# Patient Record
Sex: FEMALE | Race: WHITE | NOT HISPANIC OR LATINO | Employment: OTHER | URBAN - METROPOLITAN AREA
[De-identification: names, ages, dates, MRNs, and addresses within clinical notes are randomized per-mention and may not be internally consistent; named-entity substitution may affect disease eponyms.]

---

## 2017-01-05 ENCOUNTER — HOSPITAL ENCOUNTER (OUTPATIENT)
Dept: MAMMOGRAPHY | Facility: CLINIC | Age: 72
Discharge: HOME | End: 2017-01-05
Attending: FAMILY MEDICINE | Admitting: FAMILY MEDICINE
Payer: MEDICARE

## 2017-01-05 DIAGNOSIS — Z12.31 VISIT FOR SCREENING MAMMOGRAM: ICD-10-CM

## 2017-01-05 PROCEDURE — G0202 SCR MAMMO BI INCL CAD: HCPCS

## 2017-01-08 ENCOUNTER — HOSPITAL ENCOUNTER (EMERGENCY)
Facility: CLINIC | Age: 72
Discharge: HOME | End: 2017-01-08
Attending: EMERGENCY MEDICINE | Admitting: EMERGENCY MEDICINE
Payer: MEDICARE

## 2017-01-08 ENCOUNTER — APPOINTMENT (OUTPATIENT)
Dept: GENERAL RADIOLOGY | Facility: CLINIC | Age: 72
End: 2017-01-08
Attending: EMERGENCY MEDICINE
Payer: MEDICARE

## 2017-01-08 VITALS
DIASTOLIC BLOOD PRESSURE: 80 MMHG | TEMPERATURE: 98 F | SYSTOLIC BLOOD PRESSURE: 117 MMHG | RESPIRATION RATE: 16 BRPM | OXYGEN SATURATION: 96 % | HEART RATE: 77 BPM

## 2017-01-08 DIAGNOSIS — R06.02 SHORTNESS OF BREATH: ICD-10-CM

## 2017-01-08 DIAGNOSIS — R07.89 CHEST DISCOMFORT: ICD-10-CM

## 2017-01-08 DIAGNOSIS — R06.09 DOE (DYSPNEA ON EXERTION): ICD-10-CM

## 2017-01-08 LAB
ALBUMIN SERPL-MCNC: 3.9 G/DL (ref 3.4–5)
ALP SERPL-CCNC: 62 U/L (ref 40–150)
ALT SERPL W P-5'-P-CCNC: 58 U/L (ref 0–50)
ANION GAP SERPL CALCULATED.3IONS-SCNC: 8 MMOL/L (ref 3–14)
AST SERPL W P-5'-P-CCNC: 37 U/L (ref 0–45)
BASOPHILS # BLD AUTO: 0 10E9/L (ref 0–0.2)
BASOPHILS NFR BLD AUTO: 0.4 %
BILIRUB SERPL-MCNC: 0.3 MG/DL (ref 0.2–1.3)
BUN SERPL-MCNC: 14 MG/DL (ref 7–30)
CALCIUM SERPL-MCNC: 8.6 MG/DL (ref 8.5–10.1)
CHLORIDE SERPL-SCNC: 107 MMOL/L (ref 94–109)
CO2 SERPL-SCNC: 28 MMOL/L (ref 20–32)
CREAT SERPL-MCNC: 0.74 MG/DL (ref 0.52–1.04)
DIFFERENTIAL METHOD BLD: NORMAL
EOSINOPHIL # BLD AUTO: 0.1 10E9/L (ref 0–0.7)
EOSINOPHIL NFR BLD AUTO: 1.3 %
ERYTHROCYTE [DISTWIDTH] IN BLOOD BY AUTOMATED COUNT: 13.8 % (ref 10–15)
GFR SERPL CREATININE-BSD FRML MDRD: 78 ML/MIN/1.7M2
GLUCOSE SERPL-MCNC: 89 MG/DL (ref 70–99)
HCT VFR BLD AUTO: 36.6 % (ref 35–47)
HGB BLD-MCNC: 11.7 G/DL (ref 11.7–15.7)
IMM GRANULOCYTES # BLD: 0 10E9/L (ref 0–0.4)
IMM GRANULOCYTES NFR BLD: 0.3 %
LYMPHOCYTES # BLD AUTO: 1.8 10E9/L (ref 0.8–5.3)
LYMPHOCYTES NFR BLD AUTO: 26.1 %
MCH RBC QN AUTO: 29.8 PG (ref 26.5–33)
MCHC RBC AUTO-ENTMCNC: 32 G/DL (ref 31.5–36.5)
MCV RBC AUTO: 93 FL (ref 78–100)
MONOCYTES # BLD AUTO: 0.6 10E9/L (ref 0–1.3)
MONOCYTES NFR BLD AUTO: 8.1 %
NEUTROPHILS # BLD AUTO: 4.5 10E9/L (ref 1.6–8.3)
NEUTROPHILS NFR BLD AUTO: 63.8 %
PLATELET # BLD AUTO: 286 10E9/L (ref 150–450)
POTASSIUM SERPL-SCNC: 4 MMOL/L (ref 3.4–5.3)
PROT SERPL-MCNC: 6.6 G/DL (ref 6.8–8.8)
RBC # BLD AUTO: 3.93 10E12/L (ref 3.8–5.2)
SODIUM SERPL-SCNC: 143 MMOL/L (ref 133–144)
TROPONIN I SERPL-MCNC: NORMAL UG/L (ref 0–0.04)
WBC # BLD AUTO: 7 10E9/L (ref 4–11)

## 2017-01-08 PROCEDURE — 99284 EMERGENCY DEPT VISIT MOD MDM: CPT | Mod: 25 | Performed by: EMERGENCY MEDICINE

## 2017-01-08 PROCEDURE — 93005 ELECTROCARDIOGRAM TRACING: CPT

## 2017-01-08 PROCEDURE — 93010 ELECTROCARDIOGRAM REPORT: CPT | Performed by: EMERGENCY MEDICINE

## 2017-01-08 PROCEDURE — 85025 COMPLETE CBC W/AUTO DIFF WBC: CPT | Performed by: EMERGENCY MEDICINE

## 2017-01-08 PROCEDURE — 80053 COMPREHEN METABOLIC PANEL: CPT | Performed by: EMERGENCY MEDICINE

## 2017-01-08 PROCEDURE — 71020 XR CHEST 2 VW: CPT

## 2017-01-08 PROCEDURE — 84484 ASSAY OF TROPONIN QUANT: CPT | Performed by: EMERGENCY MEDICINE

## 2017-01-08 PROCEDURE — 99285 EMERGENCY DEPT VISIT HI MDM: CPT | Mod: 25

## 2017-01-08 NOTE — ED NOTES
"Pt presents to ED with shortness of breath. Pt notes she donated blood on Friday and hasn't felt right since (low hemoglobin going in). Pt feels like she can't get in a full breath. Pt also reports a pressure sensation across her chest. Pt denies pain. Eating and drinking normally. No change in bowel or bladder. No dizziness or lightheadedness, but she feels \"out-of-sorts\".     Recent change in meds: Doubled nexium dose around millicent.   "

## 2017-01-08 NOTE — ED PROVIDER NOTES
"  History     Chief Complaint   Patient presents with     Shortness of Breath     HPI     Kristen Thompson is a 71 year old female who presents to the ED today with complaints of shortness of breath after donating blood on Friday. She reports that she \"feels out of sorts\" and has shortness of breath even at rest. She has a history of an abnormal stress test in , noted below.  She takes Lasix when she experiences edema in her hands or ankles. She gets her weight daily and has not noticed any fluctuations in her weight. She denies any dizziness, cough, congestion, weight gain, edema, or palpitations.     Previous Records Reviewed:  Procedure: CT CORONARY ARTERY ANGIO    Examination Date: 2014 11:00 AM       IMPRESSION:     1. Total Agatston score 46.49, placing the patient in the 50 to 75th  percentile when compared to age and gender matched control group.     2. Mild two vessel coronary artery disease primarily involving the  LMCA,LAD, and RCA with no significant or flow limited stenosis.     3.  Please review Radiology report for incidental noncardiac findings  that  will follow separately.     FINDINGS:   CORONARY CALCIUM SCORE: The total Agatston calcium score is 46.49,  Left main: 32.21, left anterior descendin.2,  circumflex: 0, right  coronary artery: 13.09. This places the patient in the 50th to the  75th percentile when compared to age and gender matched control group.     CORONARY CT ANGIOGRAPHY     DOMINANCE: Right dominant system.       LEFT MAIN: The left main arises normally from the left coronary cusp  and is widely patent with mild mid to distal mixed plaque with minimal  narrowing.     LEFT ANTERIOR DESCENDING: The left anterior descending and its major  diagonal branches are widely patent with minimal diffuse scattered  soft plaque with no flow limiting stenosis.        CIRCUMFLEX: The circumflex and major branches are nondominant small  vessels with trivial soft plaque and no " stenosis     RIGHT CORONARY ARTERY: The right coronary artery is dominant, is  widely patent, and has mild narrowing with  trivial-mild mixed and  calcified plaque in the proximal segment.     ADDITIONAL FINDINGS:       The proximal ascending aorta is normal in size.       Normal pulmonary venous anatomy with all four pulmonary veins draining  into the left atrium.        There is no left ventricular mass or thrombus.       Normal pericardial thickness. There is no pericardial effusion.     The proximal pulmonary arteries are well opacified.     Please review Radiology report for incidental noncardiac findings that  will follow separately.   Pedro Cotter MD    No enlarged lymph nodes identified. The visible liver, spleen, and  left kidney are unremarkable. No acute osseous abnormality. Minimal  scattered bibasilar opacities corresponding to atelectasis. No  pulmonary masses.     JOSE MARTIN DALE MD    I have reviewed the Medications, Allergies, Past Medical and Surgical History, and Social History in the Epic system.    Patient Active Problem List   Diagnosis     GERD (gastroesophageal reflux disease)     CARDIOVASCULAR SCREENING; LDL GOAL LESS THAN 160     HL (hearing loss)     Health Care Home     Arthritis     Advanced directives, counseling/discussion     Hyperlipidemia LDL goal <130     Chronic constipation     Gastritis     Cervicalgia     Current Outpatient Prescriptions   Medication Sig Dispense Refill     vitamin B complex with vitamin C (VITAMIN  B COMPLEX) TABS tablet Take 1 tablet by mouth daily       Acetaminophen (TYLENOL PO) Take 1,000 mg by mouth every 4 hours as needed for mild pain or fever       esomeprazole (NEXIUM) 40 MG CR capsule Take 1 capsule (40 mg) by mouth every morning (before breakfast) Take 30-60 minutes before a eating. 30 capsule 1     fluticasone (FLONASE) 50 MCG/ACT spray Spray 1-2 sprays into both nostrils daily 16 g 11     simvastatin (ZOCOR) 40 MG tablet Take 1 tablet (40 mg)  by mouth At Bedtime 90 tablet 2     MAGNESIUM-POTASSIUM PO Take by mouth daily        aspirin 81 MG EC tablet Take 1 tablet (81 mg) by mouth daily 90 tablet 3     Red Yeast Rice 600 MG TABS Take 2 tablets by mouth daily       CALCIUM PO Take by mouth daily        Omega-3 Fatty Acids (OMEGA 3 PO) Take  by mouth.       Cholecalciferol (VITAMIN D PO) Take 1,000 Units by mouth daily        nitroglycerin (NITROSTAT) 0.4 MG sublingual tablet For chest pain place 1 tablet under the tongue every 5 minutes for 3 doses. If symptoms persist 5 minutes after 1st dose call 911. 25 tablet 0     furosemide (LASIX) 20 MG tablet Take 0.5 tablets (10 mg) by mouth daily as needed 30 tablet 0     cetirizine (ZYRTEC) 10 MG tablet Take 10 mg by mouth daily       Allergies   Allergen Reactions     Codeine Sulfate      Nausea and vomiting      Quinapril Difficulty breathing     Darvon-N [Propoxyphene Napsylate] Nausea and Vomiting       Review of Systems   Constitutional: Negative for unexpected weight change.   HENT: Negative for congestion.    Respiratory: Positive for chest tightness and shortness of breath. Negative for cough.    Cardiovascular: Positive for chest pain. Negative for palpitations and leg swelling.   Neurological: Negative for dizziness.       Physical Exam     BP: 117/78 mmHg  Pulse: 77  Heart Rate: 77  Temp: 98  F (36.7  C)  Resp: 20  SpO2: 98 %    Physical Exam     Head:  Normocephalic.    Eyes:  PERRLA, full EOM.  External exams normal.    Ears:  Normal pinnae, canals, and TM's.    Nose:  Patent, without deformity.    Throat:  Moist mucous membranes without lesions, erythema, or exudate.    Neck:  Supple, without masses, lymphadenopathy or tenderness.    Respiratory:  Normal respiratory effort.  Lungs are clear with good breath sounds.    Heart:  RR without murmurs, rubs, or gallops.    Chest: Nontender and without deformity.  Extremities: Normal perfusion.  No edema    ED Course   Procedures        EKG: Normal sinus  rhythm.  No ectopy.  Normal repolarization.  Rate 64 bpm.  Comparison EKG completed 2015 shows no change.         Results for orders placed or performed during the hospital encounter of 01/08/17   XR Chest 2 Views    Narrative    CHEST TWO VIEWS    1/8/2017 5:10 PM     HISTORY: Dyspnea on exertion.    COMPARISON: 12/31/2015.      Impression    IMPRESSION: Normal.     TYREE BRIZUELA MD   CBC with platelets differential   Result Value Ref Range    WBC 7.0 4.0 - 11.0 10e9/L    RBC Count 3.93 3.8 - 5.2 10e12/L    Hemoglobin 11.7 11.7 - 15.7 g/dL    Hematocrit 36.6 35.0 - 47.0 %    MCV 93 78 - 100 fl    MCH 29.8 26.5 - 33.0 pg    MCHC 32.0 31.5 - 36.5 g/dL    RDW 13.8 10.0 - 15.0 %    Platelet Count 286 150 - 450 10e9/L    Diff Method Automated Method     % Neutrophils 63.8 %    % Lymphocytes 26.1 %    % Monocytes 8.1 %    % Eosinophils 1.3 %    % Basophils 0.4 %    % Immature Granulocytes 0.3 %    Absolute Neutrophil 4.5 1.6 - 8.3 10e9/L    Absolute Lymphocytes 1.8 0.8 - 5.3 10e9/L    Absolute Monocytes 0.6 0.0 - 1.3 10e9/L    Absolute Eosinophils 0.1 0.0 - 0.7 10e9/L    Absolute Basophils 0.0 0.0 - 0.2 10e9/L    Abs Immature Granulocytes 0.0 0 - 0.4 10e9/L   Comprehensive metabolic panel   Result Value Ref Range    Sodium 143 133 - 144 mmol/L    Potassium 4.0 3.4 - 5.3 mmol/L    Chloride 107 94 - 109 mmol/L    Carbon Dioxide 28 20 - 32 mmol/L    Anion Gap 8 3 - 14 mmol/L    Glucose 89 70 - 99 mg/dL    Urea Nitrogen 14 7 - 30 mg/dL    Creatinine 0.74 0.52 - 1.04 mg/dL    GFR Estimate 78 >60 mL/min/1.7m2    GFR Estimate If Black >90   GFR Calc   >60 mL/min/1.7m2    Calcium 8.6 8.5 - 10.1 mg/dL    Bilirubin Total 0.3 0.2 - 1.3 mg/dL    Albumin 3.9 3.4 - 5.0 g/dL    Protein Total 6.6 (L) 6.8 - 8.8 g/dL    Alkaline Phosphatase 62 40 - 150 U/L    ALT 58 (H) 0 - 50 U/L    AST 37 0 - 45 U/L   Troponin I   Result Value Ref Range    Troponin I ES  0.000 - 0.045 ug/L     <0.015  The 99th percentile for upper  reference range is 0.045 ug/L.  Troponin values in   the range of 0.045 - 0.120 ug/L may be associated with risks of adverse   clinical events.           4:06 PM Patient Assessed        Assessments & Plan (with Medical Decision Making)  71-year-old female presents with dyspnea and vague chest discomfort.  Present since she donated one unit of blood a few days ago.  Symptoms do not escalate with activity.  She is noted no palpitations.  No recent chest congestion or cough.  No leg swelling.  No pleuritic type chest pain.    Her examination noted no infectious concern.  There is no signs for DVT/PE.  No indications for CHF.  She was noted not to be anemic her hemoglobin is down 2 g which I presume is secondary to her recent donating blood in the past week.  I do not believe this declining hemoglobin would be sufficient to cause her symptoms of dyspnea and chest discomfort.    The patient had a CT cholangiogram completed 2 and half years ago in June 2014.  At that time she had early RCA and LAD disease which they felt should be managed medically with aspirin and statin therapy.  Patient has been compliant with recommendations including exercise and weight loss though in recent months stopped statin therapy can she thought she didn't need it due to weight loss.    At the time of discharge she was having no symptoms.  No chest discomfort or dyspnea.  Appeared anxious.  Her initial troponin was normal and a repeat was not felt necessary because her symptoms had been present for a few days.  EKG was also normal.    Patient was scheduled for Lexiscan/myocardial perfusion scan.  She was advised and her current medications including aspirin.  Advised to return to emergency if she has any further chest discomfort or escorting symptoms of dyspnea.  She routinely checks her weight daily.  Advised to continue doing so.  At this time not show any fluid retention.    Advised to follow-up with her primary clinic provider to go over  Lexiscan results .     I have reviewed the nursing notes.    I have reviewed the findings, diagnosis, plan and need for follow up with the patient.    Discharge Medication List as of 1/8/2017  5:54 PM          Final diagnoses:   Chest discomfort   Shortness of breath   HERNANDEZ (dyspnea on exertion)     This document serves as a record of the services and decisions personally performed and made by Frederick Santiago, *. It was created on HIS/HER behalf by Gia Montez, a trained medical scribe. The creation of this document is based the provider's statements to the medical scribe.  Gia Montez   4:07 PM 1/8/2017    Provider:   The information in this document, created by the medical scribe for me, accurately reflects the services I personally performed and the decisions made by me. I have reviewed and approved this document for accuracy prior to leaving the patient care area.  Frederick Snatiago, * 4:07 PM 1/8/2017 1/8/2017   [facility], [place] EMERGENCY DEPARTMENT      Frederick Santiago, DO  01/08/17 1753    Frederick Santiago, DO  01/13/17 0011

## 2017-01-08 NOTE — ED AVS SNAPSHOT
[facility] Emergency Department    [address] [address]    Phone:  [phone]    Fax:  [phone]                                       Kristen Thompson   MRN: [id number]    Department:  [facility] Emergency Department   Date of Visit:  1/8/2017           Patient Information     Date Of Birth          1945        Your diagnoses for this visit were:     Chest discomfort     Shortness of breath        You were seen by Frederick Santiago DO.        Discharge Instructions       Continue with current medications  Maintain good hydration  Your symptoms cannot entirely be assigned to recent donation of blood.  Current hemoglobin still remains greater than 11.    Chest x-ray is normal  EKG shows no acute cardiac concerns  Cardiac enzyme-troponin was normal  Hgb is 2 grams lower then your normal- related to recent blood donation. Not low enough to cause your symptoms.    Review of your CT coronary angiogram from June 2014 showed evidence for early coronary artery disease involving the right coronary artery and the left anterior descending coronary artery.  At that time there was no flow limiting concerns and therefore they elected to treat with medical management-aspirin and statin therapy.    With 2-1/2 years passing since last study evaluating her coronary arteries now having the development of intermittent chest discomfort with shortness of breath-I recommend arranging for repeat cardiac stress test/perfusion scan.    While waiting to complete the cardiac stress priyanka- if you have symptoms that continue to escalate please return to the emergency room.    Upon completion of your cardiac stress test-Lexiscan.  Contact your primary clinic provider to discuss results.    24 Hour Appointment Hotline       To make an appointment at any [facility], call [phone] ([phone]). If you don't have a family doctor or clinic, we will help you find one. [facility] are  conveniently located to serve the needs of you and your family.          ED Discharge Orders     NM Lexiscan stress test       The type of stress to be performed (pharmacologic or physical) will be at the discretion of the supervising physician as per department protocol.                     Review of your medicines      Our records show that you are taking the medicines listed below. If these are incorrect, please call your family doctor or clinic.        Dose / Directions Last dose taken    aspirin 81 MG EC tablet   Dose:  81 mg   Quantity:  90 tablet        Take 1 tablet (81 mg) by mouth daily   Refills:  3        CALCIUM PO        Take by mouth daily   Refills:  0        cetirizine 10 MG tablet   Commonly known as:  zyrTEC   Dose:  10 mg        Take 10 mg by mouth daily   Refills:  0        esomeprazole 40 MG CR capsule   Commonly known as:  nexIUM   Dose:  40 mg   Quantity:  30 capsule        Take 1 capsule (40 mg) by mouth every morning (before breakfast) Take 30-60 minutes before a eating.   Refills:  1        fluticasone 50 MCG/ACT spray   Commonly known as:  FLONASE   Dose:  1-2 spray   Quantity:  16 g        Spray 1-2 sprays into both nostrils daily   Refills:  11        furosemide 20 MG tablet   Commonly known as:  LASIX   Dose:  10 mg   Quantity:  30 tablet        Take 0.5 tablets (10 mg) by mouth daily as needed   Refills:  0        MAGNESIUM-POTASSIUM PO        Take by mouth daily   Refills:  0        OMEGA 3 PO        Take  by mouth.   Refills:  0        Red Yeast Rice 600 MG Tabs   Dose:  2 tablet        Take 2 tablets by mouth daily   Refills:  0        simvastatin 40 MG tablet   Commonly known as:  ZOCOR   Dose:  40 mg   Quantity:  90 tablet        Take 1 tablet (40 mg) by mouth At Bedtime   Refills:  2        TYLENOL PO   Dose:  1000 mg        Take 1,000 mg by mouth every 4 hours as needed for mild pain or fever   Refills:  0        vitamin B complex with vitamin C Tabs tablet   Dose:  1 tablet         Take 1 tablet by mouth daily   Refills:  0        VITAMIN D PO   Dose:  1000 Units        Take 1,000 Units by mouth daily   Refills:  0                Procedures and tests performed during your visit     CBC with platelets differential    Comprehensive metabolic panel    EKG 12-lead, tracing only    Troponin I    XR Chest 2 Views      Orders Needing Specimen Collection     None      Pending Results     No orders found from 1/7/2017 to 1/9/2017.            Pending Culture Results     No orders found from 1/7/2017 to 1/9/2017.       Test Results from your hospital stay           1/8/2017  4:06 PM - Interface, Flexilab Results      Component Results     Component Value Ref Range & Units Status    WBC 7.0 4.0 - 11.0 10e9/L Final    RBC Count 3.93 3.8 - 5.2 10e12/L Final    Hemoglobin 11.7 11.7 - 15.7 g/dL Final    Hematocrit 36.6 35.0 - 47.0 % Final    MCV 93 78 - 100 fl Final    MCH 29.8 26.5 - 33.0 pg Final    MCHC 32.0 31.5 - 36.5 g/dL Final    RDW 13.8 10.0 - 15.0 % Final    Platelet Count 286 150 - 450 10e9/L Final    Diff Method Automated Method  Final    % Neutrophils 63.8 % Final    % Lymphocytes 26.1 % Final    % Monocytes 8.1 % Final    % Eosinophils 1.3 % Final    % Basophils 0.4 % Final    % Immature Granulocytes 0.3 % Final    Absolute Neutrophil 4.5 1.6 - 8.3 10e9/L Final    Absolute Lymphocytes 1.8 0.8 - 5.3 10e9/L Final    Absolute Monocytes 0.6 0.0 - 1.3 10e9/L Final    Absolute Eosinophils 0.1 0.0 - 0.7 10e9/L Final    Absolute Basophils 0.0 0.0 - 0.2 10e9/L Final    Abs Immature Granulocytes 0.0 0 - 0.4 10e9/L Final         1/8/2017  4:21 PM - Interface, Flexilab Results      Component Results     Component Value Ref Range & Units Status    Sodium 143 133 - 144 mmol/L Final    Potassium 4.0 3.4 - 5.3 mmol/L Final    Chloride 107 94 - 109 mmol/L Final    Carbon Dioxide 28 20 - 32 mmol/L Final    Anion Gap 8 3 - 14 mmol/L Final    Glucose 89 70 - 99 mg/dL Final    Urea Nitrogen 14 7 - 30 mg/dL Final     Creatinine 0.74 0.52 - 1.04 mg/dL Final    GFR Estimate 78 >60 mL/min/1.7m2 Final    Non  GFR Calc    GFR Estimate If Black >90   GFR Calc   >60 mL/min/1.7m2 Final    Calcium 8.6 8.5 - 10.1 mg/dL Final    Bilirubin Total 0.3 0.2 - 1.3 mg/dL Final    Albumin 3.9 3.4 - 5.0 g/dL Final    Protein Total 6.6 (L) 6.8 - 8.8 g/dL Final    Alkaline Phosphatase 62 40 - 150 U/L Final    ALT 58 (H) 0 - 50 U/L Final    AST 37 0 - 45 U/L Final         1/8/2017  4:21 PM - Interface, Flexilab Results      Component Results     Component Value Ref Range & Units Status    Troponin I ES  0.000 - 0.045 ug/L Final    <0.015  The 99th percentile for upper reference range is 0.045 ug/L.  Troponin values in   the range of 0.045 - 0.120 ug/L may be associated with risks of adverse   clinical events.           1/8/2017  5:54 PM - Interface, Radiant Ib      Narrative     CHEST TWO VIEWS    1/8/2017 5:10 PM     HISTORY: Dyspnea on exertion.    COMPARISON: 12/31/2015.        Impression     IMPRESSION: Normal.     TYREE BRIZUELA MD                Thank you for choosing [facility]       Thank you for choosing [facility] for your care. Our goal is always to provide you with excellent care. Hearing back from our patients is one way we can continue to improve our services. Please take a few minutes to complete the written survey that you may receive in the mail after you visit with us. Thank you!        [email or web address] Information     [employer] gives you secure access to your electronic health record. If you see a primary care provider, you can also send messages to your care team and make appointments. If you have questions, please call your primary care clinic.  If you do not have a primary care provider, please call [phone] and they will assist you.        Care EveryWhere ID     This is your Care EveryWhere ID. This could be used by other organizations to access your [facility] medical records  [id number]        After Visit  Summary       This is your record. Keep this with you and show to your community pharmacist(s) and doctor(s) at your next visit.

## 2017-01-08 NOTE — DISCHARGE INSTRUCTIONS
Continue with current medications  Maintain good hydration  Your symptoms cannot entirely be assigned to recent donation of blood.  Current hemoglobin still remains greater than 11.    Chest x-ray is normal  EKG shows no acute cardiac concerns  Cardiac enzyme-troponin was normal  Hgb is 2 grams lower then your normal- related to recent blood donation. Not low enough to cause your symptoms.    Review of your CT coronary angiogram from June 2014 showed evidence for early coronary artery disease involving the right coronary artery and the left anterior descending coronary artery.  At that time there was no flow limiting concerns and therefore they elected to treat with medical management-aspirin and statin therapy.    With 2-1/2 years passing since last study evaluating her coronary arteries now having the development of intermittent chest discomfort with shortness of breath-I recommend arranging for repeat cardiac stress test/perfusion scan.    While waiting to complete the cardiac stress priyanka- if you have symptoms that continue to escalate please return to the emergency room.    Upon completion of your cardiac stress test-Lexiscan.  Contact your primary clinic provider to discuss results.

## 2017-01-08 NOTE — ED AVS SNAPSHOT
[facility] Emergency Department    [address] [address]    Phone:  [phone]    Fax:  [phone]                                       Kristen Thompson   MRN: [id number]    Department:  [facility] Emergency Department   Date of Visit:  1/8/2017           After Visit Summary Signature Page     I have received my discharge instructions, and my questions have been answered. I have discussed any challenges I see with this plan with the nurse or doctor.    ..........................................................................................................................................  Patient/Patient Representative Signature      ..........................................................................................................................................  Patient Representative Print Name and Relationship to Patient    ..................................................               ................................................  Date                                            Time    ..........................................................................................................................................  Reviewed by Signature/Title    ...................................................              ..............................................  Date                                                            Time

## 2017-01-12 ENCOUNTER — OFFICE VISIT (OUTPATIENT)
Dept: FAMILY MEDICINE | Facility: CLINIC | Age: 72
End: 2017-01-12
Payer: COMMERCIAL

## 2017-01-12 VITALS
HEART RATE: 70 BPM | DIASTOLIC BLOOD PRESSURE: 87 MMHG | HEIGHT: 62 IN | TEMPERATURE: 98.6 F | OXYGEN SATURATION: 97 % | BODY MASS INDEX: 28.16 KG/M2 | WEIGHT: 153 LBS | SYSTOLIC BLOOD PRESSURE: 147 MMHG

## 2017-01-12 DIAGNOSIS — R07.9 ACUTE CHEST PAIN: Primary | ICD-10-CM

## 2017-01-12 DIAGNOSIS — R68.84 JAW PAIN: ICD-10-CM

## 2017-01-12 LAB
ALBUMIN SERPL-MCNC: 3.9 G/DL (ref 3.4–5)
ALP SERPL-CCNC: 62 U/L (ref 40–150)
ALT SERPL W P-5'-P-CCNC: 65 U/L (ref 0–50)
ANION GAP SERPL CALCULATED.3IONS-SCNC: 9 MMOL/L (ref 3–14)
AST SERPL W P-5'-P-CCNC: 35 U/L (ref 0–45)
BASOPHILS # BLD AUTO: 0 10E9/L (ref 0–0.2)
BASOPHILS NFR BLD AUTO: 0.5 %
BILIRUB SERPL-MCNC: 0.3 MG/DL (ref 0.2–1.3)
BUN SERPL-MCNC: 12 MG/DL (ref 7–30)
CALCIUM SERPL-MCNC: 8.7 MG/DL (ref 8.5–10.1)
CHLORIDE SERPL-SCNC: 106 MMOL/L (ref 94–109)
CO2 SERPL-SCNC: 27 MMOL/L (ref 20–32)
CREAT SERPL-MCNC: 0.75 MG/DL (ref 0.52–1.04)
D DIMER PPP FEU-MCNC: 0.3 UG/ML FEU (ref 0–0.5)
DIFFERENTIAL METHOD BLD: ABNORMAL
EOSINOPHIL # BLD AUTO: 0.1 10E9/L (ref 0–0.7)
EOSINOPHIL NFR BLD AUTO: 1.4 %
ERYTHROCYTE [DISTWIDTH] IN BLOOD BY AUTOMATED COUNT: 14.2 % (ref 10–15)
GFR SERPL CREATININE-BSD FRML MDRD: 77 ML/MIN/1.7M2
GLUCOSE SERPL-MCNC: 98 MG/DL (ref 70–99)
HCT VFR BLD AUTO: 36.4 % (ref 35–47)
HGB BLD-MCNC: 11.5 G/DL (ref 11.7–15.7)
LYMPHOCYTES # BLD AUTO: 1.5 10E9/L (ref 0.8–5.3)
LYMPHOCYTES NFR BLD AUTO: 27.3 %
MCH RBC QN AUTO: 30.1 PG (ref 26.5–33)
MCHC RBC AUTO-ENTMCNC: 31.6 G/DL (ref 31.5–36.5)
MCV RBC AUTO: 95 FL (ref 78–100)
MONOCYTES # BLD AUTO: 0.6 10E9/L (ref 0–1.3)
MONOCYTES NFR BLD AUTO: 10.3 %
NEUTROPHILS # BLD AUTO: 3.4 10E9/L (ref 1.6–8.3)
NEUTROPHILS NFR BLD AUTO: 60.5 %
PLATELET # BLD AUTO: 296 10E9/L (ref 150–450)
POTASSIUM SERPL-SCNC: 3.9 MMOL/L (ref 3.4–5.3)
PROT SERPL-MCNC: 6.5 G/DL (ref 6.8–8.8)
RBC # BLD AUTO: 3.82 10E12/L (ref 3.8–5.2)
SODIUM SERPL-SCNC: 142 MMOL/L (ref 133–144)
TROPONIN I SERPL-MCNC: NORMAL UG/L (ref 0–0.04)
WBC # BLD AUTO: 5.7 10E9/L (ref 4–11)

## 2017-01-12 PROCEDURE — 80053 COMPREHEN METABOLIC PANEL: CPT | Performed by: FAMILY MEDICINE

## 2017-01-12 PROCEDURE — 85379 FIBRIN DEGRADATION QUANT: CPT | Performed by: FAMILY MEDICINE

## 2017-01-12 PROCEDURE — 93000 ELECTROCARDIOGRAM COMPLETE: CPT | Performed by: FAMILY MEDICINE

## 2017-01-12 PROCEDURE — 84484 ASSAY OF TROPONIN QUANT: CPT | Performed by: FAMILY MEDICINE

## 2017-01-12 PROCEDURE — 36415 COLL VENOUS BLD VENIPUNCTURE: CPT | Performed by: FAMILY MEDICINE

## 2017-01-12 PROCEDURE — 85025 COMPLETE CBC W/AUTO DIFF WBC: CPT | Performed by: FAMILY MEDICINE

## 2017-01-12 PROCEDURE — 99214 OFFICE O/P EST MOD 30 MIN: CPT | Performed by: FAMILY MEDICINE

## 2017-01-12 RX ORDER — NITROGLYCERIN 0.4 MG/1
TABLET SUBLINGUAL
Qty: 25 TABLET | Refills: 0 | Status: SHIPPED | OUTPATIENT
Start: 2017-01-12 | End: 2018-01-11

## 2017-01-12 NOTE — MR AVS SNAPSHOT
After Visit Summary   1/12/2017    Kristen Thompson    MRN: [id number]           Patient Information     Date Of Birth          1945        Visit Information        Provider Department      1/12/2017 2:30 PM Janett Morse MD [facility]        Today's Diagnoses     Acute chest pain    -  1     Jaw pain            Follow-ups after your visit        Your next 10 appointments already scheduled     Jan 16, 2017 12:00 PM   NM MPI WITH LEXISCAN with WYNM2, WY STRESS TEST   [facility] ([facility], [place])    [address] [address]   [phone]           For a ONE day exam: Allow 3-4 hours for test. For a TWO day exam: Allow 2 hours PER day for test.  You may need to stop some medicines before the test. Follow your doctor s orders. - If you take a beta blocker: Follow your doctor s specific instructions on taking it prior to and on the day of your exam. - If you take Aggrenox or dipyridamole (Persantine, Permole), stop taking it 48 hours before your test. - If you take Viagra, Cialis or Levitra, stop taking it 48 hours before your test. - If you take theophylline or aminophylline, stop taking it 12 hours before your test.  For patients with diabetes: - If you take insulin, call your diabetes care team. Ask if you should take a 1/2 dose the morning of your test. - If you take diabetes medicine by mouth, don t take it on the morning of your test. Bring it with you to take after the test. (If you have questions, call your diabetes care team.)  Do not take nitrates on the day of your test. Do not wear your Nitro-Patch.  Stop all caffeine 12 hours before the test. This includes coffee, tea, soda pop, chocolate and certain medicines (such as Anacin, Excedrin and NoDoz). Also avoid decaf coffee and tea, as these contain small amounts of caffeine.  No alcohol, smoking or other tobacco for 12 hours before the test.  Stop eating 3 hours  "before the test. You may drink water.  Please wear a loose two-piece outfit. If you will have an exercise test, bring rubber-soled walking shoes.  When you arrive, please tell us if you: - Have diabetes - Are breastfeeding - May be pregnant - Have a pacemaker of ICD (implantable defibrillator).  Please call your Imaging Department at your exam site with any questions.              Future tests that were ordered for you today     Open Future Orders        Priority Expected Expires Ordered    Zio Patch Holter Routine  2/26/2017 1/12/2017            Who to contact     Normal or non-critical lab and imaging results will be communicated to you by [employer], letter or phone within 4 business days after the clinic has received the results. If you do not hear from us within 7 days, please contact the clinic through [employer] or phone. If you have a critical or abnormal lab result, we will notify you by phone as soon as possible.  Submit refill requests through Storie or call your pharmacy and they will forward the refill request to us. Please allow 3 business days for your refill to be completed.          If you need to speak with a  for additional information , please call: [phone]             Additional Information About Your Visit        Storie Information     Storie gives you secure access to your electronic health record. If you see a primary care provider, you can also send messages to your care team and make appointments. If you have questions, please call your primary care clinic.  If you do not have a primary care provider, please call [phone] and they will assist you.        Care EveryWhere ID     This is your Care EveryWhere ID. This could be used by other organizations to access your [place] medical records  [id number]        Your Vitals Were     Pulse Temperature Height BMI (Body Mass Index) Pulse Oximetry       70 98.6  F (37  C) (Tympanic) 5' 2\" (1.575 m) 27.98 kg/m2 97%        " Blood Pressure from Last 3 Encounters:   01/12/17 147/87   01/08/17 117/80   12/06/16 122/87    Weight from Last 3 Encounters:   01/12/17 153 lb (69.4 kg)   12/06/16 147 lb (66.679 kg)   09/16/16 147 lb (66.679 kg)              We Performed the Following     CBC with platelets differential     Comprehensive metabolic panel (BMP + Alb, Alk Phos, ALT, AST, Total. Bili, TP)     D dimer, quantitative     EKG 12-lead complete w/read - Clinics     Troponin I          Today's Medication Changes          These changes are accurate as of: 1/12/17  3:30 PM.  If you have any questions, ask your nurse or doctor.               Start taking these medicines.        Dose/Directions    nitroglycerin 0.4 MG sublingual tablet   Commonly known as:  NITROSTAT   Used for:  Jaw pain, Acute chest pain   Started by:  Janett Morse MD        For chest pain place 1 tablet under the tongue every 5 minutes for 3 doses. If symptoms persist 5 minutes after 1st dose call 911.   Quantity:  25 tablet   Refills:  0            Where to get your medicines      These medications were sent to [facility] [address] [facility]  [address] [facility] [address]     Phone:  [phone]    - nitroglycerin 0.4 MG sublingual tablet             Primary Care Provider Office Phone # Fax #    Janett Morse [phone]       [address] [address]        Thank you!     Thank you for choosing [facility]  for your care. Our goal is always to provide you with excellent care. Hearing back from our patients is one way we can continue to improve our services. Please take a few minutes to complete the written survey that you may receive in the mail after your visit with us. Thank you!             Your Updated Medication List - Protect others around you: Learn how to safely use, store and throw away your medicines at www.disposemymeds.org.          This list is  accurate as of: 1/12/17  3:30 PM.  Always use your most recent med list.                   Brand Name Dispense Instructions for use    aspirin 81 MG EC tablet     90 tablet    Take 1 tablet (81 mg) by mouth daily       CALCIUM PO      Take by mouth daily       cetirizine 10 MG tablet    zyrTEC     Take 10 mg by mouth daily       esomeprazole 40 MG CR capsule    nexIUM    30 capsule    Take 1 capsule (40 mg) by mouth every morning (before breakfast) Take 30-60 minutes before a eating.       fluticasone 50 MCG/ACT spray    FLONASE    16 g    Spray 1-2 sprays into both nostrils daily       furosemide 20 MG tablet    LASIX    30 tablet    Take 0.5 tablets (10 mg) by mouth daily as needed       MAGNESIUM-POTASSIUM PO      Take by mouth daily       nitroglycerin 0.4 MG sublingual tablet    NITROSTAT    25 tablet    For chest pain place 1 tablet under the tongue every 5 minutes for 3 doses. If symptoms persist 5 minutes after 1st dose call 911.       OMEGA 3 PO      Take  by mouth.       Red Yeast Rice 600 MG Tabs      Take 2 tablets by mouth daily       simvastatin 40 MG tablet    ZOCOR    90 tablet    Take 1 tablet (40 mg) by mouth At Bedtime       TYLENOL PO      Take 1,000 mg by mouth every 4 hours as needed for mild pain or fever       vitamin B complex with vitamin C Tabs tablet      Take 1 tablet by mouth daily       VITAMIN D PO      Take 1,000 Units by mouth daily

## 2017-01-12 NOTE — PROGRESS NOTES
"  SUBJECTIVE:                                                    Kristen Thompson is a 71 year old female who presents to clinic today for the following health issues:      ED/UC Followup:    Facility:  [facility]   Date of visit: 01/08/2017  Reason for visit: chest congestion   Current Status: Patient said she is still having chest discomfert and now having jaw pain.      In the ER - 1/8/17  Symptoms of chest pain - normal labs, trop, cxr, and ecg. Normal tele. Sent home with plan to f/u for stress echo - has it scheduled in 3 days   Donated blood the day before she had the chest pain     Having jaw pain right now - for 2 days  Still having chest heaviness - for 5 days - since before ER visit. It feels the same   \"doesn't feel right\"  Denies sob  No dizziness/lightheadedness  No nausea  More tired in the afternoons - taking daily naps   Feels like heart rate becomes abnormal sometimes - can't quantify this statement further   Lives alone     Review of systems:  No f/c  No cough or wheeze  no n/v   No d/c   Chronic neck pain, says this could be her neck pain - has been trying to cut down on her nsaid use.     Component      Latest Ref Rng 1/8/2017   WBC      4.0 - 11.0 10e9/L 7.0   RBC Count      3.8 - 5.2 10e12/L 3.93   Hemoglobin      11.7 - 15.7 g/dL 11.7   Hematocrit      35.0 - 47.0 % 36.6   MCV      78 - 100 fl 93   MCH      26.5 - 33.0 pg 29.8   MCHC      31.5 - 36.5 g/dL 32.0   RDW      10.0 - 15.0 % 13.8   Platelet Count      150 - 450 10e9/L 286   Diff Method       Automated Method   % Neutrophils       63.8   % Lymphocytes       26.1   % Monocytes       8.1   % Eosinophils       1.3   % Basophils       0.4   % Immature Granulocytes       0.3   Absolute Neutrophil      1.6 - 8.3 10e9/L 4.5   Absolute Lymphocytes      0.8 - 5.3 10e9/L 1.8   Absolute Monocytes      0.0 - 1.3 10e9/L 0.6   Absolute Eosinophils      0.0 - 0.7 10e9/L 0.1   Absolute Basophils      0.0 - 0.2 10e9/L 0.0   Abs " Immature Granulocytes      0 - 0.4 10e9/L 0.0   Sodium      133 - 144 mmol/L 143   Potassium      3.4 - 5.3 mmol/L 4.0   Chloride      94 - 109 mmol/L 107   Carbon Dioxide      20 - 32 mmol/L 28   Anion Gap      3 - 14 mmol/L 8   Glucose      70 - 99 mg/dL 89   Urea Nitrogen      7 - 30 mg/dL 14   Creatinine      0.52 - 1.04 mg/dL 0.74   GFR Estimate      >60 mL/min/1.7m2 78   GFR Estimate If Black      >60 mL/min/1.7m2 >90 . . .   Calcium      8.5 - 10.1 mg/dL 8.6   Bilirubin Total      0.2 - 1.3 mg/dL 0.3   Albumin      3.4 - 5.0 g/dL 3.9   Protein Total      6.8 - 8.8 g/dL 6.6 (L)   Alkaline Phosphatase      40 - 150 U/L 62   ALT      0 - 50 U/L 58 (H)   AST      0 - 45 U/L 37   Troponin I ES      0.000 - 0.045 ug/L <0.015 . . .              Procedure: CT CORONARY ARTERY ANGIO    Examination Date: 6/23/2014 11:00 AM       Indication:  Abnormal stress test.       Clinical Information: Exertional chest pain    Ordering Physician: Danyell       Overall quality of the study: Good.       PROCEDURE:After obtaining informed consent, the patient was positioned  in the scanner gantry and an IV was started using an 18 gauge IV in  the right antecubital fossa.  Utilizing 105 cc  Isovue 370, wasted 0  cc, multi-slice computed tomography was performed with a Siemens Dual  Source Flash scanner without incident. Beta-blockers were required to  optimize heart rate, patient was given Metoprolol 50 mg Oral,  Metoprolol 0 mg  IV. The patient was given pre-medication of  sublingual Nitrostat 0.4 mg prior to scanning. Coronary artery calcium  score was performed using the Flash scanner protocol. CTA was  performed in the sequential mode at a heart rate of 63 bpm with 100  kVp. Images were reconstructed and analyzed on a DNA Response  workstation. Scan protocol was optimized to minimize radiation  exposure. The total radiation exposure including calcium score was  calculated to be 199 DLP, and 2.8  mSv.     IMPRESSION  IMPRESSION:     1. Total Agatston score 46.49, placing the patient in the 50 to 75th  percentile when compared to age and gender matched control group.     2. Mild two vessel coronary artery disease primarily involving the  LMCA,LAD, and RCA with no significant or flow limited stenosis.     3.  Please review Radiology report for incidental noncardiac findings  that  will follow separately.    CHEST TWO VIEWS    1/8/2017 5:10 PM    HISTORY: Dyspnea on exertion.  COMPARISON: 12/31/2015.                                                              IMPRESSION: Normal.    TYREE BRIZUELA MD      GERD  nexium is working well for her  No heartburn symptoms       Problem list and histories reviewed & adjusted, as indicated.  Additional history: as documented    Patient Active Problem List   Diagnosis     GERD (gastroesophageal reflux disease)     CARDIOVASCULAR SCREENING; LDL GOAL LESS THAN 160     HL (hearing loss)     Health Care Home     Arthritis     Advanced directives, counseling/discussion     Hyperlipidemia LDL goal <130     Chronic constipation     Gastritis     Cervicalgia     Current Outpatient Prescriptions   Medication     vitamin B complex with vitamin C (VITAMIN  B COMPLEX) TABS tablet     Acetaminophen (TYLENOL PO)     esomeprazole (NEXIUM) 40 MG CR capsule     fluticasone (FLONASE) 50 MCG/ACT spray     simvastatin (ZOCOR) 40 MG tablet     MAGNESIUM-POTASSIUM PO     aspirin 81 MG EC tablet     furosemide (LASIX) 20 MG tablet     cetirizine (ZYRTEC) 10 MG tablet     Red Yeast Rice 600 MG TABS     CALCIUM PO     Omega-3 Fatty Acids (OMEGA 3 PO)     Cholecalciferol (VITAMIN D PO)     No current facility-administered medications for this visit.        Allergies   Allergen Reactions     Codeine Sulfate      Nausea and vomiting      Quinapril Difficulty breathing     Darvon-N [Propoxyphene Napsylate] Nausea and Vomiting     /87 mmHg  Pulse 70  Temp(Src) 98.6  F (37  C) (Tympanic)  Ht 5'  "2\" (1.575 m)  Wt 153 lb (69.4 kg)  BMI 27.98 kg/m2  SpO2 97%  GENERAL - Pt is alert and oriented in no acute distress.  Affect is appropriate. Good eye contact.  HEET - Head is normocephalic, atraumatic.    PERRLA,EEMI. Conjunctiva are free of icterus or erythema.   Oropharynx free of masses and lesions, no tonsillar exudate or petechiae.    NECK - Neck is supple w/o LA or thyromegaly  RESPIRATORY - Clear to auscultation bilaterally.  No wheezing noted  CV - RRR, no murmurs, rubs, gallops.   EXTREM - No edema.    ecg - NSR at 67bpm. No st changes. Similar to ecg done on 1/8/17      Assessment/Plan -  (R07.9) Acute chest pain  (primary encounter diagnosis)  Comment: Negative w/u in the ER after having symptoms for a few days. She says symptoms are similar today with the exception of the jaw pain. No other cardiac type symptoms. ECG today looks good. Exam and vitals are normal. I recommended she go to ER for immediate ddimer/trop, tele. She adamantly refused, said this was so similar to full negative w/u in the ER and didn't want to spend another night there.  She agreed to stat labs and if they are abnormal, then to ER.  I gave her nitro. If she needs to take one, should call 911.  She agrees.  Holter monitor ordered - she will do stress test first. Take it easy over the weekend. The patient indicates understanding of these issues and agrees with the plan.   Plan: EKG 12-lead complete w/read - Clinics, Troponin        I, D dimer, quantitative, CBC with platelets         differential, Comprehensive metabolic panel         (BMP + Alb, Alk Phos, ALT, AST, Total. Bili,         TP), nitroglycerin (NITROSTAT) 0.4 MG         sublingual tablet, Zio Patch Holter            (R68.84) Jaw pain  Comment:   Plan: EKG 12-lead complete w/read - Clinics, Troponin        I, D dimer, quantitative, CBC with platelets         differential, Comprehensive metabolic panel         (BMP + Alb, Alk Phos, ALT, AST, Total. Bili,         TP), " nitroglycerin (NITROSTAT) 0.4 MG         sublingual tablet            LEONARD Morse MD

## 2017-01-12 NOTE — NURSING NOTE
"Chief Complaint   Patient presents with     ER F/U       Initial /87 mmHg  Pulse 70  Temp(Src) 98.6  F (37  C) (Tympanic)  Ht 5' 2\" (1.575 m)  Wt 153 lb (69.4 kg)  BMI 27.98 kg/m2  SpO2 97% Estimated body mass index is 27.98 kg/(m^2) as calculated from the following:    Height as of this encounter: 5' 2\" (1.575 m).    Weight as of this encounter: 153 lb (69.4 kg).  BP completed using cuff size: dante Parisi LPN    "

## 2017-01-13 ASSESSMENT — ENCOUNTER SYMPTOMS
PALPITATIONS: 0
UNEXPECTED WEIGHT CHANGE: 0
SHORTNESS OF BREATH: 1
CHEST TIGHTNESS: 1
COUGH: 0
DIZZINESS: 0

## 2017-01-16 ENCOUNTER — HOSPITAL ENCOUNTER (OUTPATIENT)
Dept: NUCLEAR MEDICINE | Facility: CLINIC | Age: 72
Setting detail: NUCLEAR MEDICINE
Discharge: HOME | End: 2017-01-16
Attending: EMERGENCY MEDICINE | Admitting: EMERGENCY MEDICINE
Payer: MEDICARE

## 2017-01-16 DIAGNOSIS — R07.89 CHEST DISCOMFORT: ICD-10-CM

## 2017-01-16 DIAGNOSIS — R06.02 SHORTNESS OF BREATH: ICD-10-CM

## 2017-01-16 PROCEDURE — 93017 CV STRESS TEST TRACING ONLY: CPT

## 2017-01-16 PROCEDURE — 78452 HT MUSCLE IMAGE SPECT MULT: CPT | Mod: 26 | Performed by: INTERNAL MEDICINE

## 2017-01-16 PROCEDURE — 93018 CV STRESS TEST I&R ONLY: CPT | Performed by: INTERNAL MEDICINE

## 2017-01-16 PROCEDURE — 78452 HT MUSCLE IMAGE SPECT MULT: CPT

## 2017-01-16 PROCEDURE — A9502 TC99M TETROFOSMIN: HCPCS | Performed by: EMERGENCY MEDICINE

## 2017-01-16 PROCEDURE — 34300033 ZZH RX 343: Performed by: EMERGENCY MEDICINE

## 2017-01-16 PROCEDURE — 93016 CV STRESS TEST SUPVJ ONLY: CPT | Performed by: FAMILY MEDICINE

## 2017-01-16 RX ADMIN — TETROFOSMIN 30.9 MCI.: 0.23 INJECTION, POWDER, LYOPHILIZED, FOR SOLUTION INTRAVENOUS at 14:00

## 2017-01-16 RX ADMIN — TETROFOSMIN 10.6 MCI.: 0.23 INJECTION, POWDER, LYOPHILIZED, FOR SOLUTION INTRAVENOUS at 12:05

## 2017-01-16 NOTE — PROGRESS NOTES
Patient had a nuclear treadmill stress test today. Patient described a chronic chest tightness 3/10 prior to exercise. States she has had this for a long time and feels like she can't get a deep breath. At peak exercise sx increased to 5/10 and then resumed baseline 3/10 by 2 minutes recovery. EKG's and sx reviewed with Dr. Chavez, supervising MD. He OK'd patient to be discharged. CHRISTINE Andrade RN

## 2017-01-18 ENCOUNTER — TELEPHONE (OUTPATIENT)
Dept: FAMILY MEDICINE | Facility: CLINIC | Age: 72
End: 2017-01-18

## 2017-01-18 DIAGNOSIS — R94.39 ABNORMAL CARDIOVASCULAR STRESS TEST: Primary | ICD-10-CM

## 2017-01-19 ENCOUNTER — PRE VISIT (OUTPATIENT)
Dept: CARDIOLOGY | Facility: CLINIC | Age: 72
End: 2017-01-19

## 2017-01-19 NOTE — TELEPHONE ENCOUNTER
"Please call Paul:     The stress test was read as \"possible\" new area of ischemia in the heart wall.  There was nothing very dramatic but the cardiologist who read it made sure to say this could be a NEW finding. With that in mind, I would like her to see cardiology as soon as she can.  I have placed a referral and would like her to take the soonest appointment at any location - we could even call and get an appointment for her?      In the meantime, if her symptoms worsen/change and/or she takes the nitro, call 911.      Please give her the referral info once you have helped her make the appointment     Thanks, LEONARD Morse MD     NM MPI Multi Rest Stress   Status: Final result     Visible to patient:  Not Released     Dx:  Shortness of breath; Chest discomfort                 Details        Reading Physician Reading Date Result Priority       Ip, Jeremías Skelton MD 1/16/2017                 Narrative             GATED MYOCARDIAL PERFUSION SCINTIGRAPHY EXERCISE- ONE DAY STUDY     1/16/2017 2:47 PM  PAUL FISCHER  71 years  Female  1945.    Indication/Clinical History: Chest pain    Impression  1.  Myocardial perfusion imaging using single isotope technique  demonstrated a small reversible mid and basal inferior defect  suggesting a small area of ischemia.   2. Gated images demonstrated normal wall motion.  The left ventricular  systolic function is normal with a calculated ejection fraction of  58%.  3. Compared to the prior study from 2013, the small area of ischemia  seen in this study may be a new finding .    Procedure  The patient performed treadmill exercise using a Ronald protocol,  completing 7 minutes and 30 seconds with an estimated workload of 8.5  METS.  The test was terminated due to leg fatigue. The heart rate was  55 beats per minute at baseline and increased to 130 beats at peak  exercise, which was 86% of the maximum predicted heart rate. The rest  blood pressure was 142/80 mm/Hg and " peak blood pressure is 172/86mm/Hg  with rate pressure product of 22,380. The patient experienced mild to  moderate chest pain during the test. The patient was not on a beta  blocker.    Myocardial perfusion imaging was performed at rest, approximately 45  minutes after the injection intravenously of 10.6of Tc-99m Myoview. At  peak exercise, the patient was injected intravenously with 30.9 mCi of   Tc-99m Myoview and exercise continued for approximately 1 minute.  Gated post-stress tomographic imaging was performed approximately 30  minutes after stress    EKG Findings  The resting EKG demonstrated sinus rhythm with nonspecific ST segment  changes . The stress EKG demonstrated 1 mm of lateral ST segment  depression. These changes are less specific due to the mild  abnormalities at baseline. Blood pressure response exercise was  normal. The patient had mild chest pain at rest which increased  slightly during the exercise test. Blood pressure response exercise  was normal..    Tomographic Findings  Overall, the study quality is adequate though diaphragmatic  attenuation may be present . On the stress images, there is a mild  reduction in radiotracer uptake involving the mid and basal inferior  wall. On the rest images, myocardial perfusion appeared normal . Gated  images demonstrated normal wall motion. The left ventricular ejection  fraction was calculated to be 58%. TID was absent.    JOSE MARTIN FLORES MD

## 2017-01-20 ENCOUNTER — OFFICE VISIT (OUTPATIENT)
Dept: CARDIOLOGY | Facility: CLINIC | Age: 72
End: 2017-01-20
Attending: FAMILY MEDICINE
Payer: COMMERCIAL

## 2017-01-20 VITALS
BODY MASS INDEX: 29.26 KG/M2 | DIASTOLIC BLOOD PRESSURE: 66 MMHG | HEART RATE: 60 BPM | HEIGHT: 62 IN | SYSTOLIC BLOOD PRESSURE: 124 MMHG | WEIGHT: 159 LBS

## 2017-01-20 DIAGNOSIS — R94.39 ABNORMAL CARDIOVASCULAR STRESS TEST: Primary | ICD-10-CM

## 2017-01-20 DIAGNOSIS — I25.110 CORONARY ARTERY DISEASE INVOLVING NATIVE CORONARY ARTERY OF NATIVE HEART WITH UNSTABLE ANGINA PECTORIS (H): ICD-10-CM

## 2017-01-20 DIAGNOSIS — E78.5 HYPERLIPIDEMIA LDL GOAL <100: ICD-10-CM

## 2017-01-20 PROCEDURE — 99205 OFFICE O/P NEW HI 60 MIN: CPT | Performed by: INTERNAL MEDICINE

## 2017-01-20 RX ORDER — VITAMIN B COMPLEX
TABLET ORAL
COMMUNITY
End: 2018-01-11

## 2017-01-20 NOTE — MR AVS SNAPSHOT
After Visit Summary   1/20/2017    Kristen Thompson    MRN: [id number]           Patient Information     Date Of Birth          1945        Visit Information        Provider Department      1/20/2017 10:15 AM Pastora Castro DO [facility] [place]        Today's Diagnoses     Abnormal cardiovascular stress test    -  1     Coronary artery disease involving native coronary artery of native heart with unstable angina pectoris (H)           Care Instructions    STOP RED YEAST RICE, YOU SHOULD NOT TAKE THIS WITH SIMVASTATIN        Follow-ups after your visit        Future tests that were ordered for you today     Open Future Orders        Priority Expected Expires Ordered    Cardiac Cath: Coronary Angiography Adult Order Routine 1/27/2017 1/15/2018 1/20/2017            Who to contact     If you have questions or need follow up information about today's clinic visit or your schedule please contact [facility] [place] directly at [phone].  Normal or non-critical lab and imaging results will be communicated to you by [employer], letter or phone within 4 business days after the clinic has received the results. If you do not hear from us within 7 days, please contact the clinic through [employer] or phone. If you have a critical or abnormal lab result, we will notify you by phone as soon as possible.  Submit refill requests through [employer] or call your pharmacy and they will forward the refill request to us. Please allow 3 business days for your refill to be completed.          Additional Information About Your Visit        MyChart Information     [employer] gives you secure access to your electronic health record. If you see a primary care provider, you can also send messages to your care team and make appointments. If you have questions, please call your primary care clinic.  If you do not have a primary care provider, please  "call [phone] and they will assist you.        Care EveryWhere ID     This is your Care EveryWhere ID. This could be used by other organizations to access your [facility] medical records  [id number]        Your Vitals Were     Pulse Height BMI (Body Mass Index)             60 1.575 m (5' 2\") 29.07 kg/m2          Blood Pressure from Last 3 Encounters:   01/20/17 124/66   01/12/17 147/87   01/08/17 117/80    Weight from Last 3 Encounters:   01/20/17 72.122 kg (159 lb)   01/12/17 69.4 kg (153 lb)   12/06/16 66.679 kg (147 lb)               Primary Care Provider Office Phone # Fax #    Janett Morse [phone]       [facility] [address] [facility] [address]        Thank you!     Thank you for choosing [facility] [place]  for your care. Our goal is always to provide you with excellent care. Hearing back from our patients is one way we can continue to improve our services. Please take a few minutes to complete the written survey that you may receive in the mail after your visit with us. Thank you!             Your Updated Medication List - Protect others around you: Learn how to safely use, store and throw away your medicines at www.disposemymeds.org.          This list is accurate as of: 1/20/17 10:35 AM.  Always use your most recent med list.                   Brand Name Dispense Instructions for use    aspirin 81 MG EC tablet     90 tablet    Take 1 tablet (81 mg) by mouth daily       CALCIUM PO      Take by mouth daily       CoQ10 100 MG Caps          esomeprazole 40 MG CR capsule    nexIUM    30 capsule    Take 1 capsule (40 mg) by mouth every morning (before breakfast) Take 30-60 minutes before a eating.       fluticasone 50 MCG/ACT spray    FLONASE    16 g    Spray 1-2 sprays into both nostrils daily       furosemide 20 MG tablet    LASIX    30 tablet    Take 0.5 tablets (10 mg) by mouth daily as needed       MAGNESIUM-POTASSIUM PO      " Take by mouth daily       nitroglycerin 0.4 MG sublingual tablet    NITROSTAT    25 tablet    For chest pain place 1 tablet under the tongue every 5 minutes for 3 doses. If symptoms persist 5 minutes after 1st dose call 911.       OMEGA 3 PO      Take  by mouth.       Red Yeast Rice 600 MG Tabs      Take 2 tablets by mouth daily       simvastatin 40 MG tablet    ZOCOR    90 tablet    Take 1 tablet (40 mg) by mouth At Bedtime       TYLENOL PO      Take 1,000 mg by mouth every 4 hours as needed for mild pain or fever       vitamin B complex with vitamin C Tabs tablet      Take 1 tablet by mouth daily       VITAMIN D PO      Take 1,000 Units by mouth daily

## 2017-01-20 NOTE — LETTER
1/20/2017    Janett Morse MD  [facility]  [address] [place] [address]    RE: Kristen Mcdanielbridge       Dear Colleague,    I had the pleasure of seeing Kristen Thompson in the [facility].    REFERRING PHYSICIAN:  Dr. Janett Morse.      Ms. Thompson is a very pleasant 71-year-old female with a history of nonobstructive coronary disease and hyperlipidemia who presents with symptoms of chest pain.  She was recently hospitalized at [facility] for symptoms of chest discomfort radiating into her jaw and down her left arm.  She underwent testing to rule out for myocardial infarction, was referred for both a ZIO Patch monitor as well as a perfusion imaging study.  She has had previous CT coronary angiogram back in 2014 that did show mild 2-vessel coronary disease involving the left main into the left anterior descending artery and right coronary artery.  She has undergone the perfusion imaging study which did demonstrate ischemia in the basal to mid inferior wall with preserved LV systolic function.  The area of ischemia did seem to be a new finding.  She did have reproducible chest pain during the stress test.  She has not undergone the ZIO Patch monitor.  She does continue to have a sensation of heaviness or pressure in her chest.  This all started after she donated blood.  She has been experiencing both shortness of breath and tightness across her chest.  She does not note any significant exacerbating or relieving factors.  She is pretty sedentary, so she has not noted that activity intensifies her discomfort.  She did have an EKG done on 01/12 which I do have available for review.  This shows a normal sinus rhythm.  She does have some sagging ST segments noted in the lateral leads, but otherwise this looks like a normal EKG.      PHYSICAL EXAMINATION:   VITAL SIGNS:  On exam today, her blood pressure is 124/66, pulse is 60, weight 159 with  a body mass index of 29.   NECK:  Carotid upstrokes are brisk without bruit.   CARDIOVASCULAR:  Tones are regular without murmur, gallop or rub.   LUNGS:  Clear posteriorly.   EXTREMITIES:  She has no peripheral edema.      SUMMARY:  Ms. Thompson is a very pleasant 71-year-old female who is presenting with symptoms of unstable angina.  She has known history of coronary disease and an abnormal stress test indicating possible ischemia in the right coronary artery distribution.  At this time, I will recommend that she undergo coronary angiogram for further evaluation.  I did explain this procedure to her, its risks, benefits and alternatives and she is agreeable to proceed.  We did talk about the possibility of revascularization with drug-eluting stent placement and the need for dual antiplatelet therapy.  She does not have any contraindication to this.  I also reviewed her medication list.  She is currently taking both red yeast rice and simvastatin.  Simvastatin was added recently and has been very helpful in reducing her overall LDL. However, I recommended that she stop red yeast rice as this has a contraindication with statin use.  It is notable that on her recent liver function tests she did have abnormal ALT member measurement.  I would recommend repeating this after stopping red yeast rice.  We will follow up with her after the results of her coronary angiogram for any further management.  Please feel free to contact me with any questions you have in regards to her care and thank you for allowing me to participate in the care of your nice patient.      Again, thank you for allowing me to participate in the care of your patient.      Sincerely,    Pastora Castro, DO     [facility]

## 2017-01-20 NOTE — PROGRESS NOTES
HPI and Plan:   See dictation    No orders of the defined types were placed in this encounter.       Orders Placed This Encounter   Medications     Coenzyme Q10 (COQ10) 100 MG CAPS     Sig:        Medications Discontinued During This Encounter   Medication Reason     cetirizine (ZYRTEC) 10 MG tablet Therapy completed         Encounter Diagnoses   Name Primary?     Abnormal cardiovascular stress test Yes     Coronary artery disease involving native coronary artery of native heart with unstable angina pectoris (H)      Hyperlipidemia LDL goal <100        CURRENT MEDICATIONS:  Current Outpatient Prescriptions   Medication Sig Dispense Refill     Coenzyme Q10 (COQ10) 100 MG CAPS        nitroglycerin (NITROSTAT) 0.4 MG sublingual tablet For chest pain place 1 tablet under the tongue every 5 minutes for 3 doses. If symptoms persist 5 minutes after 1st dose call 911. 25 tablet 0     vitamin B complex with vitamin C (VITAMIN  B COMPLEX) TABS tablet Take 1 tablet by mouth daily       Acetaminophen (TYLENOL PO) Take 1,000 mg by mouth every 4 hours as needed for mild pain or fever       esomeprazole (NEXIUM) 40 MG CR capsule Take 1 capsule (40 mg) by mouth every morning (before breakfast) Take 30-60 minutes before a eating. 30 capsule 1     fluticasone (FLONASE) 50 MCG/ACT spray Spray 1-2 sprays into both nostrils daily 16 g 11     simvastatin (ZOCOR) 40 MG tablet Take 1 tablet (40 mg) by mouth At Bedtime 90 tablet 2     MAGNESIUM-POTASSIUM PO Take by mouth daily        aspirin 81 MG EC tablet Take 1 tablet (81 mg) by mouth daily 90 tablet 3     furosemide (LASIX) 20 MG tablet Take 0.5 tablets (10 mg) by mouth daily as needed 30 tablet 0     Red Yeast Rice 600 MG TABS Take 2 tablets by mouth daily       CALCIUM PO Take by mouth daily        Omega-3 Fatty Acids (OMEGA 3 PO) Take  by mouth.       Cholecalciferol (VITAMIN D PO) Take 1,000 Units by mouth daily          ALLERGIES     Allergies   Allergen Reactions     Codeine  Sulfate      Nausea and vomiting      Quinapril Difficulty breathing     Darvon-N [Propoxyphene Napsylate] Nausea and Vomiting       PAST MEDICAL HISTORY:  Past Medical History   Diagnosis Date     PONV (postoperative nausea and vomiting)      H/O total hysterectomy      Gastro-oesophageal reflux disease      Arthritis 11/19/2013     HL (hearing loss) 10/11/2012     GERD (gastroesophageal reflux disease) 10/2/2012     Squamous cell carcinoma (H)        PAST SURGICAL HISTORY:  Past Surgical History   Procedure Laterality Date     Hysterectomy       Gallbladder surgery       Repair bladder       C rad resec tonsil/pillars       Knee replacment  2007     Colonoscopy  10/30/2012     Procedure: COLONOSCOPY;  Colonoscopy;  Surgeon: Denise Bah MD;  Location: [facility]     Release trigger finger Right 4/29/2016     Procedure: RELEASE TRIGGER FINGER;  Surgeon: Percy Sarmiento MD;  Location: [facility]     Esophagoscopy, gastroscopy, duodenoscopy (egd), combined N/A 9/15/2016     Procedure: COMBINED ESOPHAGOSCOPY, GASTROSCOPY, DUODENOSCOPY (EGD);  Surgeon: Bennie Godinez MD;  Location: [facility]       FAMILY HISTORY:  Family History   Problem Relation Age of Onset     C.A.D. Mother      Cardiovascular Mother      Thyroid Disease Mother      CANCER Father      stomach ca     CANCER Sister      Eye Disorder Sister      C.A.D. Sister      CEREBROVASCULAR DISEASE Sister      Breast Cancer Sister      Arthritis Sister      Cardiovascular Sister      Depression Sister      HEART DISEASE Sister      Neurologic Disorder Sister      OSTEOPOROSIS Sister      Thyroid Disease Sister      Depression Sister      Thyroid Disease Sister      Depression Sister      Thyroid Disease Sister      Obesity Sister      Neurologic Disorder Sister        SOCIAL HISTORY:  Social History     Social History     Marital Status:      Spouse Name: N/A     Number of Children: N/A     Years of Education: N/A     Social History Main Topics      "Smoking status: Never Smoker      Smokeless tobacco: Never Used     Alcohol Use: No     Drug Use: No     Sexual Activity:     Partners: Male     Other Topics Concern     Parent/Sibling W/ Cabg, Mi Or Angioplasty Before 65f 55m? No     Social History Narrative       Review of Systems:  Skin:  Negative       Eyes:  Positive for glasses    ENT:  Positive for hearing loss    Respiratory:  Positive for dyspnea on exertion;shortness of breath     Cardiovascular:    Positive for;palpitations;chest pain;edema;lightheadedness;dizziness    Gastroenterology: Negative      Genitourinary:  Negative      Musculoskeletal:  Negative      Neurologic:  Positive for headaches    Psychiatric:  Negative      Heme/Lymph/Imm:  Negative      Endocrine:  Negative        Physical Exam:  Vitals: /66 mmHg  Pulse 60  Ht 1.575 m (5' 2\")  Wt 72.122 kg (159 lb)  BMI 29.07 kg/m2    Constitutional:  cooperative, alert and oriented, well developed, well nourished, in no acute distress        Skin:  warm and dry to the touch        Head:  normocephalic        Eyes:  pupils equal and round        ENT:  no pallor or cyanosis        Neck:  carotid pulses are full and equal bilaterally        Chest:  clear to auscultation;normal symmetry          Cardiac: regular rhythm;no murmurs, gallops or rubs detected                  Abdomen:  abdomen soft;no bruits        Vascular: pulses full and equal                                        Extremities and Back:  no deformities, clubbing, cyanosis, erythema observed;no edema              Neurological:  affect appropriate, oriented to time, person and place;no gross motor deficits              CC  Janett Morse MD  [facility]  [address] [place] [address]                    "

## 2017-01-20 NOTE — PROGRESS NOTES
REFERRING PHYSICIAN:  Dr. Janett Morse.      HISTORY OF PRESENT ILLNESS:  Ms. Thompson is a very pleasant 71-year-old female with a history of nonobstructive coronary disease and hyperlipidemia who presents with symptoms of chest pain.  She was recently hospitalized at [facility] for symptoms of chest discomfort radiating into her jaw and down her left arm.  She underwent testing to rule out for myocardial infarction, was referred for both a ZIO Patch monitor as well as a perfusion imaging study.  She has had previous CT coronary angiogram back in 2014 that did show mild 2-vessel coronary disease involving the left main into the left anterior descending artery and right coronary artery.  She has undergone the perfusion imaging study which did demonstrate ischemia in the basal to mid inferior wall with preserved LV systolic function.  The area of ischemia did seem to be a new finding.  She did have reproducible chest pain during the stress test.  She has not undergone the ZIO Patch monitor.  She does continue to have a sensation of heaviness or pressure in her chest.  This all started after she donated blood.  She has been experiencing both shortness of breath and tightness across her chest.  She does not note any significant exacerbating or relieving factors.  She is pretty sedentary, so she has not noted that activity intensifies her discomfort.  She did have an EKG done on 01/12 which I do have available for review.  This shows a normal sinus rhythm.  She does have some sagging ST segments noted in the lateral leads, but otherwise this looks like a normal EKG.      PHYSICAL EXAMINATION:   VITAL SIGNS:  On exam today, her blood pressure is 124/66, pulse is 60, weight 159 with a body mass index of 29.   NECK:  Carotid upstrokes are brisk without bruit.   CARDIOVASCULAR:  Tones are regular without murmur, gallop or rub.   LUNGS:  Clear posteriorly.   EXTREMITIES:  She has no peripheral edema.       SUMMARY:  Ms. Thompson is a very pleasant 71-year-old female who is presenting with symptoms of unstable angina.  She has known history of coronary disease and an abnormal stress test indicating possible ischemia in the right coronary artery distribution.  At this time, I will recommend that she undergo coronary angiogram for further evaluation.  I did explain this procedure to her, its risks, benefits and alternatives and she is agreeable to proceed.  We did talk about the possibility of revascularization with drug-eluting stent placement and the need for dual antiplatelet therapy.  She does not have any contraindication to this.  I also reviewed her medication list.  She is currently taking both red yeast rice and simvastatin.  Simvastatin was added recently and has been very helpful in reducing her overall LDL. However, I recommended that she stop red yeast rice as this has a contraindication with statin use.  It is notable that on her recent liver function tests she did have abnormal ALT member measurement.  I would recommend repeating this after stopping red yeast rice.  We will follow up with her after the results of her coronary angiogram for any further management.  Please feel free to contact me with any questions you have in regards to her care and thank you for allowing me to participate in the care of your nice patient.      cc:   Janett Morse MD   [address]  [address]         GERA CAMPOS DO             D: 2017 10:43   T: 2017 15:27   MT: MAYELIN      Name:     PAUL THOMPSON   MRN:      [id number]        Account:      [id number]   :      1945           Service Date: 2017      Document: [id number]

## 2017-01-24 DIAGNOSIS — E78.2 MIXED HYPERLIPIDEMIA: Primary | ICD-10-CM

## 2017-01-24 DIAGNOSIS — R94.39 ABNORMAL CARDIOVASCULAR STRESS TEST: ICD-10-CM

## 2017-01-24 DIAGNOSIS — R07.9 CHEST PAIN: Primary | ICD-10-CM

## 2017-01-24 RX ORDER — LIDOCAINE 40 MG/G
CREAM TOPICAL
Status: CANCELLED | OUTPATIENT
Start: 2017-01-24

## 2017-01-24 RX ORDER — SODIUM CHLORIDE 9 MG/ML
INJECTION, SOLUTION INTRAVENOUS CONTINUOUS
Status: CANCELLED | OUTPATIENT
Start: 2017-01-24

## 2017-01-24 RX ORDER — POTASSIUM CHLORIDE 1500 MG/1
20 TABLET, EXTENDED RELEASE ORAL
Status: CANCELLED | OUTPATIENT
Start: 2017-01-24

## 2017-01-24 RX ORDER — ASPIRIN 81 MG/1
81 TABLET ORAL DAILY
Status: CANCELLED | OUTPATIENT
Start: 2017-01-24

## 2017-01-24 NOTE — PROGRESS NOTES
Left heart cath orders entered for 1/25/17 (pt scheduled for 1000 and is to check in at 0800). I called pt and reviewed angiogram prep with her. Nothing to eat or drink after midnight except for am meds with small sip of water, including aspirin 81 mg in the am and day before. She has ride to drive her and stay with her 24 hours post angiogram. Pt said she has been experiencing some vertigo today in relation to her meniere's disease. She did some meniere exercises this am and thinks it is improving. She will update nurse in the am if she is still having any issues when she arrives. ANIRUDHlettjosee MINER

## 2017-01-25 ENCOUNTER — HOSPITAL ENCOUNTER (OUTPATIENT)
Facility: CLINIC | Age: 72
Discharge: HOME | End: 2017-01-25
Attending: INTERNAL MEDICINE | Admitting: INTERNAL MEDICINE
Payer: MEDICARE

## 2017-01-25 ENCOUNTER — TELEPHONE (OUTPATIENT)
Dept: FAMILY MEDICINE | Facility: CLINIC | Age: 72
End: 2017-01-25

## 2017-01-25 ENCOUNTER — APPOINTMENT (OUTPATIENT)
Dept: CARDIOLOGY | Facility: CLINIC | Age: 72
End: 2017-01-25
Attending: INTERNAL MEDICINE
Payer: MEDICARE

## 2017-01-25 VITALS
HEART RATE: 110 BPM | RESPIRATION RATE: 16 BRPM | OXYGEN SATURATION: 97 % | DIASTOLIC BLOOD PRESSURE: 91 MMHG | WEIGHT: 155 LBS | BODY MASS INDEX: 28.52 KG/M2 | SYSTOLIC BLOOD PRESSURE: 111 MMHG | TEMPERATURE: 97 F | HEIGHT: 62 IN

## 2017-01-25 DIAGNOSIS — R94.39 ABNORMAL CARDIOVASCULAR STRESS TEST: ICD-10-CM

## 2017-01-25 DIAGNOSIS — M19.90 ARTHRITIS: ICD-10-CM

## 2017-01-25 DIAGNOSIS — I25.110 CORONARY ARTERY DISEASE INVOLVING NATIVE CORONARY ARTERY OF NATIVE HEART WITH UNSTABLE ANGINA PECTORIS (H): ICD-10-CM

## 2017-01-25 DIAGNOSIS — M54.2 CERVICALGIA: Primary | ICD-10-CM

## 2017-01-25 DIAGNOSIS — Z98.890 STATUS POST CORONARY ANGIOGRAM: Primary | ICD-10-CM

## 2017-01-25 LAB
ANION GAP SERPL CALCULATED.3IONS-SCNC: 7 MMOL/L (ref 3–14)
APTT PPP: 27 SEC (ref 22–37)
BUN SERPL-MCNC: 11 MG/DL (ref 7–30)
CALCIUM SERPL-MCNC: 8.8 MG/DL (ref 8.5–10.1)
CHLORIDE SERPL-SCNC: 109 MMOL/L (ref 94–109)
CO2 SERPL-SCNC: 26 MMOL/L (ref 20–32)
CREAT SERPL-MCNC: 0.77 MG/DL (ref 0.52–1.04)
ERYTHROCYTE [DISTWIDTH] IN BLOOD BY AUTOMATED COUNT: 14.3 % (ref 10–15)
GFR SERPL CREATININE-BSD FRML MDRD: 75 ML/MIN/1.7M2
GLUCOSE SERPL-MCNC: 99 MG/DL (ref 70–99)
HCT VFR BLD AUTO: 35.2 % (ref 35–47)
HGB BLD-MCNC: 11.4 G/DL (ref 11.7–15.7)
INR PPP: 1.02 (ref 0.86–1.14)
MCH RBC QN AUTO: 29.8 PG (ref 26.5–33)
MCHC RBC AUTO-ENTMCNC: 32.4 G/DL (ref 31.5–36.5)
MCV RBC AUTO: 92 FL (ref 78–100)
PLATELET # BLD AUTO: 308 10E9/L (ref 150–450)
POTASSIUM SERPL-SCNC: 3.9 MMOL/L (ref 3.4–5.3)
RBC # BLD AUTO: 3.82 10E12/L (ref 3.8–5.2)
SODIUM SERPL-SCNC: 142 MMOL/L (ref 133–144)
WBC # BLD AUTO: 5.4 10E9/L (ref 4–11)

## 2017-01-25 PROCEDURE — 36415 COLL VENOUS BLD VENIPUNCTURE: CPT | Performed by: INTERNAL MEDICINE

## 2017-01-25 PROCEDURE — 25000128 H RX IP 250 OP 636: Performed by: INTERNAL MEDICINE

## 2017-01-25 PROCEDURE — 93010 ELECTROCARDIOGRAM REPORT: CPT | Performed by: INTERNAL MEDICINE

## 2017-01-25 PROCEDURE — A9270 NON-COVERED ITEM OR SERVICE: HCPCS | Mod: GY | Performed by: INTERNAL MEDICINE

## 2017-01-25 PROCEDURE — C1769 GUIDE WIRE: HCPCS

## 2017-01-25 PROCEDURE — 27210946 ZZH KIT HC TOTES DISP CR8

## 2017-01-25 PROCEDURE — 40000852 ZZH STATISTIC HEART CATH LAB OR EP LAB

## 2017-01-25 PROCEDURE — 85610 PROTHROMBIN TIME: CPT | Performed by: INTERNAL MEDICINE

## 2017-01-25 PROCEDURE — B2111ZZ FLUOROSCOPY OF MULTIPLE CORONARY ARTERIES USING LOW OSMOLAR CONTRAST: ICD-10-PCS | Performed by: INTERNAL MEDICINE

## 2017-01-25 PROCEDURE — 4A023N7 MEASUREMENT OF CARDIAC SAMPLING AND PRESSURE, LEFT HEART, PERCUTANEOUS APPROACH: ICD-10-PCS | Performed by: INTERNAL MEDICINE

## 2017-01-25 PROCEDURE — 27210914 ZZH SHEATH CR8

## 2017-01-25 PROCEDURE — 85027 COMPLETE CBC AUTOMATED: CPT | Performed by: INTERNAL MEDICINE

## 2017-01-25 PROCEDURE — 25000132 ZZH RX MED GY IP 250 OP 250 PS 637: Mod: GY | Performed by: INTERNAL MEDICINE

## 2017-01-25 PROCEDURE — 80048 BASIC METABOLIC PNL TOTAL CA: CPT | Performed by: INTERNAL MEDICINE

## 2017-01-25 PROCEDURE — 27210856 ZZH ACCESS HEART CATH CR2

## 2017-01-25 PROCEDURE — 99153 MOD SED SAME PHYS/QHP EA: CPT | Mod: GC | Performed by: INTERNAL MEDICINE

## 2017-01-25 PROCEDURE — 40000065 ZZH STATISTIC EKG NON-CHARGEABLE

## 2017-01-25 PROCEDURE — 25000125 ZZHC RX 250: Performed by: INTERNAL MEDICINE

## 2017-01-25 PROCEDURE — 85730 THROMBOPLASTIN TIME PARTIAL: CPT | Performed by: INTERNAL MEDICINE

## 2017-01-25 PROCEDURE — 27210787 ZZH MANIFOLD CR2

## 2017-01-25 PROCEDURE — 99153 MOD SED SAME PHYS/QHP EA: CPT

## 2017-01-25 PROCEDURE — 93005 ELECTROCARDIOGRAM TRACING: CPT

## 2017-01-25 PROCEDURE — 27211089 ZZH KIT ACIST INJECTOR CR3

## 2017-01-25 PROCEDURE — 99152 MOD SED SAME PHYS/QHP 5/>YRS: CPT

## 2017-01-25 PROCEDURE — B2151ZZ FLUOROSCOPY OF LEFT HEART USING LOW OSMOLAR CONTRAST: ICD-10-PCS | Performed by: INTERNAL MEDICINE

## 2017-01-25 PROCEDURE — 93458 L HRT ARTERY/VENTRICLE ANGIO: CPT | Mod: 26 | Performed by: INTERNAL MEDICINE

## 2017-01-25 PROCEDURE — 27210795 ZZH PAD DEFIB QUICK CR4

## 2017-01-25 PROCEDURE — 93458 L HRT ARTERY/VENTRICLE ANGIO: CPT

## 2017-01-25 PROCEDURE — 99152 MOD SED SAME PHYS/QHP 5/>YRS: CPT | Mod: GC | Performed by: INTERNAL MEDICINE

## 2017-01-25 RX ORDER — PHENYLEPHRINE HCL IN 0.9% NACL 1 MG/10 ML
20-100 SYRINGE (ML) INTRAVENOUS
Status: DISCONTINUED | OUTPATIENT
Start: 2017-01-25 | End: 2017-01-25 | Stop reason: HOSPADM

## 2017-01-25 RX ORDER — IOPAMIDOL 755 MG/ML
55 INJECTION, SOLUTION INTRAVASCULAR ONCE
Status: COMPLETED | OUTPATIENT
Start: 2017-01-25 | End: 2017-01-25

## 2017-01-25 RX ORDER — FENTANYL CITRATE 50 UG/ML
25-50 INJECTION, SOLUTION INTRAMUSCULAR; INTRAVENOUS
Status: DISCONTINUED | OUTPATIENT
Start: 2017-01-25 | End: 2017-01-25 | Stop reason: HOSPADM

## 2017-01-25 RX ORDER — HYDRALAZINE HYDROCHLORIDE 20 MG/ML
10-20 INJECTION INTRAMUSCULAR; INTRAVENOUS
Status: DISCONTINUED | OUTPATIENT
Start: 2017-01-25 | End: 2017-01-25 | Stop reason: HOSPADM

## 2017-01-25 RX ORDER — NITROGLYCERIN 0.4 MG/1
0.4 TABLET SUBLINGUAL EVERY 5 MIN PRN
Status: DISCONTINUED | OUTPATIENT
Start: 2017-01-25 | End: 2017-01-25 | Stop reason: HOSPADM

## 2017-01-25 RX ORDER — ENALAPRILAT 1.25 MG/ML
1.25-2.5 INJECTION INTRAVENOUS
Status: DISCONTINUED | OUTPATIENT
Start: 2017-01-25 | End: 2017-01-25 | Stop reason: HOSPADM

## 2017-01-25 RX ORDER — PRASUGREL 10 MG/1
10-60 TABLET, FILM COATED ORAL
Status: DISCONTINUED | OUTPATIENT
Start: 2017-01-25 | End: 2017-01-25 | Stop reason: HOSPADM

## 2017-01-25 RX ORDER — POTASSIUM CHLORIDE 1500 MG/1
20 TABLET, EXTENDED RELEASE ORAL
Status: COMPLETED | OUTPATIENT
Start: 2017-01-25 | End: 2017-01-25

## 2017-01-25 RX ORDER — NITROGLYCERIN 5 MG/ML
100-500 VIAL (ML) INTRAVENOUS
Status: COMPLETED | OUTPATIENT
Start: 2017-01-25 | End: 2017-01-25

## 2017-01-25 RX ORDER — BUPIVACAINE HYDROCHLORIDE 2.5 MG/ML
1-10 INJECTION, SOLUTION EPIDURAL; INFILTRATION; INTRACAUDAL
Status: DISCONTINUED | OUTPATIENT
Start: 2017-01-25 | End: 2017-01-25 | Stop reason: HOSPADM

## 2017-01-25 RX ORDER — NITROGLYCERIN 20 MG/100ML
.07-2 INJECTION INTRAVENOUS CONTINUOUS PRN
Status: DISCONTINUED | OUTPATIENT
Start: 2017-01-25 | End: 2017-01-25 | Stop reason: HOSPADM

## 2017-01-25 RX ORDER — ONDANSETRON 2 MG/ML
4 INJECTION INTRAMUSCULAR; INTRAVENOUS EVERY 4 HOURS PRN
Status: DISCONTINUED | OUTPATIENT
Start: 2017-01-25 | End: 2017-01-25 | Stop reason: HOSPADM

## 2017-01-25 RX ORDER — METOPROLOL TARTRATE 25 MG/1
25 TABLET, FILM COATED ORAL 2 TIMES DAILY
Qty: 180 TABLET | Refills: 3 | Status: SHIPPED | OUTPATIENT
Start: 2017-01-25 | End: 2017-02-09 | Stop reason: SINTOL

## 2017-01-25 RX ORDER — VERAPAMIL HYDROCHLORIDE 2.5 MG/ML
1-2.5 INJECTION, SOLUTION INTRAVENOUS
Status: COMPLETED | OUTPATIENT
Start: 2017-01-25 | End: 2017-01-25

## 2017-01-25 RX ORDER — LIDOCAINE HYDROCHLORIDE 10 MG/ML
1-10 INJECTION, SOLUTION EPIDURAL; INFILTRATION; INTRACAUDAL; PERINEURAL
Status: COMPLETED | OUTPATIENT
Start: 2017-01-25 | End: 2017-01-25

## 2017-01-25 RX ORDER — EPTIFIBATIDE 2 MG/ML
2 INJECTION, SOLUTION INTRAVENOUS CONTINUOUS PRN
Status: DISCONTINUED | OUTPATIENT
Start: 2017-01-25 | End: 2017-01-25 | Stop reason: HOSPADM

## 2017-01-25 RX ORDER — DEXTROSE MONOHYDRATE 25 G/50ML
12.5-5 INJECTION, SOLUTION INTRAVENOUS EVERY 30 MIN PRN
Status: DISCONTINUED | OUTPATIENT
Start: 2017-01-25 | End: 2017-01-25 | Stop reason: HOSPADM

## 2017-01-25 RX ORDER — LORAZEPAM 2 MG/ML
.5-2 INJECTION INTRAMUSCULAR EVERY 4 HOURS PRN
Status: DISCONTINUED | OUTPATIENT
Start: 2017-01-25 | End: 2017-01-25 | Stop reason: HOSPADM

## 2017-01-25 RX ORDER — SODIUM CHLORIDE 9 MG/ML
INJECTION, SOLUTION INTRAVENOUS CONTINUOUS
Status: DISCONTINUED | OUTPATIENT
Start: 2017-01-25 | End: 2017-01-25 | Stop reason: HOSPADM

## 2017-01-25 RX ORDER — ASPIRIN 81 MG/1
81-324 TABLET, CHEWABLE ORAL
Status: DISCONTINUED | OUTPATIENT
Start: 2017-01-25 | End: 2017-01-25 | Stop reason: HOSPADM

## 2017-01-25 RX ORDER — MORPHINE SULFATE 2 MG/ML
1-2 INJECTION, SOLUTION INTRAMUSCULAR; INTRAVENOUS EVERY 5 MIN PRN
Status: DISCONTINUED | OUTPATIENT
Start: 2017-01-25 | End: 2017-01-25 | Stop reason: HOSPADM

## 2017-01-25 RX ORDER — NIFEDIPINE 10 MG/1
10 CAPSULE ORAL
Status: DISCONTINUED | OUTPATIENT
Start: 2017-01-25 | End: 2017-01-25 | Stop reason: HOSPADM

## 2017-01-25 RX ORDER — PROMETHAZINE HYDROCHLORIDE 25 MG/ML
6.25-25 INJECTION, SOLUTION INTRAMUSCULAR; INTRAVENOUS EVERY 4 HOURS PRN
Status: DISCONTINUED | OUTPATIENT
Start: 2017-01-25 | End: 2017-01-25 | Stop reason: HOSPADM

## 2017-01-25 RX ORDER — FUROSEMIDE 10 MG/ML
20-100 INJECTION INTRAMUSCULAR; INTRAVENOUS
Status: DISCONTINUED | OUTPATIENT
Start: 2017-01-25 | End: 2017-01-25 | Stop reason: HOSPADM

## 2017-01-25 RX ORDER — PROTAMINE SULFATE 10 MG/ML
1-5 INJECTION, SOLUTION INTRAVENOUS
Status: DISCONTINUED | OUTPATIENT
Start: 2017-01-25 | End: 2017-01-25 | Stop reason: HOSPADM

## 2017-01-25 RX ORDER — NALOXONE HYDROCHLORIDE 0.4 MG/ML
.1-.4 INJECTION, SOLUTION INTRAMUSCULAR; INTRAVENOUS; SUBCUTANEOUS
Status: DISCONTINUED | OUTPATIENT
Start: 2017-01-25 | End: 2017-01-25 | Stop reason: HOSPADM

## 2017-01-25 RX ORDER — NITROGLYCERIN 5 MG/ML
100-200 VIAL (ML) INTRAVENOUS
Status: DISCONTINUED | OUTPATIENT
Start: 2017-01-25 | End: 2017-01-25 | Stop reason: HOSPADM

## 2017-01-25 RX ORDER — CLOPIDOGREL BISULFATE 75 MG/1
75 TABLET ORAL
Status: DISCONTINUED | OUTPATIENT
Start: 2017-01-25 | End: 2017-01-25 | Stop reason: HOSPADM

## 2017-01-25 RX ORDER — ASPIRIN 81 MG/1
81 TABLET ORAL DAILY
Status: DISCONTINUED | OUTPATIENT
Start: 2017-01-25 | End: 2017-01-25 | Stop reason: HOSPADM

## 2017-01-25 RX ORDER — HEPARIN SODIUM 1000 [USP'U]/ML
1000-10000 INJECTION, SOLUTION INTRAVENOUS; SUBCUTANEOUS EVERY 5 MIN PRN
Status: DISCONTINUED | OUTPATIENT
Start: 2017-01-25 | End: 2017-01-25 | Stop reason: HOSPADM

## 2017-01-25 RX ORDER — HYDROCODONE BITARTRATE AND ACETAMINOPHEN 5; 325 MG/1; MG/1
1-2 TABLET ORAL EVERY 4 HOURS PRN
Status: DISCONTINUED | OUTPATIENT
Start: 2017-01-25 | End: 2017-01-25 | Stop reason: HOSPADM

## 2017-01-25 RX ORDER — PROTAMINE SULFATE 10 MG/ML
25-100 INJECTION, SOLUTION INTRAVENOUS EVERY 5 MIN PRN
Status: DISCONTINUED | OUTPATIENT
Start: 2017-01-25 | End: 2017-01-25 | Stop reason: HOSPADM

## 2017-01-25 RX ORDER — SODIUM NITROPRUSSIDE 25 MG/ML
100-200 INJECTION INTRAVENOUS
Status: DISCONTINUED | OUTPATIENT
Start: 2017-01-25 | End: 2017-01-25 | Stop reason: HOSPADM

## 2017-01-25 RX ORDER — LIDOCAINE 40 MG/G
CREAM TOPICAL
Status: DISCONTINUED | OUTPATIENT
Start: 2017-01-25 | End: 2017-01-25 | Stop reason: HOSPADM

## 2017-01-25 RX ORDER — EPTIFIBATIDE 2 MG/ML
180 INJECTION, SOLUTION INTRAVENOUS EVERY 10 MIN PRN
Status: DISCONTINUED | OUTPATIENT
Start: 2017-01-25 | End: 2017-01-25 | Stop reason: HOSPADM

## 2017-01-25 RX ORDER — NICARDIPINE HYDROCHLORIDE 2.5 MG/ML
100 INJECTION INTRAVENOUS
Status: DISCONTINUED | OUTPATIENT
Start: 2017-01-25 | End: 2017-01-25 | Stop reason: HOSPADM

## 2017-01-25 RX ORDER — DOBUTAMINE HYDROCHLORIDE 200 MG/100ML
2-20 INJECTION INTRAVENOUS CONTINUOUS PRN
Status: DISCONTINUED | OUTPATIENT
Start: 2017-01-25 | End: 2017-01-25 | Stop reason: HOSPADM

## 2017-01-25 RX ORDER — NALOXONE HYDROCHLORIDE 0.4 MG/ML
0.4 INJECTION, SOLUTION INTRAMUSCULAR; INTRAVENOUS; SUBCUTANEOUS EVERY 5 MIN PRN
Status: DISCONTINUED | OUTPATIENT
Start: 2017-01-25 | End: 2017-01-25 | Stop reason: HOSPADM

## 2017-01-25 RX ORDER — ACETAMINOPHEN 325 MG/1
325-650 TABLET ORAL EVERY 4 HOURS PRN
Status: DISCONTINUED | OUTPATIENT
Start: 2017-01-25 | End: 2017-01-25 | Stop reason: HOSPADM

## 2017-01-25 RX ORDER — POTASSIUM CHLORIDE 29.8 MG/ML
20 INJECTION INTRAVENOUS
Status: DISCONTINUED | OUTPATIENT
Start: 2017-01-25 | End: 2017-01-25 | Stop reason: HOSPADM

## 2017-01-25 RX ORDER — FLUMAZENIL 0.1 MG/ML
0.2 INJECTION, SOLUTION INTRAVENOUS
Status: DISCONTINUED | OUTPATIENT
Start: 2017-01-25 | End: 2017-01-25 | Stop reason: HOSPADM

## 2017-01-25 RX ORDER — DOPAMINE HYDROCHLORIDE 160 MG/100ML
2-20 INJECTION, SOLUTION INTRAVENOUS CONTINUOUS PRN
Status: DISCONTINUED | OUTPATIENT
Start: 2017-01-25 | End: 2017-01-25 | Stop reason: HOSPADM

## 2017-01-25 RX ORDER — ADENOSINE 3 MG/ML
12-12000 INJECTION, SOLUTION INTRAVENOUS
Status: DISCONTINUED | OUTPATIENT
Start: 2017-01-25 | End: 2017-01-25 | Stop reason: HOSPADM

## 2017-01-25 RX ORDER — CLOPIDOGREL BISULFATE 75 MG/1
300-600 TABLET ORAL
Status: DISCONTINUED | OUTPATIENT
Start: 2017-01-25 | End: 2017-01-25 | Stop reason: HOSPADM

## 2017-01-25 RX ORDER — ASPIRIN 325 MG
325 TABLET ORAL
Status: DISCONTINUED | OUTPATIENT
Start: 2017-01-25 | End: 2017-01-25 | Stop reason: HOSPADM

## 2017-01-25 RX ORDER — METHYLPREDNISOLONE SODIUM SUCCINATE 125 MG/2ML
125 INJECTION, POWDER, LYOPHILIZED, FOR SOLUTION INTRAMUSCULAR; INTRAVENOUS
Status: DISCONTINUED | OUTPATIENT
Start: 2017-01-25 | End: 2017-01-25 | Stop reason: HOSPADM

## 2017-01-25 RX ORDER — POTASSIUM CHLORIDE 7.45 MG/ML
10 INJECTION INTRAVENOUS
Status: DISCONTINUED | OUTPATIENT
Start: 2017-01-25 | End: 2017-01-25 | Stop reason: HOSPADM

## 2017-01-25 RX ORDER — LIDOCAINE HYDROCHLORIDE 10 MG/ML
30 INJECTION, SOLUTION EPIDURAL; INFILTRATION; INTRACAUDAL; PERINEURAL
Status: DISCONTINUED | OUTPATIENT
Start: 2017-01-25 | End: 2017-01-25 | Stop reason: HOSPADM

## 2017-01-25 RX ORDER — DIPHENHYDRAMINE HYDROCHLORIDE 50 MG/ML
25-50 INJECTION INTRAMUSCULAR; INTRAVENOUS
Status: DISCONTINUED | OUTPATIENT
Start: 2017-01-25 | End: 2017-01-25 | Stop reason: HOSPADM

## 2017-01-25 RX ORDER — NALOXONE HYDROCHLORIDE 0.4 MG/ML
.2-.4 INJECTION, SOLUTION INTRAMUSCULAR; INTRAVENOUS; SUBCUTANEOUS
Status: DISCONTINUED | OUTPATIENT
Start: 2017-01-25 | End: 2017-01-25 | Stop reason: HOSPADM

## 2017-01-25 RX ADMIN — VERAPAMIL HYDROCHLORIDE 2.5 MG: 2.5 INJECTION, SOLUTION INTRAVENOUS at 12:32

## 2017-01-25 RX ADMIN — LIDOCAINE HYDROCHLORIDE 20 MG: 10 INJECTION, SOLUTION EPIDURAL; INFILTRATION; INTRACAUDAL; PERINEURAL at 12:13

## 2017-01-25 RX ADMIN — POTASSIUM CHLORIDE 20 MEQ: 1500 TABLET, EXTENDED RELEASE ORAL at 09:03

## 2017-01-25 RX ADMIN — SODIUM CHLORIDE: 9 INJECTION, SOLUTION INTRAVENOUS at 08:12

## 2017-01-25 RX ADMIN — NITROGLYCERIN 200 MCG: 5 INJECTION, SOLUTION INTRAVENOUS at 12:32

## 2017-01-25 RX ADMIN — FENTANYL CITRATE 200 MCG: 50 INJECTION, SOLUTION INTRAMUSCULAR; INTRAVENOUS at 12:53

## 2017-01-25 RX ADMIN — MIDAZOLAM HYDROCHLORIDE 3.5 MG: 1 INJECTION, SOLUTION INTRAMUSCULAR; INTRAVENOUS at 12:53

## 2017-01-25 RX ADMIN — METOPROLOL TARTRATE 5 MG: 5 INJECTION INTRAVENOUS at 13:25

## 2017-01-25 RX ADMIN — IOPAMIDOL 55 ML: 755 INJECTION, SOLUTION INTRAVASCULAR at 13:00

## 2017-01-25 NOTE — IP AVS SNAPSHOT
[place] [address]    [place] [address]    Phone:  [phone]                                       After Visit Summary   1/25/2017    Kristen Thompson    MRN: [id number]           After Visit Summary Signature Page     I have received my discharge instructions, and my questions have been answered. I have discussed any challenges I see with this plan with the nurse or doctor.    ..........................................................................................................................................  Patient/Patient Representative Signature      ..........................................................................................................................................  Patient Representative Print Name and Relationship to Patient    ..................................................               ................................................  Date                                            Time    ..........................................................................................................................................  Reviewed by Signature/Title    ...................................................              ..............................................  Date                                                            Time

## 2017-01-25 NOTE — PROGRESS NOTES
Discharge instructions & education:   Discharge instructions reviewed with patient and DTR. Patient verbalizes understanding.   Additional patient education provided:  Right groin care instructions provided        Jesus Manuel Dickson

## 2017-01-25 NOTE — PROGRESS NOTES
1300 Pt returned from Cath Lab. TR band intact to left wrist.  No oozing or hematoma noted. Area soft & flat. Pt denies pain. Pt instructed on activity restrictions with left wrist. Verbal understanding received from pt.  1305 Pt's family at bedside. Detailed update given.   1315 Dr Crowe at bedside to speak with pt & family.  1320 Report to ISIDRA Ken.

## 2017-01-25 NOTE — TELEPHONE ENCOUNTER
Kristen calling to request vicodin.  She states that she has had vertigo x 2 days and she had an angiogram done today.  She states that you and her had talked about it and was hoping to get it ordered.    Vicodin      Last Written Prescription Date:  Unknown had it in hospital  Last Fill Quantity: unknown,   # refills: unknown  Last Office Visit with [facility], [facility] or [facility] prescribing provider: 1/12/17  Future Office visit:    Next 5 appointments (look out 90 days)     Feb 01, 2017  2:30 PM   Return Visit with AHMET Bro CNP   [facility] [place] ([facility])    [address] [place] [id number] [phone]                   Routing refill request to provider for review/approval because:  Drug not active on patient's medication list

## 2017-01-25 NOTE — DISCHARGE INSTRUCTIONS
Cardiac Angiogram Discharge Instructions - Radial    After you go home:      Have an adult stay with you until tomorrow.    Drink extra fluids for 2 days.    You may resume your normal diet.    No smoking       For 24 hours - due to the sedation you received:    Relax and take it easy.    Do NOT make any important or legal decisions.    Do NOT drive or operate machines at home or at work.    Do NOT drink alcohol.    Care of Wrist Puncture Site:      For the first 24 hrs - check the puncture site every 1-2 hours while awake.    It is normal to have soreness at the puncture site and mild tingling in your hand for up to 3 days.    Remove the bandaid after 24 hours. If there is minor oozing, apply another bandaid and remove it after 12 hours.    You may shower tomorrow.  Do NOT take a bath, or use a hot tub or pool for at least 3 days. Do NOT scrub the site. Do not use lotion or powder near the puncture site.           Activity:        For 2 days:     do not use your hand or arm to support your weight (such as rising from a chair)     do not bend your wrist (such as lifting a garage door).    do not lift more than 5 pounds or exercise your arm (such as tennis, golf or bowling).    Do NOT do any heavy activity such as exercise, lifting, or straining.     Bleeding:      If you start bleeding from the site in your wrist, sit down and press firmly on/above the site for 10 minutes.     Once bleeding stops, keep arm still for 2 hours.     Call [facility] as soon as you can.       Call 911 right away if you have heavy bleeding or bleeding that does not stop.      Medicines:      If you are taking antiplatelet medications such as Plavix, Brilinta or Effient, do not stop taking it until you talk to your cardiologist.        If you are on Metformin (Glucophage), do not restart it until you have blood tests (within 2 to 3 days after discharge).  After you have your blood drawn, you may restart the Metformin.    Take your  medications, including blood thinners, unless your provider tells you not to.  If you take Coumadin (Warfarin), have your INR checked by your provider in  3-5 days. Call your clinic to schedule this.    If you have stopped any other medicines, check with your provider about when to restart them.    Follow Up Appointments:      Follow up with [facility] Nurse Practitioner at [facility] of patient preference in 7-10 days.    Call the clinic if:      You have a large or growing hard lump around the site.    The site is red, swollen, hot or tender.    Blood or fluid is draining from the site.    You have chills or a fever greater than 101 F (38 C).    Your arm turns feels numb, cool or changes color.    You have hives, a rash or unusual itching.    Any questions or concerns.          [facility] [place]:    [phone] [facility] (7 days a week)

## 2017-01-25 NOTE — PROGRESS NOTES
1450 Report received from ISIDRA Ken.  1510 RX given x1. Left forearm area cleansed & new bandaid applied to site. No oozing or hematoma noted. Area soft & flat.  1540 Pt discharged to private vehicle. All personal belongings sent with pt.

## 2017-01-25 NOTE — PROGRESS NOTES
SUMMARY:   1. Atypical chest pain with normal coronary arteries.    2. Left ventricle with LVEDP of 10 mmHg, visually estimated LVEF of 60-65%, no evidence of mitral regurgitation and no evidence of aortic stenosis.  3. Patient went into AF during the procedure while we were in the LV getting an LV gram. She mentions that the feeling she has at the time, was the same symptom she had that prompted the stress test.   4. TR band placed over the LRA arteriotomy site after sheath removal   5. There is a high radial artery bifurcation with significant tortuosity at the elbow. There was significant spasm with straightening it out with a wire, even with use of a 4Fr catheter. Consider alternative access (RRA, left ulnar artery, femoral access) if future angiogram is required.     PLAN:   1. We have added Metoprolol 25mg bid to her regimen  2. Dr. Castro had ordered Ziopatch and we have encouraged the pt to get it placed ASAP to monitor for AF  3. Further management of AF if noted on Ziopatch (and not just triggered by the LV gram), including anticoagulation, will be coordinated by Dr. Castro  4. Bedrest per protocol.  5. Discharge today per protocol   6. Continued medical management and lifestyle modification for cardiovascular risk factor optimization.     The attending interventional cardiologist was present for the entire procedure.    Harini Garvin MD,MPH [id number]  Interventional Cardiology Fellow

## 2017-01-26 RX ORDER — HYDROCODONE BITARTRATE AND ACETAMINOPHEN 5; 325 MG/1; MG/1
1 TABLET ORAL EVERY 4 HOURS PRN
Qty: 18 TABLET | Refills: 0 | Status: SHIPPED | OUTPATIENT
Start: 2017-01-26 | End: 2017-03-03

## 2017-01-26 NOTE — TELEPHONE ENCOUNTER
Ok - she can try the vicodin. I would like her to use it sparingly and only when pain is bad     She does have a codeine allergy so make sure she isn't allergic to hydrocodone     cgb

## 2017-01-27 ENCOUNTER — HOSPITAL ENCOUNTER (OUTPATIENT)
Dept: CARDIOLOGY | Facility: CLINIC | Age: 72
Discharge: HOME | End: 2017-01-27
Attending: FAMILY MEDICINE | Admitting: FAMILY MEDICINE
Payer: MEDICARE

## 2017-01-27 DIAGNOSIS — R07.9 ACUTE CHEST PAIN: ICD-10-CM

## 2017-01-27 LAB — INTERPRETATION ECG - MUSE: NORMAL

## 2017-01-27 PROCEDURE — 0298T ZZC EXT ECG > 48HR TO 21 DAY REVIEW AND INTERPRETATN: CPT | Performed by: INTERNAL MEDICINE

## 2017-01-27 PROCEDURE — 0296T ZIO PATCH HOLTER: CPT

## 2017-01-28 ENCOUNTER — HOSPITAL ENCOUNTER (EMERGENCY)
Facility: CLINIC | Age: 72
Discharge: HOME | End: 2017-01-28
Attending: EMERGENCY MEDICINE | Admitting: EMERGENCY MEDICINE
Payer: MEDICARE

## 2017-01-28 VITALS
TEMPERATURE: 98.8 F | SYSTOLIC BLOOD PRESSURE: 144 MMHG | OXYGEN SATURATION: 96 % | RESPIRATION RATE: 18 BRPM | DIASTOLIC BLOOD PRESSURE: 85 MMHG

## 2017-01-28 DIAGNOSIS — M62.81 GENERALIZED MUSCLE WEAKNESS: ICD-10-CM

## 2017-01-28 DIAGNOSIS — R42 DIZZINESS: ICD-10-CM

## 2017-01-28 DIAGNOSIS — I48.0 PAROXYSMAL ATRIAL FIBRILLATION (H): ICD-10-CM

## 2017-01-28 DIAGNOSIS — R00.1 BRADYCARDIA: ICD-10-CM

## 2017-01-28 LAB
ALBUMIN SERPL-MCNC: 3.8 G/DL (ref 3.4–5)
ALP SERPL-CCNC: 61 U/L (ref 40–150)
ALT SERPL W P-5'-P-CCNC: 44 U/L (ref 0–50)
ANION GAP SERPL CALCULATED.3IONS-SCNC: 7 MMOL/L (ref 3–14)
AST SERPL W P-5'-P-CCNC: 21 U/L (ref 0–45)
BASOPHILS # BLD AUTO: 0 10E9/L (ref 0–0.2)
BASOPHILS NFR BLD AUTO: 0.3 %
BILIRUB SERPL-MCNC: 0.2 MG/DL (ref 0.2–1.3)
BUN SERPL-MCNC: 16 MG/DL (ref 7–30)
CALCIUM SERPL-MCNC: 8.7 MG/DL (ref 8.5–10.1)
CHLORIDE SERPL-SCNC: 108 MMOL/L (ref 94–109)
CO2 SERPL-SCNC: 28 MMOL/L (ref 20–32)
CREAT SERPL-MCNC: 0.7 MG/DL (ref 0.52–1.04)
DIFFERENTIAL METHOD BLD: ABNORMAL
EOSINOPHIL # BLD AUTO: 0.1 10E9/L (ref 0–0.7)
EOSINOPHIL NFR BLD AUTO: 0.9 %
ERYTHROCYTE [DISTWIDTH] IN BLOOD BY AUTOMATED COUNT: 13.9 % (ref 10–15)
GFR SERPL CREATININE-BSD FRML MDRD: 82 ML/MIN/1.7M2
GLUCOSE SERPL-MCNC: 95 MG/DL (ref 70–99)
HCT VFR BLD AUTO: 35 % (ref 35–47)
HGB BLD-MCNC: 11.2 G/DL (ref 11.7–15.7)
IMM GRANULOCYTES # BLD: 0 10E9/L (ref 0–0.4)
IMM GRANULOCYTES NFR BLD: 0.2 %
LYMPHOCYTES # BLD AUTO: 1.5 10E9/L (ref 0.8–5.3)
LYMPHOCYTES NFR BLD AUTO: 17.8 %
MCH RBC QN AUTO: 29.5 PG (ref 26.5–33)
MCHC RBC AUTO-ENTMCNC: 32 G/DL (ref 31.5–36.5)
MCV RBC AUTO: 92 FL (ref 78–100)
MONOCYTES # BLD AUTO: 0.9 10E9/L (ref 0–1.3)
MONOCYTES NFR BLD AUTO: 10.3 %
NEUTROPHILS # BLD AUTO: 6.1 10E9/L (ref 1.6–8.3)
NEUTROPHILS NFR BLD AUTO: 70.5 %
PLATELET # BLD AUTO: 291 10E9/L (ref 150–450)
POTASSIUM SERPL-SCNC: 4.2 MMOL/L (ref 3.4–5.3)
PROT SERPL-MCNC: 6.6 G/DL (ref 6.8–8.8)
RBC # BLD AUTO: 3.8 10E12/L (ref 3.8–5.2)
SODIUM SERPL-SCNC: 143 MMOL/L (ref 133–144)
TROPONIN I SERPL-MCNC: NORMAL UG/L (ref 0–0.04)
WBC # BLD AUTO: 8.6 10E9/L (ref 4–11)

## 2017-01-28 PROCEDURE — 99284 EMERGENCY DEPT VISIT MOD MDM: CPT

## 2017-01-28 PROCEDURE — 84484 ASSAY OF TROPONIN QUANT: CPT | Performed by: EMERGENCY MEDICINE

## 2017-01-28 PROCEDURE — 93005 ELECTROCARDIOGRAM TRACING: CPT

## 2017-01-28 PROCEDURE — 85025 COMPLETE CBC W/AUTO DIFF WBC: CPT | Performed by: EMERGENCY MEDICINE

## 2017-01-28 PROCEDURE — 99284 EMERGENCY DEPT VISIT MOD MDM: CPT | Performed by: EMERGENCY MEDICINE

## 2017-01-28 PROCEDURE — 80053 COMPREHEN METABOLIC PANEL: CPT | Performed by: EMERGENCY MEDICINE

## 2017-01-28 ASSESSMENT — ENCOUNTER SYMPTOMS
DIZZINESS: 1
HEADACHES: 1
FATIGUE: 1
SHORTNESS OF BREATH: 1
COUGH: 0

## 2017-01-28 NOTE — DISCHARGE INSTRUCTIONS
Continue current medications  Follow up with your clinic physician/cardiologist to review Ziopatch cardiac monitoring  If you end up using a beta blocker therapy/metoprolol long-term-consider switching to once daily long-acting form.  Better tolerance and efficacy.

## 2017-01-28 NOTE — ED AVS SNAPSHOT
[facility]    [address] [address]    Phone:  [phone]    Fax:  [phone]                                       Kristen Thompson   MRN: [id number]    Department:  [facility]   Date of Visit:  1/28/2017           Patient Information     Date Of Birth          1945        Your diagnoses for this visit were:     Dizziness     Generalized muscle weakness     Paroxysmal atrial fibrillation (H)     Bradycardia- beta blocker        You were seen by Frederick Santiago DO.        Discharge Instructions       Continue current medications  Follow up with your clinic physician/cardiologist to review Ziopatch cardiac monitoring  If you end up using a beta blocker therapy/metoprolol long-term-consider switching to once daily long-acting form.  Better tolerance and efficacy.    Future Appointments        Provider Department Dept Phone Center    1/30/2017 12:40 PM TRAM Skaggs P [facility] [place] [phone] [facility]    1/30/2017 1:50 PM AHMET Proctor CNP [facility] AT [place] [id number] [phone] [facility]      24 Hour Appointment Hotline       To make an appointment at any [facility], call [phone] ([phone]). If you don't have a family doctor or clinic, we will help you find one. [place] clinics are conveniently located to serve the needs of you and your family.             Review of your medicines      Our records show that you are taking the medicines listed below. If these are incorrect, please call your family doctor or clinic.        Dose / Directions Last dose taken    aspirin 81 MG EC tablet   Dose:  81 mg   Quantity:  90 tablet        Take 1 tablet (81 mg) by mouth daily   Refills:  3        CALCIUM PO        Take by mouth daily   Refills:  0        CoQ10 100 MG Caps        Refills:  0        esomeprazole 40 MG CR  capsule   Commonly known as:  nexIUM   Dose:  40 mg   Quantity:  30 capsule        Take 1 capsule (40 mg) by mouth every morning (before breakfast) Take 30-60 minutes before a eating.   Refills:  1        fluticasone 50 MCG/ACT spray   Commonly known as:  FLONASE   Dose:  1-2 spray   Quantity:  16 g        Spray 1-2 sprays into both nostrils daily   Refills:  11        furosemide 20 MG tablet   Commonly known as:  LASIX   Dose:  10 mg   Quantity:  30 tablet        Take 0.5 tablets (10 mg) by mouth daily as needed   Refills:  0        HYDROcodone-acetaminophen 5-325 MG per tablet   Commonly known as:  NORCO   Dose:  1 tablet   Quantity:  18 tablet        Take 1 tablet by mouth every 4 hours as needed for moderate to severe pain or pain   Refills:  0        MAGNESIUM-POTASSIUM PO        Take by mouth daily   Refills:  0        metoprolol 25 MG tablet   Commonly known as:  LOPRESSOR   Dose:  25 mg   Quantity:  180 tablet        Take 1 tablet (25 mg) by mouth 2 times daily Hold IF heart rate less than 55.   Refills:  3        nitroglycerin 0.4 MG sublingual tablet   Commonly known as:  NITROSTAT   Quantity:  25 tablet        For chest pain place 1 tablet under the tongue every 5 minutes for 3 doses. If symptoms persist 5 minutes after 1st dose call 911.   Refills:  0        OMEGA 3 PO        Take  by mouth.   Refills:  0        Red Yeast Rice 600 MG Tabs   Dose:  2 tablet        Take 2 tablets by mouth daily   Refills:  0        simvastatin 40 MG tablet   Commonly known as:  ZOCOR   Dose:  40 mg   Quantity:  90 tablet        Take 1 tablet (40 mg) by mouth At Bedtime   Refills:  3        TYLENOL PO   Dose:  1000 mg        Take 1,000 mg by mouth every 4 hours as needed for mild pain or fever   Refills:  0        vitamin B complex with vitamin C Tabs tablet   Dose:  1 tablet        Take 1 tablet by mouth daily   Refills:  0        VITAMIN D PO   Dose:  1000 Units        Take 1,000 Units by mouth daily   Refills:  0                 Procedures and tests performed during your visit     CBC with platelets differential    Comprehensive metabolic panel    EKG 12 lead    Troponin I      Orders Needing Specimen Collection     None      Pending Results     Date and Time Order Name Status Description    1/27/2017 0936 Zio Patch Holter In process             Pending Culture Results     No orders found from 1/27/2017 to 1/29/2017.       Test Results from your hospital stay           1/28/2017  3:33 PM - Interface, Flexilab Results      Component Results     Component Value Ref Range & Units Status    WBC 8.6 4.0 - 11.0 10e9/L Final    RBC Count 3.80 3.8 - 5.2 10e12/L Final    Hemoglobin 11.2 (L) 11.7 - 15.7 g/dL Final    Hematocrit 35.0 35.0 - 47.0 % Final    MCV 92 78 - 100 fl Final    MCH 29.5 26.5 - 33.0 pg Final    MCHC 32.0 31.5 - 36.5 g/dL Final    RDW 13.9 10.0 - 15.0 % Final    Platelet Count 291 150 - 450 10e9/L Final    Diff Method Automated Method  Final    % Neutrophils 70.5 % Final    % Lymphocytes 17.8 % Final    % Monocytes 10.3 % Final    % Eosinophils 0.9 % Final    % Basophils 0.3 % Final    % Immature Granulocytes 0.2 % Final    Absolute Neutrophil 6.1 1.6 - 8.3 10e9/L Final    Absolute Lymphocytes 1.5 0.8 - 5.3 10e9/L Final    Absolute Monocytes 0.9 0.0 - 1.3 10e9/L Final    Absolute Eosinophils 0.1 0.0 - 0.7 10e9/L Final    Absolute Basophils 0.0 0.0 - 0.2 10e9/L Final    Abs Immature Granulocytes 0.0 0 - 0.4 10e9/L Final         1/28/2017  3:54 PM - Interface, Flexilab Results      Component Results     Component Value Ref Range & Units Status    Sodium 143 133 - 144 mmol/L Final    Potassium 4.2 3.4 - 5.3 mmol/L Final    Chloride 108 94 - 109 mmol/L Final    Carbon Dioxide 28 20 - 32 mmol/L Final    Anion Gap 7 3 - 14 mmol/L Final    Glucose 95 70 - 99 mg/dL Final    Urea Nitrogen 16 7 - 30 mg/dL Final    Creatinine 0.70 0.52 - 1.04 mg/dL Final    GFR Estimate 82 >60 mL/min/1.7m2 Final    Non  GFR Calc     GFR Estimate If Black >90   GFR Calc   >60 mL/min/1.7m2 Final    Calcium 8.7 8.5 - 10.1 mg/dL Final    Bilirubin Total 0.2 0.2 - 1.3 mg/dL Final    Albumin 3.8 3.4 - 5.0 g/dL Final    Protein Total 6.6 (L) 6.8 - 8.8 g/dL Final    Alkaline Phosphatase 61 40 - 150 U/L Final    ALT 44 0 - 50 U/L Final    AST 21 0 - 45 U/L Final         1/28/2017  3:54 PM - Interface, Flexilab Results      Component Results     Component Value Ref Range & Units Status    Troponin I ES  0.000 - 0.045 ug/L Final    <0.015  The 99th percentile for upper reference range is 0.045 ug/L.  Troponin values in   the range of 0.045 - 0.120 ug/L may be associated with risks of adverse   clinical events.                  Thank you for choosing [facility]       Thank you for choosing [facility] for your care. Our goal is always to provide you with excellent care. Hearing back from our patients is one way we can continue to improve our services. Please take a few minutes to complete the written survey that you may receive in the mail after you visit with us. Thank you!        [employer] Information     [employer] gives you secure access to your electronic health record. If you see a primary care provider, you can also send messages to your care team and make appointments. If you have questions, please call your primary care clinic.  If you do not have a primary care provider, please call [phone] and they will assist you.        Care EveryWhere ID     This is your Care EveryWhere ID. This could be used by other organizations to access your [facility] medical records  [id number]        After Visit Summary       This is your record. Keep this with you and show to your community pharmacist(s) and doctor(s) at your next visit.

## 2017-01-28 NOTE — ED NOTES
had angio on Wed, new DX afib started on meds, now sure if it's the meds but she is not feeling well. Was started on metoprolol and is unsure if that is causing her not to feel well, her chest pain and SOB is the same as it has been, she is also wearing a heart monitor

## 2017-01-28 NOTE — ED AVS SNAPSHOT
[facility]    [address] [address]    Phone:  [phone]    Fax:  [phone]                                       Kristen Thompson   MRN: [id number]    Department:  [facility]   Date of Visit:  1/28/2017           After Visit Summary Signature Page     I have received my discharge instructions, and my questions have been answered. I have discussed any challenges I see with this plan with the nurse or doctor.    ..........................................................................................................................................  Patient/Patient Representative Signature      ..........................................................................................................................................  Patient Representative Print Name and Relationship to Patient    ..................................................               ................................................  Date                                            Time    ..........................................................................................................................................  Reviewed by Signature/Title    ...................................................              ..............................................  Date                                                            Time

## 2017-01-28 NOTE — ED PROVIDER NOTES
History     Chief Complaint   Patient presents with     Chest Pain     had angio on Wed, new DX afib started on meds, now sure if it's the meds but she is not feeling well.      HPI  Kristen Thompson is a 71 year old female with a history of hyperlipidemia, gastritis and GERD, newly diagnosed with atrial fibrillation, on metoprolol, who presents to the ED with her daughter for evaluation of dizziness, chest pain, and shortness of breath. Patient was seen in the ED 1/8 for chest pain and shortness of breath after donating blood. At that time labs, troponin, chest x-ray, and EKG were all normal. She followed up with a stress test 1/16/17, see impression below, and met with cardiology 1/20/17. She then underwent coronary angiogram 1/25 and went into atrial fibrillation during the procedure. It did not self-resolve throughout her hospital stay and thus was ruled not to be procedure-induced. Per daughter, they suspected her original symptoms had been secondary to paroxsymal atrial fibrillation. Patient was started on metoprolol 25 mg bid at that time and received a ZIO patch yesterday, which she will wear for the next two days. Per daughter, since starting the metoprolol the patient's heart rate has been consistently below 60, and today she had one episode where her heart rate was in the 40's. The patient also reports fatigue, headaches, dizziness, and shortness of breath, which could be side effects from the metoprolol. She adds that her left arm has been very achy after the angiogram. Denies syncope. No cough or congestion. No swelling in legs or calf tenderness. Patient states her symptoms feel similar to those she felt before with atrial fibrillation.     I have reviewed the Medications, Allergies, Past Medical and Surgical History, and Social History in the Epic system.    Previous Records Reviewed:  Heart Cath Left Heart Cath 1/25/17  PROCEDURES PERFORMED:   1. Selective left and right coronary angiography.  2.  Left heart catheterization.  3. Left ventriculography  4. TR band placed over the LRA arteriotomy site after sheath removal     SUMMARY:   1. Atypical chest pain with normal coronary arteries.    2. Left ventricle with LVEDP of 10 mmHg, visually estimated LVEF of  60-65%, no evidence of mitral regurgitation and no evidence of aortic  stenosis.  3. Patient went into AF during the procedure while we were in the LV  getting an LV gram. She mentions that the feeling she has at the time,  was the same symptom she had that prompted the stress test.   4. TR band placed over the LRA arteriotomy site after sheath removal   5. There is a high radial artery bifurcation with significant  tortuosity at the elbow. There was significant spasm with  straightening it out with a wire, even with use of a 4Fr catheter.  Consider alternative access (RRA, left ulnar artery, femoral access)  if future angiogram is required.     PLAN:   1. We have added Metoprolol 25mg bid to her regimen  2. Dr. Castro had ordered Ziopatch and we have encouraged the pt to  get it placed  to monitor for spontaneous AF.  3. Further management of AF if noted on Ziopatch (and not just  triggered by the LV gram), including anticoagulation, will be  coordinated by Dr. Castro  4. Bedrest per protocol.  5. Discharge today per protocol   6. Continued medical management and lifestyle modification for  cardiovascular risk factor optimization.     GATED MYOCARDIAL PERFUSION SCINTIGRAPHY EXERCISE- ONE DAY STUDY 1/16/2017 2:47 PM  PAUL FISCHER  71 years  Female  1945.  Indication/Clinical History: Chest pain     Impression  1.  Myocardial perfusion imaging using single isotope technique  demonstrated a small reversible mid and basal inferior defect  suggesting a small area of ischemia.    2. Gated images demonstrated normal wall motion.  The left ventricular  systolic function is normal with a calculated ejection fraction of  58%.  3. Compared to the prior  study from 2013, the small area of ischemia  seen in this study may be a new finding .     JOSE MARTIN FLORES MD    Patient Active Problem List   Diagnosis     GERD (gastroesophageal reflux disease)     CARDIOVASCULAR SCREENING; LDL GOAL LESS THAN 160     HL (hearing loss)     Health Care Home     Arthritis     Advanced directives, counseling/discussion     Hyperlipidemia LDL goal <130     Chronic constipation     Gastritis     Cervicalgia     Current Outpatient Prescriptions   Medication Sig Dispense Refill     HYDROcodone-acetaminophen (NORCO) 5-325 MG per tablet Take 1 tablet by mouth every 4 hours as needed for moderate to severe pain or pain 18 tablet 0     metoprolol (LOPRESSOR) 25 MG tablet Take 1 tablet (25 mg) by mouth 2 times daily Hold IF heart rate less than 55. 180 tablet 3     simvastatin (ZOCOR) 40 MG tablet Take 1 tablet (40 mg) by mouth At Bedtime 90 tablet 3     Coenzyme Q10 (COQ10) 100 MG CAPS        nitroglycerin (NITROSTAT) 0.4 MG sublingual tablet For chest pain place 1 tablet under the tongue every 5 minutes for 3 doses. If symptoms persist 5 minutes after 1st dose call 911. 25 tablet 0     vitamin B complex with vitamin C (VITAMIN  B COMPLEX) TABS tablet Take 1 tablet by mouth daily       Acetaminophen (TYLENOL PO) Take 1,000 mg by mouth every 4 hours as needed for mild pain or fever       esomeprazole (NEXIUM) 40 MG CR capsule Take 1 capsule (40 mg) by mouth every morning (before breakfast) Take 30-60 minutes before a eating. 30 capsule 1     fluticasone (FLONASE) 50 MCG/ACT spray Spray 1-2 sprays into both nostrils daily 16 g 11     MAGNESIUM-POTASSIUM PO Take by mouth daily        aspirin 81 MG EC tablet Take 1 tablet (81 mg) by mouth daily 90 tablet 3     furosemide (LASIX) 20 MG tablet Take 0.5 tablets (10 mg) by mouth daily as needed 30 tablet 0     Red Yeast Rice 600 MG TABS Take 2 tablets by mouth daily       CALCIUM PO Take by mouth daily        Omega-3 Fatty Acids (OMEGA 3 PO) Take  by  mouth.       Cholecalciferol (VITAMIN D PO) Take 1,000 Units by mouth daily        Allergies   Allergen Reactions     Codeine Sulfate      Nausea and vomiting      Quinapril Difficulty breathing     Darvon-N [Propoxyphene Napsylate] Nausea and Vomiting       Review of Systems   Constitutional: Positive for fatigue.   HENT: Negative for congestion.    Respiratory: Positive for shortness of breath. Negative for cough.    Cardiovascular: Positive for chest pain. Negative for leg swelling.   Musculoskeletal:        Left arm aches.    Neurological: Positive for dizziness and headaches.     Remainder of comprehensive-14 point review of systems was negative    Physical Exam   BP: 149/90 mmHg  Heart Rate: 62  Temp: 98.8  F (37.1  C)  SpO2: 96 %    Physical Exam     Head:  Normocephalic.    Eyes:  PERRLA, full EOM.  External exams normal.    Throat:  Moist mucous membranes without lesions, erythema, or exudate.    Neck:  Supple, without masses, lymphadenopathy or tenderness.    Respiratory:  Normal respiratory effort.  Lungs are clear with good breath sounds.    Heart:  RR without murmurs, rubs, or gallops.  No JVD  Abdomen: No discomfort right upper quadrant epigastric area  Extremities: Ecchymosis and some soft tissue swelling in the area for the left radial artery was used for recent angiogram.  There is no evidence for any DVT involving left upper extremity.  Both lower extremities show poor perfusion.  There is no edema.  Negative Homans test bilaterally  Skin: Warm dry well perfused.      ED Course   Procedures                 Results for orders placed or performed during the hospital encounter of 01/28/17 (from the past 24 hour(s))   CBC with platelets differential   Result Value Ref Range    WBC 8.6 4.0 - 11.0 10e9/L    RBC Count 3.80 3.8 - 5.2 10e12/L    Hemoglobin 11.2 (L) 11.7 - 15.7 g/dL    Hematocrit 35.0 35.0 - 47.0 %    MCV 92 78 - 100 fl    MCH 29.5 26.5 - 33.0 pg    MCHC 32.0 31.5 - 36.5 g/dL    RDW 13.9  10.0 - 15.0 %    Platelet Count 291 150 - 450 10e9/L    Diff Method Automated Method     % Neutrophils 70.5 %    % Lymphocytes 17.8 %    % Monocytes 10.3 %    % Eosinophils 0.9 %    % Basophils 0.3 %    % Immature Granulocytes 0.2 %    Absolute Neutrophil 6.1 1.6 - 8.3 10e9/L    Absolute Lymphocytes 1.5 0.8 - 5.3 10e9/L    Absolute Monocytes 0.9 0.0 - 1.3 10e9/L    Absolute Eosinophils 0.1 0.0 - 0.7 10e9/L    Absolute Basophils 0.0 0.0 - 0.2 10e9/L    Abs Immature Granulocytes 0.0 0 - 0.4 10e9/L   Comprehensive metabolic panel   Result Value Ref Range    Sodium 143 133 - 144 mmol/L    Potassium 4.2 3.4 - 5.3 mmol/L    Chloride 108 94 - 109 mmol/L    Carbon Dioxide 28 20 - 32 mmol/L    Anion Gap 7 3 - 14 mmol/L    Glucose 95 70 - 99 mg/dL    Urea Nitrogen 16 7 - 30 mg/dL    Creatinine 0.70 0.52 - 1.04 mg/dL    GFR Estimate 82 >60 mL/min/1.7m2    GFR Estimate If Black >90   GFR Calc   >60 mL/min/1.7m2    Calcium 8.7 8.5 - 10.1 mg/dL    Bilirubin Total 0.2 0.2 - 1.3 mg/dL    Albumin 3.8 3.4 - 5.0 g/dL    Protein Total 6.6 (L) 6.8 - 8.8 g/dL    Alkaline Phosphatase 61 40 - 150 U/L    ALT 44 0 - 50 U/L    AST 21 0 - 45 U/L   Troponin I   Result Value Ref Range    Troponin I ES  0.000 - 0.045 ug/L     <0.015  The 99th percentile for upper reference range is 0.045 ug/L.  Troponin values in   the range of 0.045 - 0.120 ug/L may be associated with risks of adverse   clinical events.       3:42 PM Patient assessed.    Assessments & Plan (with Medical Decision Making)  Kristen is a 71 year old female who initially saw on January 13.  At that time she was having complaints of fatigue, dizziness, mild dyspnea with exertional symptoms.  Discharge she was set up for myocardial perfusion scan.  This was completed which showed some concerns for new ischemia and disposition of the RCA.  This led to a coronary angiogram a left heart catheterization on January 25.  Identified abnormal renal artery anatomy with no  occlusion.  A normal EF.  Postprocedure she was noted to have paroxysmal atrial fibrillation.  Initially thought this might be related procedure one persisted they initiated beta blocker therapy with metoprolol.  This resulted in chemical cardioversion to sinus rhythm and rate control.  Discharged home on metoprolol 25 mg twice a day and was given a ZIO- patch for 3 days.  This patient monitored device is scheduled to be removed on Monday and mailed in for analysis.  Patient is accompanied by her daughter Christy who is RN at [employer].  She is concerned that her mother was still complaining of vague symptoms of lightheadedness, dizziness and fatigue.  Noted that her heart rate was in the 50s to 60s but on one occasion was 48 bpm.  No documented hypotension.  No near-syncope or syncope.    Medically recommended the patient remain on her current medications.  If the outpatient cardiac monitoring confirmed that she is having frequent episodes of bradycardia with heart rate below 50 recommendation to reduce her metoprolol dosing from 25 mg twice a day to 12.5 twice a day and consider once a day extended release Toprol-XL .  Her daughter was also concerned about possible persistent hypokalemia.  Apparently was treated for hypokalemia the time of her left heart cath.  CBC along with chemistries were all normal today.       I have reviewed the nursing notes.    I have reviewed the findings, diagnosis, plan and need for follow up with the patient.    Discharge Medication List as of 1/28/2017  4:29 PM          Final diagnoses:   Dizziness   Generalized muscle weakness   Paroxysmal atrial fibrillation (H)   Bradycardia- beta blocker     This document serves as a record of the services and decisions personally performed and made by Frederick Santiago, *. It was created on HIS/HER behalf by Sonam Ybarra, a trained medical scribe. The creation of this document is based the provider's statements to the  medical scribe.  Sonam Ybarra 3:42 PM 1/28/2017    Provider:   The information in this document, created by the medical scribe for me, accurately reflects the services I personally performed and the decisions made by me. I have reviewed and approved this document for accuracy prior to leaving the patient care area.  Frederick Santiago, * 3:42 PM 1/28/2017 1/28/2017   [facility] EMERGENCY DEPARTMENT      Frederick Santiago, DO  01/29/17 0003

## 2017-01-30 ENCOUNTER — TELEPHONE (OUTPATIENT)
Dept: CARDIOLOGY | Facility: CLINIC | Age: 72
End: 2017-01-30

## 2017-01-30 NOTE — TELEPHONE ENCOUNTER
"Received call from patient reporting that her left arm is still sore from the angiogram. She is s/p angiogram on 1/25/17 with left radial approach. \"Is my arm still suppose to hurt this far out from the procedure ?\" She denied any red raised area at access site. No fever or drainage. Pt said her arm is healing and has \"bluish and yellow colored bruise. She has been taking one tylenol every 4-5 hours with good relief, however, after that time the \"aching returns\". I encouraged her to apply a warm (NOT HOT) or a cool compress for comfort. She agreed to try this. I went over the s/s symptoms of infection and encouraged her to go to the ED if her symptoms worsen. She was in agreement with this plan.  "

## 2017-02-01 ENCOUNTER — OFFICE VISIT (OUTPATIENT)
Dept: CARDIOLOGY | Facility: CLINIC | Age: 72
End: 2017-02-01
Payer: COMMERCIAL

## 2017-02-01 VITALS
WEIGHT: 157.21 LBS | DIASTOLIC BLOOD PRESSURE: 68 MMHG | HEART RATE: 53 BPM | OXYGEN SATURATION: 98 % | BODY MASS INDEX: 28.75 KG/M2 | SYSTOLIC BLOOD PRESSURE: 105 MMHG

## 2017-02-01 DIAGNOSIS — I10 BENIGN ESSENTIAL HYPERTENSION: ICD-10-CM

## 2017-02-01 DIAGNOSIS — R07.89 CHEST DISCOMFORT: Primary | ICD-10-CM

## 2017-02-01 DIAGNOSIS — Z98.61 STATUS POST PERCUTANEOUS TRANSLUMINAL CORONARY ANGIOPLASTY: ICD-10-CM

## 2017-02-01 DIAGNOSIS — M79.622 PAIN OF LEFT UPPER ARM: ICD-10-CM

## 2017-02-01 PROCEDURE — 99214 OFFICE O/P EST MOD 30 MIN: CPT | Mod: 25 | Performed by: NURSE PRACTITIONER

## 2017-02-01 NOTE — PROGRESS NOTES
Cardiology Clinic Progress Note  Kristen Thompson MRN# [id number]   YOB: 1945 Age: 71 year old     Reason For Visit:  angiogram follow-up    Primary Cardiologist:   Dr. moreno          History of Presenting Illness:    Kristen Thompson is a pleasant 71 year old patient with a past cardiac history significant for hyperlipidemia.  She was hospitalized at [facility] for chest discomfort radiating to her jaw and down her left arm.  Previous CT coronary angiogram 2014 showed mild two-vessel CAD.  Stress testing showed ischemia which was a new finding along with reproducible chest pain during the testing.  She was then referred to cardiology.  Pt was last seen by Dr. moreno on 1/20/2017 for chest pain.  She was also experiencing shortness of breath and chest tightness without any exacerbating or relieving factors.  Coronary angiogram and Zio Patch monitor were recommended.  Patient was also noted to be on red yeast rice along with simvastatin, which are contraindicated.  She did have recent abnormal ALT and so stopping red yeast rice was recommended along with repeating ALT.    Since she was last seen, she presented to the ED on 1/28/2017 with lightheadedness and dizziness with heart rate 50s to 60s.  She had no syncope or presyncope.  Her ALT was rechecked and was found to be within normal limits after stopping red yeast rice.  No changes were made to her medications and recommendation to continue with Zio Patch was made.    Pt presents today for angiogram follow-up.  Angiogram 1/25/2017 showed normal coronary arteries with no evidence of disease.  Of note, she did go into atrial fibrillation during the procedure while in the left ventricle getting an LV gram.  She mentions that the feeling she experienced was the same symptoms she had that prompted her original stress test.  There was also a high radial artery bifurcation with significant tortuosity at the elbow and she experienced significant  spasm with straightening it out with the wire.  Post angiogram, metoprolol 25 mg b.i.d. was started along with getting Zio Patch placed to assess for further atrial fibrillation.  These results were reviewed with her today.    Since her angiogram, she has completed the Zio Patch monitoring which she mailed back 2 days ago.  We do not have the results of this yet.  She has developed significant arm discomfort post angiogram in her left wrist and left upper arm.  This is exacerbated with lifting items.  She rates this 8/10 and at times wakes her at night.  She has been using Tylenol which will only last her approximately three hours before she starts developing discomfort again.  She has been using cold packs.  Her left radial angiogram site has moderate ecchymosis, no bleeding, small pea-sized hematoma, and no bruit.  She has 2+ left radial pulse with capillary refill less than 3 seconds.  This was discussed with Dr. Jara who believes the left arm discomfort may possibly be related to spasm during angiogram or reaction to the sheath used during the procedure.    She has continued to have chest pressure occurring throughout the day at rest and with exertion.  This is occurring on a daily basis.  She does have a history of gastritis and is unable to tell if this is related to her stomach or her heart.  She has developed some lightheadedness and dizziness notably in the mornings upon awakening which is improved with a glass of water.  She has also developed vertigo in the past few days with an ear ache.  I recommended she follow-up with her PCP regarding this.  She does have a history of vertigo.  She reports her home blood pressures are greater than 140 systolically, at times.  Her blood pressure today in the office is 105/68 with heart rate of 53. Patient reports no PND, orthopnea, presyncope, syncope, heart racing, or palpitations.      Current Cardiac Medications   Metoprolol 25 mg b.i.d.  Simvastatin 40 mg  daily  Coenzyme Q10 100 mg daily  Nitroglycerin p.r.n.  Magnesium-potassium supplement daily  Aspirin 81 mg daily  Lasix 10 mg daily p.r.n.  Omega-3 daily                    Assessment and Plan:     Plan  1.  Call the clinic if left arm pain worsens, may take ibuprofen or Tylenol for discomfort  2.  Check with primary provider regarding setting up medication management visit with pharmacist to check interactions with supplements  3.  Follow-up with [facility] in one week to review Zio Patch and reassess left arm  4.  Bring blood pressure cuff to next office visit to check accuracy      1. Chest discomfort    Experiences chest pressure throughout the day at rest and with exertion, on a daily basis    History of gastritis and patient is unable to tell if this is related to her heart or her stomach    May possibly be related to paroxysmal atrial fibrillation but we have not received Zio Patch results yet      2. Left arm pain status post angiogram    Left wrist and upper arm pain 8/10, worse with use    She was noted to have tortuous vessel and spasm during angiogram.  Could also be related to reaction with sheath    Continue Tylenol and ice or warm packs    If pain persists after another week, we will obtain LUE artery ultrasound      3. Atrial fibrillation    Induced during LV gram, uncertain if this is paroxysmal atrial fibrillation or induced during angiogram    Patient has been experiencing chest pressure, shortness of breath and found to have normal coronaries, this is possibly related to atrial fibrillation    Three-day Zio Patch completed but have not received results yet    Continue metoprolol for rate control      4. Hypertension    105/68 well-controlled.  Home blood pressure readings occasionally greater than 140 systolic    Continue metoprolol    Will reassess at next OV           Thank you for allowing me to participate in this delightful patient's care.      This note was completed in part using Dragon voice  recognition software. Although reviewed after completion, some word and grammatical errors may occur.    Joyce Hill, APRN, CNP           Data:   All laboratory data reviewed:    LAST CHOLESTEROL:  CHOL      144   6/9/2016  HDL       60   6/9/2016  LDL       72   6/9/2016  TRIG       58   6/9/2016  CHOLHDLRATIO      3.1   9/29/2015    LAST BMP:  NA      143   1/28/2017   POTASSIUM      4.2   1/28/2017  CHLORIDE      108   1/28/2017  HEATHER      8.7   1/28/2017  CO2       28   1/28/2017  BUN       16   1/28/2017  CR     0.70   1/28/2017  GLC       95   1/28/2017    LAST CBC:  WBC      8.6   1/28/2017  RBC     3.80   1/28/2017  HGB     11.2   1/28/2017  HCT     35.0   1/28/2017  MCV       92   1/28/2017  MCH     29.5   1/28/2017  MCHC     32.0   1/28/2017  RDW     13.9   1/28/2017  PLT      291   1/28/2017

## 2017-02-01 NOTE — PROGRESS NOTES
CURRENT MEDICATIONS:  Current Outpatient Prescriptions   Medication Sig Dispense Refill     HYDROcodone-acetaminophen (NORCO) 5-325 MG per tablet Take 1 tablet by mouth every 4 hours as needed for moderate to severe pain or pain 18 tablet 0     metoprolol (LOPRESSOR) 25 MG tablet Take 1 tablet (25 mg) by mouth 2 times daily Hold IF heart rate less than 55. 180 tablet 3     simvastatin (ZOCOR) 40 MG tablet Take 1 tablet (40 mg) by mouth At Bedtime 90 tablet 3     Coenzyme Q10 (COQ10) 100 MG CAPS        nitroglycerin (NITROSTAT) 0.4 MG sublingual tablet For chest pain place 1 tablet under the tongue every 5 minutes for 3 doses. If symptoms persist 5 minutes after 1st dose call 911. 25 tablet 0     vitamin B complex with vitamin C (VITAMIN  B COMPLEX) TABS tablet Take 1 tablet by mouth daily       Acetaminophen (TYLENOL PO) Take 1,000 mg by mouth every 4 hours as needed for mild pain or fever       esomeprazole (NEXIUM) 40 MG CR capsule Take 1 capsule (40 mg) by mouth every morning (before breakfast) Take 30-60 minutes before a eating. 30 capsule 1     fluticasone (FLONASE) 50 MCG/ACT spray Spray 1-2 sprays into both nostrils daily 16 g 11     MAGNESIUM-POTASSIUM PO Take by mouth daily        aspirin 81 MG EC tablet Take 1 tablet (81 mg) by mouth daily 90 tablet 3     furosemide (LASIX) 20 MG tablet Take 0.5 tablets (10 mg) by mouth daily as needed 30 tablet 0     Red Yeast Rice 600 MG TABS Take 2 tablets by mouth daily       CALCIUM PO Take by mouth daily        Omega-3 Fatty Acids (OMEGA 3 PO) Take  by mouth.       Cholecalciferol (VITAMIN D PO) Take 1,000 Units by mouth daily          ALLERGIES     Allergies   Allergen Reactions     Codeine Sulfate      Nausea and vomiting      Quinapril Difficulty breathing     Darvon-N [Propoxyphene Napsylate] Nausea and Vomiting       PAST MEDICAL HISTORY:  Past Medical History   Diagnosis Date     PONV (postoperative nausea and vomiting)      H/O total hysterectomy       Gastro-oesophageal reflux disease      Arthritis 11/19/2013     HL (hearing loss) 10/11/2012     GERD (gastroesophageal reflux disease) 10/2/2012     Squamous cell carcinoma (H)        PAST SURGICAL HISTORY:  Past Surgical History   Procedure Laterality Date     Hysterectomy       Gallbladder surgery       Repair bladder       C rad resec tonsil/pillars       Knee replacment  2007     Colonoscopy  10/30/2012     Procedure: COLONOSCOPY;  Colonoscopy;  Surgeon: Denise Bah MD;  Location: [facility]     Release trigger finger Right 4/29/2016     Procedure: RELEASE TRIGGER FINGER;  Surgeon: Percy Sarmiento MD;  Location: [facility]     Esophagoscopy, gastroscopy, duodenoscopy (egd), combined N/A 9/15/2016     Procedure: COMBINED ESOPHAGOSCOPY, GASTROSCOPY, DUODENOSCOPY (EGD);  Surgeon: Bennie Godinez MD;  Location: [facility]       Social History:  Social History   Substance Use Topics     Smoking status: Never Smoker      Smokeless tobacco: Never Used     Alcohol Use: No       FAMILY HISTORY:  Family History   Problem Relation Age of Onset     C.A.D. Mother      Cardiovascular Mother      Thyroid Disease Mother      CANCER Father      stomach ca     CANCER Sister      Eye Disorder Sister      C.A.D. Sister      CEREBROVASCULAR DISEASE Sister      Breast Cancer Sister      Arthritis Sister      Cardiovascular Sister      Depression Sister      HEART DISEASE Sister      Neurologic Disorder Sister      OSTEOPOROSIS Sister      Thyroid Disease Sister      Depression Sister      Thyroid Disease Sister      Depression Sister      Thyroid Disease Sister      Obesity Sister      Neurologic Disorder Sister        Review of Systems:  Skin:  Positive for bruising     Eyes:  Positive for glasses    ENT:  Positive for vertigo;hearing loss;earache    Respiratory:  Positive for dyspnea on exertion     Cardiovascular:    Positive for;chest pain;edema    Gastroenterology: Positive for abdominal pain;excessive gas or  bloating;heartburn    Genitourinary:  Negative      Musculoskeletal:  Positive for arthritis;joint pain;joint swelling;joint stiffness    Neurologic:  Positive for headaches    Psychiatric:  Positive for anxiety;depression;sleep disturbances    Heme/Lymph/Imm:  Positive for chills    Endocrine:  Negative

## 2017-02-01 NOTE — Clinical Note
2/1/2017    Janett Morse MD  [facility]   [address] [facility] [address]    RE: Kristen Thompson       Dear Colleague,    I had the pleasure of seeing Kristen Thompson in the [facility].    CURRENT MEDICATIONS:  Current Outpatient Prescriptions   Medication Sig Dispense Refill     HYDROcodone-acetaminophen (NORCO) 5-325 MG per tablet Take 1 tablet by mouth every 4 hours as needed for moderate to severe pain or pain 18 tablet 0     metoprolol (LOPRESSOR) 25 MG tablet Take 1 tablet (25 mg) by mouth 2 times daily Hold IF heart rate less than 55. 180 tablet 3     simvastatin (ZOCOR) 40 MG tablet Take 1 tablet (40 mg) by mouth At Bedtime 90 tablet 3     Coenzyme Q10 (COQ10) 100 MG CAPS        nitroglycerin (NITROSTAT) 0.4 MG sublingual tablet For chest pain place 1 tablet under the tongue every 5 minutes for 3 doses. If symptoms persist 5 minutes after 1st dose call 911. 25 tablet 0     vitamin B complex with vitamin C (VITAMIN  B COMPLEX) TABS tablet Take 1 tablet by mouth daily       Acetaminophen (TYLENOL PO) Take 1,000 mg by mouth every 4 hours as needed for mild pain or fever       esomeprazole (NEXIUM) 40 MG CR capsule Take 1 capsule (40 mg) by mouth every morning (before breakfast) Take 30-60 minutes before a eating. 30 capsule 1     fluticasone (FLONASE) 50 MCG/ACT spray Spray 1-2 sprays into both nostrils daily 16 g 11     MAGNESIUM-POTASSIUM PO Take by mouth daily        aspirin 81 MG EC tablet Take 1 tablet (81 mg) by mouth daily 90 tablet 3     furosemide (LASIX) 20 MG tablet Take 0.5 tablets (10 mg) by mouth daily as needed 30 tablet 0     Red Yeast Rice 600 MG TABS Take 2 tablets by mouth daily       CALCIUM PO Take by mouth daily        Omega-3 Fatty Acids (OMEGA 3 PO) Take  by mouth.       Cholecalciferol (VITAMIN D PO) Take 1,000 Units by mouth daily          ALLERGIES     Allergies   Allergen Reactions     Codeine Sulfate      Nausea and  vomiting      Quinapril Difficulty breathing     Darvon-N [Propoxyphene Napsylate] Nausea and Vomiting       PAST MEDICAL HISTORY:  Past Medical History   Diagnosis Date     PONV (postoperative nausea and vomiting)      H/O total hysterectomy      Gastro-oesophageal reflux disease      Arthritis 11/19/2013     HL (hearing loss) 10/11/2012     GERD (gastroesophageal reflux disease) 10/2/2012     Squamous cell carcinoma (H)        PAST SURGICAL HISTORY:  Past Surgical History   Procedure Laterality Date     Hysterectomy       Gallbladder surgery       Repair bladder       C rad resec tonsil/pillars       Knee replacment  2007     Colonoscopy  10/30/2012     Procedure: COLONOSCOPY;  Colonoscopy;  Surgeon: Denise Bah MD;  Location: [facility]     Release trigger finger Right 4/29/2016     Procedure: RELEASE TRIGGER FINGER;  Surgeon: Percy Sarmiento MD;  Location: [facility]     Esophagoscopy, gastroscopy, duodenoscopy (egd), combined N/A 9/15/2016     Procedure: COMBINED ESOPHAGOSCOPY, GASTROSCOPY, DUODENOSCOPY (EGD);  Surgeon: Bennie Godinez MD;  Location: [facility]       Social History:  Social History   Substance Use Topics     Smoking status: Never Smoker      Smokeless tobacco: Never Used     Alcohol Use: No       FAMILY HISTORY:  Family History   Problem Relation Age of Onset     C.A.D. Mother      Cardiovascular Mother      Thyroid Disease Mother      CANCER Father      stomach ca     CANCER Sister      Eye Disorder Sister      C.A.D. Sister      CEREBROVASCULAR DISEASE Sister      Breast Cancer Sister      Arthritis Sister      Cardiovascular Sister      Depression Sister      HEART DISEASE Sister      Neurologic Disorder Sister      OSTEOPOROSIS Sister      Thyroid Disease Sister      Depression Sister      Thyroid Disease Sister      Depression Sister      Thyroid Disease Sister      Obesity Sister      Neurologic Disorder Sister        Review of Systems:  Skin:  Positive for bruising     Eyes:   Positive for glasses    ENT:  Positive for vertigo;hearing loss;earache    Respiratory:  Positive for dyspnea on exertion     Cardiovascular:    Positive for;chest pain;edema    Gastroenterology: Positive for abdominal pain;excessive gas or bloating;heartburn    Genitourinary:  Negative      Musculoskeletal:  Positive for arthritis;joint pain;joint swelling;joint stiffness    Neurologic:  Positive for headaches    Psychiatric:  Positive for anxiety;depression;sleep disturbances    Heme/Lymph/Imm:  Positive for chills    Endocrine:  Negative              HPI and Plan:   See dictation    Orders Placed This Encounter   Procedures     Follow-Up with Cardiac Advanced Practice Provider       No orders of the defined types were placed in this encounter.       There are no discontinued medications.      Encounter Diagnoses   Name Primary?     Chest discomfort Yes     Pain of left upper arm and wrist      Status post percutaneous transluminal coronary angioplasty        CURRENT MEDICATIONS:  Current Outpatient Prescriptions   Medication Sig Dispense Refill     HYDROcodone-acetaminophen (NORCO) 5-325 MG per tablet Take 1 tablet by mouth every 4 hours as needed for moderate to severe pain or pain 18 tablet 0     metoprolol (LOPRESSOR) 25 MG tablet Take 1 tablet (25 mg) by mouth 2 times daily Hold IF heart rate less than 55. 180 tablet 3     simvastatin (ZOCOR) 40 MG tablet Take 1 tablet (40 mg) by mouth At Bedtime 90 tablet 3     Coenzyme Q10 (COQ10) 100 MG CAPS        nitroglycerin (NITROSTAT) 0.4 MG sublingual tablet For chest pain place 1 tablet under the tongue every 5 minutes for 3 doses. If symptoms persist 5 minutes after 1st dose call 911. 25 tablet 0     vitamin B complex with vitamin C (VITAMIN  B COMPLEX) TABS tablet Take 1 tablet by mouth daily       Acetaminophen (TYLENOL PO) Take 1,000 mg by mouth every 4 hours as needed for mild pain or fever       esomeprazole (NEXIUM) 40 MG CR capsule Take 1 capsule (40 mg) by  mouth every morning (before breakfast) Take 30-60 minutes before a eating. 30 capsule 1     fluticasone (FLONASE) 50 MCG/ACT spray Spray 1-2 sprays into both nostrils daily 16 g 11     MAGNESIUM-POTASSIUM PO Take by mouth daily        aspirin 81 MG EC tablet Take 1 tablet (81 mg) by mouth daily 90 tablet 3     furosemide (LASIX) 20 MG tablet Take 0.5 tablets (10 mg) by mouth daily as needed 30 tablet 0     Red Yeast Rice 600 MG TABS Take 2 tablets by mouth daily       CALCIUM PO Take by mouth daily        Omega-3 Fatty Acids (OMEGA 3 PO) Take  by mouth.       Cholecalciferol (VITAMIN D PO) Take 1,000 Units by mouth daily          ALLERGIES     Allergies   Allergen Reactions     Codeine Sulfate      Nausea and vomiting      Quinapril Difficulty breathing     Darvon-N [Propoxyphene Napsylate] Nausea and Vomiting       PAST MEDICAL HISTORY:  Past Medical History   Diagnosis Date     PONV (postoperative nausea and vomiting)      H/O total hysterectomy      Gastro-oesophageal reflux disease      Arthritis 11/19/2013     HL (hearing loss) 10/11/2012     GERD (gastroesophageal reflux disease) 10/2/2012     Squamous cell carcinoma (H)        PAST SURGICAL HISTORY:  Past Surgical History   Procedure Laterality Date     Hysterectomy       Gallbladder surgery       Repair bladder       C rad resec tonsil/pillars       Knee replacment  2007     Colonoscopy  10/30/2012     Procedure: COLONOSCOPY;  Colonoscopy;  Surgeon: Denise Bah MD;  Location: [facility]     Release trigger finger Right 4/29/2016     Procedure: RELEASE TRIGGER FINGER;  Surgeon: Percy Sarmiento MD;  [facility]: [facility]     Esophagoscopy, gastroscopy, duodenoscopy (egd), combined N/A 9/15/2016     Procedure: COMBINED ESOPHAGOSCOPY, GASTROSCOPY, DUODENOSCOPY (EGD);  Surgeon: Bennie Godinez MD;  Location: [facility]       FAMILY HISTORY:  Family History   Problem Relation Age of Onset     C.A.D. Mother      Cardiovascular Mother      Thyroid Disease Mother       CANCER Father      stomach ca     CANCER Sister      Eye Disorder Sister      C.A.D. Sister      CEREBROVASCULAR DISEASE Sister      Breast Cancer Sister      Arthritis Sister      Cardiovascular Sister      Depression Sister      HEART DISEASE Sister      Neurologic Disorder Sister      OSTEOPOROSIS Sister      Thyroid Disease Sister      Depression Sister      Thyroid Disease Sister      Depression Sister      Thyroid Disease Sister      Obesity Sister      Neurologic Disorder Sister        SOCIAL HISTORY:  Social History     Social History     Marital Status:      Spouse Name: N/A     Number of Children: N/A     Years of Education: N/A     Social History Main Topics     Smoking status: Never Smoker      Smokeless tobacco: Never Used     Alcohol Use: No     Drug Use: No     Sexual Activity:     Partners: Male     Other Topics Concern     Parent/Sibling W/ Cabg, Mi Or Angioplasty Before 65f 55m? No     Social History Narrative       Review of Systems:  Skin:  Positive for bruising     Eyes:  Positive for glasses    ENT:  Positive for vertigo;hearing loss;earache    Respiratory:  Positive for dyspnea on exertion     Cardiovascular:    Positive for;chest pain;edema    Gastroenterology: Positive for abdominal pain;excessive gas or bloating;heartburn    Genitourinary:  Negative      Musculoskeletal:  Positive for arthritis;joint pain;joint swelling;joint stiffness    Neurologic:  Positive for headaches    Psychiatric:  Positive for anxiety;depression;sleep disturbances    Heme/Lymph/Imm:  Positive for chills    Endocrine:  Negative        Physical Exam:  Vitals: /68 mmHg  Pulse 53  Wt 71.31 kg (157 lb 3.4 oz)  SpO2 98%    Constitutional:  cooperative, alert and oriented, well developed, well nourished, in no acute distress        Skin:  warm and dry to the touch        Head:  normocephalic        Eyes:  sclera white        ENT:  no pallor or cyanosis        Neck:  carotid pulses are full and equal  bilaterally        Chest:  clear to auscultation;normal symmetry          Cardiac: regular rhythm;no murmurs, gallops or rubs detected                  Abdomen:  abdomen soft        Vascular: pulses full and equal                                 left radial bruit (-)      Extremities and Back:  no deformities, clubbing, cyanosis, erythema observed   bilateral LE edema;1+          Neurological:  affect appropriate, oriented to time, person and place;no gross motor deficits              CC  Referred Self, MD  No address on file                  Cardiology Clinic Progress Note  Kristen Thompson MRN# [id number]   YOB: 1945 Age: 71 year old     Reason For Visit:  angiogram follow-up    Primary Cardiologist:   Dr. moreno          History of Presenting Illness:    Kristen Thompson is a pleasant 71 year old patient with a past cardiac history significant for hyperlipidemia.  She was hospitalized at [facility] for chest discomfort radiating to her jaw and down her left arm.  Previous CT coronary angiogram 2014 showed mild two-vessel CAD.  Stress testing showed ischemia which was a new finding along with reproducible chest pain during the testing.  She was then referred to cardiology.  Pt was last seen by Dr. moreno on 1/20/2017 for chest pain.  She was also experiencing shortness of breath and chest tightness without any exacerbating or relieving factors.  Coronary angiogram and Zio Patch monitor were recommended.  Patient was also noted to be on red yeast rice along with simvastatin, which are contraindicated.  She did have recent abnormal ALT and so stopping red yeast rice was recommended along with repeating ALT.    Since she was last seen, she presented to the ED on 1/28/2017 with lightheadedness and dizziness with heart rate 50s to 60s.  She had no syncope or presyncope.  Her ALT was rechecked and was found to be within normal limits after stopping red yeast rice.  No changes were made to her  medications and recommendation to continue with Zio Patch was made.    Pt presents today for angiogram follow-up.  Angiogram 1/25/2017 showed normal coronary arteries with no evidence of disease.  Of note, she did go into atrial fibrillation during the procedure while in the left ventricle getting an LV gram.  She mentions that the feeling she experienced was the same symptoms she had that prompted her original stress test.  There was also a high radial artery bifurcation with significant tortuosity at the elbow and she experienced significant spasm with straightening it out with the wire.  Post angiogram, metoprolol 25 mg b.i.d. was started along with getting Zio Patch placed to assess for further atrial fibrillation.  These results were reviewed with her today.    Since her angiogram, she has completed the Zio Patch monitoring which she mailed back 2 days ago.  We do not have the results of this yet.  She has developed significant arm discomfort post angiogram in her left wrist and left upper arm.  This is exacerbated with lifting items.  She rates this 8/10 and at times wakes her at night.  She has been using Tylenol which will only last her approximately three hours before she starts developing discomfort again.  She has been using cold packs.  Her left radial angiogram site has moderate ecchymosis, no bleeding, small pea-sized hematoma, and no bruit.  She has 2+ left radial pulse with capillary refill less than 3 seconds.  This was discussed with Dr. Jara who believes the left arm discomfort may possibly be related to spasm during angiogram or reaction to the sheath used during the procedure.    She has continued to have chest pressure occurring throughout the day at rest and with exertion.  This is occurring on a daily basis.  She does have a history of gastritis and is unable to tell if this is related to her stomach or her heart.  She has developed some lightheadedness and dizziness notably in the mornings  upon awakening which is improved with a glass of water.  She has also developed vertigo in the past few days with an ear ache.  I recommended she follow-up with her PCP regarding this.  She does have a history of vertigo.  She reports her home blood pressures are greater than 140 systolically, at times.  Her blood pressure today in the office is 105/68 with heart rate of 53. Patient reports no PND, orthopnea, presyncope, syncope, heart racing, or palpitations.      Current Cardiac Medications   Metoprolol 25 mg b.i.d.  Simvastatin 40 mg daily  Coenzyme Q10 100 mg daily  Nitroglycerin p.r.n.  Magnesium-potassium supplement daily  Aspirin 81 mg daily  Lasix 10 mg daily p.r.n.  Omega-3 daily                    Assessment and Plan:     Plan  1.  Call the clinic if left arm pain worsens, may take ibuprofen or Tylenol for discomfort  2.  Check with primary provider regarding setting up medication management visit with pharmacist to check interactions with supplements  3.  Follow-up with [facility] in one week to review Zio Patch and reassess left arm  4.  Bring blood pressure cuff to next office visit to check accuracy      1. Chest discomfort    Experiences chest pressure throughout the day at rest and with exertion, on a daily basis    History of gastritis and patient is unable to tell if this is related to her heart or her stomach    May possibly be related to paroxysmal atrial fibrillation but we have not received Zio Patch results yet      2. Left arm pain status post angiogram    Left wrist and upper arm pain 8/10, worse with use    She was noted to have tortuous vessel and spasm during angiogram.  Could also be related to reaction with sheath    Continue Tylenol and ice or warm packs    If pain persists after another week, we will obtain LUE artery ultrasound      3. Atrial fibrillation    Induced during LV gram, uncertain if this is paroxysmal atrial fibrillation or induced during angiogram    Patient has been  experiencing chest pressure, shortness of breath and found to have normal coronaries, this is possibly related to atrial fibrillation    Three-day Zio Patch completed but have not received results yet    Continue metoprolol for rate control      4. Hypertension    105/68 well-controlled.  Home blood pressure readings occasionally greater than 140 systolic    Continue metoprolol    Will reassess at next OV           Thank you for allowing me to participate in this delightful patient's care.      This note was completed in part using Dragon voice recognition software. Although reviewed after completion, some word and grammatical errors may occur.    AHMET Hinojosa, CNP           Data:   All laboratory data reviewed:    LAST CHOLESTEROL:  CHOL      144   6/9/2016  HDL       60   6/9/2016  LDL       72   6/9/2016  TRIG       58   6/9/2016  CHOLHDLRATIO      3.1   9/29/2015    LAST BMP:  NA      143   1/28/2017   POTASSIUM      4.2   1/28/2017  CHLORIDE      108   1/28/2017  HEATHER      8.7   1/28/2017  CO2       28   1/28/2017  BUN       16   1/28/2017  CR     0.70   1/28/2017  GLC       95   1/28/2017    LAST CBC:  WBC      8.6   1/28/2017  RBC     3.80   1/28/2017  HGB     11.2   1/28/2017  HCT     35.0   1/28/2017  MCV       92   1/28/2017  MCH     29.5   1/28/2017  MCHC     32.0   1/28/2017  RDW     13.9   1/28/2017  PLT      291   1/28/2017    Thank you for allowing me to participate in the care of your patient.    Sincerely,     AHMET Hinojosa CNP     [facility]

## 2017-02-01 NOTE — PROGRESS NOTES
HPI and Plan:   See dictation    Orders Placed This Encounter   Procedures     Follow-Up with Cardiac Advanced Practice Provider       No orders of the defined types were placed in this encounter.       There are no discontinued medications.      Encounter Diagnoses   Name Primary?     Chest discomfort Yes     Pain of left upper arm and wrist      Status post percutaneous transluminal coronary angioplasty        CURRENT MEDICATIONS:  Current Outpatient Prescriptions   Medication Sig Dispense Refill     HYDROcodone-acetaminophen (NORCO) 5-325 MG per tablet Take 1 tablet by mouth every 4 hours as needed for moderate to severe pain or pain 18 tablet 0     metoprolol (LOPRESSOR) 25 MG tablet Take 1 tablet (25 mg) by mouth 2 times daily Hold IF heart rate less than 55. 180 tablet 3     simvastatin (ZOCOR) 40 MG tablet Take 1 tablet (40 mg) by mouth At Bedtime 90 tablet 3     Coenzyme Q10 (COQ10) 100 MG CAPS        nitroglycerin (NITROSTAT) 0.4 MG sublingual tablet For chest pain place 1 tablet under the tongue every 5 minutes for 3 doses. If symptoms persist 5 minutes after 1st dose call 911. 25 tablet 0     vitamin B complex with vitamin C (VITAMIN  B COMPLEX) TABS tablet Take 1 tablet by mouth daily       Acetaminophen (TYLENOL PO) Take 1,000 mg by mouth every 4 hours as needed for mild pain or fever       esomeprazole (NEXIUM) 40 MG CR capsule Take 1 capsule (40 mg) by mouth every morning (before breakfast) Take 30-60 minutes before a eating. 30 capsule 1     fluticasone (FLONASE) 50 MCG/ACT spray Spray 1-2 sprays into both nostrils daily 16 g 11     MAGNESIUM-POTASSIUM PO Take by mouth daily        aspirin 81 MG EC tablet Take 1 tablet (81 mg) by mouth daily 90 tablet 3     furosemide (LASIX) 20 MG tablet Take 0.5 tablets (10 mg) by mouth daily as needed 30 tablet 0     Red Yeast Rice 600 MG TABS Take 2 tablets by mouth daily       CALCIUM PO Take by mouth daily        Omega-3 Fatty Acids (OMEGA 3 PO) Take  by mouth.        Cholecalciferol (VITAMIN D PO) Take 1,000 Units by mouth daily          ALLERGIES     Allergies   Allergen Reactions     Codeine Sulfate      Nausea and vomiting      Quinapril Difficulty breathing     Darvon-N [Propoxyphene Napsylate] Nausea and Vomiting       PAST MEDICAL HISTORY:  Past Medical History   Diagnosis Date     PONV (postoperative nausea and vomiting)      H/O total hysterectomy      Gastro-oesophageal reflux disease      Arthritis 11/19/2013     HL (hearing loss) 10/11/2012     GERD (gastroesophageal reflux disease) 10/2/2012     Squamous cell carcinoma (H)        PAST SURGICAL HISTORY:  Past Surgical History   Procedure Laterality Date     Hysterectomy       Gallbladder surgery       Repair bladder       C rad resec tonsil/pillars       Knee replacment  2007     Colonoscopy  10/30/2012     Procedure: COLONOSCOPY;  Colonoscopy;  Surgeon: Denise Bah MD;  Location: [facility]     Release trigger finger Right 4/29/2016     Procedure: RELEASE TRIGGER FINGER;  Surgeon: Percy Sarmiento MD;  Location: [facility]     Esophagoscopy, gastroscopy, duodenoscopy (egd), combined N/A 9/15/2016     Procedure: COMBINED ESOPHAGOSCOPY, GASTROSCOPY, DUODENOSCOPY (EGD);  Surgeon: Bennie Godinez MD;  Location: [facility]       FAMILY HISTORY:  Family History   Problem Relation Age of Onset     C.A.D. Mother      Cardiovascular Mother      Thyroid Disease Mother      CANCER Father      stomach ca     CANCER Sister      Eye Disorder Sister      C.A.D. Sister      CEREBROVASCULAR DISEASE Sister      Breast Cancer Sister      Arthritis Sister      Cardiovascular Sister      Depression Sister      HEART DISEASE Sister      Neurologic Disorder Sister      OSTEOPOROSIS Sister      Thyroid Disease Sister      Depression Sister      Thyroid Disease Sister      Depression Sister      Thyroid Disease Sister      Obesity Sister      Neurologic Disorder Sister        SOCIAL HISTORY:  Social History     Social History      Marital Status:      Spouse Name: N/A     Number of Children: N/A     Years of Education: N/A     Social History Main Topics     Smoking status: Never Smoker      Smokeless tobacco: Never Used     Alcohol Use: No     Drug Use: No     Sexual Activity:     Partners: Male     Other Topics Concern     Parent/Sibling W/ Cabg, Mi Or Angioplasty Before 65f 55m? No     Social History Narrative       Review of Systems:  Skin:  Positive for bruising     Eyes:  Positive for glasses    ENT:  Positive for vertigo;hearing loss;earache    Respiratory:  Positive for dyspnea on exertion     Cardiovascular:    Positive for;chest pain;edema    Gastroenterology: Positive for abdominal pain;excessive gas or bloating;heartburn    Genitourinary:  Negative      Musculoskeletal:  Positive for arthritis;joint pain;joint swelling;joint stiffness    Neurologic:  Positive for headaches    Psychiatric:  Positive for anxiety;depression;sleep disturbances    Heme/Lymph/Imm:  Positive for chills    Endocrine:  Negative        Physical Exam:  Vitals: /68 mmHg  Pulse 53  Wt 71.31 kg (157 lb 3.4 oz)  SpO2 98%    Constitutional:  cooperative, alert and oriented, well developed, well nourished, in no acute distress        Skin:  warm and dry to the touch        Head:  normocephalic        Eyes:  sclera white        ENT:  no pallor or cyanosis        Neck:  carotid pulses are full and equal bilaterally        Chest:  clear to auscultation;normal symmetry          Cardiac: regular rhythm;no murmurs, gallops or rubs detected                  Abdomen:  abdomen soft        Vascular: pulses full and equal                                 left radial bruit (-)      Extremities and Back:  no deformities, clubbing, cyanosis, erythema observed   bilateral LE edema;1+          Neurological:  affect appropriate, oriented to time, person and place;no gross motor deficits              CC  Referred Self, MD  No address on file

## 2017-02-01 NOTE — PATIENT INSTRUCTIONS
Thank you for your [facility] visit today. Your provider has recommended the following:  Medication Changes:  No changes  Recommendations:  Call if left arm pain worsens. You may take advil or tylenol for discomfort.   If arm pain continues we may need to do an ultrasound   Check with primary provider regarding setting up medication management visit with pharmacist to discuss interactions with supplements   Follow-up:  See Joyce LOPEZ for cardiology follow up in 1 week to review heart monitor and assess left arm pain.  Call [phone] two months prior to request date to schedule any future appointments.  Reminder:  1. Please bring in all current medications, over the counter supplements and vitamin bottles to your next appointment.               [facility]  [facility]~[address]~[place], [place]~[address]  Questions about your visit today?   Call your Cardiology Clinic RN's-Emilee Esposito and/or Sherri Bonilla at [phone].

## 2017-02-01 NOTE — MR AVS SNAPSHOT
After Visit Summary   2/1/2017    Kristen Thompson    MRN: [id number]           Patient Information     Date Of Birth          1945        Visit Information        Provider Department      2/1/2017 3:00 PM Joyce Hill APRN CNP [facility] PHYSICIAN HEART AT [facility]        Today's Diagnoses     Chest discomfort    -  1     Pain of left upper arm and wrist         Status post percutaneous transluminal coronary angioplasty           Care Instructions    Thank you for your [facility] visit today. Your provider has recommended the following:  Medication Changes:  No changes  Recommendations:  Call if left arm pain worsens. You may take advil or tylenol for discomfort.   If arm pain continues we may need to do an ultrasound   Check with primary provider regarding setting up medication management visit with pharmacist to discuss interactions with supplements   Follow-up:  See Joyce LOPEZ for cardiology follow up in 1 week to review heart monitor and assess left arm pain.  Call [phone] two months prior to request date to schedule any future appointments.  Reminder:  1. Please bring in all current medications, over the counter supplements and vitamin bottles to your next appointment.               [facility] HEART [facility]~[address]~[place], [place]~[address]  Questions about your visit today?   Call your Cardiology Clinic RN's-Emilee Esposito and/or Sherri Bonilla at [phone].          Follow-ups after your visit        Additional Services     Follow-Up with Cardiac Advanced Practice Provider                 Future tests that were ordered for you today     Open Future Orders        Priority Expected Expires Ordered    Follow-Up with Cardiac Advanced Practice Provider Routine 2/8/2017 2/1/2019 2/1/2017            Who to contact     If you have questions or need follow up information about today's clinic visit  or your schedule please contact [facility] PHYSICIAN HEART AT [facility] directly at [phone].  Normal or non-critical lab and imaging results will be communicated to you by MyChart, letter or phone within 4 business days after the clinic has received the results. If you do not hear from us within 7 days, please contact the clinic through [employer] or phone. If you have a critical or abnormal lab result, we will notify you by phone as soon as possible.  Submit refill requests through [employer] or call your pharmacy and they will forward the refill request to us. Please allow 3 business days for your refill to be completed.          Additional Information About Your Visit        [employer] Information     [employer] gives you secure access to your electronic health record. If you see a primary care provider, you can also send messages to your care team and make appointments. If you have questions, please call your primary care clinic.  If you do not have a primary care provider, please call [phone] and they will assist you.        Care EveryWhere ID     This is your Care EveryWhere ID. This could be used by other organizations to access your [place] medical records  [id number]        Your Vitals Were     Pulse Pulse Oximetry                53 98%           Blood Pressure from Last 3 Encounters:   02/01/17 105/68   01/28/17 144/85   01/25/17 111/91    Weight from Last 3 Encounters:   02/01/17 71.31 kg (157 lb 3.4 oz)   01/25/17 70.308 kg (155 lb)   01/20/17 72.122 kg (159 lb)               Primary Care Provider Office Phone # Fax #    Janett Morse [phone]       [facility] [address] [address] [address]        Thank you!     Thank you for choosing [facility] AT [facility]  for your care. Our goal is always to provide you with excellent care. Hearing back from our patients is one way we can continue to improve our services.  Please take a few minutes to complete the written survey that you may receive in the mail after your visit with us. Thank you!             Your Updated Medication List - Protect others around you: Learn how to safely use, store and throw away your medicines at www.disposemymeds.org.          This list is accurate as of: 2/1/17  3:59 PM.  Always use your most recent med list.                   Brand Name Dispense Instructions for use    aspirin 81 MG EC tablet     90 tablet    Take 1 tablet (81 mg) by mouth daily       CALCIUM PO      Take by mouth daily       CoQ10 100 MG Caps          esomeprazole 40 MG CR capsule    nexIUM    30 capsule    Take 1 capsule (40 mg) by mouth every morning (before breakfast) Take 30-60 minutes before a eating.       fluticasone 50 MCG/ACT spray    FLONASE    16 g    Spray 1-2 sprays into both nostrils daily       furosemide 20 MG tablet    LASIX    30 tablet    Take 0.5 tablets (10 mg) by mouth daily as needed       HYDROcodone-acetaminophen 5-325 MG per tablet    NORCO    18 tablet    Take 1 tablet by mouth every 4 hours as needed for moderate to severe pain or pain       MAGNESIUM-POTASSIUM PO      Take by mouth daily       metoprolol 25 MG tablet    LOPRESSOR    180 tablet    Take 1 tablet (25 mg) by mouth 2 times daily Hold IF heart rate less than 55.       nitroglycerin 0.4 MG sublingual tablet    NITROSTAT    25 tablet    For chest pain place 1 tablet under the tongue every 5 minutes for 3 doses. If symptoms persist 5 minutes after 1st dose call 911.       OMEGA 3 PO      Take  by mouth.       Red Yeast Rice 600 MG Tabs      Take 2 tablets by mouth daily       simvastatin 40 MG tablet    ZOCOR    90 tablet    Take 1 tablet (40 mg) by mouth At Bedtime       TYLENOL PO      Take 1,000 mg by mouth every 4 hours as needed for mild pain or fever       vitamin B complex with vitamin C Tabs tablet      Take 1 tablet by mouth daily       VITAMIN D PO      Take 1,000 Units by mouth daily

## 2017-02-03 ENCOUNTER — TELEPHONE (OUTPATIENT)
Dept: CARDIOLOGY | Facility: CLINIC | Age: 72
End: 2017-02-03

## 2017-02-03 NOTE — TELEPHONE ENCOUNTER
Pt called in today to state that she was having significant pain in her LUE where she had the angiogram done. States it woke her up this morning at 4am and she has not been able to get back to sleep since. Has been taking Tylenol Q4H without relief. Pt states that her hand and arm are pink, warm and dry and denies paresthesias. States pain is mostly with any movement and pressure--unable to pinpoint pain exactly but states that when she goes to pull up the covers, it really hurts. Advised to present to ED for evaluation--states she does not want to go to the ED. Advised to get in to see her PCP this morning so they could possible get an upper extremity US to check for clot--pt states she goes to the [facility] and doesn't know if they have that capability. I will discuss with Joyce Hill NP regarding her recommendations and pt will contact [facility] to see if they could do and upper extremity US if necessary. I will call her back with Joyce's recommendations.     ADDENDUM: Spoke with Joyce personally in clinic. Her recommendation (per Dr Jara) is to continue current treatment plan, watch over the weekend and if the pain isn't getting better, to do an ultrasound of the LUE mid next week. Discussed with patient and she agreed with this recommendation. Sherri Bonilla RN Cardiology February 3, 2017, 9:07 AM

## 2017-02-09 ENCOUNTER — OFFICE VISIT (OUTPATIENT)
Dept: CARDIOLOGY | Facility: CLINIC | Age: 72
End: 2017-02-09
Attending: NURSE PRACTITIONER
Payer: COMMERCIAL

## 2017-02-09 VITALS
SYSTOLIC BLOOD PRESSURE: 111 MMHG | DIASTOLIC BLOOD PRESSURE: 71 MMHG | OXYGEN SATURATION: 98 % | WEIGHT: 156.2 LBS | BODY MASS INDEX: 28.56 KG/M2 | HEART RATE: 51 BPM

## 2017-02-09 DIAGNOSIS — E78.5 HYPERLIPIDEMIA LDL GOAL <130: ICD-10-CM

## 2017-02-09 DIAGNOSIS — R07.89 CHEST DISCOMFORT: Primary | ICD-10-CM

## 2017-02-09 DIAGNOSIS — I10 BENIGN ESSENTIAL HYPERTENSION: ICD-10-CM

## 2017-02-09 DIAGNOSIS — R06.02 SOB (SHORTNESS OF BREATH): ICD-10-CM

## 2017-02-09 PROCEDURE — 99214 OFFICE O/P EST MOD 30 MIN: CPT | Performed by: NURSE PRACTITIONER

## 2017-02-09 RX ORDER — SIMVASTATIN 40 MG
20 TABLET ORAL AT BEDTIME
Qty: 90 TABLET | Refills: 3
Start: 2017-02-09 | End: 2017-02-17

## 2017-02-09 RX ORDER — AMLODIPINE BESYLATE 5 MG/1
5 TABLET ORAL DAILY
Qty: 30 TABLET | Refills: 11 | Status: SHIPPED | OUTPATIENT
Start: 2017-02-09 | End: 2017-02-17

## 2017-02-09 NOTE — PATIENT INSTRUCTIONS
Thank you for your [facility] visit today. Your provider has recommended the following:  Medication Changes:  STOP metoprolol   START amlodipine 5 mg daily   DECREASE simvastatin to 20 mg daily, as you had normal heart arteries  Recommendations:  Have primary provider draw cholesterol labs drawn in 2 months on the decreased dose of simvastatin   Check home blood pressures, call primary clinic if > 140/90 consistently  Follow-up:  See primary provider for follow-up of blood pressure and chest discomfort/ shortness of breath in 1 month.   Call [phone] two months prior to request date to schedule any future appointments.  Reminder:  1. Please bring in all current medications, over the counter supplements and vitamin bottles to your next appointment.               [facility]  [facility]~[address]~[place], [place]~[address]  Questions about your visit today?   Call your Cardiology Clinic RN's-Emilee Esposito and/or Sherri Bonilla at [phone].

## 2017-02-09 NOTE — PROGRESS NOTES
Cardiology Clinic Progress Note  Kristen Thompson MRN# [id number]   YOB: 1945 Age: 71 year old     Reason For Visit:  ziopatch and left arm pain follow-up    Primary Cardiologist:   Dr. moreno          History of Presenting Illness:    Kristen Thompson is a pleasant 71 year old patient with a past cardiac history significant for hyperlipidemia.  She was hospitalized at [facility] for chest discomfort radiating to her jaw and down her left arm.  Previous CT coronary angiogram 2014 showed mild two-vessel CAD and stress testing showed ischemia. Pt was seen by Dr. moreno on 1/20/2017 for chest tightness and shortness of breath.  Coronary angiogram 1/25/2017 showed normal coronary arteries with no evidence of disease.  Of note, she went into atrial fibrillation during the procedure while in the left ventricle getting an LV gram. Metoprolol 25 mg b.i.d. was started along with getting Zio Patch placed to assess for further atrial fibrillation.     Patient was last seen by me on 2/1/2017 for angiogram follow-up.  At that time, her Zio Patch results were not back yet.  She continued with c/o chest pressure throughout the day at rest and with exertion, occurring on a daily basis.  She was unable to tell if this was related to gastritis, which she had a history of.  She reported home systolic blood pressure readings greater than 140 but in the office they were 105/68.  She was asked to bring her home blood pressure cuff to check the accuracy before making adjustments to blood pressure medication.  She also had complaints of significant left arm discomfort post angiogram, in her left wrist and left upper arm, especially with lifting or pulling.  This was discussed with Dr. Jara who felt it was possibly related to spasm during angiogram or reaction to the sheath used during the procedure.  She had reports of lightheadedness with bradycardia, after starting metoprolol.     Patient presents, with her  daughter Gabriella, today for Zio Patch and left arm pain follow-up.  Three-day Zio Patch from 1/27/2017 until 1/30/2017 showed no atrial fibrillation, predominantly sinus rhythm with average heart rate 59, minimum 45, maximum 143, four runs of nonsustained SVT with the longest being six beats, frequent PACs approximately 7.3%, and symptoms did not correlate with any arrhythmias.  These results were reviewed with her today.    Since she was last seen, she states that her left arm pain has been improving and is now only slightly present such as when she turns on the lamp.  She brought her home blood pressure cuff to check accuracy and there was a difference of her cuff being 10 points higher than ours.  Her blood pressure in the clinic today is 111/71 and she reports home blood pressure readings of 150-160 systolic taking into account the 10 mmHg difference.  In regards to her symptoms of chest tightness and shortness of breath, these have continued and they are most likely noncardiac.  I offered her an echocardiogram, as this has not been performed yet.  I do not believe this will show any significant findings but she does decline it at this time.  I encouraged her to call us if she would like to get this scheduled.  She has continued to have lightheadedness and dizziness related to bradycardia, while being on metoprolol.  She is requesting this be discontinued and that she be started on a different antihypertensive medication.  At this point, she prefers to follow-up with her primary care provider in regards to her hypertension and hyperlipidemia.  I also recommended she follow-up with primary care regarding her chest discomfort and shortness of breath symptoms.  I educated her on the side effects of amlodipine with lower extremity edema. She is agreeable to starting this medication and will notify us if she develops any side effects.  She is also inquiring about discontinuing statin therapy as she has normal coronary  arteries.  Looking at her previous lipid profiles I see total cholesterols of 209-224 with LDL of 140 prior to being on statin medication.  I agreed to decrease her simvastatin to 20 mg daily from 40 mg daily, as she does not require as tight of control on her LDL, given her normal coronaries. Patient reports no PND, orthopnea, presyncope, syncope, heart racing, or palpitations.     Patient is very discouraged that we are unable to find a cardiac cause for her chest discomfort and shortness of breath.  She is also frustrated in regards to previous false positive CT coronary angiogram and stress testing.  Patient was reluctant to continue treating her hypertension but her daughter, who is a nurse, encouraged her to continue treating hypertension and hyperlipidemia.  Patient would prefer to follow up with PCP in regards to these issues, in the future.        Current Cardiac Medications   Metoprolol 25 mg b.i.d.  Simvastatin 40 mg daily  Coenzyme Q10 100 mg daily  Nitroglycerin p.r.n.  Magnesium-potassium supplement daily  Aspirin 81 mg daily  Lasix 10 mg daily p.r.n.  Omega-3 daily                    Assessment and Plan:     Plan  1.  Discontinue metoprolol  2.  Start amlodipine 5 mg daily  3.  Decrease simvastatin to 20 mg daily  4.  Follow-up with PCP in one month for hypertension and hyperlipidemia  5.  Follow-up with PCP regarding chest discomfort and shortness of breath, which is likely noncardiac  6.  Call the clinic if you decide to schedule echocardiogram      1. Chest discomfort/SOB    Experiences chest pressure throughout the day at rest and with exertion, on a daily basis    History of gastritis and patient is unable to tell if this is related to her stomach    Coronary angiogram showing normal coronaries and Zio Patch showing no atrial fibrillation or significant arrhythmias that correlate with her symptoms    Discussed that she could have echocardiogram to further assess pulmonary pressures and heart  valves, but she declines at this time    This is likely noncardiac and recommended follow-up with PCP      2. Left arm pain status post angiogram- improved    Left wrist and upper arm pain significantly improved    She was noted to have tortuous vessel and spasm during angiogram.  Could also be related to reaction with sheath    Continue Tylenol and ice as needed      3. Atrial fibrillation-resolved    Induced during LV gram and no further atrial fibrillation seen on Zio Patch    May discontinue metoprolol as she has had no further atrial fibrillation and this is believed to have been induced during LV gram, along with patient's symptoms of lightheadedness with bradycardia      4. Hypertension    111/71,  Home blood pressure systolic readings 150 to 160s    Discontinue metoprolol and start amlodipine    Patient prefers to follow with PCP regarding hypertension      5.         Hyperlipidemia    LDL 72 6/2016    Patient prefers to decrease statin medication given normal coronaries    Previous total cholesterol 224 and  in 2015    I explained that she should be on some statin medication givin her previous hyperlipidemia, but that we could decrease the dose of her simvastatin and recheck cholesterol numbers    She prefers to follow with PCP regarding hyperlipidemia         Thank you for allowing me to participate in this delightful patient's care.      This note was completed in part using Dragon voice recognition software. Although reviewed after completion, some word and grammatical errors may occur.    Joyce Hill, APRN, CNP           Data:   All laboratory data reviewed:    LAST CHOLESTEROL:  CHOL      144   6/9/2016  HDL       60   6/9/2016  LDL       72   6/9/2016  TRIG       58   6/9/2016  CHOLHDLRATIO      3.1   9/29/2015    LAST BMP:  NA      143   1/28/2017   POTASSIUM      4.2   1/28/2017  CHLORIDE      108   1/28/2017  HEATHER      8.7   1/28/2017  CO2       28   1/28/2017  BUN       16    1/28/2017  CR     0.70   1/28/2017  GLC       95   1/28/2017    LAST CBC:  WBC      8.6   1/28/2017  RBC     3.80   1/28/2017  HGB     11.2   1/28/2017  HCT     35.0   1/28/2017  MCV       92   1/28/2017  MCH     29.5   1/28/2017  MCHC     32.0   1/28/2017  RDW     13.9   1/28/2017  PLT      291   1/28/2017

## 2017-02-09 NOTE — Clinical Note
2/9/2017    Janett Morse MD  [facility]  [address] [facility] [address]    RE: Kristen Thompson       Dear Colleague,    I had the pleasure of seeing Kristen Thompson in the [facility].  CURRENT MEDICATIONS:  Current Outpatient Prescriptions   Medication Sig Dispense Refill     HYDROcodone-acetaminophen (NORCO) 5-325 MG per tablet Take 1 tablet by mouth every 4 hours as needed for moderate to severe pain or pain 18 tablet 0     metoprolol (LOPRESSOR) 25 MG tablet Take 1 tablet (25 mg) by mouth 2 times daily Hold IF heart rate less than 55. 180 tablet 3     simvastatin (ZOCOR) 40 MG tablet Take 1 tablet (40 mg) by mouth At Bedtime 90 tablet 3     Coenzyme Q10 (COQ10) 100 MG CAPS        nitroglycerin (NITROSTAT) 0.4 MG sublingual tablet For chest pain place 1 tablet under the tongue every 5 minutes for 3 doses. If symptoms persist 5 minutes after 1st dose call 911. 25 tablet 0     vitamin B complex with vitamin C (VITAMIN  B COMPLEX) TABS tablet Take 1 tablet by mouth daily       Acetaminophen (TYLENOL PO) Take 1,000 mg by mouth every 4 hours as needed for mild pain or fever       esomeprazole (NEXIUM) 40 MG CR capsule Take 1 capsule (40 mg) by mouth every morning (before breakfast) Take 30-60 minutes before a eating. 30 capsule 1     fluticasone (FLONASE) 50 MCG/ACT spray Spray 1-2 sprays into both nostrils daily 16 g 11     MAGNESIUM-POTASSIUM PO Take by mouth daily        aspirin 81 MG EC tablet Take 1 tablet (81 mg) by mouth daily 90 tablet 3     furosemide (LASIX) 20 MG tablet Take 0.5 tablets (10 mg) by mouth daily as needed 30 tablet 0     CALCIUM PO Take by mouth daily        Omega-3 Fatty Acids (OMEGA 3 PO) Take  by mouth.       Cholecalciferol (VITAMIN D PO) Take 1,000 Units by mouth daily          ALLERGIES     Allergies   Allergen Reactions     Codeine Sulfate      Nausea and vomiting      Quinapril Difficulty breathing     Darvon-N [Propoxyphene  Napsylate] Nausea and Vomiting       PAST MEDICAL HISTORY:  Past Medical History   Diagnosis Date     PONV (postoperative nausea and vomiting)      H/O total hysterectomy      Gastro-oesophageal reflux disease      Arthritis 11/19/2013     HL (hearing loss) 10/11/2012     GERD (gastroesophageal reflux disease) 10/2/2012     Squamous cell carcinoma (H)        PAST SURGICAL HISTORY:  Past Surgical History   Procedure Laterality Date     Hysterectomy       Gallbladder surgery       Repair bladder       C rad resec tonsil/pillars       Knee replacment  2007     Colonoscopy  10/30/2012     Procedure: COLONOSCOPY;  Colonoscopy;  Surgeon: Denise Bah MD;  Location: [facility]     Release trigger finger Right 4/29/2016     Procedure: RELEASE TRIGGER FINGER;  Surgeon: Percy Sarmiento MD;  Location: [facility]     Esophagoscopy, gastroscopy, duodenoscopy (egd), combined N/A 9/15/2016     Procedure: COMBINED ESOPHAGOSCOPY, GASTROSCOPY, DUODENOSCOPY (EGD);  Surgeon: Bennie Godinez MD;  Location: [facility]       Social History:  Social History   Substance Use Topics     Smoking status: Never Smoker      Smokeless tobacco: Never Used     Alcohol Use: No       FAMILY HISTORY:  Family History   Problem Relation Age of Onset     C.A.D. Mother      Cardiovascular Mother      Thyroid Disease Mother      CANCER Father      stomach ca     CANCER Sister      Eye Disorder Sister      C.A.D. Sister      CEREBROVASCULAR DISEASE Sister      Breast Cancer Sister      Arthritis Sister      Cardiovascular Sister      Depression Sister      HEART DISEASE Sister      Neurologic Disorder Sister      OSTEOPOROSIS Sister      Thyroid Disease Sister      Depression Sister      Thyroid Disease Sister      Depression Sister      Thyroid Disease Sister      Obesity Sister      Neurologic Disorder Sister        Review of Systems:  Skin:  Positive for bruising     Eyes:  Positive for glasses    ENT:  Positive for vertigo;hearing loss;earache     Respiratory:  Positive for dyspnea on exertion     Cardiovascular:    Positive for;chest pain;edema    Gastroenterology: Positive for abdominal pain;excessive gas or bloating;heartburn    Genitourinary:  Negative      Musculoskeletal:  Positive for arthritis;joint pain;joint swelling;joint stiffness    Neurologic:  Positive for headaches    Psychiatric:  Positive for anxiety;depression;sleep disturbances    Heme/Lymph/Imm:  Positive for chills    Endocrine:  Negative              HPI and Plan:   See dictation    No orders of the defined types were placed in this encounter.       Orders Placed This Encounter   Medications     amLODIPine (NORVASC) 5 MG tablet     Sig: Take 1 tablet (5 mg) by mouth daily     Dispense:  30 tablet     Refill:  11     simvastatin (ZOCOR) 40 MG tablet     Sig: Take 0.5 tablets (20 mg) by mouth At Bedtime     Dispense:  90 tablet     Refill:  3       Medications Discontinued During This Encounter   Medication Reason     metoprolol (LOPRESSOR) 25 MG tablet Side effects     simvastatin (ZOCOR) 40 MG tablet Reorder         Encounter Diagnoses   Name Primary?     Chest discomfort      Benign essential hypertension Yes     CARDIOVASCULAR SCREENING; LDL GOAL LESS THAN 100        CURRENT MEDICATIONS:  Current Outpatient Prescriptions   Medication Sig Dispense Refill     amLODIPine (NORVASC) 5 MG tablet Take 1 tablet (5 mg) by mouth daily 30 tablet 11     simvastatin (ZOCOR) 40 MG tablet Take 0.5 tablets (20 mg) by mouth At Bedtime 90 tablet 3     HYDROcodone-acetaminophen (NORCO) 5-325 MG per tablet Take 1 tablet by mouth every 4 hours as needed for moderate to severe pain or pain 18 tablet 0     Coenzyme Q10 (COQ10) 100 MG CAPS        nitroglycerin (NITROSTAT) 0.4 MG sublingual tablet For chest pain place 1 tablet under the tongue every 5 minutes for 3 doses. If symptoms persist 5 minutes after 1st dose call 911. 25 tablet 0     vitamin B complex with vitamin C (VITAMIN  B COMPLEX) TABS tablet  Take 1 tablet by mouth daily       Acetaminophen (TYLENOL PO) Take 1,000 mg by mouth every 4 hours as needed for mild pain or fever       esomeprazole (NEXIUM) 40 MG CR capsule Take 1 capsule (40 mg) by mouth every morning (before breakfast) Take 30-60 minutes before a eating. 30 capsule 1     fluticasone (FLONASE) 50 MCG/ACT spray Spray 1-2 sprays into both nostrils daily 16 g 11     MAGNESIUM-POTASSIUM PO Take by mouth daily        aspirin 81 MG EC tablet Take 1 tablet (81 mg) by mouth daily 90 tablet 3     furosemide (LASIX) 20 MG tablet Take 0.5 tablets (10 mg) by mouth daily as needed 30 tablet 0     CALCIUM PO Take by mouth daily        Omega-3 Fatty Acids (OMEGA 3 PO) Take  by mouth.       Cholecalciferol (VITAMIN D PO) Take 1,000 Units by mouth daily        [DISCONTINUED] simvastatin (ZOCOR) 40 MG tablet Take 1 tablet (40 mg) by mouth At Bedtime 90 tablet 3       ALLERGIES     Allergies   Allergen Reactions     Codeine Sulfate      Nausea and vomiting      Quinapril Difficulty breathing     Darvon-N [Propoxyphene Napsylate] Nausea and Vomiting       PAST MEDICAL HISTORY:  Past Medical History   Diagnosis Date     PONV (postoperative nausea and vomiting)      H/O total hysterectomy      Gastro-oesophageal reflux disease      Arthritis 11/19/2013     HL (hearing loss) 10/11/2012     GERD (gastroesophageal reflux disease) 10/2/2012     Squamous cell carcinoma (H)        PAST SURGICAL HISTORY:  Past Surgical History   Procedure Laterality Date     Hysterectomy       Gallbladder surgery       Repair bladder       C rad resec tonsil/pillars       Knee replacment  2007     Colonoscopy  10/30/2012     Procedure: COLONOSCOPY;  Colonoscopy;  Surgeon: Denise Bah MD;  Location: [facility]     Release trigger finger Right 4/29/2016     Procedure: RELEASE TRIGGER FINGER;  Surgeon: Percy Sarmiento MD;  Location: [facility]     Esophagoscopy, gastroscopy, duodenoscopy (egd), combined N/A 9/15/2016     Procedure:  COMBINED ESOPHAGOSCOPY, GASTROSCOPY, DUODENOSCOPY (EGD);  Surgeon: Bennie Godinez MD;  Location: [facility]       FAMILY HISTORY:  Family History   Problem Relation Age of Onset     C.A.D. Mother      Cardiovascular Mother      Thyroid Disease Mother      CANCER Father      stomach ca     CANCER Sister      Eye Disorder Sister      C.A.D. Sister      CEREBROVASCULAR DISEASE Sister      Breast Cancer Sister      Arthritis Sister      Cardiovascular Sister      Depression Sister      HEART DISEASE Sister      Neurologic Disorder Sister      OSTEOPOROSIS Sister      Thyroid Disease Sister      Depression Sister      Thyroid Disease Sister      Depression Sister      Thyroid Disease Sister      Obesity Sister      Neurologic Disorder Sister        SOCIAL HISTORY:  Social History     Social History     Marital Status:      Spouse Name: N/A     Number of Children: N/A     Years of Education: N/A     Social History Main Topics     Smoking status: Never Smoker      Smokeless tobacco: Never Used     Alcohol Use: No     Drug Use: No     Sexual Activity:     Partners: Male     Other Topics Concern     Parent/Sibling W/ Cabg, Mi Or Angioplasty Before 65f 55m? No     Social History Narrative       Review of Systems:  Skin:  Positive for bruising     Eyes:  Positive for glasses    ENT:  Positive for vertigo;hearing loss;earache    Respiratory:  Positive for dyspnea on exertion     Cardiovascular:    Positive for;chest pain;edema    Gastroenterology: Positive for abdominal pain;excessive gas or bloating;heartburn    Genitourinary:  Negative      Musculoskeletal:  Positive for arthritis;joint pain;joint swelling;joint stiffness    Neurologic:  Positive for headaches    Psychiatric:  Positive for anxiety;depression;sleep disturbances    Heme/Lymph/Imm:  Positive for chills    Endocrine:  Negative        Physical Exam:  Vitals: /71 mmHg  Pulse 51  Wt 70.852 kg (156 lb 3.2 oz)  SpO2 98%    Constitutional:  cooperative,  alert and oriented, well developed, well nourished, in no acute distress   Pt refused physical exam today     Skin:           Head:  normocephalic        Eyes:  sclera white        ENT:  no pallor or cyanosis        Neck:  not assessed this visit        Chest:  not assessed this visit          Cardiac:                    Abdomen:  not assessed this visit        Vascular: not assessed this visit                                        Extremities and Back:  not assessed this visit              Neurological:  no gross motor deficits;affect appropriate, oriented to time, person and place        Cardiology Clinic Progress Note  Kristen Thompson MRN# [id number]   YOB: 1945 Age: 71 year old     Reason For Visit:  ziopatch and left arm pain follow-up    Primary Cardiologist:   Dr. moreno          History of Presenting Illness:    Kristen Thompson is a pleasant 71 year old patient with a past cardiac history significant for hyperlipidemia.    She was hospitalized at [facility] for chest discomfort radiating to her jaw and down her left arm.    Previous CT coronary angiogram 2014 showed mild two-vessel CAD and stress testing showed ischemia.   Pt was seen by Dr. moreno on 1/20/2017 for chest tightness and shortness of breath.  Coronary angiogram   1/25/2017 showed normal coronary arteries with no evidence of disease.  Of note, she went into atrial   fibrillation during the procedure while in the left ventricle getting an LV gram. Metoprolol 25 mg b.i.d.   was started along with getting Zio Patch placed to assess for further atrial fibrillation.     Patient was last seen by me on 2/1/2017 for angiogram follow-up.  At that time, her Zio Patch results were   not back yet.  She continued with c/o chest pressure throughout the day at rest and with exertion, occurring   on a daily basis.  She was unable to tell if this was related to gastritis, which she had a history of.  She   reported home systolic blood  pressure readings greater than 140 but in the office they were 105/68.    She was asked to bring her home blood pressure cuff to check the accuracy before making adjustments   to blood pressure medication.  She also had complaints of significant left arm discomfort post angiogram,   in her left wrist and left upper arm, especially with lifting or pulling.  This was discussed with Dr. Jara   who felt it was possibly related to spasm during angiogram or reaction to the sheath used during the   procedure.  She had reports of lightheadedness with bradycardia, after starting metoprolol.     Patient presents, with her daughter Gabriella, today for Zio Patch and left arm pain follow-up.  Three-day   Zio Patch from 1/27/2017 until 1/30/2017 showed no atrial fibrillation, predominantly sinus rhythm with   average heart rate 59, minimum 45, maximum 143, four runs of nonsustained SVT with the longest   being six beats, frequent PACs approximately 7.3%, and symptoms did not correlate with any arrhythmias.    These results were reviewed with her today.    Since she was last seen, she states that her left arm pain has been improving and is now only slightly   present such as when she turns on the lamp.  She brought her home blood pressure cuff to check   accuracy and there was a difference of her cuff being 10 points higher than ours.  Her blood pressure   in the clinic today is 111/71 and she reports home blood pressure readings of 150-160 systolic taking   into account the 10 mmHg difference.  In regards to her symptoms of chest tightness and shortness of   breath, these have continued and they are most likely noncardiac.  I offered her an echocardiogram,   as this has not been performed yet.  I do not believe this will show any significant findings but she does   decline it at this time.  I encouraged her to call us if she would like to get this scheduled.  She has   continued to have lightheadedness and dizziness related to  bradycardia, while being on metoprolol.    She is requesting this be discontinued and that she be started on a different antihypertensive medication.    At this point, she prefers to follow-up with her primary care provider in regards to her hypertension and   hyperlipidemia.  I also recommended she follow-up with primary care regarding her chest discomfort and   shortness of breath symptoms.  I educated her on the side effects of amlodipine with lower extremity   edema. She is agreeable to starting this medication and will notify us if she develops any side effects.    She is also inquiring about discontinuing statin therapy as she has normal coronary arteries.  Looking   at her previous lipid profiles I see total cholesterols of 209-224 with LDL of 140 prior to being on statin   medication.  I agreed to decrease her simvastatin to 20 mg daily from 40 mg daily, as she does not   require as tight of control on her LDL, given her normal coronaries. Patient reports no PND, orthopnea,   presyncope, syncope, heart racing, or palpitations.     Patient is very discouraged that we are unable to find a cardiac cause for her chest discomfort and   shortness of breath.  She is also frustrated in regards to previous false positive CT coronary angiogram   and stress testing.  Patient was reluctant to continue treating her hypertension but her daughter, who is   a nurse, encouraged her to continue treating hypertension and hyperlipidemia.  Patient would prefer to   follow up with PCP in regards to these issues, in the future.        Current Cardiac Medications   Metoprolol 25 mg b.i.d.  Simvastatin 40 mg daily  Coenzyme Q10 100 mg daily  Nitroglycerin p.r.n.  Magnesium-potassium supplement daily  Aspirin 81 mg daily  Lasix 10 mg daily p.r.n.  Omega-3 daily                    Assessment and Plan:     Plan  1.  Discontinue metoprolol  2.  Start amlodipine 5 mg daily  3.  Decrease simvastatin to 20 mg daily  4.  Follow-up with PCP  in one month for hypertension and hyperlipidemia  5.  Follow-up with PCP regarding chest discomfort and shortness of breath, which is likely noncardiac  6.  Call the clinic if you decide to schedule echocardiogram      1. Chest discomfort/SOB    Experiences chest pressure throughout the day at rest and with exertion, on a daily basis    History of gastritis and patient is unable to tell if this is related to her stomach    Coronary angiogram showing normal coronaries and Zio Patch showing no atrial fibrillation or     significant arrhythmias that correlate with her symptoms    Discussed that she could have echocardiogram to further assess pulmonary pressures and heart     valves, but she declines at this time    This is likely noncardiac and recommended follow-up with PCP      2. Left arm pain status post angiogram- improved    Left wrist and upper arm pain significantly improved    She was noted to have tortuous vessel and spasm during angiogram.  Could also be related to     reaction with sheath    Continue Tylenol and ice as needed      3. Atrial fibrillation-resolved    Induced during LV gram and no further atrial fibrillation seen on Zio Patch    May discontinue metoprolol as she has had no further atrial fibrillation and this is believed to     have been induced during LV gram, along with patient's symptoms of lightheadedness with bradycardia      4. Hypertension    111/71,  Home blood pressure systolic readings 150 to 160s    Discontinue metoprolol and start amlodipine    Patient prefers to follow with PCP regarding hypertension      5.         Hyperlipidemia    LDL 72 6/2016    Patient prefers to decrease statin medication given normal coronaries    Previous total cholesterol 224 and  in 2015    I explained that she should be on some statin medication givin her previous hyperlipidemia, but that we could decrease the dose of her simvastatin and recheck cholesterol numbers    She prefers to follow with  PCP regarding hyperlipidemia         Thank you for allowing me to participate in this delightful patient's care.      This note was completed in part using Dragon voice recognition software. Although reviewed after completion, some word and grammatical errors may occur.    Joyce Hill, APRN, CNP           Data:   All laboratory data reviewed:    LAST CHOLESTEROL:  CHOL      144   6/9/2016  HDL       60   6/9/2016  LDL       72   6/9/2016  TRIG       58   6/9/2016  CHOLHDLRATIO      3.1   9/29/2015    LAST BMP:  NA      143   1/28/2017   POTASSIUM      4.2   1/28/2017  CHLORIDE      108   1/28/2017  HEATHER      8.7   1/28/2017  CO2       28   1/28/2017  BUN       16   1/28/2017  CR     0.70   1/28/2017  GLC       95   1/28/2017    LAST CBC:  WBC      8.6   1/28/2017  RBC     3.80   1/28/2017  HGB     11.2   1/28/2017  HCT     35.0   1/28/2017  MCV       92   1/28/2017  MCH     29.5   1/28/2017  MCHC     32.0   1/28/2017  RDW     13.9   1/28/2017  PLT      291   1/28/2017

## 2017-02-09 NOTE — PROGRESS NOTES
CURRENT MEDICATIONS:  Current Outpatient Prescriptions   Medication Sig Dispense Refill     HYDROcodone-acetaminophen (NORCO) 5-325 MG per tablet Take 1 tablet by mouth every 4 hours as needed for moderate to severe pain or pain 18 tablet 0     metoprolol (LOPRESSOR) 25 MG tablet Take 1 tablet (25 mg) by mouth 2 times daily Hold IF heart rate less than 55. 180 tablet 3     simvastatin (ZOCOR) 40 MG tablet Take 1 tablet (40 mg) by mouth At Bedtime 90 tablet 3     Coenzyme Q10 (COQ10) 100 MG CAPS        nitroglycerin (NITROSTAT) 0.4 MG sublingual tablet For chest pain place 1 tablet under the tongue every 5 minutes for 3 doses. If symptoms persist 5 minutes after 1st dose call 911. 25 tablet 0     vitamin B complex with vitamin C (VITAMIN  B COMPLEX) TABS tablet Take 1 tablet by mouth daily       Acetaminophen (TYLENOL PO) Take 1,000 mg by mouth every 4 hours as needed for mild pain or fever       esomeprazole (NEXIUM) 40 MG CR capsule Take 1 capsule (40 mg) by mouth every morning (before breakfast) Take 30-60 minutes before a eating. 30 capsule 1     fluticasone (FLONASE) 50 MCG/ACT spray Spray 1-2 sprays into both nostrils daily 16 g 11     MAGNESIUM-POTASSIUM PO Take by mouth daily        aspirin 81 MG EC tablet Take 1 tablet (81 mg) by mouth daily 90 tablet 3     furosemide (LASIX) 20 MG tablet Take 0.5 tablets (10 mg) by mouth daily as needed 30 tablet 0     CALCIUM PO Take by mouth daily        Omega-3 Fatty Acids (OMEGA 3 PO) Take  by mouth.       Cholecalciferol (VITAMIN D PO) Take 1,000 Units by mouth daily          ALLERGIES     Allergies   Allergen Reactions     Codeine Sulfate      Nausea and vomiting      Quinapril Difficulty breathing     Darvon-N [Propoxyphene Napsylate] Nausea and Vomiting       PAST MEDICAL HISTORY:  Past Medical History   Diagnosis Date     PONV (postoperative nausea and vomiting)      H/O total hysterectomy      Gastro-oesophageal reflux disease      Arthritis 11/19/2013     HL  (hearing loss) 10/11/2012     GERD (gastroesophageal reflux disease) 10/2/2012     Squamous cell carcinoma (H)        PAST SURGICAL HISTORY:  Past Surgical History   Procedure Laterality Date     Hysterectomy       Gallbladder surgery       Repair bladder       C rad resec tonsil/pillars       Knee replacment  2007     Colonoscopy  10/30/2012     Procedure: COLONOSCOPY;  Colonoscopy;  Surgeon: Denise Bah MD;  Location: [facility]     Release trigger finger Right 4/29/2016     Procedure: RELEASE TRIGGER FINGER;  Surgeon: Percy Sarmiento MD;  Location: [facility]     Esophagoscopy, gastroscopy, duodenoscopy (egd), combined N/A 9/15/2016     Procedure: COMBINED ESOPHAGOSCOPY, GASTROSCOPY, DUODENOSCOPY (EGD);  Surgeon: Bennie Godinez MD;  Location: [facility]       Social History:  Social History   Substance Use Topics     Smoking status: Never Smoker      Smokeless tobacco: Never Used     Alcohol Use: No       FAMILY HISTORY:  Family History   Problem Relation Age of Onset     C.A.D. Mother      Cardiovascular Mother      Thyroid Disease Mother      CANCER Father      stomach ca     CANCER Sister      Eye Disorder Sister      C.A.D. Sister      CEREBROVASCULAR DISEASE Sister      Breast Cancer Sister      Arthritis Sister      Cardiovascular Sister      Depression Sister      HEART DISEASE Sister      Neurologic Disorder Sister      OSTEOPOROSIS Sister      Thyroid Disease Sister      Depression Sister      Thyroid Disease Sister      Depression Sister      Thyroid Disease Sister      Obesity Sister      Neurologic Disorder Sister        Review of Systems:  Skin:  Positive for bruising     Eyes:  Positive for glasses    ENT:  Positive for vertigo;hearing loss;earache    Respiratory:  Positive for dyspnea on exertion     Cardiovascular:    Positive for;chest pain;edema    Gastroenterology: Positive for abdominal pain;excessive gas or bloating;heartburn    Genitourinary:  Negative      Musculoskeletal:  Positive  for arthritis;joint pain;joint swelling;joint stiffness    Neurologic:  Positive for headaches    Psychiatric:  Positive for anxiety;depression;sleep disturbances    Heme/Lymph/Imm:  Positive for chills    Endocrine:  Negative

## 2017-02-09 NOTE — PROGRESS NOTES
HPI and Plan:   See dictation    No orders of the defined types were placed in this encounter.       Orders Placed This Encounter   Medications     amLODIPine (NORVASC) 5 MG tablet     Sig: Take 1 tablet (5 mg) by mouth daily     Dispense:  30 tablet     Refill:  11     simvastatin (ZOCOR) 40 MG tablet     Sig: Take 0.5 tablets (20 mg) by mouth At Bedtime     Dispense:  90 tablet     Refill:  3       Medications Discontinued During This Encounter   Medication Reason     metoprolol (LOPRESSOR) 25 MG tablet Side effects     simvastatin (ZOCOR) 40 MG tablet Reorder         Encounter Diagnoses   Name Primary?     Chest discomfort      Benign essential hypertension Yes     CARDIOVASCULAR SCREENING; LDL GOAL LESS THAN 100        CURRENT MEDICATIONS:  Current Outpatient Prescriptions   Medication Sig Dispense Refill     amLODIPine (NORVASC) 5 MG tablet Take 1 tablet (5 mg) by mouth daily 30 tablet 11     simvastatin (ZOCOR) 40 MG tablet Take 0.5 tablets (20 mg) by mouth At Bedtime 90 tablet 3     HYDROcodone-acetaminophen (NORCO) 5-325 MG per tablet Take 1 tablet by mouth every 4 hours as needed for moderate to severe pain or pain 18 tablet 0     Coenzyme Q10 (COQ10) 100 MG CAPS        nitroglycerin (NITROSTAT) 0.4 MG sublingual tablet For chest pain place 1 tablet under the tongue every 5 minutes for 3 doses. If symptoms persist 5 minutes after 1st dose call 911. 25 tablet 0     vitamin B complex with vitamin C (VITAMIN  B COMPLEX) TABS tablet Take 1 tablet by mouth daily       Acetaminophen (TYLENOL PO) Take 1,000 mg by mouth every 4 hours as needed for mild pain or fever       esomeprazole (NEXIUM) 40 MG CR capsule Take 1 capsule (40 mg) by mouth every morning (before breakfast) Take 30-60 minutes before a eating. 30 capsule 1     fluticasone (FLONASE) 50 MCG/ACT spray Spray 1-2 sprays into both nostrils daily 16 g 11     MAGNESIUM-POTASSIUM PO Take by mouth daily        aspirin 81 MG EC tablet Take 1 tablet (81 mg) by  mouth daily 90 tablet 3     furosemide (LASIX) 20 MG tablet Take 0.5 tablets (10 mg) by mouth daily as needed 30 tablet 0     CALCIUM PO Take by mouth daily        Omega-3 Fatty Acids (OMEGA 3 PO) Take  by mouth.       Cholecalciferol (VITAMIN D PO) Take 1,000 Units by mouth daily        [DISCONTINUED] simvastatin (ZOCOR) 40 MG tablet Take 1 tablet (40 mg) by mouth At Bedtime 90 tablet 3       ALLERGIES     Allergies   Allergen Reactions     Codeine Sulfate      Nausea and vomiting      Quinapril Difficulty breathing     Darvon-N [Propoxyphene Napsylate] Nausea and Vomiting       PAST MEDICAL HISTORY:  Past Medical History   Diagnosis Date     PONV (postoperative nausea and vomiting)      H/O total hysterectomy      Gastro-oesophageal reflux disease      Arthritis 11/19/2013     HL (hearing loss) 10/11/2012     GERD (gastroesophageal reflux disease) 10/2/2012     Squamous cell carcinoma (H)        PAST SURGICAL HISTORY:  Past Surgical History   Procedure Laterality Date     Hysterectomy       Gallbladder surgery       Repair bladder       C rad resec tonsil/pillars       Knee replacment  2007     Colonoscopy  10/30/2012     Procedure: COLONOSCOPY;  Colonoscopy;  Surgeon: Denise Bah MD;  Location: [facility]     Release trigger finger Right 4/29/2016     Procedure: RELEASE TRIGGER FINGER;  Surgeon: Percy Sarmiento MD;  Location: [facility]     Esophagoscopy, gastroscopy, duodenoscopy (egd), combined N/A 9/15/2016     Procedure: COMBINED ESOPHAGOSCOPY, GASTROSCOPY, DUODENOSCOPY (EGD);  Surgeon: Bennie Godinez MD;  Location: [facility]       FAMILY HISTORY:  Family History   Problem Relation Age of Onset     C.A.D. Mother      Cardiovascular Mother      Thyroid Disease Mother      CANCER Father      stomach ca     CANCER Sister      Eye Disorder Sister      C.A.D. Sister      CEREBROVASCULAR DISEASE Sister      Breast Cancer Sister      Arthritis Sister      Cardiovascular Sister      Depression Sister      HEART  DISEASE Sister      Neurologic Disorder Sister      OSTEOPOROSIS Sister      Thyroid Disease Sister      Depression Sister      Thyroid Disease Sister      Depression Sister      Thyroid Disease Sister      Obesity Sister      Neurologic Disorder Sister        SOCIAL HISTORY:  Social History     Social History     Marital Status:      Spouse Name: N/A     Number of Children: N/A     Years of Education: N/A     Social History Main Topics     Smoking status: Never Smoker      Smokeless tobacco: Never Used     Alcohol Use: No     Drug Use: No     Sexual Activity:     Partners: Male     Other Topics Concern     Parent/Sibling W/ Cabg, Mi Or Angioplasty Before 65f 55m? No     Social History Narrative       Review of Systems:  Skin:  Positive for bruising     Eyes:  Positive for glasses    ENT:  Positive for vertigo;hearing loss;earache    Respiratory:  Positive for dyspnea on exertion     Cardiovascular:    Positive for;chest pain;edema    Gastroenterology: Positive for abdominal pain;excessive gas or bloating;heartburn    Genitourinary:  Negative      Musculoskeletal:  Positive for arthritis;joint pain;joint swelling;joint stiffness    Neurologic:  Positive for headaches    Psychiatric:  Positive for anxiety;depression;sleep disturbances    Heme/Lymph/Imm:  Positive for chills    Endocrine:  Negative        Physical Exam:  Vitals: /71 mmHg  Pulse 51  Wt 70.852 kg (156 lb 3.2 oz)  SpO2 98%    Constitutional:  cooperative, alert and oriented, well developed, well nourished, in no acute distress   Pt refused physical exam today     Skin:           Head:  normocephalic        Eyes:  sclera white        ENT:  no pallor or cyanosis        Neck:  not assessed this visit        Chest:  not assessed this visit          Cardiac:                    Abdomen:  not assessed this visit        Vascular: not assessed this visit                                        Extremities and Back:  not assessed this visit               Neurological:  no gross motor deficits;affect appropriate, oriented to time, person and place              CC  AHMET Tucker CNP   [facility]  [address]  [place] [id number]

## 2017-02-09 NOTE — MR AVS SNAPSHOT
After Visit Summary   2/9/2017    Kristen Thompson    MRN: [id number]           Patient Information     Date Of Birth          1945        Visit Information        Provider Department      2/9/2017 1:40 PM Joyce Hill APRN CNP [facility]        Today's Diagnoses     Benign essential hypertension    -  1     Chest discomfort         CARDIOVASCULAR SCREENING; LDL GOAL LESS THAN 100           Care Instructions    Thank you for your [facility] visit today. Your provider has recommended the following:  Medication Changes:  STOP metoprolol   START amlodipine 5 mg daily   DECREASE simvastatin to 20 mg daily, as you had normal heart arteries  Recommendations:  Have primary provider draw cholesterol labs drawn in 2 months on the decreased dose of simvastatin   Check home blood pressures, call primary clinic if > 140/90 consistently  Follow-up:  See primary provider for follow-up of blood pressure and chest discomfort/ shortness of breath in 1 month.   Call [phone] two months prior to request date to schedule any future appointments.  Reminder:  1. Please bring in all current medications, over the counter supplements and vitamin bottles to your next appointment.               [facility]~[address]~[place], [place]~[address]  Questions about your visit today?   Call your Cardiology Clinic RN's-Emilee Esposito and/or Sherir Bonilla at [phone].          Follow-ups after your visit        Who to contact     If you have questions or need follow up information about today's clinic visit or your schedule please contact [facility] directly at [phone].  Normal or non-critical lab and imaging results will be communicated to you by MyChart, letter or phone within 4 business days after the clinic has received the results. If you do  not hear from us within 7 days, please contact the clinic through [employer] or phone. If you have a critical or abnormal lab result, we will notify you by phone as soon as possible.  Submit refill requests through [employer] or call your pharmacy and they will forward the refill request to us. Please allow 3 business days for your refill to be completed.          Additional Information About Your Visit        [employer] Information     [employer] gives you secure access to your electronic health record. If you see a primary care provider, you can also send messages to your care team and make appointments. If you have questions, please call your primary care clinic.  If you do not have a primary care provider, please call [phone] and they will assist you.        Care EveryWhere ID     This is your Care EveryWhere ID. This could be used by other organizations to access your [facility] medical records  [id number]        Your Vitals Were     Pulse Pulse Oximetry                51 98%           Blood Pressure from Last 3 Encounters:   02/09/17 111/71   02/01/17 105/68   01/28/17 144/85    Weight from Last 3 Encounters:   02/09/17 70.852 kg (156 lb 3.2 oz)   02/01/17 71.31 kg (157 lb 3.4 oz)   01/25/17 70.308 kg (155 lb)              We Performed the Following     Follow-Up with Cardiac Advanced Practice Provider          Today's Medication Changes          These changes are accurate as of: 2/9/17  2:17 PM.  If you have any questions, ask your nurse or doctor.               Start taking these medicines.        Dose/Directions    amLODIPine 5 MG tablet   Commonly known as:  NORVASC   Used for:  Benign essential hypertension   Started by:  Joyce Hill APRN CNP        Dose:  5 mg   Take 1 tablet (5 mg) by mouth daily   Quantity:  30 tablet   Refills:  11         These medicines have changed or have updated prescriptions.        Dose/Directions    simvastatin 40 MG tablet   Commonly known as:  ZOCOR   This may have  changed:  how much to take   Used for:  CARDIOVASCULAR SCREENING; LDL GOAL LESS THAN 100   Changed by:  Joyce Hill APRN CNP        Dose:  20 mg   Take 0.5 tablets (20 mg) by mouth At Bedtime   Quantity:  90 tablet   Refills:  3         Stop taking these medicines if you haven't already. Please contact your care team if you have questions.     metoprolol 25 MG tablet   Commonly known as:  LOPRESSOR   Stopped by:  Joyce Hill APRN CNP                Where to get your medicines      These medications were sent to [facility]/pharmacy #[id number] - [place], [place] - [facility] [facility]  [address] [address], [facility] [address]     Phone:  [phone]    - amLODIPine 5 MG tablet      Some of these will need a paper prescription and others can be bought over the counter.  Ask your nurse if you have questions.     You don't need a prescription for these medications    - simvastatin 40 MG tablet             Primary Care Provider Office Phone # Fax #    Janett Morse [phone]       [facility] [address] [facility] [address]        Thank you!     Thank you for choosing [facility] PHYS[facility] AT [facility]  for your care. Our goal is always to provide you with excellent care. Hearing back from our patients is one way we can continue to improve our services. Please take a few minutes to complete the written survey that you may receive in the mail after your visit with us. Thank you!             Your Updated Medication List - Protect others around you: Learn how to safely use, store and throw away your medicines at www.disposemymeds.org.          This list is accurate as of: 2/9/17  2:17 PM.  Always use your most recent med list.                   Brand Name Dispense Instructions for use    amLODIPine 5 MG tablet    NORVASC    30 tablet    Take 1 tablet (5 mg) by mouth daily       aspirin 81 MG EC tablet     90 tablet    Take 1 tablet (81 mg) by  mouth daily       CALCIUM PO      Take by mouth daily       CoQ10 100 MG Caps          esomeprazole 40 MG CR capsule    nexIUM    30 capsule    Take 1 capsule (40 mg) by mouth every morning (before breakfast) Take 30-60 minutes before a eating.       fluticasone 50 MCG/ACT spray    FLONASE    16 g    Spray 1-2 sprays into both nostrils daily       furosemide 20 MG tablet    LASIX    30 tablet    Take 0.5 tablets (10 mg) by mouth daily as needed       HYDROcodone-acetaminophen 5-325 MG per tablet    NORCO    18 tablet    Take 1 tablet by mouth every 4 hours as needed for moderate to severe pain or pain       MAGNESIUM-POTASSIUM PO      Take by mouth daily       nitroglycerin 0.4 MG sublingual tablet    NITROSTAT    25 tablet    For chest pain place 1 tablet under the tongue every 5 minutes for 3 doses. If symptoms persist 5 minutes after 1st dose call 911.       OMEGA 3 PO      Take  by mouth.       simvastatin 40 MG tablet    ZOCOR    90 tablet    Take 0.5 tablets (20 mg) by mouth At Bedtime       TYLENOL PO      Take 1,000 mg by mouth every 4 hours as needed for mild pain or fever       vitamin B complex with vitamin C Tabs tablet      Take 1 tablet by mouth daily       VITAMIN D PO      Take 1,000 Units by mouth daily

## 2017-02-15 ENCOUNTER — ALLIED HEALTH/NURSE VISIT (OUTPATIENT)
Dept: FAMILY MEDICINE | Facility: CLINIC | Age: 72
End: 2017-02-15
Payer: COMMERCIAL

## 2017-02-15 VITALS — SYSTOLIC BLOOD PRESSURE: 132 MMHG | DIASTOLIC BLOOD PRESSURE: 84 MMHG

## 2017-02-15 DIAGNOSIS — Z01.30 BLOOD PRESSURE CHECK: Primary | ICD-10-CM

## 2017-02-15 PROCEDURE — 99207 ZZC NO CHARGE NURSE ONLY: CPT

## 2017-02-15 NOTE — PROGRESS NOTES
Patient's BP is 132/84 using the manual large cuff. Her CVS cuff read 169/99. She said that the last time she took metoprolol was 2/9/17. She feels better now that she is off of it. She is not sure what to do as she does not want to take medication unless she needs to. Nicki advised to take CVS cuff back and exchange it. Appointment madefor 2/17/17 to see Dr. Morse to assess BP and compare new cuff and make plan.

## 2017-02-15 NOTE — PROGRESS NOTES
Patient is here today for a blood pressure. Her blood pressure was 181/94 and 146/88. She was prescribed blood pressure medication and has not picked them up yet.. She isn't experiencing and dizzy or lightheadedness. Provider ZAIN Curtis, [facility]

## 2017-02-15 NOTE — MR AVS SNAPSHOT
After Visit Summary   2/15/2017    Kristen Thompson    MRN: [id number]           Patient Information     Date Of Birth          1945        Visit Information        Provider Department      2/15/2017 9:30 AM [facility]        Today's Diagnoses     Blood pressure check    -  1       Follow-ups after your visit        Follow-up notes from your care team     Return in about 2 days (around 2/17/2017) for BP Recheck.      Your next 10 appointments already scheduled     Feb 17, 2017 10:00 AM CST   Office Visit with Janett Morse MD   [facility]    [address]  [place] [address] [phone]           Bring a current list of meds and any records pertaining to this visit.  For Physicals, please bring immunization records and any forms needing to be filled out.  Please arrive 10 minutes early to complete paperwork.              Who to contact     Normal or non-critical lab and imaging results will be communicated to you by [employer], letter or phone within 4 business days after the clinic has received the results. If you do not hear from us within 7 days, please contact the clinic through [employer] or phone. If you have a critical or abnormal lab result, we will notify you by phone as soon as possible.  Submit refill requests through [employer] or call your pharmacy and they will forward the refill request to us. Please allow 3 business days for your refill to be completed.          If you need to speak with a  for additional information , please call: [phone]             Additional Information About Your Visit        [employer] Information     [employer] gives you secure access to your electronic health record. If you see a primary care provider, you can also send messages to your care team and make appointments. If you have questions, please call your primary care clinic.  If you do not have a primary care  provider, please call [phone] and they will assist you.        Care EveryWhere ID     This is your Care EveryWhere ID. This could be used by other organizations to access your [place] medical records  [id number]         Blood Pressure from Last 3 Encounters:   02/15/17 132/84   02/09/17 111/71   02/01/17 105/68    Weight from Last 3 Encounters:   02/09/17 156 lb 3.2 oz (70.9 kg)   02/01/17 157 lb 3.4 oz (71.3 kg)   01/25/17 155 lb (70.3 kg)              Today, you had the following     No orders found for display       Primary Care Provider Office Phone # Fax #    Janett Morse [phone]       [facility] [address] [facility] [address]        Thank you!     Thank you for choosing [facility]  for your care. Our goal is always to provide you with excellent care. Hearing back from our patients is one way we can continue to improve our services. Please take a few minutes to complete the written survey that you may receive in the mail after your visit with us. Thank you!             Your Updated Medication List - Protect others around you: Learn how to safely use, store and throw away your medicines at www.disposemymeds.org.          This list is accurate as of: 2/15/17 10:16 AM.  Always use your most recent med list.                   Brand Name Dispense Instructions for use    amLODIPine 5 MG tablet    NORVASC    30 tablet    Take 1 tablet (5 mg) by mouth daily       aspirin 81 MG EC tablet     90 tablet    Take 1 tablet (81 mg) by mouth daily       CALCIUM PO      Take by mouth daily       CoQ10 100 MG Caps          esomeprazole 40 MG CR capsule    nexIUM    30 capsule    Take 1 capsule (40 mg) by mouth every morning (before breakfast) Take 30-60 minutes before a eating.       fluticasone 50 MCG/ACT spray    FLONASE    16 g    Spray 1-2 sprays into both nostrils daily       furosemide 20 MG tablet    LASIX    30 tablet    Take 0.5 tablets (10 mg) by mouth  daily as needed       HYDROcodone-acetaminophen 5-325 MG per tablet    NORCO    18 tablet    Take 1 tablet by mouth every 4 hours as needed for moderate to severe pain or pain       MAGNESIUM-POTASSIUM PO      Take by mouth daily       nitroglycerin 0.4 MG sublingual tablet    NITROSTAT    25 tablet    For chest pain place 1 tablet under the tongue every 5 minutes for 3 doses. If symptoms persist 5 minutes after 1st dose call 911.       OMEGA 3 PO      Take  by mouth.       simvastatin 40 MG tablet    ZOCOR    90 tablet    Take 0.5 tablets (20 mg) by mouth At Bedtime       TYLENOL PO      Take 1,000 mg by mouth every 4 hours as needed for mild pain or fever       vitamin B complex with vitamin C Tabs tablet      Take 1 tablet by mouth daily       VITAMIN D PO      Take 1,000 Units by mouth daily

## 2017-02-17 ENCOUNTER — OFFICE VISIT (OUTPATIENT)
Dept: FAMILY MEDICINE | Facility: CLINIC | Age: 72
End: 2017-02-17
Payer: COMMERCIAL

## 2017-02-17 VITALS
BODY MASS INDEX: 28.34 KG/M2 | HEART RATE: 56 BPM | DIASTOLIC BLOOD PRESSURE: 97 MMHG | SYSTOLIC BLOOD PRESSURE: 146 MMHG | HEIGHT: 62 IN | WEIGHT: 154 LBS | TEMPERATURE: 98.4 F

## 2017-02-17 DIAGNOSIS — K29.50 OTHER CHRONIC GASTRITIS WITHOUT HEMORRHAGE: Primary | ICD-10-CM

## 2017-02-17 DIAGNOSIS — R07.89 OTHER CHEST PAIN: ICD-10-CM

## 2017-02-17 PROCEDURE — 99215 OFFICE O/P EST HI 40 MIN: CPT | Performed by: FAMILY MEDICINE

## 2017-02-17 RX ORDER — SUCRALFATE 1 G/1
1 TABLET ORAL 4 TIMES DAILY
Qty: 40 TABLET | Refills: 1 | Status: SHIPPED | OUTPATIENT
Start: 2017-02-17 | End: 2018-01-11

## 2017-02-17 NOTE — PATIENT INSTRUCTIONS
Try switching back to omeprazole twice a day and try carafate     Check blood pressure daily and send me data in two weeks. If it is running high, we'll consider amlodipine at 5mg/day     LEONARD Morse MD

## 2017-02-17 NOTE — NURSING NOTE
"Chief Complaint   Patient presents with     Hypertension       Initial BP (!) 146/97  Pulse 56  Temp 98.4  F (36.9  C) (Tympanic)  Ht 5' 2\" (1.575 m)  Wt 154 lb (69.9 kg)  BMI 28.17 kg/m2 Estimated body mass index is 28.17 kg/(m^2) as calculated from the following:    Height as of this encounter: 5' 2\" (1.575 m).    Weight as of this encounter: 154 lb (69.9 kg).  Medication Reconciliation: ansley Duarte CMA      "

## 2017-02-17 NOTE — PROGRESS NOTES
SUBJECTIVE:                                                    Kristen Thompson is a 71 year old female who presents to clinic today for the following health issues:      Concern - High Blood Pressure     Onset: Borderline few years    Description:   Cardiologist suggested on her taking a high blood pressure medication and she is wanting opinion if she should be taking it.     Intensity: mild    Progression of Symptoms:  same    Accompanying Signs & Symptoms:  none       Previous history of similar problem:   none    Precipitating factors:   Worsened by: none    Alleviating factors:  Improved by: none       Therapies Tried and outcome: none    Here to discuss recent chest pain/holter monitor/cardiology w/u  Frustrated that chest pain continues and is unexplained  Frustrated that nuc med study appeared positive and so she had angio - which was normal  Frustrated that angio put her in afib but no afib was seen on holter    Reports that holter was only worn while taken metoprolol so worried that it covered up a-fib     Hated being on metoprolol - made her very tired.     Cards tried to switch her to amlodipine but she didn't want to take it    Hypertension -new monitor at home - numbers from 130-140s/80-90s    BP Readings from Last 6 Encounters:   02/17/17 (!) 146/97   02/15/17 132/84   02/09/17 111/71   02/01/17 105/68   01/28/17 144/85   01/25/17 (!) 111/91     On zio patch - was on metoprolol the whole time     Chest pain - has it most of the day  Pressure feeling   No shortness of breath/cough.    Was taking omeprazole and having heartburn  Switched to nexium daily and it helps but is expensive  Would like to switch back to omeprazole    9/2015  Normal nasopharynx and oropharynx.                        - Normal esophagus.                        - Gastritis.                        - Normal examined duodenum.   Recommendation:      - Discharge patient to home.                        - High fiber diet  "indefinitely.                        - Return to referring physician PRN.                     pulm  - she doesn't feel this is pulmonary. She doesn't want to pursue this angle at this time     Wants to stop taking aspirin    Statin - wants to stop taking it. Says she doesn't need it.     Review of systems:  No headache  No weight loss/night sweats  No n/v   No d/c   No black/tarry/bloody stools.                                                                     Problem list and histories reviewed & adjusted, as indicated.  Additional history: as documented      Patient Active Problem List   Diagnosis     GERD (gastroesophageal reflux disease)     CARDIOVASCULAR SCREENING; LDL GOAL LESS THAN 160     HL (hearing loss)     Health Care Home     Arthritis     Advanced directives, counseling/discussion     Hyperlipidemia LDL goal <130     Chronic constipation     Gastritis     Cervicalgia     Current Outpatient Prescriptions   Medication     omeprazole (PRILOSEC) 20 MG CR capsule     sucralfate (CARAFATE) 1 GM tablet     simvastatin (ZOCOR) 40 MG tablet     Coenzyme Q10 (COQ10) 100 MG CAPS     Acetaminophen (TYLENOL PO)     esomeprazole (NEXIUM) 40 MG CR capsule     fluticasone (FLONASE) 50 MCG/ACT spray     furosemide (LASIX) 20 MG tablet     CALCIUM PO     Omega-3 Fatty Acids (OMEGA 3 PO)     Cholecalciferol (VITAMIN D PO)     amLODIPine (NORVASC) 5 MG tablet     nitroglycerin (NITROSTAT) 0.4 MG sublingual tablet     aspirin 81 MG EC tablet     No current facility-administered medications for this visit.         Allergies   Allergen Reactions     Codeine Sulfate      Nausea and vomiting      Quinapril Difficulty breathing     Darvon-N [Propoxyphene Napsylate] Nausea and Vomiting     BP (!) 146/97  Pulse 56  Temp 98.4  F (36.9  C) (Tympanic)  Ht 5' 2\" (1.575 m)  Wt 154 lb (69.9 kg)  BMI 28.17 kg/m2  GENERAL - Pt is alert and oriented in no acute distress.  She is tearful, and clearly frustrated. Good eye " contact.      Assessment/Plan -  (K29.50) Other chronic gastritis without hemorrhage  (primary encounter diagnosis)  Comment: switch back to omeprazole bid and add carafate.   Plan: omeprazole (PRILOSEC) 20 MG CR capsule,         sucralfate (CARAFATE) 1 GM tablet            (R07.89) Other chest pain  Comment: we reviewed her work up for chest pain thus far. Discussed imaging studies and zio patch. I offered to repeat the holter monitor off beta blockers - she declined for now. We also discussed looking at other causes of chest pain  - she would like to re-try some gastritis meds and see if that helps. See above.  Regarding blood pressure  - she will check her blood pressure daily and send me data in two weeks. She is very hesitant to start the amlodipine so we'll wait and see what blood pressure data shows.  She wants to stop the statin and, after discussion, will do that. She also wants to stop the asa and, given it could be worsened stomach pain, we decided to do that as well. The patient indicates understanding of these issues and agrees with the plan.   Plan: omeprazole (PRILOSEC) 20 MG CR capsule,         sucralfate (CARAFATE) 1 GM tablet            LEONARD Morse MD       TT =  45 minutes, more than half of the time was spent discussing plan, reviewing records, and coordinating care.

## 2017-02-17 NOTE — MR AVS SNAPSHOT
After Visit Summary   2/17/2017    Kristen Thompson    MRN: [id number]           Patient Information     Date Of Birth          1945        Visit Information        Provider Department      2/17/2017 10:00 AM Janett Morse MD [facility]        Today's Diagnoses     Other chronic gastritis without hemorrhage    -  1    Other chest pain          Care Instructions      Try switching back to omeprazole twice a day and try carafate     Check blood pressure daily and send me data in two weeks. If it is running high, we'll consider amlodipine at 5mg/day     C. Franklin Morse MD             Follow-ups after your visit        Who to contact     Normal or non-critical lab and imaging results will be communicated to you by [employer], letter or phone within 4 business days after the clinic has received the results. If you do not hear from us within 7 days, please contact the clinic through [employer] or phone. If you have a critical or abnormal lab result, we will notify you by phone as soon as possible.  Submit refill requests through [employer] or call your pharmacy and they will forward the refill request to us. Please allow 3 business days for your refill to be completed.          If you need to speak with a  for additional information , please call: [phone]             Additional Information About Your Visit        [employer] Information     [employer] gives you secure access to your electronic health record. If you see a primary care provider, you can also send messages to your care team and make appointments. If you have questions, please call your primary care clinic.  If you do not have a primary care provider, please call [phone] and they will assist you.        Care EveryWhere ID     This is your Care EveryWhere ID. This could be used by other organizations to access your [facility] medical records  [id number]        Your Vitals Were     Pulse Temperature Height BMI  "(Body Mass Index)          56 98.4  F (36.9  C) (Tympanic) 5' 2\" (1.575 m) 28.17 kg/m2         Blood Pressure from Last 3 Encounters:   02/17/17 (!) 146/97   02/15/17 132/84   02/09/17 111/71    Weight from Last 3 Encounters:   02/17/17 154 lb (69.9 kg)   02/09/17 156 lb 3.2 oz (70.9 kg)   02/01/17 157 lb 3.4 oz (71.3 kg)              Today, you had the following     No orders found for display         Today's Medication Changes          These changes are accurate as of: 2/17/17 10:55 AM.  If you have any questions, ask your nurse or doctor.               Start taking these medicines.        Dose/Directions    omeprazole 20 MG CR capsule   Commonly known as:  priLOSEC   Used for:  Other chronic gastritis without hemorrhage, Other chest pain   Started by:  Janett Morse MD        Dose:  20 mg   Take 1 capsule (20 mg) by mouth 2 times daily   Quantity:  180 capsule   Refills:  1       sucralfate 1 GM tablet   Commonly known as:  CARAFATE   Used for:  Other chronic gastritis without hemorrhage, Other chest pain   Started by:  Janett Morse MD        Dose:  1 g   Take 1 tablet (1 g) by mouth 4 times daily   Quantity:  40 tablet   Refills:  1         Stop taking these medicines if you haven't already. Please contact your care team if you have questions.     amLODIPine 5 MG tablet   Commonly known as:  NORVASC   Stopped by:  Janett Morse MD           esomeprazole 40 MG CR capsule   Commonly known as:  nexIUM   Stopped by:  Janett Morse MD           furosemide 20 MG tablet   Commonly known as:  LASIX   Stopped by:  Janett Morse MD           simvastatin 40 MG tablet   Commonly known as:  ZOCOR   Stopped by:  Janett Morse MD                Where to get your medicines      These medications were sent to [facility]/pharmacy #[id number] - [place] [id number] [address]  [address] [address], [facility] [address]     Phone:  [phone]     omeprazole 20 MG CR capsule    sucralfate 1 " GM tablet                Primary Care Provider Office Phone # Fax #    Janett Morse [phone]       [facility] [address] [facility] [address]        Thank you!     Thank you for choosing [facility]  for your care. Our goal is always to provide you with excellent care. Hearing back from our patients is one way we can continue to improve our services. Please take a few minutes to complete the written survey that you may receive in the mail after your visit with us. Thank you!             Your Updated Medication List - Protect others around you: Learn how to safely use, store and throw away your medicines at www.disposemymeds.org.          This list is accurate as of: 2/17/17 10:55 AM.  Always use your most recent med list.                   Brand Name Dispense Instructions for use    aspirin 81 MG EC tablet     90 tablet    Take 81 mg by mouth daily Reported on 2/17/2017       CALCIUM PO      Take by mouth daily       CoQ10 100 MG Caps          fluticasone 50 MCG/ACT spray    FLONASE    16 g    Spray 1-2 sprays into both nostrils daily       nitroglycerin 0.4 MG sublingual tablet    NITROSTAT    25 tablet    For chest pain place 1 tablet under the tongue every 5 minutes for 3 doses. If symptoms persist 5 minutes after 1st dose call 911.       OMEGA 3 PO      Take  by mouth.       omeprazole 20 MG CR capsule    priLOSEC    180 capsule    Take 1 capsule (20 mg) by mouth 2 times daily       sucralfate 1 GM tablet    CARAFATE    40 tablet    Take 1 tablet (1 g) by mouth 4 times daily       TYLENOL PO      Take 1,000 mg by mouth every 4 hours as needed for mild pain or fever       VITAMIN D PO      Take 1,000 Units by mouth daily

## 2017-03-02 ENCOUNTER — MYC MEDICAL ADVICE (OUTPATIENT)
Dept: FAMILY MEDICINE | Facility: CLINIC | Age: 72
End: 2017-03-02

## 2017-03-02 DIAGNOSIS — M54.2 CERVICALGIA: ICD-10-CM

## 2017-03-02 DIAGNOSIS — M19.90 ARTHRITIS: ICD-10-CM

## 2017-03-02 NOTE — TELEPHONE ENCOUNTER
Please call patient and find out how often she is using the pain medications and it seems she was given a script from Dr. Morse on 1/25/17 - did this ever get filled?   Jasmin

## 2017-03-02 NOTE — TELEPHONE ENCOUNTER
Patient got the medication for norco filled on 1/27/17 for #18. She takes one tablet at night to help her sleep. She is requesting another refill. Please advise. Thank you.  Zarina Nobles RN

## 2017-03-03 RX ORDER — HYDROCODONE BITARTRATE AND ACETAMINOPHEN 5; 325 MG/1; MG/1
1 TABLET ORAL EVERY 4 HOURS PRN
Qty: 18 TABLET | Refills: 0 | Status: SHIPPED | OUTPATIENT
Start: 2017-03-03 | End: 2017-05-01

## 2017-03-03 NOTE — TELEPHONE ENCOUNTER
"In terms of the pain medication - I will refill today but i would suggest she come back in and discuss this with Dr. Morse. Per Dr. Morse's message at her last refill:   Ok - she can try the vicodin. I would like her to use it sparingly and only when pain is bad   cgb    If it is agreed upon between Dr. Morse and patient to continue the vicodin then a pain contract needs to be signed.     In terms of the blood pressures, it looks like of the 24 readings she gave, 10 were what we consider \"hypertensive\" My understanding from the last note is that she has not started the amlodipine yet? If she continues to have readings >140/90, I would have her consider a trial of at least a very low dose of the medication with ongoing checks of her blood pressure to monitor response.    Jasmin    Will route to Dr. Morse as well  "

## 2017-03-03 NOTE — TELEPHONE ENCOUNTER
Kristen notified of script and script walked to  for .    She is going to wait to see Dr. Morse before starting her amlodipine. She will call for an appoinment..Nayeli Perez

## 2017-03-08 ENCOUNTER — OFFICE VISIT (OUTPATIENT)
Dept: DERMATOLOGY | Facility: CLINIC | Age: 72
End: 2017-03-08
Payer: COMMERCIAL

## 2017-03-08 VITALS — SYSTOLIC BLOOD PRESSURE: 124 MMHG | DIASTOLIC BLOOD PRESSURE: 76 MMHG | OXYGEN SATURATION: 98 % | HEART RATE: 59 BPM

## 2017-03-08 DIAGNOSIS — L81.4 LENTIGO: ICD-10-CM

## 2017-03-08 DIAGNOSIS — L57.0 AK (ACTINIC KERATOSIS): ICD-10-CM

## 2017-03-08 DIAGNOSIS — D48.5 NEOPLASM OF UNCERTAIN BEHAVIOR OF SKIN: Primary | ICD-10-CM

## 2017-03-08 DIAGNOSIS — L82.1 SEBORRHEIC KERATOSIS: ICD-10-CM

## 2017-03-08 DIAGNOSIS — Z85.89 HISTORY OF SQUAMOUS CELL CARCINOMA: ICD-10-CM

## 2017-03-08 PROCEDURE — 88312 SPECIAL STAINS GROUP 1: CPT | Performed by: PHYSICIAN ASSISTANT

## 2017-03-08 PROCEDURE — 17000 DESTRUCT PREMALG LESION: CPT | Performed by: PHYSICIAN ASSISTANT

## 2017-03-08 PROCEDURE — 11100 HC DESTRUCT PREMALIGNANT LESION, FIRST: CPT | Mod: 59 | Performed by: PHYSICIAN ASSISTANT

## 2017-03-08 PROCEDURE — 88305 TISSUE EXAM BY PATHOLOGIST: CPT | Performed by: PHYSICIAN ASSISTANT

## 2017-03-08 PROCEDURE — 99213 OFFICE O/P EST LOW 20 MIN: CPT | Mod: 25 | Performed by: PHYSICIAN ASSISTANT

## 2017-03-08 NOTE — NURSING NOTE
"Chief Complaint   Patient presents with     Derm Problem     skin check - waist up       Initial /76  Pulse 59  SpO2 98% Estimated body mass index is 28.17 kg/(m^2) as calculated from the following:    Height as of 2/17/17: 1.575 m (5' 2\").    Weight as of 2/17/17: 69.9 kg (154 lb).  BP completed using cuff size: dante Gutierrez LPN    "

## 2017-03-08 NOTE — PATIENT INSTRUCTIONS
Wound Care Instructions     FOR SUPERFICIAL WOUNDS     [facility] [phone]    [facility] [phone]                       AFTER 24 HOURS YOU SHOULD REMOVE THE BANDAGE AND BEGIN DAILY DRESSING CHANGES AS FOLLOWS:     1) Remove Dressing.     2) Clean and dry the area with tap water using a Q-tip or sterile gauze pad.     3) Apply Vaseline, Aquaphor, Polysporin ointment or Bacitracin ointment over entire wound.  Do NOT use Neosporin ointment.     4) Cover the wound with a band-aid, or a sterile non-stick gauze pad and micropore paper tape      REPEAT THESE INSTRUCTIONS AT LEAST ONCE A DAY UNTIL THE WOUND HAS COMPLETELY HEALED.    It is an old wives tale that a wound heals better when it is exposed to air and allowed to dry out. The wound will heal faster with a better cosmetic result if it is kept moist with ointment and covered with a bandage.    **Do not let the wound dry out.**      Supplies Needed:      *Cotton tipped applicators (Q-tips)    *Polysporin Ointment or Bacitracin Ointment (NOT NEOSPORIN)    *Band-aids or non-stick gauze pads and micropore paper tape.      PATIENT INFORMATION:    During the healing process you will notice a number of changes. All wounds develop a small halo of redness surrounding the wound.  This means healing is occurring. Severe itching with extensive redness usually indicates sensitivity to the ointment or bandage tape used to dress the wound.  You should call our office if this develops.      Swelling  and/or discoloration around your surgical site is common, particularly when performed around the eye.    All wounds normally drain.  The larger the wound the more drainage there will be.  After 7-10 days, you will notice the wound beginning to shrink and new skin will begin to grow.  The wound is healed when you can see skin has formed over the entire area.  A healed wound has a healthy, shiny look to the surface and is red to dark pink in color  to normalize.  Wounds may take approximately 4-6 weeks to heal.  Larger wounds may take 6-8 weeks.  After the wound is healed you may discontinue dressing changes.    You may experience a sensation of tightness as your wound heals. This is normal and will gradually subside.    Your healed wound may be sensitive to temperature changes. This sensitivity improves with time, but if you re having a lot of discomfort, try to avoid temperature extremes.    Patients frequently experience itching after their wound appears to have healed because of the continue healing under the skin.  Plain Vaseline will help relieve the itching.        POSSIBLE COMPLICATIONS    BLEEDIN. Leave the bandage in place.  2. Use tightly rolled up gauze or a cloth to apply direct pressure over the bandage for 30  minutes.  3. Reapply pressure for an additional 30 minutes if necessary  4. Use additional gauze and tape to maintain pressure once the bleeding has stopped.      WOUND CARE INSTRUCTIONS   FOR CRYOSURGERY   This area treated with liquid nitrogen will form a blister. You do not need to bandage the area until after the blister forms and breaks (which may be a few days). When the blister breaks, begin daily dressing changes as follows:   1) Clean and dry the area with tap water using clean Q-tip or sterile gauze pad.   2) Apply Polysporin ointment or Bacitracin ointment over entire wound. Do NOT use Neosporin ointment.   3) Cover the wound with a band-aid or sterile non-stick gauze pad and micropore paper tape.   REPEAT THESE INSTRUCTIONS AT LEAST ONCE A DAY UNTIL THE WOUND HAS COMPLETELY HEALED.   It is an old wives tale that a wound heals better when it is exposed to air and allowed to dry out. The wound will heal faster with a better cosmetic result if it is kept moist with ointment and covered with a bandage.   Do not let the wound dry out.   IMPORTANT INFORMATION ON REVERSE SIDE   Supplies Needed:   *Cotton tipped applicators  (Q-tips)   *Polysporin ointment or Bacitracin ointment (NOT NEOSPORIN)   *Band-aids, or non stick gauze pads and micropore paper tape   PATIENT INFORMATION   During the healing process you will notice a number of changes. All wounds develop a small halo of redness surrounding the wound. This means healing is occurring. Severe itching with extensive redness usually indicates sensitivity to the ointment or bandage tape used to dress the wound. You should call our office if this develops.   Swelling and/or discoloration around your surgical site is common, particularly when performed around the eye.   All wounds normally drain. The larger the wound the more drainage there will be. After 7-10 days, you will notice the wound beginning to shrink and new skin will begin to grow. The wound is healed when you can see skin has formed over the entire area. A healed wound has a healthy, shiny look to the surface and is red to dark pink in color to normalize. Wounds may take approximately 4-6 weeks to heal. Larger wounds may take 6-8 weeks. After the wound is healed you may discontinue dressing changes.   You may experience a sensation of tightness as your wound heals. This is normal and will gradually subside.   Your healed wound may be sensitive to temperature changes. This sensitivity improves with time, but if you re having a lot of discomfort, try to avoid temperature extremes.   Patients frequently experience itching after their wound appears to have healed because of the continue healing under the skin. Plain Vaseline will help relieve the itching.

## 2017-03-08 NOTE — MR AVS SNAPSHOT
After Visit Summary   3/8/2017    Kristen Thompson    MRN: [id number]           Patient Information     Date Of Birth          1945        Visit Information        Provider Department      3/8/2017 3:00 PM Andreia Looney PA-C [facility]        Care Instructions          Wound Care Instructions     FOR SUPERFICIAL WOUNDS     [facility] [phone]    [facility] [phone]                       AFTER 24 HOURS YOU SHOULD REMOVE THE BANDAGE AND BEGIN DAILY DRESSING CHANGES AS FOLLOWS:     1) Remove Dressing.     2) Clean and dry the area with tap water using a Q-tip or sterile gauze pad.     3) Apply Vaseline, Aquaphor, Polysporin ointment or Bacitracin ointment over entire wound.  Do NOT use Neosporin ointment.     4) Cover the wound with a band-aid, or a sterile non-stick gauze pad and micropore paper tape      REPEAT THESE INSTRUCTIONS AT LEAST ONCE A DAY UNTIL THE WOUND HAS COMPLETELY HEALED.    It is an old wives tale that a wound heals better when it is exposed to air and allowed to dry out. The wound will heal faster with a better cosmetic result if it is kept moist with ointment and covered with a bandage.    **Do not let the wound dry out.**      Supplies Needed:      *Cotton tipped applicators (Q-tips)    *Polysporin Ointment or Bacitracin Ointment (NOT NEOSPORIN)    *Band-aids or non-stick gauze pads and micropore paper tape.      PATIENT INFORMATION:    During the healing process you will notice a number of changes. All wounds develop a small halo of redness surrounding the wound.  This means healing is occurring. Severe itching with extensive redness usually indicates sensitivity to the ointment or bandage tape used to dress the wound.  You should call our office if this develops.      Swelling  and/or discoloration around your surgical site is common, particularly when performed around the eye.    All wounds normally drain.  The  larger the wound the more drainage there will be.  After 7-10 days, you will notice the wound beginning to shrink and new skin will begin to grow.  The wound is healed when you can see skin has formed over the entire area.  A healed wound has a healthy, shiny look to the surface and is red to dark pink in color to normalize.  Wounds may take approximately 4-6 weeks to heal.  Larger wounds may take 6-8 weeks.  After the wound is healed you may discontinue dressing changes.    You may experience a sensation of tightness as your wound heals. This is normal and will gradually subside.    Your healed wound may be sensitive to temperature changes. This sensitivity improves with time, but if you re having a lot of discomfort, try to avoid temperature extremes.    Patients frequently experience itching after their wound appears to have healed because of the continue healing under the skin.  Plain Vaseline will help relieve the itching.        POSSIBLE COMPLICATIONS    BLEEDIN. Leave the bandage in place.  2. Use tightly rolled up gauze or a cloth to apply direct pressure over the bandage for 30  minutes.  3. Reapply pressure for an additional 30 minutes if necessary  4. Use additional gauze and tape to maintain pressure once the bleeding has stopped.      WOUND CARE INSTRUCTIONS   FOR CRYOSURGERY   This area treated with liquid nitrogen will form a blister. You do not need to bandage the area until after the blister forms and breaks (which may be a few days). When the blister breaks, begin daily dressing changes as follows:   1) Clean and dry the area with tap water using clean Q-tip or sterile gauze pad.   2) Apply Polysporin ointment or Bacitracin ointment over entire wound. Do NOT use Neosporin ointment.   3) Cover the wound with a band-aid or sterile non-stick gauze pad and micropore paper tape.   REPEAT THESE INSTRUCTIONS AT LEAST ONCE A DAY UNTIL THE WOUND HAS COMPLETELY HEALED.   It is an old wives tale that a  wound heals better when it is exposed to air and allowed to dry out. The wound will heal faster with a better cosmetic result if it is kept moist with ointment and covered with a bandage.   Do not let the wound dry out.   IMPORTANT INFORMATION ON REVERSE SIDE   Supplies Needed:   *Cotton tipped applicators (Q-tips)   *Polysporin ointment or Bacitracin ointment (NOT NEOSPORIN)   *Band-aids, or non stick gauze pads and micropore paper tape   PATIENT INFORMATION   During the healing process you will notice a number of changes. All wounds develop a small halo of redness surrounding the wound. This means healing is occurring. Severe itching with extensive redness usually indicates sensitivity to the ointment or bandage tape used to dress the wound. You should call our office if this develops.   Swelling and/or discoloration around your surgical site is common, particularly when performed around the eye.   All wounds normally drain. The larger the wound the more drainage there will be. After 7-10 days, you will notice the wound beginning to shrink and new skin will begin to grow. The wound is healed when you can see skin has formed over the entire area. A healed wound has a healthy, shiny look to the surface and is red to dark pink in color to normalize. Wounds may take approximately 4-6 weeks to heal. Larger wounds may take 6-8 weeks. After the wound is healed you may discontinue dressing changes.   You may experience a sensation of tightness as your wound heals. This is normal and will gradually subside.   Your healed wound may be sensitive to temperature changes. This sensitivity improves with time, but if you re having a lot of discomfort, try to avoid temperature extremes.   Patients frequently experience itching after their wound appears to have healed because of the continue healing under the skin. Plain Vaseline will help relieve the itching.               Follow-ups after your visit        Who to contact     If  you have questions or need follow up information about today's clinic visit or your schedule please contact [facility] directly at [phone].  Normal or non-critical lab and imaging results will be communicated to you by MyChart, letter or phone within 4 business days after the clinic has received the results. If you do not hear from us within 7 days, please contact the clinic through Seesearchhart or phone. If you have a critical or abnormal lab result, we will notify you by phone as soon as possible.  Submit refill requests through Coolest Cooler or call your pharmacy and they will forward the refill request to us. Please allow 3 business days for your refill to be completed.          Additional Information About Your Visit        SeesearchharSwissmed Mobile Information     Coolest Cooler gives you secure access to your electronic health record. If you see a primary care provider, you can also send messages to your care team and make appointments. If you have questions, please call your primary care clinic.  If you do not have a primary care provider, please call [phone] and they will assist you.        Care EveryWhere ID     This is your Care EveryWhere ID. This could be used by other organizations to access your [place] medical records  [id number]        Your Vitals Were     Pulse Pulse Oximetry                59 98%           Blood Pressure from Last 3 Encounters:   03/08/17 124/76   02/17/17 (!) 146/97   02/15/17 132/84    Weight from Last 3 Encounters:   02/17/17 69.9 kg (154 lb)   02/09/17 70.9 kg (156 lb 3.2 oz)   02/01/17 71.3 kg (157 lb 3.4 oz)              Today, you had the following     No orders found for display       Primary Care Provider Office Phone # Fax #    Janett Morse [phone]       [facility] [address] [facility] [address]        Thank you!     Thank you for choosing [facility]  for your care. Our goal is always to provide you with excellent care.  Hearing back from our patients is one way we can continue to improve our services. Please take a few minutes to complete the written survey that you may receive in the mail after your visit with us. Thank you!             Your Updated Medication List - Protect others around you: Learn how to safely use, store and throw away your medicines at www.disposemymeds.org.          This list is accurate as of: 3/8/17  3:26 PM.  Always use your most recent med list.                   Brand Name Dispense Instructions for use    aspirin 81 MG EC tablet     90 tablet    Take 81 mg by mouth daily Reported on 2/17/2017       CALCIUM PO      Take by mouth daily       CoQ10 100 MG Caps          fluticasone 50 MCG/ACT spray    FLONASE    16 g    Spray 1-2 sprays into both nostrils daily       HYDROcodone-acetaminophen 5-325 MG per tablet    NORCO    18 tablet    Take 1 tablet by mouth every 4 hours as needed for moderate to severe pain or pain       nitroglycerin 0.4 MG sublingual tablet    NITROSTAT    25 tablet    For chest pain place 1 tablet under the tongue every 5 minutes for 3 doses. If symptoms persist 5 minutes after 1st dose call 911.       OMEGA 3 PO      Take  by mouth.       omeprazole 20 MG CR capsule    priLOSEC    180 capsule    Take 1 capsule (20 mg) by mouth 2 times daily       sucralfate 1 GM tablet    CARAFATE    40 tablet    Take 1 tablet (1 g) by mouth 4 times daily       TYLENOL PO      Take 1,000 mg by mouth every 4 hours as needed for mild pain or fever       VITAMIN D PO      Take 1,000 Units by mouth daily

## 2017-03-13 LAB — COPATH REPORT: NORMAL

## 2017-03-13 NOTE — PROGRESS NOTES
Kristen Thompson is a 71 year old year old female patient here today for upper body skin check. Patient reports that she has an itchy spot on back  Patient states this has been present for few months. Patient reports the following previous treatments none.  Patient reports the following modifying factors none.  Associated symptoms: none.  Patient has no other skin complaints today.  Remainder of the HPI, Meds, PMH, Allergies, FH, and SH was reviewed in chart.    Pertinent Hx:   History of SCC   Past Medical History   Diagnosis Date     Arthritis 11/19/2013     Gastro-oesophageal reflux disease      GERD (gastroesophageal reflux disease) 10/2/2012     H/O total hysterectomy      HL (hearing loss) 10/11/2012     PONV (postoperative nausea and vomiting)      Squamous cell carcinoma (H)        Past Surgical History   Procedure Laterality Date     Hysterectomy       Gallbladder surgery       Repair bladder       C rad resec tonsil/pillars       Knee replacment  2007     Colonoscopy  10/30/2012     Procedure: COLONOSCOPY;  Colonoscopy;  Surgeon: Denise Bah MD;  Location: [facility]     Release trigger finger Right 4/29/2016     Procedure: RELEASE TRIGGER FINGER;  Surgeon: Percy Sarmiento MD;  Location: [facility]     Esophagoscopy, gastroscopy, duodenoscopy (egd), combined N/A 9/15/2016     Procedure: COMBINED ESOPHAGOSCOPY, GASTROSCOPY, DUODENOSCOPY (EGD);  Surgeon: Bennie Godinez MD;  Location: [facility]        Family History   Problem Relation Age of Onset     C.A.D. Mother      Cardiovascular Mother      Thyroid Disease Mother      CANCER Father      stomach ca     CANCER Sister      Eye Disorder Sister      C.A.D. Sister      CEREBROVASCULAR DISEASE Sister      Breast Cancer Sister      Arthritis Sister      Cardiovascular Sister      Depression Sister      HEART DISEASE Sister      Neurologic Disorder Sister      OSTEOPOROSIS Sister      Thyroid Disease Sister      Depression Sister      Thyroid Disease  Sister      Depression Sister      Thyroid Disease Sister      Obesity Sister      Neurologic Disorder Sister        Social History     Social History     Marital status:      Spouse name: N/A     Number of children: N/A     Years of education: N/A     Occupational History     Not on file.     Social History Main Topics     Smoking status: Never Smoker     Smokeless tobacco: Never Used     Alcohol use No     Drug use: No     Sexual activity: Not Currently     Partners: Male     Other Topics Concern     Parent/Sibling W/ Cabg, Mi Or Angioplasty Before 65f 55m? No     Social History Narrative       Outpatient Encounter Prescriptions as of 3/8/2017   Medication Sig Dispense Refill     HYDROcodone-acetaminophen (NORCO) 5-325 MG per tablet Take 1 tablet by mouth every 4 hours as needed for moderate to severe pain or pain 18 tablet 0     omeprazole (PRILOSEC) 20 MG CR capsule Take 1 capsule (20 mg) by mouth 2 times daily 180 capsule 1     sucralfate (CARAFATE) 1 GM tablet Take 1 tablet (1 g) by mouth 4 times daily 40 tablet 1     Coenzyme Q10 (COQ10) 100 MG CAPS        nitroglycerin (NITROSTAT) 0.4 MG sublingual tablet For chest pain place 1 tablet under the tongue every 5 minutes for 3 doses. If symptoms persist 5 minutes after 1st dose call 911. 25 tablet 0     Acetaminophen (TYLENOL PO) Take 1,000 mg by mouth every 4 hours as needed for mild pain or fever       fluticasone (FLONASE) 50 MCG/ACT spray Spray 1-2 sprays into both nostrils daily 16 g 11     aspirin 81 MG EC tablet Take 81 mg by mouth daily Reported on 2/17/2017 90 tablet 3     CALCIUM PO Take by mouth daily        Omega-3 Fatty Acids (OMEGA 3 PO) Take  by mouth.       Cholecalciferol (VITAMIN D PO) Take 1,000 Units by mouth daily        No facility-administered encounter medications on file as of 3/8/2017.              Review Of Systems  Skin: As above  Eyes: negative  Ears/Nose/Throat: negative  Respiratory: No shortness of breath, dyspnea on  exertion, cough, or hemoptysis  Cardiovascular: negative  Gastrointestinal: negative  Genitourinary: negative  Musculoskeletal: negative  Neurologic: negative  Psychiatric: negative  Hematologic/Lymphatic/Immunologic: negative  Endocrine: negative      O:   NAD, WDWN, Alert & Oriented, Mood & Affect wnl, Vitals stable   Here today alone   /76  Pulse 59  SpO2 98%   General appearance normal   Vitals stable   Alert, oriented and in no acute distress      0.7 cm pin scaly papule on right upper back   Brown stuck on papules, brown macules on upper body   Pink gritty papule on hand x 1        The remainder of the detailed exam was unremarkable; the following areas were examined:  scalp/hair, conjunctiva/lids, face, neck, lips/teeth, oral mucosa/gingiva, chest, back, abdomen, digits/nails, RUE, LUE.      Eyes: Conjunctivae/lids:Normal     ENT: Lips    MSK:Normal    Pulm: Breathing Normal    Neuro/Psych: Orientation:Normal; Mood/Affect:Normal  A/P:  1. R/O NMSC on right upper back  TANGENTIAL BIOPSY SENT OUT:  After consent, anesthesia with LEC and prep, tangential excision performed and specimen sent out for permanent section histology.  No complications and routine wound care. Patient told to call our office in 1-2 weeks for result.      2. Actinic Keratosis on hand x 1  LN2:  Treated with LN2 for 5s for 1-2 cycles. Warned risks of blistering, pain, pigment change, scarring, and incomplete resolution.  Advised patient to return if lesions do not completely resolve.  Wound care sheet given.  3. History of SCC, lentigo, seborrheic keratosis   BENIGN LESIONS DISCUSSED WITH PATIENT:  I discussed the specifics of tumor, prognosis, and genetics of benign lesions.  I explained that treatment of these lesions would be purely cosmetic and not medically neccessary.  I discussed with patient different removal options including excision, cautery and /or laser.      Nature and genetics of benign skin lesions dicussed with  patient.  Signs and Symptoms of skin cancer discussed with patient.  ABCDEs of melanoma reviewed with patient.  Patient encouraged to perform monthly skin exams.  UV precautions reviewed with patient.  Skin care regimen reviewed with patient: Eliminate harsh soaps, i.e. Dial, zest, irsih spring; Mild soaps such as Cetaphil or Dove sensitive skin, avoid hot or cold showers, aggressive use of emollients including vanicream, cetaphil or cerave discussed with patient.    Risks of non-melanoma skin cancer discussed with patient   Return to clinic in one year or sooner pending biopsy results.

## 2017-03-15 ENCOUNTER — OFFICE VISIT (OUTPATIENT)
Dept: OTOLARYNGOLOGY | Facility: CLINIC | Age: 72
End: 2017-03-15
Payer: COMMERCIAL

## 2017-03-15 VITALS
WEIGHT: 150 LBS | TEMPERATURE: 98.5 F | SYSTOLIC BLOOD PRESSURE: 115 MMHG | HEART RATE: 69 BPM | DIASTOLIC BLOOD PRESSURE: 81 MMHG | BODY MASS INDEX: 27.44 KG/M2

## 2017-03-15 DIAGNOSIS — H69.93 DYSFUNCTION OF EUSTACHIAN TUBE, BILATERAL: Primary | ICD-10-CM

## 2017-03-15 PROCEDURE — 99213 OFFICE O/P EST LOW 20 MIN: CPT | Performed by: OTOLARYNGOLOGY

## 2017-03-15 ASSESSMENT — PAIN SCALES - GENERAL: PAINLEVEL: WORST PAIN (10)

## 2017-03-15 NOTE — PATIENT INSTRUCTIONS
Based on today's history and physical exam, I can find no evidence of middle ear pathology or eustachian tube dysfunction.  At this point my primary diagnosis is of temporomandibular syndrome.  I have discussed the etiology of TMJ, and the reasons why referred pain can mimic symptoms of ear disease, headaches, and even sinusitis.  Finally, I counseled the patient that should the therapy not help, or should the symptoms change, that they should return to me.    For the next 2 weeks:  1. Soft diet  2. No chewing gum  3. Use ibuprofen 600mg every 6 hours for 2 weeks  4. Use a bite guard at night, consider Kelley's Grind-No-More which is available OTC.    TMJ exercises:  1. Close your mouth to touch your upper and lower teeth without clenching them. Rest the tip of your tongue on your palate behind your upper teeth.  2. Slide the tip of your tongue backwards toward the throat as far as you can, keeping your teeth together.  3. Try to touch the soft palate with the tip of your tongue, and keep it there. Then slowly open your mouth until you feel your tongue is being pulled away from the soft palate. Keep your mouth open in this position for five seconds before closing your mouth to relax.  4. Repeat the above steps slowly for 5 minutes. You may want to check in a mirror to be sure your teeth are closed aligned straight up and down, without your jaw being off to one side.    Do the exercises twice daily for the first 2 weeks, but then you can do it more often if it seems to help. You shouldn't hear any clicks or noise from your joints, and if you do then you should restart the exercise rather than opening the mouth further. With practice, you will be able to open further without having clicking.

## 2017-03-15 NOTE — NURSING NOTE
"Initial /81 (BP Location: Right arm, Patient Position: Chair, Cuff Size: Adult Large)  Pulse 69  Temp 98.5  F (36.9  C) (Oral)  Wt 68 kg (150 lb)  BMI 27.44 kg/m2 Estimated body mass index is 27.44 kg/(m^2) as calculated from the following:    Height as of 2/17/17: 1.575 m (5' 2\").    Weight as of this encounter: 68 kg (150 lb). .    Marisabel La LPN    "

## 2017-03-15 NOTE — MR AVS SNAPSHOT
After Visit Summary   3/15/2017    Kristen Thompson    MRN: [id number]           Patient Information     Date Of Birth          1945        Visit Information        Provider Department      3/15/2017 12:45 PM Angie Castelan MD [facility]        Today's Diagnoses     Dysfunction of eustachian tube, bilateral    -  1      Care Instructions    Based on today's history and physical exam, I can find no evidence of middle ear pathology or eustachian tube dysfunction.  At this point my primary diagnosis is of temporomandibular syndrome.  I have discussed the etiology of TMJ, and the reasons why referred pain can mimic symptoms of ear disease, headaches, and even sinusitis.  Finally, I counseled the patient that should the therapy not help, or should the symptoms change, that they should return to me.    For the next 2 weeks:  1. Soft diet  2. No chewing gum  3. Use ibuprofen 600mg every 6 hours for 2 weeks  4. Use a bite guard at night, consider Kelley's Grind-No-More which is available OTC.    TMJ exercises:  1. Close your mouth to touch your upper and lower teeth without clenching them. Rest the tip of your tongue on your palate behind your upper teeth.  2. Slide the tip of your tongue backwards toward the throat as far as you can, keeping your teeth together.  3. Try to touch the soft palate with the tip of your tongue, and keep it there. Then slowly open your mouth until you feel your tongue is being pulled away from the soft palate. Keep your mouth open in this position for five seconds before closing your mouth to relax.  4. Repeat the above steps slowly for 5 minutes. You may want to check in a mirror to be sure your teeth are closed aligned straight up and down, without your jaw being off to one side.    Do the exercises twice daily for the first 2 weeks, but then you can do it more often if it seems to help. You shouldn't hear any clicks or noise from your joints, and if you  do then you should restart the exercise rather than opening the mouth further. With practice, you will be able to open further without having clicking.            Follow-ups after your visit        Who to contact     If you have questions or need follow up information about today's clinic visit or your schedule please contact [facility] directly at [phone].  Normal or non-critical lab and imaging results will be communicated to you by MyChart, letter or phone within 4 business days after the clinic has received the results. If you do not hear from us within 7 days, please contact the clinic through Autobutlerhart or phone. If you have a critical or abnormal lab result, we will notify you by phone as soon as possible.  Submit refill requests through Muxlim or call your pharmacy and they will forward the refill request to us. Please allow 3 business days for your refill to be completed.          Additional Information About Your Visit        Autobutlerhart Information     Muxlim gives you secure access to your electronic health record. If you see a primary care provider, you can also send messages to your care team and make appointments. If you have questions, please call your primary care clinic.  If you do not have a primary care provider, please call [phone] and they will assist you.        Care EveryWhere ID     This is your Care EveryWhere ID. This could be used by other organizations to access your [place] medical records  [id number]        Your Vitals Were     Pulse Temperature BMI (Body Mass Index)             69 98.5  F (36.9  C) (Oral) 27.44 kg/m2          Blood Pressure from Last 3 Encounters:   03/15/17 115/81   03/08/17 124/76   02/17/17 (!) 146/97    Weight from Last 3 Encounters:   03/15/17 68 kg (150 lb)   02/17/17 69.9 kg (154 lb)   02/09/17 70.9 kg (156 lb 3.2 oz)              Today, you had the following     No orders found for display       Primary Care Provider Office Phone # Fax #     Janett Morse [phone]       [facility] [address] [facility] [place] [address]        Thank you!     Thank you for choosing [facility]  for your care. Our goal is always to provide you with excellent care. Hearing back from our patients is one way we can continue to improve our services. Please take a few minutes to complete the written survey that you may receive in the mail after your visit with us. Thank you!             Your Updated Medication List - Protect others around you: Learn how to safely use, store and throw away your medicines at www.disposemymeds.org.          This list is accurate as of: 3/15/17  1:07 PM.  Always use your most recent med list.                   Brand Name Dispense Instructions for use    aspirin 81 MG EC tablet     90 tablet    Take 81 mg by mouth daily Reported on 2/17/2017       CALCIUM PO      Take by mouth daily       CoQ10 100 MG Caps          fluticasone 50 MCG/ACT spray    FLONASE    16 g    Spray 1-2 sprays into both nostrils daily       HYDROcodone-acetaminophen 5-325 MG per tablet    NORCO    18 tablet    Take 1 tablet by mouth every 4 hours as needed for moderate to severe pain or pain       nitroglycerin 0.4 MG sublingual tablet    NITROSTAT    25 tablet    For chest pain place 1 tablet under the tongue every 5 minutes for 3 doses. If symptoms persist 5 minutes after 1st dose call 911.       OMEGA 3 PO      Take  by mouth.       omeprazole 20 MG CR capsule    priLOSEC    180 capsule    Take 1 capsule (20 mg) by mouth 2 times daily       sucralfate 1 GM tablet    CARAFATE    40 tablet    Take 1 tablet (1 g) by mouth 4 times daily       TYLENOL PO      Take 1,000 mg by mouth every 4 hours as needed for mild pain or fever       VITAMIN D PO      Take 1,000 Units by mouth daily

## 2017-03-15 NOTE — PROGRESS NOTES
History of Present Illness - Kristen Thompson is a 71 year old female with known bilateral SNHL that presents today with bilateral otalgia. The otalgia has been present for a couple of years. No change in her hearing with these episodes. She denies any drainage from the ears.     Past Medical History -   Patient Active Problem List   Diagnosis     GERD (gastroesophageal reflux disease)     CARDIOVASCULAR SCREENING; LDL GOAL LESS THAN 160     HL (hearing loss)     Health Care Home     Arthritis     Advanced directives, counseling/discussion     Hyperlipidemia LDL goal <130     Chronic constipation     Gastritis     Cervicalgia       Current Medications -   Current Outpatient Prescriptions:      HYDROcodone-acetaminophen (NORCO) 5-325 MG per tablet, Take 1 tablet by mouth every 4 hours as needed for moderate to severe pain or pain, Disp: 18 tablet, Rfl: 0     omeprazole (PRILOSEC) 20 MG CR capsule, Take 1 capsule (20 mg) by mouth 2 times daily, Disp: 180 capsule, Rfl: 1     sucralfate (CARAFATE) 1 GM tablet, Take 1 tablet (1 g) by mouth 4 times daily, Disp: 40 tablet, Rfl: 1     Coenzyme Q10 (COQ10) 100 MG CAPS, , Disp: , Rfl:      Acetaminophen (TYLENOL PO), Take 1,000 mg by mouth every 4 hours as needed for mild pain or fever, Disp: , Rfl:      fluticasone (FLONASE) 50 MCG/ACT spray, Spray 1-2 sprays into both nostrils daily, Disp: 16 g, Rfl: 11     aspirin 81 MG EC tablet, Take 81 mg by mouth daily Reported on 2/17/2017, Disp: 90 tablet, Rfl: 3     CALCIUM PO, Take by mouth daily , Disp: , Rfl:      Omega-3 Fatty Acids (OMEGA 3 PO), Take  by mouth., Disp: , Rfl:      Cholecalciferol (VITAMIN D PO), Take 1,000 Units by mouth daily , Disp: , Rfl:      nitroglycerin (NITROSTAT) 0.4 MG sublingual tablet, For chest pain place 1 tablet under the tongue every 5 minutes for 3 doses. If symptoms persist 5 minutes after 1st dose call 911. (Patient not taking: Reported on 3/15/2017), Disp: 25 tablet, Rfl: 0    Allergies -    Allergies   Allergen Reactions     Codeine Sulfate      Nausea and vomiting      Quinapril Difficulty breathing     Darvon-N [Propoxyphene Napsylate] Nausea and Vomiting       Social History -   Social History     Social History     Marital status:      Spouse name: N/A     Number of children: N/A     Years of education: N/A     Social History Main Topics     Smoking status: Never Smoker     Smokeless tobacco: Never Used     Alcohol use No     Drug use: No     Sexual activity: Not Currently     Partners: Male     Other Topics Concern     Parent/Sibling W/ Cabg, Mi Or Angioplasty Before 65f 55m? No     Social History Narrative       Family History -   Family History   Problem Relation Age of Onset     C.A.D. Mother      Cardiovascular Mother      Thyroid Disease Mother      CANCER Father      stomach ca     CANCER Sister      Eye Disorder Sister      C.A.D. Sister      CEREBROVASCULAR DISEASE Sister      Breast Cancer Sister      Arthritis Sister      Cardiovascular Sister      Depression Sister      HEART DISEASE Sister      Neurologic Disorder Sister      OSTEOPOROSIS Sister      Thyroid Disease Sister      Depression Sister      Thyroid Disease Sister      Depression Sister      Thyroid Disease Sister      Obesity Sister      Neurologic Disorder Sister        Review of Systems - As per HPI and PMHx, otherwise 7 system review of the head and neck negative. 10+ system review negative.    Exam:  /81 (BP Location: Right arm, Patient Position: Chair, Cuff Size: Adult Large)  Pulse 69  Temp 98.5  F (36.9  C) (Oral)  Wt 68 kg (150 lb)  BMI 27.44 kg/m2  General - The patient is well nourished and well developed, and appears to have good nutritional status.  Alert and oriented to person and place, answers questions and cooperates with examination appropriately.   Head and Face - Normocephalic and atraumatic, with no gross asymmetry noted of the contour of the facial features.  The facial nerve is intact,  with strong symmetric movements.  Eyes - Extraocular movements intact.  Sclera were not icteric or injected, conjunctiva were pink and moist.  Ears - bilateral TMs intact, no middle ear effusion. No significant cerumen.  Mouth - bilateral TMJ crepitus.      A/P - Kristen Thompson is a 71 year old female with bilateral otalgia, lasting for less than a day at a time. Based on today's history and physical exam, I can find no evidence of middle ear pathology or eustachian tube dysfunction.  At this point my primary diagnosis is of temporomandibular syndrome.  I have discussed the etiology of TMJ, and the reasons why referred pain can mimic symptoms of ear disease, headaches, and even sinusitis.  Finally, I counseled the patient that should the therapy not help, or should the symptoms change, that they should return to me.    For the next 2 weeks:  1. Soft diet  2. No chewing gum  3. Use ibuprofen 600mg every 6 hours for 2 weeks  4. Use a bite guard at night, consider Kelley's Grind-No-More which is available OTC.    TMJ exercises:  1. Close your mouth to touch your upper and lower teeth without clenching them. Rest the tip of your tongue on your palate behind your upper teeth.  2. Slide the tip of your tongue backwards toward the throat as far as you can, keeping your teeth together.  3. Try to touch the soft palate with the tip of your tongue, and keep it there. Then slowly open your mouth until you feel your tongue is being pulled away from the soft palate. Keep your mouth open in this position for five seconds before closing your mouth to relax.  4. Repeat the above steps slowly for 5 minutes. You may want to check in a mirror to be sure your teeth are closed aligned straight up and down, without your jaw being off to one side.    Do the exercises twice daily for the first 2 weeks, but then you can do it more often if it seems to help. You shouldn't hear any clicks or noise from your joints, and if you do then  you should restart the exercise rather than opening the mouth further. With practice, you will be able to open further without having clicking.        Dr. Angie Castelan MD  Otolaryngology  [facility]

## 2017-03-20 NOTE — IP AVS SNAPSHOT
MRN:[id number]                      After Visit Summary   1/25/2017    Kristen Thompson    MRN: [id number]           Visit Information        Department      1/25/2017  7:44 AM [facility]          Review of your medicines      START taking        Dose / Directions    metoprolol 25 MG tablet   Commonly known as:  LOPRESSOR   Used for:  Status post coronary angiogram        Dose:  25 mg   Take 1 tablet (25 mg) by mouth 2 times daily Hold IF heart rate less than 55.   Quantity:  180 tablet   Refills:  3         CONTINUE these medicines which have NOT CHANGED        Dose / Directions    aspirin 81 MG EC tablet        Dose:  81 mg   Take 1 tablet (81 mg) by mouth daily   Quantity:  90 tablet   Refills:  3       CALCIUM PO        Take by mouth daily   Refills:  0       CoQ10 100 MG Caps        Refills:  0       esomeprazole 40 MG CR capsule   Commonly known as:  nexIUM   Used for:  Chronic gastritis without bleeding, unspecified gastritis type        Dose:  40 mg   Take 1 capsule (40 mg) by mouth every morning (before breakfast) Take 30-60 minutes before a eating.   Quantity:  30 capsule   Refills:  1       fluticasone 50 MCG/ACT spray   Commonly known as:  FLONASE   Used for:  Chronic maxillary sinusitis        Dose:  1-2 spray   Spray 1-2 sprays into both nostrils daily   Quantity:  16 g   Refills:  11       furosemide 20 MG tablet   Commonly known as:  LASIX   Used for:  Bilateral edema of lower extremity        Dose:  10 mg   Take 0.5 tablets (10 mg) by mouth daily as needed   Quantity:  30 tablet   Refills:  0       MAGNESIUM-POTASSIUM PO        Take by mouth daily   Refills:  0       nitroglycerin 0.4 MG sublingual tablet   Commonly known as:  NITROSTAT   Used for:  Jaw pain, Acute chest pain        For chest pain place 1 tablet under the tongue every 5 minutes for 3 doses. If symptoms persist 5 minutes after 1st dose call 911.   Quantity:  25 tablet   Refills:  0       OMEGA  Echo completed 3 PO        Take  by mouth.   Refills:  0       Red Yeast Rice 600 MG Tabs   Used for:  Hyperlipidemia LDL goal <130        Dose:  2 tablet   Take 2 tablets by mouth daily   Refills:  0       simvastatin 40 MG tablet   Commonly known as:  ZOCOR   Used for:  CARDIOVASCULAR SCREENING; LDL GOAL LESS THAN 100        Dose:  40 mg   Take 1 tablet (40 mg) by mouth At Bedtime   Quantity:  90 tablet   Refills:  3       TYLENOL PO        Dose:  1000 mg   Take 1,000 mg by mouth every 4 hours as needed for mild pain or fever   Refills:  0       vitamin B complex with vitamin C Tabs tablet        Dose:  1 tablet   Take 1 tablet by mouth daily   Refills:  0       VITAMIN D PO        Dose:  1000 Units   Take 1,000 Units by mouth daily   Refills:  0            Where to get your medicines      Some of these will need a paper prescription and others can be bought over the counter. Ask your nurse if you have questions.     Bring a paper prescription for each of these medications    - metoprolol 25 MG tablet            Prescriptions were sent or printed at these locations (1 Prescription)                   [facility]/pharmacy #[id number] - [address] [address]    Telephone:  [phone]   Fax:  [phone]   Hours:                  Printed at Department/Unit printer (1 of 1)         metoprolol (LOPRESSOR) 25 MG tablet                 Protect others around you: Learn how to safely use, store and throw away your medicines at www.disposemymeds.org.         Follow-ups after your visit        Your next 10 appointments already scheduled     Feb 01, 2017  1:30 PM   LAB with [facility]   [facility] [place] ([facility])    [address] [place] [id number] [phone]           Patient must bring picture ID.  Patient should be prepared to give a urine specimen  Please do not eat 10-12 hours before your appointment if you are coming in  fasting for labs on lipids, cholesterol, or glucose (sugar).  Pregnant women should follow their Care Team instructions. Water with medications is okay. Do not drink coffee or other fluids.   If you have concerns about taking  your medications, please ask at office or if scheduling via [employer], send a message by clicking on Secure Messaging, Message Your Care Team.            Feb 01, 2017  2:30 PM   Return Visit with AHMET Bro CNP   [facility] ([facility])    [address] [place] [id number] [phone]               Care Instructions        After Care Instructions     Discharge Instructions - Follow up with [facility] Cardiologist       Follow -up with the [facility] Clinic of patient preference in 2-4 weeks            Discharge Instructions - IF on Metformin (Glucophage or Glucovance) or Metformin containing medications       IF on Metformin (Glucophage or Glucovance) or Metformin containing medications , schedule a Basic Metabolic Panel at [facility] or Primary Clinic in 48 - 72 hours post procedure and PRIOR TO resuming the Metformin or Metformin containing medications.  Hold Metformin (Glucophage or Glucovance) or Metformin containing medications until after the Basic Metabolic Panel on the 2nd or 3rd day following the procedure.  May resume after blood draw is complete.                  Further instructions from your care team       Cardiac Angiogram Discharge Instructions - Radial    After you go home:      Have an adult stay with you until tomorrow.    Drink extra fluids for 2 days.    You may resume your normal diet.    No smoking       For 24 hours - due to the sedation you received:    Relax and take it easy.    Do NOT make any important or legal decisions.    Do NOT drive or operate machines at home or at work.    Do NOT drink alcohol.    Care of Wrist Puncture Site:      For the first 24 hrs - check the  puncture site every 1-2 hours while awake.    It is normal to have soreness at the puncture site and mild tingling in your hand for up to 3 days.    Remove the bandaid after 24 hours. If there is minor oozing, apply another bandaid and remove it after 12 hours.    You may shower tomorrow.  Do NOT take a bath, or use a hot tub or pool for at least 3 days. Do NOT scrub the site. Do not use lotion or powder near the puncture site.           Activity:        For 2 days:     do not use your hand or arm to support your weight (such as rising from a chair)     do not bend your wrist (such as lifting a garage door).    do not lift more than 5 pounds or exercise your arm (such as tennis, golf or bowling).    Do NOT do any heavy activity such as exercise, lifting, or straining.     Bleeding:      If you start bleeding from the site in your wrist, sit down and press firmly on/above the site for 10 minutes.     Once bleeding stops, keep arm still for 2 hours.     Call [facility] as soon as you can.       Call 911 right away if you have heavy bleeding or bleeding that does not stop.      Medicines:      If you are taking antiplatelet medications such as Plavix, Brilinta or Effient, do not stop taking it until you talk to your cardiologist.        If you are on Metformin (Glucophage), do not restart it until you have blood tests (within 2 to 3 days after discharge).  After you have your blood drawn, you may restart the Metformin.    Take your medications, including blood thinners, unless your provider tells you not to.  If you take Coumadin (Warfarin), have your INR checked by your provider in  3-5 days. Call your clinic to schedule this.    If you have stopped any other medicines, check with your provider about when to restart them.    Follow Up Appointments:      Follow up with [facility] Nurse Practitioner at [facility] Clinic of patient preference in 7-10 days.    Call the clinic if:      You have a large or growing hard lump  "around the site.    The site is red, swollen, hot or tender.    Blood or fluid is draining from the site.    You have chills or a fever greater than 101 F (38 C).    Your arm turns feels numb, cool or changes color.    You have hives, a rash or unusual itching.    Any questions or concerns.          [facility] [place]:    [phone] UMP (7 days a week)             Additional Information About Your Visit        BitStashhart Information     [employer] gives you secure access to your electronic health record. If you see a primary care provider, you can also send messages to your care team and make appointments. If you have questions, please call your primary care clinic.  If you do not have a primary care provider, please call [phone] and they will assist you.        Care EveryWhere ID     This is your Care EveryWhere ID. This could be used by other organizations to access your [facility] medical records  [id number]        Your Vitals Were     Blood Pressure Pulse Temperature    106/88 mmHg 110 97  F (36.1  C) (Oral)    Respirations Height Weight    16 1.575 m (5' 2\") 70.308 kg (155 lb)    BMI (Body Mass Index) Pulse Oximetry       28.34 kg/m2 96%        Primary Care Provider Office Phone # Fax #    Janett Morse [phone]      Thank you!     Thank you for choosing [facility] for your care. Our goal is always to provide you with excellent care. Hearing back from our patients is one way we can continue to improve our services. Please take a few minutes to complete the written survey that you may receive in the mail after you visit with us. Thank you!             Medication List: This is a list of all your medications and when to take them. Check marks below indicate your daily home schedule. Keep this list as a reference.      Medications           Morning Afternoon Evening Bedtime As Needed    aspirin 81 MG EC tablet   Take 1 tablet (81 mg) by mouth daily         "                        CALCIUM PO   Take by mouth daily                                CoQ10 100 MG Caps                                esomeprazole 40 MG CR capsule   Commonly known as:  nexIUM   Take 1 capsule (40 mg) by mouth every morning (before breakfast) Take 30-60 minutes before a eating.                                fluticasone 50 MCG/ACT spray   Commonly known as:  FLONASE   Spray 1-2 sprays into both nostrils daily                                furosemide 20 MG tablet   Commonly known as:  LASIX   Take 0.5 tablets (10 mg) by mouth daily as needed                                MAGNESIUM-POTASSIUM PO   Take by mouth daily                                metoprolol 25 MG tablet   Commonly known as:  LOPRESSOR   Take 1 tablet (25 mg) by mouth 2 times daily Hold IF heart rate less than 55.                                nitroglycerin 0.4 MG sublingual tablet   Commonly known as:  NITROSTAT   For chest pain place 1 tablet under the tongue every 5 minutes for 3 doses. If symptoms persist 5 minutes after 1st dose call 911.                                OMEGA 3 PO   Take  by mouth.                                Red Yeast Rice 600 MG Tabs   Take 2 tablets by mouth daily                                simvastatin 40 MG tablet   Commonly known as:  ZOCOR   Take 1 tablet (40 mg) by mouth At Bedtime                                TYLENOL PO   Take 1,000 mg by mouth every 4 hours as needed for mild pain or fever                                vitamin B complex with vitamin C Tabs tablet   Take 1 tablet by mouth daily                                VITAMIN D PO   Take 1,000 Units by mouth daily

## 2017-05-01 ENCOUNTER — MYC REFILL (OUTPATIENT)
Dept: FAMILY MEDICINE | Facility: CLINIC | Age: 72
End: 2017-05-01

## 2017-05-01 DIAGNOSIS — M54.2 CERVICALGIA: ICD-10-CM

## 2017-05-01 DIAGNOSIS — M19.90 ARTHRITIS: ICD-10-CM

## 2017-05-01 NOTE — TELEPHONE ENCOUNTER
Message from [employer]:  Original authorizing provider: VERA Koehler would like a refill of the following medications:  HYDROcodone-acetaminophen (NORCO) 5-325 MG per tablet [Jasmin Reis PA-C]    Preferred pharmacy: [facility]/PHARMACY #[id number] - [place] [address]    Comment:

## 2017-05-02 RX ORDER — HYDROCODONE BITARTRATE AND ACETAMINOPHEN 5; 325 MG/1; MG/1
1 TABLET ORAL EVERY 4 HOURS PRN
Qty: 18 TABLET | Refills: 0 | Status: SHIPPED | OUTPATIENT
Start: 2017-05-02 | End: 2017-06-20

## 2017-05-16 ENCOUNTER — OFFICE VISIT (OUTPATIENT)
Dept: ORTHOPEDICS | Facility: CLINIC | Age: 72
End: 2017-05-16
Payer: COMMERCIAL

## 2017-05-16 VITALS
BODY MASS INDEX: 28.52 KG/M2 | WEIGHT: 155 LBS | HEIGHT: 62 IN | SYSTOLIC BLOOD PRESSURE: 110 MMHG | DIASTOLIC BLOOD PRESSURE: 68 MMHG

## 2017-05-16 DIAGNOSIS — M17.12 PRIMARY OSTEOARTHRITIS OF LEFT KNEE: Primary | ICD-10-CM

## 2017-05-16 PROCEDURE — 99212 OFFICE O/P EST SF 10 MIN: CPT | Mod: 25 | Performed by: PEDIATRICS

## 2017-05-16 PROCEDURE — 20610 DRAIN/INJ JOINT/BURSA W/O US: CPT | Mod: LT | Performed by: PEDIATRICS

## 2017-05-16 RX ORDER — TRIAMCINOLONE ACETONIDE 40 MG/ML
40 INJECTION, SUSPENSION INTRA-ARTICULAR; INTRAMUSCULAR ONCE
Qty: 1 ML | Refills: 0 | OUTPATIENT
Start: 2017-05-16 | End: 2017-05-16

## 2017-05-16 RX ORDER — SIMVASTATIN 40 MG
40 TABLET ORAL AT BEDTIME
Refills: 1 | COMMUNITY
Start: 2017-01-23 | End: 2018-01-11

## 2017-05-16 NOTE — MR AVS SNAPSHOT
After Visit Summary   5/16/2017    Kristen Thompson    MRN: [id number]           Patient Information     Date Of Birth          1945        Visit Information        Provider Department      5/16/2017 11:40 AM Parveen Mehta DO [facility]        Today's Diagnoses     Primary osteoarthritis of left knee    -  1      Care Instructions    Left Knee Injection discussed today:     Ice/OTC Pain Medication as needed, as directed on packaging    Activity Modification: as discussed, avoid painful activities     Medical Equipment: can use compression as needed    Steroid injection of the left knee was performed today in clinic  Icing for the next 1-2 days may be helpful for pain. Injection may take 10-14 days to see the full effect.    Follow up as needed        Follow-ups after your visit        Follow-up notes from your care team     Return if symptoms worsen or fail to improve.      Who to contact     If you have questions or need follow up information about today's clinic visit or your schedule please contact [facility] directly at [phone].  Normal or non-critical lab and imaging results will be communicated to you by neoSurgicalhart, letter or phone within 4 business days after the clinic has received the results. If you do not hear from us within 7 days, please contact the clinic through Udext or phone. If you have a critical or abnormal lab result, we will notify you by phone as soon as possible.  Submit refill requests through [email or web address] or call your pharmacy and they will forward the refill request to us. Please allow 3 business days for your refill to be completed.          Additional Information About Your Visit        neoSurgicalhart Information     [email or web address] gives you secure access to your electronic health record. If you see a primary care provider, you can also send messages to your care team and make appointments. If you have  "questions, please call your primary care clinic.  If you do not have a primary care provider, please call [phone] and they will assist you.        Care EveryWhere ID     This is your Care EveryWhere ID. This could be used by other organizations to access your [facility] medical records  [id number]        Your Vitals Were     Height BMI (Body Mass Index)                5' 2\" (1.575 m) 28.35 kg/m2           Blood Pressure from Last 3 Encounters:   05/16/17 110/68   03/15/17 115/81   03/08/17 124/76    Weight from Last 3 Encounters:   05/16/17 155 lb (70.3 kg)   03/15/17 150 lb (68 kg)   02/17/17 154 lb (69.9 kg)              We Performed the Following     Kenalog 40 MG  []     Large Joint/Bursa injection and/or drainage (Shoulder, Knee)          Today's Medication Changes          These changes are accurate as of: 5/16/17  3:10 PM.  If you have any questions, ask your nurse or doctor.               Start taking these medicines.        Dose/Directions    triamcinolone acetonide 40 MG/ML injection   Commonly known as:  KENALOG   Used for:  Primary osteoarthritis of left knee   Started by:  Parveen Mehta, DO        Dose:  40 mg   1 mL (40 mg) by INTRA-ARTICULAR route once for 1 dose   Quantity:  1 mL   Refills:  0            Where to get your medicines      Some of these will need a paper prescription and others can be bought over the counter.  Ask your nurse if you have questions.     You don't need a prescription for these medications     triamcinolone acetonide 40 MG/ML injection                Primary Care Provider Office Phone # Fax #    Janett Morse [phone]       [facility] [address] [facility] [address]        Thank you!     Thank you for choosing [facility]  for your care. Our goal is always to provide you with excellent care. Hearing back from our patients is one way we can continue to improve our services. Please " take a few minutes to complete the written survey that you may receive in the mail after your visit with us. Thank you!             Your Updated Medication List - Protect others around you: Learn how to safely use, store and throw away your medicines at www.disposemymeds.org.          This list is accurate as of: 5/16/17  3:10 PM.  Always use your most recent med list.                   Brand Name Dispense Instructions for use    aspirin 81 MG EC tablet     90 tablet    Take 81 mg by mouth daily Reported on 2/17/2017       CALCIUM PO      Take by mouth daily       CoQ10 100 MG Caps          fluticasone 50 MCG/ACT spray    FLONASE    16 g    Spray 1-2 sprays into both nostrils daily       HYDROcodone-acetaminophen 5-325 MG per tablet    NORCO    18 tablet    Take 1 tablet by mouth every 4 hours as needed for moderate to severe pain or pain       nitroglycerin 0.4 MG sublingual tablet    NITROSTAT    25 tablet    For chest pain place 1 tablet under the tongue every 5 minutes for 3 doses. If symptoms persist 5 minutes after 1st dose call 911.       OMEGA 3 PO      Take  by mouth.       omeprazole 20 MG CR capsule    priLOSEC    180 capsule    Take 1 capsule (20 mg) by mouth 2 times daily       simvastatin 40 MG tablet    ZOCOR     Take 40 mg by mouth At Bedtime       sucralfate 1 GM tablet    CARAFATE    40 tablet    Take 1 tablet (1 g) by mouth 4 times daily       triamcinolone acetonide 40 MG/ML injection    KENALOG    1 mL    1 mL (40 mg) by INTRA-ARTICULAR route once for 1 dose       TYLENOL PO      Take 1,000 mg by mouth every 4 hours as needed for mild pain or fever       VITAMIN D PO      Take 1,000 Units by mouth daily

## 2017-05-16 NOTE — Clinical Note
Kristen BARRETT was seen in [facility] clinic for her knee OA. Injection done. Please see copy of the chart note for additional details. Thanks.

## 2017-05-16 NOTE — PROGRESS NOTES
"Sports Medicine Clinic Visit    PCP: Janett Morse HILARIO Thompson is a 71 year old female who is seen in f/u up for Primary osteoarthritis of left knee. Since last visit and injection on 8/9/2016 patient has begun to have pain in her left knee.  She does believe she had at least 6 months of relief from the injection.  No new injury.  Would like a repeat injection today.      Walking 3 flat miles, 5 days a week.       Review of Systems  All other systems reviewed and are negative unless noted above.    This document serves as a record of the services and decisions personally performed and made by Parveen Mehta DO, CAQ. It was created on his behalf by Ortiz Graf, a trained medical scribe. The creation of this document is based the provider's statements to the medical scribe.  Ortiz Graf May 16, 2017 12:06 PM     Past Medical History:   Diagnosis Date     Arthritis 11/19/2013     Gastro-oesophageal reflux disease      GERD (gastroesophageal reflux disease) 10/2/2012     H/O total hysterectomy      HL (hearing loss) 10/11/2012     PONV (postoperative nausea and vomiting)      Squamous cell carcinoma (H)      Past Surgical History:   Procedure Laterality Date     C RAD RESEC TONSIL/PILLARS       COLONOSCOPY  10/30/2012    Procedure: COLONOSCOPY;  Colonoscopy;  Surgeon: Denise Bah MD;  Location: [facility]     ESOPHAGOSCOPY, GASTROSCOPY, DUODENOSCOPY (EGD), COMBINED N/A 9/15/2016    Procedure: COMBINED ESOPHAGOSCOPY, GASTROSCOPY, DUODENOSCOPY (EGD);  Surgeon: Bennie Godinez MD;  Location: [facility]     GALLBLADDER SURGERY       HYSTERECTOMY       knee replacment  2007     RELEASE TRIGGER FINGER Right 4/29/2016    Procedure: RELEASE TRIGGER FINGER;  Surgeon: Percy Sarmiento MD;  Location: [facility]     REPAIR BLADDER         Objective  /68  Ht 5' 2\" (1.575 m)  Wt 155 lb (70.3 kg)  BMI 28.35 kg/m2    GENERAL APPEARANCE: healthy, alert and no distress   GAIT: NORMAL  SKIN: no suspicious " lesions or rashes  NEURO: Normal strength and tone, mentation intact and speech normal  PSYCH:  mentation appears normal and affect normal/bright  HEENT: no scleral icterus  CV: regional pulses intact  RESP: nonlabored breathing    Exam   Left Knee exam    ROM:        Grossly full active and passive ROM with flexion and extension  Pain end of motion, mild    Inspection:       no visible ecchymosis        effusion noted mild    Skin:       no visible deformities       well perfused       capillary refill brisk    Tender:        medial joint line    Special Tests:        neg (-) varus       neg (-) valgus         Radiology  Reviewed past images with patient today; left knee OA    Assessment:  1. Primary osteoarthritis of left knee        Plan:  Discussed the assessment with the patient. Reviewed course. Good results from steroid injection in past. Has not had an ideal outcome with right knee TKA, still some discomfort, so she prefers to avoid surgery on left if possible.    Radiologic images reviewed and discussed with patient today    We discussed the following treatment options: symptom treatment, activity modification/rest, rehab, injection therapy, medication, surgical options, and support for the affected area. Following discussion, plan:    Topical Treatments: Ice prn  Over the counter medication: Patient's preferred OTC medication as directed on packaging.  Activity Modification: as discussed  Medical Equipment: can use compression as needed  Steroid injection of the left knee was performed today in clinic  Icing for the next 1-2 days may be helpful for pain. Injection may take 10-14 days to see the full effect.   Follow up: as needed    Discussed steroid injection, including risks, potential benefits, and alternatives.  The patient expressed understanding.  Obtained verbal and written consent and the patient elected to proceed.  Procedure: A steroid injection was performed under aseptic technique at left knee  using 1% plain Lidocaine and 40 mg of Kenalog. Bandage applied. This was well tolerated.    Parveen Mehta DO, NATTY        Disclaimer: This note consists of symbols derived from keyboarding, dictation and/or voice recognition software. As a result, there may be errors in the script that have gone undetected. Please consider this when interpreting information found in this chart.    The information in this document, created by the medical scribe for me, accurately reflects the services I personally performed and the decisions made by me. I have reviewed and approved this document for accuracy.   Parveen Mehta DO, CAQ

## 2017-05-16 NOTE — PATIENT INSTRUCTIONS
Left Knee Injection discussed today:     Ice/OTC Pain Medication as needed, as directed on packaging    Activity Modification: as discussed, avoid painful activities     Medical Equipment: can use compression as needed    Steroid injection of the left knee was performed today in clinic  Icing for the next 1-2 days may be helpful for pain. Injection may take 10-14 days to see the full effect.    Follow up as needed

## 2017-06-14 ENCOUNTER — MYC MEDICAL ADVICE (OUTPATIENT)
Dept: FAMILY MEDICINE | Facility: CLINIC | Age: 72
End: 2017-06-14

## 2017-06-14 DIAGNOSIS — M19.90 ARTHRITIS: ICD-10-CM

## 2017-06-14 DIAGNOSIS — M54.2 CERVICALGIA: Primary | ICD-10-CM

## 2017-06-16 PROBLEM — R60.0 LOCALIZED EDEMA: Status: ACTIVE | Noted: 2017-06-16

## 2017-06-16 RX ORDER — FUROSEMIDE 20 MG
10 TABLET ORAL DAILY
Qty: 60 TABLET | Refills: 1 | Status: SHIPPED | OUTPATIENT
Start: 2017-06-16 | End: 2017-12-10

## 2017-06-20 RX ORDER — HYDROCODONE BITARTRATE AND ACETAMINOPHEN 5; 325 MG/1; MG/1
1 TABLET ORAL EVERY 4 HOURS PRN
Qty: 18 TABLET | Refills: 0 | Status: SHIPPED | OUTPATIENT
Start: 2017-06-20 | End: 2017-10-03

## 2017-07-14 ENCOUNTER — TRANSFERRED RECORDS (OUTPATIENT)
Dept: HEALTH INFORMATION MANAGEMENT | Facility: CLINIC | Age: 72
End: 2017-07-14

## 2017-08-09 DIAGNOSIS — K29.50 OTHER CHRONIC GASTRITIS WITHOUT HEMORRHAGE: ICD-10-CM

## 2017-08-09 DIAGNOSIS — R07.89 OTHER CHEST PAIN: ICD-10-CM

## 2017-08-09 NOTE — TELEPHONE ENCOUNTER
OMEPRAZOLE DR 20 MG CAPSULE        Last Written Prescription Date: 2/17/17  Last Fill Quantity: 180,  # refills: 1   Last Office Visit with G, P or [facility] prescribing provider: 7/14/17

## 2017-10-03 ENCOUNTER — MYC REFILL (OUTPATIENT)
Dept: FAMILY MEDICINE | Facility: CLINIC | Age: 72
End: 2017-10-03

## 2017-10-03 DIAGNOSIS — M54.2 CERVICALGIA: ICD-10-CM

## 2017-10-03 DIAGNOSIS — M19.90 ARTHRITIS: ICD-10-CM

## 2017-10-03 DIAGNOSIS — J32.0 CHRONIC MAXILLARY SINUSITIS: ICD-10-CM

## 2017-10-03 RX ORDER — FLUTICASONE PROPIONATE 50 MCG
1-2 SPRAY, SUSPENSION (ML) NASAL DAILY
Qty: 16 G | Refills: 11 | Status: SHIPPED | OUTPATIENT
Start: 2017-10-03 | End: 2018-01-11

## 2017-10-03 RX ORDER — HYDROCODONE BITARTRATE AND ACETAMINOPHEN 5; 325 MG/1; MG/1
1 TABLET ORAL EVERY 4 HOURS PRN
Qty: 18 TABLET | Refills: 0 | Status: SHIPPED | OUTPATIENT
Start: 2017-10-03 | End: 2018-01-11

## 2017-10-03 NOTE — TELEPHONE ENCOUNTER
Message from MyChart:  Original authorizing provider: MD Kristen Smalls would like a refill of the following medications:  fluticasone (FLONASE) 50 MCG/ACT spray [Janett Morse MD]  HYDROcodone-acetaminophen (NORCO) 5-325 MG per tablet [Janett Morse MD]    Preferred pharmacy: [id number] [place] [id number]    Comment:

## 2017-10-03 NOTE — TELEPHONE ENCOUNTER
Kristen notified via [employer].  Script faxed to [facility]..Nayeli Perez

## 2017-10-26 ENCOUNTER — OFFICE VISIT (OUTPATIENT)
Dept: PODIATRY | Facility: CLINIC | Age: 72
End: 2017-10-26
Payer: COMMERCIAL

## 2017-10-26 VITALS — HEIGHT: 62 IN | WEIGHT: 160 LBS | BODY MASS INDEX: 29.44 KG/M2

## 2017-10-26 DIAGNOSIS — L60.0 INGROWN NAIL OF FIFTH TOE OF LEFT FOOT: Primary | ICD-10-CM

## 2017-10-26 PROCEDURE — 11730 AVULSION NAIL PLATE SIMPLE 1: CPT | Mod: T4 | Performed by: PODIATRIST

## 2017-10-26 PROCEDURE — 99203 OFFICE O/P NEW LOW 30 MIN: CPT | Mod: 25 | Performed by: PODIATRIST

## 2017-10-26 NOTE — MR AVS SNAPSHOT
After Visit Summary   10/26/2017    Kristen Thompson    MRN: [id number]           Patient Information     Date Of Birth          1945        Visit Information        Provider Department      10/26/2017 2:40 PM Bart Arana DPM [facility]        Care Instructions    Post toenail procedure instructions:    1.  Keep bandage on the rest of the day; take off in the morning.  2.  Soak the toe in warm water with Epsom's Salt or Dreft detergent for 20 min.  3.  Clean out any scab tissue from the treated area with a toothpick or Q-tip.  4.  Apply a topical antibiotic to the treated area and bandage with a Band-Aid.  5.  Repeat morning and night for two weeks            Follow-ups after your visit        Who to contact     If you have questions or need follow up information about today's clinic visit or your schedule please contact [facility] [employer] directly at [phone].  Normal or non-critical lab and imaging results will be communicated to you by [employer], letter or phone within 4 business days after the clinic has received the results. If you do not hear from us within 7 days, please contact the clinic through [employer] or phone. If you have a critical or abnormal lab result, we will notify you by phone as soon as possible.  Submit refill requests through [employer] or call your pharmacy and they will forward the refill request to us. Please allow 3 business days for your refill to be completed.          Additional Information About Your Visit        [employer] Experience[employer] Information     [employer] gives you secure access to your electronic health record. If you see a primary care provider, you can also send messages to your care team and make appointments. If you have questions, please call your primary care clinic.  If you do not have a primary care provider, please call [phone] and they will assist you.        Care EveryWhere ID     This is your  "Care EveryWhere ID. This could be used by other organizations to access your [facility] medical records  [id number]        Your Vitals Were     Height BMI (Body Mass Index)                1.575 m (5' 2\") 29.26 kg/m2           Blood Pressure from Last 3 Encounters:   05/16/17 110/68   03/15/17 115/81   03/08/17 124/76    Weight from Last 3 Encounters:   10/26/17 72.6 kg (160 lb)   05/16/17 70.3 kg (155 lb)   03/15/17 68 kg (150 lb)              Today, you had the following     No orders found for display       Primary Care Provider Office Phone # Fax #    Janett Morse [phone] [address] [place] [address]        Equal Access to Services     ROLA DENNEY : Hadii rodriguez mobley Soолег, waaxda luqadaha, qaybta kaalmada adeegyada, breanne santos . So [facility] [phone].    ATENCIÓN: Si habla español, tiene a bonilla disposición servicios gratuitos de asistencia lingüística. Devin al [phone].    We comply with applicable federal civil rights laws and [place] laws. We do not discriminate on the basis of race, color, national origin, age, disability, sex, sexual orientation, or gender identity.            Thank you!     Thank you for choosing [facility]  for your care. Our goal is always to provide you with excellent care. Hearing back from our patients is one way we can continue to improve our services. Please take a few minutes to complete the written survey that you may receive in the mail after your visit with us. Thank you!             Your Updated Medication List - Protect others around you: Learn how to safely use, store and throw away your medicines at www.disposemymeds.org.          This list is accurate as of: 10/26/17  3:52 PM.  Always use your most recent med list.                   Brand Name Dispense Instructions for use Diagnosis    aspirin 81 MG EC tablet     90 tablet    Take 81 mg by mouth daily " Reported on 2/17/2017        CALCIUM PO      Take by mouth daily        CoQ10 100 MG Caps           fluticasone 50 MCG/ACT spray    FLONASE    16 g    Spray 1-2 sprays into both nostrils daily    Chronic maxillary sinusitis       furosemide 20 MG tablet    LASIX    60 tablet    Take 0.5 tablets (10 mg) by mouth daily        HYDROcodone-acetaminophen 5-325 MG per tablet    NORCO    18 tablet    Take 1 tablet by mouth every 4 hours as needed for moderate to severe pain or pain    Cervicalgia, Arthritis       nitroGLYcerin 0.4 MG sublingual tablet    NITROSTAT    25 tablet    For chest pain place 1 tablet under the tongue every 5 minutes for 3 doses. If symptoms persist 5 minutes after 1st dose call 911.    Jaw pain, Acute chest pain       OMEGA 3 PO      Take  by mouth.        omeprazole 20 MG CR capsule    priLOSEC    180 capsule    TAKE 1 CAPSULE (20 MG) BY MOUTH 2 TIMES DAILY    Other chronic gastritis without hemorrhage, Other chest pain       simvastatin 40 MG tablet    ZOCOR     Take 40 mg by mouth At Bedtime        sucralfate 1 GM tablet    CARAFATE    40 tablet    Take 1 tablet (1 g) by mouth 4 times daily    Other chronic gastritis without hemorrhage, Other chest pain       TYLENOL PO      Take 1,000 mg by mouth every 4 hours as needed for mild pain or fever        VITAMIN D PO      Take 1,000 Units by mouth daily

## 2017-10-26 NOTE — PATIENT INSTRUCTIONS
Post toenail procedure instructions:    1.  Keep bandage on the rest of the day; take off in the morning.  2.  Soak the toe in warm water with Epsom's Salt or Dreft detergent for 20 min.  3.  Clean out any scab tissue from the treated area with a toothpick or Q-tip.  4.  Apply a topical antibiotic to the treated area and bandage with a Band-Aid.  5.  Repeat morning and night for two weeks

## 2017-10-26 NOTE — LETTER
10/26/2017         RE: Kristen Thompson  [address] [facility] [address]        Dear Colleague,    Thank you for referring your patient, Kristen Thompson, to the [facility]. Please see a copy of my visit note below.    Kristen Thompson is a 72 year old female who presents with a chief complain of a painful ingrown toenail to the left fifth toe.  The patient relates the ingrown nail was first noticed several weeks ago and has steadily become worse.  The patient relates the medial nail border is inflamed and sore to the touch.  The patient relates no bloody drainage.  The patient denies any redness extending up the fifth toe.  The patient has been treating the fifth toe by soaking it in salt water and antibiotics with no relief.       REVIEW OF SYSTEMS:  Constitutional, HEENT, cardiovascular, pulmonary, GI, , musculoskeletal, neuro, skin, endocrine and psych systems are negative, except as otherwise noted.     PAST MEDICAL HISTORY:   Past Medical History:   Diagnosis Date     Arthritis 11/19/2013     Gastro-oesophageal reflux disease      GERD (gastroesophageal reflux disease) 10/2/2012     H/O total hysterectomy      HL (hearing loss) 10/11/2012     PONV (postoperative nausea and vomiting)      Squamous cell carcinoma         PAST SURGICAL HISTORY:   Past Surgical History:   Procedure Laterality Date     C RAD RESEC TONSIL/PILLARS       COLONOSCOPY  10/30/2012    Procedure: COLONOSCOPY;  Colonoscopy;  Surgeon: Denise Bah MD;  Location: [facility]     ESOPHAGOSCOPY, GASTROSCOPY, DUODENOSCOPY (EGD), COMBINED N/A 9/15/2016    Procedure: COMBINED ESOPHAGOSCOPY, GASTROSCOPY, DUODENOSCOPY (EGD);  Surgeon: Bennie Godinez MD;  Location: [facility]     GALLBLADDER SURGERY       HYSTERECTOMY       knee replacment  2007     RELEASE TRIGGER FINGER Right 4/29/2016    Procedure: RELEASE TRIGGER FINGER;  Surgeon: Percy Sarmiento MD;  Location: [facility]     REPAIR BLADDER           MEDICATIONS:   Current Outpatient Prescriptions:      fluticasone (FLONASE) 50 MCG/ACT spray, Spray 1-2 sprays into both nostrils daily, Disp: 16 g, Rfl: 11     omeprazole (PRILOSEC) 20 MG CR capsule, TAKE 1 CAPSULE (20 MG) BY MOUTH 2 TIMES DAILY, Disp: 180 capsule, Rfl: 0     furosemide (LASIX) 20 MG tablet, Take 0.5 tablets (10 mg) by mouth daily, Disp: 60 tablet, Rfl: 1     simvastatin (ZOCOR) 40 MG tablet, Take 40 mg by mouth At Bedtime, Disp: , Rfl: 1     sucralfate (CARAFATE) 1 GM tablet, Take 1 tablet (1 g) by mouth 4 times daily, Disp: 40 tablet, Rfl: 1     Coenzyme Q10 (COQ10) 100 MG CAPS, , Disp: , Rfl:      nitroglycerin (NITROSTAT) 0.4 MG sublingual tablet, For chest pain place 1 tablet under the tongue every 5 minutes for 3 doses. If symptoms persist 5 minutes after 1st dose call 911., Disp: 25 tablet, Rfl: 0     Acetaminophen (TYLENOL PO), Take 1,000 mg by mouth every 4 hours as needed for mild pain or fever, Disp: , Rfl:      aspirin 81 MG EC tablet, Take 81 mg by mouth daily Reported on 2/17/2017, Disp: 90 tablet, Rfl: 3     CALCIUM PO, Take by mouth daily , Disp: , Rfl:      Omega-3 Fatty Acids (OMEGA 3 PO), Take  by mouth., Disp: , Rfl:      Cholecalciferol (VITAMIN D PO), Take 1,000 Units by mouth daily , Disp: , Rfl:      HYDROcodone-acetaminophen (NORCO) 5-325 MG per tablet, Take 1 tablet by mouth every 4 hours as needed for moderate to severe pain or pain (Patient not taking: Reported on 10/26/2017), Disp: 18 tablet, Rfl: 0     ALLERGIES:    Allergies   Allergen Reactions     Codeine Sulfate      Nausea and vomiting      Quinapril Difficulty breathing     Darvon-N [Propoxyphene Napsylate] Nausea and Vomiting        SOCIAL HISTORY:   Social History     Social History     Marital status:      Spouse name: N/A     Number of children: N/A     Years of education: N/A     Occupational History     Not on file.     Social History Main Topics     Smoking status: Never Smoker     Smokeless  "tobacco: Never Used     Alcohol use No     Drug use: No     Sexual activity: Not Currently     Partners: Male     Other Topics Concern     Parent/Sibling W/ Cabg, Mi Or Angioplasty Before 65f 55m? No     Social History Narrative        FAMILY HISTORY:   Family History   Problem Relation Age of Onset     C.A.D. Mother      Cardiovascular Mother      Thyroid Disease Mother      CANCER Father      stomach ca     CANCER Sister      Eye Disorder Sister      C.A.D. Sister      CEREBROVASCULAR DISEASE Sister      Breast Cancer Sister      Arthritis Sister      Cardiovascular Sister      Depression Sister      HEART DISEASE Sister      Neurologic Disorder Sister      OSTEOPOROSIS Sister      Thyroid Disease Sister      Depression Sister      Thyroid Disease Sister      Depression Sister      Thyroid Disease Sister      Obesity Sister      Neurologic Disorder Sister         EXAM:Vitals: Ht 1.575 m (5' 2\")  Wt 72.6 kg (160 lb)  BMI 29.26 kg/m2  BMI= Body mass index is 29.26 kg/(m^2).        General appearance: Patient is alert and fully cooperative with history & exam.  No sign of distress is noted during the visit.     Psychiatric: Affect is pleasant & appropriate.  Patient appears motivated to improve health.     Respiratory: Breathing is regular & unlabored while sitting.     HEENT: Hearing is intact to spoken word.  Speech is clear.  No gross evidence of visual impairment that would impact ambulation.     Dermatologic: Skin is intact to both lower extremities without significant lesions, rash or abrasion.  Noted paronychia.     Vascular: DP & PT pulses are intact & regular bilaterally.  No significant edema or varicosities noted.  CFT and skin temperature is normal to both lower extremities.     Neurologic: Lower extremity sensation is intact to light touch.  No evidence of weakness or contracture in the lower extremities.  No evidence of neuropathy.     Musculoskeletal: Patient is ambulatory without assistive device or " brace.  No gross ankle deformity noted.  No foot or ankle joint effusion is noted.    One notes an inflamed medial nail border of the left fifth toe.  There is noted erythema and edema located around the medial labial fold and does not extend past the interphalangeal joint.  There is no apparent purulent drainage noted.  There is pain on palpation to the proximal aspect of the medial nail border.      Assessment:  1.  Paranychia to the medial aspect of the left fifth toe.      Plan:  I have explained to Kristen about the condition.  The potential causes and nature of an ingrown toenail were discussed with the patient.  We reviewed the natural history/prognosis of the condition and potential risks if no treatment is provided.      Treatment options discussed included conservative management (oral antibiotics, soaking of foot, adequate width shoes)  as well as surgical management (partial or total nail removal).  The pros and cons of both forms of treatment were reviewed.      After thorough discussion and answering all questions, the patient elected to proceed with surgery.    At this point, I recommended having the offending nail plate removed.  I have explained to the patient all of the possible risks, benefits, alternatives to the procedure.  The patient consented to the proposed procedure.  The left fifth toe was swabbed with alcohol.  Next, approximately 5 cc of 1% lidocaine plain was injected around the left fifth toe.  The left fifth toe was then prepped with Betadine ointment.  A tourniquet was applied to the left fifth toe.  The offending nail plate was avulsed in toto. The wound bed was debrided on any remaining nail, matrix and skin.  The wound was then irrigated and dressed with Triple antibiotic ointment and a compressive dry sterile dressing.  The tourniquet was removed and a prompt hyperemic response was noted.  The patient tolerated the procedure well with no complications.  The patient was given post  procedure instructions for the care of the wound.      Disclaimer: This note consists of symbols derived from keyboarding, dictation and/or voice recognition software. As a result, there may be errors in the script that have gone undetected. Please consider this when interpreting information found in this chart.      BUNNY Arana D.P.M., F.VINHC.F.A.S.      Again, thank you for allowing me to participate in the care of your patient.        Sincerely,        Bart Arana DPM

## 2017-10-26 NOTE — PROGRESS NOTES
Kristen Thompson is a 72 year old female who presents with a chief complain of a painful ingrown toenail to the left fifth toe.  The patient relates the ingrown nail was first noticed several weeks ago and has steadily become worse.  The patient relates the medial nail border is inflamed and sore to the touch.  The patient relates no bloody drainage.  The patient denies any redness extending up the fifth toe.  The patient has been treating the fifth toe by soaking it in salt water and antibiotics with no relief.       REVIEW OF SYSTEMS:  Constitutional, HEENT, cardiovascular, pulmonary, GI, , musculoskeletal, neuro, skin, endocrine and psych systems are negative, except as otherwise noted.     PAST MEDICAL HISTORY:   Past Medical History:   Diagnosis Date     Arthritis 11/19/2013     Gastro-oesophageal reflux disease      GERD (gastroesophageal reflux disease) 10/2/2012     H/O total hysterectomy      HL (hearing loss) 10/11/2012     PONV (postoperative nausea and vomiting)      Squamous cell carcinoma         PAST SURGICAL HISTORY:   Past Surgical History:   Procedure Laterality Date     C RAD RESEC TONSIL/PILLARS       COLONOSCOPY  10/30/2012    Procedure: COLONOSCOPY;  Colonoscopy;  Surgeon: Denise Bah MD;  Location: [facility]     ESOPHAGOSCOPY, GASTROSCOPY, DUODENOSCOPY (EGD), COMBINED N/A 9/15/2016    Procedure: COMBINED ESOPHAGOSCOPY, GASTROSCOPY, DUODENOSCOPY (EGD);  Surgeon: Bennie Godinez MD;  Location: [facility]     GALLBLADDER SURGERY       HYSTERECTOMY       knee replacment  2007     RELEASE TRIGGER FINGER Right 4/29/2016    Procedure: RELEASE TRIGGER FINGER;  Surgeon: Percy Sarmiento MD;  Location: [facility]     REPAIR BLADDER          MEDICATIONS:   Current Outpatient Prescriptions:      fluticasone (FLONASE) 50 MCG/ACT spray, Spray 1-2 sprays into both nostrils daily, Disp: 16 g, Rfl: 11     omeprazole (PRILOSEC) 20 MG CR capsule, TAKE 1 CAPSULE (20 MG) BY MOUTH 2 TIMES DAILY, Disp: 180  capsule, Rfl: 0     furosemide (LASIX) 20 MG tablet, Take 0.5 tablets (10 mg) by mouth daily, Disp: 60 tablet, Rfl: 1     simvastatin (ZOCOR) 40 MG tablet, Take 40 mg by mouth At Bedtime, Disp: , Rfl: 1     sucralfate (CARAFATE) 1 GM tablet, Take 1 tablet (1 g) by mouth 4 times daily, Disp: 40 tablet, Rfl: 1     Coenzyme Q10 (COQ10) 100 MG CAPS, , Disp: , Rfl:      nitroglycerin (NITROSTAT) 0.4 MG sublingual tablet, For chest pain place 1 tablet under the tongue every 5 minutes for 3 doses. If symptoms persist 5 minutes after 1st dose call 911., Disp: 25 tablet, Rfl: 0     Acetaminophen (TYLENOL PO), Take 1,000 mg by mouth every 4 hours as needed for mild pain or fever, Disp: , Rfl:      aspirin 81 MG EC tablet, Take 81 mg by mouth daily Reported on 2/17/2017, Disp: 90 tablet, Rfl: 3     CALCIUM PO, Take by mouth daily , Disp: , Rfl:      Omega-3 Fatty Acids (OMEGA 3 PO), Take  by mouth., Disp: , Rfl:      Cholecalciferol (VITAMIN D PO), Take 1,000 Units by mouth daily , Disp: , Rfl:      HYDROcodone-acetaminophen (NORCO) 5-325 MG per tablet, Take 1 tablet by mouth every 4 hours as needed for moderate to severe pain or pain (Patient not taking: Reported on 10/26/2017), Disp: 18 tablet, Rfl: 0     ALLERGIES:    Allergies   Allergen Reactions     Codeine Sulfate      Nausea and vomiting      Quinapril Difficulty breathing     Darvon-N [Propoxyphene Napsylate] Nausea and Vomiting        SOCIAL HISTORY:   Social History     Social History     Marital status:      Spouse name: N/A     Number of children: N/A     Years of education: N/A     Occupational History     Not on file.     Social History Main Topics     Smoking status: Never Smoker     Smokeless tobacco: Never Used     Alcohol use No     Drug use: No     Sexual activity: Not Currently     Partners: Male     Other Topics Concern     Parent/Sibling W/ Cabg, Mi Or Angioplasty Before 65f 55m? No     Social History Narrative        FAMILY HISTORY:   Family  "History   Problem Relation Age of Onset     C.A.D. Mother      Cardiovascular Mother      Thyroid Disease Mother      CANCER Father      stomach ca     CANCER Sister      Eye Disorder Sister      C.A.D. Sister      CEREBROVASCULAR DISEASE Sister      Breast Cancer Sister      Arthritis Sister      Cardiovascular Sister      Depression Sister      HEART DISEASE Sister      Neurologic Disorder Sister      OSTEOPOROSIS Sister      Thyroid Disease Sister      Depression Sister      Thyroid Disease Sister      Depression Sister      Thyroid Disease Sister      Obesity Sister      Neurologic Disorder Sister         EXAM:Vitals: Ht 1.575 m (5' 2\")  Wt 72.6 kg (160 lb)  BMI 29.26 kg/m2  BMI= Body mass index is 29.26 kg/(m^2).        General appearance: Patient is alert and fully cooperative with history & exam.  No sign of distress is noted during the visit.     Psychiatric: Affect is pleasant & appropriate.  Patient appears motivated to improve health.     Respiratory: Breathing is regular & unlabored while sitting.     HEENT: Hearing is intact to spoken word.  Speech is clear.  No gross evidence of visual impairment that would impact ambulation.     Dermatologic: Skin is intact to both lower extremities without significant lesions, rash or abrasion.  Noted paronychia.     Vascular: DP & PT pulses are intact & regular bilaterally.  No significant edema or varicosities noted.  CFT and skin temperature is normal to both lower extremities.     Neurologic: Lower extremity sensation is intact to light touch.  No evidence of weakness or contracture in the lower extremities.  No evidence of neuropathy.     Musculoskeletal: Patient is ambulatory without assistive device or brace.  No gross ankle deformity noted.  No foot or ankle joint effusion is noted.    One notes an inflamed medial nail border of the left fifth toe.  There is noted erythema and edema located around the medial labial fold and does not extend past the " interphalangeal joint.  There is no apparent purulent drainage noted.  There is pain on palpation to the proximal aspect of the medial nail border.      Assessment:  1.  Paranychia to the medial aspect of the left fifth toe.      Plan:  I have explained to Kristen about the condition.  The potential causes and nature of an ingrown toenail were discussed with the patient.  We reviewed the natural history/prognosis of the condition and potential risks if no treatment is provided.      Treatment options discussed included conservative management (oral antibiotics, soaking of foot, adequate width shoes)  as well as surgical management (partial or total nail removal).  The pros and cons of both forms of treatment were reviewed.      After thorough discussion and answering all questions, the patient elected to proceed with surgery.    At this point, I recommended having the offending nail plate removed.  I have explained to the patient all of the possible risks, benefits, alternatives to the procedure.  The patient consented to the proposed procedure.  The left fifth toe was swabbed with alcohol.  Next, approximately 5 cc of 1% lidocaine plain was injected around the left fifth toe.  The left fifth toe was then prepped with Betadine ointment.  A tourniquet was applied to the left fifth toe.  The offending nail plate was avulsed in toto. The wound bed was debrided on any remaining nail, matrix and skin.  The wound was then irrigated and dressed with Triple antibiotic ointment and a compressive dry sterile dressing.  The tourniquet was removed and a prompt hyperemic response was noted.  The patient tolerated the procedure well with no complications.  The patient was given post procedure instructions for the care of the wound.      Disclaimer: This note consists of symbols derived from keyboarding, dictation and/or voice recognition software. As a result, there may be errors in the script that have gone undetected. Please  consider this when interpreting information found in this chart.      BUNNY Arana D.P.M., CATHIE.[email or web address].S.

## 2017-10-26 NOTE — NURSING NOTE
"Chief Complaint   Patient presents with     Consult     sore little toe       Initial Ht 1.575 m (5' 2\")  Wt 72.6 kg (160 lb)  BMI 29.26 kg/m2 Estimated body mass index is 29.26 kg/(m^2) as calculated from the following:    Height as of this encounter: 1.575 m (5' 2\").    Weight as of this encounter: 72.6 kg (160 lb).  Medication Reconciliation: complete.  ruddy mendoza LPN      "

## 2017-11-06 DIAGNOSIS — R07.89 OTHER CHEST PAIN: ICD-10-CM

## 2017-11-06 DIAGNOSIS — K29.50 OTHER CHRONIC GASTRITIS WITHOUT HEMORRHAGE: ICD-10-CM

## 2017-11-08 NOTE — TELEPHONE ENCOUNTER
Prescription approved per [facility] Refill Protocol.  Mahamed Thomas RN

## 2017-11-27 ENCOUNTER — TELEPHONE (OUTPATIENT)
Dept: FAMILY MEDICINE | Facility: CLINIC | Age: 72
End: 2017-11-27

## 2017-11-27 DIAGNOSIS — E78.5 HYPERLIPIDEMIA LDL GOAL <130: Primary | ICD-10-CM

## 2017-11-27 DIAGNOSIS — D64.9 LOW HEMOGLOBIN: ICD-10-CM

## 2017-11-27 NOTE — TELEPHONE ENCOUNTER
Reason for Call: Request for an order or referral:    Order or referral being requested: Kristen has lab appointment on 11/30/17 and she is hoping to have her lipids done along with her iron.  I see lipids are due, but nothing about Iron.  Could you please call and assess Thank you..Nayeli Perez    Date needed: By November 30, 2017    Has the patient been seen by the PCP for this problem? YES    Additional comments: none    Phone number Patient can be reached at:  Home number on file [phone] (home)    Best Time:  Any time    Can we leave a detailed message on this number?  YES    Call taken on 11/27/2017 at 11:56 AM by Nayeli Perez

## 2017-11-28 NOTE — TELEPHONE ENCOUNTER
hgb has been low.   I ordered a ferritin as well as the labs you already ordered     cgb       Component      Latest Ref Rng & Units 1/25/2017 1/28/2017   WBC      4.0 - 11.0 10e9/L 5.4 8.6   RBC Count      3.8 - 5.2 10e12/L 3.82 3.80   Hemoglobin      11.7 - 15.7 g/dL 11.4 (L) 11.2 (L)   Hematocrit      35.0 - 47.0 % 35.2 35.0   MCV      78 - 100 fl 92 92   MCH      26.5 - 33.0 pg 29.8 29.5   MCHC      31.5 - 36.5 g/dL 32.4 32.0   RDW      10.0 - 15.0 % 14.3 13.9   Platelet Count      150 - 450 10e9/L 308 291   Diff Method        Automated Method   % Neutrophils      %  70.5   % Lymphocytes      %  17.8   % Monocytes      %  10.3   % Eosinophils      %  0.9   % Basophils      %  0.3   % Immature Granulocytes      %  0.2   Absolute Neutrophil      1.6 - 8.3 10e9/L  6.1   Absolute Lymphocytes      0.8 - 5.3 10e9/L  1.5   Absolute Monocytes      0.0 - 1.3 10e9/L  0.9   Absolute Eosinophils      0.0 - 0.7 10e9/L  0.1   Absolute Basophils      0.0 - 0.2 10e9/L  0.0   Abs Immature Granulocytes      0 - 0.4 10e9/L  0.0

## 2017-12-07 DIAGNOSIS — D64.9 LOW HEMOGLOBIN: ICD-10-CM

## 2017-12-07 DIAGNOSIS — E78.5 HYPERLIPIDEMIA LDL GOAL <130: ICD-10-CM

## 2017-12-07 LAB
CHOLEST SERPL-MCNC: 233 MG/DL
ERYTHROCYTE [DISTWIDTH] IN BLOOD BY AUTOMATED COUNT: 15.9 % (ref 10–15)
FERRITIN SERPL-MCNC: 19 NG/ML (ref 8–252)
HCT VFR BLD AUTO: 43.7 % (ref 35–47)
HDLC SERPL-MCNC: 90 MG/DL
HGB BLD-MCNC: 14.1 G/DL (ref 11.7–15.7)
LDLC SERPL CALC-MCNC: 128 MG/DL
MCH RBC QN AUTO: 30.2 PG (ref 26.5–33)
MCHC RBC AUTO-ENTMCNC: 32.3 G/DL (ref 31.5–36.5)
MCV RBC AUTO: 94 FL (ref 78–100)
NONHDLC SERPL-MCNC: 143 MG/DL
PLATELET # BLD AUTO: 270 10E9/L (ref 150–450)
RBC # BLD AUTO: 4.67 10E12/L (ref 3.8–5.2)
TRIGL SERPL-MCNC: 75 MG/DL
WBC # BLD AUTO: 3.8 10E9/L (ref 4–11)

## 2017-12-07 PROCEDURE — 82728 ASSAY OF FERRITIN: CPT | Performed by: FAMILY MEDICINE

## 2017-12-07 PROCEDURE — 85027 COMPLETE CBC AUTOMATED: CPT | Performed by: FAMILY MEDICINE

## 2017-12-07 PROCEDURE — 36415 COLL VENOUS BLD VENIPUNCTURE: CPT | Performed by: FAMILY MEDICINE

## 2017-12-07 PROCEDURE — 80061 LIPID PANEL: CPT | Performed by: FAMILY MEDICINE

## 2017-12-10 DIAGNOSIS — R60.9 EDEMA: Primary | ICD-10-CM

## 2017-12-11 RX ORDER — FUROSEMIDE 20 MG
TABLET ORAL
Qty: 60 TABLET | Refills: 0 | Status: SHIPPED | OUTPATIENT
Start: 2017-12-11 | End: 2018-03-09

## 2017-12-11 NOTE — TELEPHONE ENCOUNTER
Prescription approved per [facility] Refill Protocol.  Mahamed Thomas RN

## 2018-01-11 ENCOUNTER — OFFICE VISIT (OUTPATIENT)
Dept: FAMILY MEDICINE | Facility: CLINIC | Age: 73
End: 2018-01-11
Payer: COMMERCIAL

## 2018-01-11 VITALS — SYSTOLIC BLOOD PRESSURE: 122 MMHG | HEART RATE: 64 BPM | DIASTOLIC BLOOD PRESSURE: 70 MMHG | TEMPERATURE: 98.5 F

## 2018-01-11 DIAGNOSIS — B30.9 VIRAL CONJUNCTIVITIS OF LEFT EYE: Primary | ICD-10-CM

## 2018-01-11 PROCEDURE — 99213 OFFICE O/P EST LOW 20 MIN: CPT | Performed by: FAMILY MEDICINE

## 2018-01-11 NOTE — MR AVS SNAPSHOT
After Visit Summary   1/11/2018    Kristen Thompson    MRN: [id number]           Patient Information     Date Of Birth          1945        Visit Information        Provider Department      1/11/2018 3:00 PM Cortney Hung MD [facility]        Today's Diagnoses     Viral conjunctivitis of left eye    -  1       Follow-ups after your visit        Follow-up notes from your care team     Return if symptoms worsen or fail to improve.      Who to contact     Normal or non-critical lab and imaging results will be communicated to you by Drobohart, letter or phone within 4 business days after the clinic has received the results. If you do not hear from us within 7 days, please contact the clinic through VividWorkst or phone. If you have a critical or abnormal lab result, we will notify you by phone as soon as possible.  Submit refill requests through [employer] or call your pharmacy and they will forward the refill request to us. Please allow 3 business days for your refill to be completed.          If you need to speak with a  for additional information , please call: [phone]             Additional Information About Your Visit        MyChart Information     [employer] gives you secure access to your electronic health record. If you see a primary care provider, you can also send messages to your care team and make appointments. If you have questions, please call your primary care clinic.  If you do not have a primary care provider, please call [phone] and they will assist you.        Care EveryWhere ID     This is your Care EveryWhere ID. This could be used by other organizations to access your [facility] medical records  [id number]        Your Vitals Were     Pulse Temperature                64 98.5  F (36.9  C) (Tympanic)           Blood Pressure from Last 3 Encounters:   01/11/18 122/70   05/16/17 110/68   03/15/17 115/81    Weight from Last 3 Encounters:   10/26/17 160  lb (72.6 kg)   05/16/17 155 lb (70.3 kg)   03/15/17 150 lb (68 kg)              Today, you had the following     No orders found for display       Primary Care Provider Office Phone # Fax #    Janett Morse [phone] [facility] [address]        Equal Access to Services     [facility]: Hadii aad ku hadasho Soomaali, waaxda luqadaha, qaybta kaalmada adeegyada, waxay idiin hayaan adeeg kharash laYoshiaan . [facility] [phone].    ATENCIÓN: Si habla español, tiene a bonilla disposición servicios gratuitos de asistencia lingüística. Llame al [phone].    We comply with applicable federal civil rights laws and [place] laws. We do not discriminate on the basis of race, color, national origin, age, disability, sex, sexual orientation, or gender identity.            Thank you!     Thank you for choosing [facility]  for your care. Our goal is always to provide you with excellent care. Hearing back from our patients is one way we can continue to improve our services. Please take a few minutes to complete the written survey that you may receive in the mail after your visit with us. Thank you!             Your Updated Medication List - Protect others around you: Learn how to safely use, store and throw away your medicines at www.disposemymeds.org.          This list is accurate as of: 1/11/18  4:11 PM.  Always use your most recent med list.                   Brand Name Dispense Instructions for use Diagnosis    CALCIUM PO      Take by mouth daily        furosemide 20 MG tablet    LASIX    60 tablet    TAKE 0.5 TABLETS (10 MG) BY MOUTH DAILY    Edema       OMEGA 3 PO      Take  by mouth.        omeprazole 20 MG CR capsule    priLOSEC    180 capsule    TAKE 1 CAPSULE (20 MG) BY MOUTH 2 TIMES DAILY    Other chronic gastritis without hemorrhage, Other chest pain       TYLENOL PO      Take 1,000 mg by mouth every 4 hours as needed for mild pain or fever        VITAMIN D PO       Take 1,000 Units by mouth daily

## 2018-01-11 NOTE — PROGRESS NOTES
SUBJECTIVE:                                                    Kristen Thompson is a 72 year old female who presents to clinic today for the following health issues:    Eye(s) Problem  Onset: started yesterday    Description:   Location: right  Pain: YES  Redness: YES    Accompanying Signs & Symptoms:  Discharge/mattering: YES  Swelling: YES  Visual changes: no  Fever: no  Nasal Congestion: no  Bothered by bright lights: YES- but nothing more than normal    History:   Trauma: no   Foreign body exposure: YES- feels like there is sand in the eye.    Precipitating factors:   Wearing contacts: no    Alleviating factors:  Improved by: nothing    Therapies Tried and outcome: OTC irritated eye relief, warm wash cloth      Problem list and histories reviewed & adjusted, as indicated.  Additional history: just started yesterday feels like there is sand in the eye  No different headache   No change in vision  Had a tiny amount of d/c last night   Started working at  2 days ago   Has been using homeopathic drops they have multiple ingredients in them they are not helping       Patient Active Problem List   Diagnosis     GERD (gastroesophageal reflux disease)     CARDIOVASCULAR SCREENING; LDL GOAL LESS THAN 160     HL (hearing loss)     Health Care Home     Arthritis     Advanced directives, counseling/discussion     Hyperlipidemia LDL goal <130     Chronic constipation     Gastritis     Cervicalgia     Localized edema     Past Surgical History:   Procedure Laterality Date     C RAD RESEC TONSIL/PILLARS       COLONOSCOPY  10/30/2012    Procedure: COLONOSCOPY;  Colonoscopy;  Surgeon: Denise Bah MD;  Location: [facility]     ESOPHAGOSCOPY, GASTROSCOPY, DUODENOSCOPY (EGD), COMBINED N/A 9/15/2016    Procedure: COMBINED ESOPHAGOSCOPY, GASTROSCOPY, DUODENOSCOPY (EGD);  Surgeon: Bennie Godinez MD;  Location: [facility]     GALLBLADDER SURGERY       HYSTERECTOMY       knee replacment  2007     RELEASE TRIGGER FINGER  Right 4/29/2016    Procedure: RELEASE TRIGGER FINGER;  Surgeon: Percy Sarmiento MD;  Location: [facility]     REPAIR BLADDER         Social History   Substance Use Topics     Smoking status: Never Smoker     Smokeless tobacco: Never Used     Alcohol use No     Family History   Problem Relation Age of Onset     C.A.D. Mother      Cardiovascular Mother      Thyroid Disease Mother      CANCER Father      stomach ca     CANCER Sister      Eye Disorder Sister      C.A.D. Sister      CEREBROVASCULAR DISEASE Sister      Breast Cancer Sister      Arthritis Sister      Cardiovascular Sister      Depression Sister      HEART DISEASE Sister      Neurologic Disorder Sister      OSTEOPOROSIS Sister      Thyroid Disease Sister      Depression Sister      Thyroid Disease Sister      Depression Sister      Thyroid Disease Sister      Obesity Sister      Neurologic Disorder Sister              ROS:  Constitutional, HEENT, cardiovascular, pulmonary, GI, , musculoskeletal, neuro, skin, endocrine and psych systems are negative, except as otherwise noted.      OBJECTIVE:                                                    /70  Pulse 64  Temp 98.5  F (36.9  C) (Tympanic) There is no height or weight on file to calculate BMI.   GENERAL APPEARANCE: healthy, alert and no distress  EYES: fundi benign-no diabetic or hypertensive changes seen, PERRL and conjunctiva/corneas- conjunctival injection OS and neg fluoroscein exam no fb seen on inverted eyelid   HENT: ear canals and TM's normal and nose and mouth without ulcers or lesions  NECK: no adenopathy, no asymmetry, masses, or scars and thyroid normal to palpation       ASSESSMENT/PLAN:                                                    1. Viral conjunctivitis of left eye  Stop the eye drops   Start just plain artificial tears/saline   If not improving please make eye doctor appointment for full exam      reports that she has never smoked. She has never used smokeless  tobacco.          Cortney Hung M.D.  [facility]

## 2018-01-29 ENCOUNTER — HOSPITAL ENCOUNTER (OUTPATIENT)
Dept: MAMMOGRAPHY | Facility: CLINIC | Age: 73
Discharge: HOME | End: 2018-01-29
Attending: FAMILY MEDICINE | Admitting: FAMILY MEDICINE
Payer: MEDICARE

## 2018-01-29 DIAGNOSIS — Z12.31 VISIT FOR SCREENING MAMMOGRAM: ICD-10-CM

## 2018-01-29 PROCEDURE — 77067 SCR MAMMO BI INCL CAD: CPT

## 2018-02-04 DIAGNOSIS — R07.89 OTHER CHEST PAIN: ICD-10-CM

## 2018-02-04 DIAGNOSIS — K29.50 OTHER CHRONIC GASTRITIS WITHOUT HEMORRHAGE: ICD-10-CM

## 2018-02-05 NOTE — TELEPHONE ENCOUNTER
"OMEPRAZOLE DR 20 MG CAPSULE        Last Written Prescription Date:  11/8/17  Last Fill Quantity: 180,   # refills: 0  Last Office Visit: 1/11/18  Future Office visit:       Requested Prescriptions   Pending Prescriptions Disp Refills     omeprazole (PRILOSEC) 20 MG CR capsule [Pharmacy Med Name: OMEPRAZOLE DR 20 MG CAPSULE] 180 capsule 0     Sig: TAKE 1 CAPSULE (20 MG) BY MOUTH 2 TIMES DAILY    PPI Protocol Passed    2/4/2018  1:28 AM       Passed - Not on Clopidogrel (unless Pantoprazole ordered)       Passed - No diagnosis of osteoporosis on record       Passed - Recent or future visit with authorizing provider's specialty    Patient had office visit in the last year or has a visit in the next 30 days with authorizing provider.  See \"Patient Info\" tab in inbasket, or \"Choose Columns\" in Meds & Orders section of the refill encounter.            Passed - Patient is age 18 or older       Passed - No active pregnacy on record       Passed - No positive pregnancy test in past 12 months          "

## 2018-03-08 DIAGNOSIS — K29.50 OTHER CHRONIC GASTRITIS WITHOUT HEMORRHAGE: ICD-10-CM

## 2018-03-08 DIAGNOSIS — R07.89 OTHER CHEST PAIN: ICD-10-CM

## 2018-03-09 DIAGNOSIS — R60.9 EDEMA: ICD-10-CM

## 2018-03-09 NOTE — TELEPHONE ENCOUNTER
"Requested Prescriptions   Pending Prescriptions Disp Refills     furosemide (LASIX) 20 MG tablet [Pharmacy Med Name: FUROSEMIDE 20 MG TABLET] 60 tablet 0    Last Written Prescription Date:  12/11/17  Last Fill Quantity: 60,  # refills: 0   Last office visit: 1/11/2018 with prescribing provider:  1/11/18   Future Office Visit:     Sig: TAKE 0.5 TABLETS (10 MG) BY MOUTH DAILY    Diuretics (Including Combos) Protocol Failed    3/9/2018  1:47 AM       Failed - Normal serum creatinine on file in past 12 months    Recent Labs   Lab Test  01/28/17   1507   CR  0.70             Failed - Normal serum potassium on file in past 12 months    Recent Labs   Lab Test  01/28/17   1507   POTASSIUM  4.2                   Failed - Normal serum sodium on file in past 12 months    Recent Labs   Lab Test  01/28/17   1507   NA  143             Passed - Blood pressure under 140/90 in past 12 months    BP Readings from Last 3 Encounters:   01/11/18 122/70   05/16/17 110/68   03/15/17 115/81                Passed - Recent (12 mo) or future (30 days) visit within the authorizing provider's specialty    Patient had office visit in the last year or has a visit in the next 30 days with authorizing provider.  See \"Patient Info\" tab in inbasket, or \"Choose Columns\" in Meds & Orders section of the refill encounter.            Passed - Patient is age 18 or older       Passed - No active pregancy on record       Passed - No positive pregnancy test in past 12 months          "

## 2018-03-09 NOTE — TELEPHONE ENCOUNTER
"Requested Prescriptions   Pending Prescriptions Disp Refills     omeprazole (PRILOSEC) 20 MG CR capsule [Pharmacy Med Name: OMEPRAZOLE DR 20 MG CAPSULE] 60 capsule 0    Last Written Prescription Date:  2/6/18  Last Fill Quantity: 60,  # refills: 0   Last office visit: 1/11/2018 with prescribing provider:  1/11/18   Future Office Visit:     Sig: TAKE 1 CAPSULE (20 MG) BY MOUTH 2 TIMES DAILY (NEEDS FOLLOW-UP APPOINTMENT FOR THIS MEDICATION)    PPI Protocol Passed    3/8/2018  5:02 PM       Passed - Not on Clopidogrel (unless Pantoprazole ordered)       Passed - No diagnosis of osteoporosis on record       Passed - Recent (12 mo) or future (30 days) visit within the authorizing provider's specialty    Patient had office visit in the last year or has a visit in the next 30 days with authorizing provider.  See \"Patient Info\" tab in inbasket, or \"Choose Columns\" in Meds & Orders section of the refill encounter.            Passed - Patient is age 18 or older       Passed - No active pregnacy on record       Passed - No positive pregnancy test in past 12 months          "

## 2018-03-12 RX ORDER — FUROSEMIDE 20 MG
TABLET ORAL
Qty: 45 TABLET | Refills: 0 | Status: SHIPPED | OUTPATIENT
Start: 2018-03-12 | End: 2018-09-04

## 2018-03-12 NOTE — TELEPHONE ENCOUNTER
Prescription approved per [facility] Refill Protocol.  Mahamed Thomas RN

## 2018-03-12 NOTE — TELEPHONE ENCOUNTER
Medication is being filled for 1 time refill only due to:  Patient needs labs bmp. Future labs ordered yes.   Mahamed Thomas RN

## 2018-03-20 ENCOUNTER — OFFICE VISIT (OUTPATIENT)
Dept: FAMILY MEDICINE | Facility: CLINIC | Age: 73
End: 2018-03-20
Payer: COMMERCIAL

## 2018-03-20 VITALS
HEIGHT: 62 IN | HEART RATE: 58 BPM | BODY MASS INDEX: 31.25 KG/M2 | WEIGHT: 169.8 LBS | DIASTOLIC BLOOD PRESSURE: 87 MMHG | TEMPERATURE: 98.5 F | SYSTOLIC BLOOD PRESSURE: 135 MMHG

## 2018-03-20 DIAGNOSIS — K59.09 CHRONIC CONSTIPATION: ICD-10-CM

## 2018-03-20 DIAGNOSIS — R10.9 LEFT SIDED ABDOMINAL PAIN: Primary | ICD-10-CM

## 2018-03-20 LAB
ALBUMIN SERPL-MCNC: 3.9 G/DL (ref 3.4–5)
ALBUMIN UR-MCNC: NEGATIVE MG/DL
ALP SERPL-CCNC: 82 U/L (ref 40–150)
ALT SERPL W P-5'-P-CCNC: 27 U/L (ref 0–50)
ANION GAP SERPL CALCULATED.3IONS-SCNC: 5 MMOL/L (ref 3–14)
APPEARANCE UR: CLEAR
AST SERPL W P-5'-P-CCNC: 20 U/L (ref 0–45)
BASOPHILS # BLD AUTO: 0 10E9/L (ref 0–0.2)
BASOPHILS NFR BLD AUTO: 0.3 %
BILIRUB SERPL-MCNC: 0.3 MG/DL (ref 0.2–1.3)
BILIRUB UR QL STRIP: NEGATIVE
BUN SERPL-MCNC: 22 MG/DL (ref 7–30)
CALCIUM SERPL-MCNC: 8.8 MG/DL (ref 8.5–10.1)
CHLORIDE SERPL-SCNC: 105 MMOL/L (ref 94–109)
CO2 SERPL-SCNC: 29 MMOL/L (ref 20–32)
COLOR UR AUTO: YELLOW
CREAT SERPL-MCNC: 0.71 MG/DL (ref 0.52–1.04)
DIFFERENTIAL METHOD BLD: NORMAL
EOSINOPHIL # BLD AUTO: 0.1 10E9/L (ref 0–0.7)
EOSINOPHIL NFR BLD AUTO: 1.4 %
ERYTHROCYTE [DISTWIDTH] IN BLOOD BY AUTOMATED COUNT: 14.9 % (ref 10–15)
GFR SERPL CREATININE-BSD FRML MDRD: 82 ML/MIN/1.7M2
GLUCOSE SERPL-MCNC: 97 MG/DL (ref 70–99)
GLUCOSE UR STRIP-MCNC: NEGATIVE MG/DL
HCT VFR BLD AUTO: 40 % (ref 35–47)
HGB BLD-MCNC: 12.9 G/DL (ref 11.7–15.7)
HGB UR QL STRIP: NEGATIVE
KETONES UR STRIP-MCNC: NEGATIVE MG/DL
LEUKOCYTE ESTERASE UR QL STRIP: NEGATIVE
LIPASE SERPL-CCNC: 132 U/L (ref 73–393)
LYMPHOCYTES # BLD AUTO: 1.9 10E9/L (ref 0.8–5.3)
LYMPHOCYTES NFR BLD AUTO: 24.1 %
MCH RBC QN AUTO: 29.9 PG (ref 26.5–33)
MCHC RBC AUTO-ENTMCNC: 32.3 G/DL (ref 31.5–36.5)
MCV RBC AUTO: 93 FL (ref 78–100)
MONOCYTES # BLD AUTO: 0.6 10E9/L (ref 0–1.3)
MONOCYTES NFR BLD AUTO: 7.8 %
NEUTROPHILS # BLD AUTO: 5.2 10E9/L (ref 1.6–8.3)
NEUTROPHILS NFR BLD AUTO: 66.4 %
NITRATE UR QL: NEGATIVE
PH UR STRIP: 6 PH (ref 5–7)
PLATELET # BLD AUTO: 267 10E9/L (ref 150–450)
POTASSIUM SERPL-SCNC: 3.6 MMOL/L (ref 3.4–5.3)
PROT SERPL-MCNC: 6.5 G/DL (ref 6.8–8.8)
RBC # BLD AUTO: 4.31 10E12/L (ref 3.8–5.2)
SODIUM SERPL-SCNC: 139 MMOL/L (ref 133–144)
SOURCE: NORMAL
SP GR UR STRIP: 1.01 (ref 1–1.03)
UROBILINOGEN UR STRIP-ACNC: 0.2 EU/DL (ref 0.2–1)
WBC # BLD AUTO: 7.8 10E9/L (ref 4–11)

## 2018-03-20 PROCEDURE — 80053 COMPREHEN METABOLIC PANEL: CPT | Performed by: FAMILY MEDICINE

## 2018-03-20 PROCEDURE — 83690 ASSAY OF LIPASE: CPT | Performed by: FAMILY MEDICINE

## 2018-03-20 PROCEDURE — 85025 COMPLETE CBC W/AUTO DIFF WBC: CPT | Performed by: FAMILY MEDICINE

## 2018-03-20 PROCEDURE — 99214 OFFICE O/P EST MOD 30 MIN: CPT | Performed by: FAMILY MEDICINE

## 2018-03-20 PROCEDURE — 36415 COLL VENOUS BLD VENIPUNCTURE: CPT | Performed by: FAMILY MEDICINE

## 2018-03-20 PROCEDURE — 81003 URINALYSIS AUTO W/O SCOPE: CPT | Performed by: FAMILY MEDICINE

## 2018-03-20 RX ORDER — SENNOSIDES A AND B 8.6 MG/1
1 TABLET, FILM COATED ORAL DAILY
Qty: 120 TABLET | Refills: 0 | Status: SHIPPED | OUTPATIENT
Start: 2018-03-20 | End: 2018-09-04

## 2018-03-20 NOTE — PROGRESS NOTES
"  SUBJECTIVE:   Kristen Thompson is a 72 year old female who presents to clinic today for the following health issues:      ABDOMINAL PAIN     Onset: past few months     abd pain - several months worsened but lifelong constipation  \" I think I'm full of poop\" - stooling marbles several times a day   Passing gas  Having large belches   Off and on left sided pain - several times a day and when eating     Tried miralax 3/4 capful daily for a week   Drinks a lot of water. Eats fruits/vegies     Walking 2-3 miles/day     Colonoscopy 2012 - normal   EGD 2016 - gastritis, normal otherwise     Review of systems:  No f/c  No weight loss/night sweats  Normal appetite and energy level  No n/v  No diarrhea  No black/tarry/bloody stools.   No dysuria/urgency/frequency     Problem list and histories reviewed & adjusted, as indicated.  Additional history: as documented    Patient Active Problem List   Diagnosis     GERD (gastroesophageal reflux disease)     CARDIOVASCULAR SCREENING; LDL GOAL LESS THAN 160     HL (hearing loss)     Health Care Home     Arthritis     Advanced directives, counseling/discussion     Hyperlipidemia LDL goal <130     Chronic constipation     Gastritis     Cervicalgia     Localized edema     Current Outpatient Prescriptions   Medication     Multiple Vitamins-Minerals (MULTIVITAMIN PO)     B Complex Vitamins (VITAMIN B COMPLEX PO)     UNABLE TO FIND     senna (SENOKOT) 8.6 MG tablet     omeprazole (PRILOSEC) 20 MG CR capsule     Acetaminophen (TYLENOL PO)     CALCIUM PO     Omega-3 Fatty Acids (OMEGA 3 PO)     Cholecalciferol (VITAMIN D PO)     furosemide (LASIX) 20 MG tablet     No current facility-administered medications for this visit.         Allergies   Allergen Reactions     Codeine Sulfate      Nausea and vomiting      Quinapril Difficulty breathing     Darvon-N [Propoxyphene Napsylate] Nausea and Vomiting       /87 (BP Location: Right arm, Patient Position: Sitting, Cuff Size: Adult " "Regular)  Pulse 58  Temp 98.5  F (36.9  C) (Tympanic)  Ht 5' 2\" (1.575 m)  Wt 169 lb 12.8 oz (77 kg)  BMI 31.06 kg/m2  GENERAL - Pt is alert and oriented in no acute distress.  Affect is appropriate. Good eye contact.  HEET - Head is normocephalic, atraumatic.  PERRLA,EEMI. Conjunctiva are free of icterus or erythema.    NECK - Neck is supple w/o LA or thyromegaly  RESPIRATORY - Clear to auscultation bilaterally.  No wheezing noted  CV - RRR, no murmurs, rubs, gallops.   ABD - +BS, soft, slightly tender to palpation in the left upper and lower quadrant, no rebound, no guarding. No palpable organomegaly.  EXTREM - No edema.      Assessment/Plan -  (R10.9) Left sided abdominal pain  (primary encounter diagnosis)  Comment: Will treat as constipation. Not likely obstruction as she is passing flatus and small amount of stool - she declines abd xray. Will check basic labs, per her request. Will have her do colonoscopy prep, or half of prep to empty out bowels, then start daily senna. If symptoms persist/change/worsen, needs to let me know as we need to go further with the w/u. The patient indicates understanding of these issues and agrees with the plan.   Plan: Comprehensive metabolic panel, Lipase, CBC with        platelets differential, *UA reflex to         Microscopic and Culture ([facility] (except [place] and [place]), senna        (SENOKOT) 8.6 MG tablet            (K59.09) Chronic constipation  Comment:   Plan:     LEONARD Morse MD       "

## 2018-03-20 NOTE — MR AVS SNAPSHOT
"              After Visit Summary   3/20/2018    Kristen Thompson    MRN: [id number]           Patient Information     Date Of Birth          1945        Visit Information        Provider Department      3/20/2018 1:00 PM Janett Morse MD [facility]        Today's Diagnoses     Left sided abdominal pain    -  1    Chronic constipation           Follow-ups after your visit        Who to contact     Normal or non-critical lab and imaging results will be communicated to you by CLAREDhart, letter or phone within 4 business days after the clinic has received the results. If you do not hear from us within 7 days, please contact the clinic through CLAREDhart or phone. If you have a critical or abnormal lab result, we will notify you by phone as soon as possible.  Submit refill requests through FRS or call your pharmacy and they will forward the refill request to us. Please allow 3 business days for your refill to be completed.          If you need to speak with a  for additional information , please call: [phone]             Additional Information About Your Visit        CLAREDharPlaybasis Information     FRS gives you secure access to your electronic health record. If you see a primary care provider, you can also send messages to your care team and make appointments. If you have questions, please call your primary care clinic.  If you do not have a primary care provider, please call [phone] and they will assist you.        Care EveryWhere ID     This is your Care EveryWhere ID. This could be used by other organizations to access your [place] medical records  [id number]        Your Vitals Were     Pulse Temperature Height BMI (Body Mass Index)          58 98.5  F (36.9  C) (Tympanic) 5' 2\" (1.575 m) 31.06 kg/m2         Blood Pressure from Last 3 Encounters:   03/20/18 135/87   01/11/18 122/70   05/16/17 110/68    Weight from Last 3 Encounters:   03/20/18 169 lb 12.8 oz (77 " kg)   10/26/17 160 lb (72.6 kg)   05/16/17 155 lb (70.3 kg)              We Performed the Following     *UA reflex to Microscopic and Culture ([facility] (except [place] and [place])     CBC with platelets differential     Comprehensive metabolic panel     Lipase          Today's Medication Changes          These changes are accurate as of 3/20/18  1:35 PM.  If you have any questions, ask your nurse or doctor.               Start taking these medicines.        Dose/Directions    senna 8.6 MG tablet   Commonly known as:  SENOKOT   Used for:  Left sided abdominal pain   Started by:  Janett Morse MD        Dose:  1 tablet   Take 1 tablet by mouth daily   Quantity:  120 tablet   Refills:  0            Where to get your medicines      These medications were sent to [facility]/pharmacy #[id number] - [place] [address], [facility] [address]     Phone:  [phone]     senna 8.6 MG tablet                Primary Care Provider Office Phone # Fax #    Janett Morse [phone] [address] [facility] [address]        Equal Access to Services     [facility] AH: Hadii aad ku hadasho Soomaali, waaxda luqadaha, qaybta kaalmada adeegyada, breanne santos . So [facility] [phone].    ATENCIÓN: Si habla español, tiene a bonilla disposición servicios gratuitos de asistencia lingüística. Devin al [phone].    We comply with applicable federal civil rights laws and [place] laws. We do not discriminate on the basis of race, color, national origin, age, disability, sex, sexual orientation, or gender identity.            Thank you!     Thank you for choosing [facility]  for your care. Our goal is always to provide you with excellent care. Hearing back from our patients is one way we can continue to improve our services. Please take a few minutes to complete the written survey that you may receive in the mail after  your visit with us. Thank you!             Your Updated Medication List - Protect others around you: Learn how to safely use, store and throw away your medicines at www.disposemymeds.org.          This list is accurate as of 3/20/18  1:35 PM.  Always use your most recent med list.                   Brand Name Dispense Instructions for use Diagnosis    CALCIUM PO      Take by mouth daily        furosemide 20 MG tablet    LASIX    45 tablet    TAKE 0.5 TABLETS (10 MG) BY MOUTH DAILY    Edema       MULTIVITAMIN PO           OMEGA 3 PO      Take  by mouth.        omeprazole 20 MG CR capsule    priLOSEC    180 capsule    Take 1 capsule (20 mg) by mouth 2 times daily    Other chronic gastritis without hemorrhage, Other chest pain       senna 8.6 MG tablet    SENOKOT    120 tablet    Take 1 tablet by mouth daily    Left sided abdominal pain       TYLENOL PO      Take 1,000 mg by mouth every 4 hours as needed for mild pain or fever        UNABLE TO FIND      MEDICATION NAME: Vinager tablet        VITAMIN B COMPLEX PO           VITAMIN D PO      Take 1,000 Units by mouth daily

## 2018-03-20 NOTE — NURSING NOTE
"Chief Complaint   Patient presents with     Pain       Initial /87 (BP Location: Right arm, Patient Position: Sitting, Cuff Size: Adult Regular)  Pulse 58  Temp 98.5  F (36.9  C) (Tympanic)  Ht 5' 2\" (1.575 m)  Wt 169 lb 12.8 oz (77 kg)  BMI 31.06 kg/m2 Estimated body mass index is 31.06 kg/(m^2) as calculated from the following:    Height as of this encounter: 5' 2\" (1.575 m).    Weight as of this encounter: 169 lb 12.8 oz (77 kg).  Medication Reconciliation: complete   Marissa Parisi LPN    "

## 2018-04-16 ENCOUNTER — DOCUMENTATION ONLY (OUTPATIENT)
Dept: OTHER | Facility: CLINIC | Age: 73
End: 2018-04-16

## 2018-04-16 DIAGNOSIS — Z71.89 ADVANCED DIRECTIVES, COUNSELING/DISCUSSION: Chronic | ICD-10-CM

## 2018-09-04 ENCOUNTER — OFFICE VISIT (OUTPATIENT)
Dept: FAMILY MEDICINE | Facility: CLINIC | Age: 73
End: 2018-09-04
Payer: COMMERCIAL

## 2018-09-04 VITALS — WEIGHT: 148 LBS | HEIGHT: 62 IN | BODY MASS INDEX: 27.23 KG/M2

## 2018-09-04 DIAGNOSIS — M54.14 RADICULAR PAIN OF THORACIC REGION: ICD-10-CM

## 2018-09-04 DIAGNOSIS — K29.50 CHRONIC GASTRITIS WITHOUT BLEEDING, UNSPECIFIED GASTRITIS TYPE: Primary | ICD-10-CM

## 2018-09-04 PROCEDURE — 99213 OFFICE O/P EST LOW 20 MIN: CPT | Performed by: FAMILY MEDICINE

## 2018-09-04 RX ORDER — MECOBALAMIN 5000 MCG
15 TABLET,DISINTEGRATING ORAL
Qty: 60 CAPSULE | Refills: 1 | Status: SHIPPED | OUTPATIENT
Start: 2018-09-04 | End: 2019-04-10

## 2018-09-04 NOTE — PATIENT INSTRUCTIONS
If the insurance does not pay for the prevacid , start taking zantac 2 times per day for 2 weeks   If this does not make any difference in the stomach pain please schedule the upper endoscopy    Here are some exercises for the upper back pain . Just do the ones that look like they won't make you dizzy                 Upper Back Pain          What is upper back pain?   Your upper back is also called your thoracic back, the part of the back where the ribs attach. Upper back pain is pain between your neck and your lower back.   How does it occur?   The bones in your back are called vertebrae. Back pain is usually caused when ligaments or muscles attaching to the vertebrae are injured. Upper back pain can come from a twisting motion, poor posture, overuse, or an injury such as a fall or car accident. It is very common for someone to injure their upper back when carrying objects, throwing, bending or twisting. Sitting at a desk for a long time can cause upper back muscles to tighten and become stiff. Upper back pain can even come from vigorous coughing or sneezing.   Sometimes upper back pain is caused by scoliosis, a curve in the spine that has developed during the adolescent growth period. In scoliosis there is usually an imbalance of the muscles of the upper back.   What are the symptoms?   Symptoms of upper back pain may include:   muscle spasms   pain when you take a deep breath   pain when your back is touched or when you move   pain when you move your shoulders or bend your neck forward   How is it diagnosed?   Your provider will take your history, review your symptoms, and examine your back.   How is it treated?   To treat this condition:   Put an ice pack, gel pack, or package of frozen vegetables, wrapped in a cloth on the area every 3 to 4 hours, for up to 20 minutes at a time.   After you have iced for 2 to 3 days, you may start to use moist heat to help loosen up stiff muscles. Use moist heat for up to 20  minutes at a time to help relax tight muscles or muscle spasms. Do not use heat if you have swelling.   Take an anti-inflammatory such as ibuprofen, or other medicine as directed by your provider. Nonsteroidal anti-inflammatory medicines (NSAIDs) may cause stomach bleeding and other problems. These risks increase with age. Read the label and take as directed. Unless recommended by your healthcare provider, do not take for more than 10 days.   Get a back massage by a trained person.   Follow your provider's instructions for doing exercises to help you recover.   When can I return to my normal activities?   Everyone recovers from an injury at a different rate. Return to your activities depends on how soon your back recovers, not by how many days or weeks it has been since your injury has occurred. In general, the longer you have symptoms before you start treatment, the longer it will take to get better. The goal of rehabilitation is to return you to your normal activities as soon as is safely possible. If you return too soon you may worsen your injury.   It is important that you have fully recovered from your upper back pain before you return to any strenuous activity. You must be able to have the same range of motion that you had before the injury.   What can I do to prevent upper back pain?   Be sure that you have warmed up and have done proper stretching exercises before your activity. Try not to twist when you are lifting heavy objects. If you are at a desk for a long period of time be sure to take frequent breaks to stretch you back.     Published by [employer].  This content is reviewed periodically and is subject to change as new health information becomes available. The information is intended to inform and educate and is not a replacement for medical evaluation, advice, diagnosis or treatment by a healthcare professional.   Written by Len Lainez MD.   ? 2010 [employer] and/or its affiliates. All Rights  Reserved.   Copyright   [employer] 2011      Upper Back Pain Rehabilitation Exercises   You may do all of these exercises right away.     Pectoralis stretch:  a doorway or corner with both arms on the wall slightly above your head. Slowly lean forward until you feel a stretch in the front of your shoulders. Hold 15 to 30 seconds. Repeat 3 times.     Thoracic extension: While sitting in a chair, clasp both arms behind your head. Gently arch backward and look up toward the ceiling. Repeat 10 times. Do this several times per day.     Arm slides on wall: Sit or stand against a wall with your elbows and wrists against the wall. Slowly slide your arms upward as high as you can while keeping your elbows and wrists against the wall. Do 3 sets of 10.     Scapular squeezes: While sitting or standing with your arms by your sides, squeeze your shoulder blades together and hold for 5 seconds. Do 3 sets of 10.     Mid-trap exercise: Lie on your stomach on a firm surface and place a folded pillow underneath your chest. Place your arms out straight to your sides with your elbows straight and thumbs toward the ceiling. Slowly raise your arms toward the ceiling as you squeeze your shoulder blades together. Lower slowly. Do 3 sets of 15. Progress to holding soup cans or small weights in your hands.     Thoracic stretch   A. Sit on the floor with your legs out straight in front of you. Hold your mid-thighs with your hands. Curl you head and neck toward your belly button. Hold for a count of 15. Repeat 3 times.   B. To stretch your right upper back, point your right elbow and shoulders forward while twisting your trunk to the left. Hold for a count of 15. Repeat 3 times.   C. To stretch your left upper back, point your left elbow and shoulder forward while twisting your trunk to the right. Hold for a count of 10. Repeat 3 times.     Rowing exercise: Tie a piece of elastic tubing around an immovable object and  grasp the ends in each hand. Keep your forearms vertical and your elbows at shoulder level and bent to 90 degrees. Pull backward on the band and squeeze your shoulder blades together. Repeat 10 times. Do 3 sets.   Written by Len Lainez MD for [email or web address].   Published by [employer].   This content is reviewed periodically and is subject to change as new health information becomes available. The information is intended to inform and educate and is not a replacement for medical evaluation, advice, diagnosis or treatment by a healthcare professional.   Sports Medicine Advisor 2003.1 Index  Sports Medicine Advisor 2003.1 Credits   Copyright   2003 [employer]. All rights reserved.

## 2018-09-04 NOTE — PROGRESS NOTES
SUBJECTIVE:                                                    Kristen Thompson is a 72 year old female who presents to clinic today for the following health issues:    FLANK PAIN     Onset: 6 months - seen Dr. Morse 3/20/18 had constipation, made some dietary changes and it still having the pain. Stomach ache every time she eats.     Description:   Character: pain, sometimes sharp pain. With feel a twisting feeling when bending over, has to move to get things back in place.   Location: left flank, ULQ  Radiation: Back - not sure that it is related.     Progression of Symptoms:  same and intermittent    Accompanying Signs & Symptoms:  Fever/Chills?: no   Gas/Bloating: YES  Nausea: no   Vomitting: no   Diarrhea?: no   Constipation:YES - this is her normal. Taking magnesium for the legs and it is helping with the BM's   Dysuria or Hematuria: no    History:   Trauma: no   Previous similar pain: no    Previous tests done: none    Precipitating factors:   Does the pain change with:     Food: YES     BM: YES    Urination: no     Therapies Tried and outcome: eliminated, gluten, milk, sugar. Senna - didn't help    LMP:  not applicable     Wt Readings from Last 5 Encounters:   09/04/18 148 lb (67.1 kg)   03/20/18 169 lb 12.8 oz (77 kg)   10/26/17 160 lb (72.6 kg)   05/16/17 155 lb (70.3 kg)   03/15/17 150 lb (68 kg)       Problem list and histories reviewed & adjusted, as indicated.  Additional history: as above she has no constipation now but she still has pain   Has eliminated gluten and dairy   Has pain every time she eats   Lasts 1/2 hour   Has history of  Gastritis   She takes omeprazole 2 times per day   Has more gas   Feels like she is having things get stuck almost   She has been trying to lose weight she had been doing medifast and it made her have bowel issues   Now she is doing the whole 30 has been eating healthy   She has lost weight on purpose   Able to eat has discomfort afterwrds   She has no middle of the  night symptoms   Has more gas   After the egd 2 y ago she had relief with this   The twisting is related to more of the   Passing gas and belching helps   She is taking magnesium   She has had some back pain     Patient Active Problem List   Diagnosis     GERD (gastroesophageal reflux disease)     CARDIOVASCULAR SCREENING; LDL GOAL LESS THAN 160     HL (hearing loss)     Health Care Home     Arthritis     Advance Care Planning     Hyperlipidemia LDL goal <130     Chronic constipation     Gastritis     Cervicalgia     Localized edema     Past Surgical History:   Procedure Laterality Date     C RAD RESEC TONSIL/PILLARS       COLONOSCOPY  10/30/2012    Procedure: COLONOSCOPY;  Colonoscopy;  Surgeon: Denise Bah MD;  Location: [facility]     ESOPHAGOSCOPY, GASTROSCOPY, DUODENOSCOPY (EGD), COMBINED N/A 9/15/2016    Procedure: COMBINED ESOPHAGOSCOPY, GASTROSCOPY, DUODENOSCOPY (EGD);  Surgeon: Bennie Godinez MD;  Location: [facility]     GALLBLADDER SURGERY       HYSTERECTOMY       knee replacment  2007     RELEASE TRIGGER FINGER Right 4/29/2016    Procedure: RELEASE TRIGGER FINGER;  Surgeon: Percy Sarmiento MD;  Location: [facility]     REPAIR BLADDER         Social History   Substance Use Topics     Smoking status: Never Smoker     Smokeless tobacco: Never Used     Alcohol use No     Family History   Problem Relation Age of Onset     C.A.D. Mother      Cardiovascular Mother      Thyroid Disease Mother      Cancer Father      stomach ca     Cancer Sister      Eye Disorder Sister      C.A.D. Sister      Cerebrovascular Disease Sister      Breast Cancer Sister      Arthritis Sister      Cardiovascular Sister      Depression Sister      HEART DISEASE Sister      Neurologic Disorder Sister      Osteoporosis Sister      Thyroid Disease Sister      Depression Sister      Thyroid Disease Sister      Depression Sister      Thyroid Disease Sister      Obesity Sister      Neurologic Disorder Sister       "      ROS:  Constitutional, HEENT, cardiovascular, pulmonary, gi and gu systems are negative, except as otherwise noted.    OBJECTIVE:                                                    Ht 5' 2\" (1.575 m)  Wt 148 lb (67.1 kg)  BMI 27.07 kg/m2 Body mass index is 27.07 kg/(m^2).   GENERAL: healthy, alert, well nourished, well hydrated, no distress  HENT: ear canals- normal; TMs- normal; Nose- normal; Mouth- no ulcers, no lesions  NECK: no tenderness, no adenopathy, no asymmetry, no masses, no stiffness; thyroid- normal to palpation  RESP: lungs clear to auscultation - no rales, no rhonchi, no wheezes  CV: regular rates and rhythm, normal S1 S2, no S3 or S4 and no murmur, no click or rub -  ABDOMEN: soft, no tenderness, no  hepatosplenomegaly, no masses, normal bowel sounds  Comprehensive back pain exam:  Tenderness of t7-8 parathoracic muscles and + facet compression with reproduction of pain , Range of motion not limited by pain, Lower extremity strength functional and equal on both sides, Lower extremity sensation normal and equal on both sides and Straight leg raise negative bilaterally       ASSESSMENT/PLAN:                                                    1. Chronic gastritis without bleeding, unspecified gastritis type  Cont with this  - LANsoprazole (PREVACID) 15 MG CR capsule; Take 1 capsule (15 mg) by mouth 2 times daily (before meals) Take 30-60 minutes before a meal.  Dispense: 60 capsule; Refill: 1  - GASTROENTEROLOGY ADULT REF PROCEDURE ONLY [facility] [phone]; [facility]    2. Radicular pain of thoracic region  See avs for stretching exercises   she will also cont to monitor and will retrun if she having new sx       reports that she has never smoked. She has never used smokeless tobacco.          Cortney Hung M.D.  [facility]    "

## 2018-09-04 NOTE — MR AVS SNAPSHOT
After Visit Summary   9/4/2018    Kristen Thompson    MRN: [id number]           Patient Information     Date Of Birth          1945        Visit Information        Provider Department      9/4/2018 2:30 PM Cortney Hung MD [facility]'s Diagnoses     Chronic gastritis without bleeding, unspecified gastritis type    -  1    Radicular pain of thoracic region          Care Instructions    If the insurance does not pay for the prevacid , start taking zantac 2 times per day for 2 weeks   If this does not make any difference in the stomach pain please schedule the upper endoscopy    Here are some exercises for the upper back pain . Just do the ones that look like they won't make you dizzy                 Upper Back Pain          What is upper back pain?   Your upper back is also called your thoracic back, the part of the back where the ribs attach. Upper back pain is pain between your neck and your lower back.   How does it occur?   The bones in your back are called vertebrae. Back pain is usually caused when ligaments or muscles attaching to the vertebrae are injured. Upper back pain can come from a twisting motion, poor posture, overuse, or an injury such as a fall or car accident. It is very common for someone to injure their upper back when carrying objects, throwing, bending or twisting. Sitting at a desk for a long time can cause upper back muscles to tighten and become stiff. Upper back pain can even come from vigorous coughing or sneezing.   Sometimes upper back pain is caused by scoliosis, a curve in the spine that has developed during the adolescent growth period. In scoliosis there is usually an imbalance of the muscles of the upper back.   What are the symptoms?   Symptoms of upper back pain may include:   muscle spasms   pain when you take a deep breath   pain when your back is touched or when you move   pain when you move your shoulders or bend your neck  forward   How is it diagnosed?   Your provider will take your history, review your symptoms, and examine your back.   How is it treated?   To treat this condition:   Put an ice pack, gel pack, or package of frozen vegetables, wrapped in a cloth on the area every 3 to 4 hours, for up to 20 minutes at a time.   After you have iced for 2 to 3 days, you may start to use moist heat to help loosen up stiff muscles. Use moist heat for up to 20 minutes at a time to help relax tight muscles or muscle spasms. Do not use heat if you have swelling.   Take an anti-inflammatory such as ibuprofen, or other medicine as directed by your provider. Nonsteroidal anti-inflammatory medicines (NSAIDs) may cause stomach bleeding and other problems. These risks increase with age. Read the label and take as directed. Unless recommended by your healthcare provider, do not take for more than 10 days.   Get a back massage by a trained person.   Follow your provider's instructions for doing exercises to help you recover.   When can I return to my normal activities?   Everyone recovers from an injury at a different rate. Return to your activities depends on how soon your back recovers, not by how many days or weeks it has been since your injury has occurred. In general, the longer you have symptoms before you start treatment, the longer it will take to get better. The goal of rehabilitation is to return you to your normal activities as soon as is safely possible. If you return too soon you may worsen your injury.   It is important that you have fully recovered from your upper back pain before you return to any strenuous activity. You must be able to have the same range of motion that you had before the injury.   What can I do to prevent upper back pain?   Be sure that you have warmed up and have done proper stretching exercises before your activity. Try not to twist when you are lifting heavy objects. If you are at a desk for a long period of  time be sure to take frequent breaks to stretch you back.     Published by [employer].  This content is reviewed periodically and is subject to change as new health information becomes available. The information is intended to inform and educate and is not a replacement for medical evaluation, advice, diagnosis or treatment by a healthcare professional.   Written by Len Lainez MD.   ? 2010 [employer] and/or its affiliates. All Rights Reserved.   Copyright   [employer] 2011      Upper Back Pain Rehabilitation Exercises   You may do all of these exercises right away.     Pectoralis stretch:  a doorway or corner with both arms on the wall slightly above your head. Slowly lean forward until you feel a stretch in the front of your shoulders. Hold 15 to 30 seconds. Repeat 3 times.     Thoracic extension: While sitting in a chair, clasp both arms behind your head. Gently arch backward and look up toward the ceiling. Repeat 10 times. Do this several times per day.     Arm slides on wall: Sit or stand against a wall with your elbows and wrists against the wall. Slowly slide your arms upward as high as you can while keeping your elbows and wrists against the wall. Do 3 sets of 10.     Scapular squeezes: While sitting or standing with your arms by your sides, squeeze your shoulder blades together and hold for 5 seconds. Do 3 sets of 10.     Mid-trap exercise: Lie on your stomach on a firm surface and place a folded pillow underneath your chest. Place your arms out straight to your sides with your elbows straight and thumbs toward the ceiling. Slowly raise your arms toward the ceiling as you squeeze your shoulder blades together. Lower slowly. Do 3 sets of 15. Progress to holding soup cans or small weights in your hands.     Thoracic stretch   A. Sit on the floor with your legs out straight in front of you. Hold your mid-thighs with your hands. Curl you head and neck toward your belly button. Hold  for a count of 15. Repeat 3 times.   B. To stretch your right upper back, point your right elbow and shoulders forward while twisting your trunk to the left. Hold for a count of 15. Repeat 3 times.   C. To stretch your left upper back, point your left elbow and shoulder forward while twisting your trunk to the right. Hold for a count of 10. Repeat 3 times.     Rowing exercise: Tie a piece of elastic tubing around an immovable object and grasp the ends in each hand. Keep your forearms vertical and your elbows at shoulder level and bent to 90 degrees. Pull backward on the band and squeeze your shoulder blades together. Repeat 10 times. Do 3 sets.   Written by Len Lainez MD for [employer].   Published by [employer].   This content is reviewed periodically and is subject to change as new health information becomes available. The information is intended to inform and educate and is not a replacement for medical evaluation, advice, diagnosis or treatment by a healthcare professional.   Sports Medicine Advisor 2003.1 Index  Sports Medicine Advisor 2003.1 Credits   Copyright   2003 [employer]. All rights reserved.                               Follow-ups after your visit        Additional Services     GASTROENTEROLOGY ADULT REF PROCEDURE ONLY [facility] [phone]; [place] [facility] Surgeon       Last Lab Result: Creatinine (mg/dL)       Date                     Value                 03/20/2018               0.71             ----------  Body mass index is 27.07 kg/(m^2).     Needed:  No  Language:  English    Patient will be contacted to schedule procedure.     Please be aware that coverage of these services is subject to the terms and limitations of your health insurance plan.  Call member services at your health plan with any benefit or coverage questions.  Any procedures must be performed at a [place] facility OR coordinated by your clinic's  "referral office.    Please bring the following with you to your appointment:    (1) Any X-Rays, CTs or MRIs which have been performed.  Contact the facility where they were done to arrange for  prior to your scheduled appointment.    (2) List of current medications   (3) This referral request   (4) Any documents/labs given to you for this referral                  Who to contact     Normal or non-critical lab and imaging results will be communicated to you by Myfacepagehart, letter or phone within 4 business days after the clinic has received the results. If you do not hear from us within 7 days, please contact the clinic through [employer] or phone. If you have a critical or abnormal lab result, we will notify you by phone as soon as possible.  Submit refill requests through [employer] or call your pharmacy and they will forward the refill request to us. Please allow 3 business days for your refill to be completed.          If you need to speak with a  for additional information , please call: [phone]             Additional Information About Your Visit        [employer] Information     [employer] gives you secure access to your electronic health record. If you see a primary care provider, you can also send messages to your care team and make appointments. If you have questions, please call your primary care clinic.  If you do not have a primary care provider, please call [phone] and they will assist you.        Care EveryWhere ID     This is your Care EveryWhere ID. This could be used by other organizations to access your [place] medical records  [id number]        Your Vitals Were     Height BMI (Body Mass Index)                5' 2\" (1.575 m) 27.07 kg/m2           Blood Pressure from Last 3 Encounters:   03/20/18 135/87   01/11/18 122/70   05/16/17 110/68    Weight from Last 3 Encounters:   09/04/18 148 lb (67.1 kg)   03/20/18 169 lb 12.8 oz (77 kg)   10/26/17 160 lb (72.6 kg)              We " Performed the Following     GASTROENTEROLOGY ADULT REF PROCEDURE ONLY [facility] [phone]; [place] [facility] Surgeon          Today's Medication Changes          These changes are accurate as of 9/4/18  3:23 PM.  If you have any questions, ask your nurse or doctor.               Start taking these medicines.        Dose/Directions    LANsoprazole 15 MG CR capsule   Commonly known as:  PREVACID   Used for:  Chronic gastritis without bleeding, unspecified gastritis type   Started by:  Cortney Hung MD        Dose:  15 mg   Take 1 capsule (15 mg) by mouth 2 times daily (before meals) Take 30-60 minutes before a meal.   Quantity:  60 capsule   Refills:  1         Stop taking these medicines if you haven't already. Please contact your care team if you have questions.     furosemide 20 MG tablet   Commonly known as:  LASIX   Stopped by:  Cortney Hung MD           MULTIVITAMIN PO   Stopped by:  Cortney Hung MD           senna 8.6 MG tablet   Commonly known as:  SENOKOT   Stopped by:  Cortney Hung MD           TYLENOL PO   Stopped by:  Cortney Hung MD           UNABLE TO FIND   Stopped by:  Cortney Hung MD                Where to get your medicines      These medications were sent to [facility]/pharmacy #[id number] - [place] [id number] [place] [address]  [address] [address], [facility] [address]     Phone:  [phone]     LANsoprazole 15 MG CR capsule                Primary Care Provider Office Phone # Fax #    Janett Franklin Morse [phone] [address] [facility] [address]        Equal Access to Services     [facility] : Hadii rodriguez hickeyo Soолег, waaxda luqadaha, qaybta kaalmada adeegyada, waxkiara ruy dacosta. So [facility] [phone].    ATENCIÓN: Si habla español, tiene a bonilla disposición servicios gratuitos de asistencia lingüística. Llame al [phone].    We comply with applicable federal civil rights laws and [place] laws. We do not  discriminate on the basis of race, color, national origin, age, disability, sex, sexual orientation, or gender identity.            Thank you!     Thank you for choosing [facility]  for your care. Our goal is always to provide you with excellent care. Hearing back from our patients is one way we can continue to improve our services. Please take a few minutes to complete the written survey that you may receive in the mail after your visit with us. Thank you!             Your Updated Medication List - Protect others around you: Learn how to safely use, store and throw away your medicines at www.disposemymeds.org.          This list is accurate as of 9/4/18  3:23 PM.  Always use your most recent med list.                   Brand Name Dispense Instructions for use Diagnosis    CALCIUM PO      Take by mouth daily        KRILL OIL OMEGA-3 PO      Take by mouth daily        LANsoprazole 15 MG CR capsule    PREVACID    60 capsule    Take 1 capsule (15 mg) by mouth 2 times daily (before meals) Take 30-60 minutes before a meal.    Chronic gastritis without bleeding, unspecified gastritis type       MAGNESIUM PO           OMEGA 3 PO      Take  by mouth.        omeprazole 20 MG CR capsule    priLOSEC    180 capsule    Take 1 capsule (20 mg) by mouth 2 times daily    Other chronic gastritis without hemorrhage, Other chest pain       VITAMIN B COMPLEX PO           VITAMIN D PO      Take 1,000 Units by mouth daily

## 2018-09-12 ENCOUNTER — TELEPHONE (OUTPATIENT)
Dept: FAMILY MEDICINE | Facility: CLINIC | Age: 73
End: 2018-09-12

## 2018-09-12 NOTE — TELEPHONE ENCOUNTER
Yes she was trying something for the back. She had improved and now is working on some radicular pain. If this was not improving then she will make an appointment for the egd. Cortney Hung M.D.'

## 2018-09-12 NOTE — TELEPHONE ENCOUNTER
Reason for Call:  Procedure  has reached out to patient to schedule diagnostic upper gi endoscopy    Detailed comments: patient states that she is trying another medication and will call if she needs to proceed    No further action necessary for procedure     Phone Number Patient can be reached at: Home number on file [phone] (home)    Best Time: NA    Can we leave a detailed message on this number? Not Applicable    Call taken on 9/12/2018 at 8:42 AM by Kate Arrieta

## 2018-11-26 ENCOUNTER — OFFICE VISIT (OUTPATIENT)
Dept: FAMILY MEDICINE | Facility: CLINIC | Age: 73
End: 2018-11-26
Payer: COMMERCIAL

## 2018-11-26 VITALS
OXYGEN SATURATION: 97 % | WEIGHT: 148 LBS | HEIGHT: 62 IN | TEMPERATURE: 98.2 F | SYSTOLIC BLOOD PRESSURE: 138 MMHG | DIASTOLIC BLOOD PRESSURE: 80 MMHG | HEART RATE: 63 BPM | BODY MASS INDEX: 27.23 KG/M2

## 2018-11-26 DIAGNOSIS — J01.11 ACUTE RECURRENT FRONTAL SINUSITIS: Primary | ICD-10-CM

## 2018-11-26 PROCEDURE — 99213 OFFICE O/P EST LOW 20 MIN: CPT | Performed by: FAMILY MEDICINE

## 2018-11-26 NOTE — PATIENT INSTRUCTIONS
Sinus pressure is primarily a problem of drainage.  You can best help your body clear the sinus secretions and pressure by opening up the natural passageways which are often blocked by viral colds and allergies.      Short courses of a nasal decongestant spray (Afrin or Neosinephrine) are one of the most effective tools in opening sinus drainage passageways.  Their use should be restricted to 3 days though due to the high risk of nasal addiction.  Pseudoephedrine or phenylephrine (Sudafed) is often helpful but it can cause elevations in blood pressure and insomnia.     Sometimes a nasal saline spray will help rinse out the nasal passages and feel good. The mist is better     Guaifenesin (Robitussin or Mucinex) helps loosen secretions and often help make the mucous more liquid and easier to clear.    For pain and fevers, acetaminophen (Tylenol) is most appropriate.  Ibuprofen (Advil) or naproxen (Aleve) are useful too and last longer but they can cause elevation of blood pressure or stomach problems.    Antihistamines (Benadryl, Dimetapp, etc.) cause sedation, confusion, bowel and urinary abnormalities and are of little use for infectious causes of cough and nasal congestion.  Their use should be reserved for allergic symptoms.    The body needs to be treated well in order to help heal itself.  Rest as needed.  It is ok to reduce food intake if appetite is poor but it is quite important to maintain/increase fluid intake.

## 2018-11-26 NOTE — MR AVS SNAPSHOT
After Visit Summary   11/26/2018    Kristen Thompson    MRN: [id number]           Patient Information     Date Of Birth          1945        Visit Information        Provider Department      11/26/2018 2:30 PM Cortney Hung MD [facility]        Today's Diagnoses     Acute recurrent frontal sinusitis    -  1      Care Instructions    Sinus pressure is primarily a problem of drainage.  You can best help your body clear the sinus secretions and pressure by opening up the natural passageways which are often blocked by viral colds and allergies.      Short courses of a nasal decongestant spray (Afrin or Neosinephrine) are one of the most effective tools in opening sinus drainage passageways.  Their use should be restricted to 3 days though due to the high risk of nasal addiction.  Pseudoephedrine or phenylephrine (Sudafed) is often helpful but it can cause elevations in blood pressure and insomnia.     Sometimes a nasal saline spray will help rinse out the nasal passages and feel good. The mist is better     Guaifenesin (Robitussin or Mucinex) helps loosen secretions and often help make the mucous more liquid and easier to clear.    For pain and fevers, acetaminophen (Tylenol) is most appropriate.  Ibuprofen (Advil) or naproxen (Aleve) are useful too and last longer but they can cause elevation of blood pressure or stomach problems.    Antihistamines (Benadryl, Dimetapp, etc.) cause sedation, confusion, bowel and urinary abnormalities and are of little use for infectious causes of cough and nasal congestion.  Their use should be reserved for allergic symptoms.    The body needs to be treated well in order to help heal itself.  Rest as needed.  It is ok to reduce food intake if appetite is poor but it is quite important to maintain/increase fluid intake.             Follow-ups after your visit        Who to contact     Normal or non-critical lab and imaging results will be communicated  "to you by Yozonshart, letter or phone within 4 business days after the clinic has received the results. If you do not hear from us within 7 days, please contact the clinic through [employer] or phone. If you have a critical or abnormal lab result, we will notify you by phone as soon as possible.  Submit refill requests through [employer] or call your pharmacy and they will forward the refill request to us. Please allow 3 business days for your refill to be completed.          If you need to speak with a  for additional information , please call: [phone]             Additional Information About Your Visit        [employer] Information     [employer] gives you secure access to your electronic health record. If you see a primary care provider, you can also send messages to your care team and make appointments. If you have questions, please call your primary care clinic.  If you do not have a primary care provider, please call [phone] and they will assist you.        Care EveryWhere ID     This is your Care EveryWhere ID. This could be used by other organizations to access your [place] medical records  [id number]        Your Vitals Were     Pulse Temperature Height Pulse Oximetry BMI (Body Mass Index)       63 98.2  F (36.8  C) (Tympanic) 5' 2\" (1.575 m) 97% 27.07 kg/m2        Blood Pressure from Last 3 Encounters:   11/26/18 138/80   03/20/18 135/87   01/11/18 122/70    Weight from Last 3 Encounters:   11/26/18 148 lb (67.1 kg)   09/04/18 148 lb (67.1 kg)   03/20/18 169 lb 12.8 oz (77 kg)              Today, you had the following     No orders found for display         Today's Medication Changes          These changes are accurate as of 11/26/18  3:03 PM.  If you have any questions, ask your nurse or doctor.               Start taking these medicines.        Dose/Directions    amoxicillin-clavulanate 875-125 MG tablet   Commonly known as:  AUGMENTIN   Used for:  Acute recurrent frontal sinusitis "   Started by:  Cortney Hung MD        Dose:  1 tablet   Take 1 tablet by mouth 2 times daily for 10 days   Quantity:  20 tablet   Refills:  0            Where to get your medicines      These medications were sent to [facility]/pharmacy #[id number] - [place], [place] - [address] [facility] [address], [facility] [address]     Phone:  [phone]     amoxicillin-clavulanate 875-125 MG tablet                Primary Care Provider Office Phone # Fax #    Janett Morse [phone] [address] [facility] [address]        Equal Access to Services     [facility]: Hadii aad ku hadasho Soomaali, waaxda luqadaha, qaybta kaalmada adeegyada, breanne santos . So [facility] [phone].    ATENCIÓN: Si habla español, tiene a bonilla disposición servicios gratuitos de asistencia lingüística. [facility] [phone].    We comply with applicable federal civil rights laws and [place] laws. We do not discriminate on the basis of race, color, national origin, age, disability, sex, sexual orientation, or gender identity.            Thank you!     Thank you for choosing [facility]  for your care. Our goal is always to provide you with excellent care. Hearing back from our patients is one way we can continue to improve our services. Please take a few minutes to complete the written survey that you may receive in the mail after your visit with us. Thank you!             Your Updated Medication List - Protect others around you: Learn how to safely use, store and throw away your medicines at www.disposemymeds.org.          This list is accurate as of 11/26/18  3:03 PM.  Always use your most recent med list.                   Brand Name Dispense Instructions for use Diagnosis    amoxicillin-clavulanate 875-125 MG tablet    AUGMENTIN    20 tablet    Take 1 tablet by mouth 2 times daily for 10 days    Acute recurrent frontal sinusitis       CALCIUM PO      Take by mouth daily         KRILL OIL OMEGA-3 PO      Take by mouth daily        LANsoprazole 15 MG DR capsule    PREVACID    60 capsule    Take 1 capsule (15 mg) by mouth 2 times daily (before meals) Take 30-60 minutes before a meal.    Chronic gastritis without bleeding, unspecified gastritis type       MAGNESIUM PO           OMEGA 3 PO      Take  by mouth.        omeprazole 20 MG DR capsule    priLOSEC    180 capsule    Take 1 capsule (20 mg) by mouth 2 times daily    Other chronic gastritis without hemorrhage, Other chest pain       VITAMIN B COMPLEX PO           VITAMIN D PO      Take 1,000 Units by mouth daily

## 2018-11-26 NOTE — PROGRESS NOTES
"SUBJECTIVE:  Kristen Thompson is a 73 year old female who presents with the following concerns;              Symptoms: cc Present Absent Comment   Fever/Chills  x  Hot and cold flashes   Fatigue  x     Headache  x     Muscle or Body  Aches   x    Eye Irritation  x  soreness   Sneezing   x    Nasal Thomas/Drg x   green   Sinus Pressure/Pain x      Dental pain  x     Sore Throat   x    Swollen Glands   x    Ear Pain/Fullness  x     Cough   x    Wheeze   x    Chest Discomfort   x    Shortness of breath   x    Abdominal pain   x    Emesis    x    Diarrhea   x    Other  x  dizzy     Symptom duration:  24 days, since November 2   Symptom severity:     Treatments tried:  tylenol at 8 am   Contacts:  yes, works at [employer]   Had n/v/d and passed out heaves then she started with the upper respiratory and so that part is better  Still having the headache frontal feels stabbing feels heaviness   The acute phase is over   She still as the PND  She is feeling a little better but it has not resolved   PMH  Patient Active Problem List   Diagnosis     GERD (gastroesophageal reflux disease)     CARDIOVASCULAR SCREENING; LDL GOAL LESS THAN 160     HL (hearing loss)     Health Care Home     Arthritis     Advance Care Planning     Hyperlipidemia LDL goal <130     Chronic constipation     Gastritis     Cervicalgia     Localized edema     ROS: Constitutional, HEENT, cardiovascular, respiratory, GI, , and skin are otherwise negative except as noted above.    PHYSICAL EXAM:    /80  Pulse 63  Temp 98.2  F (36.8  C) (Tympanic)  Ht 5' 2\" (1.575 m)  Wt 148 lb (67.1 kg)  SpO2 97%  BMI 27.07 kg/m2  GENERAL: Active, alert and no distress.  EYES: PERRL/EOMI.  Bilateral sclera/conjunctiva clear.  HEENT: Audible congestion with post  nasal discharge.  TMs gray and translucent.  Oral mucosa moist and pink.  Posterior pharynx normal . Uvula midline.frontal sinus tenderness   NECK: Supple with full range of motion.  Bilateral shotty " anterior cervical nodes.  CV: Regular rate and rhythm without murmur.  LUNGS: Clear to auscultation.    SKIN:  No rash. Warm, pink. Capillary refill less than 2 seconds.      1. Acute recurrent frontal sinusitis  Patient Instructions   Sinus pressure is primarily a problem of drainage.  You can best help your body clear the sinus secretions and pressure by opening up the natural passageways which are often blocked by viral colds and allergies.      Short courses of a nasal decongestant spray (Afrin or Neosinephrine) are one of the most effective tools in opening sinus drainage passageways.  Their use should be restricted to 3 days though due to the high risk of nasal addiction.  Pseudoephedrine or phenylephrine (Sudafed) is often helpful but it can cause elevations in blood pressure and insomnia.     Sometimes a nasal saline spray will help rinse out the nasal passages and feel good. The mist is better     Guaifenesin (Robitussin or Mucinex) helps loosen secretions and often help make the mucous more liquid and easier to clear.    For pain and fevers, acetaminophen (Tylenol) is most appropriate.  Ibuprofen (Advil) or naproxen (Aleve) are useful too and last longer but they can cause elevation of blood pressure or stomach problems.    Antihistamines (Benadryl, Dimetapp, etc.) cause sedation, confusion, bowel and urinary abnormalities and are of little use for infectious causes of cough and nasal congestion.  Their use should be reserved for allergic symptoms.    The body needs to be treated well in order to help heal itself.  Rest as needed.  It is ok to reduce food intake if appetite is poor but it is quite important to maintain/increase fluid intake.         - amoxicillin-clavulanate (AUGMENTIN) 875-125 MG tablet; Take 1 tablet by mouth 2 times daily for 10 days  Dispense: 20 tablet; Refill: 0    Cortney Hung M.D.

## 2018-11-29 ENCOUNTER — TELEPHONE (OUTPATIENT)
Dept: FAMILY MEDICINE | Facility: CLINIC | Age: 73
End: 2018-11-29

## 2018-11-29 DIAGNOSIS — K29.50 CHRONIC GASTRITIS WITHOUT BLEEDING, UNSPECIFIED GASTRITIS TYPE: Primary | ICD-10-CM

## 2018-11-29 NOTE — TELEPHONE ENCOUNTER
There is not anyone up at [facility]. I have referred her to [facility] . Cortney Hung M.D.

## 2018-11-29 NOTE — TELEPHONE ENCOUNTER
Reason for call:  Patient reporting a symptom    Symptom or request: Kristen was seen on 9/4 and was advised if her stomach issues was not improving that she should have an endoscopy.  She would rather see a gastro first.  If in agreement could you please place referral for her.  She is OK to see someone at [place] if there is one there.  Please review and advise.  Thank you..Nayeli Perez    Duration (how long have symptoms been present): since 9/4/18    Have you been treated for this before? Yes seen    Phone Number patient can be reached at:  Home number on file [phone] (home)    Best Time:  Any time    Can we leave a detailed message on this number:  YES    Call taken on 11/29/2018 at 3:55 PM by Nayeli Perez

## 2018-12-11 ENCOUNTER — TRANSFERRED RECORDS (OUTPATIENT)
Dept: HEALTH INFORMATION MANAGEMENT | Facility: CLINIC | Age: 73
End: 2018-12-11

## 2018-12-14 ENCOUNTER — HOSPITAL ENCOUNTER (EMERGENCY)
Facility: CLINIC | Age: 73
Discharge: HOME | End: 2018-12-14
Attending: EMERGENCY MEDICINE | Admitting: EMERGENCY MEDICINE
Payer: MEDICARE

## 2018-12-14 ENCOUNTER — APPOINTMENT (OUTPATIENT)
Dept: CT IMAGING | Facility: CLINIC | Age: 73
End: 2018-12-14
Attending: EMERGENCY MEDICINE
Payer: MEDICARE

## 2018-12-14 VITALS
RESPIRATION RATE: 16 BRPM | BODY MASS INDEX: 27.6 KG/M2 | HEIGHT: 62 IN | OXYGEN SATURATION: 97 % | DIASTOLIC BLOOD PRESSURE: 86 MMHG | WEIGHT: 150 LBS | SYSTOLIC BLOOD PRESSURE: 138 MMHG | HEART RATE: 76 BPM | TEMPERATURE: 97.8 F

## 2018-12-14 DIAGNOSIS — K57.32 DIVERTICULITIS OF COLON: ICD-10-CM

## 2018-12-14 LAB
ALBUMIN SERPL-MCNC: 3.8 G/DL (ref 3.4–5)
ALP SERPL-CCNC: 70 U/L (ref 40–150)
ALT SERPL W P-5'-P-CCNC: 21 U/L (ref 0–50)
ANION GAP SERPL CALCULATED.3IONS-SCNC: 7 MMOL/L (ref 3–14)
AST SERPL W P-5'-P-CCNC: 16 U/L (ref 0–45)
BASOPHILS # BLD AUTO: 0 10E9/L (ref 0–0.2)
BASOPHILS NFR BLD AUTO: 0.4 %
BILIRUB SERPL-MCNC: 0.4 MG/DL (ref 0.2–1.3)
BUN SERPL-MCNC: 19 MG/DL (ref 7–30)
CALCIUM SERPL-MCNC: 8.7 MG/DL (ref 8.5–10.1)
CHLORIDE SERPL-SCNC: 109 MMOL/L (ref 94–109)
CO2 SERPL-SCNC: 27 MMOL/L (ref 20–32)
CREAT SERPL-MCNC: 0.7 MG/DL (ref 0.52–1.04)
DIFFERENTIAL METHOD BLD: ABNORMAL
EOSINOPHIL # BLD AUTO: 0.1 10E9/L (ref 0–0.7)
EOSINOPHIL NFR BLD AUTO: 1 %
ERYTHROCYTE [DISTWIDTH] IN BLOOD BY AUTOMATED COUNT: 13.2 % (ref 10–15)
GFR SERPL CREATININE-BSD FRML MDRD: 82 ML/MIN/1.7M2
GLUCOSE SERPL-MCNC: 102 MG/DL (ref 70–99)
HCT VFR BLD AUTO: 41.9 % (ref 35–47)
HGB BLD-MCNC: 13.8 G/DL (ref 11.7–15.7)
IMM GRANULOCYTES # BLD: 0 10E9/L (ref 0–0.4)
IMM GRANULOCYTES NFR BLD: 0.3 %
LYMPHOCYTES # BLD AUTO: 1.1 10E9/L (ref 0.8–5.3)
LYMPHOCYTES NFR BLD AUTO: 10.3 %
MCH RBC QN AUTO: 32.7 PG (ref 26.5–33)
MCHC RBC AUTO-ENTMCNC: 32.9 G/DL (ref 31.5–36.5)
MCV RBC AUTO: 99 FL (ref 78–100)
MONOCYTES # BLD AUTO: 0.8 10E9/L (ref 0–1.3)
MONOCYTES NFR BLD AUTO: 7.6 %
NEUTROPHILS # BLD AUTO: 8.5 10E9/L (ref 1.6–8.3)
NEUTROPHILS NFR BLD AUTO: 80.4 %
NRBC # BLD AUTO: 0 10*3/UL
NRBC BLD AUTO-RTO: 0 /100
PLATELET # BLD AUTO: 219 10E9/L (ref 150–450)
POTASSIUM SERPL-SCNC: 3.8 MMOL/L (ref 3.4–5.3)
PROT SERPL-MCNC: 6.5 G/DL (ref 6.8–8.8)
RBC # BLD AUTO: 4.22 10E12/L (ref 3.8–5.2)
SODIUM SERPL-SCNC: 143 MMOL/L (ref 133–144)
WBC # BLD AUTO: 10.6 10E9/L (ref 4–11)

## 2018-12-14 PROCEDURE — 25000128 H RX IP 250 OP 636: Performed by: EMERGENCY MEDICINE

## 2018-12-14 PROCEDURE — 99285 EMERGENCY DEPT VISIT HI MDM: CPT | Mod: 25 | Performed by: EMERGENCY MEDICINE

## 2018-12-14 PROCEDURE — 80053 COMPREHEN METABOLIC PANEL: CPT | Performed by: EMERGENCY MEDICINE

## 2018-12-14 PROCEDURE — 96375 TX/PRO/DX INJ NEW DRUG ADDON: CPT | Performed by: EMERGENCY MEDICINE

## 2018-12-14 PROCEDURE — 74177 CT ABD & PELVIS W/CONTRAST: CPT

## 2018-12-14 PROCEDURE — 96374 THER/PROPH/DIAG INJ IV PUSH: CPT | Mod: 59 | Performed by: EMERGENCY MEDICINE

## 2018-12-14 PROCEDURE — 25000125 ZZHC RX 250: Performed by: EMERGENCY MEDICINE

## 2018-12-14 PROCEDURE — 99284 EMERGENCY DEPT VISIT MOD MDM: CPT | Mod: Z6 | Performed by: EMERGENCY MEDICINE

## 2018-12-14 PROCEDURE — 85025 COMPLETE CBC W/AUTO DIFF WBC: CPT | Performed by: EMERGENCY MEDICINE

## 2018-12-14 RX ORDER — KETOROLAC TROMETHAMINE 15 MG/ML
15 INJECTION, SOLUTION INTRAMUSCULAR; INTRAVENOUS ONCE
Status: COMPLETED | OUTPATIENT
Start: 2018-12-14 | End: 2018-12-14

## 2018-12-14 RX ORDER — IOPAMIDOL 755 MG/ML
74 INJECTION, SOLUTION INTRAVASCULAR ONCE
Status: COMPLETED | OUTPATIENT
Start: 2018-12-14 | End: 2018-12-14

## 2018-12-14 RX ORDER — METRONIDAZOLE 500 MG/1
500 TABLET ORAL 2 TIMES DAILY
Qty: 20 TABLET | Refills: 0 | Status: SHIPPED | OUTPATIENT
Start: 2018-12-14 | End: 2019-04-10

## 2018-12-14 RX ORDER — CIPROFLOXACIN 500 MG/1
500 TABLET, FILM COATED ORAL 2 TIMES DAILY
Qty: 20 TABLET | Refills: 0 | Status: SHIPPED | OUTPATIENT
Start: 2018-12-14 | End: 2019-04-10

## 2018-12-14 RX ORDER — ONDANSETRON 2 MG/ML
4 INJECTION INTRAMUSCULAR; INTRAVENOUS
Status: COMPLETED | OUTPATIENT
Start: 2018-12-14 | End: 2018-12-14

## 2018-12-14 RX ADMIN — SODIUM CHLORIDE 58 ML: 9 INJECTION, SOLUTION INTRAVENOUS at 05:06

## 2018-12-14 RX ADMIN — IOPAMIDOL 74 ML: 755 INJECTION, SOLUTION INTRAVENOUS at 05:06

## 2018-12-14 RX ADMIN — KETOROLAC TROMETHAMINE 15 MG: 15 INJECTION, SOLUTION INTRAMUSCULAR; INTRAVENOUS at 04:44

## 2018-12-14 RX ADMIN — ONDANSETRON 4 MG: 2 INJECTION INTRAMUSCULAR; INTRAVENOUS at 04:43

## 2018-12-14 ASSESSMENT — ENCOUNTER SYMPTOMS
SHORTNESS OF BREATH: 0
SORE THROAT: 0
CHILLS: 0
ARTHRALGIAS: 0
EYE REDNESS: 0
WEAKNESS: 0
BRUISES/BLEEDS EASILY: 0
CONFUSION: 0
DYSURIA: 0
DIFFICULTY URINATING: 0
ABDOMINAL PAIN: 1
HEADACHES: 0
RECTAL PAIN: 0
NECK STIFFNESS: 0
FEVER: 0
FATIGUE: 0
MYALGIAS: 0
VOMITING: 0
BLOOD IN STOOL: 1
CONSTIPATION: 1
WHEEZING: 0
CHEST TIGHTNESS: 0
EYE DISCHARGE: 0
COUGH: 0
TROUBLE SWALLOWING: 0
SINUS PRESSURE: 0

## 2018-12-14 ASSESSMENT — MIFFLIN-ST. JEOR: SCORE: 1148.65

## 2018-12-14 NOTE — ED AVS SNAPSHOT
[facility]  [address] [address]  Phone:  [phone]  Fax:  [phone]                                    Kristen Thompson   MRN: [id number]    Department:  [facility]   Date of Visit:  12/14/2018           After Visit Summary Signature Page    I have received my discharge instructions, and my questions have been answered. I have discussed any challenges I see with this plan with the nurse or doctor.    ..........................................................................................................................................  Patient/Patient Representative Signature      ..........................................................................................................................................  Patient Representative Print Name and Relationship to Patient    ..................................................               ................................................  Date                                   Time    ..........................................................................................................................................  Reviewed by Signature/Title    ...................................................              ..............................................  Date                                               Time          22EPIC Rev 08/18

## 2018-12-14 NOTE — ED PROVIDER NOTES
History     Chief Complaint   Patient presents with     Abdominal Pain     HPI  Kristen Thompson is a 73 year old female significant past medical history for chronic constipation, acute on chronic left sided abdominal pain, osteoarthritis, hyperlipidemia, hearing loss, chronic vertigo, GERD-presents with worsening of her left-sided abdominal pain.  States in the last year she is seen her primary care provider at the [facility] as well as a recent visit this past week with a gastroenterologist for recurrent left sided abdominal pain.  The gastroenterologist report he thought was may be muscle strain though did order blood work and an ultrasound which has not been done to date.  Over the last year she been having a twisting sensation in the left side of her abdomen with activity or when she bends over.  It is very fleeting.  She does suffer from chronic constipation.  This is ongoing.  In the last 24 hours she has noted pain now more localized left lower quadrant.  Last bowel when she did notice some blood when she wiped little bit on the surface of the stool.  Normal colonoscopy October 2012.  Colonoscopy makes no mention of any diverticulum.  Patient does acknowledge there is a family history for diverticulitis.  Patient is status post total abdominal hysterectomy.  Patient's had no history of renal calculi.  This did not come on room abrupt.  She is noted no hematuria, dysuria, urgency or frequency.  Rates her pain 7/10 intensity.  She has had no fever chills or night sweats..    Problem List:    Patient Active Problem List    Diagnosis Date Noted     Localized edema 06/16/2017     Priority: Medium     Cervicalgia 12/06/2016     Priority: Medium     Gastritis 09/16/2016     Priority: Medium     Chronic constipation 09/08/2016     Priority: Medium     Advance Care Planning 07/11/2014     Priority: Medium     Advance Care Planning: ACP Review and Resources Provided:  Reviewed chart for advance care plan.  Kristen  HILARIO Thompson has no plan or code status on file however states presence of ACP document. Copy requested. Added by Yasmeen Todd on 7/11/2014       Hyperlipidemia LDL goal <130 07/11/2014     Priority: Medium     Arthritis 11/19/2013     Priority: Medium     Health Care Home 10/19/2012     Priority: Medium     Keyla Beckett, RN-PHN  FPA / FELI [facility]   [phone]   DX V65.8 REPLACED WITH [id number] HEALTH CARE HOME (04/08/2013)       CARDIOVASCULAR SCREENING; LDL GOAL LESS THAN 160 10/11/2012     Priority: Medium     HL (hearing loss) 10/11/2012     Priority: Medium     GERD (gastroesophageal reflux disease) 10/02/2012     Priority: Medium        Past Medical History:    Past Medical History:   Diagnosis Date     Arthritis 11/19/2013     Gastro-oesophageal reflux disease      GERD (gastroesophageal reflux disease) 10/2/2012     H/O total hysterectomy      HL (hearing loss) 10/11/2012     PONV (postoperative nausea and vomiting)      Squamous cell carcinoma        Past Surgical History:    Past Surgical History:   Procedure Laterality Date     C RAD RESEC TONSIL/PILLARS       COLONOSCOPY  10/30/2012    Procedure: COLONOSCOPY;  Colonoscopy;  Surgeon: Denise Bah MD;  Location: [facility]     ESOPHAGOSCOPY, GASTROSCOPY, DUODENOSCOPY (EGD), COMBINED N/A 9/15/2016    Procedure: COMBINED ESOPHAGOSCOPY, GASTROSCOPY, DUODENOSCOPY (EGD);  Surgeon: Bennie Godinez MD;  Location: [facility]     GALLBLADDER SURGERY       HYSTERECTOMY       knee replacment  2007     RELEASE TRIGGER FINGER Right 4/29/2016    Procedure: RELEASE TRIGGER FINGER;  Surgeon: Percy Sarmiento MD;  Location: [facility]     REPAIR BLADDER         Family History:    Family History   Problem Relation Age of Onset     C.A.D. Mother      Cardiovascular Mother      Thyroid Disease Mother      Cancer Father         stomach ca     Cancer Sister      Eye Disorder Sister      C.A.D. Sister      Cerebrovascular Disease Sister      Breast  Cancer Sister      Arthritis Sister      Cardiovascular Sister      Depression Sister      Heart Disease Sister      Neurologic Disorder Sister      Osteoporosis Sister      Thyroid Disease Sister      Depression Sister      Thyroid Disease Sister      Depression Sister      Thyroid Disease Sister      Obesity Sister      Neurologic Disorder Sister        Social History:  Marital Status:   [5]  Social History     Tobacco Use     Smoking status: Never Smoker     Smokeless tobacco: Never Used   Substance Use Topics     Alcohol use: No     Alcohol/week: 0.0 oz     Drug use: No        Medications:      B Complex Vitamins (VITAMIN B COMPLEX PO)   CALCIUM PO   Cholecalciferol (VITAMIN D PO)   KRILL OIL OMEGA-3 PO   LANsoprazole (PREVACID) 15 MG CR capsule   MAGNESIUM PO   Omega-3 Fatty Acids (OMEGA 3 PO)   omeprazole (PRILOSEC) 20 MG CR capsule         Review of Systems   Constitutional: Negative for chills, fatigue and fever.   HENT: Negative for congestion, ear pain, sinus pressure, sore throat and trouble swallowing.    Eyes: Negative for discharge and redness.   Respiratory: Negative for cough, chest tightness, shortness of breath and wheezing.    Cardiovascular: Negative for chest pain and leg swelling.   Gastrointestinal: Positive for abdominal pain, blood in stool and constipation. Negative for rectal pain and vomiting.   Genitourinary: Negative for difficulty urinating, dysuria and vaginal discharge.   Musculoskeletal: Negative for arthralgias, myalgias and neck stiffness.   Skin: Negative for pallor and rash.   Neurological: Negative for weakness and headaches.   Hematological: Does not bruise/bleed easily.   Psychiatric/Behavioral: Negative for confusion.   All other systems reviewed and are negative.      Physical Exam   BP: (!) 189/95  Pulse: 76  Temp: 97.8  F (36.6  C)  Resp: 18  SpO2: 98 %      Physical Exam   Constitutional: She is oriented to person, place, and time. Vital signs are normal. She  appears well-nourished. She does not appear ill.   HENT:   Head: Normocephalic and atraumatic.   Right Ear: External ear normal.   Left Ear: External ear normal.   Nose: Nose normal.   Mouth/Throat: Oropharynx is clear and moist and mucous membranes are normal.   Eyes: Conjunctivae, EOM and lids are normal. Pupils are equal, round, and reactive to light. No scleral icterus.   Fundoscopic exam:       The right eye shows no hemorrhage.        The left eye shows no hemorrhage.   Neck: Trachea normal. Neck supple. No JVD present. Carotid bruit is not present.   Cardiovascular: Normal rate, regular rhythm, S1 normal, S2 normal and intact distal pulses.  No extrasystoles are present.   No murmur heard.  Pulmonary/Chest: Effort normal and breath sounds normal. No respiratory distress.   Abdominal: Soft. Bowel sounds are normal. She exhibits no distension and no mass. There is no splenomegaly or hepatomegaly. There is tenderness (Pain localizes left lower quadrant.). There is no rebound and no guarding. No hernia.   Musculoskeletal: Normal range of motion.   Lymphadenopathy:     She has no cervical adenopathy.   Neurological: She is alert and oriented to person, place, and time. She has normal strength and normal reflexes. No sensory deficit.   Skin: Skin is warm and dry. No rash noted. No pallor.   Psychiatric: She has a normal mood and affect. Her speech is normal and behavior is normal. Cognition and memory are normal.   Nursing note and vitals reviewed.      ED Course        Procedures              Medications   ketorolac (TORADOL) injection 15 mg (not administered)   ondansetron (ZOFRAN) injection 4 mg (not administered)       Assessments & Plan (with Medical Decision Making)  Acute on chronic left sided Abd. Pain. Now with blood mixed with stool and more intense pain LLQ.  No fever.  Examination noted localized pain left.  Positive family history of diverticulitis.  Reviewed colonoscopy report from 2012 which  indicated normal findings.  I suspect that diverticulum was present this was not simply reported.  CT confirmed multiple diverticula on the left side with active mesenteric inflammation in the fat layer around the diverticulum consistent with diverticulitis.  There is no complication such as perforation or abscess.  Treat with metronidazole and ciprofloxacin.     I have reviewed the nursing notes.    I have reviewed the findings, diagnosis, plan and need for follow up with the patient.         Medication List      There are no discharge medications for this visit.         Final diagnoses:   Diverticulitis of colon       12/14/2018   [facility] EMERGENCY DEPARTMENT     Frederick Santiago DO  12/14/18 0619

## 2018-12-20 ENCOUNTER — TELEPHONE (OUTPATIENT)
Dept: FAMILY MEDICINE | Facility: CLINIC | Age: 73
End: 2018-12-20

## 2018-12-20 NOTE — TELEPHONE ENCOUNTER
"Call placed to patient.  Reports having red blood tinged stool 12/14/18 with worsening left side abdominal pain-ED.  Diagnosed with Diverticulitis.     Patient reports having intermittent black stools since starting antibiotics.  Today, thought there was a larger amount of black stool.  Patient \"looking for reassurance that this is normal with Diverticulitis\".    Afebrile.  Denies light headed or dizziness.  Abdominal pain present, tender, better.  Reports poor intake, pushing fluids. No change in urine amount or character.  HGB 13.8 at ED visit.  With symptoms improving since diagnosis, appears this can be the Diverticulitis resolving.    Encourage small bland food frequently.  Encourage probiotic or yogurt daily.    Advised to ED if pain becomes severe, bright red blood per stool.  Patient verbalizes understanding.  Plan forward to Dr Hung to advise if additional recommendations.  Debbie Mayorga RN       "

## 2018-12-20 NOTE — TELEPHONE ENCOUNTER
Reason for call:  Patient reporting a symptom    Symptom or request: Kristen was seen by gastroenterologist and diagnosed with diverticulitis.  She is on 1000mg cipro and 1000mg flagyl.  She has been having dark stools.  States that for a couple of days they were a bit light, but now today they are black.  No diarrhea, no pain, just black stools.  Could antibiotics be causing the dark stools?  She has been seeing Dr. Hung lately.  Please call and assess.  Thank you..Nayeli Perez    Duration (how long have symptoms been present): for a few days    Have you been treated for this before? No    Phone Number patient can be reached at:  Cell number on file:    Telephone Information:   Mobile [phone]       Best Time:  Any time    Can we leave a detailed message on this number:  YES    Call taken on 12/20/2018 at 2:53 PM by Nayeli Perez

## 2018-12-26 ENCOUNTER — TELEPHONE (OUTPATIENT)
Dept: FAMILY MEDICINE | Facility: CLINIC | Age: 73
End: 2018-12-26

## 2018-12-26 NOTE — TELEPHONE ENCOUNTER
Reason for Call:  Other dark stools.       Detailed comments: Kristen had called on 12/20/18 and spoke with RN regarding her dark stools.  She is still waiting to hear what the next step is or what the doctor said.  Could you please call and assess. Thank you..Nayeli Perez    Phone Number Patient can be reached at: Home number on file [phone] (home)    Best Time: any time    Can we leave a detailed message on this number? YES    Call taken on 12/26/2018 at 8:58 AM by Nayeli Perez

## 2018-12-28 ENCOUNTER — OFFICE VISIT (OUTPATIENT)
Dept: FAMILY MEDICINE | Facility: CLINIC | Age: 73
End: 2018-12-28
Payer: COMMERCIAL

## 2018-12-28 VITALS
HEART RATE: 52 BPM | TEMPERATURE: 97.8 F | DIASTOLIC BLOOD PRESSURE: 72 MMHG | SYSTOLIC BLOOD PRESSURE: 126 MMHG | BODY MASS INDEX: 27.44 KG/M2 | HEIGHT: 62 IN

## 2018-12-28 DIAGNOSIS — R14.0 BLOATING SYMPTOM: ICD-10-CM

## 2018-12-28 DIAGNOSIS — M46.92 INFLAMMATORY SPONDYLOPATHY OF CERVICAL REGION (H): ICD-10-CM

## 2018-12-28 DIAGNOSIS — K57.32 DIVERTICULITIS OF COLON: Primary | ICD-10-CM

## 2018-12-28 DIAGNOSIS — K59.00 CONSTIPATION, UNSPECIFIED CONSTIPATION TYPE: ICD-10-CM

## 2018-12-28 DIAGNOSIS — I48.91 ATRIAL FIBRILLATION, UNSPECIFIED TYPE (H): ICD-10-CM

## 2018-12-28 PROCEDURE — 99214 OFFICE O/P EST MOD 30 MIN: CPT | Performed by: FAMILY MEDICINE

## 2018-12-28 ASSESSMENT — PAIN SCALES - GENERAL: PAINLEVEL: NO PAIN (0)

## 2018-12-28 NOTE — PROGRESS NOTES
"  SUBJECTIVE:   Kristen Thompson is a 73 year old female who presents to clinic today for the following health issues:    ED/UC Followup:    Facility:  [facility]  Date of visit: 12/14/2018  Reason for visit: Diverticulitis  Current Status: She says she is feeling better, but she is still in pain. Her stools were black until yesterday.      Symptoms started 3/2018  Was seen in our clinic 3x this year and once in GI clinic without getting diagnosis of diverticulitis  Has chronic constipation - \"my entire life I have pooped marbles\"    Symptoms got worse in the middle of the night on 12/14/18 and she went to the ER  CT scan showed diverticulitis -   Ct abd/pelvis:  Pelvis: The small and large bowel are normal in caliber. Several  diverticula are present in the colon. Moderate haziness within the fat  about a diverticulum in the distal sigmoid colon (series 2 image 62).  These findings are consistent with diverticulitis. No extraluminal gas  or loculated fluid collections in the pelvis. The uterus is not  visualized. No enlarged lymph nodes in the pelvis.   IMPRESSION: Diverticulitis of the distal sigmoid colon. No abscess.  KATT KAUR MD    Discharged home on antibiotics     Has been trying to eat fiber rich foods  Tried cutting out wheat and dairy.    Current symptoms -   Blood tinged stool for the past two weeks, gone today  Says pain in the LLQ is improved but not gone   Pain level 3-4, pain was 8/10 in the ER     She wants to know how to avoid bouts of diverticulitis in the future     Review of systems:  Appetite is normal  Eating bland food   No n/v       Colonoscopy 10/2012    Findings:       The entire examined colon appeared normal on direct and retroflexion        views.                                                                                   Impression:               - Preparation of the colon was fair.                            - The entire examined colon is normal on direct and " "                            retroflexion views.  Recommendation:           - Discharge patient to home (ambulatory).                                             Problem list and histories reviewed & adjusted, as indicated.  Additional history: as documented    Patient Active Problem List   Diagnosis     GERD (gastroesophageal reflux disease)     CARDIOVASCULAR SCREENING; LDL GOAL LESS THAN 160     HL (hearing loss)     Health Care Home     Arthritis     Advance Care Planning     Hyperlipidemia LDL goal <130     Chronic constipation     Gastritis     Cervicalgia     Localized edema     Current Outpatient Medications   Medication     B Complex Vitamins (VITAMIN B COMPLEX PO)     CALCIUM PO     Cholecalciferol (VITAMIN D PO)     KRILL OIL OMEGA-3 PO     Omega-3 Fatty Acids (OMEGA 3 PO)     omeprazole (PRILOSEC) 20 MG CR capsule     LANsoprazole (PREVACID) 15 MG CR capsule     MAGNESIUM PO     No current facility-administered medications for this visit.         Allergies   Allergen Reactions     Codeine Sulfate      Nausea and vomiting      Quinapril Difficulty breathing     Darvon-N [Propoxyphene Napsylate] Nausea and Vomiting       /72 (BP Location: Right arm, Patient Position: Sitting, Cuff Size: Adult Regular)   Pulse 52   Temp 97.8  F (36.6  C) (Tympanic)   Ht 1.575 m (5' 2\")   BMI 27.44 kg/m    GENERAL - Pt is alert and oriented in no acute distress.  Affect is appropriate. Good eye contact.  HEET - Head is normocephalic, atraumatic.    Oropharynx free of masses and lesions, no tonsillar exudate or petechiae.    NECK - Neck is supple w/o LA or thyromegaly  RESPIRATORY - Clear to auscultation bilaterally.  No wheezing noted  CV - RRR, no murmurs, rubs, gallops.   ABD - +BS, soft, mildly tender to palpation in the LLQ, no rebound, no guarding. No palpable organomegaly.  EXTREM - No edema.      Assessment/Plan -    (K57.32) Diverticulitis of colon  (primary encounter diagnosis)  Comment: discussed diagnosis " at length. She has completed her antibiotics and still has pain. Doesn't want to take more antibiotics - no fever, pain is better than it was, eating normally.  Will monitor closely, if pain worsens or persists, consider another course of antibiotics.  Discussed importance of no straining and soft stools to prevent recurrence of diverticulitis.  Given her chronic lifelong constipation, she is skeptical that this is possible.    Plan:     (K59.00) chronic constipation  Comment: Discussed possible bowel regimen. Will start with :   miralax take half capful every other day  PLUS daily stool softener for a week   If too much, stop stool softener   If too little - increase miralax to half capful daily and can go further to capful daily     She may need senna in addition, but she didn't want to start that today.   In general, she doesn't like taking medication so she is a little frustrated that I am asking her to do this. However, she acknowledges that she has made multiple appropriate dietary changes without improvement in her constipation. She will mychart me in a few weeks and let me know how it is going     (R14.0) Bloating symptom  Comment: I offered sprue testing but she prefers to just continue gluten free   Plan: Tissue transglutaminase cristal IgA and IgG, IgA            (I48.91) Atrial fibrillation, unspecified type (H)  Comment:   Plan:     (M46.92) Inflammatory spondylopathy of cervical region (H)  Comment: neck is feeling ok at this time.   Plan:     LEONARD Morse MD

## 2018-12-28 NOTE — PATIENT INSTRUCTIONS
For chronic constipation:     miralax take half capful every other day  PLUS daily stool softener for a week     If too much, stop stool softener     If too little - increase miralax to half capful daily and can go further to capful daily

## 2018-12-30 PROBLEM — M46.92 INFLAMMATORY SPONDYLOPATHY OF CERVICAL REGION (H): Status: ACTIVE | Noted: 2018-12-30

## 2018-12-30 PROBLEM — I48.91 ATRIAL FIBRILLATION, UNSPECIFIED TYPE (H): Status: ACTIVE | Noted: 2018-12-30

## 2019-01-03 ENCOUNTER — TELEPHONE (OUTPATIENT)
Dept: FAMILY MEDICINE | Facility: CLINIC | Age: 74
End: 2019-01-03

## 2019-01-03 NOTE — TELEPHONE ENCOUNTER
"Patient returned call.  Patient denies sore throat. Denies difficulty breathing.  Tongue feels \"thick\" last few days.  Thick white coating on tongue.  Metallic taste in mouth since 12/14/18 when started antibiotics.  Afebrile.  Abdominal ache/constipation unchanged from office visit.  Advised if feels difficulty breathing, ED visit.  ?Thrush.  Debbie Mayorga RN     "

## 2019-01-03 NOTE — TELEPHONE ENCOUNTER
Reason for call:  Patient reporting a symptom    Symptom or request: Kristen WRIGHT MESSAGE:  She feels like the back of her tongue is swollen.  When she looks at it there is a yellow film on her tongue that doesn't come off.  Not sure if that is from the antibiotics she was on or what it's from.  Please call and assess.  Thank you..Nayeli Perez    Duration (how long have symptoms been present): unknown    Have you been treated for this before? No    Phone Number patient can be reached at:  Cell number on file:    Telephone Information:   Mobile [phone]       Call taken on 1/3/2019 at 2:16 PM by Nayeli Perez

## 2019-01-04 NOTE — TELEPHONE ENCOUNTER
Voicemail message left for patient to return call, Dr Hung would like to see her today.  Debbie Mayorga RN

## 2019-01-07 ENCOUNTER — OFFICE VISIT (OUTPATIENT)
Dept: FAMILY MEDICINE | Facility: CLINIC | Age: 74
End: 2019-01-07
Payer: COMMERCIAL

## 2019-01-07 VITALS
DIASTOLIC BLOOD PRESSURE: 72 MMHG | BODY MASS INDEX: 27.6 KG/M2 | HEIGHT: 62 IN | SYSTOLIC BLOOD PRESSURE: 122 MMHG | TEMPERATURE: 98.7 F | HEART RATE: 72 BPM | WEIGHT: 150 LBS

## 2019-01-07 DIAGNOSIS — R39.9 LOWER URINARY TRACT SYMPTOMS: ICD-10-CM

## 2019-01-07 DIAGNOSIS — B37.0 ORAL THRUSH: Primary | ICD-10-CM

## 2019-01-07 LAB
ALBUMIN UR-MCNC: NEGATIVE MG/DL
APPEARANCE UR: CLEAR
BACTERIA #/AREA URNS HPF: ABNORMAL /HPF
BILIRUB UR QL STRIP: NEGATIVE
COLOR UR AUTO: YELLOW
GLUCOSE UR STRIP-MCNC: NEGATIVE MG/DL
HGB UR QL STRIP: NEGATIVE
KETONES UR STRIP-MCNC: NEGATIVE MG/DL
LEUKOCYTE ESTERASE UR QL STRIP: NEGATIVE
NITRATE UR QL: NEGATIVE
PH UR STRIP: 7 PH (ref 5–7)
RBC #/AREA URNS AUTO: ABNORMAL /HPF
SOURCE: ABNORMAL
SP GR UR STRIP: 1.01 (ref 1–1.03)
UROBILINOGEN UR STRIP-ACNC: 0.2 EU/DL (ref 0.2–1)
WBC #/AREA URNS AUTO: ABNORMAL /HPF

## 2019-01-07 PROCEDURE — 81001 URINALYSIS AUTO W/SCOPE: CPT | Performed by: FAMILY MEDICINE

## 2019-01-07 PROCEDURE — 99214 OFFICE O/P EST MOD 30 MIN: CPT | Performed by: FAMILY MEDICINE

## 2019-01-07 RX ORDER — NYSTATIN 100000/ML
500000 SUSPENSION, ORAL (FINAL DOSE FORM) ORAL 4 TIMES DAILY
Qty: 200 ML | Refills: 0 | Status: SHIPPED | OUTPATIENT
Start: 2019-01-07 | End: 2019-04-10

## 2019-01-07 ASSESSMENT — MIFFLIN-ST. JEOR: SCORE: 1148.65

## 2019-01-07 NOTE — TELEPHONE ENCOUNTER
Kristen reports that her tongue is not as swollen but she can still feel something in her throat and tongue. Message handled by Nurse Triage with [facility] - provider name: Toan.Work in appointment made for this morning.  Mahamed Thomas RN

## 2019-01-07 NOTE — PROGRESS NOTES
"SUBJECTIVE:                                                    Kristen Thompson is a 73 year old female who presents to clinic today for the following health issues:    1/3/19 Nurse Telephone Message.   Patient denies sore throat. Denies difficulty breathing.  Tongue feels \"thick\" last few days.  Thick white coating on tongue.  Metallic taste in mouth since 12/14/18 when started antibiotics.  Afebrile.  Abdominal ache/constipation unchanged from office visit.  Advised if feels difficulty breathing, ED visit.  ?Thrush.  Debbie Mayorga RN         Problem list and histories reviewed & adjusted, as indicated.  Additional history: not a swollen but she feels   She has not been the same since she was on the antibiotics  She has been constipated and has been on the miralax  She has had the diverticultisi and she took the medications still feeling like it is not   She also has strong urine odor maybe some hesitancy   No fever or chills no back pain   No hematuria   The blood in the stool has stopped     Patient Active Problem List   Diagnosis     GERD (gastroesophageal reflux disease)     CARDIOVASCULAR SCREENING; LDL GOAL LESS THAN 160     HL (hearing loss)     Health Care Home     Arthritis     Advance Care Planning     Hyperlipidemia LDL goal <130     Chronic constipation     Gastritis     Cervicalgia     Localized edema     Atrial fibrillation, unspecified type (H)     Inflammatory spondylopathy of cervical region (H)     Past Surgical History:   Procedure Laterality Date     C RAD RESEC TONSIL/PILLARS       COLONOSCOPY  10/30/2012    Procedure: COLONOSCOPY;  Colonoscopy;  Surgeon: Denise Bah MD;  Location: [facility]     ESOPHAGOSCOPY, GASTROSCOPY, DUODENOSCOPY (EGD), COMBINED N/A 9/15/2016    Procedure: COMBINED ESOPHAGOSCOPY, GASTROSCOPY, DUODENOSCOPY (EGD);  Surgeon: Bennie Godinez MD;  Location: [facility]     GALLBLADDER SURGERY       HYSTERECTOMY       knee replacment  2007     RELEASE TRIGGER FINGER " "Right 4/29/2016    Procedure: RELEASE TRIGGER FINGER;  Surgeon: Percy Sarmiento MD;  Location: [facility]     REPAIR BLADDER         Social History     Tobacco Use     Smoking status: Never Smoker     Smokeless tobacco: Never Used   Substance Use Topics     Alcohol use: No     Alcohol/week: 0.0 oz     Family History   Problem Relation Age of Onset     C.A.D. Mother      Cardiovascular Mother      Thyroid Disease Mother      Cancer Father         stomach ca     Cancer Sister      Eye Disorder Sister      C.A.D. Sister      Cerebrovascular Disease Sister      Breast Cancer Sister      Arthritis Sister      Cardiovascular Sister      Depression Sister      Heart Disease Sister      Neurologic Disorder Sister      Osteoporosis Sister      Thyroid Disease Sister      Depression Sister      Thyroid Disease Sister      Depression Sister      Thyroid Disease Sister      Obesity Sister      Neurologic Disorder Sister            ROS:  Constitutional, HEENT, cardiovascular, pulmonary, gi and gu systems are negative, except as otherwise noted.    OBJECTIVE:                                                    /72   Pulse 72   Temp 98.7  F (37.1  C) (Tympanic)   Ht 1.575 m (5' 2\")   Wt 68 kg (150 lb)   BMI 27.44 kg/m   Body mass index is 27.44 kg/m .   GENERAL APPEARANCE: healthy, alert and no distress  HENT: ear canals and TM's normal and white /yellow coating posterior tongue with patches on tonsils   NECK: no adenopathy, no asymmetry, masses, or scars and thyroid normal to palpation  RESP: lungs clear to auscultation - no rales, rhonchi or wheezes  CV: regular rates and rhythm, normal S1 S2, no S3 or S4 and no murmur, click or rub  ABDOMEN: soft, little tender to deep palpation in the llq , without hepatosplenomegaly or masses, bowel sounds normal and no cva tenderness        ASSESSMENT/PLAN:                                                      1. Oral thrush  Secondary to the antibiotics for diverticulitis   - " nystatin (MYCOSTATIN) 222526 UNIT/ML suspension; Take 5 mLs (500,000 Units) by mouth 4 times daily for 10 days  Dispense: 200 mL; Refill: 0    2. Lower urinary tract symptoms  Will check urine will call with results.     - UA with Microscopic reflex to Culture     reports that  has never smoked. she has never used smokeless tobacco.          Cortney Hung M.D.  [facility]

## 2019-02-08 ENCOUNTER — OFFICE VISIT (OUTPATIENT)
Dept: ORTHOPEDICS | Facility: CLINIC | Age: 74
End: 2019-02-08
Payer: COMMERCIAL

## 2019-02-08 ENCOUNTER — ANCILLARY PROCEDURE (OUTPATIENT)
Dept: GENERAL RADIOLOGY | Facility: CLINIC | Age: 74
End: 2019-02-08
Attending: PEDIATRICS
Payer: COMMERCIAL

## 2019-02-08 VITALS
SYSTOLIC BLOOD PRESSURE: 126 MMHG | HEIGHT: 62 IN | BODY MASS INDEX: 27.6 KG/M2 | WEIGHT: 150 LBS | DIASTOLIC BLOOD PRESSURE: 74 MMHG

## 2019-02-08 DIAGNOSIS — M17.12 PRIMARY OSTEOARTHRITIS OF LEFT KNEE: ICD-10-CM

## 2019-02-08 DIAGNOSIS — M25.561 PAIN IN BOTH KNEES, UNSPECIFIED CHRONICITY: ICD-10-CM

## 2019-02-08 DIAGNOSIS — Z96.651 HISTORY OF TOTAL KNEE ARTHROPLASTY, RIGHT: ICD-10-CM

## 2019-02-08 DIAGNOSIS — M25.562 PAIN IN BOTH KNEES, UNSPECIFIED CHRONICITY: ICD-10-CM

## 2019-02-08 DIAGNOSIS — M17.12 PRIMARY OSTEOARTHRITIS OF LEFT KNEE: Primary | ICD-10-CM

## 2019-02-08 PROCEDURE — 73562 X-RAY EXAM OF KNEE 3: CPT | Mod: LT | Performed by: PEDIATRICS

## 2019-02-08 PROCEDURE — 99213 OFFICE O/P EST LOW 20 MIN: CPT | Performed by: PEDIATRICS

## 2019-02-08 ASSESSMENT — MIFFLIN-ST. JEOR: SCORE: 1148.65

## 2019-02-08 NOTE — PROGRESS NOTES
"Sports Medicine Clinic Visit    PCP: Janett Morse Jay is a 73 year old female who is seen in f/u up for Primary osteoarthritis of left knee. Since last visit on 5/16/17  patient has begun to have pain in both knees.  Does have a TKA in her right knee, done in 2007.  In the past 6 weeks she has had pain in her right knee.  She did also fall on her left knee about 3 weeks ago.  She has had an increase in pain in that knee since that time.  Pain increases with weather changes.    Has never been pain free with her TKA.  **    Right knee more sore currently. Hx of TKA 2007.  Left knee injury as noted above.  Primarily anterior knee discomfort, both knees.  Not much pain with walking on level surface.  Notes pain more with stairs, attempting to kneel or squat, also sit to stand and vice versa.      Review of Systems  All other systems reviewed and are negative unless noted above.    Past Medical History:   Diagnosis Date     Arthritis 11/19/2013     Gastro-oesophageal reflux disease      GERD (gastroesophageal reflux disease) 10/2/2012     H/O total hysterectomy      HL (hearing loss) 10/11/2012     PONV (postoperative nausea and vomiting)      Squamous cell carcinoma      Past Surgical History:   Procedure Laterality Date     C RAD RESEC TONSIL/PILLARS       COLONOSCOPY  10/30/2012    Procedure: COLONOSCOPY;  Colonoscopy;  Surgeon: Denise Bah MD;  Location: [facility]     ESOPHAGOSCOPY, GASTROSCOPY, DUODENOSCOPY (EGD), COMBINED N/A 9/15/2016    Procedure: COMBINED ESOPHAGOSCOPY, GASTROSCOPY, DUODENOSCOPY (EGD);  Surgeon: Bennie Godinez MD;  Location: [facility]     GALLBLADDER SURGERY       HYSTERECTOMY       knee replacment  2007     RELEASE TRIGGER FINGER Right 4/29/2016    Procedure: RELEASE TRIGGER FINGER;  Surgeon: Percy Sarmiento MD;  Location: [facility]     REPAIR BLADDER         Objective  /74   Ht 1.575 m (5' 2\")   Wt 68 kg (150 lb)   BMI 27.44 kg/m      GENERAL " APPEARANCE: healthy, alert and no distress   GAIT: NORMAL  SKIN: no suspicious lesions or rashes  NEURO: Normal strength and tone, mentation intact and speech normal  PSYCH:  mentation appears normal and affect normal/bright  HEENT: no scleral icterus  CV: no extremity edema  RESP: nonlabored breathing        Bilateral knee exam    ROM:        Grossly symmetric active and passive ROM with flexion and extension  Minimal discomfort anterior knee at end of flexion, and with active extension    Inspection:       no visible ecchymosis       effusion noted trace to mild left  Right with appearance of chronic degenerative change, and well-healed surgical scar    Skin:       no visible deformities       well perfused       capillary refill brisk    Tender:        Mild joint lines bilaterally    Special Tests:        neg (-) varus       neg (-) valgus         Radiology  Visualized radiographs of both knees obtained today, and reviewed the images with the patient.  Impression: Bilateral PA, bilateral sunrise, bilateral lateral views of the knees demonstrate once again moderate left knee degenerative change, similar to that seen on prior plain films from 5/10/16.  On the right, total knee arthroplasty components appear intact, unchanged compared to prior films.  There is no acute osseous abnormality identified.      Assessment:  1. Primary osteoarthritis of left knee    2. Pain in both knees, unspecified chronicity    3. History of total knee arthroplasty, right        Plan:  Discussed the assessment with the patient.  Reviewed course and options.  In particular, discussed ongoing monitoring with symptom management, activity modification; rehab; additional imaging (nothing required, though if investigating components on the right, could consider bone scan); use of compression or other support as desired; left knee steroid injection.  Her main concern was for any potential aggravation of underlying.  I do not see any  significant change on plain films, so no other intervention is required currently.  She prefers to continue with monitoring, does not desire an injection today.  Follow-up: Will leave open-ended.  Questions answered. The patient indicates understanding of these issues and agrees with the plan.    Parveen Mehta, , CAQ          Disclaimer: This note consists of symbols derived from keyboarding, dictation and/or voice recognition software. As a result, there may be errors in the script that have gone undetected. Please consider this when interpreting information found in this chart.

## 2019-02-08 NOTE — LETTER
2/8/2019         RE: Kristen Thompson  [address] [place] [id number]        Dear Colleague,    Thank you for referring your patient, Kristen Thompson, to the [facility]. Please see a copy of my visit note below.    Sports Medicine Clinic Visit    PCP: Janett Morse    Kristen Thompson is a 73 year old female who is seen in f/u up for Primary osteoarthritis of left knee. Since last visit on 5/16/17  patient has begun to have pain in both knees.  Does have a TKA in her right knee, done in 2007.  In the past 6 weeks she has had pain in her right knee.  She did also fall on her left knee about 3 weeks ago.  She has had an increase in pain in that knee since that time.  Pain increases with weather changes.    Has never been pain free with her TKA.  **    Right knee more sore currently. Hx of TKA 2007.  Left knee injury as noted above.  Primarily anterior knee discomfort, both knees.  Not much pain with walking on level surface.  Notes pain more with stairs, attempting to kneel or squat, also sit to stand and vice versa.      Review of Systems  All other systems reviewed and are negative unless noted above.    Past Medical History:   Diagnosis Date     Arthritis 11/19/2013     Gastro-oesophageal reflux disease      GERD (gastroesophageal reflux disease) 10/2/2012     H/O total hysterectomy      HL (hearing loss) 10/11/2012     PONV (postoperative nausea and vomiting)      Squamous cell carcinoma      Past Surgical History:   Procedure Laterality Date     C RAD RESEC TONSIL/PILLARS       COLONOSCOPY  10/30/2012    Procedure: COLONOSCOPY;  Colonoscopy;  Surgeon: Denise Bah MD;  Location: [facility]     ESOPHAGOSCOPY, GASTROSCOPY, DUODENOSCOPY (EGD), COMBINED N/A 9/15/2016    Procedure: COMBINED ESOPHAGOSCOPY, GASTROSCOPY, DUODENOSCOPY (EGD);  Surgeon: Bennie Godinez MD;  Location: [facility]     GALLBLADDER SURGERY       HYSTERECTOMY       knee replacment   "2007     RELEASE TRIGGER FINGER Right 4/29/2016    Procedure: RELEASE TRIGGER FINGER;  Surgeon: Percy Sarmiento MD;  Location: [facility]     REPAIR BLADDER         Objective  /74   Ht 1.575 m (5' 2\")   Wt 68 kg (150 lb)   BMI 27.44 kg/m       GENERAL APPEARANCE: healthy, alert and no distress   GAIT: NORMAL  SKIN: no suspicious lesions or rashes  NEURO: Normal strength and tone, mentation intact and speech normal  PSYCH:  mentation appears normal and affect normal/bright  HEENT: no scleral icterus  CV: no extremity edema  RESP: nonlabored breathing        Bilateral knee exam    ROM:        Grossly symmetric active and passive ROM with flexion and extension  Minimal discomfort anterior knee at end of flexion, and with active extension    Inspection:       no visible ecchymosis       effusion noted trace to mild left  Right with appearance of chronic degenerative change, and well-healed surgical scar    Skin:       no visible deformities       well perfused       capillary refill brisk    Tender:        Mild joint lines bilaterally    Special Tests:        neg (-) varus       neg (-) valgus         Radiology  Visualized radiographs of both knees obtained today, and reviewed the images with the patient.  Impression: Bilateral PA, bilateral sunrise, bilateral lateral views of the knees demonstrate once again moderate left knee degenerative change, similar to that seen on prior plain films from 5/10/16.  On the right, total knee arthroplasty components appear intact, unchanged compared to prior films.  There is no acute osseous abnormality identified.      Assessment:  1. Primary osteoarthritis of left knee    2. Pain in both knees, unspecified chronicity    3. History of total knee arthroplasty, right        Plan:  Discussed the assessment with the patient.  Reviewed course and options.  In particular, discussed ongoing monitoring with symptom management, activity modification; rehab; additional imaging " (nothing required, though if investigating components on the right, could consider bone scan); use of compression or other support as desired; left knee steroid injection.  Her main concern was for any potential aggravation of underlying.  I do not see any significant change on plain films, so no other intervention is required currently.  She prefers to continue with monitoring, does not desire an injection today.  Follow-up: Will leave open-ended.  Questions answered. The patient indicates understanding of these issues and agrees with the plan.    Parveen Mehta DO, CAQ          Disclaimer: This note consists of symbols derived from keyboarding, dictation and/or voice recognition software. As a result, there may be errors in the script that have gone undetected. Please consider this when interpreting information found in this chart.        Again, thank you for allowing me to participate in the care of your patient.        Sincerely,        Parveen Mehta DO

## 2019-02-14 ENCOUNTER — HOSPITAL ENCOUNTER (OUTPATIENT)
Dept: MAMMOGRAPHY | Facility: CLINIC | Age: 74
Discharge: HOME | End: 2019-02-14
Attending: FAMILY MEDICINE | Admitting: FAMILY MEDICINE
Payer: COMMERCIAL

## 2019-02-14 DIAGNOSIS — Z12.31 VISIT FOR SCREENING MAMMOGRAM: ICD-10-CM

## 2019-02-14 PROCEDURE — 77063 BREAST TOMOSYNTHESIS BI: CPT

## 2019-03-14 ENCOUNTER — OFFICE VISIT (OUTPATIENT)
Dept: FAMILY MEDICINE | Facility: CLINIC | Age: 74
End: 2019-03-14
Payer: COMMERCIAL

## 2019-03-14 VITALS
RESPIRATION RATE: 18 BRPM | HEART RATE: 54 BPM | OXYGEN SATURATION: 96 % | WEIGHT: 148 LBS | HEIGHT: 62 IN | DIASTOLIC BLOOD PRESSURE: 87 MMHG | BODY MASS INDEX: 27.23 KG/M2 | TEMPERATURE: 97.8 F | SYSTOLIC BLOOD PRESSURE: 134 MMHG

## 2019-03-14 DIAGNOSIS — J01.00 ACUTE NON-RECURRENT MAXILLARY SINUSITIS: Primary | ICD-10-CM

## 2019-03-14 PROCEDURE — 99214 OFFICE O/P EST MOD 30 MIN: CPT | Performed by: FAMILY MEDICINE

## 2019-03-14 ASSESSMENT — MIFFLIN-ST. JEOR: SCORE: 1139.57

## 2019-03-14 NOTE — PATIENT INSTRUCTIONS
Continue with the flonase spray     Buy from pharmacist - generic 12hr sudafed   ( not phenylephrine)     neti pot - sterile saline warm    Humidifier in the bedroom     Consider afrin   - 3 days - twice a day - wash with neti after using afrin

## 2019-03-14 NOTE — PROGRESS NOTES
SUBJECTIVE:   Kristen Thompson is a 73 year old female who presents to clinic today for the following health issues:      ENT Symptoms             Symptoms: cc Present Absent Comment   Fever/Chills   x    Fatigue  x  Headache    Muscle Aches   x    Eye Irritation   x    Sneezing   x    Nasal Thomas/Drg  x     Sinus Pressure/Pain  x     Loss of smell  x     Dental pain  x     Sore Throat   x    Swollen Glands       Ear Pain/Fullness  x  Pressure     Cough   x    Wheeze   x    Chest Pain   x    Shortness of breath   x    Rash   x    Other   x      Symptom duration:  Past 4 weeks    Symptom severity:  mild    Treatments tried:  Flonase    Contacts:  - works at      4 weeks of symptoms  Sore throat, congestion, ears are plugged   Headache   Symptoms are slightly improved   Fevers at the start - not now  No cough  She started flonase a few weeks ago and it has helped     Review of systems:  Normal appetite  No f/c   No n/v   No diarrhea/constipation   No rash     Problem list and histories reviewed & adjusted, as indicated.  Additional history: as documented       Patient Active Problem List   Diagnosis     GERD (gastroesophageal reflux disease)     CARDIOVASCULAR SCREENING; LDL GOAL LESS THAN 160     HL (hearing loss)     Health Care Home     Arthritis     Advance Care Planning     Hyperlipidemia LDL goal <130     Chronic constipation     Gastritis     Cervicalgia     Localized edema     Atrial fibrillation, unspecified type (H)     Inflammatory spondylopathy of cervical region (H)     Current Outpatient Medications   Medication     B Complex Vitamins (VITAMIN B COMPLEX PO)     CALCIUM PO     Cholecalciferol (VITAMIN D PO)     fluticasone (FLONASE) 50 MCG/ACT nasal spray     KRILL OIL OMEGA-3 PO     Omega-3 Fatty Acids (OMEGA 3 PO)     Polyethylene Glycol 3350 (MIRALAX PO)     Probiotic Product (PROBIOTIC PO)     LANsoprazole (PREVACID) 15 MG CR capsule     MAGNESIUM PO     omeprazole (PRILOSEC) 20 MG   "capsule     No current facility-administered medications for this visit.         Allergies   Allergen Reactions     Codeine Sulfate      Nausea and vomiting      Quinapril Difficulty breathing     Darvon-N [Propoxyphene Napsylate] Nausea and Vomiting       /87 (BP Location: Right arm, Patient Position: Sitting, Cuff Size: Adult Regular)   Pulse 54   Temp 97.8  F (36.6  C) (Tympanic)   Resp 18   Ht 1.575 m (5' 2\")   Wt 67.1 kg (148 lb)   SpO2 96%   BMI 27.07 kg/m    GENERAL - Pt is alert and oriented in no acute distress.  Affect is appropriate. Good eye contact.  HEET - Head is normocephalic, atraumatic.    PERRLA,EEMI. Conjunctiva are free of icterus or erythema.    TMs bilaterally normal.    Oropharynx free of masses and lesions, no tonsillar exudate or petechiae.    NECK - Neck is supple w/o LA or thyromegaly  RESPIRATORY - Clear to auscultation bilaterally.  No wheezing noted  CV - RRR, no murmurs, rubs, gallops.       Assessment/Plan -    (J01.00) Acute non-recurrent maxillary sinusitis  (primary encounter diagnosis)  Comment: Discussed sinus cares including sinus washes, rest, and fluids. Sudafed for decongestion as needed. Can take Afrin x 2-3d then stop. Continue using flonase. She doesn't want antibiotics and there isn't a clear indication for them at this time. She will try to get sinuses draining with washing, humidifier over the weekend. If symptoms persist/change/worsen, she will let me know early next week and we'll consider adding in antibiotics.  Information handout on diagnosis and symptomatic treatment.  Return to clinic or call if not improving.      LEONARD Morse MD             "

## 2019-04-03 ENCOUNTER — TELEPHONE (OUTPATIENT)
Dept: ORTHOPEDICS | Facility: CLINIC | Age: 74
End: 2019-04-03

## 2019-04-03 NOTE — TELEPHONE ENCOUNTER
Patient LVM requesting a call back. Would like to know if she should continue with antibiotics prior to dental due to her knee replacement.

## 2019-04-03 NOTE — TELEPHONE ENCOUNTER
Called patient.  She would like to know what Dr. Mehta thought about having to take antibiotics for her TKA prior to her dental procedures.  Her dentist told her that she may need to be on them for life.  Explained that Dr. Mehta does not do surgery and she would need to contact her surgeon.  She states her surgeon has since passed away.  Her surgery was in 2007.  I did let her know I would pass this message along to the surgeons here to see if they have any insight on if she would still need to continue antibiotics for dental procedures.    Please advise on thoughts for continued prophylactic antibiotics.     Susan Guidry MS ATC

## 2019-04-10 ENCOUNTER — OFFICE VISIT (OUTPATIENT)
Dept: DERMATOLOGY | Facility: CLINIC | Age: 74
End: 2019-04-10
Payer: COMMERCIAL

## 2019-04-10 VITALS — OXYGEN SATURATION: 97 % | HEART RATE: 57 BPM | SYSTOLIC BLOOD PRESSURE: 152 MMHG | DIASTOLIC BLOOD PRESSURE: 88 MMHG

## 2019-04-10 DIAGNOSIS — L82.1 SEBORRHEIC KERATOSIS: Primary | ICD-10-CM

## 2019-04-10 DIAGNOSIS — D23.9 DERMAL NEVUS: ICD-10-CM

## 2019-04-10 DIAGNOSIS — Z85.828 HISTORY OF SKIN CANCER: ICD-10-CM

## 2019-04-10 DIAGNOSIS — D18.00 ANGIOMA: ICD-10-CM

## 2019-04-10 DIAGNOSIS — L81.4 LENTIGO: ICD-10-CM

## 2019-04-10 PROCEDURE — 99213 OFFICE O/P EST LOW 20 MIN: CPT | Performed by: DERMATOLOGY

## 2019-04-10 NOTE — PROGRESS NOTES
Kristen Thompson is a 73 year old year old female patient here today for hx of non-melanoma skin cancer.  She notes brown spots on back.  .  Patient states this has been present for a while.  Patient reports the following symptoms:  none.  Patient reports the following previous treatments none.  Patient reports the following modifying factors none.  Associated symptoms: none.  Patient has no other skin complaints today.  Remainder of the HPI, Meds, PMH, Allergies, FH, and SH was reviewed in chart.      Past Medical History:   Diagnosis Date     Arthritis 11/19/2013     Gastro-oesophageal reflux disease      GERD (gastroesophageal reflux disease) 10/2/2012     H/O total hysterectomy      HL (hearing loss) 10/11/2012     PONV (postoperative nausea and vomiting)      Squamous cell carcinoma        Past Surgical History:   Procedure Laterality Date     C RAD RESEC TONSIL/PILLARS       COLONOSCOPY  10/30/2012    Procedure: COLONOSCOPY;  Colonoscopy;  Surgeon: Denise Bah MD;  Location: [facility]     ESOPHAGOSCOPY, GASTROSCOPY, DUODENOSCOPY (EGD), COMBINED N/A 9/15/2016    Procedure: COMBINED ESOPHAGOSCOPY, GASTROSCOPY, DUODENOSCOPY (EGD);  Surgeon: Bennie Godinez MD;  Location: [facility]     GALLBLADDER SURGERY       HYSTERECTOMY       knee replacment  2007     RELEASE TRIGGER FINGER Right 4/29/2016    Procedure: RELEASE TRIGGER FINGER;  Surgeon: Percy Sarmiento MD;  Location: [facility]     REPAIR BLADDER          Family History   Problem Relation Age of Onset     C.A.D. Mother      Cardiovascular Mother      Thyroid Disease Mother      Cancer Father         stomach ca     Cancer Sister      Eye Disorder Sister      C.A.D. Sister      Cerebrovascular Disease Sister      Breast Cancer Sister      Arthritis Sister      Cardiovascular Sister      Depression Sister      Heart Disease Sister      Neurologic Disorder Sister      Osteoporosis Sister      Thyroid Disease Sister      Depression Sister      Thyroid  Disease Sister      Depression Sister      Thyroid Disease Sister      Obesity Sister      Neurologic Disorder Sister        Social History     Socioeconomic History     Marital status:      Spouse name: Not on file     Number of children: Not on file     Years of education: Not on file     Highest education level: Not on file   Occupational History     Not on file   Social Needs     Financial resource strain: Not on file     Food insecurity:     Worry: Not on file     Inability: Not on file     Transportation needs:     Medical: Not on file     Non-medical: Not on file   Tobacco Use     Smoking status: Never Smoker     Smokeless tobacco: Never Used   Substance and Sexual Activity     Alcohol use: No     Alcohol/week: 0.0 oz     Drug use: No     Sexual activity: Not Currently     Partners: Male   Lifestyle     Physical activity:     Days per week: Not on file     Minutes per session: Not on file     Stress: Not on file   Relationships     Social connections:     Talks on phone: Not on file     Gets together: Not on file     Attends [employer] service: Not on file     Active member of club or organization: Not on file     Attends meetings of clubs or organizations: Not on file     Relationship status: Not on file     Intimate partner violence:     Fear of current or ex partner: Not on file     Emotionally abused: Not on file     Physically abused: Not on file     Forced sexual activity: Not on file   Other Topics Concern     Parent/sibling w/ CABG, MI or angioplasty before 65F 55M? No   Social History Narrative     Not on file       Outpatient Encounter Medications as of 4/10/2019   Medication Sig Dispense Refill     B Complex Vitamins (VITAMIN B COMPLEX PO)        CALCIUM PO Take by mouth daily        Cholecalciferol (VITAMIN D PO) Take 1,000 Units by mouth daily        fluticasone (FLONASE) 50 MCG/ACT nasal spray SPRAY 1-2 SPRAYS INTO BOTH NOSTRILS DAILY 16 mL 3     KRILL OIL OMEGA-3 PO Take by mouth daily        MAGNESIUM PO        Omega-3 Fatty Acids (OMEGA 3 PO) Take  by mouth.       Polyethylene Glycol 3350 (MIRALAX PO)        Probiotic Product (PROBIOTIC PO)        [DISCONTINUED] amoxicillin-clavulanate (AUGMENTIN) 875-125 MG tablet Take 1 tablet by mouth 2 times daily for 10 days 20 tablet 0     [DISCONTINUED] ciprofloxacin (CIPRO) 500 MG tablet Take 1 tablet (500 mg) by mouth 2 times daily for 10 days 20 tablet 0     [DISCONTINUED] LANsoprazole (PREVACID) 15 MG CR capsule Take 1 capsule (15 mg) by mouth 2 times daily (before meals) Take 30-60 minutes before a meal. (Patient not taking: Reported on 3/14/2019) 60 capsule 1     [DISCONTINUED] metroNIDAZOLE (FLAGYL) 500 MG tablet Take 1 tablet (500 mg) by mouth 2 times daily for 10 days 20 tablet 0     [DISCONTINUED] nystatin (MYCOSTATIN) 477811 UNIT/ML suspension Take 5 mLs (500,000 Units) by mouth 4 times daily for 10 days 200 mL 0     [DISCONTINUED] omeprazole (PRILOSEC) 20 MG DR capsule TAKE 1 CAPSULE (20 MG) BY MOUTH 2 TIMES DAILY (Patient not taking: Reported on 3/14/2019) 180 capsule 3     No facility-administered encounter medications on file as of 4/10/2019.              Review Of Systems  Skin: As above  Eyes: negative  Ears/Nose/Throat: negative  Respiratory: No shortness of breath, dyspnea on exertion, cough, or hemoptysis  Cardiovascular: negative  Gastrointestinal: negative  Genitourinary: negative  Musculoskeletal: negative  Neurologic: negative  Psychiatric: negative  Hematologic/Lymphatic/Immunologic: negative  Endocrine: negative      O:   NAD, WDWN, Alert & Oriented, Mood & Affect wnl, Vitals stable   Here today alone   /88   Pulse 57   SpO2 97%    General appearance normal   Vitals stable   Alert, oriented and in no acute distress      Following lymph nodes palpated: Occipital, Cervical, Supraclavicular no lad     Stuck on papules and brown macules on trunk and ext   Red papules on trunk  Flesh colored papules on trunk     The remainder of  the full exam was unremarkable; the following areas were examined:  conjunctiva/lids, oral mucosa, neck, peripheral vascular system, abdomen, lymph nodes, digits/nails, eccrine and apocrine glands, scalp/hair, face, neck, chest, abdomen, buttocks, back, RUE, LUE, RLE, LLE       Eyes: Conjunctivae/lids:Normal     ENT: Lips, buccal mucosa, tongue: normal    MSK:Normal    Cardiovascular: peripheral edema none    Pulm: Breathing Normal    Lymph Nodes: No Head and Neck Lymphadenopathy     Neuro/Psych: Orientation:Normal; Mood/Affect:Normal      A/P:  1. Seborrheic keratosis, lentigo, angioma, dermal nevus, hx of [facility]   BENIGN LESIONS DISCUSSED WITH PATIENT:  I discussed the specifics of tumor, prognosis, and genetics of benign lesions.  I explained that treatment of these lesions would be purely cosmetic and not medically neccessary.  I discussed with patient different removal options including excision, cautery and /or laser.      Nature and genetics of benign skin lesions dicussed with patient.  Signs and Symptoms of skin cancer discussed with patient.  ABCDEs of melanoma reviewed with patient.  Patient encouraged to perform monthly skin exams.  UV precautions reviewed with patient.  Patient to follow up with Primary Care provider regarding elevated blood pressure.  Skin care regimen reviewed with patient: Eliminate harsh soaps, i.e. Dial, zest, irsih spring; Mild soaps such as Cetaphil or Dove sensitive skin, avoid hot or cold showers, aggressive use of emollients including vanicream, cetaphil or cerave discussed with patient.    Risks of non-melanoma skin cancer discussed with patient   Return to clinic 12 months  Kristen to follow up with Primary Care provider regarding elevated blood pressure.

## 2019-04-10 NOTE — LETTER
4/10/2019         RE: Kristen Thompson  [address] [place] [id number]        Dear Colleague,    Thank you for referring your patient, Kristen Thompson, to the [facility]. Please see a copy of my visit note below.    Kristen Thompson is a 73 year old year old female patient here today for hx of non-melanoma skin cancer.  She notes brown spots on back.  .  Patient states this has been present for a while.  Patient reports the following symptoms:  none.  Patient reports the following previous treatments none.  Patient reports the following modifying factors none.  Associated symptoms: none.  Patient has no other skin complaints today.  Remainder of the HPI, Meds, PMH, Allergies, FH, and SH was reviewed in chart.      Past Medical History:   Diagnosis Date     Arthritis 11/19/2013     Gastro-oesophageal reflux disease      GERD (gastroesophageal reflux disease) 10/2/2012     H/O total hysterectomy      HL (hearing loss) 10/11/2012     PONV (postoperative nausea and vomiting)      Squamous cell carcinoma        Past Surgical History:   Procedure Laterality Date     C RAD RESEC TONSIL/PILLARS       COLONOSCOPY  10/30/2012    Procedure: COLONOSCOPY;  Colonoscopy;  Surgeon: Denise Bah MD;  Location: [facility]     ESOPHAGOSCOPY, GASTROSCOPY, DUODENOSCOPY (EGD), COMBINED N/A 9/15/2016    Procedure: COMBINED ESOPHAGOSCOPY, GASTROSCOPY, DUODENOSCOPY (EGD);  Surgeon: Bennie Godinez MD;  Location: [facility]     GALLBLADDER SURGERY       HYSTERECTOMY       knee replacment  2007     RELEASE TRIGGER FINGER Right 4/29/2016    Procedure: RELEASE TRIGGER FINGER;  Surgeon: Percy Sarmiento MD;  Location: [facility]     REPAIR BLADDER          Family History   Problem Relation Age of Onset     C.A.D. Mother      Cardiovascular Mother      Thyroid Disease Mother      Cancer Father         stomach ca     Cancer Sister      Eye Disorder Sister      C.A.D. Sister      Cerebrovascular Disease Sister       Breast Cancer Sister      Arthritis Sister      Cardiovascular Sister      Depression Sister      Heart Disease Sister      Neurologic Disorder Sister      Osteoporosis Sister      Thyroid Disease Sister      Depression Sister      Thyroid Disease Sister      Depression Sister      Thyroid Disease Sister      Obesity Sister      Neurologic Disorder Sister        Social History     Socioeconomic History     Marital status:      Spouse name: Not on file     Number of children: Not on file     Years of education: Not on file     Highest education level: Not on file   Occupational History     Not on file   Social Needs     Financial resource strain: Not on file     Food insecurity:     Worry: Not on file     Inability: Not on file     Transportation needs:     Medical: Not on file     Non-medical: Not on file   Tobacco Use     Smoking status: Never Smoker     Smokeless tobacco: Never Used   Substance and Sexual Activity     Alcohol use: No     Alcohol/week: 0.0 oz     Drug use: No     Sexual activity: Not Currently     Partners: Male   Lifestyle     Physical activity:     Days per week: Not on file     Minutes per session: Not on file     Stress: Not on file   Relationships     Social connections:     Talks on phone: Not on file     Gets together: Not on file     Attends [employer] service: Not on file     Active member of club or organization: Not on file     Attends meetings of clubs or organizations: Not on file     Relationship status: Not on file     Intimate partner violence:     Fear of current or ex partner: Not on file     Emotionally abused: Not on file     Physically abused: Not on file     Forced sexual activity: Not on file   Other Topics Concern     Parent/sibling w/ CABG, MI or angioplasty before 65F 55M? No   Social History Narrative     Not on file       Outpatient Encounter Medications as of 4/10/2019   Medication Sig Dispense Refill     B Complex Vitamins (VITAMIN B COMPLEX PO)         CALCIUM PO Take by mouth daily        Cholecalciferol (VITAMIN D PO) Take 1,000 Units by mouth daily        fluticasone (FLONASE) 50 MCG/ACT nasal spray SPRAY 1-2 SPRAYS INTO BOTH NOSTRILS DAILY 16 mL 3     KRILL OIL OMEGA-3 PO Take by mouth daily       MAGNESIUM PO        Omega-3 Fatty Acids (OMEGA 3 PO) Take  by mouth.       Polyethylene Glycol 3350 (MIRALAX PO)        Probiotic Product (PROBIOTIC PO)        [DISCONTINUED] amoxicillin-clavulanate (AUGMENTIN) 875-125 MG tablet Take 1 tablet by mouth 2 times daily for 10 days 20 tablet 0     [DISCONTINUED] ciprofloxacin (CIPRO) 500 MG tablet Take 1 tablet (500 mg) by mouth 2 times daily for 10 days 20 tablet 0     [DISCONTINUED] LANsoprazole (PREVACID) 15 MG CR capsule Take 1 capsule (15 mg) by mouth 2 times daily (before meals) Take 30-60 minutes before a meal. (Patient not taking: Reported on 3/14/2019) 60 capsule 1     [DISCONTINUED] metroNIDAZOLE (FLAGYL) 500 MG tablet Take 1 tablet (500 mg) by mouth 2 times daily for 10 days 20 tablet 0     [DISCONTINUED] nystatin (MYCOSTATIN) 861779 UNIT/ML suspension Take 5 mLs (500,000 Units) by mouth 4 times daily for 10 days 200 mL 0     [DISCONTINUED] omeprazole (PRILOSEC) 20 MG DR capsule TAKE 1 CAPSULE (20 MG) BY MOUTH 2 TIMES DAILY (Patient not taking: Reported on 3/14/2019) 180 capsule 3     No facility-administered encounter medications on file as of 4/10/2019.              Review Of Systems  Skin: As above  Eyes: negative  Ears/Nose/Throat: negative  Respiratory: No shortness of breath, dyspnea on exertion, cough, or hemoptysis  Cardiovascular: negative  Gastrointestinal: negative  Genitourinary: negative  Musculoskeletal: negative  Neurologic: negative  Psychiatric: negative  Hematologic/Lymphatic/Immunologic: negative  Endocrine: negative      O:   NAD, WDWN, Alert & Oriented, Mood & Affect wnl, Vitals stable   Here today alone   /88   Pulse 57   SpO2 97%    General appearance normal   Vitals  stable   Alert, oriented and in no acute distress      Following lymph nodes palpated: Occipital, Cervical, Supraclavicular no lad     Stuck on papules and brown macules on trunk and ext   Red papules on trunk  Flesh colored papules on trunk     The remainder of the full exam was unremarkable; the following areas were examined:  conjunctiva/lids, oral mucosa, neck, peripheral vascular system, abdomen, lymph nodes, digits/nails, eccrine and apocrine glands, scalp/hair, face, neck, chest, abdomen, buttocks, back, RUE, LUE, RLE, LLE       Eyes: Conjunctivae/lids:Normal     ENT: Lips, buccal mucosa, tongue: normal    MSK:Normal    Cardiovascular: peripheral edema none    Pulm: Breathing Normal    Lymph Nodes: No Head and Neck Lymphadenopathy     Neuro/Psych: Orientation:Normal; Mood/Affect:Normal      A/P:  1. Seborrheic keratosis, lentigo, angioma, dermal nevus, hx of [facility]   BENIGN LESIONS DISCUSSED WITH PATIENT:  I discussed the specifics of tumor, prognosis, and genetics of benign lesions.  I explained that treatment of these lesions would be purely cosmetic and not medically neccessary.  I discussed with patient different removal options including excision, cautery and /or laser.      Nature and genetics of benign skin lesions dicussed with patient.  Signs and Symptoms of skin cancer discussed with patient.  ABCDEs of melanoma reviewed with patient.  Patient encouraged to perform monthly skin exams.  UV precautions reviewed with patient.  Patient to follow up with Primary Care provider regarding elevated blood pressure.  Skin care regimen reviewed with patient: Eliminate harsh soaps, i.e. Dial, zest, irsih spring; Mild soaps such as Cetaphil or Dove sensitive skin, avoid hot or cold showers, aggressive use of emollients including vanicream, cetaphil or cerave discussed with patient.    Risks of non-melanoma skin cancer discussed with patient   Return to clinic 12 months  Kristen to follow up with Primary Care provider  regarding elevated blood pressure.      Again, thank you for allowing me to participate in the care of your patient.        Sincerely,        Kaushal Angela MD

## 2019-04-10 NOTE — NURSING NOTE
Chief Complaint   Patient presents with     Skin Check       Vitals:    04/10/19 1044   BP: 152/88   Pulse: 57   SpO2: 97%     Wt Readings from Last 1 Encounters:   03/14/19 67.1 kg (148 lb)       Izzy Escalera LPN.................4/10/2019

## 2019-04-16 ENCOUNTER — TELEPHONE (OUTPATIENT)
Dept: ORTHOPEDICS | Facility: CLINIC | Age: 74
End: 2019-04-16

## 2019-04-16 DIAGNOSIS — Z96.651 HISTORY OF TOTAL KNEE ARTHROPLASTY, RIGHT: ICD-10-CM

## 2019-04-16 DIAGNOSIS — M25.562 PAIN IN BOTH KNEES, UNSPECIFIED CHRONICITY: Primary | ICD-10-CM

## 2019-04-16 DIAGNOSIS — M25.561 PAIN IN BOTH KNEES, UNSPECIFIED CHRONICITY: Primary | ICD-10-CM

## 2019-04-16 NOTE — TELEPHONE ENCOUNTER
Called and spoke with patient. She would like to proceed with the bone scan to evaluate her TKA.    Order placed, please sign if in agreement.    Susan Guidry MS ATC

## 2019-04-16 NOTE — TELEPHONE ENCOUNTER
"Patient LVM requesting a call back regarding information on how to process with \"imaging scheduling\".    Please call to discuss        # [phone]        "

## 2019-04-17 NOTE — TELEPHONE ENCOUNTER
Contacted patient and informed her the order was placed. She already had the scheduling number. She will call to schedule.     She was pleasant and thankful for the call.     Suze Chatterjee M.Ed., LAT, ATC

## 2019-04-23 ENCOUNTER — HOSPITAL ENCOUNTER (OUTPATIENT)
Dept: NUCLEAR MEDICINE | Facility: CLINIC | Age: 74
Setting detail: NUCLEAR MEDICINE
Discharge: HOME | End: 2019-04-23
Attending: PEDIATRICS | Admitting: PEDIATRICS
Payer: COMMERCIAL

## 2019-04-23 ENCOUNTER — TELEPHONE (OUTPATIENT)
Dept: ORTHOPEDICS | Facility: CLINIC | Age: 74
End: 2019-04-23

## 2019-04-23 ENCOUNTER — HOSPITAL ENCOUNTER (OUTPATIENT)
Dept: NUCLEAR MEDICINE | Facility: CLINIC | Age: 74
Setting detail: NUCLEAR MEDICINE
End: 2019-04-23
Attending: PEDIATRICS
Payer: COMMERCIAL

## 2019-04-23 DIAGNOSIS — Z96.651 HISTORY OF TOTAL KNEE ARTHROPLASTY, RIGHT: ICD-10-CM

## 2019-04-23 DIAGNOSIS — M25.562 PAIN IN BOTH KNEES, UNSPECIFIED CHRONICITY: ICD-10-CM

## 2019-04-23 DIAGNOSIS — M25.561 PAIN IN BOTH KNEES, UNSPECIFIED CHRONICITY: ICD-10-CM

## 2019-04-23 PROCEDURE — 34300033 ZZH RX 343: Performed by: PEDIATRICS

## 2019-04-23 PROCEDURE — 78315 BONE IMAGING 3 PHASE: CPT

## 2019-04-23 PROCEDURE — A9561 TC99M OXIDRONATE: HCPCS | Performed by: PEDIATRICS

## 2019-04-23 RX ADMIN — Medication 24.8 MILLICURIE: at 08:01

## 2019-04-24 NOTE — TELEPHONE ENCOUNTER
Results for orders placed or performed during the hospital encounter of 04/23/19   NM Bone scan 3 phase    Narrative    NUCLEAR MEDICINE BONE SCAN THREE-PHASE  4/23/2019 11:19 AM    HISTORY: Pain in left knee 12 years after total knee arthroplasty.  Pain in both knees, unspecified chronicity. History of total knee  arthroplasty, right.    TECHNIQUE:  24.8mCi Tc-HDP.  Anterior and posterior images. Selected  oblique images.    COMPARISON:  Prior bone scan: None available.   Relevant imaging study: Right knee radiograph 2/8/2019.    FINDINGS: Symmetric blood flow to the knees. Symmetric blood pool  activity at the knee joints. There is asymmetric superficial blood  flow activity at the medial aspect of the right knee; this could  relate to cellulitis, injury, or recent procedure. Clinical  correlation recommended. Mild delayed activity adjacent to the right  tibial component which is nonspecific, and could simply be reactive.  There is some arthritic type activity at the left knee medial  compartment.      Impression    IMPRESSION:   1. No findings suspicious for right knee arthroplasty loosening.  2. Superficial blood pool activity at the medial aspect of the right  knee, as above.    BRANDON HOBBS MD

## 2019-04-25 ENCOUNTER — TELEPHONE (OUTPATIENT)
Dept: FAMILY MEDICINE | Facility: CLINIC | Age: 74
End: 2019-04-25

## 2019-04-25 DIAGNOSIS — M25.50 ARTHRALGIA, UNSPECIFIED JOINT: Primary | ICD-10-CM

## 2019-04-25 NOTE — TELEPHONE ENCOUNTER
Reason for Call:  Other Question    Detailed comments: Kristen is calling wondering if she has ever had a lyme test done?  No other information was left.  Please review, call and assess. Thank you..Nayeli Perez    Phone Number Patient can be reached at: Home number on file [phone] (home)      Call taken on 4/25/2019 at 1:25 PM by Nayeli Perez

## 2019-04-25 NOTE — TELEPHONE ENCOUNTER
Call placed to patient.  Requesting Lymes work up to rule out symptoms she has noted worsening x 6months:    Increased joint pain, involving all joints.  Headaches.  Patient needing to take Tylenol Q4 hr to manage discomfort, not resolving discomfort.  Patient ambulating also with no improvement of pain.  Dizziness, balance changes.  Fatigue.  Short term memory loss has worsened over the last 6 months.  Patient has not noted rash or bullseye on skin.    Please advise.  Debbie Mayorga RN

## 2019-04-26 DIAGNOSIS — M25.50 ARTHRALGIA, UNSPECIFIED JOINT: ICD-10-CM

## 2019-04-26 DIAGNOSIS — R14.0 BLOATING SYMPTOM: ICD-10-CM

## 2019-04-26 PROCEDURE — 36415 COLL VENOUS BLD VENIPUNCTURE: CPT | Performed by: FAMILY MEDICINE

## 2019-04-26 PROCEDURE — 82784 ASSAY IGA/IGD/IGG/IGM EACH: CPT | Performed by: FAMILY MEDICINE

## 2019-04-26 PROCEDURE — 83516 IMMUNOASSAY NONANTIBODY: CPT | Mod: 59 | Performed by: FAMILY MEDICINE

## 2019-04-26 PROCEDURE — 86618 LYME DISEASE ANTIBODY: CPT | Performed by: FAMILY MEDICINE

## 2019-04-26 NOTE — TELEPHONE ENCOUNTER
Given the timing, I doubt this is lyme. I will order the test for her though. We have not tested her for lyme in the past   CVerna Morse MD

## 2019-04-26 NOTE — TELEPHONE ENCOUNTER
Call placed to patient.  Relayed Dr Morse's message.  Scheduled lab visit for today per patient request.  Debbie Mayorga RN

## 2019-04-28 LAB — IGA SERPL-MCNC: 80 MG/DL (ref 70–380)

## 2019-04-29 LAB
B BURGDOR IGG+IGM SER QL: 0.08 (ref 0–0.89)
TTG IGA SER-ACNC: <1 U/ML
TTG IGG SER-ACNC: <1 U/ML

## 2019-04-30 NOTE — TELEPHONE ENCOUNTER
Findings noted. Nothing concerning for component loosening. Some uptake medial knee, of questionable significance.  Options: 1) continue monitoring, 2) PT, 3) recheck in clinic.  Thanks.  Parveen Mehta DO, CAQ

## 2019-05-01 NOTE — TELEPHONE ENCOUNTER
Patient calling for bone scan results.      Called and spoke with patient.  Relayed results.  She would like to think about her options and will call us back with her decision.    Susan Guidry MS ATC

## 2019-07-22 ENCOUNTER — OFFICE VISIT (OUTPATIENT)
Dept: ORTHOPEDICS | Facility: CLINIC | Age: 74
End: 2019-07-22
Payer: COMMERCIAL

## 2019-07-22 ENCOUNTER — ANCILLARY PROCEDURE (OUTPATIENT)
Dept: GENERAL RADIOLOGY | Facility: CLINIC | Age: 74
End: 2019-07-22
Attending: PEDIATRICS
Payer: COMMERCIAL

## 2019-07-22 VITALS
WEIGHT: 159 LBS | DIASTOLIC BLOOD PRESSURE: 76 MMHG | BODY MASS INDEX: 29.26 KG/M2 | HEIGHT: 62 IN | SYSTOLIC BLOOD PRESSURE: 122 MMHG

## 2019-07-22 DIAGNOSIS — M25.531 RIGHT WRIST PAIN: Primary | ICD-10-CM

## 2019-07-22 DIAGNOSIS — Z96.651 HISTORY OF TOTAL KNEE ARTHROPLASTY, RIGHT: ICD-10-CM

## 2019-07-22 DIAGNOSIS — M25.531 RIGHT WRIST PAIN: ICD-10-CM

## 2019-07-22 DIAGNOSIS — M19.031 ARTHRITIS OF SCAPHOID-TRAPEZIUM-TRAPEZOID JOINT OF RIGHT HAND: ICD-10-CM

## 2019-07-22 PROCEDURE — 99214 OFFICE O/P EST MOD 30 MIN: CPT | Performed by: PEDIATRICS

## 2019-07-22 PROCEDURE — 73110 X-RAY EXAM OF WRIST: CPT | Mod: RT | Performed by: PEDIATRICS

## 2019-07-22 ASSESSMENT — MIFFLIN-ST. JEOR: SCORE: 1189.47

## 2019-07-22 NOTE — PROGRESS NOTES
Sports Medicine Clinic Visit    PCP: Janett Morse    Kristen HILARIO Thompson is a 73 year old female who is seen as a self referral presenting with right wrist pain.  Pain is diffuse through the wrist.  No known injury.  Pain has been present for about 3-4 months.  Pain with weight bearing, twisting off jars, or overuse.   She is right hand dominant.     **  She notes right wrist pain started after doing repetitive cutting motions (cutting rhubarb). Pain is located in the thumb and radial wrist. Pain with gripping any weighted item.     **  History of right total knee arthroplasty, 2007. She reports not being pain free since surgery.     Injury: gradual onset     Location of Pain: right wrist, diffuse   Duration of Pain: 2-3 month(s)  Rating of Pain at worst: 10/10  Rating of Pain Currently: 4/10  Symptoms are better with: Nothing  Symptoms are worse with: use  Additional Features:   Positive: swelling, weakness   Negative: bruising, popping, grinding, catching, locking, instability, paresthesias, numbness, pain in other joints and systemic symptoms  Other evaluation and/or treatments so far consists of: Nothing  Prior History of related problems: denies     Social History: retired     Review of Systems  Musculoskeletal: as above  Remainder of review of systems is negative including constitutional, CV, pulmonary, GI, Skin and Neurologic except as noted in HPI or medical history.    Past Medical History:   Diagnosis Date     Arthritis 11/19/2013     Gastro-oesophageal reflux disease      GERD (gastroesophageal reflux disease) 10/2/2012     H/O total hysterectomy      HL (hearing loss) 10/11/2012     PONV (postoperative nausea and vomiting)      Squamous cell carcinoma      Past Surgical History:   Procedure Laterality Date     C RAD RESEC TONSIL/PILLARS       COLONOSCOPY  10/30/2012    Procedure: COLONOSCOPY;  Colonoscopy;  Surgeon: Denise Bah MD;  Location: [facility]     ESOPHAGOSCOPY, GASTROSCOPY,  DUODENOSCOPY (EGD), COMBINED N/A 9/15/2016    Procedure: COMBINED ESOPHAGOSCOPY, GASTROSCOPY, DUODENOSCOPY (EGD);  Surgeon: Bennie Godinez MD;  Location: [facility]     GALLBLADDER SURGERY       HYSTERECTOMY       knee replacment  2007     RELEASE TRIGGER FINGER Right 4/29/2016    Procedure: RELEASE TRIGGER FINGER;  Surgeon: Percy Sarmiento MD;  Location: [facility]     REPAIR BLADDER       Family History   Problem Relation Age of Onset     C.A.D. Mother      Cardiovascular Mother      Thyroid Disease Mother      Cancer Father         stomach ca     Cancer Sister      Eye Disorder Sister      C.A.D. Sister      Cerebrovascular Disease Sister      Breast Cancer Sister      Arthritis Sister      Cardiovascular Sister      Depression Sister      Heart Disease Sister      Neurologic Disorder Sister      Osteoporosis Sister      Thyroid Disease Sister      Depression Sister      Thyroid Disease Sister      Depression Sister      Thyroid Disease Sister      Obesity Sister      Neurologic Disorder Sister      Social History     Socioeconomic History     Marital status:      Spouse name: Not on file     Number of children: Not on file     Years of education: Not on file     Highest education level: Not on file   Occupational History     Not on file   Social Needs     Financial resource strain: Not on file     Food insecurity:     Worry: Not on file     Inability: Not on file     Transportation needs:     Medical: Not on file     Non-medical: Not on file   Tobacco Use     Smoking status: Never Smoker     Smokeless tobacco: Never Used   Substance and Sexual Activity     Alcohol use: No     Alcohol/week: 0.0 oz     Drug use: No     Sexual activity: Not Currently     Partners: Male   Lifestyle     Physical activity:     Days per week: Not on file     Minutes per session: Not on file     Stress: Not on file   Relationships     Social connections:     Talks on phone: Not on file     Gets together: Not on file     Attends  "Protestant service: Not on file     Active member of club or organization: Not on file     Attends meetings of clubs or organizations: Not on file     Relationship status: Not on file     Intimate partner violence:     Fear of current or ex partner: Not on file     Emotionally abused: Not on file     Physically abused: Not on file     Forced sexual activity: Not on file   Other Topics Concern     Parent/sibling w/ CABG, MI or angioplasty before 65F 55M? No   Social History Narrative     Not on file     This document serves as a record of the services and decisions personally performed and made by DO NATTY Hutchins. It was created on his behalf by Vianey Keene, a trained medical scribe. The creation of this document is based the provider's statements to the medical scribe.    Scribe Vianey Keene 3:43 PM 7/22/2019       Objective  /76   Ht 1.575 m (5' 2\")   Wt 72.1 kg (159 lb)   BMI 29.08 kg/m      GENERAL APPEARANCE: healthy, alert and no distress   GAIT: NORMAL  SKIN: no suspicious lesions or rashes  NEURO: Normal strength and tone, mentation intact and speech normal  PSYCH:  mentation appears normal and affect normal/bright  HEENT: no scleral icterus  CV: no extremity edema  RESP: nonlabored breathing    Exam:  Hand/wrist (right):    Inspection:  No deformity noted. Radial hand soft tissue swelling, mild. No ecchymosis.    Motion:       Grossly intact wrist; some radial hand pain with thumb motion    Sensation:       Grossly intact.    Palpation:        Tender no area of focal tenderness    Tests:       Thumb grind test: pain reproduced    Radial pulses normal, +2/4, capillary refill brisk.    Right Knee exam  No exam repeated today in lieu of discussion      Radiology:  Visualized radiographs of right wrist, taken 7/22/2019, and reviewed the images with the patient.  Impression: Three views of the right wrist demonstrate degenerative change at the first carpometacarpal joint and " ynfykl-gtwkbxzg-rizdhcdmu articulation. No acute osseous abnormality identified.        Prior bone scan:    NUCLEAR MEDICINE BONE SCAN THREE-PHASE  4/23/2019 11:19 AM     HISTORY: Pain in left knee 12 years after total knee arthroplasty.  Pain in both knees, unspecified chronicity. History of total knee  arthroplasty, right.     TECHNIQUE:  24.8mCi Tc-HDP.  Anterior and posterior images. Selected  oblique images.     COMPARISON:  Prior bone scan: None available.   Relevant imaging study: Right knee radiograph 2/8/2019.     FINDINGS: Symmetric blood flow to the knees. Symmetric blood pool  activity at the knee joints. There is asymmetric superficial blood  flow activity at the medial aspect of the right knee; this could  relate to cellulitis, injury, or recent procedure. Clinical  correlation recommended. Mild delayed activity adjacent to the right  tibial component which is nonspecific, and could simply be reactive.  There is some arthritic type activity at the left knee medial  compartment.                                                                      IMPRESSION:   1. No findings suspicious for right knee arthroplasty loosening.  2. Superficial blood pool activity at the medial aspect of the right  knee, as above.     BRANDON HOBBS MD      Assessment:  1. Right wrist pain    2. Arthritis of uziojmct-rzeqivjfa-jlxwqdjfk joint of right hand    3. History of total knee arthroplasty, right         Plan:  Discussed the assessment with the patient. Right knee persistent pain with prior TKA. Right hand STT arthrosis.    Radiologic images reviewed and discussed with patient today     Right hand/wrist:   Topical Treatments: Ice or Heat as needed   Over the counter medication: Patient's preferred OTC medication as needed   Activity Modification: as discussed; may remain active as able.  Rehab: consider hand therapy; referral offered.  Medical Equipment: discussed wrist brace options. Spica brace provided.  Future  consideration for cortisone injection if symptoms don't improve. For injection, likely would have her see one of my colleagues for use of US guidance.  Follow up: is open ended. Contact clinic if questions, desiring therapy, or injection.       Right knee:   Activity Modification: as discussed; remain active as able.  Rehab: consider physical therapy; she has done PT in past  Further considerations: consider genicular nerve block. If found beneficial, could consider RFA. Can also consider additional imaging via CT for further evaluation of total knee arthroplasty components. Finally, we discussed possible referral to see orthopaedics surgeon for further surgical discussion.  For now, she prefers to monitor the knee.  Follow up: is as needed   Questions answered. The patient indicates understanding of these issues and agrees with the plan.    Parveen Mehta, DO, CAQ      Patient Instructions   Right Hand/Wrist:  There is some degenerative change (arthritis) in the hand/wrist  - Ice/heat as needed  - Over-the-counter medications as needed  - Activity modification: consider taking breaks with activity if needed  - Support: wrist brace options reviewed  - Consider hand therapy  - Cortisone injection.     - If considering brace, hand therapy and/or injection, please contact my office.       Right Knee:  - OK to continue doing current treatment of symptom treatment and activity modification.   - additional considerations: nerve block through pain management, additional imaging with CT scan, and return to see orthopedic surgeon             Disclaimer: This note consists of symbols derived from keyboarding, dictation and/or voice recognition software. As a result, there may be errors in the script that have gone undetected. Please consider this when interpreting information found in this chart.    The information in this document, created by a scribe for me, accurately reflects the services I personally performed and the  decisions made by me. I have reviewed and approved this document for accuracy.

## 2019-07-22 NOTE — PATIENT INSTRUCTIONS
Right Hand/Wrist:  There is some degenerative change (arthritis) in the hand/wrist  - Ice/heat as needed  - Over-the-counter medications as needed  - Activity modification: consider taking breaks with activity if needed  - Support: wrist brace options reviewed  - Consider hand therapy  - Cortisone injection.     - If considering brace, hand therapy and/or injection, please contact my office.       Right Knee:  - OK to continue doing current treatment of symptom treatment and activity modification.   - additional considerations: nerve block through pain management, additional imaging with CT scan, and return to see orthopedic surgeon

## 2019-07-22 NOTE — LETTER
7/22/2019         RE: Kristen Thompson  [address] [place] [id number]        Dear Colleague,    Thank you for referring your patient, Kritsen Thompson, to the [facility]. Please see a copy of my visit note below.    Sports Medicine Clinic Visit    PCP: Janett Morse    Kristen Thompson is a 73 year old female who is seen as a self referral presenting with right wrist pain.  Pain is diffuse through the wrist.  No known injury.  Pain has been present for about 3-4 months.  Pain with weight bearing, twisting off jars, or overuse.   She is right hand dominant.     **  She notes right wrist pain started after doing repetitive cutting motions (cutting rhubarb). Pain is located in the thumb and radial wrist. Pain with gripping any weighted item.     **  History of right total knee arthroplasty, 2007. She reports not being pain free since surgery.     Injury: gradual onset     Location of Pain: right wrist, diffuse   Duration of Pain: 2-3 month(s)  Rating of Pain at worst: 10/10  Rating of Pain Currently: 4/10  Symptoms are better with: Nothing  Symptoms are worse with: use  Additional Features:   Positive: swelling, weakness   Negative: bruising, popping, grinding, catching, locking, instability, paresthesias, numbness, pain in other joints and systemic symptoms  Other evaluation and/or treatments so far consists of: Nothing  Prior History of related problems: denies     Social History: retired     Review of Systems  Musculoskeletal: as above  Remainder of review of systems is negative including constitutional, CV, pulmonary, GI, Skin and Neurologic except as noted in HPI or medical history.    Past Medical History:   Diagnosis Date     Arthritis 11/19/2013     Gastro-oesophageal reflux disease      GERD (gastroesophageal reflux disease) 10/2/2012     H/O total hysterectomy      HL (hearing loss) 10/11/2012     PONV (postoperative nausea and vomiting)      Squamous  cell carcinoma      Past Surgical History:   Procedure Laterality Date     C RAD RESEC TONSIL/PILLARS       COLONOSCOPY  10/30/2012    Procedure: COLONOSCOPY;  Colonoscopy;  Surgeon: Denise Bah MD;  Location: [facility]     ESOPHAGOSCOPY, GASTROSCOPY, DUODENOSCOPY (EGD), COMBINED N/A 9/15/2016    Procedure: COMBINED ESOPHAGOSCOPY, GASTROSCOPY, DUODENOSCOPY (EGD);  Surgeon: Bennie Godinez MD;  Location: [facility]     GALLBLADDER SURGERY       HYSTERECTOMY       knee replacment  2007     RELEASE TRIGGER FINGER Right 4/29/2016    Procedure: RELEASE TRIGGER FINGER;  Surgeon: Percy Sarmiento MD;  Location: [facility]     REPAIR BLADDER       Family History   Problem Relation Age of Onset     C.A.D. Mother      Cardiovascular Mother      Thyroid Disease Mother      Cancer Father         stomach ca     Cancer Sister      Eye Disorder Sister      C.A.D. Sister      Cerebrovascular Disease Sister      Breast Cancer Sister      Arthritis Sister      Cardiovascular Sister      Depression Sister      Heart Disease Sister      Neurologic Disorder Sister      Osteoporosis Sister      Thyroid Disease Sister      Depression Sister      Thyroid Disease Sister      Depression Sister      Thyroid Disease Sister      Obesity Sister      Neurologic Disorder Sister      Social History     Socioeconomic History     Marital status:      Spouse name: Not on file     Number of children: Not on file     Years of education: Not on file     Highest education level: Not on file   Occupational History     Not on file   Social Needs     Financial resource strain: Not on file     Food insecurity:     Worry: Not on file     Inability: Not on file     Transportation needs:     Medical: Not on file     Non-medical: Not on file   Tobacco Use     Smoking status: Never Smoker     Smokeless tobacco: Never Used   Substance and Sexual Activity     Alcohol use: No     Alcohol/week: 0.0 oz     Drug use: No     Sexual activity: Not Currently      "Partners: Male   Lifestyle     Physical activity:     Days per week: Not on file     Minutes per session: Not on file     Stress: Not on file   Relationships     Social connections:     Talks on phone: Not on file     Gets together: Not on file     Attends Hoahaoism service: Not on file     Active member of club or organization: Not on file     Attends meetings of clubs or organizations: Not on file     Relationship status: Not on file     Intimate partner violence:     Fear of current or ex partner: Not on file     Emotionally abused: Not on file     Physically abused: Not on file     Forced sexual activity: Not on file   Other Topics Concern     Parent/sibling w/ CABG, MI or angioplasty before 65F 55M? No   Social History Narrative     Not on file     This document serves as a record of the services and decisions personally performed and made by DO NATTY Hutchins. It was created on his behalf by Vianey Keene, a trained medical scribe. The creation of this document is based the provider's statements to the medical scribe.    Scribe Vianey Keene 3:43 PM 7/22/2019       Objective  /76   Ht 1.575 m (5' 2\")   Wt 72.1 kg (159 lb)   BMI 29.08 kg/m       GENERAL APPEARANCE: healthy, alert and no distress   GAIT: NORMAL  SKIN: no suspicious lesions or rashes  NEURO: Normal strength and tone, mentation intact and speech normal  PSYCH:  mentation appears normal and affect normal/bright  HEENT: no scleral icterus  CV: no extremity edema  RESP: nonlabored breathing    Exam:  Hand/wrist (right):    Inspection:  No deformity noted. Radial hand soft tissue swelling, mild. No ecchymosis.    Motion:       Grossly intact wrist; some radial hand pain with thumb motion    Sensation:       Grossly intact.    Palpation:        Tender no area of focal tenderness    Tests:       Thumb grind test: pain reproduced    Radial pulses normal, +2/4, capillary refill brisk.    Right Knee exam  No exam repeated today in lieu of " discussion      Radiology:  Visualized radiographs of right wrist, taken 7/22/2019, and reviewed the images with the patient.  Impression: Three views of the right wrist demonstrate degenerative change at the first carpometacarpal joint and ogskfq-jcugcxzj-jxffnjdqw articulation. No acute osseous abnormality identified.        Prior bone scan:    NUCLEAR MEDICINE BONE SCAN THREE-PHASE  4/23/2019 11:19 AM     HISTORY: Pain in left knee 12 years after total knee arthroplasty.  Pain in both knees, unspecified chronicity. History of total knee  arthroplasty, right.     TECHNIQUE:  24.8mCi Tc-HDP.  Anterior and posterior images. Selected  oblique images.     COMPARISON:  Prior bone scan: None available.   Relevant imaging study: Right knee radiograph 2/8/2019.     FINDINGS: Symmetric blood flow to the knees. Symmetric blood pool  activity at the knee joints. There is asymmetric superficial blood  flow activity at the medial aspect of the right knee; this could  relate to cellulitis, injury, or recent procedure. Clinical  correlation recommended. Mild delayed activity adjacent to the right  tibial component which is nonspecific, and could simply be reactive.  There is some arthritic type activity at the left knee medial  compartment.                                                                      IMPRESSION:   1. No findings suspicious for right knee arthroplasty loosening.  2. Superficial blood pool activity at the medial aspect of the right  knee, as above.     BRANDON HOBBS MD      Assessment:  1. Right wrist pain    2. Arthritis of lxfnddgf-svnycshdt-noogqjspz joint of right hand    3. History of total knee arthroplasty, right         Plan:  Discussed the assessment with the patient. Right knee persistent pain with prior TKA. Right hand STT arthrosis.    Radiologic images reviewed and discussed with patient today     Right hand/wrist:   Topical Treatments: Ice or Heat as needed   Over the counter medication:  Patient's preferred OTC medication as needed   Activity Modification: as discussed; may remain active as able.  Rehab: consider hand therapy; referral offered.  Medical Equipment: discussed wrist brace options. Spica brace provided.  Future consideration for cortisone injection if symptoms don't improve. For injection, likely would have her see one of my colleagues for use of US guidance.  Follow up: is open ended. Contact clinic if questions, desiring therapy, or injection.       Right knee:   Activity Modification: as discussed; remain active as able.  Rehab: consider physical therapy; she has done PT in past  Further considerations: consider genicular nerve block. If found beneficial, could consider RFA. Can also consider additional imaging via CT for further evaluation of total knee arthroplasty components. Finally, we discussed possible referral to see orthopaedics surgeon for further surgical discussion.  For now, she prefers to monitor the knee.  Follow up: is as needed   Questions answered. The patient indicates understanding of these issues and agrees with the plan.    Parveen Mehta, DO, CAQ      Patient Instructions   Right Hand/Wrist:  There is some degenerative change (arthritis) in the hand/wrist  - Ice/heat as needed  - Over-the-counter medications as needed  - Activity modification: consider taking breaks with activity if needed  - Support: wrist brace options reviewed  - Consider hand therapy  - Cortisone injection.     - If considering brace, hand therapy and/or injection, please contact my office.       Right Knee:  - OK to continue doing current treatment of symptom treatment and activity modification.   - additional considerations: nerve block through pain management, additional imaging with CT scan, and return to see orthopedic surgeon             Disclaimer: This note consists of symbols derived from keyboarding, dictation and/or voice recognition software. As a result, there may be errors  in the script that have gone undetected. Please consider this when interpreting information found in this chart.    The information in this document, created by a scribe for me, accurately reflects the services I personally performed and the decisions made by me. I have reviewed and approved this document for accuracy.              Again, thank you for allowing me to participate in the care of your patient.        Sincerely,        Parveen Mehta, DO

## 2019-11-01 ENCOUNTER — OFFICE VISIT (OUTPATIENT)
Dept: FAMILY MEDICINE | Facility: CLINIC | Age: 74
End: 2019-11-01
Payer: COMMERCIAL

## 2019-11-01 VITALS
HEART RATE: 68 BPM | SYSTOLIC BLOOD PRESSURE: 118 MMHG | BODY MASS INDEX: 28.52 KG/M2 | OXYGEN SATURATION: 98 % | TEMPERATURE: 98 F | DIASTOLIC BLOOD PRESSURE: 72 MMHG | WEIGHT: 155 LBS | RESPIRATION RATE: 16 BRPM | HEIGHT: 62 IN

## 2019-11-01 DIAGNOSIS — J01.00 ACUTE NON-RECURRENT MAXILLARY SINUSITIS: Primary | ICD-10-CM

## 2019-11-01 PROBLEM — I48.91 ATRIAL FIBRILLATION, UNSPECIFIED TYPE (H): Status: RESOLVED | Noted: 2018-12-30 | Resolved: 2019-11-01

## 2019-11-01 PROCEDURE — 99213 OFFICE O/P EST LOW 20 MIN: CPT | Performed by: NURSE PRACTITIONER

## 2019-11-01 RX ORDER — DOXYCYCLINE HYCLATE 100 MG
100 TABLET ORAL 2 TIMES DAILY
Qty: 14 TABLET | Refills: 0 | Status: SHIPPED | OUTPATIENT
Start: 2019-11-01 | End: 2020-02-03

## 2019-11-01 ASSESSMENT — MIFFLIN-ST. JEOR: SCORE: 1166.33

## 2019-11-01 ASSESSMENT — PAIN SCALES - GENERAL: PAINLEVEL: NO PAIN (0)

## 2019-11-01 NOTE — PATIENT INSTRUCTIONS
Patient Education     Sinusitis (Antibiotic Treatment)    The sinuses are air-filled spaces within the bones of the face. They connect to the inside of the nose. Sinusitis is an inflammation of the tissue that lines the sinuses. Sinusitis can occur during a cold. It can also happen due to allergies to pollens and other particles in the air. Sinusitis can cause symptoms of sinus congestion and a feeling of fullness. A sinus infection causes fever, headache, and facial pain. There is often green or yellow fluid draining from the nose or into the back of the throat (post-nasal drip). You have been given antibiotics to treat this condition.  Home care    Take the full course of antibiotics as instructed. Do not stop taking them, even when you feel better.    Drink plenty of water, hot tea, and other liquids. This may help thin nasal mucus. It also may help your sinuses drain fluids.    Heat may help soothe painful areas of your face. Use a towel soaked in hot water. Or,  the shower and direct the warm spray onto your face. Using a vaporizer along with a menthol rub at night may also help soothe symptoms.     An expectorant with guaifenesin (Mucinex or Robitussin) may help thin nasal mucus and help your sinuses drain fluids.    You can use an over-the-counter decongestant, unless a similar medicine was prescribed to you. Nasal sprays work the fastest. Use one that contains phenylephrine or oxymetazoline. First blow your nose gently. Then use the spray. Do not use these medicines more often than directed on the label. If you do, your symptoms may get worse. You may also take pills that contain pseudoephedrine. Don t use products that combine multiple medicines. This is because side effects may be increased. Read labels. You can also ask the pharmacist for help. (People with high blood pressure should not use decongestants. They can raise blood pressure.)    Over-the-counter antihistamines may help if allergies  contributed to your sinusitis.      Do not use nasal rinses or irrigation during an acute sinus infection, unless your healthcare provider tells you to. Rinsing may spread the infection to other areas in your sinuses.    Use acetaminophen or ibuprofen to control pain, unless another pain medicine was prescribed to you. If you have chronic liver or kidney disease or ever had a stomach ulcer, talk with your healthcare provider before using these medicines. (Aspirin should never be taken by anyone under age 18 who is ill with a fever. It may cause severe liver damage.)    Don't smoke. This can make symptoms worse.  Follow-up care  Follow up with your healthcare provider or our staff if you are not better in 1 week.  When to seek medical advice  Call your healthcare provider if any of these occur:    Facial pain or headache that gets worse    Stiff neck    Unusual drowsiness or confusion    Swelling of your forehead or eyelids    Vision problems, such as blurred or double vision    Fever of 100.4 F (38 C) or higher, or as directed by your healthcare provider    Seizure    Breathing problems    Symptoms don't go away in 10 days  Prevention  Here are steps you can take to help prevent an infection:    Keep good hand washing habits.    Don t have close contact with people who have sore throats, colds, or other upper respiratory infections.    Don t smoke, and stay away from secondhand smoke.    Stay up to date with of your vaccines.  Date Last Reviewed: 11/1/2017 2000-2018 [employer]. [address] [place] [id number]. All rights reserved. This information is not intended as a substitute for professional medical care. Always follow your healthcare professional's instructions.

## 2019-11-01 NOTE — PROGRESS NOTES
Subjective     Kristen Thompson is a 74 year old female who presents to clinic today for the following health issues:    HPI   ENT Symptoms             Symptoms: cc Present Absent Comment   Fever/Chills   x First few weeks had a fever   Fatigue  x     Muscle Aches   x    Eye Irritation   x    Sneezing   x    Nasal Thomas/Drg  x     Sinus Pressure/Pain  x     Loss of smell   x    Dental pain   x    Sore Throat  x  Sore throat x 1 month   Swollen Glands  x     Ear Pain/Fullness  x  Ears feels full   Cough   x Has to clear throat all day   Wheeze   x    Chest Pain  x  From coughing   Shortness of breath  x     Rash   x    Other   x      Symptom duration:  x 2 weeks   Symptom severity:  mild to moderate   Treatments tried:  OTC; sudafed and flonase - not really   Contacts:  unknown     Eating and drinking okay.  No nausea, vomiting or rashes.  Persistent symptoms with drainage all day and now blood in nasal drainage.    Patient Active Problem List   Diagnosis     GERD (gastroesophageal reflux disease)     CARDIOVASCULAR SCREENING; LDL GOAL LESS THAN 160     HL (hearing loss)     Health Care Home     Arthritis     Advance Care Planning     Hyperlipidemia LDL goal <130     Chronic constipation     Gastritis     Cervicalgia     Localized edema     Inflammatory spondylopathy of cervical region (H)     Restless leg syndrome     Primary localized osteoarthrosis, lower leg     Female stress incontinence     Insomnia     Edema     Past Surgical History:   Procedure Laterality Date     C RAD RESEC TONSIL/PILLARS       COLONOSCOPY  10/30/2012    Procedure: COLONOSCOPY;  Colonoscopy;  Surgeon: Denise Bah MD;  Location: [facility]     ESOPHAGOSCOPY, GASTROSCOPY, DUODENOSCOPY (EGD), COMBINED N/A 9/15/2016    Procedure: COMBINED ESOPHAGOSCOPY, GASTROSCOPY, DUODENOSCOPY (EGD);  Surgeon: Bennie Godinez MD;  Location: [facility]     GALLBLADDER SURGERY       HYSTERECTOMY       knee replacment  2007     RELEASE TRIGGER FINGER  Right 4/29/2016    Procedure: RELEASE TRIGGER FINGER;  Surgeon: Percy Sarmiento MD;  Location: [facility]     REPAIR BLADDER         Social History     Tobacco Use     Smoking status: Never Smoker     Smokeless tobacco: Never Used   Substance Use Topics     Alcohol use: No     Alcohol/week: 0.0 standard drinks     Family History   Problem Relation Age of Onset     C.A.D. Mother      Cardiovascular Mother      Thyroid Disease Mother      Cancer Father         stomach ca     Cancer Sister      Eye Disorder Sister      C.A.D. Sister      Cerebrovascular Disease Sister      Breast Cancer Sister      Arthritis Sister      Cardiovascular Sister      Depression Sister      Heart Disease Sister      Neurologic Disorder Sister      Osteoporosis Sister      Thyroid Disease Sister      Depression Sister      Thyroid Disease Sister      Depression Sister      Thyroid Disease Sister      Obesity Sister      Neurologic Disorder Sister          Current Outpatient Medications   Medication Sig Dispense Refill     B Complex Vitamins (VITAMIN B COMPLEX PO)        CALCIUM PO Take by mouth daily        Cholecalciferol (VITAMIN D PO) Take 1,000 Units by mouth daily        doxycycline hyclate (VIBRA-TABS) 100 MG tablet Take 1 tablet (100 mg) by mouth 2 times daily for 7 days 14 tablet 0     fluticasone (FLONASE) 50 MCG/ACT nasal spray SPRAY 1-2 SPRAYS INTO BOTH NOSTRILS DAILY 16 mL 3     KRILL OIL OMEGA-3 PO Take by mouth daily       MAGNESIUM PO        Omega-3 Fatty Acids (OMEGA 3 PO) Take  by mouth.       order for DME Equipment being ordered: thumb spica wrist brace, right 1 Device 0     Polyethylene Glycol 3350 (MIRALAX PO)        Probiotic Product (PROBIOTIC PO)        Allergies   Allergen Reactions     Codeine Sulfate      Nausea and vomiting      Quinapril Difficulty breathing     Darvon-N [Propoxyphene Napsylate] Nausea and Vomiting       Reviewed and updated as needed this visit by Provider  Tobacco  Allergies  Meds  Problems   "Med Hx  Surg Hx  Fam Hx         Review of Systems   ROS COMP: CONSTITUTIONAL:POSITIVE  for fatigue and fever at beginning but now there is none  INTEGUMENTARY/SKIN: NEGATIVE for worrisome rashes, moles or lesions  ENT/MOUTH: POSITIVE for nasal congestion, postnasal drainage, sinus pressure, sore throat   RESP:POSITIVE for no cough but does feel that breathing is tight sometimes  CV: NEGATIVE for chest pain, palpitations or peripheral edema  PSYCHIATRIC: NEGATIVE for changes in mood or affect  ROS otherwise negative      Objective    /72 (BP Location: Right arm, Patient Position: Sitting, Cuff Size: Adult Large)   Pulse 68   Temp 98  F (36.7  C) (Tympanic)   Resp 16   Ht 1.575 m (5' 2\")   Wt 70.3 kg (155 lb)   SpO2 98%   Breastfeeding? No   BMI 28.35 kg/m    Body mass index is 28.35 kg/m .  Physical Exam   GENERAL: healthy, alert and no distress  HENT: normal cephalic/atraumatic, right ear: occluded with wax, left ear: normal: no effusions, no erythema, normal landmarks, nasal mucosa edematous , oropharynx clear, oral mucous membranes moist, sinuses: maxillary, frontal tenderness on bilateral and green thick drainage in post pharyngeal with redness in throat and peripheral oropharyngeal  NECK: no adenopathy and no asymmetry, masses, or scars  RESP: lungs clear to auscultation - no rales, rhonchi or wheezes  CV: regular rate and rhythm, normal S1 S2, no S3 or S4, no murmur, click or rub, no peripheral edema and peripheral pulses strong  PSYCH: mentation appears normal, affect normal/bright    Diagnostic Test Results:  Labs reviewed in Epic        Assessment & Plan     1. Acute non-recurrent maxillary sinusitis  Patient has had symptoms for 1 month and they are persistent with little to no improvement.  Patient is being started on doxycycline twice a day for 7 days.  Recommend follow-up in clinic if any persistent or worsening symptoms.  Information handout given and reviewed and symptom management.  " Recommending Mucinex and Robitussin versus Sudafed in the future for congestion.  - doxycycline hyclate (VIBRA-TABS) 100 MG tablet; Take 1 tablet (100 mg) by mouth 2 times daily for 7 days  Dispense: 14 tablet; Refill: 0     See Patient Instructions    Return in about 1 week (around 11/8/2019), or if symptoms worsen or fail to improve.    Gretchen Last NP  [facility]

## 2019-11-06 NOTE — TELEPHONE ENCOUNTER
EMERGENCY DEPARTMENT HISTORY AND PHYSICAL EXAM    Date: 11/5/2019  Patient Name: Candace Rodrigez    History of Presenting Illness     Chief Complaint   Patient presents with    Shortness of Breath         History Provided By: Patient        Additional History (Context): Candace Rodrigez is a 63 y.o. female with History of hypertension, obstructive sleep apnea, COPD on home oxygen 24 hours 2 L/min obesity diastolic congestive heart failure who presents with a complaint of shortness of breath at home prior to arrival despite multiple DuoNeb's. Patient states she is currently on a steroid taper daily dose of 30 mg of prednisone. Patient smoking history is equal to 40-year pack history. She states she quit smoking in 2009. Verbal report from EMS provider states patient had pallor on arrival.  During transport additional DuoNeb's patient's color improved. Patient denies recent illness to include cough above her baseline cough. PCP: Devante Medeiros MD    Current Outpatient Medications   Medication Sig Dispense Refill    insulin glargine (LANTUS) 100 unit/mL injection Take 37u QHS while on steroids, then return to 35u QHS when not taking increased steroids 1 Vial 0    albuterol-ipratropium (DUO-NEB) 2.5 mg-0.5 mg/3 ml nebu 3 mL by Nebulization route every four (4) hours as needed (SOB). 30 Nebule 0    levoFLOXacin (LEVAQUIN) 500 mg tablet Take 1 Tab by mouth every twenty-four (24) hours. 4 Tab 0    predniSONE (DELTASONE) 10 mg tablet Take 30mg BID for 7 days total, then 50mg daily for 7 days,40 mg daily for 7 days, 30mg daily for 7 days, 20mg daily for 7 days, 10 mg 131 Tab 0    simethicone (GAS-X) 125 mg capsule Take 125 mg by mouth four (4) times daily as needed for Flatulence.  loratadine (CLARITIN) 10 mg tablet Take 10 mg by mouth daily as needed for Allergies.       HYPER-SAL 3.5 % nebu USE 1 VIAL IN NEBULIZER TWICE DAILY  6    lisinopril (PRINIVIL, ZESTRIL) 20 mg tablet Take Patient notified of recommendations. Phone numbers given. She will call and make an appointment within one week. If she has problems, she will call the clinic back.  Zarina Nobles RN      Your provider has referred you to:  [facility]: [facility] [phone]   [email or web address]  [facility]: [facility] [phone]   [email or web address]  [facility]: [facility] [phone]   [email or web address]  [facility]: [facility] [phone]   [email or web address]-center  [facility]: [facility] [phone]   [email or web address]  [facility] [phone]   [email or web address]   20 mg by mouth two (2) times a day.  albuterol sulfate (PROVENTIL;VENTOLIN) 2.5 mg/0.5 mL nebu nebulizer solution 0.5 mL by Nebulization route four (4) times daily as needed for Wheezing. To be used with HyperSal nebulizer solution. ICD code: COPD J44.1 60 mL 3    fluticasone propionate (FLONASE ALLERGY RELIEF) 50 mcg/actuation nasal spray 2 Sprays by Both Nostrils route daily.  fluticasone propion-salmeterol (ADVAIR HFA) 230-21 mcg/actuation inhaler Take 2 Puffs by inhalation two (2) times a day.  hydroCHLOROthiazide (HYDRODIURIL) 12.5 mg tablet Take 12.5 mg by mouth daily.  tiotropium bromide (SPIRIVA RESPIMAT) 2.5 mcg/actuation inhaler Take 2 Puffs by inhalation daily.  insulin glargine (LANTUS,BASAGLAR) 100 unit/mL (3 mL) inpn 35 Units by SubCUTAneous route nightly. (Patient taking differently: 32 Units by SubCUTAneous route nightly. Indications: type 2 diabetes mellitus) 28 Units 0    albuterol sulfate 90 mcg/actuation aepb Take 1 Puff by inhalation every four (4) hours. (Patient taking differently: Take 1 Puff by inhalation every four (4) hours as needed.) 1 Inhaler 0    NOVOLOG FLEXPEN U-100 INSULIN 100 unit/mL inpn Continue home Sliding scale insulin as prior to admission (Patient taking differently: 1 Units by SubCUTAneous route three (3) times daily. If BG <100=0u  101-150=5u  151-250=8u  251-300=12u  >300 call MD  ) 1 Pen 0    omeprazole (PRILOSEC) 40 mg capsule Take 40 mg by mouth daily. Indications: gastroesophageal reflux disease      cpap machine kit by Does Not Apply route nightly. M.E.D.      OXYGEN-AIR DELIVERY SYSTEMS 2 L by Nasal route continuous. [facility] Choice      aspirin delayed-release 81 mg tablet Take 81 mg by mouth daily.  famotidine (PEPCID) 20 mg tablet Take 20 mg by mouth two (2) times daily as needed for Other (reflux).  montelukast (SINGULAIR) 10 mg tablet Take 10 mg by mouth nightly.          Past History     Past Medical History:  Past Medical History:   Diagnosis Date    Asthma     Chronic lung disease     COPD     Cystocele, midline     Diabetes mellitus (HCC)     GERD (gastroesophageal reflux disease)     Hidradenitis suppurativa     Hyperlipidemia     Hypertension     SHERRIE on CPAP     CPAP    Stress incontinence        Past Surgical History:  Past Surgical History:   Procedure Laterality Date    BREAST SURGERY PROCEDURE UNLISTED      Right breast benign tumor removal    HX APPENDECTOMY      HX CHOLECYSTECTOMY      HX DILATION AND CURETTAGE      Dysfunctional uterine bleeding, thought 2/2 fibroids    HX TUBAL LIGATION         Family History:  Family History   Problem Relation Age of Onset    Hypertension Mother     Stroke Mother     Breast Cancer Mother         Bilateral mastectomies    Cancer Mother         ovarian and breast    Heart Failure Mother     Heart Attack Father         2011    Heart Surgery Father         CABG    Heart Failure Father     COPD Sister         Heavy smoker    Cancer Sister         ovarian    Heart Failure Sister     Lung Disease Sister     Asthma Child     Cancer Maternal Aunt         pancreatic    Cancer Maternal Grandfather         stomach       Social History:  Social History     Tobacco Use    Smoking status: Former Smoker     Packs/day: 1.00     Years: 30.00     Pack years: 30.00     Types: Cigarettes     Start date: 1966     Last attempt to quit: 2006     Years since quittin.1    Smokeless tobacco: Never Used    Tobacco comment: 1-1.5 packs per day   Substance Use Topics    Alcohol use: No    Drug use: No       Allergies: Allergies   Allergen Reactions    Ancef [Cefazolin] Hives    Contrast Agent [Iodine] Anaphylaxis, Shortness of Breath and Swelling     Needs pre-medication for IV contrast with Benadryl, Solu-Medrol    Fish Containing Products Anaphylaxis     Pt states she had a severe allergic reaction at 12 y/o.     Metformin Other (comments)     Abdominal pain, diarrhea.  Codeine Other (comments)     Altered mental status         Review of Systems   Review of Systems  Review of Systems   Constitutional: Negative for fatigue and fever. HENT: Negative for congestion. Respiratory: Baseline cough with shortness of breath. Cardiovascular: Negative for chest pain. Gastrointestinal: Negative for abdominal pain, diarrhea, nausea and vomiting. Genitourinary: Negative for difficulty urinating and dysuria. Musculoskeletal: Negative joint pain, joint swelling, recent injury. Skin: Negative for wound. Neurological: Negative for dizziness and headaches. All other systems reviewed and are negative. All Other Systems Negative  Physical Exam     Vitals:    11/05/19 2344   BP: 189/85   Pulse: 89   Resp: 24   Temp: 97.7 °F (36.5 °C)   SpO2: 99%     Physical Exam     Constitutional: Pt is oriented to person, place, and time. Pt appears well-developed and well-nourished. HENT:   Head: Normocephalic and atraumatic. Mouth/Throat: Oropharynx is clear and moist.   Eyes: Pupils are equal, round, and reactive to light. Neck: Normal range of motion. Neck supple. No tracheal deviation present. No thyromegaly present. Cardiovascular: Normal rate, regular rhythm and normal heart sounds. No murmur heard. Pulmonary/Chest: Patient has laborious breathing pattern with tachypnea patient only able to talk in brief sentences with 3 words. Decreased air movement on auscultation with mild wheezing throughout. Abdominal: Soft. no distension and no mass. There is no tenderness. There is no rebound and no guarding. Musculoskeletal: Normal range of motion. No edema or deformity. Neurological: Pt is alert and oriented to person, place, and time   Skin: Skin is warm and dry.    Psychiatric: Pt has a normal mood and affect;  behavior is normal. Judgment and thought content normal.           Diagnostic Study Results     Labs -     Recent Results (from the past 12 hour(s)) CBC WITH AUTOMATED DIFF    Collection Time: 11/06/19 12:02 AM   Result Value Ref Range    WBC 8.6 4.6 - 13.2 K/uL    RBC 4.57 4.20 - 5.30 M/uL    HGB 13.6 12.0 - 16.0 g/dL    HCT 39.0 35.0 - 45.0 %    MCV 85.3 74.0 - 97.0 FL    MCH 29.8 24.0 - 34.0 PG    MCHC 34.9 31.0 - 37.0 g/dL    RDW 13.7 11.6 - 14.5 %    PLATELET 646 254 - 453 K/uL    MPV 8.9 (L) 9.2 - 11.8 FL    NEUTROPHILS 69 40 - 73 %    LYMPHOCYTES 24 21 - 52 %    MONOCYTES 7 3 - 10 %    EOSINOPHILS 0 0 - 5 %    BASOPHILS 0 0 - 2 %    ABS. NEUTROPHILS 5.9 1.8 - 8.0 K/UL    ABS. LYMPHOCYTES 2.1 0.9 - 3.6 K/UL    ABS. MONOCYTES 0.6 0.05 - 1.2 K/UL    ABS. EOSINOPHILS 0.0 0.0 - 0.4 K/UL    ABS. BASOPHILS 0.0 0.0 - 0.1 K/UL    DF AUTOMATED     NT-PRO BNP    Collection Time: 11/06/19 12:02 AM   Result Value Ref Range    NT pro-BNP 82 0 - 123 PG/ML   METABOLIC PANEL, COMPREHENSIVE    Collection Time: 11/06/19 12:02 AM   Result Value Ref Range    Sodium 139 136 - 145 mmol/L    Potassium 3.8 3.5 - 5.5 mmol/L    Chloride 105 100 - 111 mmol/L    CO2 29 21 - 32 mmol/L    Anion gap 5 3.0 - 18 mmol/L    Glucose 157 (H) 74 - 99 mg/dL    BUN 18 7.0 - 18 MG/DL    Creatinine 0.85 0.6 - 1.3 MG/DL    BUN/Creatinine ratio 21 (H) 12 - 20      GFR est AA >60 >60 ml/min/1.73m2    GFR est non-AA >60 >60 ml/min/1.73m2    Calcium 9.3 8.5 - 10.1 MG/DL    Bilirubin, total 0.6 0.2 - 1.0 MG/DL    ALT (SGPT) 31 13 - 56 U/L    AST (SGOT) 8 (L) 10 - 38 U/L    Alk.  phosphatase 77 45 - 117 U/L    Protein, total 7.6 6.4 - 8.2 g/dL    Albumin 3.7 3.4 - 5.0 g/dL    Globulin 3.9 2.0 - 4.0 g/dL    A-G Ratio 0.9 0.8 - 1.7     POC LACTIC ACID    Collection Time: 11/06/19 12:28 AM   Result Value Ref Range    Lactic Acid (POC) 1.84 0.40 - 2.00 mmol/L   POC VENOUS BLOOD GAS    Collection Time: 11/06/19  2:32 AM   Result Value Ref Range    Device: NASAL CANNULA      Flow rate (POC) 3 L/M    pH, venous (POC) 7.399 7.32 - 7.42      pCO2, venous (POC) 44.7 41 - 51 MMHG    pO2, venous (POC) 85 (H) 25 - 40 mmHg    HCO3, venous (POC) 27.6 23.0 - 28.0 MMOL/L    sO2, venous (POC) 96 (H) 65 - 88 %    Base excess, venous (POC) 2 mmol/L    Allens test (POC) N/A      Total resp. rate 19      Site OTHER      Specimen type (POC) VENOUS BLOOD      Performed by Bing Brooks        Radiologic Studies -   XR CHEST PORT    (Results Pending)     CT Results  (Last 48 hours)    None        CXR Results  (Last 48 hours)    None            Medical Decision Making   I am the first provider for this patient. I reviewed the vital signs, available nursing notes, past medical history, past surgical history, family history and social history. Vital Signs-Reviewed the patient's vital signs. Comparison:    Records Reviewed: Nursing Notes, Old Medical Records, Previous Radiology Studies and Previous Laboratory Studies    Procedures:  Procedures    Provider Notes (Medical Decision Making):   I increased the patient's nasal cannula O2 to 4 L/min ordered a DuoNeb and Solu-Medrol IV upon arrival.  Patient was having difficulty speaking in sentences upon arrival.    Venous blood gas demonstrates the patient is currently not holding CO2. Lab results are within normal limits. Chest x-ray no indication of infiltrate seen on chest x-ray. Increased air movement of the lungs, expiratory wheezing is still present but more audible. Patient is comfortable going home and following up with primary care. MED RECONCILIATION:  No current facility-administered medications for this encounter. Current Outpatient Medications   Medication Sig    insulin glargine (LANTUS) 100 unit/mL injection Take 37u QHS while on steroids, then return to 35u QHS when not taking increased steroids    albuterol-ipratropium (DUO-NEB) 2.5 mg-0.5 mg/3 ml nebu 3 mL by Nebulization route every four (4) hours as needed (SOB).  levoFLOXacin (LEVAQUIN) 500 mg tablet Take 1 Tab by mouth every twenty-four (24) hours.     predniSONE (DELTASONE) 10 mg tablet Take 30mg BID for 7 days total, then 50mg daily for 7 days,40 mg daily for 7 days, 30mg daily for 7 days, 20mg daily for 7 days, 10 mg    simethicone (GAS-X) 125 mg capsule Take 125 mg by mouth four (4) times daily as needed for Flatulence.  loratadine (CLARITIN) 10 mg tablet Take 10 mg by mouth daily as needed for Allergies.  HYPER-SAL 3.5 % nebu USE 1 VIAL IN NEBULIZER TWICE DAILY    lisinopril (PRINIVIL, ZESTRIL) 20 mg tablet Take 20 mg by mouth two (2) times a day.  albuterol sulfate (PROVENTIL;VENTOLIN) 2.5 mg/0.5 mL nebu nebulizer solution 0.5 mL by Nebulization route four (4) times daily as needed for Wheezing. To be used with HyperSal nebulizer solution. ICD code: COPD J44.1    fluticasone propionate (FLONASE ALLERGY RELIEF) 50 mcg/actuation nasal spray 2 Sprays by Both Nostrils route daily.  fluticasone propion-salmeterol (ADVAIR HFA) 230-21 mcg/actuation inhaler Take 2 Puffs by inhalation two (2) times a day.  hydroCHLOROthiazide (HYDRODIURIL) 12.5 mg tablet Take 12.5 mg by mouth daily.  tiotropium bromide (SPIRIVA RESPIMAT) 2.5 mcg/actuation inhaler Take 2 Puffs by inhalation daily.  insulin glargine (LANTUS,BASAGLAR) 100 unit/mL (3 mL) inpn 35 Units by SubCUTAneous route nightly. (Patient taking differently: 32 Units by SubCUTAneous route nightly. Indications: type 2 diabetes mellitus)    albuterol sulfate 90 mcg/actuation aepb Take 1 Puff by inhalation every four (4) hours. (Patient taking differently: Take 1 Puff by inhalation every four (4) hours as needed.)    NOVOLOG FLEXPEN U-100 INSULIN 100 unit/mL inpn Continue home Sliding scale insulin as prior to admission (Patient taking differently: 1 Units by SubCUTAneous route three (3) times daily. If BG <100=0u  101-150=5u  151-250=8u  251-300=12u  >300 call MD  )    omeprazole (PRILOSEC) 40 mg capsule Take 40 mg by mouth daily. Indications: gastroesophageal reflux disease    cpap machine kit by Does Not Apply route nightly.  [place] OXYGEN-AIR DELIVERY SYSTEMS 2 L by Nasal route continuous. First Choice    aspirin delayed-release 81 mg tablet Take 81 mg by mouth daily.  famotidine (PEPCID) 20 mg tablet Take 20 mg by mouth two (2) times daily as needed for Other (reflux).  montelukast (SINGULAIR) 10 mg tablet Take 10 mg by mouth nightly. Disposition:  Home    DISCHARGE NOTE:     Pt has been reexamined. Patient has no new complaints, changes, or physical findings. Care plan outlined and precautions discussed. Results of labs, chest xray and  exam were reviewed with the patient. All medications were reviewed with the patient; will d/c home with follow up instructions. All of pt's questions and concerns were addressed. Patient was instructed and agrees to follow up with primary care this  week, as well as to return to the ED upon further deterioration. Patient is ready to go home. Follow-up Information     Follow up With Specialties Details Why Contact Info    Melody Torres MD [facility] Schedule an appointment as soon as possible for a visit  Joe Rizzo      [facility] EMERGENCY DEPT Emergency Medicine  If symptoms worsen [address] [address]  [phone]          Current Discharge Medication List              Diagnosis     Clinical Impression:   1. Acute exacerbation of chronic obstructive pulmonary disease (COPD) ([address]    2.  Shortness of breath

## 2020-02-03 ENCOUNTER — OFFICE VISIT (OUTPATIENT)
Dept: DERMATOLOGY | Facility: CLINIC | Age: 75
End: 2020-02-03
Payer: COMMERCIAL

## 2020-02-03 VITALS
WEIGHT: 150 LBS | BODY MASS INDEX: 27.6 KG/M2 | SYSTOLIC BLOOD PRESSURE: 128 MMHG | HEART RATE: 58 BPM | HEIGHT: 62 IN | DIASTOLIC BLOOD PRESSURE: 82 MMHG

## 2020-02-03 DIAGNOSIS — L81.4 LENTIGO: ICD-10-CM

## 2020-02-03 DIAGNOSIS — D22.9 NEVUS: ICD-10-CM

## 2020-02-03 DIAGNOSIS — D18.01 ANGIOMA OF SKIN: ICD-10-CM

## 2020-02-03 DIAGNOSIS — L82.1 SEBORRHEIC KERATOSIS: ICD-10-CM

## 2020-02-03 DIAGNOSIS — Z85.89 HISTORY OF SQUAMOUS CELL CARCINOMA: ICD-10-CM

## 2020-02-03 DIAGNOSIS — L57.0 AK (ACTINIC KERATOSIS): Primary | ICD-10-CM

## 2020-02-03 PROCEDURE — 17003 DESTRUCT PREMALG LES 2-14: CPT | Performed by: PHYSICIAN ASSISTANT

## 2020-02-03 PROCEDURE — 99213 OFFICE O/P EST LOW 20 MIN: CPT | Mod: 25 | Performed by: PHYSICIAN ASSISTANT

## 2020-02-03 PROCEDURE — 17000 DESTRUCT PREMALG LESION: CPT | Performed by: PHYSICIAN ASSISTANT

## 2020-02-03 ASSESSMENT — MIFFLIN-ST. JEOR: SCORE: 1143.65

## 2020-02-03 NOTE — NURSING NOTE
"Initial /82   Pulse 58   Ht 1.575 m (5' 2\")   Wt 68 kg (150 lb)   BMI 27.44 kg/m   Estimated body mass index is 27.44 kg/m  as calculated from the following:    Height as of this encounter: 1.575 m (5' 2\").    Weight as of this encounter: 68 kg (150 lb). .      "

## 2020-02-03 NOTE — PROGRESS NOTES
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 Spot on her back that her chiropractor pointed out.  Social History: lives in [place]  On review of systems, there are no further skin complaints, patient is feeling otherwise well.  See patient intake sheet.  ROS of the following were done and are negative: Constitutional, Eyes, Ears, Nose,   Mouth, Throat, Cardiovascular, Respiratory, GI, Genitourinary, Musculoskeletal,   Psychiatric, Endocrine, Allergic/Immunologic.    This document serves as a record of the services and decisions personally performed and made by Adriana Rubio, MS, PA-C. It was created on her behalf by Muna Lazo, a trained medical scribe. The creation of this document is based on the provider's statements to the medical scribe.  Muna Lazo 4:39 PM February 3, 2020    PHYSICAL EXAM:   /82   Pulse 58   Ht 1.575 m (5' 2\")   Wt 68 kg (150 lb)   BMI 27.44 kg/m    Skin exam performed as follows: Type 2 skin. Mood appropriate  Alert and Oriented X 3. Well developed, well nourished in no distress.  General appearance: Normal  Head including face: Normal  Eyes: conjunctiva and lids: Normal  Mouth: Lips, teeth, gums: Normal  Neck: Normal  Chest-breast/axillae: Normal  Back: Normal  Spleen and liver: Normal  Cardiovascular: Exam of peripheral vascular system by observation for swelling, varicosities, edema: Normal  Genitalia: groin, buttocks: Normal  Extremities: digits/nails (clubbing): Normal  Eccrine and Apocrine glands: Normal  Right upper extremity: " Normal  Left upper extremity: Normal  Right lower extremity: Normal  Left lower extremity: Normal  Skin: Scalp and body hair: See below    Pt deferred exam of breasts, groin, buttocks: No    Other physical findings:  1. Multiple pigmented macules on extremities and trunk  2. Multiple pigmented macules on face, trunk and extremities  3. Multiple vascular papules on trunk, arms and legs  4. Multiple scattered keratotic plaques  5. Pink gritty papule on right dorsal hand x 1, right trapezius x 1, left nasal tip x 1.        Except as noted above, no other signs of skin cancer or melanoma.     ASSESSMENT/PLAN:   Benign Above Waist skin cancer screening today. . Patient with history of NMSC  Advised on monthly self exams and 1 year  Patient Education: Appropriate brochures given.    1. Multiple benign appearing nevi on arms, legs and trunk. Discussed ABCDEs of melanoma and sunscreen.   2. Multiple lentigos on arms, legs and trunk. Advised benign, no treatment needed.  3. Multiple scattered angiomas. Advised benign, no treatment needed.   4. Seborrheic keratosis on arms, legs and trunk. Advised benign, no treatment needed.  5. Actinic keratosis on right dorsal hand x 1, right trapezius x 1, and left nasal tip x 1. As precancerous, cryosurgery performed. Advised on blistering and post-op care. Advised if not resolved in 1-2 months to return for evaluation  6. Kristen to follow up with Primary Care provider regarding elevated blood pressure.            Follow-up: Yearly FSE / PRN sooner    1.) Patient was asked about new and changing moles. YES  2.) Patient received a complete physical skin examination: YES  3.) Patient was counseled to perform a monthly self skin examination: YES  Scribed By: Muna Lazo Medical Scribe    The information in this document, created by the medical scribe for me, accurately reflects the services I personally performed and the decisions made by me. I have reviewed and approved this document for  accuracy prior to leaving the patient care area.  February 3, 2020 4:55 PM      Adriana Rubio MS, PA-C

## 2020-02-03 NOTE — LETTER
"    2/3/2020         RE: Kristen Thompson  [address] [place] [id number]        Dear Colleague,    Thank you for referring your patient, Kristen Thompson, to the [facility]. Please see a copy of my visit note below.    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 Spot on her back that her chiropractor pointed out.  Social History: lives in [place]  On review of systems, there are no further skin complaints, patient is feeling otherwise well.  See patient intake sheet.  ROS of the following were done and are negative: Constitutional, Eyes, Ears, Nose,   Mouth, Throat, Cardiovascular, Respiratory, GI, Genitourinary, Musculoskeletal,   Psychiatric, Endocrine, Allergic/Immunologic.    This document serves as a record of the services and decisions personally performed and made by Adriana Rubio, MS, PA-C. It was created on her behalf by Muna Lazo, a trained medical scribe. The creation of this document is based on the provider's statements to the medical scribe.  Muna Lazo 4:39 PM February 3, 2020    PHYSICAL EXAM:   /82   Pulse 58   Ht 1.575 m (5' 2\")   Wt 68 kg (150 lb)   BMI 27.44 kg/m     Skin exam performed as follows: Type 2 skin. Mood appropriate  Alert and Oriented X 3. Well developed, well nourished in no distress.  General appearance: Normal  Head including face: Normal  Eyes: conjunctiva and lids: Normal  Mouth: Lips, teeth, gums: Normal  Neck: Normal  Chest-breast/axillae: Normal  Back: Normal  Spleen and liver: " Normal  Cardiovascular: Exam of peripheral vascular system by observation for swelling, varicosities, edema: Normal  Genitalia: groin, buttocks: Normal  Extremities: digits/nails (clubbing): Normal  Eccrine and Apocrine glands: Normal  Right upper extremity: Normal  Left upper extremity: Normal  Right lower extremity: Normal  Left lower extremity: Normal  Skin: Scalp and body hair: See below    Pt deferred exam of breasts, groin, buttocks: No    Other physical findings:  1. Multiple pigmented macules on extremities and trunk  2. Multiple pigmented macules on face, trunk and extremities  3. Multiple vascular papules on trunk, arms and legs  4. Multiple scattered keratotic plaques  5. Pink gritty papule on right dorsal hand x 1, right trapezius x 1, left nasal tip x 1.        Except as noted above, no other signs of skin cancer or melanoma.     ASSESSMENT/PLAN:   Benign Above Waist skin cancer screening today. . Patient with history of NMSC  Advised on monthly self exams and 1 year  Patient Education: Appropriate brochures given.    1. Multiple benign appearing nevi on arms, legs and trunk. Discussed ABCDEs of melanoma and sunscreen.   2. Multiple lentigos on arms, legs and trunk. Advised benign, no treatment needed.  3. Multiple scattered angiomas. Advised benign, no treatment needed.   4. Seborrheic keratosis on arms, legs and trunk. Advised benign, no treatment needed.  5. Actinic keratosis on right dorsal hand x 1, right trapezius x 1, and left nasal tip x 1. As precancerous, cryosurgery performed. Advised on blistering and post-op care. Advised if not resolved in 1-2 months to return for evaluation  6. Kristen to follow up with Primary Care provider regarding elevated blood pressure.            Follow-up: Yearly FSE / PRN sooner    1.) Patient was asked about new and changing moles. YES  2.) Patient received a complete physical skin examination: YES  3.) Patient was counseled to perform a monthly self skin  examination: YES  Scribed By: Muna Lazo Medical Scribe    The information in this document, created by the medical scribe for me, accurately reflects the services I personally performed and the decisions made by me. I have reviewed and approved this document for accuracy prior to leaving the patient care area.  February 3, 2020 4:55 PM      Adriana Rubio MS, PAMARTHA        Again, thank you for allowing me to participate in the care of your patient.        Sincerely,        Adriana Rubio PA-C

## 2020-02-05 ENCOUNTER — TELEPHONE (OUTPATIENT)
Dept: FAMILY MEDICINE | Facility: CLINIC | Age: 75
End: 2020-02-05

## 2020-02-05 DIAGNOSIS — K21.9 GASTROESOPHAGEAL REFLUX DISEASE WITHOUT ESOPHAGITIS: ICD-10-CM

## 2020-02-05 RX ORDER — NICOTINE POLACRILEX 4 MG/1
20 GUM, CHEWING ORAL DAILY
Qty: 90 TABLET | Refills: 2 | Status: SHIPPED | OUTPATIENT
Start: 2020-02-05 | End: 2020-06-22

## 2020-02-05 NOTE — TELEPHONE ENCOUNTER
I iwll refill this. If her stomach /gerd is not getting better she should make an appointment. Cortney Hung M.D.

## 2020-02-05 NOTE — TELEPHONE ENCOUNTER
Reason for Call:  Other prescription    Detailed comments: Pt is wanting to refill the medication of: omeprazole for her acid reflex.  She stating she has had to take this again every nite now and is down to three tablets.  It was over a year ago that it was ordered.  [facility]    Phone Number Patient can be reached at: Home number on file [phone] (home)    Best Time: any    Can we leave a detailed message on this number? YES    Call taken on 2/5/2020 at 11:10 AM by Marie Ramos

## 2020-02-05 NOTE — TELEPHONE ENCOUNTER
Routing to PCP for review, Omeprazole is discontinued in the chart as of 4-10-19.    ISIDRA Razo

## 2020-02-16 ENCOUNTER — HEALTH MAINTENANCE LETTER (OUTPATIENT)
Age: 75
End: 2020-02-16

## 2020-02-27 ENCOUNTER — HOSPITAL ENCOUNTER (OUTPATIENT)
Dept: MAMMOGRAPHY | Facility: CLINIC | Age: 75
Discharge: HOME | End: 2020-02-27
Attending: FAMILY MEDICINE | Admitting: FAMILY MEDICINE
Payer: COMMERCIAL

## 2020-02-27 DIAGNOSIS — Z12.31 VISIT FOR SCREENING MAMMOGRAM: ICD-10-CM

## 2020-02-27 PROCEDURE — 77067 SCR MAMMO BI INCL CAD: CPT

## 2020-06-18 ENCOUNTER — MYC REFILL (OUTPATIENT)
Dept: FAMILY MEDICINE | Facility: CLINIC | Age: 75
End: 2020-06-18

## 2020-06-18 DIAGNOSIS — K21.9 GASTROESOPHAGEAL REFLUX DISEASE WITHOUT ESOPHAGITIS: ICD-10-CM

## 2020-06-18 RX ORDER — NICOTINE POLACRILEX 4 MG/1
20 GUM, CHEWING ORAL DAILY
Qty: 90 TABLET | Refills: 2 | Status: CANCELLED | OUTPATIENT
Start: 2020-06-18

## 2020-06-18 NOTE — TELEPHONE ENCOUNTER
Please call patient and have her set up a virtual visit. I am concerned that she increased this. Cortney Hung M.D.

## 2020-06-18 NOTE — TELEPHONE ENCOUNTER
Routing refill request to provider for review/approval because:  Patient reports taking BID, PCP to decide  Debbie Mayorga RN

## 2020-06-22 ENCOUNTER — VIRTUAL VISIT (OUTPATIENT)
Dept: FAMILY MEDICINE | Facility: CLINIC | Age: 75
End: 2020-06-22
Payer: COMMERCIAL

## 2020-06-22 DIAGNOSIS — Z79.899 ENCOUNTER FOR MONITORING LONG-TERM PROTON PUMP INHIBITOR THERAPY: ICD-10-CM

## 2020-06-22 DIAGNOSIS — Z51.81 ENCOUNTER FOR MONITORING LONG-TERM PROTON PUMP INHIBITOR THERAPY: ICD-10-CM

## 2020-06-22 DIAGNOSIS — K21.9 GASTROESOPHAGEAL REFLUX DISEASE WITHOUT ESOPHAGITIS: ICD-10-CM

## 2020-06-22 DIAGNOSIS — R42 DIZZINESS: Primary | ICD-10-CM

## 2020-06-22 DIAGNOSIS — E78.5 HYPERLIPIDEMIA LDL GOAL <130: ICD-10-CM

## 2020-06-22 PROCEDURE — 99214 OFFICE O/P EST MOD 30 MIN: CPT | Mod: 95 | Performed by: FAMILY MEDICINE

## 2020-06-22 RX ORDER — NICOTINE POLACRILEX 4 MG/1
20 GUM, CHEWING ORAL 2 TIMES DAILY
Qty: 180 TABLET | Refills: 3 | Status: SHIPPED | OUTPATIENT
Start: 2020-06-22 | End: 2021-03-12

## 2020-06-22 NOTE — PROGRESS NOTES
"Kristen Thompson is a 74 year old female who is being evaluated via a billable telephone visit.      The patient has been notified of following:     \"This telephone visit will be conducted via a call between you and your physician/provider. We have found that certain health care needs can be provided without the need for a physical exam.  This service lets us provide the care you need with a short phone conversation.  If a prescription is necessary we can send it directly to your pharmacy.  If lab work is needed we can place an order for that and you can then stop by our lab to have the test done at a later time.    Telephone visits are billed at different rates depending on your insurance coverage. During this emergency period, for some insurers they may be billed the same as an in-person visit.  Please reach out to your insurance provider with any questions.    If during the course of the call the physician/provider feels a telephone visit is not appropriate, you will not be charged for this service.\"    Patient has given verbal consent for Telephone visit?  Yes    What phone number would you like to be contacted at? [phone]    How would you like to obtain your AVS? Yessica Gilmore     Kristen Thompson is a 74 year old female who presents via phone visit today for the following health issues:    HPI  Medication Followup of Omeprazole 20 mg tablet     Taking Medication as prescribed: yes, she was only taking 1 tablet a day but now wanting to go back up to 2 a day    Side Effects:  None    Medication Helping Symptoms:  yes   No answer 1130 ,   She has the acid reflux and she has had this for years   She has been more stressed , she feels like   No weight no gi symptoms   sghe does have some dizziness she has some neck issues   She did physical therapy in the past but she still has pain   She is concerned as the dizziness altough chronic changes at times  No syncope, no chest pain  No soa   Because of " her neck she cannot do any of the epley exercises.          She had been taking this daily but now has increase in symptoms during the lockdown.        Patient Active Problem List   Diagnosis     GERD (gastroesophageal reflux disease)     CARDIOVASCULAR SCREENING; LDL GOAL LESS THAN 160     HL (hearing loss)     Health Care Home     Arthritis     Advance Care Planning     Hyperlipidemia LDL goal <130     Chronic constipation     Gastritis     Cervicalgia     Localized edema     Inflammatory spondylopathy of cervical region (H)     Restless leg syndrome     Primary localized osteoarthrosis, lower leg     Female stress incontinence     Insomnia     Edema     Past Surgical History:   Procedure Laterality Date     C RAD RESEC TONSIL/PILLARS       COLONOSCOPY  10/30/2012    Procedure: COLONOSCOPY;  Colonoscopy;  Surgeon: Denise Bah MD;  Location: [facility]     ESOPHAGOSCOPY, GASTROSCOPY, DUODENOSCOPY (EGD), COMBINED N/A 9/15/2016    Procedure: COMBINED ESOPHAGOSCOPY, GASTROSCOPY, DUODENOSCOPY (EGD);  Surgeon: Bennie Godinez MD;  Location: [facility]     GALLBLADDER SURGERY       HYSTERECTOMY       knee replacment  2007     RELEASE TRIGGER FINGER Right 4/29/2016    Procedure: RELEASE TRIGGER FINGER;  Surgeon: Percy Sarmiento MD;  Location: [facility]     REPAIR BLADDER         Social History     Tobacco Use     Smoking status: Never Smoker     Smokeless tobacco: Never Used   Substance Use Topics     Alcohol use: No     Alcohol/week: 0.0 standard drinks     Family History   Problem Relation Age of Onset     C.A.D. Mother      Cardiovascular Mother      Thyroid Disease Mother      Cancer Father         stomach ca     Cancer Sister      Eye Disorder Sister      C.A.D. Sister      Cerebrovascular Disease Sister      Breast Cancer Sister      Arthritis Sister      Cardiovascular Sister      Depression Sister      Heart Disease Sister      Neurologic Disorder Sister      Osteoporosis Sister      Thyroid Disease Sister       Depression Sister      Thyroid Disease Sister      Depression Sister      Thyroid Disease Sister      Obesity Sister      Neurologic Disorder Sister            Reviewed and updated as needed this visit by Provider         Review of Systems   Constitutional, HEENT, cardiovascular, pulmonary, gi and gu systems are negative, except as otherwise noted.       Objective   Reported vitals:  There were no vitals taken for this visit.   healthy, alert and no distress  PSYCH: Alert and oriented times 3; coherent speech, normal   rate and volume, able to articulate logical thoughts, able   to abstract reason, no tangential thoughts, no hallucinations   or delusions  Her affect is normal  RESP: No cough, no audible wheezing, able to talk in full sentences  Remainder of exam unable to be completed due to telephone visits    Diagnostic Test Results:  Labs reviewed in Epic        Assessment/Plan:  1. Dizziness  Will refer this is not acute but she would like to be reevaluated   - NEUROLOGY ADULT REFERRAL  - **Comprehensive metabolic panel FUTURE 2mo; Future  - Vitamin D Deficiency  - **CBC with platelets differential FUTURE 2mo; Future    2. Gastroesophageal reflux disease without esophagitis  May increase to 2 times per day for now try to reduce back down.   - **CBC with platelets differential FUTURE 2mo; Future  - omeprazole 20 MG tablet; Take 1 tablet (20 mg) by mouth 2 times daily Take 30-60 minutes before a meal.  Dispense: 180 tablet; Refill: 3    3. Encounter for monitoring long-term proton pump inhibitor therapy    - **Vitamin B12 FUTURE 2mo; Future  - Vitamin D Deficiency  - **CBC with platelets differential FUTURE 2mo; Future    4. Hyperlipidemia LDL goal <130    - Lipid panel reflex to direct LDL Fasting; Future    Return in about 4 weeks (around 7/20/2020) for with consultant as planned.      Phone call duration:  16 minutes    Cortney Hung MD

## 2020-06-22 NOTE — TELEPHONE ENCOUNTER
omeprazole 20 MG tablet  180 tablet  3  6/22/2020   No    Sig - Route: Take 1 tablet (20 mg) by mouth 2 times daily Take 30-60 minutes before a meal. - Oral    Sent to pharmacy as: Omeprazole 20 MG Oral Tablet Delayed Release    Class: E-Prescribe    Order: [id number]    E-Prescribing Status: Receipt confirmed by pharmacy (6/22/2020 11:51 AM CDT)    Printout Tracking     External Result Report    Medication Administration Instructions     Take 30-60 minutes before a meal.    Pharmacy     [id number] [place] [id number]

## 2020-06-22 NOTE — PATIENT INSTRUCTIONS
Make the appointment with the neurologist  Increase the omeprazole to 2 times per day for the next month and then try to decrease to just daily   I will send you the lab results with instructions. The vit d sometimes takes a few days.

## 2020-06-29 DIAGNOSIS — E78.5 HYPERLIPIDEMIA LDL GOAL <130: ICD-10-CM

## 2020-06-29 DIAGNOSIS — R42 DIZZINESS: ICD-10-CM

## 2020-06-29 DIAGNOSIS — Z79.899 ENCOUNTER FOR MONITORING LONG-TERM PROTON PUMP INHIBITOR THERAPY: ICD-10-CM

## 2020-06-29 DIAGNOSIS — K21.9 GASTROESOPHAGEAL REFLUX DISEASE WITHOUT ESOPHAGITIS: ICD-10-CM

## 2020-06-29 DIAGNOSIS — Z51.81 ENCOUNTER FOR MONITORING LONG-TERM PROTON PUMP INHIBITOR THERAPY: ICD-10-CM

## 2020-06-29 LAB
ALBUMIN SERPL-MCNC: 4 G/DL (ref 3.4–5)
ALP SERPL-CCNC: 80 U/L (ref 40–150)
ALT SERPL W P-5'-P-CCNC: 31 U/L (ref 0–50)
ANION GAP SERPL CALCULATED.3IONS-SCNC: 5 MMOL/L (ref 3–14)
AST SERPL W P-5'-P-CCNC: 20 U/L (ref 0–45)
BASOPHILS # BLD AUTO: 0 10E9/L (ref 0–0.2)
BASOPHILS NFR BLD AUTO: 0.6 %
BILIRUB SERPL-MCNC: 0.5 MG/DL (ref 0.2–1.3)
BUN SERPL-MCNC: 16 MG/DL (ref 7–30)
CALCIUM SERPL-MCNC: 9.1 MG/DL (ref 8.5–10.1)
CHLORIDE SERPL-SCNC: 106 MMOL/L (ref 94–109)
CHOLEST SERPL-MCNC: 243 MG/DL
CO2 SERPL-SCNC: 28 MMOL/L (ref 20–32)
CREAT SERPL-MCNC: 0.68 MG/DL (ref 0.52–1.04)
DIFFERENTIAL METHOD BLD: ABNORMAL
EOSINOPHIL # BLD AUTO: 0.1 10E9/L (ref 0–0.7)
EOSINOPHIL NFR BLD AUTO: 1.7 %
ERYTHROCYTE [DISTWIDTH] IN BLOOD BY AUTOMATED COUNT: 12.3 % (ref 10–15)
GFR SERPL CREATININE-BSD FRML MDRD: 87 ML/MIN/{1.73_M2}
GLUCOSE SERPL-MCNC: 86 MG/DL (ref 70–99)
HCT VFR BLD AUTO: 45.1 % (ref 35–47)
HDLC SERPL-MCNC: 75 MG/DL
HGB BLD-MCNC: 15 G/DL (ref 11.7–15.7)
LDLC SERPL CALC-MCNC: 150 MG/DL
LYMPHOCYTES # BLD AUTO: 1.4 10E9/L (ref 0.8–5.3)
LYMPHOCYTES NFR BLD AUTO: 25.4 %
MCH RBC QN AUTO: 33.1 PG (ref 26.5–33)
MCHC RBC AUTO-ENTMCNC: 33.3 G/DL (ref 31.5–36.5)
MCV RBC AUTO: 100 FL (ref 78–100)
MONOCYTES # BLD AUTO: 0.6 10E9/L (ref 0–1.3)
MONOCYTES NFR BLD AUTO: 10.6 %
NEUTROPHILS # BLD AUTO: 3.3 10E9/L (ref 1.6–8.3)
NEUTROPHILS NFR BLD AUTO: 61.7 %
NONHDLC SERPL-MCNC: 168 MG/DL
PLATELET # BLD AUTO: 226 10E9/L (ref 150–450)
POTASSIUM SERPL-SCNC: 4 MMOL/L (ref 3.4–5.3)
PROT SERPL-MCNC: 7.1 G/DL (ref 6.8–8.8)
RBC # BLD AUTO: 4.53 10E12/L (ref 3.8–5.2)
SODIUM SERPL-SCNC: 139 MMOL/L (ref 133–144)
TRIGL SERPL-MCNC: 92 MG/DL
VIT B12 SERPL-MCNC: 1095 PG/ML (ref 193–986)
WBC # BLD AUTO: 5.4 10E9/L (ref 4–11)

## 2020-06-29 PROCEDURE — 80061 LIPID PANEL: CPT | Performed by: FAMILY MEDICINE

## 2020-06-29 PROCEDURE — 85025 COMPLETE CBC W/AUTO DIFF WBC: CPT | Performed by: FAMILY MEDICINE

## 2020-06-29 PROCEDURE — 80053 COMPREHEN METABOLIC PANEL: CPT | Performed by: FAMILY MEDICINE

## 2020-06-29 PROCEDURE — 82607 VITAMIN B-12: CPT | Performed by: FAMILY MEDICINE

## 2020-06-29 PROCEDURE — 36415 COLL VENOUS BLD VENIPUNCTURE: CPT | Performed by: FAMILY MEDICINE

## 2020-07-01 ENCOUNTER — OFFICE VISIT (OUTPATIENT)
Dept: FAMILY MEDICINE | Facility: CLINIC | Age: 75
End: 2020-07-01
Payer: COMMERCIAL

## 2020-07-01 VITALS
WEIGHT: 160 LBS | OXYGEN SATURATION: 95 % | HEART RATE: 65 BPM | SYSTOLIC BLOOD PRESSURE: 122 MMHG | TEMPERATURE: 98.8 F | HEIGHT: 62 IN | BODY MASS INDEX: 29.44 KG/M2 | DIASTOLIC BLOOD PRESSURE: 77 MMHG | RESPIRATION RATE: 18 BRPM

## 2020-07-01 DIAGNOSIS — M46.92 INFLAMMATORY SPONDYLOPATHY OF CERVICAL REGION (H): ICD-10-CM

## 2020-07-01 DIAGNOSIS — Z13.21 ENCOUNTER FOR VITAMIN DEFICIENCY SCREENING: ICD-10-CM

## 2020-07-01 DIAGNOSIS — E78.5 HYPERLIPIDEMIA, UNSPECIFIED HYPERLIPIDEMIA TYPE: ICD-10-CM

## 2020-07-01 DIAGNOSIS — K21.9 GASTROESOPHAGEAL REFLUX DISEASE WITHOUT ESOPHAGITIS: Primary | ICD-10-CM

## 2020-07-01 DIAGNOSIS — M54.2 CERVICALGIA: ICD-10-CM

## 2020-07-01 PROCEDURE — 99214 OFFICE O/P EST MOD 30 MIN: CPT | Performed by: INTERNAL MEDICINE

## 2020-07-01 ASSESSMENT — MIFFLIN-ST. JEOR: SCORE: 1189.01

## 2020-07-01 NOTE — PROGRESS NOTES
"Subjective     Kristen Thompson is a 74 year old female who presents to clinic today for the following health issues:    HPI     Chief Complaint   Patient presents with     Establish Care     Former patient of Yasmin Morse ([place]).  Establishing care closer to home.     Flank Pain     Ongoing left-sided pain (two years).  Patient states sometimes it feels like a hernia - something may have popped out. Aching feeling.     Establish care    Dizziness: referred to neurology    Hyperlipidemia:  On fish oil.  Was on simvastatin from 22 .  Stopped for unclear reasons.  Was taking for OA as well  --no family history of vascular disease.      Angiogram 1/2017 - CORONARY ANGIOGRAM:   1. Both coronary arteries arise from their respective cusps.  2. Right dominant.  3. LM is without angiographic evidence of disease.   4. LAD is a type III vessel and gives rise to septal perforators, D1 and D2. There is no angiographic evidence of disease.   5. LCX is small and gives rise to OM1 and OM2 vessels.  There is no angiographic evidence of disease.   6. RCA gives rise to PL branches and supplies PDA. There is no angiographic evidence of disease.     Cardiology 6/2014 -   CT Calcium score 6/2014  IMPRESSION:     1. Total Agatston score 46.49, placing the patient in the 50 to 75th  percentile when compared to age and gender matched control group.     2. Mild two vessel coronary artery disease primarily involving the  LMCA,LAD, and RCA with no significant or flow limited stenosis.  \"If her CT angiogram does demonstrate atherosclerotic coronary artery disease, then I would recommend starting a statin agent. \"    Cholesterol   Date Value Ref Range Status   06/29/2020 243 (H) <200 mg/dL Final     Comment:     Desirable:       <200 mg/dl   12/07/2017 233 (H) <200 mg/dL Final     Comment:     Desirable:       <200 mg/dl     HDL Cholesterol   Date Value Ref Range Status   06/29/2020 75 >49 mg/dL Final   12/07/2017 90 >49 mg/dL Final "     LDL Cholesterol Calculated   Date Value Ref Range Status   06/29/2020 150 (H) <100 mg/dL Final     Comment:     Above desirable:  100-129 mg/dl  Borderline High:  130-159 mg/dL  High:             160-189 mg/dL  Very high:       >189 mg/dl     12/07/2017 128 (H) <100 mg/dL Final     Comment:     Above desirable:  100-129 mg/dl  Borderline High:  130-159 mg/dL  High:             160-189 mg/dL  Very high:       >189 mg/dl       Triglycerides   Date Value Ref Range Status   06/29/2020 92 <150 mg/dL Final     Comment:     Fasting specimen   12/07/2017 75 <150 mg/dL Final     Cholesterol/HDL Ratio   Date Value Ref Range Status   09/29/2015 3.1 0.0 - 5.0 Final   07/15/2014 4.0 0.0 - 5.0 Final       GERD: Recently increased her PPI. Has had GERD since her 20s, on meds most of her life.    Abdominal Pain: thinks it is a hernia.  Pain is LUQ.  Intermittent for 2+ years.  Visiting with GI in 2018 for this.  Nothing provokes.  Pain feels like a 'catch'.  It is not a severe pain.  It is increasing in frequency and severity.  Previously was 2-3 x week, now nearly daily, and has near constant aching.  When the pain is more severe, it lasts only a few seconds.  Bending over makes it worse  This feels different that GERD.  Pain radiates to back. No N/V.  Has chronic constipation, not changed.  Colonoscopy 2012 - normal   EGD 2016 - gastritis, normal otherwise    Hard of hearing: Needs to read lips.     Polypharmacy:  --takes b complex, unsure why      Current Outpatient Medications   Medication Sig Dispense Refill     B Complex Vitamins (VITAMIN B COMPLEX PO)        CALCIUM PO Take by mouth daily        Cholecalciferol (VITAMIN D PO) Take 1,000 Units by mouth daily        MAGNESIUM PO        Omega-3 Fatty Acids (OMEGA 3 PO) Take  by mouth.       omeprazole 20 MG tablet Take 1 tablet (20 mg) by mouth 2 times daily Take 30-60 minutes before a meal. 180 tablet 3     Polyethylene Glycol 3350 (MIRALAX PO)        Probiotic Product  "(PROBIOTIC PO)        fluticasone (FLONASE) 50 MCG/ACT nasal spray SPRAY 1-2 SPRAYS INTO BOTH NOSTRILS DAILY (Patient not taking: Reported on 7/1/2020) 16 mL 3     order for DME Equipment being ordered: thumb spica wrist brace, right (Patient not taking: Reported on 7/1/2020) 1 Device 0         Reviewed and updated as needed this visit by Provider  Problems         Review of Systems   Constitutional, HEENT, cardiovascular, pulmonary, gi and gu systems are negative, except as otherwise noted.      Objective    /77   Pulse 65   Temp 98.8  F (37.1  C) (Tympanic)   Resp 18   Ht 1.575 m (5' 2\")   Wt 72.6 kg (160 lb)   SpO2 95%   BMI 29.26 kg/m    Body mass index is 29.26 kg/m .  Physical Exam   GENERAL APPEARANCE: healthy, alert, no distress and over weight  ABDOMEN: soft, nontender, without hepatosplenomegaly or masses, bowel sounds normal and surgical scars, no hernia appreciated          Assessment & Plan       ICD-10-CM    1. Gastroesophageal reflux disease without esophagitis  K21.9    2. Cervicalgia  M54.2    3. Inflammatory spondylopathy of cervical region (H)  M46.92    4. Encounter for vitamin deficiency screening  Z13.21 **Vitamin B12 FUTURE 2mo   5. Hyperlipidemia, unspecified hyperlipidemia type  E78.5 Lipid panel reflex to direct LDL Fasting        BMI:   Estimated body mass index is 29.26 kg/m  as calculated from the following:    Height as of this encounter: 1.575 m (5' 2\").    Weight as of this encounter: 72.6 kg (160 lb).   Weight management plan: Discussed healthy diet and exercise guidelines        Patient Instructions     1. Fish oil can worsen acid reflux.  Consider stopping this or trial off to see if GERD improves.  Follow-up if GERD does not improve in 2 weeks, -may consider upper endoscopy.  Follow anti-reflux diet.  2. B 12 was elevated.  Consider changing B complex to every other day or even stopping altogether.  receheck B12 in 6 months.  3. If you manage constipation better, the " pain may improve        Patient Education     Tips to Control Acid Reflux    To control acid reflux, you ll need to make some basic diet and lifestyle changes. The simple steps outlined below may be all you ll need to ease discomfort.  Watch what you eat    Avoid fatty foods and spicy foods.    Eat fewer acidic foods, such as citrus and tomato-based foods. These can increase symptoms.    Limit drinking alcohol, caffeine, and fizzy beverages. All increase acid reflux.    Try limiting chocolate, peppermint, and spearmint. These can worsen acid reflux in some people.  Watch when you eat    Avoid lying down for 3 hours after eating.    Do not snack before going to bed.  Raise your head  Raising your head and upper body by 4 to 6 inches helps limit reflux when you re lying down. Put blocks under the head of your bed frame to raise it.  Other changes    Lose weight, if you need to    Don t exercise near bedtime    Avoid tight-fitting clothes    Limit aspirin and ibuprofen    Stop smoking   Date Last Reviewed: 7/1/2016 2000-2019 [employer]. [address] [place] [address], [place] [address]. All rights reserved. This information is not intended as a substitute for professional medical care. Always follow your healthcare professional's instructions.               No follow-ups on file.    Dorina Escalera,   [facility]

## 2020-07-01 NOTE — PATIENT INSTRUCTIONS
1. Fish oil can worsen acid reflux.  Consider stopping this or trial off to see if GERD improves.  Follow-up if GERD does not improve in 2 weeks, -may consider upper endoscopy.  Follow anti-reflux diet.  2. B 12 was elevated.  Consider changing B complex to every other day or even stopping altogether.  receheck B12 in 6 months.  3. If you manage constipation better, the pain may improve        Patient Education     Tips to Control Acid Reflux    To control acid reflux, you ll need to make some basic diet and lifestyle changes. The simple steps outlined below may be all you ll need to ease discomfort.  Watch what you eat    Avoid fatty foods and spicy foods.    Eat fewer acidic foods, such as citrus and tomato-based foods. These can increase symptoms.    Limit drinking alcohol, caffeine, and fizzy beverages. All increase acid reflux.    Try limiting chocolate, peppermint, and spearmint. These can worsen acid reflux in some people.  Watch when you eat    Avoid lying down for 3 hours after eating.    Do not snack before going to bed.  Raise your head  Raising your head and upper body by 4 to 6 inches helps limit reflux when you re lying down. Put blocks under the head of your bed frame to raise it.  Other changes    Lose weight, if you need to    Don t exercise near bedtime    Avoid tight-fitting clothes    Limit aspirin and ibuprofen    Stop smoking   Date Last Reviewed: 7/1/2016 2000-2019 [employer]. [address] [place] [address], [place] [address]. All rights reserved. This information is not intended as a substitute for professional medical care. Always follow your healthcare professional's instructions.

## 2020-08-12 ENCOUNTER — OFFICE VISIT (OUTPATIENT)
Dept: DERMATOLOGY | Facility: CLINIC | Age: 75
End: 2020-08-12
Payer: COMMERCIAL

## 2020-08-12 VITALS — SYSTOLIC BLOOD PRESSURE: 143 MMHG | OXYGEN SATURATION: 97 % | DIASTOLIC BLOOD PRESSURE: 83 MMHG | HEART RATE: 59 BPM

## 2020-08-12 DIAGNOSIS — L57.0 ACTINIC KERATOSIS: ICD-10-CM

## 2020-08-12 DIAGNOSIS — L82.0 INFLAMED SEBORRHEIC KERATOSIS: Primary | ICD-10-CM

## 2020-08-12 PROCEDURE — 17003 DESTRUCT PREMALG LES 2-14: CPT | Mod: 59 | Performed by: PHYSICIAN ASSISTANT

## 2020-08-12 PROCEDURE — 17110 DESTRUCTION B9 LES UP TO 14: CPT | Performed by: PHYSICIAN ASSISTANT

## 2020-08-12 PROCEDURE — 17000 DESTRUCT PREMALG LESION: CPT | Mod: 59 | Performed by: PHYSICIAN ASSISTANT

## 2020-08-12 NOTE — PROGRESS NOTES
Kristen Thompson is a 74 year old year old female patient here today for a few areas that she believes may be AKs. She notes they started a few months ago. Patient has no other skin complaints today.  Remainder of the HPI, Meds, PMH, Allergies, FH, and SH was reviewed in chart.    Pertinent Hx:   History of SCC  Past Medical History:   Diagnosis Date     Arthritis 11/19/2013     Gastro-oesophageal reflux disease      GERD (gastroesophageal reflux disease) 10/2/2012     H/O total hysterectomy      HL (hearing loss) 10/11/2012     PONV (postoperative nausea and vomiting)      Squamous cell carcinoma        Past Surgical History:   Procedure Laterality Date     C RAD RESEC TONSIL/PILLARS       COLONOSCOPY  10/30/2012    Procedure: COLONOSCOPY;  Colonoscopy;  Surgeon: Denise Bah MD;  Location: [facility]     ESOPHAGOSCOPY, GASTROSCOPY, DUODENOSCOPY (EGD), COMBINED N/A 9/15/2016    Procedure: COMBINED ESOPHAGOSCOPY, GASTROSCOPY, DUODENOSCOPY (EGD);  Surgeon: Bennie Godinez MD;  Location: [facility]     GALLBLADDER SURGERY       HYSTERECTOMY       knee replacment  2007     RELEASE TRIGGER FINGER Right 4/29/2016    Procedure: RELEASE TRIGGER FINGER;  Surgeon: Percy Sarmiento MD;  Location: [facility]     REPAIR BLADDER          Family History   Problem Relation Age of Onset     C.A.D. Mother      Cardiovascular Mother      Thyroid Disease Mother      Cancer Father         stomach ca     Cancer Sister      Eye Disorder Sister      C.A.D. Sister      Cerebrovascular Disease Sister      Breast Cancer Sister      Arthritis Sister      Cardiovascular Sister      Depression Sister      Heart Disease Sister      Neurologic Disorder Sister      Osteoporosis Sister      Thyroid Disease Sister      Depression Sister      Thyroid Disease Sister      Depression Sister      Thyroid Disease Sister      Obesity Sister      Neurologic Disorder Sister        Social History     Socioeconomic History     Marital status:       Spouse name: Not on file     Number of children: Not on file     Years of education: Not on file     Highest education level: Not on file   Occupational History     Not on file   Social Needs     Financial resource strain: Not on file     Food insecurity     Worry: Not on file     Inability: Not on file     Transportation needs     Medical: Not on file     Non-medical: Not on file   Tobacco Use     Smoking status: Never Smoker     Smokeless tobacco: Never Used   Substance and Sexual Activity     Alcohol use: No     Alcohol/week: 0.0 standard drinks     Drug use: No     Sexual activity: Not Currently     Partners: Male   Lifestyle     Physical activity     Days per week: Not on file     Minutes per session: Not on file     Stress: Not on file   Relationships     Social connections     Talks on phone: Not on file     Gets together: Not on file     Attends [employer] service: Not on file     Active member of club or organization: Not on file     Attends meetings of clubs or organizations: Not on file     Relationship status: Not on file     Intimate partner violence     Fear of current or ex partner: Not on file     Emotionally abused: Not on file     Physically abused: Not on file     Forced sexual activity: Not on file   Other Topics Concern     Parent/sibling w/ CABG, MI or angioplasty before 65F 55M? No   Social History Narrative     Not on file       Outpatient Encounter Medications as of 8/12/2020   Medication Sig Dispense Refill     B Complex Vitamins (VITAMIN B COMPLEX PO) Take 1 tablet by mouth daily        CALCIUM PO Take by mouth daily        Cholecalciferol (VITAMIN D PO) Take 1,000 Units by mouth daily        MAGNESIUM PO Take 150 mg by mouth daily        Omega-3 Fatty Acids (OMEGA 3 PO) Take 2 capsules by mouth daily        omeprazole 20 MG tablet Take 1 tablet (20 mg) by mouth 2 times daily Take 30-60 minutes before a meal. 180 tablet 3     Polyethylene Glycol 3350 (MIRALAX PO)        Probiotic Product  (PROBIOTIC PO)        fluticasone (FLONASE) 50 MCG/ACT nasal spray SPRAY 1-2 SPRAYS INTO BOTH NOSTRILS DAILY (Patient not taking: Reported on 7/1/2020) 16 mL 3     order for DME Equipment being ordered: thumb spica wrist brace, right (Patient not taking: Reported on 7/1/2020) 1 Device 0     No facility-administered encounter medications on file as of 8/12/2020.              Review Of Systems  Skin: As above  Eyes: negative  Ears/Nose/Throat: negative  Respiratory: No shortness of breath, dyspnea on exertion, cough, or hemoptysis  Cardiovascular: negative  Gastrointestinal: negative  Genitourinary: negative  Musculoskeletal: negative  Neurologic: negative  Psychiatric: negative  Hematologic/Lymphatic/Immunologic: negative  Endocrine: negative      O:   NAD, WDWN, Alert & Oriented, Mood & Affect wnl, Vitals stable   Here today alone   BP (!) 143/83   Pulse 59   SpO2 97%    General appearance normal   Vitals stable   Alert, oriented and in no acute distress     Gritty papule on left hand x 2   Light brown stuck on papule on right temple and left shoulder       Eyes: Conjunctivae/lids:Normal     ENT: Lips: normal    MSK:Normal    Pulm: Breathing Normal    Neuro/Psych: Orientation:Alert and Orientedx3 ; Mood/Affect:normal   A/P:  1. Actinic keratoses on left hand x 2  LN2:  Treated with LN2 for 5s for 1-2 cycles. Warned risks of blistering, pain, pigment change, scarring, and incomplete resolution.  Advised patient to return if lesions do not completely resolve.  Wound care sheet given.  2. Inflamed seborrheic keratosis on right temple and left posterior shoulder   LN2:  Treated with LN2 for 5s for 1-2 cycles. Warned risks of blistering, pain, pigment change, scarring, and incomplete resolution.  Advised patient to return if lesions do not completely resolve.  Wound care sheet given.

## 2020-08-12 NOTE — LETTER
8/12/2020         RE: Kristen Thompson  [address] [place] [id number]        Dear Colleague,    Thank you for referring your patient, Kristen Thompson, to the [facility]. Please see a copy of my visit note below.    Kristen Thompson is a 74 year old year old female patient here today for a few areas that she believes may be AKs. She notes they started a few months ago. Patient has no other skin complaints today.  Remainder of the HPI, Meds, PMH, Allergies, FH, and SH was reviewed in chart.    Pertinent Hx:   History of SCC  Past Medical History:   Diagnosis Date     Arthritis 11/19/2013     Gastro-oesophageal reflux disease      GERD (gastroesophageal reflux disease) 10/2/2012     H/O total hysterectomy      HL (hearing loss) 10/11/2012     PONV (postoperative nausea and vomiting)      Squamous cell carcinoma        Past Surgical History:   Procedure Laterality Date     C RAD RESEC TONSIL/PILLARS       COLONOSCOPY  10/30/2012    Procedure: COLONOSCOPY;  Colonoscopy;  Surgeon: Denise Bah MD;  Location: [facility]     ESOPHAGOSCOPY, GASTROSCOPY, DUODENOSCOPY (EGD), COMBINED N/A 9/15/2016    Procedure: COMBINED ESOPHAGOSCOPY, GASTROSCOPY, DUODENOSCOPY (EGD);  Surgeon: Bennie Godinez MD;  Location: [facility]     GALLBLADDER SURGERY       HYSTERECTOMY       knee replacment  2007     RELEASE TRIGGER FINGER Right 4/29/2016    Procedure: RELEASE TRIGGER FINGER;  Surgeon: Percy Sarmiento MD;  Location: [facility]     REPAIR BLADDER          Family History   Problem Relation Age of Onset     C.A.D. Mother      Cardiovascular Mother      Thyroid Disease Mother      Cancer Father         stomach ca     Cancer Sister      Eye Disorder Sister      C.A.D. Sister      Cerebrovascular Disease Sister      Breast Cancer Sister      Arthritis Sister      Cardiovascular Sister      Depression Sister      Heart Disease Sister      Neurologic Disorder Sister      Osteoporosis Sister      Thyroid  Disease Sister      Depression Sister      Thyroid Disease Sister      Depression Sister      Thyroid Disease Sister      Obesity Sister      Neurologic Disorder Sister        Social History     Socioeconomic History     Marital status:      Spouse name: Not on file     Number of children: Not on file     Years of education: Not on file     Highest education level: Not on file   Occupational History     Not on file   Social Needs     Financial resource strain: Not on file     Food insecurity     Worry: Not on file     Inability: Not on file     Transportation needs     Medical: Not on file     Non-medical: Not on file   Tobacco Use     Smoking status: Never Smoker     Smokeless tobacco: Never Used   Substance and Sexual Activity     Alcohol use: No     Alcohol/week: 0.0 standard drinks     Drug use: No     Sexual activity: Not Currently     Partners: Male   Lifestyle     Physical activity     Days per week: Not on file     Minutes per session: Not on file     Stress: Not on file   Relationships     Social connections     Talks on phone: Not on file     Gets together: Not on file     Attends [employer] service: Not on file     Active member of club or organization: Not on file     Attends meetings of clubs or organizations: Not on file     Relationship status: Not on file     Intimate partner violence     Fear of current or ex partner: Not on file     Emotionally abused: Not on file     Physically abused: Not on file     Forced sexual activity: Not on file   Other Topics Concern     Parent/sibling w/ CABG, MI or angioplasty before 65F 55M? No   Social History Narrative     Not on file       Outpatient Encounter Medications as of 8/12/2020   Medication Sig Dispense Refill     B Complex Vitamins (VITAMIN B COMPLEX PO) Take 1 tablet by mouth daily        CALCIUM PO Take by mouth daily        Cholecalciferol (VITAMIN D PO) Take 1,000 Units by mouth daily        MAGNESIUM PO Take 150 mg by mouth daily        Omega-3  Fatty Acids (OMEGA 3 PO) Take 2 capsules by mouth daily        omeprazole 20 MG tablet Take 1 tablet (20 mg) by mouth 2 times daily Take 30-60 minutes before a meal. 180 tablet 3     Polyethylene Glycol 3350 (MIRALAX PO)        Probiotic Product (PROBIOTIC PO)        fluticasone (FLONASE) 50 MCG/ACT nasal spray SPRAY 1-2 SPRAYS INTO BOTH NOSTRILS DAILY (Patient not taking: Reported on 7/1/2020) 16 mL 3     order for DME Equipment being ordered: thumb spica wrist brace, right (Patient not taking: Reported on 7/1/2020) 1 Device 0     No facility-administered encounter medications on file as of 8/12/2020.              Review Of Systems  Skin: As above  Eyes: negative  Ears/Nose/Throat: negative  Respiratory: No shortness of breath, dyspnea on exertion, cough, or hemoptysis  Cardiovascular: negative  Gastrointestinal: negative  Genitourinary: negative  Musculoskeletal: negative  Neurologic: negative  Psychiatric: negative  Hematologic/Lymphatic/Immunologic: negative  Endocrine: negative      O:   NAD, WDWN, Alert & Oriented, Mood & Affect wnl, Vitals stable   Here today alone   BP (!) 143/83   Pulse 59   SpO2 97%    General appearance normal   Vitals stable   Alert, oriented and in no acute distress     Gritty papule on left hand x 2   Light brown stuck on papule on right temple and left shoulder       Eyes: Conjunctivae/lids:Normal     ENT: Lips: normal    MSK:Normal    Pulm: Breathing Normal    Neuro/Psych: Orientation:Alert and Orientedx3 ; Mood/Affect:normal   A/P:  1. Actinic keratoses on left hand x 2  LN2:  Treated with LN2 for 5s for 1-2 cycles. Warned risks of blistering, pain, pigment change, scarring, and incomplete resolution.  Advised patient to return if lesions do not completely resolve.  Wound care sheet given.  2. Inflamed seborrheic keratosis on right temple and left posterior shoulder   LN2:  Treated with LN2 for 5s for 1-2 cycles. Warned risks of blistering, pain, pigment change, scarring, and  incomplete resolution.  Advised patient to return if lesions do not completely resolve.  Wound care sheet given.      Again, thank you for allowing me to participate in the care of your patient.        Sincerely,        Andreia Lea PA-C

## 2020-08-12 NOTE — NURSING NOTE
Chief Complaint   Patient presents with     Derm Problem       Vitals:    08/12/20 1044   BP: (!) 143/83   Pulse: 59   SpO2: 97%     Wt Readings from Last 1 Encounters:   07/01/20 72.6 kg (160 lb)       Izzy Escalera LPN.................8/12/2020

## 2020-09-20 NOTE — TELEPHONE ENCOUNTER
Patient:   BERTA AGUIRRE            MRN: [facility]-[id number]            FIN: [id number]              Age:   89 years     Sex:  FEMALE     :  29   Associated Diagnoses:   None   Author:   WANDA MISTRY     Subjective:  Pt seen and examined at bedside.  Pt hard of hearing.  Writing used to communicate.  Pt abd tenderness to palpation with dark, liquidy stool this AM.  Pt denies CP, SOB, and cough.  Objective:  I & O between:  29-OCT-2014 14:17 TO 30-OCT-2014 14:17  Med Dosing Weight:  78.3  kg   29-OCT-2014  24 Hour Intake:   1574.97  ( 20.11 mL/kg )  24 Hour Output:   2170.00           24 Hour Urine/Stool Output:   0.0  24 Hour Balance:   -595.03           24 Hour Urine Output:   1440.00  ( 0.77 mL/kg/hr)    Vitals between:   19-SEP-2020 12:25:19   TO   20-SEP-2020 12:25:19                   LAST RESULT MINIMUM MAXIMUM  Temperature 36.4 36.4 37.4  Heart Rate 64 64 68  Respiratory Rate 16 16 16  NISBP           145 131 149  NIDBP           78 71 78  NIMBP           100 91 100  SpO2                    99 97 99   Medications (15) Active  Scheduled: (9)  amoxicillin-clavulanate  875 mg, Oral, Q12H  Aspirin 81 mg chew tab  81 mg 1 tab, Chewed, Daily  Atorvastatin 40 mg tab  40 mg 1 tab, Oral, Daily  DULoxetine 30 mg DR cap  30 mg 1 cap, Oral, BID  Heparin 5,000 unit/1 mL inj MDV  5,000 unit 1 mL, Subcutaneous, Q8H  Insulin human lispro 1 unit/0.01 mL inj  1-6 units, Subcutaneous, QID [with meals & HS]  Metoprolol succinate 12.5 mg/0.5 XL tab (1/2 of 25 mg)  12.5 mg 0.5 tab, Oral, BID  Oxybutynin 5 mg tab  5 mg 1 tab, Oral, Daily  Pantoprazole 20 mg DR tab  20 mg 1 tab, Oral, Daily  Continuous: (1)  Sodium Chloride 0.9% 1,000 mL  1,000 mL, IV, 60 mL/hr  PRN: (5)  Acetaminophen 325 mg tab  650 mg 2 tab, Oral, Q6H  Dextrose (glucose) 40% 15 gm/37.5 gm oral gel UD  15 gm, Oral, As Directed PRN  Dextrose (glucose) 50% 25 gm/50 mL syringe  12.5 gm 25 mL, IV Push, As Directed PRN  Glucagon 1 mg/1 mL emergency kit SDV  1  Called patient and left voice mail inquiring about what kind of imaging she is trying to schedule given that I can't see any future orders for imaging.     Eddie Escalera, ATC     mg 1 mL, IM, As Directed PRN  Ondansetron 4 mg/2 mL inj SDV  4 mg 2 mL, Slow IV Push, Q8H  GENERAL:  In no apparent distress.  CHEST:  Chest with clear breath sounds bilaterally.  No wheezes, rales, or rhonchi.  CARDIAC:  Regular rate and rhythm.  S1 and S2, without murmurs, gallops, or rubs.  VASCULAR:  No Edema.    ABDOMEN:  Soft, without detectable tenderness.  No sign of distention.  No   rebound or guarding, and no masses palpated.   Bowel Sounds normal.  MUSCULOSKELETAL:  Good range of motion of all major joints.     Labs between:  19-SEP-2020 12:25 to 20-SEP-2020 12:25  CBC:                 WBC  HgB  Hct  Plt  MCV  RDW   20-SEP-2020 5.8  (L) 9.3  (L) 30.5  208  91.0  12.8   DIFF:                 Seg  Neutroph//ABS  Lymph//ABS  Mono//ABS  EOS/ABS  20-SEP-2020 NOT APPLICABLE  78 // 4.6 9 // (L) 0.6  9 // 0.5 2 // 0.1  BMP:                 Na  Cl  BUN  Glu   20-SEP-2020 144  (H) 111  (H) 22  (H) 125                              K  CO2  Cr  Ca                              (L) 3.2  26  0.91  (L) 7.9   CMP:                 AST  ALT  AlkPhos  Bili  Albumin   20-SEP-2020 34  (H) 71  (H) 240  0.3  (L) 1.9   POC GLU:                 Latest Result  Latest Date  Minimum  Min Date  Maximum  Max Date                             (H) 105  20-SEP-2020 (H) 105  20-SEP-2020 (H) 151  19-SEP-2020                 Assessment and plan:   89 y.o. CF with PMH of hypertensive HD with CAD s/p stent, diastolic dysfunction, DM type II not on insulin with complications of CKD, Stage III, and overactive bladder who was admitted for failure to thrive secondary to possible COVID-19.  # Failure to thrive secondary to possible COVID-19  - Pt afebrile with no leukocytosis.  - Sating well on RA.  - UA unremarkable.  - D-dimer, CRP, ESR, Procalcitonin, and Ferritin elevated.  Ferritin and LDH WNL.  - Rapid SARS-CoV-2- positive.    - PCXR shows coarse interstitial opacities lower lungs likely related to fibrosis.  Lungs clear.   - Continue  supportive care with routine IS and O2 NC PRN O2 sats <92%.  - PT/OT consulted.  SW consulted for placement.  Pt accepted to [facility] when medically stable.  # NAEEM on CKD, Stage III  - Renal function improving.  Likely due to dehydration.  - Hold Lasix.  Continue IVF hydration and to monitor renal function.  Avoid nephrotoxic agents.  # Elevated LFTs  - U/S Abd shows a significantly limited examination secondary to patient's inability to follow commands and shadowing from overlying structures.  Cholelithiasis without secondary signs of cholecystitis. Dilated common bile duct up to 11 mm at the level of the pancreatic head. A distal obstructing stone or lesion cannot be entirely excluded.  - GI consultation appreciated.  - CEA and CA 19-9 WNL.   - MRCP positive for choledocholithiasis, with multiple small stones measuring up to 6 mm in the distal CBD and mild upstream CBD dilation up to 9 mm.  Stone in the gallbladder.  Gallbladder wall is mildly thickened up to 4 mm but there are no other obvious findings of acute cholecystitis.  Diffuse periportal edema, nonspecific but sometimes seen in congestive hepatopathy/right heart failure, fluid overload, or hepatitis among other etiologies.  Few cystic lesions in the pancreas measuring up to 1.5 cm, statistically benign sidebranch IPMNs.  - Will start Augmentin x14 days, obtain pre-op Cardiology consultation, and perform ERCP as outpt in 2 wks.  - Palliative Care consultation appreciated.  Pt to remain FULL CODE per dicussion with son and POA.  # Hypokalemia  - Potassium supplemented.  Continue to monitor.  # Hypertensive HD with CAD s/p stent, diastolic dysfunction  - Hold Lasix.  Continue ASA, Metoprolol BID, and Atorvastatin.  # DM type II  - Hold Glipizide.  Continue Supplemental Insulin.  # Overactive Bladder  - Continue Oxybutynin.  # Anemia  - Drop in H/H likely due to dilution.  Continue Ferrous Sulfate and to monitor H/H.  # DVT proph with Lovenox  # FULL CODE

## 2020-11-29 ENCOUNTER — HEALTH MAINTENANCE LETTER (OUTPATIENT)
Age: 75
End: 2020-11-29

## 2021-01-02 ENCOUNTER — ANCILLARY PROCEDURE (OUTPATIENT)
Dept: GENERAL RADIOLOGY | Facility: CLINIC | Age: 76
End: 2021-01-02
Attending: PEDIATRICS
Payer: COMMERCIAL

## 2021-01-02 ENCOUNTER — OFFICE VISIT (OUTPATIENT)
Dept: ORTHOPEDICS | Facility: CLINIC | Age: 76
End: 2021-01-02
Payer: COMMERCIAL

## 2021-01-02 VITALS
HEIGHT: 62 IN | SYSTOLIC BLOOD PRESSURE: 128 MMHG | BODY MASS INDEX: 29.44 KG/M2 | WEIGHT: 160 LBS | DIASTOLIC BLOOD PRESSURE: 78 MMHG

## 2021-01-02 DIAGNOSIS — M53.3 SACROILIAC JOINT PAIN: ICD-10-CM

## 2021-01-02 DIAGNOSIS — M54.50 ACUTE LEFT-SIDED LOW BACK PAIN, UNSPECIFIED WHETHER SCIATICA PRESENT: Primary | ICD-10-CM

## 2021-01-02 DIAGNOSIS — M54.50 ACUTE LEFT-SIDED LOW BACK PAIN, UNSPECIFIED WHETHER SCIATICA PRESENT: ICD-10-CM

## 2021-01-02 PROCEDURE — 99203 OFFICE O/P NEW LOW 30 MIN: CPT | Performed by: PEDIATRICS

## 2021-01-02 PROCEDURE — 72100 X-RAY EXAM L-S SPINE 2/3 VWS: CPT | Performed by: RADIOLOGY

## 2021-01-02 ASSESSMENT — MIFFLIN-ST. JEOR: SCORE: 1184.01

## 2021-01-02 NOTE — LETTER
1/2/2021         RE: Kristen Thompson  [address] [place] [id number]        Dear Colleague,    Thank you for referring your patient, Kristen Thompson, to the [facility]. Please see a copy of my visit note below.    Sports Medicine Clinic Visit    PCP: Dorina Escalera    Kristen Thompson is a 75 year old female who is seen  as a self referral presenting with left side posterior hip and low back pain.  Pain has been increasing over the past 3 weeks.  Denies any numbness.  Denies any groin pain.      Injury: gradual onset   **  Has had some progressive pain in this area since spring. Worse past few weeks.  Points to left low back.  No numbness/tingling, no significant radiating symptoms that are nerve related.  Some radiating symptoms to left LE, but pain.      Location of Pain: left low back, posterior hip   Duration of Pain: 3 weeks on longer  Rating of Pain at worst: 8/10  Rating of Pain Currently: 6/10  Symptoms are better with: Tylenol,  Ice, Heat and Rest  Symptoms are worse with: lifting, getting up, getting in and out of bed/car/chair   Additional Features:   Positive:    Negative: swelling, bruising, popping, grinding, catching, locking, instability, paresthesias, numbness, weakness, pain in other joints and systemic symptoms  Other evaluation and/or treatments so far consists of: heat, ice, acetaminophen  Prior History of related problems: denies     Social History: retired     Kristen was asked to complete the Oswestry Low Back Disability Index.  Disability score: 53.33%.     Review of Systems  Musculoskeletal: as above  Remainder of review of systems is negative including constitutional, CV, pulmonary, GI, Skin and Neurologic except as noted in HPI or medical history.    Past Medical History:   Diagnosis Date     Arthritis 11/19/2013     Gastro-oesophageal reflux disease      GERD (gastroesophageal reflux disease) 10/2/2012     H/O total  hysterectomy      HL (hearing loss) 10/11/2012     PONV (postoperative nausea and vomiting)      Squamous cell carcinoma      Past Surgical History:   Procedure Laterality Date     C RAD RESEC TONSIL/PILLARS       COLONOSCOPY  10/30/2012    Procedure: COLONOSCOPY;  Colonoscopy;  Surgeon: Denise aBh MD;  Location: [facility]     ESOPHAGOSCOPY, GASTROSCOPY, DUODENOSCOPY (EGD), COMBINED N/A 9/15/2016    Procedure: COMBINED ESOPHAGOSCOPY, GASTROSCOPY, DUODENOSCOPY (EGD);  Surgeon: Bennie Godinez MD;  Location: [facility]     GALLBLADDER SURGERY       HYSTERECTOMY       knee replacment  2007     RELEASE TRIGGER FINGER Right 4/29/2016    Procedure: RELEASE TRIGGER FINGER;  Surgeon: Percy Sarmiento MD;  Location: [facility]     REPAIR BLADDER       Family History   Problem Relation Age of Onset     C.A.D. Mother      Cardiovascular Mother      Thyroid Disease Mother      Cancer Father         stomach ca     Cancer Sister      Eye Disorder Sister      C.A.D. Sister      Cerebrovascular Disease Sister      Breast Cancer Sister      Arthritis Sister      Cardiovascular Sister      Depression Sister      Heart Disease Sister      Neurologic Disorder Sister      Osteoporosis Sister      Thyroid Disease Sister      Depression Sister      Thyroid Disease Sister      Depression Sister      Thyroid Disease Sister      Obesity Sister      Neurologic Disorder Sister      Social History     Socioeconomic History     Marital status:      Spouse name: Not on file     Number of children: Not on file     Years of education: Not on file     Highest education level: Not on file   Occupational History     Not on file   Social Needs     Financial resource strain: Not on file     Food insecurity     Worry: Not on file     Inability: Not on file     Transportation needs     Medical: Not on file     Non-medical: Not on file   Tobacco Use     Smoking status: Never Smoker     Smokeless tobacco: Never Used   Substance and Sexual  "Activity     Alcohol use: No     Alcohol/week: 0.0 standard drinks     Drug use: No     Sexual activity: Not Currently     Partners: Male   Lifestyle     Physical activity     Days per week: Not on file     Minutes per session: Not on file     Stress: Not on file   Relationships     Social connections     Talks on phone: Not on file     Gets together: Not on file     Attends Catholic service: Not on file     Active member of club or organization: Not on file     Attends meetings of clubs or organizations: Not on file     Relationship status: Not on file     Intimate partner violence     Fear of current or ex partner: Not on file     Emotionally abused: Not on file     Physically abused: Not on file     Forced sexual activity: Not on file   Other Topics Concern     Parent/sibling w/ CABG, MI or angioplasty before 65F 55M? No   Social History Narrative     Not on file       Objective  /78   Ht 1.575 m (5' 2\")   Wt 72.6 kg (160 lb)   BMI 29.26 kg/m      GENERAL APPEARANCE: healthy, alert and no distress   GAIT: ambulates independently  SKIN: no suspicious lesions or rashes  NEURO: Normal strength and tone, mentation intact and speech normal  PSYCH:  mentation appears normal and affect normal/bright  HEENT: no scleral icterus  CV: distal perfusion intact  RESP: nonlabored breathing      Low back exam:    Inspection:       no visible deformity in the low back       normal skin       normal vascular       normal lymphatic    ROM:       Extension no change in pain  Lateral bending with focal left lower back/posterior hip pain, including leftward bending    Pain also increased with pelvic shifting laterally each way    Tender:       SI joint left    LE strength grossly intact        No significant change with active hip rotation      Radiology:  Visualized radiographs as noted below, and reviewed the images with the patient; if the report was available at the time of the visit, the report was reviewed as well.  " Some underlying degenerative change.    Recent Results (from the past 24 hour(s))   XR Lumbar Spine 2/3 Views    Narrative    XR LUMBAR SPINE TWO-THREE VIEWS 1/2/2021 11:18 AM     COMPARISON: None    HISTORY: Acute left-sided low back pain, unspecified whether sciatica  present.      Impression    IMPRESSION: Five lumbar-type vertebrae. Normal alignment. Vertebral  body heights are normal. No fractures. Minimal degenerative endplate  spurring at the L2-L3 and L3-L4 levels. Facet arthropathy at the L4-L5  and L5-S1 levels.    TELLO MORTON MD         Assessment:  1. Acute left-sided low back pain, unspecified whether sciatica present    2. Sacroiliac joint pain        Plan:  Discussed the assessment with the patient.  Some underlying facet arthrosis, but favor SI joint source. Does not appear hip joint related.  We discussed the following: symptom treatment, activity modification/rest, imaging, rehab and injection therapy. Following discussion, plan:    Topical Treatments: Ice, Heat or Topical Analgesics  Over the counter medication: prn; she inquired about other medications as well. May use acetaminophen prn. Could consider diclofenac (Voltaren) gel, though may not work as well on deeper structures. Otherwise, avoids NSAIDs.  X-ray of the lumbar spine reviewed. We discussed potential for MRI lumbar, but deferred for now with plan for injection.  Activity Modification: discussed  Rehab: Physical Therapy: future consideration, await result from anticipated injection  Discussed SI joint injection. She is interested in this. Referred to pain mgmt.  Follow up: or contact clinic with update 2-3 weeks after injection, sooner prn.  Questions answered. Discussed signs and symptoms that may indicate more serious issues; the patient was instructed to seek appropriate care if noted. Kristen indicates understanding of these issues and agrees with the plan.      Parveen Mehta, DO, CAQ            Patient Instructions   The pain  "around the back of the left hip is most consistent with sacroiliac (SI) joint source.  X-rays of the low back also show some underlying degenerative (\"wear and tear\") change.  We discussed considerations with additional imaging (MRI), physical therapy, and referral for an injection.  Given the pain, plan referral for an injection next.  Referred to pain management for this.  If unable to get in in a timely manner, then contact clinic and we can consider other resources.  Also, request contact clinic by phone or [facility] [facility] with an update 2 to 3 weeks after the injection.    If you have any further questions for your physician or physician s care team you can call [phone] and use option 3 to leave a voice message. Calls received during business hours will be returned same day.              This note consists of symbols derived from keyboarding, dictation and/or voice recognition software. As a result, there may be errors in the script that have gone undetected. Please consider this when interpreting information found in this chart.          Again, thank you for allowing me to participate in the care of your patient.        Sincerely,        Parveen Mehta, DO    "

## 2021-01-02 NOTE — PATIENT INSTRUCTIONS
"The pain around the back of the left hip is most consistent with sacroiliac (SI) joint source.  X-rays of the low back also show some underlying degenerative (\"wear and tear\") change.  We discussed considerations with additional imaging (MRI), physical therapy, and referral for an injection.  Given the pain, plan referral for an injection next.  Referred to pain management for this.  If unable to get in in a timely manner, then contact clinic and we can consider other resources.  Also, request contact clinic by phone or [facility] [employer] with an update 2 to 3 weeks after the injection.    If you have any further questions for your physician or physician s care team you can call [phone] and use option 3 to leave a voice message. Calls received during business hours will be returned same day.    "

## 2021-01-02 NOTE — PROGRESS NOTES
Sports Medicine Clinic Visit    PCP: Dorina Escalera HILARIO Thompson is a 75 year old female who is seen  as a self referral presenting with left side posterior hip and low back pain.  Pain has been increasing over the past 3 weeks.  Denies any numbness.  Denies any groin pain.      Injury: gradual onset   **  Has had some progressive pain in this area since spring. Worse past few weeks.  Points to left low back.  No numbness/tingling, no significant radiating symptoms that are nerve related.  Some radiating symptoms to left LE, but pain.    **  Chronic problem, as this problem has been present since this past spring.      Location of Pain: left low back, posterior hip   Duration of Pain: 3 weeks on longer  Rating of Pain at worst: 8/10  Rating of Pain Currently: 6/10  Symptoms are better with: Tylenol,  Ice, Heat and Rest  Symptoms are worse with: lifting, getting up, getting in and out of bed/car/chair   Additional Features:   Positive:    Negative: swelling, bruising, popping, grinding, catching, locking, instability, paresthesias, numbness, weakness, pain in other joints and systemic symptoms  Other evaluation and/or treatments so far consists of: heat, ice, acetaminophen  Prior History of related problems: denies     Social History: retired     Kristen was asked to complete the Oswestry Low Back Disability Index.  Disability score: 53.33%.     Review of Systems  Musculoskeletal: as above  Remainder of review of systems is negative including constitutional, CV, pulmonary, GI, Skin and Neurologic except as noted in HPI or medical history.    Past Medical History:   Diagnosis Date     Arthritis 11/19/2013     Gastro-oesophageal reflux disease      GERD (gastroesophageal reflux disease) 10/2/2012     H/O total hysterectomy      HL (hearing loss) 10/11/2012     PONV (postoperative nausea and vomiting)      Squamous cell carcinoma      Past Surgical History:   Procedure Laterality Date     C RAD RESEC  TONSIL/PILLARS       COLONOSCOPY  10/30/2012    Procedure: COLONOSCOPY;  Colonoscopy;  Surgeon: Denise Bah MD;  Location: [facility]     ESOPHAGOSCOPY, GASTROSCOPY, DUODENOSCOPY (EGD), COMBINED N/A 9/15/2016    Procedure: COMBINED ESOPHAGOSCOPY, GASTROSCOPY, DUODENOSCOPY (EGD);  Surgeon: Bennie Godinez MD;  Location: [facility]     GALLBLADDER SURGERY       HYSTERECTOMY       knee replacment  2007     RELEASE TRIGGER FINGER Right 4/29/2016    Procedure: RELEASE TRIGGER FINGER;  Surgeon: Percy Sarmiento MD;  Location: [facility]     REPAIR BLADDER       Family History   Problem Relation Age of Onset     C.A.D. Mother      Cardiovascular Mother      Thyroid Disease Mother      Cancer Father         stomach ca     Cancer Sister      Eye Disorder Sister      C.A.D. Sister      Cerebrovascular Disease Sister      Breast Cancer Sister      Arthritis Sister      Cardiovascular Sister      Depression Sister      Heart Disease Sister      Neurologic Disorder Sister      Osteoporosis Sister      Thyroid Disease Sister      Depression Sister      Thyroid Disease Sister      Depression Sister      Thyroid Disease Sister      Obesity Sister      Neurologic Disorder Sister      Social History     Socioeconomic History     Marital status:      Spouse name: Not on file     Number of children: Not on file     Years of education: Not on file     Highest education level: Not on file   Occupational History     Not on file   Social Needs     Financial resource strain: Not on file     Food insecurity     Worry: Not on file     Inability: Not on file     Transportation needs     Medical: Not on file     Non-medical: Not on file   Tobacco Use     Smoking status: Never Smoker     Smokeless tobacco: Never Used   Substance and Sexual Activity     Alcohol use: No     Alcohol/week: 0.0 standard drinks     Drug use: No     Sexual activity: Not Currently     Partners: Male   Lifestyle     Physical activity     Days per week: Not on  "file     Minutes per session: Not on file     Stress: Not on file   Relationships     Social connections     Talks on phone: Not on file     Gets together: Not on file     Attends Christianity service: Not on file     Active member of club or organization: Not on file     Attends meetings of clubs or organizations: Not on file     Relationship status: Not on file     Intimate partner violence     Fear of current or ex partner: Not on file     Emotionally abused: Not on file     Physically abused: Not on file     Forced sexual activity: Not on file   Other Topics Concern     Parent/sibling w/ CABG, MI or angioplasty before 65F 55M? No   Social History Narrative     Not on file       Objective  /78   Ht 1.575 m (5' 2\")   Wt 72.6 kg (160 lb)   BMI 29.26 kg/m      GENERAL APPEARANCE: healthy, alert and no distress   GAIT: ambulates independently  SKIN: no suspicious lesions or rashes  NEURO: Normal strength and tone, mentation intact and speech normal  PSYCH:  mentation appears normal and affect normal/bright  HEENT: no scleral icterus  CV: distal perfusion intact  RESP: nonlabored breathing      Low back exam:    Inspection:       no visible deformity in the low back       normal skin       normal vascular       normal lymphatic    ROM:       Extension no change in pain  Lateral bending with focal left lower back/posterior hip pain, including leftward bending    Pain also increased with pelvic shifting laterally each way    Tender:       SI joint left    LE strength grossly intact        No significant change with active hip rotation      Radiology:  Visualized radiographs as noted below, and reviewed the images with the patient; if the report was available at the time of the visit, the report was reviewed as well.  Some underlying degenerative change.    Recent Results (from the past 24 hour(s))   XR Lumbar Spine 2/3 Views    Narrative    XR LUMBAR SPINE TWO-THREE VIEWS 1/2/2021 11:18 AM     COMPARISON: " "None    HISTORY: Acute left-sided low back pain, unspecified whether sciatica  present.      Impression    IMPRESSION: Five lumbar-type vertebrae. Normal alignment. Vertebral  body heights are normal. No fractures. Minimal degenerative endplate  spurring at the L2-L3 and L3-L4 levels. Facet arthropathy at the L4-L5  and L5-S1 levels.    TELLO MORTON MD         Assessment:  1. Acute left-sided low back pain, unspecified whether sciatica present    2. Sacroiliac joint pain        Plan:  Discussed the assessment with the patient.  Some underlying facet arthrosis, but favor SI joint source. Does not appear hip joint related.  We discussed the following: symptom treatment, activity modification/rest, imaging, rehab and injection therapy. Following discussion, plan:    Topical Treatments: Ice, Heat or Topical Analgesics  Over the counter medication: prn; she inquired about other medications as well. May use acetaminophen prn. Could consider diclofenac (Voltaren) gel, though may not work as well on deeper structures. Otherwise, avoids NSAIDs.  X-ray of the lumbar spine reviewed. We discussed potential for MRI lumbar, but deferred for now with plan for injection.  Activity Modification: discussed  Rehab: Physical Therapy: future consideration, await result from anticipated injection  Discussed SI joint injection. She is interested in this. Referred to pain mgmt.  Follow up: or contact clinic with update 2-3 weeks after injection, sooner prn.  Questions answered. Discussed signs and symptoms that may indicate more serious issues; the patient was instructed to seek appropriate care if noted. Kristen indicates understanding of these issues and agrees with the plan.      Parveen Mehta, DO, CAQ            Patient Instructions   The pain around the back of the left hip is most consistent with sacroiliac (SI) joint source.  X-rays of the low back also show some underlying degenerative (\"wear and tear\") change.  We discussed " considerations with additional imaging (MRI), physical therapy, and referral for an injection.  Given the pain, plan referral for an injection next.  Referred to pain management for this.  If unable to get in in a timely manner, then contact clinic and we can consider other resources.  Also, request contact clinic by phone or MyChart with an update 2 to 3 weeks after the injection.    If you have any further questions for your physician or physician s care team you can call [phone] and use option 3 to leave a voice message. Calls received during business hours will be returned same day.              This note consists of symbols derived from keyboarding, dictation and/or voice recognition software. As a result, there may be errors in the script that have gone undetected. Please consider this when interpreting information found in this chart.

## 2021-01-04 ENCOUNTER — TELEPHONE (OUTPATIENT)
Dept: PALLIATIVE MEDICINE | Facility: CLINIC | Age: 76
End: 2021-01-04

## 2021-01-04 NOTE — TELEPHONE ENCOUNTER
Authorization Number: [id number]  Review Date: 1/4/2021 2:58:42 PM  Expiration Date: 7/3/2021  Status: Your case has been Approved.        Okay to schedule with Dr Ann-Marie MCLEOD    [facility]

## 2021-01-04 NOTE — TELEPHONE ENCOUNTER
Screening Questions for Radiology Injections:    Injection to be done at which interventional clinic site? [facility]    If [facility] location, tell patient that this procedure requires a COVID-19 lab test be done within 4 days of the procedure. Would you still like to move forward with scheduling the procedure?  Not Applicable   If YES, let patient know that someone will call them to schedule the COVID-19 test and that they will only receive a call back if the result is positive. Route to nursing to enter order.     Instruct patient to arrive as directed prior to the scheduled appointment time:    [place] minutes before      Kristina: 30 minutes before; if IV needed 1 hour before     Procedure ordered by Gabriel    Procedure ordered? Left SI Joint Injection      Transforaminal Cervical MOE - no pain provider currently performing    As a reminder, receiving steroids can decrease your body's ability to fight infection.   Would you still like to move forward with scheduling the injection?  Yes    What insurance would patient like us to bill for this procedure? BCBS/ Medicare      Worker's comp or MVA (motor vehicle accident) -Any injection DO NOT SCHEDULE and route to [facility] [place].      [employer] insurance - For SI joint injections, DO NOT SCHEDULE and route [facility] [place].       ALL BCBS, [employer] and [employer]-Route to [facility] for review DO NOT SCHEDULE      IF SCHEDULING IN [place] AND NEEDS A PA, IT IS OKAY TO SCHEDULE. [place] HANDLES THEIR OWN PA'S AFTER THE PATIENT IS SCHEDULED. PLEASE SCHEDULE AT LEAST 1 WEEK OUT SO A PA CAN BE OBTAINED.    Any chance of pregnancy? NO   If YES, do NOT schedule and route to RN pool    Is an  needed? No     Patient has a drive home? (mandatory) YES: INFORMED    Is patient taking any blood thinners (i.e. plavix, coumadin, jantoven, warfarin, heparin, pradaxa or dabigatran, etc)? No   If hold needed, do NOT schedule, route  to RN pool     Is patient taking any aspirin products (includes Excedrin and Fiorinal)? No     If more than 325mg/day, OK to schedule; Instruct pt to decrease to less than 325 mg for 7 days AND route to RN pool    For CERVICAL procedures, hold all aspirin products for 6 days.     Tell pt that if aspirin product is not held for 6 days, the procedure WILL BE cancelled.      Does the patient have a bleeding or clotting disorder? No     If YES, okay to schedule AND route to RN nurse pool    For any patients with platelet count <100, must be forwarded to provider    Is patient diabetic?  No  If YES, instruct them to bring their glucometer.    Does patient have an active infection or treated for one within the past week? No     Is patient currently taking any antibiotics?  No     For patients on chronic, preventative, or prophylactic antibiotics, procedures may be scheduled.     For patients on antibiotics for active or recent infection:antibiotic course must have been completed for 4 days    Is patient currently taking any steroid medications? (i.e. Prednisone, Medrol)  No     For patients on steroid medications, course must have been completed for 4 days    Is patient actively being treated for cancer or immunocompromised? No  If YES, do NOT schedule and route to RN pool     Are you able to get on and off an exam table with minimal or no assistance? Yes  If NO, do NOT schedule and route to RN pool    Are you able to roll over and lay on your stomach with minimal or no assistance? Yes  If NO, do NOT schedule and route to RN pool     Any allergies to contrast dye, iodine, shellfish, or numbing and steroid medications? No  If YES, route to RN pool AND add allergy information to appointment notes    Allergies: Codeine sulfate, Quinapril, and Darvon-n [propoxyphene napsylate]      Has the patient had a flu shot or any other vaccinations within 7 days before or after the procedure.  No     Does patient have an MRI/CT?  Not  Applicable  Check Procedure Scheduling Grid to see if required.      Was the MRI done within the last 3 years?  NA    If yes, where was the MRI done i.e.[facility], [facility], [facility], [facility] etc?       If no, do not schedule and route to RN pool    If MRI was not done at [place], [facility] or [facility] do NOT schedule and route to RN pool.      If pt has an imaging disc, the injection MAY be scheduled but pt has to bring disc to appt.     If they show up without the disc the injection cannot be done    Procedure Specific Instructions:      If celiac plexus block, informed patient NPO for 6 hours and that it is okay to take medications with sips of water, especially blood pressure medications  Not Applicable         If this is for a cervical procedure, informed patient that aspirin needs to be held for 6 days.   Not Applicable      If IV needed:    Do not schedule procedures requiring IV placement in the first appointment of the day or first appointment after lunch. Do NOT schedule at 0745, 0815 or 1245.     Instructed pt to arrive 30 minutes early for IV start if required. (Check Procedure Scheduling Grid)  Not Applicable    Reminders:      If you are started on any steroids or antibiotics between now and your appointment, you must contact us because the procedure may need to be cancelled.  Yes      For all procedures except radiofrequency ablations (RFAs) and spinal cord stimulator (SCS) trials, informed patient:    IV sedation is not provided for this procedure.  If you feel that an oral anti-anxiety medication is needed, you can discuss this further with your referring provider or primary care provider.  The Pain Clinic provider will discuss specifics of what the procedure includes at your appointment.  Most procedures last 10-20 minutes.  We use numbing medications to help with any discomfort during the procedure.  Not Applicable      For patients 85 or older we recommend having an adult stay w/  them for the remainder of the day.       Does the patient have any questions?  NO  Gem Guajardo  [facility]

## 2021-01-10 NOTE — PROGRESS NOTES
Pre procedure Diagnosis: SI joint dysfunction    Post procedure Diagnosis: Same  Procedure performed: left SI joint injection  Anesthesia: none  Complications: none  Operators: Milla Jacobsen MD and Dionicio Ramirez MD     Indications:   Kristen Thompson is a 75 year old female was sent by Dr. Mehta for SIJ pain.  They have a history of pain in the lower umbar spine for the past few months with pain on the left low back which is progressively getting worse.  No radiating pain, no numbness or tingling.  Exam shows tenderness over the PSIS and SI joint area with some myofascial tenderness over the piriformis and greater trochanter and they have tried conservative treatment including Tylenol, no PT for this low back pain.    Options/alternatives, benefits and risks were discussed with the patient including bleeding, infection, tissue trauma, exposure to radiation, reaction to medications including seizure, nerve injury, weakness, and numbness.  Questions were answered to her satisfaction and she agrees to proceed. Voluntary informed consent was obtained and signed.     Vitals were reviewed: Yes  Allergies were reviewed:  Yes   Medications were reviewed:  Yes   Pre-procedure pain score: 10/10    Procedure:  After getting informed consent, patient was brought into the procedure suite and was placed in a prone position on the procedure table.   A Pause for the Cause was performed.  Patient was prepped and draped in sterile fashion.     After identifying the left SI joint, the C-arm was rotated to a obliquely to obtain the best view of the inferior angle of the joint.  A total of 4 ml of Lidocaine 1%  was used to anesthetize the skin at a skin entry site coaxial with the fluoroscopy beam at this location.  A 22gauge 3.5\ inch needle was advanced under intermittent fluoroscopy until it was felt to enter the SI joint.    A total of 2.5ml of Omnipaque-300 was injected, confirming appropriate position, with spread into the  intraarticular space, with no intravascular uptake noted.  7.5ml was wasted. Needle was adjusted due to vascular uptake.    1.5 ml of 0.2% ropivacaine with 40mg of kenalog was injected.  The needle was flushed with lidocaine and removed.     Hemostasis was achieved, the area was cleaned, and bandaids were placed when appropriate.  The patient tolerated the procedure well, and was taken to the recovery room.    Images were saved to PACS.    Post-procedure pain score: 0/10  Follow-up includes:   -f/u with referring provider  -if not helpful, we could add in piriformis and/or trochanteric bursa after 2 weeks    Milla Jacobsen MD  [facility]

## 2021-01-11 ENCOUNTER — RADIOLOGY INJECTION OFFICE VISIT (OUTPATIENT)
Dept: PALLIATIVE MEDICINE | Facility: CLINIC | Age: 76
End: 2021-01-11
Attending: PEDIATRICS
Payer: COMMERCIAL

## 2021-01-11 ENCOUNTER — ANCILLARY PROCEDURE (OUTPATIENT)
Dept: RADIOLOGY | Facility: CLINIC | Age: 76
End: 2021-01-11
Attending: PSYCHIATRY & NEUROLOGY
Payer: COMMERCIAL

## 2021-01-11 VITALS
DIASTOLIC BLOOD PRESSURE: 82 MMHG | SYSTOLIC BLOOD PRESSURE: 162 MMHG | HEART RATE: 55 BPM | OXYGEN SATURATION: 96 % | RESPIRATION RATE: 16 BRPM

## 2021-01-11 DIAGNOSIS — M53.3 SACROILIAC JOINT PAIN: Primary | ICD-10-CM

## 2021-01-11 DIAGNOSIS — M54.50 ACUTE LEFT-SIDED LOW BACK PAIN, UNSPECIFIED WHETHER SCIATICA PRESENT: ICD-10-CM

## 2021-01-11 DIAGNOSIS — M53.3 SI (SACROILIAC) JOINT DYSFUNCTION: ICD-10-CM

## 2021-01-11 PROCEDURE — 27096 INJECT SACROILIAC JOINT: CPT | Mod: LT | Performed by: PSYCHIATRY & NEUROLOGY

## 2021-01-11 ASSESSMENT — PAIN SCALES - GENERAL: PAINLEVEL: WORST PAIN (10)

## 2021-01-11 NOTE — PATIENT INSTRUCTIONS
[facility]   Procedure Discharge Instructions    Today you saw:  Dr. Dianne Jacobsen     You had an:    sacroiliac joint injection      Medications used:  Lidocaine Omnipaque  Ropivicaine   Kenalog              If you were holding your blood thinning medication, please restart taking it: N/A    Be cautious when walking. Numbness and/or weakness in the lower extremities may occur for up to 6-8 hours after the procedure due to effect of the local anesthetic    Do not drive for 6 hours. The effect of the local anesthetic could slow your reflexes.     You may resume your regular activities after 24 hours    Avoid strenuous activity for the first 24 hours    You may shower, however avoid swimming, tub baths or hot tubs for 24 hours following your procedure    You may have a mild to moderate increase in pain for several days following the injection.    It may take up to 14 days for the steroid medication to start working although you may feel the effect as early as a few days after the procedure.       You may use ice packs for 10-15 minutes, 3 to 4 times a day at the injection site for comfort    Do not use heat to painful areas for 6 to 8 hours. This will give the local anesthetic time to wear off and prevent you from accidentally burning your skin.     Unless you have been directed to avoid the use of anti-inflammatory medications (NSAIDS), you may use medications such as ibuprofen, Aleve or Tylenol for pain control if needed.     Possible side effects of steroids that you may experience include flushing, elevated blood pressure, increased appetite, mild headaches and restlessness.  All of these symptoms will get better with time.    If you experience any of the following, call the Pain Clinic during work hours (Mon-Friday 8-4:30 pm) at [phone] or the Provider Line after hours at [phone]:  -Fever over 100 degree F  -Swelling, bleeding, redness, drainage, warmth at the injection  site  -Progressive weakness or numbness in your legs   -Unusual new onset of pain that is not improving

## 2021-01-11 NOTE — NURSING NOTE
Discharge Information    IV Discontiued Time:  NA    Amount of Fluid Infused:  NA    Discharge Criteria = When patient returns to baseline or as per MD order    Consciousness:  Pt is fully awake    Circulation:  BP +/- 20% of pre-procedure level    Respiration:  Patient is able to breathe deeply    O2 Sat:  Patient is able to maintain O2 Sat >92% on room air    Activity:  Moves 4 extremities on command    Ambulation:  Patient is able to stand and walk or stand and pivot into wheelchair    Dressing:  Clean/dry or No Dressing    Notes:   Discharge instructions and AVS given to patient    Patient meets criteria for discharge?  YES    Admitted to PCU?  No    Responsible adult present to accompany patient home?  Yes    Signature/Title:    Parveen Peacock RN  RN Care Coordinator  [facility] [place]

## 2021-01-11 NOTE — NURSING NOTE
Pre-procedure Intake    Have you been fasting? NA    If yes, for how long? No     Are you taking a prescribed blood thinner such as coumadin, Plavix, Xarelto?    No    If yes, when did you take your last dose? No     Do you take aspirin?  No    If cervical procedure, have you held aspirin for 6 days?   No     Do you have any allergies to contrast dye, iodine, steroid and/or numbing medications?  NO    Are you currently taking antibiotics or have an active infection?  NO    Have you had a fever/elevated temperature within the past week? NO    Are you currently taking oral steroids? NO    Do you have a ? Yes       Are you pregnant or breastfeeding?  NO    Are the vital signs normal?  Yes

## 2021-03-02 ENCOUNTER — OFFICE VISIT (OUTPATIENT)
Dept: FAMILY MEDICINE | Facility: CLINIC | Age: 76
End: 2021-03-02
Payer: COMMERCIAL

## 2021-03-02 VITALS
HEART RATE: 54 BPM | OXYGEN SATURATION: 97 % | TEMPERATURE: 98 F | SYSTOLIC BLOOD PRESSURE: 130 MMHG | HEIGHT: 62 IN | WEIGHT: 165.6 LBS | DIASTOLIC BLOOD PRESSURE: 84 MMHG | RESPIRATION RATE: 16 BRPM | BODY MASS INDEX: 30.47 KG/M2

## 2021-03-02 DIAGNOSIS — I63.9 CEREBROVASCULAR ACCIDENT (CVA), UNSPECIFIED MECHANISM (H): Primary | ICD-10-CM

## 2021-03-02 DIAGNOSIS — I10 ESSENTIAL HYPERTENSION WITH GOAL BLOOD PRESSURE LESS THAN 130/80: ICD-10-CM

## 2021-03-02 DIAGNOSIS — E78.5 HYPERLIPIDEMIA, UNSPECIFIED HYPERLIPIDEMIA TYPE: ICD-10-CM

## 2021-03-02 PROCEDURE — 99214 OFFICE O/P EST MOD 30 MIN: CPT | Performed by: INTERNAL MEDICINE

## 2021-03-02 RX ORDER — AMLODIPINE BESYLATE 10 MG/1
10 TABLET ORAL DAILY
Qty: 90 TABLET | Refills: 3 | Status: SHIPPED | OUTPATIENT
Start: 2021-03-02 | End: 2021-03-12

## 2021-03-02 RX ORDER — FAMOTIDINE 20 MG/1
20 TABLET, FILM COATED ORAL
COMMUNITY
End: 2021-03-12

## 2021-03-02 RX ORDER — AMLODIPINE BESYLATE 5 MG/1
5 TABLET ORAL DAILY
COMMUNITY
End: 2021-03-02

## 2021-03-02 RX ORDER — CLOPIDOGREL BISULFATE 75 MG/1
75 TABLET ORAL DAILY
COMMUNITY
End: 2021-04-20

## 2021-03-02 RX ORDER — ATORVASTATIN CALCIUM 40 MG/1
40 TABLET, FILM COATED ORAL DAILY
Qty: 90 TABLET | Refills: 3 | Status: SHIPPED | OUTPATIENT
Start: 2021-03-02 | End: 2021-06-29

## 2021-03-02 RX ORDER — ASPIRIN 81 MG/1
81 TABLET ORAL DAILY
COMMUNITY

## 2021-03-02 RX ORDER — ATORVASTATIN CALCIUM 40 MG/1
40 TABLET, FILM COATED ORAL DAILY
COMMUNITY
End: 2021-03-02

## 2021-03-02 ASSESSMENT — MIFFLIN-ST. JEOR: SCORE: 1209.41

## 2021-03-02 NOTE — PATIENT INSTRUCTIONS
Follow-up with cardiology and neurology after the heart monitor is complete.      Increase the amlodipine to 10mg daily.  Send me a R&L message next week with an update on your home blood pressures.

## 2021-03-02 NOTE — PROGRESS NOTES
Assessment & Plan     Cerebrovascular accident (CVA), unspecified mechanism (H)    Kristen has mild residual left sided weakness in the hand and leg.  Should be okay for her to resume driving.  She is on Plavix for 21 days and then will be continuing on aspirin alone.  She was started on statin after the stroke, we'll recheck lipids in a couple of months.  She has an appointment to have a cardiac event monitor placed to check for A fib.  Recommend follow-up with cardiology and neurology after that is completed.     - Lipid panel reflex to direct LDL Fasting; Future  - amLODIPine (NORVASC) 10 MG tablet; Take 1 tablet (10 mg) by mouth daily  - atorvastatin (LIPITOR) 40 MG tablet; Take 1 tablet (40 mg) by mouth daily  - CARDIOLOGY EVAL ADULT REFERRAL; Future  - NEUROLOGY ADULT REFERRAL    Hyperlipidemia, unspecified hyperlipidemia type    - Lipid panel reflex to direct LDL Fasting; Future  - atorvastatin (LIPITOR) 40 MG tablet; Take 1 tablet (40 mg) by mouth daily    Essential hypertension with goal blood pressure less than 130/80    Home BPs remain a bit high, so we'll increase amlodipine from 5mg to 10mg.  Asked her to message next week with an update on home BPs.     - amLODIPine (NORVASC) 10 MG tablet; Take 1 tablet (10 mg) by mouth daily    [facility] external [facility] from [facility]  reviewed    Return in about 2 months (around 5/2/2021) for Lab appointment.    Mikael Bauer MD  [facility]    Subjective   Kristen is a 75 year old who presents for the following health issues  accompanied by her daughter:    \[facility] [place] [facility]\""         Hospital Follow-up Visit:    Hospital/Nursing Home/ Rehab Facility: [facility]  Date of Admission: 2/13/21, [facility] on 2/17/21  Date of Discharge: 2/26/21  Reason(s) for Admission: Stroke      Was your hospitalization related to COVID-19? No   Problems taking medications regularly:  None  Medication changes since  "discharge: None  Problems adhering to non-medication therapy:  None    Summary of hospitalization:  CareEverywhere information obtained and reviewed    Kristen presented with acute L sided weakness, she was treated with TPA for stroke as noted in MRI results below.     MR Brain WO IV Cont 2/14  IMPRESSION:  1.  Acute/subacute infarct involving the right thalamus without hemorrhagic conversion.  2.  Chronic senescent and presumed microvascular ischemic changes, as above.      Per discharge summary: \"Echocardiogram negative for PFO. Telemetry continued to show NSR, no occult arrhythmias noted. Started ASA 81mg, plavix 75mg (for 21 days), atorvastatin 40mg and amlodipine 5mg for risk factor modification.\"    Diagnostic Tests/Treatments reviewed.  Follow up needed: lipids  Other Healthcare Providers Involved in Patient s Care:         Specialist appointment - cardiology appt tomorrow to place heart monitor  Update since discharge: improved.     Kristen is doing well since discharge, notes easy fatigability in the left arm and leg.  She has no ongoing visual problems, does have some worsened hearing deficit.  She is wondering if she can go back to driving at this point.  Her daughter states that she is doing well living independently, she notes no cognitive deficits even at the start of symptoms.      She has been having left leg cramps.  She was taking Mg previously and wonders if she can resume this.        Post Discharge Medication Reconciliation: discharge medications reconciled and changed, per note/orders.  Plan of care communicated with patient and family            Review of Systems   Constitutional, neuro systems are negative, except as otherwise noted.      Objective    /84 (BP Location: Right arm, Patient Position: Chair, Cuff Size: Adult Regular)   Pulse 54   Temp 98  F (36.7  C) (Tympanic)   Resp 16   Ht 1.575 m (5' 2\")   Wt 75.1 kg (165 lb 9.6 oz)   SpO2 97%   BMI 30.29 kg/m    Body mass index is " 30.29 kg/m .  Physical Exam     GENERAL: healthy, alert and no distress  NEURO: Left hand  mild weakness, left hip flexion mild weakness, slightly slurred speech, normal mentation, steady normal gait, tandem gait slightly unsteady, normal Romberg, mild trouble with coordination with left sided finger to nose test, normal heel to shin tests

## 2021-03-07 ENCOUNTER — MYC MEDICAL ADVICE (OUTPATIENT)
Dept: FAMILY MEDICINE | Facility: CLINIC | Age: 76
End: 2021-03-07

## 2021-03-08 NOTE — TELEPHONE ENCOUNTER
Can somebody please assist them with scheduling?  I do have appointments available in [facility] on Wednesday and a hospital follow-up slot open on Friday at [place].    Thanks,  Mikael Bauer MD

## 2021-03-12 ENCOUNTER — OFFICE VISIT (OUTPATIENT)
Dept: FAMILY MEDICINE | Facility: CLINIC | Age: 76
End: 2021-03-12
Payer: COMMERCIAL

## 2021-03-12 VITALS
DIASTOLIC BLOOD PRESSURE: 68 MMHG | OXYGEN SATURATION: 97 % | HEART RATE: 63 BPM | TEMPERATURE: 98.1 F | BODY MASS INDEX: 30.03 KG/M2 | SYSTOLIC BLOOD PRESSURE: 122 MMHG | WEIGHT: 164.2 LBS

## 2021-03-12 DIAGNOSIS — M79.602 LEFT ARM PAIN: ICD-10-CM

## 2021-03-12 DIAGNOSIS — G47.00 INSOMNIA, UNSPECIFIED TYPE: ICD-10-CM

## 2021-03-12 DIAGNOSIS — R68.2 DRY MOUTH: ICD-10-CM

## 2021-03-12 DIAGNOSIS — I10 ESSENTIAL HYPERTENSION WITH GOAL BLOOD PRESSURE LESS THAN 130/80: ICD-10-CM

## 2021-03-12 DIAGNOSIS — K21.9 GASTROESOPHAGEAL REFLUX DISEASE WITHOUT ESOPHAGITIS: ICD-10-CM

## 2021-03-12 DIAGNOSIS — I63.9 CEREBROVASCULAR ACCIDENT (CVA), UNSPECIFIED MECHANISM (H): Primary | ICD-10-CM

## 2021-03-12 PROCEDURE — 99214 OFFICE O/P EST MOD 30 MIN: CPT | Performed by: INTERNAL MEDICINE

## 2021-03-12 RX ORDER — TRAZODONE HYDROCHLORIDE 50 MG/1
50 TABLET, FILM COATED ORAL
Qty: 90 TABLET | Refills: 3 | Status: SHIPPED | OUTPATIENT
Start: 2021-03-12 | End: 2022-03-08

## 2021-03-12 RX ORDER — AMLODIPINE BESYLATE 5 MG/1
5 TABLET ORAL DAILY
Qty: 90 TABLET | Refills: 3 | Status: SHIPPED | OUTPATIENT
Start: 2021-03-12 | End: 2022-03-10

## 2021-03-12 RX ORDER — GABAPENTIN 100 MG/1
100 CAPSULE ORAL
COMMUNITY
Start: 2021-03-07 | End: 2021-03-12

## 2021-03-12 RX ORDER — GABAPENTIN 100 MG/1
CAPSULE ORAL
Qty: 270 CAPSULE | Refills: 3 | Status: SHIPPED | OUTPATIENT
Start: 2021-03-12 | End: 2021-04-23

## 2021-03-12 RX ORDER — NICOTINE POLACRILEX 4 MG/1
20 GUM, CHEWING ORAL 2 TIMES DAILY
Qty: 180 TABLET | Refills: 3 | Status: SHIPPED | OUTPATIENT
Start: 2021-03-12 | End: 2021-12-07

## 2021-03-12 NOTE — PROGRESS NOTES
"    Assessment & Plan     Cerebrovascular accident (CVA), unspecified mechanism (H)  Weakness to left side slowly improving. Continues PT/OTand finds helpful. Neuropathic pain to LLE resolved, continues to have to LUE with benefit from gabapentin. Interested in increasing gabapentin, tolerating well.   Completed plavix today.     -increase gabapentin to 100mg TID po    Essential hypertension with goal blood pressure less than 130/80  Home BP's reasonable. Amlodipine decreased back to 5mg daily due to orthostatic lightheadedness, continue this dose.   - amLODIPine (NORVASC) 5 MG tablet; Take 1 tablet (5 mg) by mouth daily    Gastroesophageal reflux disease without esophagitis  No GERD symptoms. Was on pepcid with plavix. Prefers omeprazole.   - omeprazole 20 MG tablet; Take 1 tablet (20 mg) by mouth 2 times daily Take 30-60 minutes before a meal.    Left arm pain  History neck pain in past with increase in radicular sx's since CVA. Repeat MRI and increase gabapentin.   - MR Cervical Spine w/o Contrast; Future  - gabapentin (NEURONTIN) 100 MG capsule; Take 100mg twice per day for 3 days then if tolerated, increase to 100mg three times per day    Insomnia, unspecified type  Sleeping well with trazadone, denies SE's.   - traZODone (DESYREL) 50 MG tablet; Take 1 tablet (50 mg) by mouth nightly as needed for sleep    Dry mouth  Distressing and persistent. Agreeable to adding biotene.   - artificial saliva (BIOTENE MT) AERS spray; Take 1 spray by mouth every 6 hours as needed for dry mouth             BMI:   Estimated body mass index is 30.03 kg/m  as calculated from the following:    Height as of 3/2/21: 1.575 m (5' 2\").    Weight as of this encounter: 74.5 kg (164 lb 3.2 oz).     Return in about 1 month (around 4/12/2021) for Follow up.    Elisha Dasilva, JESSICA student  Mikael Bauer MD  [facility]    Deirdre Peterson is a 75 year old who presents for the following health issues,  accompanied by her " "daughter:    Chief Complaint   Patient presents with     RECHECK     Last seen 3/2/2021     Hospital F/U     3/6/2021 - 3/7/2021 @ [facility], dizziness, headache, arm pain, orthostatic hypotension       HPI   Readmitted for dizziness, found to the strongly orthostatic with \"electrolyte\" disturbances. Had been taking in very large amounts of water PTA r/t persistent dry mouth. Feeling much better since discharge.     Neck/L arm pain: started before 2nd admission. Chronic neck issues since 1984, admission providers felt r/t chronic issues and are recommending MRI of neck to further assess. Has lidocaine patches- helping. Not seen by PT while admitted for this.   Currently, arm pain better, but \"pins and needles in L wrist/weller continue.   Noticed decreased radial pulses on L, compared to R. Ultrasound done in hospital, was negative.     Dizziness: now better, denies any goinf from sitting-standing as long as goes slow. Uses walker outside, nothing in. No falls. L leg remains weak but no more pins/needles. Still doing PT/OT weekly.     HTN: amlodipine was decreased back down form 10mg to 5mg. Home BP's running 117-154/63-89.     Saw cardiology and has cardiac monitor on- 3/3 for 30 days. F/u with caridoology after. Neuro consult pending: have not scheduled.  Has cardiacmonitor on now for 30 days and wants to wait until completed.     2nd covid vaccine coming up.     Really dry mouth, tongues sticks and is distressing.    Hearing worse, prefers eval in future once better. Will schedule eye exam too.       Hospital Follow-up Visit:     Hospital/Nursing Home/IP Rehab Facility: [facility]  Date of Admission: 3/6/2021  Date of Discharge: 3/7/2021  Reason(s) for Admission: dizziness, headache,       Was your hospitalization related to COVID-19? No   Problems taking medications regularly:  None  Medication changes since discharge: None  Problems adhering to non-medication therapy:  None, ongoing since stroke, no " changes    Summary of hospitalization:  [facility] discharge summary reviewed  Diagnostic Tests/Treatments reviewed.  Follow up needed: none  Other Healthcare Providers Involved in Patient s Care:         None  Update since discharge: improved.       Post Discharge Medication Reconciliation: discharge medications reconciled and changed, per note/orders.  Plan of care communicated with patient and family                  Review of Systems   Constitutional, HEENT, cardiovascular, pulmonary, gi and gu systems are negative, except as otherwise noted.      Objective    /68 (BP Location: Left arm, Patient Position: Sitting, Cuff Size: Adult Regular)   Pulse 63   Temp 98.1  F (36.7  C) (Tympanic)   Wt 74.5 kg (164 lb 3.2 oz)   SpO2 97%   BMI 30.03 kg/m    Body mass index is 30.03 kg/m .     Physical Exam   GENERAL: healthy, alert and no distress  RESP: lungs clear to auscultation - no rales, rhonchi or wheezes  CV: regular rate and rhythm, normal S1 S2, no S3 or S4, no murmur, click or rub, no peripheral edema   SKIN: no suspicious lesions or rashes to exposed skin  NEURO: mentation intact and speech slightly slurred  PSYCH: mentation appears normal, affect normal/bright    No results found for this or any previous visit (from the past 24 hour(s)).    Physician Attestation   I, Mikael Bauer, was present with the medical/TASIA student who participated in the service and in the documentation of the note.  I have verified the history and personally performed the physical exam and medical decision making.  I agree with the assessment and plan of care as documented in the note.      Items personally reviewed: vitals.    Mikael Bauer MD

## 2021-03-17 ENCOUNTER — HOSPITAL ENCOUNTER (OUTPATIENT)
Dept: MRI IMAGING | Facility: CLINIC | Age: 76
Discharge: HOME | End: 2021-03-17
Attending: INTERNAL MEDICINE | Admitting: INTERNAL MEDICINE
Payer: COMMERCIAL

## 2021-03-17 DIAGNOSIS — M79.602 LEFT ARM PAIN: ICD-10-CM

## 2021-03-17 PROCEDURE — 72141 MRI NECK SPINE W/O DYE: CPT

## 2021-04-10 ENCOUNTER — HEALTH MAINTENANCE LETTER (OUTPATIENT)
Age: 76
End: 2021-04-10

## 2021-04-19 ENCOUNTER — PRE VISIT (OUTPATIENT)
Dept: NEUROLOGY | Facility: CLINIC | Age: 76
End: 2021-04-19

## 2021-04-19 NOTE — TELEPHONE ENCOUNTER
FUTURE VISIT INFORMATION:    Date: 4/20/21    Time:  1320    Location: [place] [facility]   REFERRAL INFORMATION:    Referring provider:  Mikael Bauer MD    Referring providers clinic: [facility]    Reason for visit/diagnosis:  CVA       NOTES (FOR ALL VISITS) STATUS DETAILS   OFFICE NOTE from referring provider Printed 3/2/21, 3/12/21   OFFICE NOTE from other specialist Printed     DISCHARGE SUMMARY from hospital  Printed  [facility] 2/13/21  [facility] 2/17/21   DISCHARGE REPORT from the ER Printed [facility] 3/7/21   MEDICATION LIST In Epic     IMAGING  (FOR ALL VISITS)       EMG       EEG       MRI (HEAD, NECK, SPINE) Printed  Images in PACs MRI brain 2/14/21 (requested)  MRI cervical 3/18/21  CTA head and neck 3/6/21 (requested)   CARDIAC STUDIES  Printed  [facility] 2/13/21    FORMAL COGNITIVE TESTING       OTHER                ** pt will need to sign NOAH for Care Everywhere when she arrives**

## 2021-04-20 ENCOUNTER — OFFICE VISIT (OUTPATIENT)
Dept: NEUROLOGY | Facility: CLINIC | Age: 76
End: 2021-04-20
Payer: COMMERCIAL

## 2021-04-20 VITALS
DIASTOLIC BLOOD PRESSURE: 69 MMHG | BODY MASS INDEX: 30.18 KG/M2 | SYSTOLIC BLOOD PRESSURE: 120 MMHG | HEIGHT: 62 IN | HEART RATE: 75 BPM | WEIGHT: 164 LBS

## 2021-04-20 DIAGNOSIS — R20.2 PARESTHESIAS IN LEFT HAND: ICD-10-CM

## 2021-04-20 DIAGNOSIS — I63.81 THALAMIC STROKE (H): Primary | ICD-10-CM

## 2021-04-20 DIAGNOSIS — I10 HYPERTENSION GOAL BP (BLOOD PRESSURE) < 140/90: ICD-10-CM

## 2021-04-20 DIAGNOSIS — E78.5 HYPERLIPIDEMIA LDL GOAL <70: ICD-10-CM

## 2021-04-20 PROCEDURE — 99204 OFFICE O/P NEW MOD 45 MIN: CPT | Performed by: PSYCHIATRY & NEUROLOGY

## 2021-04-20 ASSESSMENT — MIFFLIN-ST. JEOR: SCORE: 1202.15

## 2021-04-20 NOTE — PROGRESS NOTES
INITIAL NEUROLOGY CONSULTATION    DATE OF VISIT: 4/20/2021  MRN: [id number]  PATIENT NAME: Kristen Thompson  YOB: 1945    REFERRING PROVIDER: No ref. provider found    Chief Complaint   Patient presents with     Stroke       SUBJECTIVE:                                                      HPI:   Kristen Thompson is a 75 year old female whom I have been asked by Dr. Bauer to see in consultation for Stroke. Per chart review, the patient was admitted to [facility] on 2/13/21. She presented with Left-sided weakness, facial droop and speech difficulties. She received tPA and symptoms progressively resolved. Brain MRI showed a Right thalamic stroke. LDL was elevated so she was started on atorvastatin. CTA Echocardiogram was negative for PFO. No arrhythmia on telemetry. Aspirin, Plavix and amlodipine were also started. She was discharged to TCU for rehabilitation and 30-day event monitor was recommended. The TCU discharge note (2/26) indicates possible sinus arrhythmia.  Previous event monitor was negative for A. Fib, I do not have the most recent test on file.  She is following with Cardiology now.    Per primary care follow-up note, the plan was for 21 days of DAP and then continuation of aspirin alone.  She complained of some leg cramping and easy fatigability and tingling on the left side.  She had previously been on magnesium and asked Dr. Bauer about resuming this.  She was seen in the emergency room last month for arm pain.  Head CT and CTA were unremarkable.  Ultrasound ruled out embolism or stenosis.  Low dose of gabapentin was started then.    The patient has additional history of chronic neck pain. RLS, GERD, hearing loss and stress incontinence.     Kristen is here with her daughter, who is a nurse.  She feels like she is slowly recovering.  Daughter has noticed improvement.  She still has some facial tingling and pain in the left arm.  She still has a limp, and says that when she is  really tired the leg is even weaker.  She continues to have some problems with neck pain which leads to dizziness at times.  This is the reason for recent cervical spine imaging which showed multilevel degenerative changes.  Her chiropractor has told her that her C2 keeps slipping out of place.  She has an underlying speech impediment secondary to hearing loss.  She had very brief speech changes at the time of her stroke, but these quickly resolved.  She was also having some trouble with swallowing initially but this is no longer the case.  She endorses some TMJ pain in the left jaw.  She also has problems with dry mouth.  The gabapentin seems to be helping somewhat with her arm pain.  She is now taking 300mg total daily.  She is able to walk about 2 miles per day.  She does have some problems with knee pain on the left side, and wonders about if another injection would be helpful.     Daughter clarifies that she took the Plavix for 21 days and then switch to aspirin monotherapy.  Kristen was not on any medications prior to the stroke.    He does still have some headaches, but nothing severe.  She is somewhat frustrated with her slow progress.  Daughter feels like it is going well.  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      Past Medical History:   Diagnosis Date     Arthritis 11/19/2013     Gastro-oesophageal reflux disease      GERD (gastroesophageal reflux disease) 10/2/2012     H/O total hysterectomy      HL (hearing loss) 10/11/2012     PONV (postoperative nausea and vomiting)      Squamous cell carcinoma      Past Surgical History:   Procedure Laterality Date     C RAD RESEC TONSIL/PILLARS       COLONOSCOPY  10/30/2012    Procedure: COLONOSCOPY;  Colonoscopy;  Surgeon: Denise Bah MD;  Location: [facility]     ESOPHAGOSCOPY, GASTROSCOPY, DUODENOSCOPY (EGD), COMBINED N/A 9/15/2016    Procedure: COMBINED ESOPHAGOSCOPY,  GASTROSCOPY, DUODENOSCOPY (EGD);  Surgeon: Bennie Godinez MD;  Location: [facility]     GALLBLADDER SURGERY       HYSTERECTOMY       knee replacment  2007     RELEASE TRIGGER FINGER Right 4/29/2016    Procedure: RELEASE TRIGGER FINGER;  Surgeon: Percy Sarmiento MD;  Location: [facility]     REPAIR BLADDER         amLODIPine (NORVASC) 5 MG tablet, Take 1 tablet (5 mg) by mouth daily  artificial saliva (BIOTENE MT) AERS spray, Take 1 spray by mouth every 6 hours as needed for dry mouth  aspirin 81 MG EC tablet, Take 81 mg by mouth daily  atorvastatin (LIPITOR) 40 MG tablet, Take 1 tablet (40 mg) by mouth daily  B Complex Vitamins (VITAMIN B COMPLEX PO), Take 1 tablet by mouth daily   CALCIUM PO, Take by mouth daily   Cholecalciferol (VITAMIN D PO), Take 1,000 Units by mouth daily   gabapentin (NEURONTIN) 100 MG capsule, Take 100mg twice per day for 3 days then if tolerated, increase to 100mg three times per day  MAGNESIUM PO, Take 150 mg by mouth daily   Omega-3 Fatty Acids (OMEGA 3 PO), Take 2 capsules by mouth daily   omeprazole 20 MG tablet, Take 1 tablet (20 mg) by mouth 2 times daily Take 30-60 minutes before a meal.  OVER-THE-COUNTER, Chlorophyll complex one daily  OVER-THE-COUNTER, sinuplex and fengre for sinus as needed  Polyethylene Glycol 3350 (MIRALAX PO),   traZODone (DESYREL) 50 MG tablet, Take 1 tablet (50 mg) by mouth nightly as needed for sleep  ZINC OXIDE PO,   order for DME, Equipment being ordered: thumb spica wrist brace, right (Patient not taking: Reported on 7/1/2020)    No current facility-administered medications on file prior to visit.     Allergies   Allergen Reactions     Codeine Sulfate      Nausea and vomiting      Quinapril Difficulty breathing     Darvon-N [Propoxyphene Napsylate] Nausea and Vomiting        Problem (# of Occurrences) Relation (Name,Age of Onset)    Arthritis (1) Sister    Breast Cancer (1) Sister    C.A.D. (2) Mother, Sister    Cancer (2) Father: stomach ca, Sister     "Cardiovascular (2) Mother, Sister    Cerebrovascular Disease (1) Sister    Depression (3) Sister, Sister, Sister    Eye Disorder (1) Sister    Heart Disease (1) Sister    Neurologic Disorder (2) Sister, Sister    Obesity (1) Sister    Osteoporosis (1) Sister    Thyroid Disease (4) Mother, Sister, Sister, Sister        Social History     Tobacco Use     Smoking status: Never Smoker     Smokeless tobacco: Never Used   Substance Use Topics     Alcohol use: No     Alcohol/week: 0.0 standard drinks     Drug use: No       REVIEW OF SYSTEMS:                                                      10-point review of systems is negative except as mentioned above in HPI.     EXAM:                                                      Physical Exam:   Vitals: /69   Pulse 75   Ht 1.575 m (5' 2\")   Wt 74.4 kg (164 lb)   BMI 30.00 kg/m    BMI= Body mass index is 30 kg/m .  GENERAL: NAD.  HEENT: NC/AT.   CV: RRR. S1, S2.   NECK: No bruits.  PULM: Non-labored breathing.   Neurologic:  MENTAL STATUS: Alert, attentive. Speech impediment. Normal comprehension. Normal concentration. Adequate fund of knowledge.   CRANIAL NERVES: Discs technically difficult to visualize. Visual fields intact to confrontation. Pupils equally, round and reactive to light. Facial sensation and movement normal. EOM full. Hard of hearing. Trapezius strength intact. Palate moves symmetrically. Tongue midline.  MOTOR: Slight weakness with hip flexion on the left, otherwise strength is 5/5 in proximal and distal muscle groups of upper and lower extremities. Tone and bulk normal.   DTRs: Intact and symmetric in biceps, BR, patellae.  Cannot elicit ankle jerks.  Babinski down-going bilaterally.   SENSATION: Normal light touch and pinprick on the right.  Hypersensitivity with pinprick testing in the fingertips of the left hand and plantar surface of the left foot.  Intact proprioception at great toes. Vibration: Slightly decreased at left ankle compared to " right.    COORDINATION: Normal finger nose finger on the right, dysmetria on the left.  Knee heel shin normal.   STATION AND GAIT: Slight sway with Romberg. Good postural reflexes. Casual gait reveals slight limp.  Right hand-dominant.    Relevant Data:  MRI Brain (2.14.21):  FINDINGS:  INTRACRANIAL CONTENTS: There is an acute/subacute infarct involving the right thalamus measuring approximately 1.3 x 1.0 cm in greatest axial dimensions. There is associated T2/FLAIR signal hyperintensity in this region without associated hemorrhagic conversion. No mass, acute hemorrhage, or extra-axial fluid collections. Patchy nonspecific T2/FLAIR hyperintensities within the cerebral white matter most consistent with mild to moderate chronic microvascular ischemic change. Mild generalized cerebral atrophy. No hydrocephalus. Normal position of the cerebellar tonsils.     SELLA: No abnormality accounting for technique.    OSSEOUS STRUCTURES/SOFT TISSUES: Normal marrow signal. The major intracranial vascular flow voids are maintained.     ORBITS: No abnormality accounting for technique.     SINUSES/MASTOIDS: Mucosal thickening primarily involving the ethmoid air cells. No middle ear or mastoid effusion.     IMPRESSION:  1.  Acute/subacute infarct involving the right thalamus without hemorrhagic conversion.  2.  Chronic senescent and presumed microvascular ischemic changes, as above.    Head CT/CTA Head and Neck (3.6.21):  IMPRESSION:   HEAD CT:  1.  No evidence of acute hemorrhage, mass effect, or acute vascular territorial infarction. Consider MRI for further evaluation, as clinically appropriate.  2.  Expected evolution of known subacute right thalamic infarction.  3.  Findings compatible with mild chronic small vessel ischemic change.    HEAD CTA:   1.  No evidence of proximal large vessel occlusion or flow-limiting stenosis.  2.  Previously noted moderate right P2 segment narrowing is not demonstrated on this examination.    NECK  CTA:  1.  No evidence of hemodynamically significant stenosis based on NASCET criteria.  2.  No evidence of dissection or pseudoaneurysm.    MRI Cervical Spine (3.17.21):  IMPRESSION:  Multilevel degenerative disc disease and uncovertebral  and facet arthropathy of the cervical spine, as described. No  high-grade spinal canal stenosis. Minimal/mild multilevel neural  foraminal stenosis, as described.    Imaging reviewed independently by me.  Agree with radiology read.    ASSESSMENT and PLAN:                                                      Assessment:     ICD-10-CM    1. Thalamic stroke (H)  I63.9    2. Paresthesias in left hand  R20.2    3. Hyperlipidemia LDL goal <70  E78.5    4. Hypertension goal BP (blood pressure) < 140/90  I10         Ms. Thompson is a pleasant 75-year-old woman here for follow-up after a stroke.  She does have some residual stroke symptoms but with continued therapy at home I am hopeful that her issues with sensory loss/weakness/pain will continue to improve.  The Wendy seems to be helping with nerve related pain, so I suggested they talk to her primary about increasing the dose.  The patient and her daughter had several questions about her chronic neck pain as well.  I did take a look at the cervical MRI and I am not sure if there is anything that would be surgically correctable, but she might benefit from injections.  They will discuss this with her primary doctor later this week.  She also has a an appointment in cardiology clinic later this week.  I do not have access to the event monitor results at this time.  With the information that I have, I think her treatments are currently appropriate.  We will plan to follow-up as needed.  The patient and her daughter expressed understanding and agreement with the plan.    Plan:  -- I think you are on the proper treatments for prevention of future strokes, based on the available information.  -- Review the event monitor results with the  Cardiologist when you see them later this week.  After our visit, I realized that the event monitor that we have on record is not the most recent test.  -- Talk to Dr. Bauer about the neck and knee pain/recent MRI.  -- Follow-up in Neurology as needed.     Total Time: 45 minutes were spent with the patient and in chart review/documentation (as described above in Assessment and Plan) /coordinating the care.    Yasmin Quintanilla MD  Neurology    CC: Mikael Bauer MD

## 2021-04-20 NOTE — PROGRESS NOTES
CARDIOLOGY CONSULT    REASON FOR CONSULT: CVA    PRIMARY CARE PHYSICIAN:  Dorina Escalrea    HISTORY OF PRESENT ILLNESS:  75-year-old female seen for recent stroke.  She has dyslipidemia, hypertension.    In 2017 she had abnormal stress test.  Angiogram showed normal coronary arteries.  She had A. fib during the angiogram.  3-day ZIO monitor showed sinus rhythm, 7% PACs with runs up to 6 beats, no A. fib.    February 2021 she was evaluated for right-sided weakness.  Head CT showed no acute finding.  She was given TPA.  She was diagnosed with right thalamus stroke by MRI. Echo February 2021 showed EF 60%, normal RV, no valve disease, negative bubble study.  Discharged on aspirin and 21 days of clopidogrel.    30-day event monitor showed sinus rhythm, no A. fib, mild to moderate PVC burden.    She has been doing well and has recovered from her stroke.  She has some mild left-sided weakness.  Blood pressure tends to run 130 on average.  She has been started on amlodipine.  She denies any palpitations, chest pain, lightheadedness, or syncope.    PAST MEDICAL HISTORY:  Past Medical History:   Diagnosis Date     Arthritis 11/19/2013     Gastro-oesophageal reflux disease      GERD (gastroesophageal reflux disease) 10/2/2012     H/O total hysterectomy      HL (hearing loss) 10/11/2012     PONV (postoperative nausea and vomiting)      Squamous cell carcinoma        MEDICATIONS:  Current Outpatient Medications   Medication     amLODIPine (NORVASC) 5 MG tablet     artificial saliva (BIOTENE MT) AERS spray     aspirin 81 MG EC tablet     atorvastatin (LIPITOR) 40 MG tablet     B Complex Vitamins (VITAMIN B COMPLEX PO)     CALCIUM PO     Cholecalciferol (VITAMIN D PO)     clopidogrel (PLAVIX) 75 MG tablet     gabapentin (NEURONTIN) 100 MG capsule     MAGNESIUM PO     Omega-3 Fatty Acids (OMEGA 3 PO)     omeprazole 20 MG tablet     order for DME     OVER-THE-COUNTER     OVER-THE-COUNTER     Polyethylene Glycol 3350 (MIRALAX  PO)     traZODone (DESYREL) 50 MG tablet     ZINC OXIDE PO     No current facility-administered medications for this visit.        ALLERGIES:  Allergies   Allergen Reactions     Codeine Sulfate      Nausea and vomiting      Quinapril Difficulty breathing     Darvon-N [Propoxyphene Napsylate] Nausea and Vomiting       SOCIAL HISTORY:  I have reviewed this patient's social history and updated it with pertinent information if needed. Kristen Thompson  reports that she has never smoked. She has never used smokeless tobacco. She reports that she does not drink alcohol or use drugs.    FAMILY HISTORY:  I have reviewed this patient's family history and updated it with pertinent information if needed.   Family History   Problem Relation Age of Onset     C.A.D. Mother      Cardiovascular Mother      Thyroid Disease Mother      Cancer Father         stomach ca     Cancer Sister      Eye Disorder Sister      C.A.D. Sister      Cerebrovascular Disease Sister      Breast Cancer Sister      Arthritis Sister      Cardiovascular Sister      Depression Sister      Heart Disease Sister      Neurologic Disorder Sister      Osteoporosis Sister      Thyroid Disease Sister      Depression Sister      Thyroid Disease Sister      Depression Sister      Thyroid Disease Sister      Obesity Sister      Neurologic Disorder Sister        REVIEW OF SYSTEMS:  Constitutional:  No weight loss, fever, chills, weakness or fatigue.  HEENT:  Eyes:  No visual loss, blurred vision, double vision or yellow sclerae. No hearing loss, sneezing, congestion, runny nose or sore throat.  Skin:  No rash or itching.  Cardiovascular: per HPI  Respiratory: per HPI  GI:  No anorexia, nausea, vomiting or diarrhea. No abdominal pain or blood.  :  No dysurea, hematuria  Neurologic:  No headache, dizziness, syncope, paralysis, ataxia, numbness or tingling in the extremities. No change in bowel or bladder control.  Musculoskeletal:  No muscle, back pain, joint pain or  stiffness.  Hematologic:  No anemia, bleeding or bruising.  Lymphatics:  No enlarged nodes. No history of splenectomy.  Psychiatric:  No history of depression or anxiety.  Endocrine:  No reports of sweating, cold or heat intolerance. No polyuria or polydipsia.  Allergies:  No history of asthma, hives, eczema or rhinitis.    PHYSICAL EXAM:  /82   Pulse 58   Wt 76.2 kg (168 lb)   BMI 30.73 kg/m      Constitutional: awake, alert, no distress  Eyes: PERRL, sclera nonicteric  ENT: trachea midline  Respiratory: Lungs clear  Cardiovascular: Regular rate and rhythm, no murmurs  GI: nondistended, nontender, bowel sounds present  Lymph/Hematologic: no lymphadenopathy  Skin: dry, no rash  Musculoskeletal: good muscle tone, strength 5/5 in upper and lower extremities  Neurologic: no focal deficits  Neuropsychiatric: appropriate affact    DATA:  Labs:     Recent Labs   Lab Test 06/29/20  1048 12/07/17  0822 09/29/15  0757 09/29/15  0757 07/15/14  0806   CHOL 243* 233*   < > 224* 209*   HDL 75 90   < > 72 52   * 128*   < > 140* 140*   TRIG 92 75   < > 58 87   CHOLHDLRATIO  --   --   --  3.1 4.0    < > = values in this interval not displayed.     ASSESSMENT:  75-year-old female seen for recent stroke.  Her cardiac work-up has been normal.  Ejection fraction was preserved with a negative bubble study.  Her event monitor showed sinus rhythm with no A. fib.  Heart rate at home has been in the 60s, she has no symptoms to suggest A. fib.  She has appropriately been started on aspirin, amlodipine, and statin.    Would not recommend any additional cardiac work-up.  We talked about the ongoing modification of stroke risk factors.    RECOMMENDATIONS:  1.  Stroke, no cardiac source  -Recommend ongoing treatment of blood pressure, cholesterol with a statin, and aspirin    Follow-up with cardiology as needed.    Jacinto Dubose MD  Cardiology - [facility]  Pager:  [phone]  Text Page  April 22, 2021

## 2021-04-20 NOTE — NURSING NOTE
"Initial There were no vitals taken for this visit. Estimated body mass index is 30.03 kg/m  as calculated from the following:    Height as of 3/2/21: 1.575 m (5' 2\").    Weight as of 3/12/21: 74.5 kg (164 lb 3.2 oz). .    Aleksandra Denny MA on 4/20/2021 at 1:24 PM    " Detail Level: Zone

## 2021-04-20 NOTE — PATIENT INSTRUCTIONS
Plan:  -- I think you are on the proper treatments for prevention of future strokes, based on the available information.  -- Review the event monitor results with the Cardiologist when you see them later this week.  After our visit, I realized that the event monitor that we have on record is not the most recent test.  -- Talk to Dr. Bauer about the neck and knee pain/recent MRI.  -- Follow-up in Neurology as needed.

## 2021-04-20 NOTE — LETTER
4/20/2021         RE: Kristen Thompson  [address] [place] [id number]        Dear Colleague,    Thank you for referring your patient, Kristen Thompson, to the [facility] [place]. Please see a copy of my visit note below.    INITIAL NEUROLOGY CONSULTATION    DATE OF VISIT: 4/20/2021  MRN: [id number]  PATIENT NAME: Kristen Thompson  YOB: 1945    REFERRING PROVIDER: No ref. provider found    Chief Complaint   Patient presents with     Stroke       SUBJECTIVE:                                                      HPI:   Kristen Thompson is a 75 year old female whom I have been asked by Dr. Bauer to see in consultation for Stroke. Per chart review, the patient was admitted to [facility] on 2/13/21. She presented with Left-sided weakness, facial droop and speech difficulties. She received tPA and symptoms progressively resolved. Brain MRI showed a Right thalamic stroke. LDL was elevated so she was started on atorvastatin. CTA Echocardiogram was negative for PFO. No arrhythmia on telemetry. Aspirin, Plavix and amlodipine were also started. She was discharged to TCU for rehabilitation and 30-day event monitor was recommended. The TCU discharge note (2/26) indicates possible sinus arrhythmia.  Previous event monitor was negative for A. Fib, I do not have the most recent test on file.  She is following with Cardiology now.    Per primary care follow-up note, the plan was for 21 days of DAP and then continuation of aspirin alone.  She complained of some leg cramping and easy fatigability and tingling on the left side.  She had previously been on magnesium and asked Dr. Bauer about resuming this.  She was seen in the emergency room last month for arm pain.  Head CT and CTA were unremarkable.  Ultrasound ruled out embolism or stenosis.  Low dose of gabapentin was started then.    The patient has additional history of chronic neck pain. RLS, GERD, hearing loss and  stress incontinence.     Kristen is here with her daughter, who is a nurse.  She feels like she is slowly recovering.  Daughter has noticed improvement.  She still has some facial tingling and pain in the left arm.  She still has a limp, and says that when she is really tired the leg is even weaker.  She continues to have some problems with neck pain which leads to dizziness at times.  This is the reason for recent cervical spine imaging which showed multilevel degenerative changes.  Her chiropractor has told her that her C2 keeps slipping out of place.  She has an underlying speech impediment secondary to hearing loss.  She had very brief speech changes at the time of her stroke, but these quickly resolved.  She was also having some trouble with swallowing initially but this is no longer the case.  She endorses some TMJ pain in the left jaw.  She also has problems with dry mouth.  The gabapentin seems to be helping somewhat with her arm pain.  She is now taking 300mg total daily.  She is able to walk about 2 miles per day.  She does have some problems with knee pain on the left side, and wonders about if another injection would be helpful.     Daughter clarifies that she took the Plavix for 21 days and then switch to aspirin monotherapy.  Kristen was not on any medications prior to the stroke.    He does still have some headaches, but nothing severe.  She is somewhat frustrated with her slow progress.  Daughter feels like it is going well.  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      Past Medical History:   Diagnosis Date     Arthritis 11/19/2013     Gastro-oesophageal reflux disease      GERD (gastroesophageal reflux disease) 10/2/2012     H/O total hysterectomy      HL (hearing loss) 10/11/2012     PONV (postoperative nausea and vomiting)      Squamous cell carcinoma      Past Surgical History:   Procedure Laterality Date     C RAD RESEC  TONSIL/PILLARS       COLONOSCOPY  10/30/2012    Procedure: COLONOSCOPY;  Colonoscopy;  Surgeon: Denise Bah MD;  Location: [facility]     ESOPHAGOSCOPY, GASTROSCOPY, DUODENOSCOPY (EGD), COMBINED N/A 9/15/2016    Procedure: COMBINED ESOPHAGOSCOPY, GASTROSCOPY, DUODENOSCOPY (EGD);  Surgeon: Bennie Godinez MD;  Location: [facility]     GALLBLADDER SURGERY       HYSTERECTOMY       knee replacment  2007     RELEASE TRIGGER FINGER Right 4/29/2016    Procedure: RELEASE TRIGGER FINGER;  Surgeon: Percy Sarmiento MD;  Location: [facility]     REPAIR BLADDER         amLODIPine (NORVASC) 5 MG tablet, Take 1 tablet (5 mg) by mouth daily  artificial saliva (BIOTENE MT) AERS spray, Take 1 spray by mouth every 6 hours as needed for dry mouth  aspirin 81 MG EC tablet, Take 81 mg by mouth daily  atorvastatin (LIPITOR) 40 MG tablet, Take 1 tablet (40 mg) by mouth daily  B Complex Vitamins (VITAMIN B COMPLEX PO), Take 1 tablet by mouth daily   CALCIUM PO, Take by mouth daily   Cholecalciferol (VITAMIN D PO), Take 1,000 Units by mouth daily   gabapentin (NEURONTIN) 100 MG capsule, Take 100mg twice per day for 3 days then if tolerated, increase to 100mg three times per day  MAGNESIUM PO, Take 150 mg by mouth daily   Omega-3 Fatty Acids (OMEGA 3 PO), Take 2 capsules by mouth daily   omeprazole 20 MG tablet, Take 1 tablet (20 mg) by mouth 2 times daily Take 30-60 minutes before a meal.  OVER-THE-COUNTER, Chlorophyll complex one daily  OVER-THE-COUNTER, sinuplex and fengre for sinus as needed  Polyethylene Glycol 3350 (MIRALAX PO),   traZODone (DESYREL) 50 MG tablet, Take 1 tablet (50 mg) by mouth nightly as needed for sleep  ZINC OXIDE PO,   order for DME, Equipment being ordered: thumb spica wrist brace, right (Patient not taking: Reported on 7/1/2020)    No current facility-administered medications on file prior to visit.     Allergies   Allergen Reactions     Codeine Sulfate      Nausea and vomiting      Quinapril Difficulty  "breathing     Darvon-N [Propoxyphene Napsylate] Nausea and Vomiting        Problem (# of Occurrences) Relation (Name,Age of Onset)    Arthritis (1) Sister    Breast Cancer (1) Sister    C.A.D. (2) Mother, Sister    Cancer (2) Father: stomach ca, Sister    Cardiovascular (2) Mother, Sister    Cerebrovascular Disease (1) Sister    Depression (3) Sister, Sister, Sister    Eye Disorder (1) Sister    Heart Disease (1) Sister    Neurologic Disorder (2) Sister, Sister    Obesity (1) Sister    Osteoporosis (1) Sister    Thyroid Disease (4) Mother, Sister, Sister, Sister        Social History     Tobacco Use     Smoking status: Never Smoker     Smokeless tobacco: Never Used   Substance Use Topics     Alcohol use: No     Alcohol/week: 0.0 standard drinks     Drug use: No       REVIEW OF SYSTEMS:                                                      10-point review of systems is negative except as mentioned above in HPI.     EXAM:                                                      Physical Exam:   Vitals: /69   Pulse 75   Ht 1.575 m (5' 2\")   Wt 74.4 kg (164 lb)   BMI 30.00 kg/m    BMI= Body mass index is 30 kg/m .  GENERAL: NAD.  HEENT: NC/AT.   CV: RRR. S1, S2.   NECK: No bruits.  PULM: Non-labored breathing.   Neurologic:  MENTAL STATUS: Alert, attentive. Speech impediment. Normal comprehension. Normal concentration. Adequate fund of knowledge.   CRANIAL NERVES: Discs technically difficult to visualize. Visual fields intact to confrontation. Pupils equally, round and reactive to light. Facial sensation and movement normal. EOM full. Hard of hearing. Trapezius strength intact. Palate moves symmetrically. Tongue midline.  MOTOR: Slight weakness with hip flexion on the left, otherwise strength is 5/5 in proximal and distal muscle groups of upper and lower extremities. Tone and bulk normal.   DTRs: Intact and symmetric in biceps, BR, patellae.  Cannot elicit ankle jerks.  Babinski down-going bilaterally.   SENSATION: " Normal light touch and pinprick on the right.  Hypersensitivity with pinprick testing in the fingertips of the left hand and plantar surface of the left foot.  Intact proprioception at great toes. Vibration: Slightly decreased at left ankle compared to right.    COORDINATION: Normal finger nose finger on the right, dysmetria on the left.  Knee heel shin normal.   STATION AND GAIT: Slight sway with Romberg. Good postural reflexes. Casual gait reveals slight limp.  Right hand-dominant.    Relevant Data:  MRI Brain (2.14.21):  FINDINGS:  INTRACRANIAL CONTENTS: There is an acute/subacute infarct involving the right thalamus measuring approximately 1.3 x 1.0 cm in greatest axial dimensions. There is associated T2/FLAIR signal hyperintensity in this region without associated hemorrhagic conversion. No mass, acute hemorrhage, or extra-axial fluid collections. Patchy nonspecific T2/FLAIR hyperintensities within the cerebral white matter most consistent with mild to moderate chronic microvascular ischemic change. Mild generalized cerebral atrophy. No hydrocephalus. Normal position of the cerebellar tonsils.     SELLA: No abnormality accounting for technique.    OSSEOUS STRUCTURES/SOFT TISSUES: Normal marrow signal. The major intracranial vascular flow voids are maintained.     ORBITS: No abnormality accounting for technique.     SINUSES/MASTOIDS: Mucosal thickening primarily involving the ethmoid air cells. No middle ear or mastoid effusion.     IMPRESSION:  1.  Acute/subacute infarct involving the right thalamus without hemorrhagic conversion.  2.  Chronic senescent and presumed microvascular ischemic changes, as above.    Head CT/CTA Head and Neck (3.6.21):  IMPRESSION:   HEAD CT:  1.  No evidence of acute hemorrhage, mass effect, or acute vascular territorial infarction. Consider MRI for further evaluation, as clinically appropriate.  2.  Expected evolution of known subacute right thalamic infarction.  3.  Findings  compatible with mild chronic small vessel ischemic change.    HEAD CTA:   1.  No evidence of proximal large vessel occlusion or flow-limiting stenosis.  2.  Previously noted moderate right P2 segment narrowing is not demonstrated on this examination.    NECK CTA:  1.  No evidence of hemodynamically significant stenosis based on NASCET criteria.  2.  No evidence of dissection or pseudoaneurysm.    MRI Cervical Spine (3.17.21):  IMPRESSION:  Multilevel degenerative disc disease and uncovertebral  and facet arthropathy of the cervical spine, as described. No  high-grade spinal canal stenosis. Minimal/mild multilevel neural  foraminal stenosis, as described.    Imaging reviewed independently by me.  Agree with radiology read.    ASSESSMENT and PLAN:                                                      Assessment:     ICD-10-CM    1. Thalamic stroke (H)  I63.9    2. Paresthesias in left hand  R20.2    3. Hyperlipidemia LDL goal <70  E78.5    4. Hypertension goal BP (blood pressure) < 140/90  I10         Ms. Thompson is a pleasant 75-year-old woman here for follow-up after a stroke.  She does have some residual stroke symptoms but with continued therapy at home I am hopeful that her issues with sensory loss/weakness/pain will continue to improve.  The [place] seems to be helping with nerve related pain, so I suggested they talk to her primary about increasing the dose.  The patient and her daughter had several questions about her chronic neck pain as well.  I did take a look at the cervical MRI and I am not sure if there is anything that would be surgically correctable, but she might benefit from injections.  They will discuss this with her primary doctor later this week.  She also has a an appointment in cardiology clinic later this week.  I do not have access to the event monitor results at this time.  With the information that I have, I think her treatments are currently appropriate.  We will plan to follow-up as  needed.  The patient and her daughter expressed understanding and agreement with the plan.    Plan:  -- I think you are on the proper treatments for prevention of future strokes, based on the available information.  -- Review the event monitor results with the Cardiologist when you see them later this week.  After our visit, I realized that the event monitor that we have on record is not the most recent test.  -- Talk to Dr. Bauer about the neck and knee pain/recent MRI.  -- Follow-up in Neurology as needed.     Total Time: 45 minutes were spent with the patient and in chart review/documentation (as described above in Assessment and Plan) /coordinating the care.    Yasmin Quintanilla MD  Neurology    CC: Mikael Bauer MD        Again, thank you for allowing me to participate in the care of your patient.        Sincerely,        Yasmin Quintanilla MD

## 2021-04-22 ENCOUNTER — OFFICE VISIT (OUTPATIENT)
Dept: CARDIOLOGY | Facility: CLINIC | Age: 76
End: 2021-04-22
Attending: INTERNAL MEDICINE
Payer: COMMERCIAL

## 2021-04-22 VITALS
DIASTOLIC BLOOD PRESSURE: 82 MMHG | WEIGHT: 168 LBS | SYSTOLIC BLOOD PRESSURE: 134 MMHG | HEART RATE: 58 BPM | BODY MASS INDEX: 30.73 KG/M2

## 2021-04-22 DIAGNOSIS — I63.9 CEREBROVASCULAR ACCIDENT (CVA), UNSPECIFIED MECHANISM (H): ICD-10-CM

## 2021-04-22 PROCEDURE — 99204 OFFICE O/P NEW MOD 45 MIN: CPT | Performed by: INTERNAL MEDICINE

## 2021-04-22 RX ORDER — FAMOTIDINE 10 MG
10 TABLET ORAL 2 TIMES DAILY
COMMUNITY
End: 2021-12-07

## 2021-04-22 NOTE — LETTER
4/22/2021    Dorina Escalera, DO  [address] [facility] [address]    RE: Kristen Thompson       Dear Colleague,    I had the pleasure of seeing Kristen Thompson in the  Heart [facility].    CARDIOLOGY CONSULT    REASON FOR CONSULT: CVA    PRIMARY CARE PHYSICIAN:  Dorina Escalera    HISTORY OF PRESENT ILLNESS:  75-year-old female seen for recent stroke.  She has dyslipidemia, hypertension.    In 2017 she had abnormal stress test.  Angiogram showed normal coronary arteries.  She had A. fib during the angiogram.  3-day ZIO monitor showed sinus rhythm, 7% PACs with runs up to 6 beats, no A. fib.    February 2021 she was evaluated for right-sided weakness.  Head CT showed no acute finding.  She was given TPA.  She was diagnosed with right thalamus stroke by MRI. Echo February 2021 showed EF 60%, normal RV, no valve disease, negative bubble study.  Discharged on aspirin and 21 days of clopidogrel.    30-day event monitor showed sinus rhythm, no A. fib, mild to moderate PVC burden.    She has been doing well and has recovered from her stroke.  She has some mild left-sided weakness.  Blood pressure tends to run 130 on average.  She has been started on amlodipine.  She denies any palpitations, chest pain, lightheadedness, or syncope.    PAST MEDICAL HISTORY:  Past Medical History:   Diagnosis Date     Arthritis 11/19/2013     Gastro-oesophageal reflux disease      GERD (gastroesophageal reflux disease) 10/2/2012     H/O total hysterectomy      HL (hearing loss) 10/11/2012     PONV (postoperative nausea and vomiting)      Squamous cell carcinoma        MEDICATIONS:  Current Outpatient Medications   Medication     amLODIPine (NORVASC) 5 MG tablet     artificial saliva (BIOTENE MT) AERS spray     aspirin 81 MG EC tablet     atorvastatin (LIPITOR) 40 MG tablet     B Complex Vitamins (VITAMIN B COMPLEX PO)     CALCIUM PO     Cholecalciferol (VITAMIN D PO)      clopidogrel (PLAVIX) 75 MG tablet     gabapentin (NEURONTIN) 100 MG capsule     MAGNESIUM PO     Omega-3 Fatty Acids (OMEGA 3 PO)     omeprazole 20 MG tablet     order for DME     OVER-THE-COUNTER     OVER-THE-COUNTER     Polyethylene Glycol 3350 (MIRALAX PO)     traZODone (DESYREL) 50 MG tablet     ZINC OXIDE PO     No current facility-administered medications for this visit.        ALLERGIES:  Allergies   Allergen Reactions     Codeine Sulfate      Nausea and vomiting      Quinapril Difficulty breathing     Darvon-N [Propoxyphene Napsylate] Nausea and Vomiting       SOCIAL HISTORY:  I have reviewed this patient's social history and updated it with pertinent information if needed. Kristen Thompson  reports that she has never smoked. She has never used smokeless tobacco. She reports that she does not drink alcohol or use drugs.    FAMILY HISTORY:  I have reviewed this patient's family history and updated it with pertinent information if needed.   Family History   Problem Relation Age of Onset     C.A.D. Mother      Cardiovascular Mother      Thyroid Disease Mother      Cancer Father         stomach ca     Cancer Sister      Eye Disorder Sister      C.A.D. Sister      Cerebrovascular Disease Sister      Breast Cancer Sister      Arthritis Sister      Cardiovascular Sister      Depression Sister      Heart Disease Sister      Neurologic Disorder Sister      Osteoporosis Sister      Thyroid Disease Sister      Depression Sister      Thyroid Disease Sister      Depression Sister      Thyroid Disease Sister      Obesity Sister      Neurologic Disorder Sister        REVIEW OF SYSTEMS:  Constitutional:  No weight loss, fever, chills, weakness or fatigue.  HEENT:  Eyes:  No visual loss, blurred vision, double vision or yellow sclerae. No hearing loss, sneezing, congestion, runny nose or sore throat.  Skin:  No rash or itching.  Cardiovascular: per HPI  Respiratory: per HPI  GI:  No anorexia, nausea, vomiting or diarrhea.  No abdominal pain or blood.  :  No dysurea, hematuria  Neurologic:  No headache, dizziness, syncope, paralysis, ataxia, numbness or tingling in the extremities. No change in bowel or bladder control.  Musculoskeletal:  No muscle, back pain, joint pain or stiffness.  Hematologic:  No anemia, bleeding or bruising.  Lymphatics:  No enlarged nodes. No history of splenectomy.  Psychiatric:  No history of depression or anxiety.  Endocrine:  No reports of sweating, cold or heat intolerance. No polyuria or polydipsia.  Allergies:  No history of asthma, hives, eczema or rhinitis.    PHYSICAL EXAM:  /82   Pulse 58   Wt 76.2 kg (168 lb)   BMI 30.73 kg/m      Constitutional: awake, alert, no distress  Eyes: PERRL, sclera nonicteric  ENT: trachea midline  Respiratory: Lungs clear  Cardiovascular: Regular rate and rhythm, no murmurs  GI: nondistended, nontender, bowel sounds present  Lymph/Hematologic: no lymphadenopathy  Skin: dry, no rash  Musculoskeletal: good muscle tone, strength 5/5 in upper and lower extremities  Neurologic: no focal deficits  Neuropsychiatric: appropriate affact    DATA:  Labs:     Recent Labs   Lab Test 06/29/20  1048 12/07/17  0822 09/29/15  0757 09/29/15  0757 07/15/14  0806   CHOL 243* 233*   < > 224* 209*   HDL 75 90   < > 72 52   * 128*   < > 140* 140*   TRIG 92 75   < > 58 87   CHOLHDLRATIO  --   --   --  3.1 4.0    < > = values in this interval not displayed.     ASSESSMENT:  75-year-old female seen for recent stroke.  Her cardiac work-up has been normal.  Ejection fraction was preserved with a negative bubble study.  Her event monitor showed sinus rhythm with no A. fib.  Heart rate at home has been in the 60s, she has no symptoms to suggest A. fib.  She has appropriately been started on aspirin, amlodipine, and statin.    Would not recommend any additional cardiac work-up.  We talked about the ongoing modification of stroke risk factors.    RECOMMENDATIONS:  1.  Stroke, no  cardiac source  -Recommend ongoing treatment of blood pressure, cholesterol with a statin, and aspirin    Follow-up with cardiology as needed.    Jacinto Dubose MD  Cardiology - [facility]  Pager:  [phone]  Text Page  April 22, 2021    cc:   Mikael Bauer MD  [address] [place] [address]

## 2021-04-23 ENCOUNTER — OFFICE VISIT (OUTPATIENT)
Dept: FAMILY MEDICINE | Facility: CLINIC | Age: 76
End: 2021-04-23
Payer: COMMERCIAL

## 2021-04-23 VITALS
WEIGHT: 168 LBS | HEIGHT: 62 IN | BODY MASS INDEX: 30.91 KG/M2 | DIASTOLIC BLOOD PRESSURE: 79 MMHG | OXYGEN SATURATION: 97 % | SYSTOLIC BLOOD PRESSURE: 120 MMHG | HEART RATE: 60 BPM

## 2021-04-23 DIAGNOSIS — R42 DIZZINESS: ICD-10-CM

## 2021-04-23 DIAGNOSIS — M54.2 CERVICALGIA: Primary | ICD-10-CM

## 2021-04-23 DIAGNOSIS — M79.602 LEFT ARM PAIN: ICD-10-CM

## 2021-04-23 PROCEDURE — 99214 OFFICE O/P EST MOD 30 MIN: CPT | Performed by: INTERNAL MEDICINE

## 2021-04-23 RX ORDER — GABAPENTIN 100 MG/1
200 CAPSULE ORAL 3 TIMES DAILY
Qty: 540 CAPSULE | Refills: 3 | Status: SHIPPED | OUTPATIENT
Start: 2021-04-23 | End: 2021-05-05

## 2021-04-23 ASSESSMENT — MIFFLIN-ST. JEOR: SCORE: 1220.29

## 2021-04-23 NOTE — PROGRESS NOTES
Assessment & Plan     Cervicalgia  and  Left arm pain    Kristen has ongoing left arm pain and to a lesser extent left leg pain after her stroke.  Gabapentin not helping too much, but she is on low dose, so we'll try titrating upward to see if that helps.  No obvious nerve compression seen on cervical CT, but she does have facet arthropathy and is interested in trying injections (referral placed).  She feels the neck is contributing to her dizziness- has tried PT for dizziness in the past but is interested in trying this again, referral placed.       - PAIN MANAGEMENT REFERRAL; Future  - PHYSICAL THERAPY REFERRAL; Future  - gabapentin (NEURONTIN) 100 MG capsule; Take 2 capsules (200 mg) by mouth 3 times daily The patient requests that this prescription be held on file for filling in the near future.    Dizziness    See above    - PHYSICAL THERAPY REFERRAL; Future        Patient Instructions   We'll increase gabapentin to 200mg three times per day.  Let me know in 2-3 weeks how this is going (if it helping or if there are any side effects).        Mikael Bauer MD  [facility]    Subjective   Kristen is a 75 year old who presents for the following health issues     HPI     Patient states that she is here today to follow up on her Stroke back in February. Patient also states that she would like to discuss the results from her previous MRI.    I saw Kristen last on 3/12 and we incresaed gabapentin for ongoing L arm pain after her stroke.  Cervical spine MRI showed multilevel degenerative disc disease and arthritis, no significant stenosis.  She saw neurology a few days ago and they discussed neck injections and increasing gabapentin.    Today, Kristen reports ongoing left arm aches and tingling.  She is not sure if the gabapentin is helping.      She's had 17 chiro appointments for dizziness and the chiro says her C2 vertebrae keeps getting out of alignment and there may be weak neck muscles  "contributing to this.          Review of Systems   Constitutional, MSK systems are negative, except as otherwise noted.      Objective    /79   Pulse 60   Ht 1.575 m (5' 2\")   Wt 76.2 kg (168 lb)   SpO2 97%   BMI 30.73 kg/m    Body mass index is 30.73 kg/m .  Physical Exam   GENERAL: healthy, alert and no distress  MS: no significant knee pain to palpation  NEURO: slurred speech            "

## 2021-04-23 NOTE — PATIENT INSTRUCTIONS
We'll increase gabapentin to 200mg three times per day.  Let me know in 2-3 weeks how this is going (if it helping or if there are any side effects).

## 2021-04-28 ENCOUNTER — MYC MEDICAL ADVICE (OUTPATIENT)
Dept: FAMILY MEDICINE | Facility: CLINIC | Age: 76
End: 2021-04-28

## 2021-04-28 DIAGNOSIS — F41.9 ANXIETY: Primary | ICD-10-CM

## 2021-04-28 RX ORDER — LORAZEPAM 0.5 MG/1
TABLET ORAL ONCE
Status: CANCELLED | OUTPATIENT
Start: 2021-04-28 | End: 2021-04-28

## 2021-04-28 RX ORDER — LORAZEPAM 1 MG/1
1 TABLET ORAL ONCE
Qty: 1 TABLET | Refills: 0 | Status: SHIPPED | OUTPATIENT
Start: 2021-04-28 | End: 2021-04-28

## 2021-04-28 NOTE — TELEPHONE ENCOUNTER
Dr. Bauer,    Patient is having a injection into her neck with pain clinic here.  Asking for something to help her with anxiety for this procedure.  Ativan is pended . Marie COHEN RN

## 2021-05-03 ENCOUNTER — MYC MEDICAL ADVICE (OUTPATIENT)
Dept: FAMILY MEDICINE | Facility: CLINIC | Age: 76
End: 2021-05-03

## 2021-05-03 DIAGNOSIS — Z13.21 ENCOUNTER FOR VITAMIN DEFICIENCY SCREENING: ICD-10-CM

## 2021-05-03 DIAGNOSIS — I63.9 CEREBROVASCULAR ACCIDENT (CVA), UNSPECIFIED MECHANISM (H): ICD-10-CM

## 2021-05-03 DIAGNOSIS — E78.5 HYPERLIPIDEMIA, UNSPECIFIED HYPERLIPIDEMIA TYPE: ICD-10-CM

## 2021-05-03 LAB
CHOLEST SERPL-MCNC: 159 MG/DL
HDLC SERPL-MCNC: 79 MG/DL
LDLC SERPL CALC-MCNC: 65 MG/DL
NONHDLC SERPL-MCNC: 80 MG/DL
TRIGL SERPL-MCNC: 74 MG/DL
VIT B12 SERPL-MCNC: 543 PG/ML (ref 193–986)

## 2021-05-03 PROCEDURE — 80061 LIPID PANEL: CPT | Performed by: INTERNAL MEDICINE

## 2021-05-03 PROCEDURE — 82607 VITAMIN B-12: CPT | Performed by: INTERNAL MEDICINE

## 2021-05-03 PROCEDURE — 36415 COLL VENOUS BLD VENIPUNCTURE: CPT | Performed by: INTERNAL MEDICINE

## 2021-05-05 ENCOUNTER — MYC MEDICAL ADVICE (OUTPATIENT)
Dept: FAMILY MEDICINE | Facility: CLINIC | Age: 76
End: 2021-05-05

## 2021-05-05 DIAGNOSIS — M79.602 LEFT ARM PAIN: ICD-10-CM

## 2021-05-05 RX ORDER — GABAPENTIN 100 MG/1
100 CAPSULE ORAL 3 TIMES DAILY
Qty: 270 CAPSULE | Refills: 3 | Status: SHIPPED | OUTPATIENT
Start: 2021-05-05 | End: 2021-07-06

## 2021-05-13 ENCOUNTER — HOSPITAL ENCOUNTER (OUTPATIENT)
Dept: PHYSICAL THERAPY | Facility: CLINIC | Age: 76
Setting detail: THERAPIES SERIES
End: 2021-05-13
Attending: INTERNAL MEDICINE
Payer: COMMERCIAL

## 2021-05-13 ENCOUNTER — TELEPHONE (OUTPATIENT)
Dept: PHYSICAL THERAPY | Facility: CLINIC | Age: 76
End: 2021-05-13

## 2021-05-13 DIAGNOSIS — R42 DIZZINESS: ICD-10-CM

## 2021-05-13 DIAGNOSIS — H81.11 BENIGN PAROXYSMAL POSITIONAL VERTIGO, RIGHT: Primary | ICD-10-CM

## 2021-05-13 DIAGNOSIS — M54.2 CERVICALGIA: ICD-10-CM

## 2021-05-13 PROCEDURE — 97162 PT EVAL MOD COMPLEX 30 MIN: CPT | Mod: GP | Performed by: PHYSICAL THERAPIST

## 2021-05-13 PROCEDURE — 95992 CANALITH REPOSITIONING PROC: CPT | Mod: GP | Performed by: PHYSICAL THERAPIST

## 2021-05-14 NOTE — PROGRESS NOTES
Kristen Thompson   PHYSICAL THERAPY EVALUATION    05/13/21 1300   General Information   Type of Visit Initial OP Ortho PT Evaluation   Start of Care Date 05/13/21   Referring Physician DR Bauer   Patient/Family Goals Statement get rid of dizziness, hand tingling   Orders Evaluate and Treat   Date of Order 04/23/21   Certification Required? Yes   Medical Diagnosis cervicalgia, dizziness   Body Part(s)   Body Part(s) Cervical Spine   Presentation and Etiology   Pertinent history of current problem (include personal factors and/or comorbidities that impact the POC) CVA 2/13/21, 30 year history of neck issues, L UE, LE weakness - both mostly resolved, from CVA, Some weakness, tinlging in L face too. could stand on leg after couple days, L arm still not as strong. Has had BPPV before, but this is different, has had this sensation as well prior to CVA, gets a wave of dizzy sx, can be sitting still, says she goes to chiroractor who puts her C2 back in place. Neck pain and dizziness worse from being in bed in hospital. Is on many new meds, did not take any meds prior. Has been doing things around her house, hand exercises, knows she is getting stronger.    Onset date of current episode/exacerbation 02/13/21   Prior Level of Function   Functional Level Prior Comment cook, clean, drive, volunteers at food shelf   Current Level of Function   Patient role/employment history F. Retired   Living environment [place]   Fall Risk Screen   Fall screen completed by PT   Have you fallen 2 or more times in the past year? No   Have you fallen and had an injury in the past year? No   Is patient a fall risk? No   Cervical Spine   Observation has very minimal drooping L lip,cheek, corner of eye   Posture forward head   Cervical Flexion ROM WNL   Cervical Extension ROM 30%   Cervical Right Side Bending ROM 10%   Cervical Left Side Bending ROM 10%   Cervical Right Rotation ROM 30%   Cervical Left Rotation ROM 40%   Shoulder AROM  "Screen     Shoulder Shrug (C2-C4) Strength 5   Shoulder Abd (C5) Strength 5   Shoulder ER (C5, C6) Strength 5   Shoulder IR (C5, C6) Strength 5   Elbow Flexion (C5, C6) Strength 5   Elbow Extension (C7) Strength 5   Wrist Extension (C6) Strength 4+   Wrist Flexion (C7) Strength 5   Shoulder/Wrist/Hand Strength Comments  50# B   Cervical/Shoulder Special Tests Comments cervical isometric sterngth WFL except L SB min loss   Neurological Testing Comments eye tracking  normal, saccades normal, VOR normal, Hallpike R + upward/R ROT nystagmus fast beating, lasing 7-8 seconds , balance CTSIB 30 sec for all, feet apart 3\" on foam   Planned Therapy Interventions   Planned Therapy Interventions neuromuscular re-education;manual therapy   Clinical Impression   Criteria for Skilled Therapeutic Interventions Met yes, treatment indicated   PT Diagnosis BPPV R posterior canal   Functional limitations due to impairments walking, walk and turn head, change position   Clinical Presentation Evolving/Changing   Clinical Presentation Rationale CVA, vestibular, BPPV, weakness   Clinical Decision Making (Complexity) Moderate complexity   Therapy Frequency 1 time/week   Predicted Duration of Therapy Intervention (days/wks) up to 6wks, pt did not seem interested inreturning, does not like to \"spin\" with vertigo treatment, highly encouraged pt she would feel better with BPPV resolved   Risk & Benefits of therapy have been explained Yes   Patient, Family & other staff in agreement with plan of care Yes   Clinical Impression Comments L hand and face tinlging most liekly resolving CVA sx, thalamic region, dizziness associated with BPPV   Education Assessment   Preferred Learning Style Listening   Barriers to Learning Hearing  (reads lips)   ORTHO GOALS   PT Ortho Eval Goals 1;2   Ortho Goal 1   Goal Identifier 1   Goal Description resolution of hand numbness with ADLs, safety in 4wk   Target Date 06/10/21   Ortho Goal 2   Goal Identifier 2 "   Goal Description pt will be able to do all household tasks w/o dizziness in 4wk   Target Date 06/10/21   Total Evaluation Time   PT Eval, Moderate Complexity Minutes (31093) 25   Therapy Certification   Certification date from 05/13/21   Certification date to 06/10/21   Medical Diagnosis cervicalgia, dizziness, BPPV   [facility]  Kris Hoenk  PT  [facility]  [address] [place] [address]  [email or web address]   Office: [phone]  Voicemail: [phone]

## 2021-05-14 NOTE — PROGRESS NOTES
"[facility]          OUTPATIENT PHYSICAL THERAPY ORTHOPEDIC EVALUATION  PLAN OF TREATMENT FOR OUTPATIENT REHABILITATION  (COMPLETE FOR INITIAL CLAIMS ONLY)  Patient's Last Name, First Name, M.I.  YOB: 1945  Kristen Thompson    Provider s Name:  [facility]   Medical Record No.  [id number]   Start of Care Date:  05/13/21   Onset Date:  02/13/21   Type:     _X__PT   ___OT   ___SLP Medical Diagnosis:  cervicalgia, dizziness, BPPV     PT Diagnosis:  BPPV R posterior canal   Visits from SOC:  1      _________________________________________________________________________________  Plan of Treatment/Functional Goals:  neuromuscular re-education, manual therapy          Goals  Goal Identifier: 1  Goal Description: resolution of hand numbness with ADLs, safety in 4wk  Target Date: 06/10/21    Goal Identifier: 2  Goal Description: pt will be able to do all household tasks w/o dizziness in 4wk  Target Date: 06/10/21                        Therapy Frequency:  1 time/week  Predicted Duration of Therapy Intervention:  up to 6wks, pt did not seem interested inreturning, does not like to \"spin\" with vertigo treatment, highly encouraged pt she would feel better with BPPV resolved    Kris Hoenk, PT                 I CERTIFY THE NEED FOR THESE SERVICES FURNISHED UNDER        THIS PLAN OF TREATMENT AND WHILE UNDER MY CARE     (Physician co-signature of this document indicates review and certification of the therapy plan).                       Certification Date From:  05/13/21   Certification Date To:  06/10/21    Referring Provider:  DR Bauer    Initial Assessment        See Epic Evaluation Start of Care Date: 05/13/21                                                                              "

## 2021-05-18 ENCOUNTER — HOSPITAL ENCOUNTER (OUTPATIENT)
Dept: MAMMOGRAPHY | Facility: CLINIC | Age: 76
Discharge: HOME | End: 2021-05-18
Attending: INTERNAL MEDICINE | Admitting: INTERNAL MEDICINE
Payer: COMMERCIAL

## 2021-05-18 DIAGNOSIS — Z12.31 VISIT FOR SCREENING MAMMOGRAM: ICD-10-CM

## 2021-05-18 PROCEDURE — 77063 BREAST TOMOSYNTHESIS BI: CPT

## 2021-06-08 ENCOUNTER — MYC MEDICAL ADVICE (OUTPATIENT)
Dept: FAMILY MEDICINE | Facility: CLINIC | Age: 76
End: 2021-06-08

## 2021-06-29 ENCOUNTER — OFFICE VISIT (OUTPATIENT)
Dept: FAMILY MEDICINE | Facility: CLINIC | Age: 76
End: 2021-06-29
Payer: COMMERCIAL

## 2021-06-29 VITALS
HEIGHT: 62 IN | TEMPERATURE: 98.2 F | HEART RATE: 70 BPM | DIASTOLIC BLOOD PRESSURE: 67 MMHG | WEIGHT: 170.25 LBS | RESPIRATION RATE: 14 BRPM | OXYGEN SATURATION: 97 % | SYSTOLIC BLOOD PRESSURE: 129 MMHG | BODY MASS INDEX: 31.33 KG/M2

## 2021-06-29 DIAGNOSIS — M54.12 CERVICAL RADICULOPATHY: ICD-10-CM

## 2021-06-29 DIAGNOSIS — I63.9 CEREBROVASCULAR ACCIDENT (CVA), UNSPECIFIED MECHANISM (H): Primary | ICD-10-CM

## 2021-06-29 DIAGNOSIS — E78.5 HYPERLIPIDEMIA, UNSPECIFIED HYPERLIPIDEMIA TYPE: ICD-10-CM

## 2021-06-29 DIAGNOSIS — I49.9 IRREGULAR HEART RHYTHM: ICD-10-CM

## 2021-06-29 PROCEDURE — 99214 OFFICE O/P EST MOD 30 MIN: CPT | Performed by: INTERNAL MEDICINE

## 2021-06-29 RX ORDER — ATORVASTATIN CALCIUM 10 MG/1
10 TABLET, FILM COATED ORAL DAILY
Qty: 90 TABLET | Refills: 0 | Status: SHIPPED | OUTPATIENT
Start: 2021-06-29 | End: 2021-09-17

## 2021-06-29 ASSESSMENT — MIFFLIN-ST. JEOR: SCORE: 1230.5

## 2021-06-29 NOTE — PROGRESS NOTES
Assessment & Plan     Cerebrovascular accident (CVA), unspecified mechanism (H) -with probable myalgias on the statin.  Does need a statin due to recent stroke so we will decrease the dose and follow-up in 2 months with cholesterol check  - atorvastatin (LIPITOR) 10 MG tablet; Take 1 tablet (10 mg) by mouth daily  - Lipid panel reflex to direct LDL Fasting; Future    Irregular heart rhythm -30-day Zio patch without A. fib    Cervical radiculopathy -likely contributes some to the numbness/tingling and pain.  Decrease the gabapentin to a lower dose to help offset side effects but still manage pain.  Try wrist brace, CTS contributing?  Insurance would not pay for steroid injection so make face-to-face visit with the pain doctor to determine that that is the best option and to help strengthen a letter of appeal  - PAIN MANAGEMENT REFERRAL; Future    Hyperlipidemia, unspecified hyperlipidemia type -see above  - atorvastatin (LIPITOR) 10 MG tablet; Take 1 tablet (10 mg) by mouth daily      Patient Instructions   Hypertension:  1. Goal blood pressure is consistently less than 140 at a minimum; ideally < 130.  Very high blood pressure would be > 180. If you see it this high, try to relax, deep breaths, and recheck in 10-20 min.    Ideal blood pressure is consistently less than 140.  Best way is take 2+ hours after you have taken your blood pressure medication, and not while drinking coffee, alcohol, using advil, sudafed, etc.  These can raise your blood pressure.   Sit quietly for several min with feet flat on ground.  With arm at heart level, take blood pressure, then again in 1 min.  Average the 2 readings and record this.  Can come back to see RN for blood pressure check.      Arm pain:  1. See Dr. Mcguire to consider injections  2. Can try wrist brace that you buy over the counter - wear at night  3. Decrease gabapentin by 1 pill every 4 days, until at 100 mg 3 x day    Hyperlipidemia:  1. Decrease atorvastatin  "to 10 mg daily  2. Check lipids in 2 months      No follow-ups on file.    Dorina Escalera DO  [facility]    Deirdre Peterson is a 75 year old who presents for the following health issues     HPI       I have seen patient once, 7/2020.  Chief Complaint   Patient presents with     Neurologic Problem     Follow up from stroke in February 2021.  Has seen Dr. Bauer 2x since stroke.  Patient feels extremely overmedicated with gabapentin.  Reports tingling in right arm, leg, along with weakness that comes & goes.     Arthritis     Patient reports joint pain all over (wrists, elbows, knees) since starting atorvastatin in hospital.  Patient reports this may be a side effect of atorvastatin.       Medication Followup of Atorvastatin 40 mg tablet    Taking Medication as prescribed: yes    Side Effects:  Patient reports \"hurting all over\" for past month.      Medication Helping Symptoms:  yes     Stroke:  --she has seen cards and neurology in April  --She is not on any medications prior to the stroke  --feels over medicated on gabapentin - drowsy, falls asleep easily    Cervical Radiculopathy  --having numbness/tingling in right arm/hand  --the left arm/elbow feels tight or 'muscle bound' and this is painful  --feels the gabapentin is helping; taking 200 mg AM, lunch, dinner and 300 mg HS.  --it has made her feel drowsy the whole time she has been taking it.  --has significant pain in the arm  --pain doctor note 4/28 \"Given the fusion of the C2-3 joint, I think it is reasonable to go to the left C3-4 and C4-5 joints. Left C3/4 and C4/5 facet joint injections. \"  --has gained weight since being on gabapentin;     Left eye:   --saw eye doctor for symptoms of very dry eyes; L>R  --pataday is not helping; now using artifical tears    Bladder:   --had mild incontinence prior to stroke but now is worse  --has troubles 'holding urine'. Has urgency  --ongoing fo rmonths;  No dysuria.  --no stress " "incontinence    From 3/12   Cerebrovascular accident (CVA), unspecified mechanism (H)  Weakness to left side slowly improving. Continues PT/OTand finds helpful. Neuropathic pain to LLE resolved, continues to have to LUE with benefit from gabapentin. Interested in increasing gabapentin, tolerating well  Left arm pain  History neck pain in past with increase in radicular sx's since CVA      Review of Systems   Constitutional, HEENT, cardiovascular, pulmonary, gi and gu systems are negative, except as otherwise noted.      Objective    /67   Pulse 70   Temp 98.2  F (36.8  C) (Tympanic)   Resp 14   Ht 1.575 m (5' 2\")   Wt 77.2 kg (170 lb 4 oz)   SpO2 97%   BMI 31.14 kg/m    Body mass index is 31.14 kg/m .  Physical Exam   GENERAL APPEARANCE: alert and no distress  RESP: lungs clear to auscultation - no rales, rhonchi or wheezes  CV: regular rates and rhythm, normal S1 S2, no S3 or S4 and frequent irregular beats  MS: Decreased handgrip strength of the left hand.  4-5 strength bilateral upper extremities otherwise  SKIN: no suspicious lesions or rashes  NEURO: mentation intact, speech normal and diminished sensation in the left arm in a stocking glove pattern                "

## 2021-06-29 NOTE — PATIENT INSTRUCTIONS
Hypertension:  1. Goal blood pressure is consistently less than 140 at a minimum; ideally < 130.  Very high blood pressure would be > 180. If you see it this high, try to relax, deep breaths, and recheck in 10-20 min.    Ideal blood pressure is consistently less than 140.  Best way is take 2+ hours after you have taken your blood pressure medication, and not while drinking coffee, alcohol, using advil, sudafed, etc.  These can raise your blood pressure.   Sit quietly for several min with feet flat on ground.  With arm at heart level, take blood pressure, then again in 1 min.  Average the 2 readings and record this.  Can come back to see RN for blood pressure check.      Arm pain:  1. See Dr. Mcguire to consider injections  2. Can try wrist brace that you buy over the counter - wear at night  3. Decrease gabapentin by 1 pill every 4 days, until at 100 mg 3 x day    Hyperlipidemia:  1. Decrease atorvastatin to 10 mg daily  2. Check lipids in 2 months

## 2021-06-30 ENCOUNTER — TELEPHONE (OUTPATIENT)
Dept: PALLIATIVE MEDICINE | Facility: CLINIC | Age: 76
End: 2021-06-30

## 2021-06-30 NOTE — TELEPHONE ENCOUNTER

## 2021-07-06 ENCOUNTER — MYC MEDICAL ADVICE (OUTPATIENT)
Dept: FAMILY MEDICINE | Facility: CLINIC | Age: 76
End: 2021-07-06

## 2021-07-06 DIAGNOSIS — M79.602 LEFT ARM PAIN: ICD-10-CM

## 2021-07-06 RX ORDER — GABAPENTIN 100 MG/1
CAPSULE ORAL
Qty: 360 CAPSULE | Refills: 0 | Status: SHIPPED | OUTPATIENT
Start: 2021-07-06 | End: 2021-08-17 | Stop reason: SINTOL

## 2021-07-06 RX ORDER — GABAPENTIN 100 MG/1
100 CAPSULE ORAL 3 TIMES DAILY
Qty: 270 CAPSULE | Refills: 3 | Status: CANCELLED | OUTPATIENT
Start: 2021-07-06

## 2021-07-06 NOTE — TELEPHONE ENCOUNTER
Dr. Escalera,    Routing refill request to provider for review/approval because:  Drug not on the [facility] refill protocol Maire COHEN RN    Patient sends my chart that she is currently taking 600 mg per day. Marie COHEN RN
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
## 2021-07-26 ENCOUNTER — OFFICE VISIT (OUTPATIENT)
Dept: PALLIATIVE MEDICINE | Facility: CLINIC | Age: 76
End: 2021-07-26
Payer: COMMERCIAL

## 2021-07-26 VITALS — DIASTOLIC BLOOD PRESSURE: 88 MMHG | HEART RATE: 58 BPM | SYSTOLIC BLOOD PRESSURE: 133 MMHG

## 2021-07-26 DIAGNOSIS — M47.812 CERVICAL SPONDYLOSIS WITHOUT MYELOPATHY: Primary | ICD-10-CM

## 2021-07-26 DIAGNOSIS — M47.819 FACET ARTHROPATHY: ICD-10-CM

## 2021-07-26 DIAGNOSIS — M54.12 CERVICAL RADICULOPATHY: ICD-10-CM

## 2021-07-26 PROCEDURE — 99215 OFFICE O/P EST HI 40 MIN: CPT | Performed by: STUDENT IN AN ORGANIZED HEALTH CARE EDUCATION/TRAINING PROGRAM

## 2021-07-26 ASSESSMENT — PAIN SCALES - GENERAL: PAINLEVEL: MILD PAIN (3)

## 2021-07-26 NOTE — PATIENT INSTRUCTIONS
I think the pain in your neck is most likely due to arthritis of the joints in the cervical spine.   I think your hand tingling is most likely from the stroke, less likely from a pinched nerve in your neck, less likely carpal tunnel syndrome.   I think your left elbow pain is most likely muscular/tendon - most likely lateral epicondylitis.     For these we will:    1. Do injections in your neck. You will be called to schedule this. You can take your lorazepam prior to the injection.     2. I have printed information about your elbow with some exercises you can try.      ----------------------------------------------------------------  Clinic Number:  [phone]     Call with any questions about your care and for scheduling assistance.     Calls are returned Monday through Friday between 8 AM and 4:30 PM. We usually get back to you within 2 business days depending on the issue/request.    If we are prescribing your medications:    For opioid medication refills, call the clinic or send a [employer] message 7 days in advance.  Please include:    Name of requested medication    Name of the pharmacy.    For non-opioid medications, call your pharmacy directly to request a refill. Please allow 3-4 days to be processed.     Per [place] State Law:    All controlled substance prescriptions must be filled within 30 days of being written.      For those controlled substances allowing refills, pickup must occur within 30 days of last fill.      We believe regular attendance is key to your success in our program!      Any time you are unable to keep your appointment we ask that you call us at least 24 hours in advance to cancel.This will allow us to offer the appointment time to another patient.     Multiple missed appointments may lead to dismissal from the clinic.

## 2021-07-27 ENCOUNTER — TELEPHONE (OUTPATIENT)
Dept: PALLIATIVE MEDICINE | Facility: CLINIC | Age: 76
End: 2021-07-27

## 2021-07-27 DIAGNOSIS — Z20.822 ENCOUNTER FOR LABORATORY TESTING FOR COVID-19 VIRUS: Primary | ICD-10-CM

## 2021-07-27 NOTE — TELEPHONE ENCOUNTER
Called patient to schedule Left C2-3, C3-4, C4-5, C5-6 facet joint injections , she stated she is going to call her insurance first then will call back to schedule         Yusra Valenzuela    [facility]

## 2021-07-27 NOTE — TELEPHONE ENCOUNTER
Covid test ordered     Parveen Peacock, RN  Patient Care Supervisor   [facility]

## 2021-07-27 NOTE — TELEPHONE ENCOUNTER
Screening Questions for Radiology Injections:    Injection to be done at which interventional clinic site? [facility]    If [facility] location, tell patient that this procedure requires a COVID-19 lab test be done within 4 days of the procedure. Would you still like to move forward with scheduling the procedure?  YES: OK   If YES, let patient know that someone will call them to schedule the COVID-19 test and that they will only receive a call back if the result is positive. Route to nursing to enter order.     Instruct patient to arrive as directed prior to the scheduled appointment time:    [place] minutes before      Friend: 30 minutes before; if IV needed 1 hour before     Procedure ordered by Nicole    Procedure ordered? Left C2-3, C3-4, C4-5, C5-6 facet joint injections     Transforaminal Cervical MOE - no pain provider currently performing    As a reminder, receiving steroids can decrease your body's ability to fight infection.   Would you still like to move forward with scheduling the injection?  Yes    What insurance would patient like us to bill for this procedure? BC      Worker's comp or MVA (motor vehicle accident) -Any injection DO NOT SCHEDULE and route to [facility] [place].      [employer] insurance - For SI joint injections, DO NOT SCHEDULE and route [facility] [place].       ALL BCBS, [employer] and [employer]-Route to [place] for review DO NOT SCHEDULE      IF SCHEDULING IN [place] AND NEEDS A PA, IT IS OKAY TO SCHEDULE. [place] HANDLES THEIR OWN PA'S AFTER THE PATIENT IS SCHEDULED. PLEASE SCHEDULE AT LEAST 1 WEEK OUT SO A PA CAN BE OBTAINED.    Any chance of pregnancy? NO   If YES, do NOT schedule and route to RN pool    Is an  needed? No     Patient has a drive home? (mandatory) YES: OK    Is patient taking any blood thinners (i.e. plavix, coumadin, jantoven, warfarin, heparin, pradaxa or dabigatran, etc)? No   If hold needed, do NOT schedule, route to RN pool     Is  patient taking any aspirin products (includes Excedrin and Fiorinal)? Yes - Pt takes 81mg daily; instructed to hold 6 day(s) prior to procedure.      If more than 325mg/day, OK to schedule; Instruct pt to decrease to less than 325 mg for 7 days AND route to RN pool    For CERVICAL procedures, hold all aspirin products for 6 days.     Tell pt that if aspirin product is not held for 6 days, the procedure WILL BE cancelled.      Does the patient have a bleeding or clotting disorder? No     If YES, okay to schedule AND route to RN nurse pool    For any patients with platelet count <100, must be forwarded to provider    Any allergies to contrast dye, iodine, shellfish, or numbing and steroid medications? No    If YES, add allergy information to appointment notes AND route to the RN pool     If MOE and Contrast Dye / Iodine Allergy? DO NOT SCHEDULE, route to RN pool    Allergies: Codeine sulfate, Quinapril, and Darvon-n [propoxyphene napsylate]     Is patient diabetic?  No  If YES, instruct them to bring their glucometer.    Does patient have an active infection or treated for one within the past week? No     Is patient currently taking any antibiotics?  No     For patients on chronic, preventative, or prophylactic antibiotics, procedures may be scheduled.     For patients on antibiotics for active or recent infection:antibiotic course must have been completed for 4 days    Is patient currently taking any steroid medications? (i.e. Prednisone, Medrol)  No     For patients on steroid medications, course must have been completed for 4 days    Is patient actively being treated for cancer or immunocompromised? No  If YES, do NOT schedule and route to RN pool     Are you able to get on and off an exam table with minimal or no assistance? Yes  If NO, do NOT schedule and route to RN pool    Are you able to roll over and lay on your stomach with minimal or no assistance? Yes  If NO, do NOT schedule and route to RN pool     Has  the patient had a flu shot or any other vaccinations within 7 days before or after the procedure.  No     Have you recently had a COVID vaccine or have plans to get it in the near future? No    If yes, explain that for the vaccine to work best they need to:       wait 1 week before and 1 week after getting Vaccine #1    wait 1 week before and 2 weeks after getting Vaccine #2    If patient has concerns about the timing, send to RN pool     Does patient have an MRI/CT?  YES: MRI  Check Procedure Scheduling Grid to see if required.      Was the MRI done within the last 3 years?  Yes    If yes, where was the MRI done i.e.[facility], [facility], [place], [facility] etc?  [facility]      If no, do not schedule and route to RN pool    If MRI was not done at [place], [facility] or [facility] do NOT schedule and route to RN pool.      If pt has an imaging disc, the injection MAY be scheduled but pt has to bring disc to appt.     If they show up without the disc the injection cannot be done    Procedure Specific Instructions:      If celiac plexus block, informed patient NPO for 6 hours and that it is okay to take medications with sips of water, especially blood pressure medications  Not Applicable         If this is for a cervical procedure, informed patient that aspirin needs to be held for 6 days.   YES: OK      If IV needed:    Do not schedule procedures requiring IV placement in the first appointment of the day or first appointment after lunch. Do NOT schedule at 0745, 0815 or 1245. OK    Instructed pt to arrive 30 minutes early for IV start if required. (Check Procedure Scheduling Grid)  YES: OK    Reminders:      If you are started on any steroids or antibiotics between now and your appointment, you must contact us because the procedure may need to be cancelled.  Yes      For all procedures except radiofrequency ablations (RFAs) and spinal cord stimulator (SCS) trials, informed patient:    IV sedation is not  provided for this procedure.  If you feel that an oral anti-anxiety medication is needed, you can discuss this further with your referring provider or primary care provider.  The Pain Clinic provider will discuss specifics of what the procedure includes at your appointment.  Most procedures last 10-20 minutes.  We use numbing medications to help with any discomfort during the procedure.  Not Applicable      For patients 85 or older we recommend having an adult stay w/ them for the remainder of the day.   OK    Does the patient have any questions?  NO  Claire Martinez  [facility]

## 2021-07-29 NOTE — TELEPHONE ENCOUNTER
Covid test reordered     Parveen Peacock, RN  Patient Care Supervisor   [facility]

## 2021-07-29 NOTE — TELEPHONE ENCOUNTER
Patient rescheduled, routing to nursing to review COVID order      Yusra Valenzuela    [facility]

## 2021-08-02 ENCOUNTER — NURSE TRIAGE (OUTPATIENT)
Dept: FAMILY MEDICINE | Facility: CLINIC | Age: 76
End: 2021-08-02

## 2021-08-02 NOTE — TELEPHONE ENCOUNTER
"  Reason for Disposition    Patient wants to be seen    Additional Information    Negative: Severe difficulty breathing (e.g., struggling for each breath, speaks in single words)    Negative: Sounds like a life-threatening emergency to the triager    Negative: Throat culture results, call about    Negative: Productive cough is the main symptom    Negative: Runny nose is the main symptom    Negative: SEVERE sore throat pain    Negative: Pus on tonsils (back of throat) and swollen neck lymph nodes ('glands')    Negative: Earache also present    Negative: Widespread rash (especially chest and abdomen)    Negative: Diabetes mellitus or weak immune system (e.g., HIV positive, cancer chemo, splenectomy, organ transplant, chronic steroids)    Negative: History of rheumatic fever    Negative: Fever present > 3 days (72 hours)    Answer Assessment - Initial Assessment Questions  1. ONSET: \"When did the throat start hurting?\" (Hours or days ago)       4-5 days ago  2. SEVERITY: \"How bad is the sore throat?\" (Scale 1-10; mild, moderate or severe)    - MILD (1-3):  doesn't interfere with eating or normal activities    - MODERATE (4-7): interferes with eating some solids and normal activities    - SEVERE (8-10):  excruciating pain, interferes with most normal activities    - SEVERE DYSPHAGIA: can't swallow liquids, drooling      moderate  3. STREP EXPOSURE: \"Has there been any exposure to strep within the past week?\" If so, ask: \"What type of contact occurred?\"       denies  4.  VIRAL SYMPTOMS: \"Are there any symptoms of a cold, such as a runny nose, cough, hoarse voice or red eyes?\"       Runny nose  5. FEVER: \"Do you have a fever?\" If so, ask: \"What is your temperature, how was it measured, and when did it start?\"      denies  6. PUS ON THE TONSILS: \"Is there pus on the tonsils in the back of your throat?\"      denies  7. OTHER SYMPTOMS: \"Do you have any other symptoms?\" (e.g., difficulty breathing, headache, rash)      " "denies  8. PREGNANCY: \"Is there any chance you are pregnant?\" \"When was your last menstrual period?\"      NA    Protocols used: SORE THROAT-A-OH    "

## 2021-08-02 NOTE — TELEPHONE ENCOUNTER
Reason for call:  Symptom   Symptom or request: feels like something is pushing on her garcia apple in her throat     Duration (how long have symptoms been present): 4=5 days   Have you been treated for this before? No    Additional comments: na    Phone number to reach patient:  Cell number on file:    Telephone Information:   Mobile [phone]       Best Time:  any    Can we leave a detailed message on this number?  YES    Travel screening: Not Applicable
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
## 2021-08-03 NOTE — TELEPHONE ENCOUNTER
Patient is contacted and told of the need to see one of Dr. Escalera's partners.  Offered appt patient refused. Marie COHEN RN     Procedure(s):  LAPAROSCOPIC LEFT SALPINGO-OOPHORECTOMY/RIGHT SALPINGECTOMY. general    Anesthesia Post Evaluation        Patient location during evaluation: PACU  Level of consciousness: awake  Pain management: adequate  Airway patency: patent  Anesthetic complications: no  Cardiovascular status: acceptable  Respiratory status: acceptable  Hydration status: acceptable  Post anesthesia nausea and vomiting:  none      INITIAL Post-op Vital signs:   Vitals Value Taken Time   /61 06/18/21 1010   Temp 36.4 °C (97.5 °F) 06/18/21 1015   Pulse 47 06/18/21 1015   Resp 20 06/18/21 1015   SpO2 96 % 06/18/21 1015   Vitals shown include unvalidated device data.

## 2021-08-08 ENCOUNTER — LAB (OUTPATIENT)
Dept: FAMILY MEDICINE | Facility: CLINIC | Age: 76
End: 2021-08-08
Attending: STUDENT IN AN ORGANIZED HEALTH CARE EDUCATION/TRAINING PROGRAM
Payer: COMMERCIAL

## 2021-08-08 DIAGNOSIS — Z20.822 ENCOUNTER FOR LABORATORY TESTING FOR COVID-19 VIRUS: ICD-10-CM

## 2021-08-08 LAB — SARS-COV-2 RNA RESP QL NAA+PROBE: NEGATIVE

## 2021-08-08 PROCEDURE — 99207 PR NO CHARGE LOS: CPT

## 2021-08-08 PROCEDURE — U0005 INFEC AGEN DETEC AMPLI PROBE: HCPCS

## 2021-08-08 PROCEDURE — U0003 INFECTIOUS AGENT DETECTION BY NUCLEIC ACID (DNA OR RNA); SEVERE ACUTE RESPIRATORY SYNDROME CORONAVIRUS 2 (SARS-COV-2) (CORONAVIRUS DISEASE [COVID-19]), AMPLIFIED PROBE TECHNIQUE, MAKING USE OF HIGH THROUGHPUT TECHNOLOGIES AS DESCRIBED BY CMS-2020-01-R: HCPCS

## 2021-08-10 ENCOUNTER — HOSPITAL ENCOUNTER (OUTPATIENT)
Dept: PALLIATIVE MEDICINE | Facility: CLINIC | Age: 76
Discharge: HOME | End: 2021-08-10
Attending: INTERNAL MEDICINE | Admitting: STUDENT IN AN ORGANIZED HEALTH CARE EDUCATION/TRAINING PROGRAM
Payer: COMMERCIAL

## 2021-08-10 VITALS
SYSTOLIC BLOOD PRESSURE: 131 MMHG | DIASTOLIC BLOOD PRESSURE: 82 MMHG | TEMPERATURE: 98.3 F | RESPIRATION RATE: 16 BRPM | HEART RATE: 78 BPM

## 2021-08-10 DIAGNOSIS — M47.819 FACET ARTHROPATHY: ICD-10-CM

## 2021-08-10 DIAGNOSIS — M47.812 FACET ARTHROPATHY, CERVICAL: ICD-10-CM

## 2021-08-10 DIAGNOSIS — M47.812 CERVICAL SPONDYLOSIS WITHOUT MYELOPATHY: ICD-10-CM

## 2021-08-10 PROCEDURE — 250N000011 HC RX IP 250 OP 636: Performed by: STUDENT IN AN ORGANIZED HEALTH CARE EDUCATION/TRAINING PROGRAM

## 2021-08-10 PROCEDURE — 64495 INJ PARAVERT F JNT L/S 3 LEV: CPT | Mod: LT | Performed by: STUDENT IN AN ORGANIZED HEALTH CARE EDUCATION/TRAINING PROGRAM

## 2021-08-10 PROCEDURE — 250N000009 HC RX 250: Performed by: STUDENT IN AN ORGANIZED HEALTH CARE EDUCATION/TRAINING PROGRAM

## 2021-08-10 PROCEDURE — 64493 INJ PARAVERT F JNT L/S 1 LEV: CPT | Mod: LT | Performed by: STUDENT IN AN ORGANIZED HEALTH CARE EDUCATION/TRAINING PROGRAM

## 2021-08-10 PROCEDURE — 64494 INJ PARAVERT F JNT L/S 2 LEV: CPT | Mod: LT | Performed by: STUDENT IN AN ORGANIZED HEALTH CARE EDUCATION/TRAINING PROGRAM

## 2021-08-10 RX ORDER — IOPAMIDOL 612 MG/ML
15 INJECTION, SOLUTION INTRATHECAL ONCE
Status: COMPLETED | OUTPATIENT
Start: 2021-08-10 | End: 2021-08-10

## 2021-08-10 RX ORDER — BUPIVACAINE HYDROCHLORIDE 2.5 MG/ML
10 INJECTION, SOLUTION EPIDURAL; INFILTRATION; INTRACAUDAL ONCE
Status: COMPLETED | OUTPATIENT
Start: 2021-08-10 | End: 2021-08-10

## 2021-08-10 RX ORDER — DEXAMETHASONE SODIUM PHOSPHATE 10 MG/ML
10 INJECTION, SOLUTION INTRAMUSCULAR; INTRAVENOUS ONCE
Status: COMPLETED | OUTPATIENT
Start: 2021-08-10 | End: 2021-08-10

## 2021-08-10 RX ORDER — LIDOCAINE HYDROCHLORIDE 10 MG/ML
5 INJECTION, SOLUTION EPIDURAL; INFILTRATION; INTRACAUDAL; PERINEURAL ONCE
Status: COMPLETED | OUTPATIENT
Start: 2021-08-10 | End: 2021-08-10

## 2021-08-10 RX ADMIN — IOPAMIDOL 1 ML: 612 INJECTION, SOLUTION INTRATHECAL at 12:51

## 2021-08-10 RX ADMIN — BUPIVACAINE HYDROCHLORIDE 2 ML: 2.5 INJECTION, SOLUTION EPIDURAL; INFILTRATION; INTRACAUDAL; PERINEURAL at 12:51

## 2021-08-10 RX ADMIN — DEXAMETHASONE SODIUM PHOSPHATE 10 MG: 10 INJECTION, SOLUTION INTRAMUSCULAR; INTRAVENOUS at 12:51

## 2021-08-10 RX ADMIN — LIDOCAINE HYDROCHLORIDE 2 ML: 10 INJECTION, SOLUTION EPIDURAL; INFILTRATION; INTRACAUDAL; PERINEURAL at 12:51

## 2021-08-10 NOTE — PROGRESS NOTES
Pre-procedure Intake    Have you been fasting? No     If yes, for how long?     Are you taking a prescribed blood thinner such as coumadin, Plavix, Xarelto?    No    If yes, when did you take your last dose?     Do you take aspirin?  Yes -   ASA    If cervical procedure, have you held aspirin for 6 days?   Yes    Do you have any allergies to contrast dye, iodine, steroid and/or numbing medications?  NO    Are you currently taking antibiotics or have an active infection?  NO    Have you had a fever/elevated temperature within the past week? NO    Are you currently taking oral steroids? NO    Do you have a ? Yes       Are you pregnant or breastfeeding?  Not Applicable    Have you received the COVID-19 vaccine? Yes       If yes, was it your 1st, 2nd or only dose needed? 2nd    Date of most recent vaccine: 3/23/2021    Notify provider and RNs if systolic BP >170, diastolic BP >100, P >100 or O2 sats < 90%

## 2021-08-10 NOTE — DISCHARGE INSTRUCTIONS
[facility]   Procedure Discharge Instructions    Today you saw:  Dr. Luna Rivera    You had:   C2/3, C3/4, C4/5 and C5/6 Facet Joint Injections with Corticosteroids    Medications used:  Lidocaine   Bupivacaine   Isovue   Kenalog             If you were holding your blood thinning medication, please restart taking it: Restart Aspirin in 24 hours.    Do not drive for 6 hours. The effect of the local anesthetic could slow your reflexes.     You may resume your regular activities after 24 hours    Avoid strenuous activity for the first 24 hours    You may shower, however avoid swimming, tub baths or hot tubs for 24 hours following your procedure    You may have a mild to moderate increase in pain for several days following the injection.    It may take up to 14 days for the steroid medication to start working although you may feel the effect as early as a few days after the procedure.       You may use ice packs for 10-15 minutes, 3 to 4 times a day at the injection site for comfort    Do not use heat to painful areas for 6 to 8 hours. This will give the local anesthetic time to wear off and prevent you from accidentally burning your skin.     Unless you have been directed to avoid the use of anti-inflammatory medications (NSAIDS), you may use medications such as ibuprofen, Aleve or Tylenol for pain control if needed.     If you were fasting, you may resume your normal diet and medications after the procedure    Possible side effects of steroids that you may experience include flushing, elevated blood pressure, increased appetite, mild headaches and restlessness.  All of these symptoms will get better with time.    If you experience any of the following, call the Pain Clinic during work hours (Mon-Friday 8-4:30 pm) at [phone] or the Provider Line after hours at [phone]:  -Fever over 100 degree F  -Swelling, bleeding, redness, drainage, warmth at the injection  site  -Progressive weakness or numbness in your arms  -Unusual headache that is not relieved by Tylenol or other pain reliever  -Unusual new onset of pain that is not improving

## 2021-08-12 NOTE — PROGRESS NOTES
[facility] - Procedure Note      Pre procedure Diagnosis: Cervical facet arthropathy   Post procedure Diagnosis: Same  Procedure performed: Left C2-3, C3-4, C4-5, C5-6 facet joint injections  Anesthesia: none  Complications: none  Operators: Luna Rivera MD    Indications:   Kristen Thompson is a 75 year old female who is well known to me from previous visits, here for left-sided cervical facet injections.  They have a history of chronic left-sided neck pain.  Exam shows tender to palpation of left sided cervical facets/paraspinal mm and they have tried conservative treatment including medications, chiroractic, acupuncture.    Options/alternatives, benefits and risks were discussed with the patient including bleeding, infection, flared pain, tissue trauma, exposure to radiation, reaction to medications including seizure, spinal cord injury, paralysis, weakness, numbness and headache.    Questions were answered to her satisfaction and she agrees to proceed. Voluntary informed consent was obtained and signed.     Vitals were reviewed: Yes  Allergies were reviewed:  Yes   Medications were reviewed:  Yes   Pre-procedure pain score: 7/10    CERVICAL MRI: 3/17/2021   C3-C4: Normal disc height. No herniation. Mild to moderate bilateral  facet arthropathy. No spinal canal stenosis. Minimal right neural  foraminal narrowing. The left neural foramen is patent.     C4-C5: Mild disc height loss. Shallow symmetric disc bulge with  posterior osteophytic ridging, mild bilateral uncovertebral  arthropathy and moderate bilateral facet arthropathy. Mild mass effect  on the ventral thecal sac without mass effect on the spinal cord or  significant spinal canal stenosis. No significant neural foraminal  stenosis.     C5-C6: Moderate disc height loss. Symmetric disc bulge with posterior  endplate osteophytic ridging and bilateral uncovertebral arthropathy.  Moderate left and mild right facet  arthropathy. Effacement of the  ventral thecal sac with minimal mass effect on the ventral aspect of  the cord. No significant spinal canal stenosis. Mild bilateral neural  foraminal stenosis.    Procedure:  After getting informed consent, patient was brought into the procedure suite and was placed in a prone position on the procedure table.   A Pause for the Cause was performed.  Patient was prepped and draped in sterile fashion.     Under AP fluoroscopic guidance the cervical C2/3, C3-4 & C4-5, C5/6 facet joints on the left side were identified. In the AP view a 25 gauge 3.5 inch quincke type spinal needle was inserted and advanced under fluoroscopic guidance.  Then lateral fluroscopic views were obtained and the needle was advanced into the joint space.     AP fluoroscopic views were again obtained to confirm the needle placement. Then, Isovue 300 contrast dye was injected after negative aspiration for heme and CSF in each joint, confirming appropriate placement.  A total of 1mL of Isovue 300 was used and 14mL was wasted.    The injection was then accomplished using a solution containing 1 ml of 0.25% bupivacaine mixed with 10mg of dexamethasone, divided between the 4 joints. The needles were removed..     Hemostasis was achieved, the area was cleaned, and bandaids were placed when appropriate.  The patient tolerated the procedure well, and was taken to the recovery room.    Images were saved to PACS.    Post-procedure pain score: 0/10  Follow-up includes:   -f/u with the referring provider    Luna Rivera MD   Pain Management

## 2021-08-15 DIAGNOSIS — K21.9 GASTROESOPHAGEAL REFLUX DISEASE WITHOUT ESOPHAGITIS: ICD-10-CM

## 2021-08-17 ENCOUNTER — OFFICE VISIT (OUTPATIENT)
Dept: FAMILY MEDICINE | Facility: CLINIC | Age: 76
End: 2021-08-17
Payer: COMMERCIAL

## 2021-08-17 VITALS
HEART RATE: 61 BPM | SYSTOLIC BLOOD PRESSURE: 132 MMHG | TEMPERATURE: 97.9 F | OXYGEN SATURATION: 97 % | WEIGHT: 167 LBS | BODY MASS INDEX: 30.73 KG/M2 | DIASTOLIC BLOOD PRESSURE: 76 MMHG | HEIGHT: 62 IN

## 2021-08-17 DIAGNOSIS — G56.32 RADIAL NEUROPATHY, LEFT: ICD-10-CM

## 2021-08-17 DIAGNOSIS — H02.9 EYELID PROBLEM: ICD-10-CM

## 2021-08-17 DIAGNOSIS — Z86.73 HISTORY OF STROKE: ICD-10-CM

## 2021-08-17 DIAGNOSIS — M26.609 TMJ (TEMPOROMANDIBULAR JOINT SYNDROME): Primary | ICD-10-CM

## 2021-08-17 PROCEDURE — 99214 OFFICE O/P EST MOD 30 MIN: CPT | Performed by: FAMILY MEDICINE

## 2021-08-17 RX ORDER — DULOXETIN HYDROCHLORIDE 20 MG/1
20 CAPSULE, DELAYED RELEASE ORAL DAILY
Qty: 30 CAPSULE | Refills: 3 | Status: SHIPPED | OUTPATIENT
Start: 2021-08-17 | End: 2021-09-09

## 2021-08-17 ASSESSMENT — MIFFLIN-ST. JEOR: SCORE: 1215.76

## 2021-08-17 NOTE — PROGRESS NOTES
"    Assessment & Plan     TMJ (temporomandibular joint syndrome)  wioll start cymbalta for pain /neuropathy and mood   - [facility]; Future  - DULoxetine (CYMBALTA) 20 MG capsule; Take 1 capsule (20 mg) by mouth daily    Eyelid problem  Use gel before bed and tape eye shut     Radial neuropathy, left    - DULoxetine (CYMBALTA) 20 MG capsule; Take 1 capsule (20 mg) by mouth daily    History of stroke  Has recovered mostly          BMI:   Estimated body mass index is 30.54 kg/m  as calculated from the following:    Height as of this encounter: 1.575 m (5' 2\").    Weight as of this encounter: 75.8 kg (167 lb).       Patient Instructions   Put the gel moisturizer in the left eye ,then tape the eye lid shut    Start the duloxetine.   Follow up in 4 weeks       Return in about 4 weeks (around 9/14/2021) for med check.    Cortney Hung MD  [facility] DESHAUN Peterson is a 75 year old who presents for the following health issues    HPI     Stroke this year, Feb 13/21  Still struggling   Troubles with both eyes, left eye is worse.   Feels like there is always something in the corner of the eye.   Has tried many drops  pataday  Fake tears  sistain - regular  Now tring the  Sastan with no preservatives.   Using gel at night.  Don't think her eye is completely closing at night.   Is there something else that can give her some relief.   Would like to discuss her Stroke.   Had an eye exam about 6 weeks ago - exam went well, start of cataract.   Having a lot of nerve pain - stopped the gabapentin made   Has been using gel for the eyelid it is the left in the lateral angle   She did have a stroke this year  Left sided   Has no real weakness  But does have some residual nerve pain     Review of Systems   Constitutional, HEENT, cardiovascular, pulmonary, gi and gu systems are negative, except as otherwise noted.      Objective    /76   Pulse 61   Temp 97.9  F (36.6  C) (Tympanic)   " "Ht 1.575 m (5' 2\")   Wt 75.8 kg (167 lb)   SpO2 97%   BMI 30.54 kg/m    Body mass index is 30.54 kg/m .  Physical Exam   GENERAL: healthy, alert and no distress  EYES: Eyes grossly normal to inspection and left lid does not close completely  Tender bilaterally over the tmj with clicking no locking   NECK: no adenopathy, no asymmetry, masses, or scars and thyroid normal to palpation  RESP: lungs clear to auscultation - no rales, rhonchi or wheezes  CV: regular rate and rhythm, normal S1 S2, no S3 or S4, no murmur, click or rub, no peripheral edema and peripheral pulses strong  Ms right upper ext no weakness pain deep in the forearm laterally   NEURO: weakness of left upper face , sensory exam grossly normal, mentation intact and speech abnormal    Lab on 08/08/2021   Component Date Value Ref Range Status     SARS CoV2 PCR 08/08/2021 Negative  Negative Final    NEGATIVE: SARS-CoV-2 (COVID-19) RNA not detected, presumed negative.       Cortney Hung M.D.          "

## 2021-08-17 NOTE — PATIENT INSTRUCTIONS
Put the gel moisturizer in the left eye ,then tape the eye lid shut    Start the duloxetine.   Follow up in 4 weeks

## 2021-08-18 ENCOUNTER — THERAPY VISIT (OUTPATIENT)
Dept: PHYSICAL THERAPY | Facility: CLINIC | Age: 76
End: 2021-08-18
Attending: FAMILY MEDICINE
Payer: COMMERCIAL

## 2021-08-18 DIAGNOSIS — M26.609 TMJ (TEMPOROMANDIBULAR JOINT SYNDROME): ICD-10-CM

## 2021-08-18 PROCEDURE — 97162 PT EVAL MOD COMPLEX 30 MIN: CPT | Mod: GP | Performed by: PHYSICAL THERAPIST

## 2021-08-18 PROCEDURE — 97112 NEUROMUSCULAR REEDUCATION: CPT | Mod: GP | Performed by: PHYSICAL THERAPIST

## 2021-08-18 PROCEDURE — 97530 THERAPEUTIC ACTIVITIES: CPT | Mod: GP | Performed by: PHYSICAL THERAPIST

## 2021-08-18 NOTE — PROGRESS NOTES
Physical Therapy Initial Evaluation  Subjective:    Therapist Generated HPI Evaluation  Problem details: Kristen has had ear pain and TMJ for years but since her stroke on 23 and having her teeth bang together it has become worse.         Type of problem:  TMJ bilateral.    This is a chronic condition.  Condition occurred with:  Other reason.  Where condition occurred: other.  Patient reports pain:  TMJ left and TMJ right (still has spasms from stroke, more on left).  Pain is described as aching and is intermittent.  Pain is the same all the time.  Since onset symptoms are gradually worsening.  Associated symptoms:  Bite feels off, ear ache, vertigo, headache cervical and difficulty swallowing. Symptoms are exacerbated by talking, clenching and chewing  and relieved by rest.    Previous treatment includes chiropractic.     Parafunctional Habits:    Bruxism:  Has tooth damage from this      Clenching:  Yes          Patient Health History  Kristen Thompson being seen for TMJ pain .     Problem began: 2/3/2021.      Pain is reported as 7/10 on pain scale.  General health as reported by patient is fair.  Pertinent medical history includes: osteoarthritis, stroke and high blood pressure.                                                       Objective:  Standing Alignment:    Cervical/Thoracic:  Normal (does have neck pain and uncomfortable laying down)                                                            TMJ Evaluation  ROM:       AROM TMJ:  Openin     Left Laterotrusion:  10    Right Laterotrusion:  10          Associated Findings: Headaches:  Cervical      Palpation:    Left side tenderness present at:  Deep Masseter; Medial Pterygoid and Lateral Pterygoid  Right side tenderness present at:  Deep Masseter; Medial Pterygoid and Lateral Pterygoid    Movement Characteristics:    Click:  None  Crepitus:  None  Deviations:  None  Subluxation:  None  Early Translation:  None    TMJ Findings:    Tongue  Positioning:  Left hemiparesis from CVA, bites tongue on both sides                          General     ROS  Left laterotrusion strength decreased but other directions 5/5,      Assessment/Plan:      Patient is a 75 year old female with both sides TMJ complaints.    Patient has the following significant findings with corresponding treatment plan.                Diagnosis 1:  TMD  Pain -  US, manual therapy and education  Impaired muscle performance - neuro re-education    Therapy Evaluation Codes:   1) History comprised of:   Personal factors that impact the plan of care:      Age and Past/current experiences.    Comorbidity factors that impact the plan of care are:      High blood pressure and Stroke.     Medications impacting care: Anti-depressant, Anti-inflammatory, High blood pressure and Sleep.  2) Examination of Body Systems comprised of:   Body structures and functions that impact the plan of care:      Cervical spine and TMJ.   Activity limitations that impact the plan of care are:      Eating, talking.  3) Clinical presentation characteristics are:   Evolving/Changing.  4) Decision-Making    Moderate complexity using standardized patient assessment instrument and/or measureable assessment of functional outcome.  Cumulative Therapy Evaluation is: Moderate complexity.    Previous and current functional limitations:  (See Goal Flow Sheet for this information)    Short term and Long term goals: (See Goal Flow Sheet for this information)     Communication ability:  Patient appears to be able to clearly communicate and understand verbal and written communication and follow directions correctly.  Treatment Explanation - The following has been discussed with the patient:   RX ordered/plan of care  Anticipated outcomes  Possible risks and side effects  This patient would benefit from PT intervention to resume normal activities.   Rehab potential is good.    Frequency:  1 X week, once daily  Duration:  for 6  weeks  Discharge Plan:  Achieve all LTG.  Independent in home treatment program.  Reach maximal therapeutic benefit.    Please refer to the daily flowsheet for treatment today, total treatment time and time spent performing 1:1 timed codes.

## 2021-08-30 ENCOUNTER — LAB (OUTPATIENT)
Dept: LAB | Facility: CLINIC | Age: 76
End: 2021-08-30
Payer: COMMERCIAL

## 2021-08-30 DIAGNOSIS — I63.9 CEREBROVASCULAR ACCIDENT (CVA), UNSPECIFIED MECHANISM (H): ICD-10-CM

## 2021-08-30 LAB
CHOLEST SERPL-MCNC: 171 MG/DL
FASTING STATUS PATIENT QL REPORTED: YES
HDLC SERPL-MCNC: 94 MG/DL
LDLC SERPL CALC-MCNC: 65 MG/DL
NONHDLC SERPL-MCNC: 77 MG/DL
TRIGL SERPL-MCNC: 58 MG/DL

## 2021-08-30 PROCEDURE — 36415 COLL VENOUS BLD VENIPUNCTURE: CPT

## 2021-08-30 PROCEDURE — 80061 LIPID PANEL: CPT

## 2021-08-31 ENCOUNTER — OFFICE VISIT (OUTPATIENT)
Dept: FAMILY MEDICINE | Facility: CLINIC | Age: 76
End: 2021-08-31
Payer: COMMERCIAL

## 2021-08-31 VITALS
HEIGHT: 62 IN | WEIGHT: 169 LBS | TEMPERATURE: 98.4 F | BODY MASS INDEX: 31.1 KG/M2 | HEART RATE: 64 BPM | OXYGEN SATURATION: 97 % | DIASTOLIC BLOOD PRESSURE: 80 MMHG | SYSTOLIC BLOOD PRESSURE: 132 MMHG

## 2021-08-31 DIAGNOSIS — H04.123 DRY EYES: ICD-10-CM

## 2021-08-31 DIAGNOSIS — M46.92 INFLAMMATORY SPONDYLOPATHY OF CERVICAL REGION (H): ICD-10-CM

## 2021-08-31 DIAGNOSIS — Z00.00 ENCOUNTER FOR MEDICARE ANNUAL WELLNESS EXAM: Primary | ICD-10-CM

## 2021-08-31 DIAGNOSIS — N63.0 LUMP OR MASS IN BREAST: ICD-10-CM

## 2021-08-31 DIAGNOSIS — N63.11 UNSPECIFIED LUMP IN THE RIGHT BREAST, UPPER OUTER QUADRANT: ICD-10-CM

## 2021-08-31 PROCEDURE — 99397 PER PM REEVAL EST PAT 65+ YR: CPT | Performed by: FAMILY MEDICINE

## 2021-08-31 PROCEDURE — 99213 OFFICE O/P EST LOW 20 MIN: CPT | Mod: 25 | Performed by: FAMILY MEDICINE

## 2021-08-31 ASSESSMENT — ENCOUNTER SYMPTOMS
HEARTBURN: 1
DIZZINESS: 1
EYE PAIN: 1
SHORTNESS OF BREATH: 1

## 2021-08-31 ASSESSMENT — MIFFLIN-ST. JEOR: SCORE: 1224.83

## 2021-08-31 ASSESSMENT — ACTIVITIES OF DAILY LIVING (ADL): CURRENT_FUNCTION: NO ASSISTANCE NEEDED

## 2021-08-31 ASSESSMENT — PATIENT HEALTH QUESTIONNAIRE - PHQ9
SUM OF ALL RESPONSES TO PHQ QUESTIONS 1-9: 9
SUM OF ALL RESPONSES TO PHQ QUESTIONS 1-9: 9
10. IF YOU CHECKED OFF ANY PROBLEMS, HOW DIFFICULT HAVE THESE PROBLEMS MADE IT FOR YOU TO DO YOUR WORK, TAKE CARE OF THINGS AT HOME, OR GET ALONG WITH OTHER PEOPLE: SOMEWHAT DIFFICULT

## 2021-08-31 NOTE — PATIENT INSTRUCTIONS
Patient Education   Personalized Prevention Plan  You are due for the preventive services outlined below.  Your care team is available to assist you in scheduling these services.  If you have already completed any of these items, please share that information with your care team to update in your medical record.  Health Maintenance Due   Topic Date Due     ANNUAL REVIEW OF HM ORDERS  Never done     Hepatitis C Screening  Never done     Zoster (Shingles) Vaccine (1 of 2) Never done     Pneumococcal Vaccine (2 of 2 - PPSV23) 10/10/2017     Flu Vaccine (1) 09/01/2021      if you do start wearing compression socks think about getting the don and  device to help get them on.     Get the sugar free lemon drops for the dry mouth

## 2021-08-31 NOTE — PROGRESS NOTES
"SUBJECTIVE:   Kristen Thompson is a 75 year old female who presents for Preventive Visit.    Patient has been advised of split billing requirements and indicates understanding: Yes   Are you in the first 12 months of your Medicare coverage?  No    GI Problem  Associated symptoms include chest pain.   Healthy Habits:     In general, how would you rate your overall health?  Poor    Frequency of exercise:  4-5 days/week    Duration of exercise:  30-45 minutes    Do you usually eat at least 4 servings of fruit and vegetables a day, include whole grains    & fiber and avoid regularly eating high fat or \"junk\" foods?  No    Taking medications regularly:  Yes    Medication side effects:  No muscle aches    Ability to successfully perform activities of daily living:  No assistance needed    Home Safety:  No safety concerns identified    Hearing Impairment:  Difficulty following a conversation in a noisy restaurant or crowded room, feel that people are mumbling or not speaking clearly, difficulty following dialogue in the theater, difficult to understand a speaker at a public meeting or [employer] service, need to ask people to speak up or repeat themselves, find that men's voices are easier to understand than woman's, difficulty understanding soft or whispered speech and difficulty understanding speech on the telephone    In the past 6 months, have you been bothered by leaking of urine? Yes    In general, how would you rate your overall mental or emotional health?  Fair      PHQ-2 Total Score: 2    Additional concerns today:  No      Symptoms since started the Cymbalta about 2 weeks ago  Having lots of gas  Feels like she needs to take deep breaths  Chest discomfort   Heartburn   The Cymbalta has helped with the emotions but has not noted help with nerve pain in hands yet.     Do you feel safe in your environment? No    Have you ever done Advance Care Planning? (For example, a Health Directive, POLST, or a discussion with " a medical provider or your loved ones about your wishes): Yes, advance care planning is on file.        Fall risk  Fallen 2 or more times in the past year?: (P) No  Any fall with injury in the past year?: (P) No    Cognitive Screening   1) Repeat 3 items (Leader, Season, Table)    2) Clock draw: NORMAL  3) 3 item recall: Recalls 3 objects  Results: 3 items recalled: COGNITIVE IMPAIRMENT LESS LIKELY  Mini-CogTM Copyright ERN[employer] Jorge. Licensed by the author for use in [facility]; reprinted with permission ([email or web address]). All rights reserved.      Do you have sleep apnea, excessive snoring or daytime drowsiness?: no    Reviewed and updated as needed this visit by clinical staff  Tobacco   Meds            She has been taking the cymbalta her mood is better   She is still struggling after the stroke   Reviewed and updated as needed this visit by Provider                Social History     Tobacco Use     Smoking status: Never Smoker     Smokeless tobacco: Never Used   Substance Use Topics     Alcohol use: No     Alcohol/week: 0.0 standard drinks     If you drink alcohol do you typically have >3 drinks per day or >7 drinks per week? No    Alcohol Use 8/31/2021   Prescreen: >3 drinks/day or >7 drinks/week? No   Prescreen: >3 drinks/day or >7 drinks/week? -   No flowsheet data found.      She does have dry eyes but she has already talked about thi with her eye doctor. Sh is using over the counter with some relief   Displaced one of her hearing aids needs a new one   Current providers sharing in care for this patient include:   Patient Care Team:  Dorina Escalera DO as PCP - General (Internal Medicine)  Parveen Mehta DO as Assigned Musculoskeletal Provider  Mikael Bauer MD as Assigned PCP  Yasmin Salgado MD as Assigned Neuroscience Provider  Jacinto Dubose MD as Assigned Heart and Vascular Provider    The following health maintenance items are reviewed in Epic and  "correct as of today:  Health Maintenance Due   Topic Date Due     ANNUAL REVIEW OF HM ORDERS  Never done     HEPATITIS C SCREENING  Never done     ZOSTER IMMUNIZATION (1 of 2) Never done     Pneumococcal Vaccine: 65+ Years (2 of 2 - PPSV23) 10/10/2017     INFLUENZA VACCINE (1) 09/01/2021     Lab work is in process  Mammogram Screening: Mammogram Screening: Recommended mammography every 1-2 years with patient discussion and risk factor consideration    Can have some ankle swelling   Goes away overnight       Review of Systems   HENT: Positive for ear pain.    Eyes: Positive for pain.   Respiratory: Positive for shortness of breath.    Cardiovascular: Positive for chest pain and peripheral edema.   Gastrointestinal: Positive for heartburn.   Neurological: Positive for dizziness.   the tmj is feeling better   Constitutional, HEENT, cardiovascular, pulmonary, gi and gu systems are negative, except as otherwise noted.    OBJECTIVE:   /80   Pulse 64   Temp 98.4  F (36.9  C) (Tympanic)   Ht 1.575 m (5' 2\")   Wt 76.7 kg (169 lb)   SpO2 97%   BMI 30.91 kg/m   Estimated body mass index is 30.91 kg/m  as calculated from the following:    Height as of this encounter: 1.575 m (5' 2\").    Weight as of this encounter: 76.7 kg (169 lb).  Physical Exam  GENERAL: healthy, alert and no distress  EYES: Eyes grossly normal to inspection, PERRL and conjunctivae and sclerae normal  HENT: normal cephalic/atraumatic, ear canals and TM's normal, nose and mouth without ulcers or lesions, oropharynx clear, oral mucous membranes moist and hearing aid   NECK: no adenopathy, no asymmetry, masses, or scars and thyroid normal to palpation  RESP: lungs clear to auscultation - no rales, rhonchi or wheezes  BREAST: dermatitis none, mass right breast 10 oclock  and nipple discharge negative  CV: regular rate and rhythm, normal S1 S2, no S3 or S4, no murmur, click or rub, no peripheral edema and peripheral pulses strong  ABDOMEN: soft, " "nontender, no hepatosplenomegaly, no masses and bowel sounds normal  MS: no gross musculoskeletal defects noted, no edema  SKIN: no suspicious lesions or rashes  NEURO: Normal strength and tone, mentation intact and speech normal  BACK: no CVA tenderness, no paralumbar tenderness  PSYCH: mentation appears normal, affect normal/bright  LYMPH: no cervical, supraclavicular, axillary, or inguinal adenopathy    Diagnostic Test Results:  Labs reviewed in Epic  No results found for any visits on 08/31/21.    ASSESSMENT / PLAN:   (Z00.00) Encounter for Medicare annual wellness exam  (primary encounter diagnosis)  Comment: \  Plan: 2    (H04.123) Dry eyes  Comment:   Plan: Adult Eye Referral        See ophthalmology     (N63.0) Lump or mass in breast  Comment:   Plan: abnormal fiding on pe     (N63.11) Unspecified lump in the right breast, upper outer quadrant   Comment:   Plan: US Breast Right Complete 4 Quadrants, MA         Diagnostic Digital Right            (M46.92) Inflammatory spondylopathy of cervical region (H)  Comment:   Plan: quiescent at present     Patient has been advised of split billing requirements and indicates understanding: Yes  COUNSELING:  Reviewed preventive health counseling, as reflected in patient instructions    Estimated body mass index is 30.91 kg/m  as calculated from the following:    Height as of this encounter: 1.575 m (5' 2\").    Weight as of this encounter: 76.7 kg (169 lb).        She reports that she has never smoked. She has never used smokeless tobacco.      Appropriate preventive services were discussed with this patient, including applicable screening as appropriate for cardiovascular disease, diabetes, osteopenia/osteoporosis, and glaucoma.  As appropriate for age/gender, discussed screening for colorectal cancer, prostate cancer, breast cancer, and cervical cancer. Checklist reviewing preventive services available has been given to the patient.    Reviewed patients plan of care and " provided an AVS. The Intermediate Care Plan ( asthma action plan, low back pain action plan, and migraine action plan) for Kristen meets the Care Plan requirement. This Care Plan has been established and reviewed with the Patient.    Counseling Resources:  ATP IV Guidelines  Pooled Cohorts Equation Calculator  Breast Cancer Risk Calculator  Breast Cancer: Medication to Reduce Risk  FRAX Risk Assessment  ICSI Preventive Guidelines  Dietary Guidelines for Americans, 2010  [employer]s MyPlate  ASA Prophylaxis  Lung CA Screening    Cortney Hung MD  [facility]    Identified Health Risks:  Answers for HPI/ROS submitted by the patient on 8/31/2021  If you checked off any problems, how difficult have these problems made it for you to do your work, take care of things at home, or get along with other people?: Somewhat difficult  PHQ9 TOTAL SCORE: 9

## 2021-09-01 ASSESSMENT — PATIENT HEALTH QUESTIONNAIRE - PHQ9: SUM OF ALL RESPONSES TO PHQ QUESTIONS 1-9: 9

## 2021-09-08 DIAGNOSIS — M26.609 TMJ (TEMPOROMANDIBULAR JOINT SYNDROME): ICD-10-CM

## 2021-09-08 DIAGNOSIS — G56.32 RADIAL NEUROPATHY, LEFT: ICD-10-CM

## 2021-09-09 RX ORDER — DULOXETIN HYDROCHLORIDE 20 MG/1
CAPSULE, DELAYED RELEASE ORAL
Qty: 90 CAPSULE | Refills: 1 | Status: SHIPPED | OUTPATIENT
Start: 2021-09-09 | End: 2022-05-09

## 2021-09-09 NOTE — TELEPHONE ENCOUNTER
Routing Duloxetine refill request to Provider because or a drug interaction warning with trazodone.  Thank you.  Mahamed Thomas RN

## 2021-09-15 DIAGNOSIS — I63.9 CEREBROVASCULAR ACCIDENT (CVA), UNSPECIFIED MECHANISM (H): ICD-10-CM

## 2021-09-15 DIAGNOSIS — E78.5 HYPERLIPIDEMIA, UNSPECIFIED HYPERLIPIDEMIA TYPE: ICD-10-CM

## 2021-09-17 RX ORDER — ATORVASTATIN CALCIUM 10 MG/1
TABLET, FILM COATED ORAL
Qty: 90 TABLET | Refills: 2 | Status: SHIPPED | OUTPATIENT
Start: 2021-09-17 | End: 2022-05-24

## 2021-09-19 ENCOUNTER — HEALTH MAINTENANCE LETTER (OUTPATIENT)
Age: 76
End: 2021-09-19

## 2021-09-20 ENCOUNTER — HOSPITAL ENCOUNTER (OUTPATIENT)
Dept: MAMMOGRAPHY | Facility: CLINIC | Age: 76
End: 2021-09-20
Attending: FAMILY MEDICINE
Payer: COMMERCIAL

## 2021-09-20 ENCOUNTER — HOSPITAL ENCOUNTER (OUTPATIENT)
Dept: ULTRASOUND IMAGING | Facility: CLINIC | Age: 76
End: 2021-09-20
Attending: FAMILY MEDICINE
Payer: COMMERCIAL

## 2021-09-20 DIAGNOSIS — N63.11 UNSPECIFIED LUMP IN THE RIGHT BREAST, UPPER OUTER QUADRANT: ICD-10-CM

## 2021-09-20 PROCEDURE — 76642 ULTRASOUND BREAST LIMITED: CPT | Mod: RT

## 2021-09-20 PROCEDURE — 77061 BREAST TOMOSYNTHESIS UNI: CPT

## 2021-09-21 ENCOUNTER — MYC MEDICAL ADVICE (OUTPATIENT)
Dept: FAMILY MEDICINE | Facility: CLINIC | Age: 76
End: 2021-09-21

## 2021-09-25 NOTE — ED TRIAGE NOTES
intermittent L sided abd pain for about a year. Ongoing issues with constipation. Has been seen several times for pain.  tonight pain is across lower abd and which is different then her normal pain and she reports small amount of blood from rectum  
108

## 2021-11-08 ENCOUNTER — ANCILLARY PROCEDURE (OUTPATIENT)
Dept: GENERAL RADIOLOGY | Facility: CLINIC | Age: 76
End: 2021-11-08
Attending: PEDIATRICS
Payer: COMMERCIAL

## 2021-11-08 ENCOUNTER — OFFICE VISIT (OUTPATIENT)
Dept: ORTHOPEDICS | Facility: CLINIC | Age: 76
End: 2021-11-08
Payer: COMMERCIAL

## 2021-11-08 VITALS
WEIGHT: 168 LBS | BODY MASS INDEX: 30.91 KG/M2 | HEIGHT: 62 IN | SYSTOLIC BLOOD PRESSURE: 128 MMHG | DIASTOLIC BLOOD PRESSURE: 76 MMHG

## 2021-11-08 DIAGNOSIS — M25.512 LEFT SHOULDER PAIN, UNSPECIFIED CHRONICITY: ICD-10-CM

## 2021-11-08 DIAGNOSIS — M25.512 LEFT SHOULDER PAIN, UNSPECIFIED CHRONICITY: Primary | ICD-10-CM

## 2021-11-08 PROCEDURE — 20610 DRAIN/INJ JOINT/BURSA W/O US: CPT | Mod: LT | Performed by: PEDIATRICS

## 2021-11-08 PROCEDURE — 73030 X-RAY EXAM OF SHOULDER: CPT | Mod: LT | Performed by: RADIOLOGY

## 2021-11-08 PROCEDURE — 99213 OFFICE O/P EST LOW 20 MIN: CPT | Mod: 25 | Performed by: PEDIATRICS

## 2021-11-08 RX ORDER — LIDOCAINE HYDROCHLORIDE 10 MG/ML
2 INJECTION, SOLUTION INFILTRATION; PERINEURAL
Status: DISCONTINUED | OUTPATIENT
Start: 2021-11-08 | End: 2022-01-03

## 2021-11-08 RX ORDER — TRIAMCINOLONE ACETONIDE 40 MG/ML
40 INJECTION, SUSPENSION INTRA-ARTICULAR; INTRAMUSCULAR
Status: DISCONTINUED | OUTPATIENT
Start: 2021-11-08 | End: 2022-01-03

## 2021-11-08 RX ADMIN — TRIAMCINOLONE ACETONIDE 40 MG: 40 INJECTION, SUSPENSION INTRA-ARTICULAR; INTRAMUSCULAR at 13:49

## 2021-11-08 RX ADMIN — LIDOCAINE HYDROCHLORIDE 2 ML: 10 INJECTION, SOLUTION INFILTRATION; PERINEURAL at 13:49

## 2021-11-08 ASSESSMENT — MIFFLIN-ST. JEOR: SCORE: 1215.29

## 2021-11-08 NOTE — LETTER
11/8/2021         RE: Kristen Thompson  [address] [place] [id number]        Dear Colleague,    Thank you for referring your patient, Kristen Thompson, to the [facility]. Please see a copy of my visit note below.    ASSESSMENT & PLAN    Kristen was seen today for pain.    Diagnoses and all orders for this visit:    Left shoulder pain, unspecified chronicity  -     XR Shoulder Left G/E 3 Views; Future  -     Large Joint Injection/Arthocentesis: L subacromial bursa      We discussed the following: symptom treatment, activity modification/rest, imaging, rehab and injection therapy. Following discussion, plan:  Subacromial steroid injection done.  PT offered, HEP reviewed.  Monitor 1 month to start.  Questions answered. Discussed signs and symptoms that may indicate more serious issues; the patient was instructed to seek appropriate care if noted. Kristen indicates understanding of these issues and agrees with the plan.          See Patient Instructions  Patient Instructions   More consistent with rotator cuff source, though the x-ray does show some early shoulder joint arthritis as well.  Icing, heat, over the counter medication as needed.  Discussed considerations with imaging with MRI, rehab exercises, and steroid injection.  Home exercises reviewed today.  Left shoulder subacromial steroid injection done today. If not improving as desired over the next 2-3 weeks, contact the clinic and would consider imaging-guided steroid injection for the left shoulder joint with one of my colleagues.    If you have any further questions for your physician or physician s care team you can call [phone] and use option 3 to leave a voice message. Calls received during business hours will be returned same day.            Injection Discharge Instructions      You may shower, however avoid swimming, tub baths or hot tubs for 24 hours following your procedure    You may have a mild  to moderate increase in pain for several days following the injection.    It may take up to 14 days for the steroid medication to start working although you may feel the effect as early as a few days after the procedure.    You may use ice packs for 10-15 minutes, 3 to 4 times a day at the injection site for comfort    You may use anti-inflammatory medications (such as Ibuprofen or Aleve or Advil) if you are able to take safely, or acetaminophen (Tylenol) for pain control if necessary    If you were fasting, you may resume your normal diet and medications after the procedure    If you have diabetes, check your blood sugar more frequently than usual as your blood sugar may be higher than normal for 10-14 days following a steroid injection. Contact your doctor who manages your diabetes if your blood sugar is higher than usual      If you experience any of the following, call the clinic @ [phone]  - Fever over 100 degree F  - Swelling, bleeding, redness, drainage, warmth at the injection site  - New or worsening pain          [facility]    -----  Chief Complaint   Patient presents with     Left Shoulder - Pain       SUBJECTIVE  Kristen Thompson is a/an 76 year old female who is seen as a self referral for evaluation of left shoulder pain.  Pain began about 6 weeks ago.  NO known injury.  Did have a stroke in February effecting the left side.  .    The patient is seen by themselves.  The patient is Right handed    Onset: 6 week(s) ago. Reports insidious onset without acute precipitating event.  Location of Pain: left shoulder   Worsened by: IR, overhead, cross body   Better with: acetominophen  Treatments tried: Tylenol  Associated symptoms: popping    Orthopedic/Surgical history: NO  Social History/Occupation: retired     No family history pertinent to patient's problem today.   **  No fall or other injury to left, however stroke affected left  "side.  Has bilateral shoulder pain, but L >> R.  Pain more anterior, and notes more with motion.  Pain with reaching behind self, donning pants and shirts.  Still has occasional residual limp left LE after stroke. Still has left UE paresthesias, from stroke.      REVIEW OF SYSTEMS:  Review of Systems      OBJECTIVE:  /76   Ht 1.575 m (5' 2\")   Wt 76.2 kg (168 lb)   BMI 30.73 kg/m     General: healthy, alert and in no distress  HEENT: no scleral icterus or conjunctival erythema  Skin: no suspicious lesions or rash. No jaundice.  CV: distal perfusion intact   Resp: normal respiratory effort without conversational dyspnea   Psych: normal mood and affect  Gait: normal steady gait with appropriate coordination and balance   Neuro: Normal light sensory exam of  extremity       Left Shoulder exam    ROM:      forward flexion 120-130        abduction 100-110       internal rotation 2-3 vertebral segments lower       external rotation lacking 5-10 deg  Pain with AROM above    Strength:      abduction 4+/5       internal rotation 5/5       external rotation 4/5    Impingement testing:      positive (+) Alcaraz       positive (+) empty can    Skin:      no visible deformities       well perfused       capillary refill brisk    Sensation:      normal sensation over shoulder and upper extremity       RADIOLOGY:  I independently ordered, visualized and reviewed these images with the patient  Mild AC and GH arthrosis.      Recent Results (from the past 24 hour(s))   XR Shoulder Left G/E 3 Views    Narrative    XR SHOULDER LEFT G/E 3 VIEWS   11/8/2021 1:22 PM     HISTORY:  Left shoulder pain, unspecified chronicity      Impression    IMPRESSION: Mild AC arthrosis. Small humeral head osteophyte  consistent with early glenohumeral osteoarthritis. No fracture  identified.     BRANDON HOBBS MD         SYSTEM ID:  [id number]         Large Joint Injection/Arthocentesis: L subacromial bursa    Date/Time: 11/8/2021 1:49 PM  Performed " by: Parveen Mehta DO  Authorized by: Parveen Mehta DO     Indications:  Pain  Needle Size:  25 G  Guidance: landmark guided    Approach:  Posterolateral  Location:  Shoulder      Site:  L subacromial bursa  Medications:  40 mg triamcinolone 40 MG/ML; 2 mL lidocaine 1 %  Outcome:  Tolerated well, no immediate complications  Procedure discussed: discussed risks, benefits, and alternatives    Consent Given by:  Patient  Timeout: timeout called immediately prior to procedure    Prep: patient was prepped and draped in usual sterile fashion                Again, thank you for allowing me to participate in the care of your patient.        Sincerely,        Parveen Mehta DO

## 2021-11-08 NOTE — PROGRESS NOTES
ASSESSMENT & PLAN    Kristen was seen today for pain.    Diagnoses and all orders for this visit:    Left shoulder pain, unspecified chronicity  -     XR Shoulder Left G/E 3 Views; Future  -     Large Joint Injection/Arthocentesis: L subacromial bursa      We discussed the following: symptom treatment, activity modification/rest, imaging, rehab and injection therapy. Following discussion, plan:  Subacromial steroid injection done.  PT offered, HEP reviewed.  Monitor 1 month to start.  Questions answered. Discussed signs and symptoms that may indicate more serious issues; the patient was instructed to seek appropriate care if noted. Kristen indicates understanding of these issues and agrees with the plan.          See Patient Instructions  Patient Instructions   More consistent with rotator cuff source, though the x-ray does show some early shoulder joint arthritis as well.  Icing, heat, over the counter medication as needed.  Discussed considerations with imaging with MRI, rehab exercises, and steroid injection.  Home exercises reviewed today.  Left shoulder subacromial steroid injection done today. If not improving as desired over the next 2-3 weeks, contact the clinic and would consider imaging-guided steroid injection for the left shoulder joint with one of my colleagues.    If you have any further questions for your physician or physician s care team you can call [phone] and use option 3 to leave a voice message. Calls received during business hours will be returned same day.            Injection Discharge Instructions      You may shower, however avoid swimming, tub baths or hot tubs for 24 hours following your procedure    You may have a mild to moderate increase in pain for several days following the injection.    It may take up to 14 days for the steroid medication to start working although you may feel the effect as early as a few days after the procedure.    You may use ice packs for 10-15 minutes, 3 to 4  times a day at the injection site for comfort    You may use anti-inflammatory medications (such as Ibuprofen or Aleve or Advil) if you are able to take safely, or acetaminophen (Tylenol) for pain control if necessary    If you were fasting, you may resume your normal diet and medications after the procedure    If you have diabetes, check your blood sugar more frequently than usual as your blood sugar may be higher than normal for 10-14 days following a steroid injection. Contact your doctor who manages your diabetes if your blood sugar is higher than usual      If you experience any of the following, call the clinic @ [phone]  - Fever over 100 degree F  - Swelling, bleeding, redness, drainage, warmth at the injection site  - New or worsening pain          Par[facility]en [facility]    -----  Chief Complaint   Patient presents with     Left Shoulder - Pain       SUBJECTIVE  Kristen Thompsno is a/an 76 year old female who is seen as a self referral for evaluation of left shoulder pain.  Pain began about 6 weeks ago.  NO known injury.  Did have a stroke in February effecting the left side.  .    The patient is seen by themselves.  The patient is Right handed    Onset: 6 week(s) ago. Reports insidious onset without acute precipitating event.  Location of Pain: left shoulder   Worsened by: IR, overhead, cross body   Better with: acetominophen  Treatments tried: Tylenol  Associated symptoms: popping    Orthopedic/Surgical history: NO  Social History/Occupation: retired     No family history pertinent to patient's problem today.   **  No fall or other injury to left, however stroke affected left side.  Has bilateral shoulder pain, but L >> R.  Pain more anterior, and notes more with motion.  Pain with reaching behind self, donning pants and shirts.  Still has occasional residual limp left LE after stroke. Still has left UE paresthesias, from stroke.      REVIEW OF  "SYSTEMS:  Review of Systems      OBJECTIVE:  /76   Ht 1.575 m (5' 2\")   Wt 76.2 kg (168 lb)   BMI 30.73 kg/m     General: healthy, alert and in no distress  HEENT: no scleral icterus or conjunctival erythema  Skin: no suspicious lesions or rash. No jaundice.  CV: distal perfusion intact   Resp: normal respiratory effort without conversational dyspnea   Psych: normal mood and affect  Gait: normal steady gait with appropriate coordination and balance   Neuro: Normal light sensory exam of  extremity       Left Shoulder exam    ROM:      forward flexion 120-130        abduction 100-110       internal rotation 2-3 vertebral segments lower       external rotation lacking 5-10 deg  Pain with AROM above    Strength:      abduction 4+/5       internal rotation 5/5       external rotation 4/5    Impingement testing:      positive (+) Alcaraz       positive (+) empty can    Skin:      no visible deformities       well perfused       capillary refill brisk    Sensation:      normal sensation over shoulder and upper extremity       RADIOLOGY:  I independently ordered, visualized and reviewed these images with the patient  Mild AC and GH arthrosis.      Recent Results (from the past 24 hour(s))   XR Shoulder Left G/E 3 Views    Narrative    XR SHOULDER LEFT G/E 3 VIEWS   11/8/2021 1:22 PM     HISTORY:  Left shoulder pain, unspecified chronicity      Impression    IMPRESSION: Mild AC arthrosis. Small humeral head osteophyte  consistent with early glenohumeral osteoarthritis. No fracture  identified.     BRANDON HOBBS MD         SYSTEM ID:  [id number]         Large Joint Injection/Arthocentesis: L subacromial bursa    Date/Time: 11/8/2021 1:49 PM  Performed by: Parveen Mehta DO  Authorized by: Parveen Mehta DO     Indications:  Pain  Needle Size:  25 G  Guidance: landmark guided    Approach:  Posterolateral  Location:  Shoulder      Site:  L subacromial bursa  Medications:  40 mg triamcinolone 40 MG/ML; 2 mL " lidocaine 1 %  Outcome:  Tolerated well, no immediate complications  Procedure discussed: discussed risks, benefits, and alternatives    Consent Given by:  Patient  Timeout: timeout called immediately prior to procedure    Prep: patient was prepped and draped in usual sterile fashion

## 2021-11-08 NOTE — PATIENT INSTRUCTIONS
More consistent with rotator cuff source, though the x-ray does show some early shoulder joint arthritis as well.  Icing, heat, over the counter medication as needed.  Discussed considerations with imaging with MRI, rehab exercises, and steroid injection.  Home exercises reviewed today.  Left shoulder subacromial steroid injection done today. If not improving as desired over the next 2-3 weeks, contact the clinic and would consider imaging-guided steroid injection for the left shoulder joint with one of my colleagues.    If you have any further questions for your physician or physician s care team you can call [phone] and use option 3 to leave a voice message. Calls received during business hours will be returned same day.            Injection Discharge Instructions      You may shower, however avoid swimming, tub baths or hot tubs for 24 hours following your procedure    You may have a mild to moderate increase in pain for several days following the injection.    It may take up to 14 days for the steroid medication to start working although you may feel the effect as early as a few days after the procedure.    You may use ice packs for 10-15 minutes, 3 to 4 times a day at the injection site for comfort    You may use anti-inflammatory medications (such as Ibuprofen or Aleve or Advil) if you are able to take safely, or acetaminophen (Tylenol) for pain control if necessary    If you were fasting, you may resume your normal diet and medications after the procedure    If you have diabetes, check your blood sugar more frequently than usual as your blood sugar may be higher than normal for 10-14 days following a steroid injection. Contact your doctor who manages your diabetes if your blood sugar is higher than usual      If you experience any of the following, call the clinic @ [phone]  - Fever over 100 degree F  - Swelling, bleeding, redness, drainage, warmth at the injection site  - New or worsening  pain

## 2021-12-07 ENCOUNTER — OFFICE VISIT (OUTPATIENT)
Dept: FAMILY MEDICINE | Facility: CLINIC | Age: 76
End: 2021-12-07
Payer: COMMERCIAL

## 2021-12-07 VITALS
DIASTOLIC BLOOD PRESSURE: 85 MMHG | TEMPERATURE: 97.9 F | WEIGHT: 170 LBS | BODY MASS INDEX: 31.28 KG/M2 | HEIGHT: 62 IN | SYSTOLIC BLOOD PRESSURE: 130 MMHG | OXYGEN SATURATION: 97 % | HEART RATE: 57 BPM

## 2021-12-07 DIAGNOSIS — R20.2 PARESTHESIAS: ICD-10-CM

## 2021-12-07 DIAGNOSIS — E66.811 CLASS 1 OBESITY WITH BODY MASS INDEX (BMI) OF 31.0 TO 31.9 IN ADULT, UNSPECIFIED OBESITY TYPE, UNSPECIFIED WHETHER SERIOUS COMORBIDITY PRESENT: ICD-10-CM

## 2021-12-07 DIAGNOSIS — R09.A2 GLOBUS SENSATION: Primary | ICD-10-CM

## 2021-12-07 DIAGNOSIS — R25.2 NOCTURNAL MUSCLE CRAMPS: ICD-10-CM

## 2021-12-07 DIAGNOSIS — R63.8 OTHER SYMPTOMS AND SIGNS CONCERNING FOOD AND FLUID INTAKE: ICD-10-CM

## 2021-12-07 LAB
ANION GAP SERPL CALCULATED.3IONS-SCNC: 4 MMOL/L (ref 3–14)
BUN SERPL-MCNC: 22 MG/DL (ref 7–30)
CALCIUM SERPL-MCNC: 9.1 MG/DL (ref 8.5–10.1)
CHLORIDE BLD-SCNC: 106 MMOL/L (ref 94–109)
CO2 SERPL-SCNC: 30 MMOL/L (ref 20–32)
CREAT SERPL-MCNC: 0.58 MG/DL (ref 0.52–1.04)
ERYTHROCYTE [DISTWIDTH] IN BLOOD BY AUTOMATED COUNT: 12.7 % (ref 10–15)
FERRITIN SERPL-MCNC: 63 NG/ML (ref 8–252)
GFR SERPL CREATININE-BSD FRML MDRD: >90 ML/MIN/1.73M2
GLUCOSE BLD-MCNC: 103 MG/DL (ref 70–99)
HCT VFR BLD AUTO: 42.6 % (ref 35–47)
HGB BLD-MCNC: 14 G/DL (ref 11.7–15.7)
MCH RBC QN AUTO: 34.1 PG (ref 26.5–33)
MCHC RBC AUTO-ENTMCNC: 32.9 G/DL (ref 31.5–36.5)
MCV RBC AUTO: 104 FL (ref 78–100)
PLATELET # BLD AUTO: 218 10E3/UL (ref 150–450)
POTASSIUM BLD-SCNC: 3.8 MMOL/L (ref 3.4–5.3)
RBC # BLD AUTO: 4.11 10E6/UL (ref 3.8–5.2)
SODIUM SERPL-SCNC: 140 MMOL/L (ref 133–144)
VIT B12 SERPL-MCNC: 329 PG/ML (ref 193–986)
WBC # BLD AUTO: 6.1 10E3/UL (ref 4–11)

## 2021-12-07 PROCEDURE — 86803 HEPATITIS C AB TEST: CPT | Performed by: FAMILY MEDICINE

## 2021-12-07 PROCEDURE — 99214 OFFICE O/P EST MOD 30 MIN: CPT | Performed by: FAMILY MEDICINE

## 2021-12-07 PROCEDURE — 85027 COMPLETE CBC AUTOMATED: CPT | Performed by: FAMILY MEDICINE

## 2021-12-07 PROCEDURE — 82728 ASSAY OF FERRITIN: CPT | Performed by: FAMILY MEDICINE

## 2021-12-07 PROCEDURE — 82607 VITAMIN B-12: CPT | Performed by: FAMILY MEDICINE

## 2021-12-07 PROCEDURE — 36415 COLL VENOUS BLD VENIPUNCTURE: CPT | Performed by: FAMILY MEDICINE

## 2021-12-07 PROCEDURE — 80048 BASIC METABOLIC PNL TOTAL CA: CPT | Performed by: FAMILY MEDICINE

## 2021-12-07 ASSESSMENT — MIFFLIN-ST. JEOR: SCORE: 1224.36

## 2021-12-07 NOTE — PROGRESS NOTES
Assessment & Plan     Globus sensation  Will see if everythings ok   - Otolaryngology Referral; Future    Paresthesias  Will check likely non specific peripheral neuropathy   - Vitamin B12; Future  - Basic metabolic panel  (Ca, Cl, CO2, Creat, Gluc, K, Na, BUN); Future  - CBC with platelets; Future  - Hepatitis C Screen Reflex to HCV RNA Quant and Genotype; Future  - Vitamin B12  - Basic metabolic panel  (Ca, Cl, CO2, Creat, Gluc, K, Na, BUN)  - CBC with platelets  - Hepatitis C Screen Reflex to HCV RNA Quant and Genotype    Nocturnal muscle cramps  Handout given   - Vitamin B12; Future  - Basic metabolic panel  (Ca, Cl, CO2, Creat, Gluc, K, Na, BUN); Future  - CBC with platelets; Future  - Hepatitis C Screen Reflex to HCV RNA Quant and Genotype; Future  - Vitamin B12  - Basic metabolic panel  (Ca, Cl, CO2, Creat, Gluc, K, Na, BUN)  - CBC with platelets  - Hepatitis C Screen Reflex to HCV RNA Quant and Genotype    Class 1 obesity with body mass index (BMI) of 31.0 to 31.9 in adult, unspecified obesity type, unspecified whether serious comorbidity present    - Ferritin; Future  - Ferritin    Other symptoms and signs concerning food and fluid intake   She does not get a lot of iron   - Ferritin; Future  - Ferritin       See Patient Instructions    Return in about 4 weeks (around 1/4/2022) for if not improving.    Cortney Hung MD  [facility] DESHAUN Peterson is a 76 year old who presents for the following health issues     HPI     *Tingling in feet - off and on, both feet and sometimes her right hand.   Discuss checking Vitamin b12 was done in may and was normal  Hurts worse in the cold  Has silva horses and she has to walk then out   She is not walking as much   *Covid booster    *Lot of silva horse, feet, legs and sometimes even her fingers.   Added magnesium supplement for the last week.   Has tingling in the feet and sometimes in the right hand    She has the tingling pins and  "needles both feet no weakness   No claudication no neurogenic claudication  Says she she doesn't eat the best       *Noticed pressure in her throat for the last couple months, clearing her throat all the time.       Review of Systems   Constitutional, HEENT, cardiovascular, pulmonary, gi and gu systems are negative, except as otherwise noted.      Objective    /85   Pulse 57   Temp 97.9  F (36.6  C) (Tympanic)   Ht 1.575 m (5' 2\")   Wt 77.1 kg (170 lb)   SpO2 97%   BMI 31.09 kg/m    Body mass index is 31.09 kg/m .  Physical Exam   GENERAL: healthy, alert and no distress  EYES: Eyes grossly normal to inspection, PERRL and conjunctivae and sclerae normal  NEURO:decreased tone on the left side , mentation intact and speech normal        Cortney Hung M.D.          "

## 2021-12-08 LAB — HCV AB SERPL QL IA: NONREACTIVE

## 2021-12-26 NOTE — RESULT ENCOUNTER NOTE
Kristen,  Your lab results were normal/stable. Please feel free to my chart or call the office with questions. Cortney Hung M.D.

## 2022-01-03 ENCOUNTER — OFFICE VISIT (OUTPATIENT)
Dept: OTOLARYNGOLOGY | Facility: CLINIC | Age: 77
End: 2022-01-03
Payer: COMMERCIAL

## 2022-01-03 VITALS
WEIGHT: 170 LBS | DIASTOLIC BLOOD PRESSURE: 87 MMHG | HEART RATE: 80 BPM | SYSTOLIC BLOOD PRESSURE: 119 MMHG | TEMPERATURE: 98.4 F | BODY MASS INDEX: 31.09 KG/M2

## 2022-01-03 DIAGNOSIS — R09.A2 GLOBUS SENSATION: ICD-10-CM

## 2022-01-03 DIAGNOSIS — H90.3 SENSORINEURAL HEARING LOSS, BILATERAL: ICD-10-CM

## 2022-01-03 DIAGNOSIS — K21.9 LARYNGOPHARYNGEAL REFLUX: ICD-10-CM

## 2022-01-03 DIAGNOSIS — R09.82 POST-NASAL DRAINAGE: Primary | ICD-10-CM

## 2022-01-03 DIAGNOSIS — J31.0 CHRONIC RHINITIS: ICD-10-CM

## 2022-01-03 DIAGNOSIS — Z86.73 HISTORY OF STROKE: ICD-10-CM

## 2022-01-03 PROCEDURE — 31231 NASAL ENDOSCOPY DX: CPT | Performed by: OTOLARYNGOLOGY

## 2022-01-03 PROCEDURE — 99204 OFFICE O/P NEW MOD 45 MIN: CPT | Mod: 25 | Performed by: OTOLARYNGOLOGY

## 2022-01-03 RX ORDER — BUDESONIDE 0.5 MG/2ML
INHALANT ORAL
Qty: 180 ML | Refills: 3 | Status: SHIPPED | OUTPATIENT
Start: 2022-01-03 | End: 2022-06-13

## 2022-01-03 RX ORDER — FAMOTIDINE 40 MG/1
40 TABLET, FILM COATED ORAL AT BEDTIME
Qty: 90 TABLET | Refills: 3 | Status: SHIPPED | OUTPATIENT
Start: 2022-01-03 | End: 2022-08-04

## 2022-01-03 RX ORDER — OMEPRAZOLE 40 MG/1
40 CAPSULE, DELAYED RELEASE ORAL DAILY
Qty: 90 CAPSULE | Refills: 1 | Status: SHIPPED | OUTPATIENT
Start: 2022-01-03 | End: 2022-02-14

## 2022-01-03 NOTE — PATIENT INSTRUCTIONS
Per physician's instructions    BUDESONIDE IRRIGATION INSTRUCTIONS    You will be starting Budesonide nasal irrigations and will need to obtain the following:      - NeilMed Sinus Rinse 8 oz Kit  - Distilled or filtered water   - Normal saline salt packets  - Budesonide medication     Place filtered or distilled water into the NeilMed bottle up to the fill line (DO NOT USE TAP OR WELL WATER).  Place the pre-made salt packet in the 8 oz of saline.  Then placed the budesonide medication into the bottle.  Shake the bottle to suspend into solution.  Lean head forward over a sink or a basin.  Rinse each side of the nose with one-half of the bottle (each squeeze is about one-half of the bottle). Rinse the nose daily.     You will follow up with your ENT surgeon in 6 weeks to recheck your symptoms.  If you are having problems with your irrigations or problems obtaining the medication, please contact your ENT surgeon.    Video example: https://www.[employer].com/watch?v=[email or web address]    Take 40 mg of omeprazole in the morning 20-30 minutes before meal  Take 40 mg Pepcid at bedtimes

## 2022-01-03 NOTE — NURSING NOTE
"Initial BP (!) 154/95 (BP Location: Right arm, Patient Position: Chair, Cuff Size: Adult Large)   Pulse 80   Temp 98.4  F (36.9  C) (Tympanic)   Wt 77.1 kg (170 lb)   BMI 31.09 kg/m   Estimated body mass index is 31.09 kg/m  as calculated from the following:    Height as of 12/7/21: 1.575 m (5' 2\").    Weight as of this encounter: 77.1 kg (170 lb). .    Marisabel La LPN    "

## 2022-01-03 NOTE — LETTER
1/3/2022         RE: Kristen Thompson  [address] [place] [id number]        Dear Colleague,    Thank you for referring your patient, Kristen Thompson, to the [facility]. Please see a copy of my visit note below.    Chief Complaint   Patient presents with     Ent Problem     c/o contantly clearing her throat and PND- history of stroke 2/2021 and has GERD     History of Present Illness  Kristen Thompson is a 76 year old female who presents today for evaluation.  I am seeing this patient in consultation for globus sensation at the request of the provider Dr. Hung.  The patient reports a history of feeling like something is in the back of the throat since February 2021 after she had a stroke.  She is been having issues with frequent throat clearing and sense of postnasal drainage since that time.  Her past history is significant for gastroesophageal reflux for which she takes 20 mg of Prilosec twice daily.  She states she has breakthrough heartburn about once or twice a week, especially if she eats the wrong things.  She usually has dinner around 6 PM and does not eat before bedtime.      She also has issues with her sinuses usually worse in the fall.  Almost every fall she does have to be treated for sinus infection.  She has been taking some Sudafed with some benefit.  She denies any significant or severe allergy history.  She previously underwent a CT scan of her sinuses from January 4, 2016.  My review of the sinus CT did show a leftward anterior and superior and rightward posterior nasal septal deviation with spur more posterior on the right.  The patient had some sinus mucosal thickening and air-fluid levels in the left sphenoid sinus.  The remainder the paranasal sinuses were clear without any evidence of acute or chronic inflammation.     The patient intermittently will have some dysphagia but no odynophagia, pharyngodynia, otalgia, hemoptysis, voice change.  No neck  lumps/bumps/swelling.  No unintentional weight loss.  The patient is a lifetime non-smoker.  No recent admissions or treatments for pneumonia or aspiration pneumonia.  She has not had a post stroke video swallow study or esophagram.    Also had concern is her significant bilateral hearing loss.  The patient's had a long history of hearing loss since she was young.  She has intermittently had hearing aids throughout her life.  Her last set of hearing aids were several years ago and were quite expensive and not helpful.  She does report that she relies mostly on reading lips and that masks of significantly hurt her ability to communicate.  She has not previously had a cochlear implant evaluation.  She does feel like her hearing is worse since her stroke.  She denies any otalgia or otorrhea.  She is not had previous ear surgery.    My review of the patient's audiogram from 12/16/2015 showed mild sloping to moderate sloping to profound sensorineural hearing loss in both ears. Pure-tone average was 78 dB on the right and 78 dB on the left.  Speech reception threshold was 55 dB on the right and 50 dB on the left.  The patient had 44% word recognition on the right and 44% word recognition on the left.      Past Medical History  Patient Active Problem List   Diagnosis     CARDIOVASCULAR SCREENING; LDL GOAL LESS THAN 160     HL (hearing loss)     Advance Care Planning     Hyperlipidemia LDL goal <130     Chronic constipation     Gastritis     Cervicalgia     Localized edema     Inflammatory spondylopathy of cervical region (H)     Restless leg syndrome     Primary localized osteoarthrosis, lower leg     Female stress incontinence     Insomnia     Gastroesophageal reflux disease without esophagitis     Cerebrovascular accident (CVA), unspecified mechanism (H)     Essential hypertension with goal blood pressure less than 130/80     Current Medications     Current Outpatient Medications:      amLODIPine (NORVASC) 5 MG tablet,  Take 1 tablet (5 mg) by mouth daily, Disp: 90 tablet, Rfl: 3     aspirin 81 MG EC tablet, Take 81 mg by mouth daily, Disp: , Rfl:      atorvastatin (LIPITOR) 10 MG tablet, TAKE 1 TABLET BY MOUTH EVERY DAY, Disp: 90 tablet, Rfl: 2     budesonide (PULMICORT) 0.5 MG/2ML neb solution, Place 0.5 mg/2 mL budesonide vial in 8 oz normal saline sinus rinse bottle.  Irrigate each nostril with one half of the bottle daily., Disp: 180 mL, Rfl: 3     CALCIUM PO, Take by mouth daily , Disp: , Rfl:      Cholecalciferol (VITAMIN D PO), Take 1,000 Units by mouth daily , Disp: , Rfl:      DULoxetine (CYMBALTA) 20 MG capsule, TAKE 1 CAPSULE BY MOUTH EVERY DAY, Disp: 90 capsule, Rfl: 1     famotidine (PEPCID) 40 MG tablet, Take 1 tablet (40 mg) by mouth At Bedtime, Disp: 90 tablet, Rfl: 3     MAGNESIUM PO, Take 250 mg by mouth daily , Disp: , Rfl:      Omega-3 Fatty Acids (OMEGA 3 PO), Take 2 capsules by mouth daily , Disp: , Rfl:      omeprazole (PRILOSEC) 20 MG DR capsule, TAKE 1 TABLET (20 MG) BY MOUTH 2 TIMES DAILY TAKE 30-60 MINUTES BEFORE A MEAL., Disp: 180 capsule, Rfl: 3     omeprazole (PRILOSEC) 40 MG DR capsule, Take 1 capsule (40 mg) by mouth daily Take 20-30 minutes prior to morning meal, Disp: 90 capsule, Rfl: 1     OVER-THE-COUNTER, sinuplex and fengre for sinus as needed, Disp: , Rfl:      Polyethylene Glycol 3350 (MIRALAX PO), , Disp: , Rfl:      traZODone (DESYREL) 50 MG tablet, Take 1 tablet (50 mg) by mouth nightly as needed for sleep, Disp: 90 tablet, Rfl: 3    Allergies  Allergies   Allergen Reactions     Codeine Sulfate      Nausea and vomiting      Quinapril Difficulty breathing     Darvon-N [Propoxyphene Napsylate] Nausea and Vomiting       Social History   Social History     Socioeconomic History     Marital status:      Spouse name: Not on file     Number of children: Not on file     Years of education: Not on file     Highest education level: Not on file   Occupational History     Not on file   Tobacco  Use     Smoking status: Never Smoker     Smokeless tobacco: Never Used   Substance and Sexual Activity     Alcohol use: No     Alcohol/week: 0.0 standard drinks     Drug use: No     Sexual activity: Not Currently     Partners: Male   Other Topics Concern     Parent/sibling w/ CABG, MI or angioplasty before 65F 55M? No   Social History Narrative     Not on file     Social Determinants of Health     Financial Resource Strain: Not on file   Food Insecurity: Not on file   Transportation Needs: Not on file   Physical Activity: Not on file   Stress: Not on file   Social Connections: Not on file   Intimate Partner Violence: Not on file   Housing Stability: Not on file       Family History  Family History   Problem Relation Age of Onset     C.A.D. Mother      Cardiovascular Mother      Thyroid Disease Mother      Cancer Father         stomach ca     Cancer Sister      Eye Disorder Sister      C.A.D. Sister      Cerebrovascular Disease Sister      Breast Cancer Sister      Arthritis Sister      Cardiovascular Sister      Depression Sister      Heart Disease Sister      Neurologic Disorder Sister      Osteoporosis Sister      Thyroid Disease Sister      Depression Sister      Thyroid Disease Sister      Depression Sister      Thyroid Disease Sister      Obesity Sister      Neurologic Disorder Sister        Review of Systems  As per HPI and PMHx, otherwise 10+ comprehensive system review is negative.    Physical Exam  /87 (BP Location: Right arm, Patient Position: Chair, Cuff Size: Adult Large)   Pulse 80   Temp 98.4  F (36.9  C) (Tympanic)   Wt 77.1 kg (170 lb)   BMI 31.09 kg/m    GENERAL: Patient is a pleasant, cooperative 76 year old female in no acute distress.  HEAD: Normocephalic, atraumatic.  Hair and scalp are normal.  EYES: Pupils are equal, round, reactive to light and accommodation.  Extraocular movements are intact.  The sclera nonicteric without injection.  The extraocular structures are normal.  EARS:  Normal shape and symmetry.  No tenderness when palpating the mastoid or tragal areas bilaterally.  Otoscopic exam reveals a minimal amount of cerumen bilaterally.  The bilateral tympanic membranes are round, intact without evidence of effusion, good landmarks.  No retraction, granulation, or drainage.  NOSE: Nares are patent.  Nasal mucosa is slightly dry.  The patient has a leftward anterior nasal septal deviation off the maxillary crest and a posterior rightward nasal septal deviation with spur.  The patient has boggy, inflamed mucosa with sticky, inflammatory mucus.  No nasal cavity masses, polyps, or mucopurulence on anterior rhinoscopy.  ORAL CAVITY: Dentition is in good repair.  Mucous membranes are slightly dry.  Tongue is mobile, protrudes to the midline.  Palate elevates symmetrically.  Tonsils are surgically absent.  No erythema or exudate.  No oral cavity or oropharyngeal masses, lesions, ulcerations, leukoplakia.  NECK: Supple, trachea is midline.  There no palpable cervical lymphadenopathy or masses bilaterally.    NEUROLOGIC: Cranial nerves II through XII are grossly intact.  Voice is strong.  Patient is House-Brackmann I/VI bilaterally.  CARDIOVASCULAR: Extremities are warm and well-perfused.  No significant peripheral edema.  RESPIRATORY: Patient has nonlabored breathing without cough, wheeze, stridor.  PSYCHIATRIC: Patient is alert and oriented.  Mood and affect appear normal.  SKIN: Warm and dry.  No scalp, face, or neck lesions noted.    Procedure: Flexible Nasal Endoscopy  Indication: Nasal congestion, postnasal drainage, throat clearing    To best visualize the sinonasal anatomy and due to the chief complaint and HPI, I proceeded with flexible fiberoptic nasal endoscopy.  The bilateral nasal cavities were anesthetized and decongested with a mixture of lidocaine and neosynephrine.  The bilateral nasal cavities were then examined using flexible fiberoptic nasal endoscope.  The right nasal cavity  was without masses, polyps, or mucopurulence.  The right middle turbinate and middle meatus was normal in appearance.  The sphenoethmoid recess was clear.  The left nasal cavity was without masses, polyps, or mucopurulence.  The left middle turbinate and middle meatus was normal in appearance.  The sphenoethmoid recess was clear.  The sinonasal mucosa appeared boggy and inflamed with sticky, inflammatory mucus.  The nasal septum deviates to the left anteriorly into the right more posteriorly with a spur.  The nasopharynx had a normal appearance with normal Eustachian tube openings and fossa of Rosenmuller bilaterally.  Minimal adenoid tissue.  The scope was removed.  The patient tolerated the procedure well.    Assessment and Plan    ICD-10-CM    1. Post-nasal drainage  R09.82 budesonide (PULMICORT) 0.5 MG/2ML neb solution     omeprazole (PRILOSEC) 40 MG DR capsule     famotidine (PEPCID) 40 MG tablet     NASAL ENDOSCOPY, DIAGNOSTIC   2. Globus sensation  R19.8 budesonide (PULMICORT) 0.5 MG/2ML neb solution     omeprazole (PRILOSEC) 40 MG DR capsule     famotidine (PEPCID) 40 MG tablet     NASAL ENDOSCOPY, DIAGNOSTIC   3. Laryngopharyngeal reflux  K21.9 budesonide (PULMICORT) 0.5 MG/2ML neb solution     omeprazole (PRILOSEC) 40 MG DR capsule     famotidine (PEPCID) 40 MG tablet     NASAL ENDOSCOPY, DIAGNOSTIC   4. Chronic rhinitis  J31.0 budesonide (PULMICORT) 0.5 MG/2ML neb solution     omeprazole (PRILOSEC) 40 MG DR capsule     famotidine (PEPCID) 40 MG tablet     NASAL ENDOSCOPY, DIAGNOSTIC   5. Sensorineural hearing loss, bilateral  H90.3 budesonide (PULMICORT) 0.5 MG/2ML neb solution     omeprazole (PRILOSEC) 40 MG DR capsule     famotidine (PEPCID) 40 MG tablet     NASAL ENDOSCOPY, DIAGNOSTIC   6. History of stroke  Z86.73 budesonide (PULMICORT) 0.5 MG/2ML neb solution     omeprazole (PRILOSEC) 40 MG DR capsule     famotidine (PEPCID) 40 MG tablet     NASAL ENDOSCOPY, DIAGNOSTIC      It was my pleasure seeing  Kristen Thompson today in clinic.  The patient presents with postnasal drainage, throat clearing, globus sensation but no evidence of infection, inflammation, or neoplasm on exam to explain the symptoms.  She does have a history of gastroesophageal reflux, which does question the component of possible laryngopharyngeal reflux.  She is also had some chronic sinus inflammation and previous CT imaging.  I do think that we could try some nasal irrigation with budesonide to see if this at all helps with the amount of nasal drainage she is having.  I provided her with a prescription and written instructions.  I provided her with a rinse bottle.      I also think she benefit from a change in her reflux medications.  We discussed switching from 20 mg of Prilosec twice daily to 40 mg of Prilosec once daily in the morning 20 or 30 minutes prior to morning meal.  We discussed having 40 mg of Pepcid at bedtime.  We discussed lifestyle changes including avoidance of certain foods, sleeping on an incline, and avoiding lying down within 3-4 hours after eating.  I would like to recheck the patient's symptoms in 6 weeks.      Given the change in her hearing symptoms and her previous significant hearing loss, I also think we should obtain an updated audiogram.  Depending on the audiogram results, she might be a candidate for retrial of amplification versus consideration of cochlear implant evaluation.      Depending on the patient's symptom improvement, we can continue her reflux medicine management.  If she is not improving, I think sinus CT would be reasonable as well as a video swallow study.    Yunior Garsia MD  Department of Otolaryngology-Head and Neck Surgery  [facility]         Again, thank you for allowing me to participate in the care of your patient.        Sincerely,        Yunior Garsia MD

## 2022-02-10 NOTE — PROGRESS NOTES
Chief Complaint   Patient presents with     Follow Up     patient states no change after using rinses and prilosec/pecid- if anything she feels symptoms are worse - constant dry  coughing all day     History of Present Illness  Kristen Thompson is a 76 year old female who presents today for follow-up.  The patient was evaluated on 1/3/2022.  The patient has a history of globus sensationsince February 2021 after she had a stroke.  She is been having issues with frequent throat clearing and sense of postnasal drainage since that time.  Her past history is significant for gastroesophageal reflux.  The patient also had some issues with her sinuses for 2021.  She underwent a sinus CT on January 4, 2016 which showed a leftward anterior nasal septal deviation and a rightward posterior nasal septal deviation there was some sinonasal mucosal thickening with air-fluid level in the left sphenoid sinus.  The remainder the paranasal sinuses were clear without any evidence of acute or chronic inflammation.    The patient was also having some dysphagia without odynophagia, pharyngodynia, voice change, otalgia.  No hemoptysis.  No neck lumps/bumps/swelling.  No unintentional weight loss.  She is a lifetime non-smoker. She has not had a post stroke video swallow study or esophagram.     She was also having some hearing concerns.  Patient had some hearing loss and she was young and has had hearing aids throughout her life.  She has not previously had a cochlear implant evaluation.  She does feel like her hearing is worse since her stroke.  She denies any otalgia or otorrhea.  She is not had previous ear surgery.     My review of the patient's audiogram from 12/16/2015 showed mild sloping to moderate sloping to profound sensorineural hearing loss in both ears. Pure-tone average was 78 dB on the right and 78 dB on the left.  Speech reception threshold was 55 dB on the right and 50 dB on the left.  The patient had 44% word recognition  on the right and 44% word recognition on the left.      The patient patient returns today for follow-up.  She was placed on nasal irrigations with budesonide.  I changed her reflux medicine to 40 mg of Prilosec once daily 20-30 minutes prior to morning meal and some dietary changes.  The patient returns today for follow-up and audiometric assessment.      From a symptom standpoint, the patient reported she had no improvement in symptoms.  She continues to have postnasal drainage sensation, chronic cough, throat clearing.  She is having breakthrough reflux symptoms even on the medications.    Past Medical History  Patient Active Problem List   Diagnosis     CARDIOVASCULAR SCREENING; LDL GOAL LESS THAN 160     HL (hearing loss)     Advance Care Planning     Hyperlipidemia LDL goal <130     Chronic constipation     Gastritis     Cervicalgia     Localized edema     Inflammatory spondylopathy of cervical region (H)     Restless leg syndrome     Primary localized osteoarthrosis, lower leg     Female stress incontinence     Insomnia     Gastroesophageal reflux disease without esophagitis     Cerebrovascular accident (CVA), unspecified mechanism (H)     Essential hypertension with goal blood pressure less than 130/80     Current Medications    Current Outpatient Medications:      amLODIPine (NORVASC) 5 MG tablet, Take 1 tablet (5 mg) by mouth daily, Disp: 90 tablet, Rfl: 3     aspirin 81 MG EC tablet, Take 81 mg by mouth daily, Disp: , Rfl:      atorvastatin (LIPITOR) 10 MG tablet, TAKE 1 TABLET BY MOUTH EVERY DAY, Disp: 90 tablet, Rfl: 2     budesonide (PULMICORT) 0.5 MG/2ML neb solution, Place 0.5 mg/2 mL budesonide vial in 8 oz normal saline sinus rinse bottle.  Irrigate each nostril with one half of the bottle daily., Disp: 180 mL, Rfl: 3     CALCIUM PO, Take by mouth daily , Disp: , Rfl:      Cholecalciferol (VITAMIN D PO), Take 1,000 Units by mouth daily , Disp: , Rfl:      DULoxetine (CYMBALTA) 20 MG capsule, TAKE 1  CAPSULE BY MOUTH EVERY DAY, Disp: 90 capsule, Rfl: 1     famotidine (PEPCID) 40 MG tablet, Take 1 tablet (40 mg) by mouth At Bedtime, Disp: 90 tablet, Rfl: 3     MAGNESIUM PO, Take 250 mg by mouth daily , Disp: , Rfl:      Omega-3 Fatty Acids (OMEGA 3 PO), Take 2 capsules by mouth daily , Disp: , Rfl:      omeprazole (PRILOSEC) 20 MG DR capsule, TAKE 1 TABLET (20 MG) BY MOUTH 2 TIMES DAILY TAKE 30-60 MINUTES BEFORE A MEAL., Disp: 180 capsule, Rfl: 3     OVER-THE-COUNTER, sinuplex and fengre for sinus as needed, Disp: , Rfl:      Polyethylene Glycol 3350 (MIRALAX PO), , Disp: , Rfl:      traZODone (DESYREL) 50 MG tablet, Take 1 tablet (50 mg) by mouth nightly as needed for sleep, Disp: 90 tablet, Rfl: 3    Allergies  Allergies   Allergen Reactions     Codeine Sulfate      Nausea and vomiting      Quinapril Difficulty breathing     Darvon-N [Propoxyphene Napsylate] Nausea and Vomiting       Social History  Social History     Socioeconomic History     Marital status:      Spouse name: Not on file     Number of children: Not on file     Years of education: Not on file     Highest education level: Not on file   Occupational History     Not on file   Tobacco Use     Smoking status: Never Smoker     Smokeless tobacco: Never Used   Substance and Sexual Activity     Alcohol use: No     Alcohol/week: 0.0 standard drinks     Drug use: No     Sexual activity: Not Currently     Partners: Male   Other Topics Concern     Parent/sibling w/ CABG, MI or angioplasty before 65F 55M? No   Social History Narrative     Not on file     Social Determinants of Health     Financial Resource Strain: Not on file   Food Insecurity: Not on file   Transportation Needs: Not on file   Physical Activity: Not on file   Stress: Not on file   Social Connections: Not on file   Intimate Partner Violence: Not on file   Housing Stability: Not on file       Family History  Family History   Problem Relation Age of Onset     C.A.D. Mother       Cardiovascular Mother      Thyroid Disease Mother      Cancer Father         stomach ca     Cancer Sister      Eye Disorder Sister      C.A.D. Sister      Cerebrovascular Disease Sister      Breast Cancer Sister      Arthritis Sister      Cardiovascular Sister      Depression Sister      Heart Disease Sister      Neurologic Disorder Sister      Osteoporosis Sister      Thyroid Disease Sister      Depression Sister      Thyroid Disease Sister      Depression Sister      Thyroid Disease Sister      Obesity Sister      Neurologic Disorder Sister        Review of Systems  As per HPI and PMHx, otherwise 10 system review including the head and neck, constitutional, eyes, respiratory, GI, skin, neurologic, lymphatic, endocrine, and allergy systems is negative.    Physical Exam  /74 (BP Location: Right arm, Patient Position: Chair, Cuff Size: Adult Regular)   Pulse 65   Temp 98  F (36.7  C) (Tympanic)   Wt 77.1 kg (170 lb)   BMI 31.09 kg/m    GENERAL: Patient is a pleasant, cooperative 76 year old female in no acute distress.  HEAD: Normocephalic, atraumatic.  Hair and scalp are normal.  EYES: Pupils are equal, round, reactive to light and accommodation.  Extraocular movements are intact.  The sclera nonicteric without injection.  The extraocular structures are normal.  EARS: Normal shape and symmetry.  No tenderness when palpating the mastoid or tragal areas bilaterally.  Otoscopic exam reveals a minimal amount of cerumen bilaterally.  The bilateral tympanic membranes are round, intact without evidence of effusion, good landmarks.  No retraction, granulation, or drainage.  NOSE: Nares are patent.  Nasal mucosa is slightly dry.  The patient has a leftward anterior nasal septal deviation off the maxillary crest and a posterior rightward nasal septal deviation with spur.  The patient has boggy, inflamed mucosa with sticky, inflammatory mucus.  No nasal cavity masses, polyps, or mucopurulence on anterior  rhinoscopy.  ORAL CAVITY: Dentition is in good repair.  Mucous membranes are slightly dry.  Tongue is mobile, protrudes to the midline.  Palate elevates symmetrically.  Tonsils are surgically absent.  No erythema or exudate.  No oral cavity or oropharyngeal masses, lesions, ulcerations, leukoplakia.  NECK: Supple, trachea is midline.  There no palpable cervical lymphadenopathy or masses bilaterally.    NEUROLOGIC: Cranial nerves II through XII are grossly intact.  Voice is strong.  Patient is House-Brackmann I/VI bilaterally.  CARDIOVASCULAR: Extremities are warm and well-perfused.  No significant peripheral edema.  RESPIRATORY: Patient has nonlabored breathing without cough, wheeze, stridor.  PSYCHIATRIC: Patient is alert and oriented.  Mood and affect appear normal.  SKIN: Warm and dry.  No scalp, face, or neck lesions noted.    Assessment and Plan     ICD-10-CM    1. Post-nasal drainage  R09.82 Adult Audiology Referral     XR Video Swallow with SLP or OT - Order with Speech Therapy Referral     Speech Therapy Referral     XR Esophagram     CT Sinus w/o Contrast     CT Chest w/o Contrast     General PFT Lab (Please always keep checked)     Pulmonary Function Test   2. Globus sensation  R19.8 Adult Audiology Referral     XR Video Swallow with SLP or OT - Order with Speech Therapy Referral     Speech Therapy Referral     XR Esophagram     CT Sinus w/o Contrast     CT Chest w/o Contrast     General PFT Lab (Please always keep checked)     Pulmonary Function Test   3. Chronic cough  R05.3 Adult Audiology Referral     XR Video Swallow with SLP or OT - Order with Speech Therapy Referral     Speech Therapy Referral     XR Esophagram     CT Sinus w/o Contrast     CT Chest w/o Contrast     General PFT Lab (Please always keep checked)     Pulmonary Function Test   4. Laryngopharyngeal reflux  K21.9 Adult Audiology Referral     XR Video Swallow with SLP or OT - Order with Speech Therapy Referral     Speech Therapy Referral      XR Esophagram     CT Sinus w/o Contrast     CT Chest w/o Contrast   5. Sensorineural hearing loss, bilateral  H90.3 Adult Audiology Referral     XR Video Swallow with SLP or OT - Order with Speech Therapy Referral     Speech Therapy Referral     XR Esophagram     CT Sinus w/o Contrast     CT Chest w/o Contrast   6. History of stroke  Z86.73 Adult Audiology Referral     XR Video Swallow with SLP or OT - Order with Speech Therapy Referral     Speech Therapy Referral     XR Esophagram     CT Sinus w/o Contrast     CT Chest w/o Contrast      It was my pleasure seeing Kristen Thompson today in clinic.  Unfortunately, the patient continues to have issues with postnasal drainage, cough, throat clearing.  She does not feel like the addition of Pepcid to her Prilosec is made much of a difference in symptoms.  I think that we should pursue a more progressive and comprehensive chronic cough work-up.  We will start with sinus CT and chest CT imaging to rule out any sinopulmonary issues.  We can pursue pulmonary function testing to rule out any significant COPD, asthma, obstruction.  I do think a video swallow study with esophagram would be warranted to look for any signs of aspiration or significant reflux.    Additionally, the patient presents today with significant bilateral sensorineural hearing loss.  This is most consistent with presbycusis. I can find no evidence of serious CNS disorders or other complicating factors that could be causing this.  We spent the remainder of today's visit on education. We discussed hearing protection in noisy environments.    The patient is medically cleared for consideration of a hearing aid evaluation.    The patient will follow up as necessary for worsening symptoms or changes in symptoms. I have also recommended repeat audiogram in 1-3 years, or sooner if symptoms warrant.      Yunior Garsia MD  Department of Otolaryngology-Head and Neck Surgery  [facility]

## 2022-02-14 ENCOUNTER — OFFICE VISIT (OUTPATIENT)
Dept: AUDIOLOGY | Facility: CLINIC | Age: 77
End: 2022-02-14
Payer: COMMERCIAL

## 2022-02-14 ENCOUNTER — OFFICE VISIT (OUTPATIENT)
Dept: OTOLARYNGOLOGY | Facility: CLINIC | Age: 77
End: 2022-02-14
Payer: COMMERCIAL

## 2022-02-14 VITALS
BODY MASS INDEX: 31.09 KG/M2 | TEMPERATURE: 98 F | HEART RATE: 65 BPM | DIASTOLIC BLOOD PRESSURE: 74 MMHG | WEIGHT: 170 LBS | SYSTOLIC BLOOD PRESSURE: 120 MMHG

## 2022-02-14 DIAGNOSIS — R09.A2 GLOBUS SENSATION: ICD-10-CM

## 2022-02-14 DIAGNOSIS — H90.3 SENSORINEURAL HEARING LOSS, BILATERAL: Primary | ICD-10-CM

## 2022-02-14 DIAGNOSIS — H90.3 SENSORINEURAL HEARING LOSS, BILATERAL: ICD-10-CM

## 2022-02-14 DIAGNOSIS — R09.82 POST-NASAL DRAINAGE: Primary | ICD-10-CM

## 2022-02-14 DIAGNOSIS — K21.9 LARYNGOPHARYNGEAL REFLUX: ICD-10-CM

## 2022-02-14 DIAGNOSIS — Z86.73 HISTORY OF STROKE: ICD-10-CM

## 2022-02-14 DIAGNOSIS — R05.3 CHRONIC COUGH: ICD-10-CM

## 2022-02-14 PROCEDURE — 99213 OFFICE O/P EST LOW 20 MIN: CPT | Performed by: OTOLARYNGOLOGY

## 2022-02-14 PROCEDURE — 99207 PR NO CHARGE LOS: CPT | Performed by: AUDIOLOGIST

## 2022-02-14 PROCEDURE — 92557 COMPREHENSIVE HEARING TEST: CPT | Performed by: AUDIOLOGIST

## 2022-02-14 PROCEDURE — 92550 TYMPANOMETRY & REFLEX THRESH: CPT | Performed by: AUDIOLOGIST

## 2022-02-14 ASSESSMENT — PAIN SCALES - GENERAL: PAINLEVEL: SEVERE PAIN (6)

## 2022-02-14 NOTE — PATIENT INSTRUCTIONS
Per physician's instructions    1.  Lung function testing  2.  CT scans of the sinuses and chest  3.  Video swallow study and esophagram to rule out aspiration or severe reflux  4.  Consider trial of bilateral hearing aids for severe/profound hearing loss

## 2022-02-14 NOTE — LETTER
2/14/2022         RE: Kristen Thompson  [address] [place] [id number]        Dear Colleague,    Thank you for referring your patient, Kristen Thompson, to the [facility]. Please see a copy of my visit note below.    Chief Complaint   Patient presents with     Follow Up     patient states no change after using rinses and prilosec/pecid- if anything she feels symptoms are worse - constant dry  coughing all day     History of Present Illness  Kristen Thompson is a 76 year old female who presents today for follow-up.  The patient was evaluated on 1/3/2022.  The patient has a history of globus sensationsince February 2021 after she had a stroke.  She is been having issues with frequent throat clearing and sense of postnasal drainage since that time.  Her past history is significant for gastroesophageal reflux.  The patient also had some issues with her sinuses for 2021.  She underwent a sinus CT on January 4, 2016 which showed a leftward anterior nasal septal deviation and a rightward posterior nasal septal deviation there was some sinonasal mucosal thickening with air-fluid level in the left sphenoid sinus.  The remainder the paranasal sinuses were clear without any evidence of acute or chronic inflammation.    The patient was also having some dysphagia without odynophagia, pharyngodynia, voice change, otalgia.  No hemoptysis.  No neck lumps/bumps/swelling.  No unintentional weight loss.  She is a lifetime non-smoker. She has not had a post stroke video swallow study or esophagram.     She was also having some hearing concerns.  Patient had some hearing loss and she was young and has had hearing aids throughout her life.  She has not previously had a cochlear implant evaluation.  She does feel like her hearing is worse since her stroke.  She denies any otalgia or otorrhea.  She is not had previous ear surgery.     My review of the patient's audiogram from 12/16/2015 showed mild  sloping to moderate sloping to profound sensorineural hearing loss in both ears. Pure-tone average was 78 dB on the right and 78 dB on the left.  Speech reception threshold was 55 dB on the right and 50 dB on the left.  The patient had 44% word recognition on the right and 44% word recognition on the left.      The patient patient returns today for follow-up.  She was placed on nasal irrigations with budesonide.  I changed her reflux medicine to 40 mg of Prilosec once daily 20-30 minutes prior to morning meal and some dietary changes.  The patient returns today for follow-up and audiometric assessment.      From a symptom standpoint, the patient reported she had no improvement in symptoms.  She continues to have postnasal drainage sensation, chronic cough, throat clearing.  She is having breakthrough reflux symptoms even on the medications.    Past Medical History  Patient Active Problem List   Diagnosis     CARDIOVASCULAR SCREENING; LDL GOAL LESS THAN 160     HL (hearing loss)     Advance Care Planning     Hyperlipidemia LDL goal <130     Chronic constipation     Gastritis     Cervicalgia     Localized edema     Inflammatory spondylopathy of cervical region (H)     Restless leg syndrome     Primary localized osteoarthrosis, lower leg     Female stress incontinence     Insomnia     Gastroesophageal reflux disease without esophagitis     Cerebrovascular accident (CVA), unspecified mechanism (H)     Essential hypertension with goal blood pressure less than 130/80     Current Medications    Current Outpatient Medications:      amLODIPine (NORVASC) 5 MG tablet, Take 1 tablet (5 mg) by mouth daily, Disp: 90 tablet, Rfl: 3     aspirin 81 MG EC tablet, Take 81 mg by mouth daily, Disp: , Rfl:      atorvastatin (LIPITOR) 10 MG tablet, TAKE 1 TABLET BY MOUTH EVERY DAY, Disp: 90 tablet, Rfl: 2     budesonide (PULMICORT) 0.5 MG/2ML neb solution, Place 0.5 mg/2 mL budesonide vial in 8 oz normal saline sinus rinse bottle.   Irrigate each nostril with one half of the bottle daily., Disp: 180 mL, Rfl: 3     CALCIUM PO, Take by mouth daily , Disp: , Rfl:      Cholecalciferol (VITAMIN D PO), Take 1,000 Units by mouth daily , Disp: , Rfl:      DULoxetine (CYMBALTA) 20 MG capsule, TAKE 1 CAPSULE BY MOUTH EVERY DAY, Disp: 90 capsule, Rfl: 1     famotidine (PEPCID) 40 MG tablet, Take 1 tablet (40 mg) by mouth At Bedtime, Disp: 90 tablet, Rfl: 3     MAGNESIUM PO, Take 250 mg by mouth daily , Disp: , Rfl:      Omega-3 Fatty Acids (OMEGA 3 PO), Take 2 capsules by mouth daily , Disp: , Rfl:      omeprazole (PRILOSEC) 20 MG DR capsule, TAKE 1 TABLET (20 MG) BY MOUTH 2 TIMES DAILY TAKE 30-60 MINUTES BEFORE A MEAL., Disp: 180 capsule, Rfl: 3     OVER-THE-COUNTER, sinuplex and fengre for sinus as needed, Disp: , Rfl:      Polyethylene Glycol 3350 (MIRALAX PO), , Disp: , Rfl:      traZODone (DESYREL) 50 MG tablet, Take 1 tablet (50 mg) by mouth nightly as needed for sleep, Disp: 90 tablet, Rfl: 3    Allergies  Allergies   Allergen Reactions     Codeine Sulfate      Nausea and vomiting      Quinapril Difficulty breathing     Darvon-N [Propoxyphene Napsylate] Nausea and Vomiting       Social History  Social History     Socioeconomic History     Marital status:      Spouse name: Not on file     Number of children: Not on file     Years of education: Not on file     Highest education level: Not on file   Occupational History     Not on file   Tobacco Use     Smoking status: Never Smoker     Smokeless tobacco: Never Used   Substance and Sexual Activity     Alcohol use: No     Alcohol/week: 0.0 standard drinks     Drug use: No     Sexual activity: Not Currently     Partners: Male   Other Topics Concern     Parent/sibling w/ CABG, MI or angioplasty before 65F 55M? No   Social History Narrative     Not on file     Social Determinants of Health     Financial Resource Strain: Not on file   Food Insecurity: Not on file   Transportation Needs: Not on file    Physical Activity: Not on file   Stress: Not on file   Social Connections: Not on file   Intimate Partner Violence: Not on file   Housing Stability: Not on file       Family History  Family History   Problem Relation Age of Onset     C.A.D. Mother      Cardiovascular Mother      Thyroid Disease Mother      Cancer Father         stomach ca     Cancer Sister      Eye Disorder Sister      C.A.D. Sister      Cerebrovascular Disease Sister      Breast Cancer Sister      Arthritis Sister      Cardiovascular Sister      Depression Sister      Heart Disease Sister      Neurologic Disorder Sister      Osteoporosis Sister      Thyroid Disease Sister      Depression Sister      Thyroid Disease Sister      Depression Sister      Thyroid Disease Sister      Obesity Sister      Neurologic Disorder Sister        Review of Systems  As per HPI and PMHx, otherwise 10 system review including the head and neck, constitutional, eyes, respiratory, GI, skin, neurologic, lymphatic, endocrine, and allergy systems is negative.    Physical Exam  /74 (BP Location: Right arm, Patient Position: Chair, Cuff Size: Adult Regular)   Pulse 65   Temp 98  F (36.7  C) (Tympanic)   Wt 77.1 kg (170 lb)   BMI 31.09 kg/m    GENERAL: Patient is a pleasant, cooperative 76 year old female in no acute distress.  HEAD: Normocephalic, atraumatic.  Hair and scalp are normal.  EYES: Pupils are equal, round, reactive to light and accommodation.  Extraocular movements are intact.  The sclera nonicteric without injection.  The extraocular structures are normal.  EARS: Normal shape and symmetry.  No tenderness when palpating the mastoid or tragal areas bilaterally.  Otoscopic exam reveals a minimal amount of cerumen bilaterally.  The bilateral tympanic membranes are round, intact without evidence of effusion, good landmarks.  No retraction, granulation, or drainage.  NOSE: Nares are patent.  Nasal mucosa is slightly dry.  The patient has a leftward anterior  nasal septal deviation off the maxillary crest and a posterior rightward nasal septal deviation with spur.  The patient has boggy, inflamed mucosa with sticky, inflammatory mucus.  No nasal cavity masses, polyps, or mucopurulence on anterior rhinoscopy.  ORAL CAVITY: Dentition is in good repair.  Mucous membranes are slightly dry.  Tongue is mobile, protrudes to the midline.  Palate elevates symmetrically.  Tonsils are surgically absent.  No erythema or exudate.  No oral cavity or oropharyngeal masses, lesions, ulcerations, leukoplakia.  NECK: Supple, trachea is midline.  There no palpable cervical lymphadenopathy or masses bilaterally.    NEUROLOGIC: Cranial nerves II through XII are grossly intact.  Voice is strong.  Patient is House-Brackmann I/VI bilaterally.  CARDIOVASCULAR: Extremities are warm and well-perfused.  No significant peripheral edema.  RESPIRATORY: Patient has nonlabored breathing without cough, wheeze, stridor.  PSYCHIATRIC: Patient is alert and oriented.  Mood and affect appear normal.  SKIN: Warm and dry.  No scalp, face, or neck lesions noted.    Assessment and Plan     ICD-10-CM    1. Post-nasal drainage  R09.82 Adult Audiology Referral     XR Video Swallow with SLP or OT - Order with Speech Therapy Referral     Speech Therapy Referral     XR Esophagram     CT Sinus w/o Contrast     CT Chest w/o Contrast     General PFT Lab (Please always keep checked)     Pulmonary Function Test   2. Globus sensation  R19.8 Adult Audiology Referral     XR Video Swallow with SLP or OT - Order with Speech Therapy Referral     Speech Therapy Referral     XR Esophagram     CT Sinus w/o Contrast     CT Chest w/o Contrast     General PFT Lab (Please always keep checked)     Pulmonary Function Test   3. Chronic cough  R05.3 Adult Audiology Referral     XR Video Swallow with SLP or OT - Order with Speech Therapy Referral     Speech Therapy Referral     XR Esophagram     CT Sinus w/o Contrast     CT Chest w/o Contrast      General PFT Lab (Please always keep checked)     Pulmonary Function Test   4. Laryngopharyngeal reflux  K21.9 Adult Audiology Referral     XR Video Swallow with SLP or OT - Order with Speech Therapy Referral     Speech Therapy Referral     XR Esophagram     CT Sinus w/o Contrast     CT Chest w/o Contrast   5. Sensorineural hearing loss, bilateral  H90.3 Adult Audiology Referral     XR Video Swallow with SLP or OT - Order with Speech Therapy Referral     Speech Therapy Referral     XR Esophagram     CT Sinus w/o Contrast     CT Chest w/o Contrast   6. History of stroke  Z86.73 Adult Audiology Referral     XR Video Swallow with SLP or OT - Order with Speech Therapy Referral     Speech Therapy Referral     XR Esophagram     CT Sinus w/o Contrast     CT Chest w/o Contrast      It was my pleasure seeing Kristen Thompson today in clinic.  Unfortunately, the patient continues to have issues with postnasal drainage, cough, throat clearing.  She does not feel like the addition of Pepcid to her Prilosec is made much of a difference in symptoms.  I think that we should pursue a more progressive and comprehensive chronic cough work-up.  We will start with sinus CT and chest CT imaging to rule out any sinopulmonary issues.  We can pursue pulmonary function testing to rule out any significant COPD, asthma, obstruction.  I do think a video swallow study with esophagram would be warranted to look for any signs of aspiration or significant reflux.    Additionally, the patient presents today with significant bilateral sensorineural hearing loss.  This is most consistent with presbycusis. I can find no evidence of serious CNS disorders or other complicating factors that could be causing this.  We spent the remainder of today's visit on education. We discussed hearing protection in noisy environments.    The patient is medically cleared for consideration of a hearing aid evaluation.    The patient will follow up as necessary for  worsening symptoms or changes in symptoms. I have also recommended repeat audiogram in 1-3 years, or sooner if symptoms warrant.      Yunior Garsia MD  Department of Otolaryngology-Head and Neck Surgery  [facility]         Again, thank you for allowing me to participate in the care of your patient.        Sincerely,        Yunior Garsia MD

## 2022-02-14 NOTE — NURSING NOTE
"Initial /74 (BP Location: Right arm, Patient Position: Chair, Cuff Size: Adult Regular)   Pulse 65   Temp 98  F (36.7  C) (Tympanic)   Wt 77.1 kg (170 lb)   BMI 31.09 kg/m   Estimated body mass index is 31.09 kg/m  as calculated from the following:    Height as of 12/7/21: 1.575 m (5' 2\").    Weight as of this encounter: 77.1 kg (170 lb). .    Marisabel La LPN    "

## 2022-02-14 NOTE — PROGRESS NOTES
AUDIOLOGY REPORT:    Patient was referred from ENT by Dr. Garsia for audiology evaluation. The patient reports hearing loss that has been present since childhood but has gotten worse over time. The cause of her hearing loss is unknown, but it may have been present since birth or may have been caused by having measles as a young child. The patient reports worsening hearing since she had a stroke around a year ago. This is especially noticeable in the left ear. The patient reports that she recently had vertigo which was attributed to the left ear. This improved with physical therapy. She also reports intermittent bilateral tinnitus. The patient reports ear pain but no pressure. She reports a history of ear infections as a child. The patient reports that she does not have a family history of hearing loss. The patient was last tested on 12/16/2015 and results showed normal to profound sensorineural hearing loss bilaterally. The patient reports that she has hearing aids that she purchased from [employer] a few years ago, but she has not worn them for years because she was not satisfied with them.    Testing:    Otoscopy:   Otoscopic exam indicates ears are clear of cerumen bilaterally     Tympanograms:    RIGHT: normal eardrum mobility     LEFT:   normal eardrum mobility    Reflexes (reported by stimulus ear):  RIGHT: Ipsilateral is present at normal levels  RIGHT: Contralateral is absent at frequencies tested  LEFT:   Ipsilateral is absent at frequencies tested  LEFT:   Contralateral is absent at frequencies tested    Thresholds:   Pure Tone Thresholds assessed using conventional audiometry with good reliability from 250-8000 Hz bilaterally using insert earphones and circumaural headphones     RIGHT:  normal hearing sensitivity at 250 Hz sloping to moderate to profound sensorineural hearing loss    LEFT:    normal hearing sensitivity at 250 Hz sloping to moderate to profound sensorineural hearing loss  NOTE: could not  mask bone conduction at 4000 Hz in either ear due to the limits of the equipment    Speech Reception Threshold:    RIGHT: 50 dB HL    LEFT:   55 dB HL  Results are in agreement with pure tone average.     Word Recognition Score:     RIGHT: 72% at 95 dB HL using NU-6 recorded word list.    LEFT:   56% at 90 dB HL using NU-6 recorded word list.      68% at 95 dB HL using NU-6 recorded word list.    Discussed results with the patient. Thresholds are stable today compared to 12/16/2015. Word recognition is significantly better today in the right ear and borderline significantly better in the left ear. This is likely due to higher presentation levels, though it should be noted that a different word list (NIGEL W-22) was previously used and changes should be interpreted with caution.    Patient was returned to ENT for follow up.     José Antonio Kincaid, CCC-A  Licensed Audiologist #[id number]  2/14/2022

## 2022-02-18 ENCOUNTER — HOSPITAL ENCOUNTER (OUTPATIENT)
Dept: CT IMAGING | Facility: CLINIC | Age: 77
End: 2022-02-18
Attending: OTOLARYNGOLOGY
Payer: COMMERCIAL

## 2022-02-18 DIAGNOSIS — R09.A2 GLOBUS SENSATION: ICD-10-CM

## 2022-02-18 DIAGNOSIS — K21.9 LARYNGOPHARYNGEAL REFLUX: ICD-10-CM

## 2022-02-18 DIAGNOSIS — R09.82 POST-NASAL DRAINAGE: ICD-10-CM

## 2022-02-18 DIAGNOSIS — H90.3 SENSORINEURAL HEARING LOSS, BILATERAL: ICD-10-CM

## 2022-02-18 DIAGNOSIS — R05.3 CHRONIC COUGH: ICD-10-CM

## 2022-02-18 DIAGNOSIS — Z86.73 HISTORY OF STROKE: ICD-10-CM

## 2022-02-18 PROCEDURE — 250N000011 HC RX IP 250 OP 636: Performed by: OTOLARYNGOLOGY

## 2022-02-18 PROCEDURE — 71260 CT THORAX DX C+: CPT

## 2022-02-18 PROCEDURE — 250N000009 HC RX 250: Performed by: OTOLARYNGOLOGY

## 2022-02-18 PROCEDURE — 70486 CT MAXILLOFACIAL W/O DYE: CPT

## 2022-02-18 RX ORDER — IOPAMIDOL 755 MG/ML
83 INJECTION, SOLUTION INTRAVASCULAR ONCE
Status: COMPLETED | OUTPATIENT
Start: 2022-02-18 | End: 2022-02-18

## 2022-02-18 RX ADMIN — IOPAMIDOL 83 ML: 755 INJECTION, SOLUTION INTRAVENOUS at 07:51

## 2022-02-18 RX ADMIN — SODIUM CHLORIDE 61 ML: 9 INJECTION, SOLUTION INTRAVENOUS at 07:51

## 2022-02-23 ENCOUNTER — TELEPHONE (OUTPATIENT)
Dept: OTOLARYNGOLOGY | Facility: CLINIC | Age: 77
End: 2022-02-23
Payer: COMMERCIAL

## 2022-02-23 DIAGNOSIS — R05.3 CHRONIC COUGH: ICD-10-CM

## 2022-02-23 DIAGNOSIS — K21.9 LARYNGOPHARYNGEAL REFLUX: ICD-10-CM

## 2022-02-23 DIAGNOSIS — Z86.73 HISTORY OF STROKE: ICD-10-CM

## 2022-02-23 DIAGNOSIS — R09.82 POST-NASAL DRAINAGE: Primary | ICD-10-CM

## 2022-02-23 DIAGNOSIS — R09.A2 GLOBUS SENSATION: ICD-10-CM

## 2022-02-23 NOTE — TELEPHONE ENCOUNTER
Left a message for the patient to call back with questions.  I will also send her a MyChart.    Yunior Garsia MD  Department of Otolaryngology-Head and Neck Surgery  [facility]

## 2022-03-07 DIAGNOSIS — G47.00 INSOMNIA, UNSPECIFIED TYPE: ICD-10-CM

## 2022-03-08 ENCOUNTER — TELEPHONE (OUTPATIENT)
Dept: FAMILY MEDICINE | Facility: CLINIC | Age: 77
End: 2022-03-08
Payer: COMMERCIAL

## 2022-03-08 DIAGNOSIS — I10 ESSENTIAL HYPERTENSION WITH GOAL BLOOD PRESSURE LESS THAN 130/80: ICD-10-CM

## 2022-03-08 DIAGNOSIS — I63.9 CEREBROVASCULAR ACCIDENT (CVA), UNSPECIFIED MECHANISM (H): ICD-10-CM

## 2022-03-08 NOTE — TELEPHONE ENCOUNTER
"Routing refill request to provider for review/approval because:  Drug interaction warning    Requested Prescriptions   Pending Prescriptions Disp Refills     traZODone (DESYREL) 50 MG tablet [Pharmacy Med Name: TRAZODONE 50 MG TABLET] 90 tablet 2     Sig: TAKE 1 TABLET BY MOUTH NIGHTLY AS NEEDED FOR SLEEP       Serotonin Modulators Passed - 3/7/2022  8:19 AM        Passed - Recent (12 mo) or future (30 days) visit within the authorizing provider's specialty     Patient has had an office visit with the authorizing provider or a provider within the authorizing providers department within the previous 12 mos or has a future within next 30 days. See \"Patient Info\" tab in inbasket, or \"Choose Columns\" in Meds & Orders section of the refill encounter.              Passed - Medication is active on med list        Passed - Patient is age 18 or older        Passed - No active pregnancy on record        Passed - No positive pregnancy test in past 12 months                   "

## 2022-03-10 RX ORDER — TRAZODONE HYDROCHLORIDE 50 MG/1
TABLET, FILM COATED ORAL
Qty: 90 TABLET | Refills: 2 | Status: SHIPPED | OUTPATIENT
Start: 2022-03-10 | End: 2022-11-10

## 2022-03-10 RX ORDER — AMLODIPINE BESYLATE 5 MG/1
TABLET ORAL
Qty: 90 TABLET | Refills: 3 | Status: SHIPPED | OUTPATIENT
Start: 2022-03-10 | End: 2022-12-11

## 2022-03-10 NOTE — TELEPHONE ENCOUNTER
Patient is asking for her med to be filled as soon as it can please, the pharmacy is sending it in under the wrong doctor.      Tennille Sibley, TJ Marcelino

## 2022-03-11 ENCOUNTER — HOSPITAL ENCOUNTER (OUTPATIENT)
Dept: SPEECH THERAPY | Facility: CLINIC | Age: 77
Discharge: HOME | End: 2022-03-11
Attending: OTOLARYNGOLOGY
Payer: COMMERCIAL

## 2022-03-11 ENCOUNTER — HOSPITAL ENCOUNTER (OUTPATIENT)
Dept: GENERAL RADIOLOGY | Facility: CLINIC | Age: 77
Discharge: HOME | End: 2022-03-11
Attending: OTOLARYNGOLOGY
Payer: COMMERCIAL

## 2022-03-11 DIAGNOSIS — R09.A2 GLOBUS SENSATION: ICD-10-CM

## 2022-03-11 DIAGNOSIS — R05.3 CHRONIC COUGH: ICD-10-CM

## 2022-03-11 DIAGNOSIS — R09.82 POST-NASAL DRAINAGE: ICD-10-CM

## 2022-03-11 DIAGNOSIS — H90.3 SENSORINEURAL HEARING LOSS, BILATERAL: ICD-10-CM

## 2022-03-11 DIAGNOSIS — Z86.73 HISTORY OF STROKE: ICD-10-CM

## 2022-03-11 DIAGNOSIS — K21.9 LARYNGOPHARYNGEAL REFLUX: ICD-10-CM

## 2022-03-11 PROCEDURE — 74230 X-RAY XM SWLNG FUNCJ C+: CPT

## 2022-03-11 PROCEDURE — 92611 MOTION FLUOROSCOPY/SWALLOW: CPT | Mod: GN | Performed by: SPEECH-LANGUAGE PATHOLOGIST

## 2022-03-11 PROCEDURE — 74220 X-RAY XM ESOPHAGUS 1CNTRST: CPT

## 2022-03-11 RX ORDER — BARIUM SULFATE 400 MG/ML
SUSPENSION ORAL ONCE
Status: COMPLETED | OUTPATIENT
Start: 2022-03-11 | End: 2022-03-11

## 2022-03-11 RX ADMIN — BARIUM SULFATE: 400 SUSPENSION ORAL at 10:47

## 2022-03-11 NOTE — PROGRESS NOTES
Video Swallow Study  03/11/22   General Information   Type Of Visit Initial   Start Of Care Date 03/11/22   Referring Physician Yunior Garsia MD   Orders Evaluate And Treat   Medical Diagnosis R09.82 (ICD-10-CM) - Post-nasal drainage;  R19.8 (ICD-10-CM) - Globus sensation; R05.3 (ICD-10-CM) - Chronic cough; K21.9 (ICD-10-CM) - Laryngopharyngeal jpfwilF26.3 (ICD-10-CM) - Sensorineural hearing loss, bilateral; Z86.73 (ICD-10-CM) - History of stroke   Precautions/limitations No Known Precautions/limitations   Hearing bilateral hearing loss from childhood. Has cochlear implants.    Pertinent History of Current Problem/OT: Additional Occupational Profile Info Patient is referred for video swallow study with a history of globus sensation, postnasal drainage and throat clearing since a stroke in February of 2021.  She also has a history of GERD.  She reports no weight loss but that recently her symptoms seem to be worsening. She reports globus sensation is constant and does not seem to get worse with intake. She reports she eats whatever she wants. She has recently started nasal irrigation and reflux medications though does not feel they are helping with her symptoms.   Respiratory Status Room air   General Observations Patient is pleasant and cooperative throughout the evaluation.   Clinical Swallow Evaluation   Oral Musculature generally intact   Structural Abnormalities none present   Dentition present and adequate   Mucosal Quality good   Mandibular Strength and Mobility intact   Oral Labial Strength and Mobility WFL   Lingual Strength and Mobility WFL   Velar Elevation intact   Buccal Strength and Mobility intact   Laryngeal Function Voicing initiated;Throat clear   Oral Musculature Comments Patient reports some residual weakness on her left side though it is not significantly noticable during oral mech. She reports her speech is slightly slurred due to her hearing loss and is not new with her stroke.   Additional  Documentation No   VFSS Evaluation   VFSS Additional Documentation Yes   VFSS Eval: Radiology   Radiologist Dr. Sánchez   Views Taken left lateral;other (see comments)  (esophagram followed)   Physical Location of Procedure [place], [place]   VFSS Eval: Thin Liquid Texture Trial   Mode of Presentation, Thin Liquid cup;straw;self-fed   Order of Presentation 1, 2, 3, 4   Preparatory Phase WFL   Oral Phase, Thin Liquid Premature pharyngeal entry   Pharyngeal Phase, Thin Liquid WFL   Rosenbek's Penetration Aspiration Scale: Thin Liquid Trial Results 1 - no aspiration, contrast does not enter airway   Diagnostic Statement No aspiration or penetration with thin.   VFSS Eval: Puree Solid Texture Trial   Mode of Presentation, Puree spoon;self-fed   Order of Presentation 4, 5   Preparatory Phase WFL   Oral Phase, Puree WFL   Pharyngeal Phase, Puree WFL   Rosenbek's Penetration Aspiration Scale: Puree Food Trial Results 1 - no aspiration, contrast does not enter airway   Diagnostic Statement No aspiration or penetration. No significant oral/pharyngeal residue.   VFSS Eval: Regular Texture Trial (Solid)   Mode of Presentation self-fed   Order of Presentation 6, 7   Preparatory Phase WFL   Oral Phase WFL   Pharyngeal Phase WFL   Rosenbek's Penetration Aspiration Scale 1 - no aspiration, contrast does not enter airway   Diagnostic Statement No aspiration or penetration. No significant oral/pharyngeal residue.   Swallow Compensations   Swallow Compensations No compensations were used   Results No difficulties noted   Educational Assessment   Barriers to Learning Hearing   Esophageal Phase of Swallow   Patient reports or presents with symptoms of esophageal dysphagia Yes   Esophageal comments See esophagram from same day   Swallow Eval: Clinical Impressions   Skilled Criteria for Therapy Intervention No problems identified which require skilled intervention   Diet texture recommendations Thin liquids  (level 0);Regular diet   Risks and Benefits of Treatment have been explained. Yes   Patient, family and/or staff in agreement with Plan of Care Yes   Clinical Impression Comments Oral [facility] exam is unremarkable. No aspiration or penetration noted on any consistency. Patient demonstrated timely and efficient mastication.  Repetitive tongue motion noted with pudding consistency and residue collection on oral structures in most trials that clears with second swallow. Delayed initiation of the pharyngeal swallow to the level of the pyriforms with reduced tongue base retraction.  Minimal pharyngeal residue with thin liquids that clears with second swallow. No pharyngeal residue observed for thicker textures. Partial laryngeal elevation though complete laryngeal vestibular closure is observed. Pharyngeal stripping wave is complete. Recommend continue with regular diet and thin liquids.   Total Session Time   SLP Eval: VideoFluoroscopic Swallow function Minutes (41051) 15   Total Evaluation Time 15   Therapy Certification   Certification date from 03/11/22   Certification date to 03/11/22   Medical Diagnosis R09.82 (ICD-10-CM) - Post-nasal drainage;  R19.8 (ICD-10-CM) - Globus sensation; R05.3 (ICD-10-CM) - Chronic cough; K21.9 (ICD-10-CM) - Laryngopharyngeal fmolhlY98.3 (ICD-10-CM) - Sensorineural hearing loss, bilateral; Z86.73 (ICD-10-CM) - History of stroke   Certification I certify the need for these services furnished under this plan of treatment and while under my care.  (Physician co-signature of this document indicates review and certification of the therapy plan).

## 2022-03-16 ENCOUNTER — HOSPITAL ENCOUNTER (OUTPATIENT)
Dept: RESPIRATORY THERAPY | Facility: CLINIC | Age: 77
Discharge: HOME | End: 2022-03-16
Attending: INTERNAL MEDICINE | Admitting: INTERNAL MEDICINE
Payer: COMMERCIAL

## 2022-03-16 DIAGNOSIS — R09.A2 GLOBUS SENSATION: ICD-10-CM

## 2022-03-16 DIAGNOSIS — R05.3 CHRONIC COUGH: ICD-10-CM

## 2022-03-16 DIAGNOSIS — R09.82 POST-NASAL DRAINAGE: ICD-10-CM

## 2022-03-16 PROCEDURE — 94729 DIFFUSING CAPACITY: CPT

## 2022-03-16 PROCEDURE — 94726 PLETHYSMOGRAPHY LUNG VOLUMES: CPT

## 2022-03-16 PROCEDURE — 250N000009 HC RX 250: Performed by: OTOLARYNGOLOGY

## 2022-03-16 PROCEDURE — 94060 EVALUATION OF WHEEZING: CPT

## 2022-03-16 RX ORDER — ALBUTEROL SULFATE 0.83 MG/ML
2.5 SOLUTION RESPIRATORY (INHALATION) ONCE
Status: COMPLETED | OUTPATIENT
Start: 2022-03-16 | End: 2022-03-16

## 2022-03-16 RX ADMIN — ALBUTEROL SULFATE 2.5 MG: 2.5 SOLUTION RESPIRATORY (INHALATION) at 13:30

## 2022-03-18 LAB
DLCOUNC-%PRED-PRE: 121 %
DLCOUNC-PRE: 21.69 ML/MIN/MMHG
DLCOUNC-PRED: 17.91 ML/MIN/MMHG
ERV-%PRED-PRE: 215 %
ERV-PRE: 0.78 L
ERV-PRED: 0.36 L
EXPTIME-PRE: 7.34 SEC
FEF2575-%PRED-POST: 151 %
FEF2575-%PRED-PRE: 122 %
FEF2575-POST: 2.52 L/SEC
FEF2575-PRE: 2.03 L/SEC
FEF2575-PRED: 1.66 L/SEC
FEFMAX-%PRED-PRE: 141 %
FEFMAX-PRE: 7.12 L/SEC
FEFMAX-PRED: 5.03 L/SEC
FEV1-%PRED-PRE: 127 %
FEV1-PRE: 2.51 L
FEV1FEV6-PRE: 76 %
FEV1FEV6-PRED: 79 %
FEV1FVC-PRE: 78 %
FEV1FVC-PRED: 78 %
FEV1SVC-PRE: 76 %
FEV1SVC-PRED: 72 %
FIFMAX-PRE: 3.03 L/SEC
FRCPLETH-%PRED-PRE: 120 %
FRCPLETH-PRE: 3.13 L
FRCPLETH-PRED: 2.6 L
FVC-%PRED-PRE: 127 %
FVC-PRE: 3.22 L
FVC-PRED: 2.53 L
IC-%PRED-PRE: 102 %
IC-PRE: 2.41 L
IC-PRED: 2.36 L
RVPLETH-%PRED-PRE: 110 %
RVPLETH-PRE: 2.26 L
RVPLETH-PRED: 2.03 L
TLCPLETH-%PRED-PRE: 120 %
TLCPLETH-PRE: 5.54 L
TLCPLETH-PRED: 4.6 L
VA-%PRED-PRE: 117 %
VA-PRE: 5.05 L
VC-%PRED-PRE: 120 %
VC-PRE: 3.29 L
VC-PRED: 2.72 L

## 2022-04-06 DIAGNOSIS — R09.82 POST-NASAL DRAINAGE: Primary | ICD-10-CM

## 2022-04-06 DIAGNOSIS — J31.0 CHRONIC RHINITIS: ICD-10-CM

## 2022-04-11 ENCOUNTER — TELEPHONE (OUTPATIENT)
Dept: FAMILY MEDICINE | Facility: CLINIC | Age: 77
End: 2022-04-11
Payer: COMMERCIAL

## 2022-04-11 NOTE — TELEPHONE ENCOUNTER
Reason for Call:  Other call back    Detailed comments: Patient is asking for a call back  regarding a sleep study that was done.    Phone Number Patient can be reached at: Cell number on file:    Telephone Information:   Mobile [phone]       Best Time:     Can we leave a detailed message on this number? YES    Call taken on 4/11/2022 at 2:38 PM by Dari Sibley

## 2022-04-11 NOTE — TELEPHONE ENCOUNTER
"Patient reports that she had a home sleep study. \"It came back that I was severe.\"  Appointment made for her to see Dr. Hung tomorrow. She said that she will bring the sleep study results.  Mahamed Thomas RN      "

## 2022-04-12 ENCOUNTER — OFFICE VISIT (OUTPATIENT)
Dept: FAMILY MEDICINE | Facility: CLINIC | Age: 77
End: 2022-04-12
Payer: COMMERCIAL

## 2022-04-12 VITALS
WEIGHT: 159 LBS | DIASTOLIC BLOOD PRESSURE: 80 MMHG | BODY MASS INDEX: 29.26 KG/M2 | SYSTOLIC BLOOD PRESSURE: 120 MMHG | HEART RATE: 76 BPM | HEIGHT: 62 IN | TEMPERATURE: 98.1 F | OXYGEN SATURATION: 98 %

## 2022-04-12 DIAGNOSIS — G47.9 SLEEP DISORDER: Primary | ICD-10-CM

## 2022-04-12 DIAGNOSIS — I69.359 HEMIPLEGIA AND HEMIPARESIS FOLLOWING CEREBRAL INFARCTION AFFECTING UNSPECIFIED SIDE (H): ICD-10-CM

## 2022-04-12 PROBLEM — M46.92 INFLAMMATORY SPONDYLOPATHY OF CERVICAL REGION (H): Status: RESOLVED | Noted: 2018-12-30 | Resolved: 2022-04-12

## 2022-04-12 PROCEDURE — 99213 OFFICE O/P EST LOW 20 MIN: CPT | Performed by: FAMILY MEDICINE

## 2022-04-12 NOTE — PROGRESS NOTES
"  Assessment & Plan     Sleep disorder  Unsure of the exact dignosis here but mariposa refer   - Adult Sleep Eval & Management  Referral; Future    Hemiplegia and hemiparesis following cerebral infarction affecting unspecified side (H)  Stable chol checked in 8/21 and this was good            BMI:   Estimated body mass index is 29.08 kg/m  as calculated from the following:    Height as of this encounter: 1.575 m (5' 2\").    Weight as of this encounter: 72.1 kg (159 lb).       CONSULTATION/REFERRAL to sleep medicine     No follow-ups on file.    Cortney Hung MD  [facility] DESHAUN Peterson is a 76 year old who presents for the following health issues    HPI     Chief Complaint   Patient presents with     Results     Discuss Sleep Study         Her dentist did a home sleep study and then told her to see me so I can refer her to the sleep doctor. I  Did review the sleep study but it was limited. We discussed that the sleep doctor may decide to have a different type of sleep study to help sort out what is going on.     Review of Systems   Constitutional, HEENT, cardiovascular, pulmonary, gi and gu systems are negative, except as otherwise noted.      Objective    /80   Pulse 76   Temp 98.1  F (36.7  C) (Tympanic)   Ht 1.575 m (5' 2\")   Wt 72.1 kg (159 lb)   SpO2 98%   BMI 29.08 kg/m    Body mass index is 29.08 kg/m .  Physical Exam   GENERAL: healthy, alert and no distress    Cortney Hung M.D.              "

## 2022-05-05 DIAGNOSIS — G56.32 RADIAL NEUROPATHY, LEFT: ICD-10-CM

## 2022-05-05 DIAGNOSIS — M26.609 TMJ (TEMPOROMANDIBULAR JOINT SYNDROME): ICD-10-CM

## 2022-05-09 RX ORDER — DULOXETIN HYDROCHLORIDE 20 MG/1
CAPSULE, DELAYED RELEASE ORAL
Qty: 90 CAPSULE | Refills: 1 | Status: SHIPPED | OUTPATIENT
Start: 2022-05-09 | End: 2022-11-04

## 2022-05-09 NOTE — TELEPHONE ENCOUNTER
"Routing refill request to provider for review/approval because:  Drug interaction warning    Requested Prescriptions   Pending Prescriptions Disp Refills     DULoxetine (CYMBALTA) 20 MG capsule [Pharmacy Med Name: DULOXETINE HCL DR 20 MG CAP] 90 capsule 1     Sig: TAKE 1 CAPSULE BY MOUTH EVERY DAY       Serotonin-Norepinephrine Reuptake Inhibitors  Passed - 5/5/2022 12:22 AM        Passed - Blood pressure under 140/90 in past 12 months     BP Readings from Last 3 Encounters:   04/12/22 120/80   02/14/22 120/74   01/03/22 119/87                 Passed - Recent (12 mo) or future (30 days) visit within the authorizing provider's specialty     Patient has had an office visit with the authorizing provider or a provider within the authorizing providers department within the previous 12 mos or has a future within next 30 days. See \"Patient Info\" tab in inbasket, or \"Choose Columns\" in Meds & Orders section of the refill encounter.              Passed - Medication is active on med list        Passed - Patient is age 18 or older        Passed - No active pregnancy on record        Passed - No positive pregnancy test in past 12 months                     "

## 2022-05-24 ENCOUNTER — MYC MEDICAL ADVICE (OUTPATIENT)
Dept: FAMILY MEDICINE | Facility: CLINIC | Age: 77
End: 2022-05-24
Payer: COMMERCIAL

## 2022-05-24 DIAGNOSIS — I63.9 CEREBROVASCULAR ACCIDENT (CVA), UNSPECIFIED MECHANISM (H): ICD-10-CM

## 2022-05-24 DIAGNOSIS — E78.5 HYPERLIPIDEMIA, UNSPECIFIED HYPERLIPIDEMIA TYPE: ICD-10-CM

## 2022-05-24 RX ORDER — ATORVASTATIN CALCIUM 10 MG/1
TABLET, FILM COATED ORAL
Qty: 90 TABLET | Refills: 2 | Status: SHIPPED | OUTPATIENT
Start: 2022-05-24 | End: 2023-02-28

## 2022-06-09 ENCOUNTER — OFFICE VISIT (OUTPATIENT)
Dept: ALLERGY | Facility: CLINIC | Age: 77
End: 2022-06-09
Attending: OTOLARYNGOLOGY
Payer: COMMERCIAL

## 2022-06-09 VITALS
SYSTOLIC BLOOD PRESSURE: 119 MMHG | DIASTOLIC BLOOD PRESSURE: 80 MMHG | RESPIRATION RATE: 20 BRPM | HEART RATE: 73 BPM | BODY MASS INDEX: 29.08 KG/M2 | HEIGHT: 62 IN

## 2022-06-09 DIAGNOSIS — H57.9 ITCHY EYES: Primary | ICD-10-CM

## 2022-06-09 DIAGNOSIS — J30.0 VASOMOTOR RHINITIS: ICD-10-CM

## 2022-06-09 DIAGNOSIS — R09.82 POST-NASAL DRAINAGE: ICD-10-CM

## 2022-06-09 LAB
BASOPHILS # BLD AUTO: 0 10E3/UL (ref 0–0.2)
BASOPHILS NFR BLD AUTO: 1 %
EOSINOPHIL # BLD AUTO: 0.2 10E3/UL (ref 0–0.7)
EOSINOPHIL NFR BLD AUTO: 3 %
ERYTHROCYTE [DISTWIDTH] IN BLOOD BY AUTOMATED COUNT: 13 % (ref 10–15)
HCT VFR BLD AUTO: 41.1 % (ref 35–47)
HGB BLD-MCNC: 13.4 G/DL (ref 11.7–15.7)
LYMPHOCYTES # BLD AUTO: 1.5 10E3/UL (ref 0.8–5.3)
LYMPHOCYTES NFR BLD AUTO: 24 %
MCH RBC QN AUTO: 34.2 PG (ref 26.5–33)
MCHC RBC AUTO-ENTMCNC: 32.6 G/DL (ref 31.5–36.5)
MCV RBC AUTO: 105 FL (ref 78–100)
MONOCYTES # BLD AUTO: 0.7 10E3/UL (ref 0–1.3)
MONOCYTES NFR BLD AUTO: 11 %
NEUTROPHILS # BLD AUTO: 3.9 10E3/UL (ref 1.6–8.3)
NEUTROPHILS NFR BLD AUTO: 61 %
PLATELET # BLD AUTO: 259 10E3/UL (ref 150–450)
RBC # BLD AUTO: 3.92 10E6/UL (ref 3.8–5.2)
WBC # BLD AUTO: 6.3 10E3/UL (ref 4–11)

## 2022-06-09 PROCEDURE — 82785 ASSAY OF IGE: CPT | Performed by: ALLERGY & IMMUNOLOGY

## 2022-06-09 PROCEDURE — 99204 OFFICE O/P NEW MOD 45 MIN: CPT | Performed by: ALLERGY & IMMUNOLOGY

## 2022-06-09 PROCEDURE — 86003 ALLG SPEC IGE CRUDE XTRC EA: CPT | Performed by: ALLERGY & IMMUNOLOGY

## 2022-06-09 PROCEDURE — 85025 COMPLETE CBC W/AUTO DIFF WBC: CPT | Performed by: ALLERGY & IMMUNOLOGY

## 2022-06-09 PROCEDURE — 36415 COLL VENOUS BLD VENIPUNCTURE: CPT | Performed by: ALLERGY & IMMUNOLOGY

## 2022-06-09 RX ORDER — AZELASTINE 1 MG/ML
2 SPRAY, METERED NASAL 2 TIMES DAILY PRN
Qty: 30 ML | Refills: 3 | Status: SHIPPED | OUTPATIENT
Start: 2022-06-09 | End: 2023-03-14

## 2022-06-09 ASSESSMENT — ENCOUNTER SYMPTOMS
RHINORRHEA: 0
HEADACHES: 0
SINUS PRESSURE: 0
EYE DISCHARGE: 1
MYALGIAS: 0
FEVER: 0
DIARRHEA: 0
JOINT SWELLING: 0
SHORTNESS OF BREATH: 0
EYE REDNESS: 1
ADENOPATHY: 0
EYE ITCHING: 1
CHILLS: 0
ARTHRALGIAS: 0
VOMITING: 0
ACTIVITY CHANGE: 0
FACIAL SWELLING: 0
WHEEZING: 0
NAUSEA: 0

## 2022-06-09 NOTE — PATIENT INSTRUCTIONS
Get the bloodwork done.   -Use azelastine 2 sprays in each nostril twice a day when necessary.     Allergy Staff Appt Hours Shot Hours Locations    Physician     Chase Diaz MD       Support Staff     Rola Lema LPN     Trung CMA    Tuesday:   [place] :  [place] :         [facility]      Friday:  [place] [address]  [place]        Thursday: :        Friday: 712:     [place]        Tuesday: : : : :: :[facility]       Tues & Wed: : & Thurs: :       Fri: :           [facility]  [address] [place] [address]  Appt Line: [phone]      [facility]  [address] [place] [address]  Appt Line: [phone]    Pulmonary Function Scheduling:  [place]: [phone]  [place]: [phone]  [phone]     Important Scheduling Information (if recommended by provider):  Aspirin Desensitization: Appt will last 2 clinic days. Please call the Allergy RN line for your clinic to schedule. Discontinue antihistamines 7 days prior to the appointment.     Food Challenges: Appt will last 3-4 hours. Please call the Allergy RN line for your clinic to schedule. Discontinue antihistamines 7 days prior to the appointment.     Penicillin Testing: Appt will last 2-3 hours. Please call the Allergy RN line for your clinic to schedule. Discontinue antihistamines 7 days prior to the appointment.     Skin Testing: Appt will about 40 minutes. Call the appointment line for your clinic to schedule. Discontinue antihistamines 7 days prior to the appointment.     Thank you for trusting us with your Allergy, Asthma, and Immunology care. Please feel free to contact us with any questions or concerns you may have.      [place] [facility] [phone]

## 2022-06-09 NOTE — PROGRESS NOTES
SUBJECTIVE:                                                                   Kristen Thompson is a 77-year-old female who presents today to our Allergy Clinic at [facility]; She is being seen in consultation at the request of Dr. Kameron Hylton, for chronic rhinitis and postnasal drainage evaluation.    The patient has a history of globus sensation, postnasal drainage, cough, and throat clearing for years. She used to develop a sinus infection each Winter.   It is associated with frequent throat clearing and cough.  She also has a history suggesting GERD. Was on reflux meds since her early 30's.     In December 2021, she got COVID booster. Cough due to tickle at the back of her throat, throat clearing and globus sensation got worse.   No wheezing, chest tightness, shortness of breath or persistent cough.  She had a pulmonary function test in March 2022 which did not suggest an obstruction.  No postbronchodilator reversibility was noted.  Chest CT with mild atelectasis in both lower lobes.  Otherwise no other acute cardiopulmonary changes.  Sinus CT performed in February 2022 did not show any acute or chronic sinus disease.    Currently, she takes omeprazole 20 mg daily, famotidine 40 mg daily, and her cough seems to be well controlled. About 6 weeks ago it almost completely resolved.    She admits having rhinorrhea in Winter and having Summer exacerbated sneezing and nasal congestion.   She has a history of dry eye syndrome for ears. She had a stroke in 2021, she developed more issues with the left eye.   Also, since stroke, she has issues with left thigh being dry, itchy, and burning.  She is using Systane for that.  She feels that ocular symptoms are worse than cough and globus sensation.  She tried and failed Pataday and [place]. Systane works better than anything. But one of the eye doctors  Told her that she could have environmental allergies.  She was recommended Restasis, but  it was too expensive for her.    Patient Active Problem List   Diagnosis     CARDIOVASCULAR SCREENING; LDL GOAL LESS THAN 160     HL (hearing loss)     Advance Care Planning     Hyperlipidemia LDL goal <130     Chronic constipation     Gastritis     Cervicalgia     Localized edema     Restless leg syndrome     Primary localized osteoarthrosis, lower leg     Female stress incontinence     Insomnia     Gastroesophageal reflux disease without esophagitis     Cerebrovascular accident (CVA), unspecified mechanism (H)     Essential hypertension with goal blood pressure less than 130/80     Hemiplegia and hemiparesis following cerebral infarction affecting unspecified side (H)       Past Medical History:   Diagnosis Date     Arthritis 11/19/2013     Gastro-oesophageal reflux disease      GERD (gastroesophageal reflux disease) 10/2/2012     H/O total hysterectomy      HL (hearing loss) 10/11/2012     PONV (postoperative nausea and vomiting)      Squamous cell carcinoma       Problem (# of Occurrences) Relation (Name,Age of Onset)    Arthritis (1) Sister    Breast Cancer (1) Sister    C.A.D. (2) Mother, Sister    Cancer (2) Father: stomach ca, Sister    Cardiovascular (2) Mother, Sister    Cerebrovascular Disease (1) Sister    Depression (3) Sister, Sister, Sister    Eye Disorder (1) Sister    Heart Disease (1) Sister    Neurologic Disorder (2) Sister, Sister    Obesity (1) Sister    Osteoporosis (1) Sister    Thyroid Disease (4) Mother, Sister, Sister, Sister        Past Surgical History:   Procedure Laterality Date     BIOPSY BREAST       COLONOSCOPY  10/30/2012    Procedure: COLONOSCOPY;  Colonoscopy;  Surgeon: Denise Bah MD;  Location: [facility]     ESOPHAGOSCOPY, GASTROSCOPY, DUODENOSCOPY (EGD), COMBINED N/A 9/15/2016    Procedure: COMBINED ESOPHAGOSCOPY, GASTROSCOPY, DUODENOSCOPY (EGD);  Surgeon: Bennie Godinez MD;  Location: [facility]     GALLBLADDER SURGERY       HYSTERECTOMY       knee replacment  2007      RELEASE TRIGGER FINGER Right 4/29/2016    Procedure: RELEASE TRIGGER FINGER;  Surgeon: Percy Sarmiento MD;  Location: [facility]     REPAIR BLADDER       ZZC RAD RESEC TONSIL/PILLARS       Social History     Socioeconomic History     Marital status:      Spouse name: None     Number of children: None     Years of education: None     Highest education level: None   Tobacco Use     Smoking status: Never Smoker     Smokeless tobacco: Never Used   Substance and Sexual Activity     Alcohol use: No     Alcohol/week: 0.0 standard drinks     Drug use: No     Sexual activity: Not Currently     Partners: Male   Other Topics Concern     Parent/sibling w/ CABG, MI or angioplasty before 65F 55M? No   Social History Narrative    June 9, 2022    ENVIRONMENTAL HISTORY: [place] The home is heated with a forced air. They does have central air conditioning. The patient's bedroom is furnished with carpeting in bedroom and fabric window coverings.  Pets inside the house include 0. There is no history of cockroach or mice infestation. There is/are 0 smokers in the house.  The house does not have a damp basement.            Review of Systems   Constitutional: Negative for activity change, chills and fever.   HENT: Positive for postnasal drip. Negative for congestion, dental problem, ear pain, facial swelling, nosebleeds, rhinorrhea, sinus pressure and sneezing.    Eyes: Positive for discharge, redness and itching.   Respiratory: Negative for shortness of breath and wheezing.    Cardiovascular: Negative for chest pain.   Gastrointestinal: Negative for diarrhea, nausea and vomiting.   Musculoskeletal: Negative for arthralgias, joint swelling and myalgias.   Skin: Negative for rash.   Neurological: Negative for headaches.   Hematological: Negative for adenopathy.   Psychiatric/Behavioral: Negative for behavioral problems and self-injury.           Current Outpatient Medications:       amLODIPine (NORVASC) 5 MG tablet, TAKE 1 TABLET BY MOUTH EVERY DAY, Disp: 90 tablet, Rfl: 3     aspirin 81 MG EC tablet, Take 81 mg by mouth daily, Disp: , Rfl:      atorvastatin (LIPITOR) 10 MG tablet, TAKE 1 TABLET BY MOUTH EVERY DAY, Disp: 90 tablet, Rfl: 2     azelastine (ASTELIN) 0.1 % nasal spray, Spray 2 sprays into both nostrils 2 times daily as needed for rhinitis, Disp: 30 mL, Rfl: 3     CALCIUM PO, Take by mouth daily , Disp: , Rfl:      Cholecalciferol (VITAMIN D PO), Take 1,000 Units by mouth daily , Disp: , Rfl:      DULoxetine (CYMBALTA) 20 MG capsule, TAKE 1 CAPSULE BY MOUTH EVERY DAY, Disp: 90 capsule, Rfl: 1     famotidine (PEPCID) 40 MG tablet, Take 1 tablet (40 mg) by mouth At Bedtime, Disp: 90 tablet, Rfl: 3     MAGNESIUM PO, Take 250 mg by mouth daily , Disp: , Rfl:      Omega-3 Fatty Acids (OMEGA 3 PO), Take 2 capsules by mouth daily , Disp: , Rfl:      omeprazole (PRILOSEC) 20 MG DR capsule, TAKE 1 TABLET (20 MG) BY MOUTH 2 TIMES DAILY TAKE 30-60 MINUTES BEFORE A MEAL., Disp: 180 capsule, Rfl: 3     OVER-THE-COUNTER, sinuplex and fengre for sinus as needed, Disp: , Rfl:      Polyethyl Glycol-Propyl Glycol (SYSTANE FREE OP), Apply 2 drops to eye as needed (dry eyes), Disp: , Rfl:      Polyethylene Glycol 3350 (MIRALAX PO), , Disp: , Rfl:      traZODone (DESYREL) 50 MG tablet, TAKE 1 TABLET BY MOUTH NIGHTLY AS NEEDED FOR SLEEP, Disp: 90 tablet, Rfl: 2     budesonide (PULMICORT) 0.5 MG/2ML neb solution, Place 0.5 mg/2 mL budesonide vial in 8 oz normal saline sinus rinse bottle.  Irrigate each nostril with one half of the bottle daily. (Patient not taking: Reported on 6/9/2022), Disp: 180 mL, Rfl: 3  Immunization History   Administered Date(s) Administered     COVID-19,PF,Moderna 02/26/2021, 03/26/2021     Influenza (High Dose) 3 valent vaccine 10/17/2014, 09/25/2015, 10/13/2016     Influenza (IIV3) PF 10/20/2006, 10/02/2007, 11/04/2008, 10/01/2009, 10/10/2012     Influenza Vaccine IM > 6 months  "Valent IIV4 (Alfuria,Fluzone) 11/07/2013     Pneumo Conj 13-V (2010&after) 09/25/2015     Pneumococcal 23 valent 10/10/2012     TD (ADULT, 7+) 11/29/2011     Td (Adult), Adsorbed 12/31/1982, 06/09/1994, 02/06/2002     Allergies   Allergen Reactions     Codeine Sulfate      Nausea and vomiting      Quinapril Difficulty breathing     Darvon-N [Propoxyphene Napsylate] Nausea and Vomiting     OBJECTIVE:                                                                 /80 (BP Location: Left arm, Patient Position: Sitting, Cuff Size: Adult Regular)   Pulse 73   Resp 20   Ht 1.575 m (5' 2\")   BMI 29.08 kg/m          Physical Exam  Vitals and nursing note reviewed.   Constitutional:       General: She is not in acute distress.     Appearance: She is not ill-appearing, toxic-appearing or diaphoretic.   HENT:      Head: Normocephalic and atraumatic.      Right Ear: Tympanic membrane, ear canal and external ear normal.      Left Ear: Tympanic membrane, ear canal and external ear normal.      Nose: Septal deviation present. No congestion or rhinorrhea.      Right Turbinates: Not enlarged, swollen or pale.      Left Turbinates: Not enlarged, swollen or pale.      Mouth/Throat:      Lips: Pink.      Mouth: Mucous membranes are moist.      Pharynx: Oropharynx is clear. No pharyngeal swelling, oropharyngeal exudate, posterior oropharyngeal erythema or uvula swelling.   Eyes:      General:         Right eye: No discharge.         Left eye: No discharge.      Conjunctiva/sclera: Conjunctivae normal.   Cardiovascular:      Rate and Rhythm: Normal rate and regular rhythm.      Heart sounds: Normal heart sounds.   Pulmonary:      Effort: Pulmonary effort is normal. No respiratory distress.      Breath sounds: Normal breath sounds and air entry. No stridor, decreased air movement or transmitted upper airway sounds. No decreased breath sounds, wheezing, rhonchi or rales.   Skin:     General: Skin is warm.   Neurological:      " Mental Status: She is alert and oriented to person, place, and time.   Psychiatric:         Mood and Affect: Mood normal.         Behavior: Behavior normal.           ASSESSMENT/PLAN:    Post-nasal drainage  Vasomotor rhinitis  Considering long-term history of GERD, it is highly likely that her postnasal drainage sensation is still a result of reflux.  I am unable to perform SPT for aeroallergens.  She takes trazodone daily.  It blocks H1 receptors.  - Ordered serum IgE for regional aeroallergen panel.  - Trial of azelastine 2 sprays in each nostril twice daily as needed.  - The cough has already significantly improved.  She barely has it.  I suggest to continue the same antireflux regimen.        - Allergen cat epithellium IgE  - Allergen dog epithelium IgE  - Allergen Jack grass IgE  - Allergen orchard grass IgE  - Allergen meche IgE  - Allergen D farinae IgE  - Allergen D pteronyssinus IgE  - Allergen alternaria alternata IgE  - Allergen aspergillus fumigatus IgE  - Allergen cladosporium herbarum IgE  - Allergen Epicoccum purpurascens IgE  - Allergen penicillium notatum IgE  - Allergen jamil white IgE  - Allergen Cedar IgE  - Allergen cottonwood IgE  - Allergen elm IgE  - Allergen maple box elder IgE  - Allergen oak white IgE  - Allergen Red Woodland IgE  - Allergen silver  birch IgE  - Allergen Tree White Woodland IgE  - Allergen [place] Tree  - Allergen white pine IgE  - Allergen English plantain IgE  - Allergen giant ragweed IgE  - Allergen lamb's quarter IgE  - Allergen Mugwort IgE  - Allergen ragweed short IgE  - Allergen Sagebrush Wormwood IgE  - Allergen Sheep Sorrel IgE  - Allergen thistle Russian IgE  - Allergen Weed Nettle IgE  - Allergen, Kochia/Firebush  - IgE  - azelastine (ASTELIN) 0.1 % nasal spray  Dispense: 30 mL; Refill: 3      Itchy eyes  Got worse after stroke.  Likely secondary to impaired moisturization.  Systane seems to help more than a variety of antihistamine eyedrops that she tried in  the past.  - We will see the results of serum IgE.  But if unremarkable, I would recommend to continue following up with Allergy.       Return if symptoms worsen or fail to improve, for depending on the blood test results.    Thank you for allowing us to participate in the care of this patient. Please feel free to contact us if there are any questions or concerns about the patient.    Disclaimer: This note consists of symbols derived from keyboarding, dictation and/or voice recognition software. As a result, there may be errors in the script that have gone undetected. Please consider this when interpreting information found in this chart.    Chase Diaz MD, FAAAAI, FACAAI  Allergy, Asthma and Immunology     [facility]

## 2022-06-09 NOTE — LETTER
6/9/2022         RE: Kristen Thompson  [address] [place] [id number]        Dear Colleague,    Thank you for referring your patient, Kristen Thompson, to the [facility]. Please see a copy of my visit note below.    SUBJECTIVE:                                                                   Kristen Thompson is a 77-year-old female who presents today to our Allergy Clinic at [facility]; She is being seen in consultation at the request of Dr. Kameron Hylton, for chronic rhinitis and postnasal drainage evaluation.    The patient has a history of globus sensation, postnasal drainage, cough, and throat clearing for years. She used to develop a sinus infection each Winter.   It is associated with frequent throat clearing and cough.  She also has a history suggesting GERD. Was on reflux meds since her early 30's.     In December 2021, she got COVID booster. Cough due to tickle at the back of her throat, throat clearing and globus sensation got worse.   No wheezing, chest tightness, shortness of breath or persistent cough.  She had a pulmonary function test in March 2022 which did not suggest an obstruction.  No postbronchodilator reversibility was noted.  Chest CT with mild atelectasis in both lower lobes.  Otherwise no other acute cardiopulmonary changes.  Sinus CT performed in February 2022 did not show any acute or chronic sinus disease.    Currently, she takes omeprazole 20 mg daily, famotidine 40 mg daily, and her cough seems to be well controlled. About 6 weeks ago it almost completely resolved.    She admits having rhinorrhea in Winter and having Summer exacerbated sneezing and nasal congestion.   She has a history of dry eye syndrome for ears. She had a stroke in 2021, she developed more issues with the left eye.   Also, since stroke, she has issues with left thigh being dry, itchy, and burning.  She is using Systane for that.  She feels that  ocular symptoms are worse than cough and globus sensation.  She tried and failed Pataday and [place]. Systane works better than anything. But one of the eye doctors  Told her that she could have environmental allergies.  She was recommended Restasis, but it was too expensive for her.    Patient Active Problem List   Diagnosis     CARDIOVASCULAR SCREENING; LDL GOAL LESS THAN 160     HL (hearing loss)     Advance Care Planning     Hyperlipidemia LDL goal <130     Chronic constipation     Gastritis     Cervicalgia     Localized edema     Restless leg syndrome     Primary localized osteoarthrosis, lower leg     Female stress incontinence     Insomnia     Gastroesophageal reflux disease without esophagitis     Cerebrovascular accident (CVA), unspecified mechanism (H)     Essential hypertension with goal blood pressure less than 130/80     Hemiplegia and hemiparesis following cerebral infarction affecting unspecified side (H)       Past Medical History:   Diagnosis Date     Arthritis 11/19/2013     Gastro-oesophageal reflux disease      GERD (gastroesophageal reflux disease) 10/2/2012     H/O total hysterectomy      HL (hearing loss) 10/11/2012     PONV (postoperative nausea and vomiting)      Squamous cell carcinoma       Problem (# of Occurrences) Relation (Name,Age of Onset)    Arthritis (1) Sister    Breast Cancer (1) Sister    C.A.D. (2) Mother, Sister    Cancer (2) Father: stomach ca, Sister    Cardiovascular (2) Mother, Sister    Cerebrovascular Disease (1) Sister    Depression (3) Sister, Sister, Sister    Eye Disorder (1) Sister    Heart Disease (1) Sister    Neurologic Disorder (2) Sister, Sister    Obesity (1) Sister    Osteoporosis (1) Sister    Thyroid Disease (4) Mother, Sister, Sister, Sister        Past Surgical History:   Procedure Laterality Date     BIOPSY BREAST       COLONOSCOPY  10/30/2012    Procedure: COLONOSCOPY;  Colonoscopy;  Surgeon: Denise Bah MD;  Location: [facility]      ESOPHAGOSCOPY, GASTROSCOPY, DUODENOSCOPY (EGD), COMBINED N/A 9/15/2016    Procedure: COMBINED ESOPHAGOSCOPY, GASTROSCOPY, DUODENOSCOPY (EGD);  Surgeon: Bennie Godinez MD;  Location: [facility]     GALLBLADDER SURGERY       HYSTERECTOMY       knee replacment  2007     RELEASE TRIGGER FINGER Right 4/29/2016    Procedure: RELEASE TRIGGER FINGER;  Surgeon: Percy Sarmiento MD;  Location: [facility]     REPAIR BLADDER       ZZC RAD RESEC TONSIL/PILLARS       Social History     Socioeconomic History     Marital status:      Spouse name: None     Number of children: None     Years of education: None     Highest education level: None   Tobacco Use     Smoking status: Never Smoker     Smokeless tobacco: Never Used   Substance and Sexual Activity     Alcohol use: No     Alcohol/week: 0.0 standard drinks     Drug use: No     Sexual activity: Not Currently     Partners: Male   Other Topics Concern     Parent/sibling w/ CABG, MI or angioplasty before 65F 55M? No   Social History Narrative    June 9, 2022    ENVIRONMENTAL HISTORY: [place] The home is heated with a forced air. They does have central air conditioning. The patient's bedroom is furnished with carpeting in bedroom and fabric window coverings.  Pets inside the house include 0. There is no history of cockroach or mice infestation. There is/are 0 smokers in the house.  The house does not have a damp basement.            Review of Systems   Constitutional: Negative for activity change, chills and fever.   HENT: Positive for postnasal drip. Negative for congestion, dental problem, ear pain, facial swelling, nosebleeds, rhinorrhea, sinus pressure and sneezing.    Eyes: Positive for discharge, redness and itching.   Respiratory: Negative for shortness of breath and wheezing.    Cardiovascular: Negative for chest pain.   Gastrointestinal: Negative for diarrhea, nausea and vomiting.   Musculoskeletal: Negative for arthralgias,  joint swelling and myalgias.   Skin: Negative for rash.   Neurological: Negative for headaches.   Hematological: Negative for adenopathy.   Psychiatric/Behavioral: Negative for behavioral problems and self-injury.           Current Outpatient Medications:      amLODIPine (NORVASC) 5 MG tablet, TAKE 1 TABLET BY MOUTH EVERY DAY, Disp: 90 tablet, Rfl: 3     aspirin 81 MG EC tablet, Take 81 mg by mouth daily, Disp: , Rfl:      atorvastatin (LIPITOR) 10 MG tablet, TAKE 1 TABLET BY MOUTH EVERY DAY, Disp: 90 tablet, Rfl: 2     azelastine (ASTELIN) 0.1 % nasal spray, Spray 2 sprays into both nostrils 2 times daily as needed for rhinitis, Disp: 30 mL, Rfl: 3     CALCIUM PO, Take by mouth daily , Disp: , Rfl:      Cholecalciferol (VITAMIN D PO), Take 1,000 Units by mouth daily , Disp: , Rfl:      DULoxetine (CYMBALTA) 20 MG capsule, TAKE 1 CAPSULE BY MOUTH EVERY DAY, Disp: 90 capsule, Rfl: 1     famotidine (PEPCID) 40 MG tablet, Take 1 tablet (40 mg) by mouth At Bedtime, Disp: 90 tablet, Rfl: 3     MAGNESIUM PO, Take 250 mg by mouth daily , Disp: , Rfl:      Omega-3 Fatty Acids (OMEGA 3 PO), Take 2 capsules by mouth daily , Disp: , Rfl:      omeprazole (PRILOSEC) 20 MG DR capsule, TAKE 1 TABLET (20 MG) BY MOUTH 2 TIMES DAILY TAKE 30-60 MINUTES BEFORE A MEAL., Disp: 180 capsule, Rfl: 3     OVER-THE-COUNTER, sinuplex and fengre for sinus as needed, Disp: , Rfl:      Polyethyl Glycol-Propyl Glycol (SYSTANE FREE OP), Apply 2 drops to eye as needed (dry eyes), Disp: , Rfl:      Polyethylene Glycol 3350 (MIRALAX PO), , Disp: , Rfl:      traZODone (DESYREL) 50 MG tablet, TAKE 1 TABLET BY MOUTH NIGHTLY AS NEEDED FOR SLEEP, Disp: 90 tablet, Rfl: 2     budesonide (PULMICORT) 0.5 MG/2ML neb solution, Place 0.5 mg/2 mL budesonide vial in 8 oz normal saline sinus rinse bottle.  Irrigate each nostril with one half of the bottle daily. (Patient not taking: Reported on 6/9/2022), Disp: 180 mL, Rfl: 3  Immunization History   Administered  "Date(s) Administered     COVID-19,PF,Moderna 02/26/2021, 03/26/2021     Influenza (High Dose) 3 valent vaccine 10/17/2014, 09/25/2015, 10/13/2016     Influenza (IIV3) PF 10/20/2006, 10/02/2007, 11/04/2008, 10/01/2009, 10/10/2012     Influenza Vaccine IM > 6 months Valent IIV4 (Alfuria,Fluzone) 11/07/2013     Pneumo Conj 13-V (2010&after) 09/25/2015     Pneumococcal 23 valent 10/10/2012     TD (ADULT, 7+) 11/29/2011     Td (Adult), Adsorbed 12/31/1982, 06/09/1994, 02/06/2002     Allergies   Allergen Reactions     Codeine Sulfate      Nausea and vomiting      Quinapril Difficulty breathing     Darvon-N [Propoxyphene Napsylate] Nausea and Vomiting     OBJECTIVE:                                                                 /80 (BP Location: Left arm, Patient Position: Sitting, Cuff Size: Adult Regular)   Pulse 73   Resp 20   Ht 1.575 m (5' 2\")   BMI 29.08 kg/m          Physical Exam  Vitals and nursing note reviewed.   Constitutional:       General: She is not in acute distress.     Appearance: She is not ill-appearing, toxic-appearing or diaphoretic.   HENT:      Head: Normocephalic and atraumatic.      Right Ear: Tympanic membrane, ear canal and external ear normal.      Left Ear: Tympanic membrane, ear canal and external ear normal.      Nose: Septal deviation present. No congestion or rhinorrhea.      Right Turbinates: Not enlarged, swollen or pale.      Left Turbinates: Not enlarged, swollen or pale.      Mouth/Throat:      Lips: Pink.      Mouth: Mucous membranes are moist.      Pharynx: Oropharynx is clear. No pharyngeal swelling, oropharyngeal exudate, posterior oropharyngeal erythema or uvula swelling.   Eyes:      General:         Right eye: No discharge.         Left eye: No discharge.      Conjunctiva/sclera: Conjunctivae normal.   Cardiovascular:      Rate and Rhythm: Normal rate and regular rhythm.      Heart sounds: Normal heart sounds.   Pulmonary:      Effort: Pulmonary effort is normal. No " respiratory distress.      Breath sounds: Normal breath sounds and air entry. No stridor, decreased air movement or transmitted upper airway sounds. No decreased breath sounds, wheezing, rhonchi or rales.   Skin:     General: Skin is warm.   Neurological:      Mental Status: She is alert and oriented to person, place, and time.   Psychiatric:         Mood and Affect: Mood normal.         Behavior: Behavior normal.           ASSESSMENT/PLAN:    Post-nasal drainage  Vasomotor rhinitis  Considering long-term history of GERD, it is highly likely that her postnasal drainage sensation is still a result of reflux.  I am unable to perform SPT for aeroallergens.  She takes trazodone daily.  It blocks H1 receptors.  - Ordered serum IgE for regional aeroallergen panel.  - Trial of azelastine 2 sprays in each nostril twice daily as needed.  - The cough has already significantly improved.  She barely has it.  I suggest to continue the same antireflux regimen.        - Allergen cat epithellium IgE  - Allergen dog epithelium IgE  - Allergen Jack grass IgE  - Allergen orchard grass IgE  - Allergen meche IgE  - Allergen D farinae IgE  - Allergen D pteronyssinus IgE  - Allergen alternaria alternata IgE  - Allergen aspergillus fumigatus IgE  - Allergen cladosporium herbarum IgE  - Allergen Epicoccum purpurascens IgE  - Allergen penicillium notatum IgE  - Allergen jamil white IgE  - Allergen Cedar IgE  - Allergen cottonwood IgE  - Allergen elm IgE  - Allergen maple box elder IgE  - Allergen oak white IgE  - Allergen Red [place] IgE  - Allergen silver  birch IgE  - Allergen Tree White [place] IgE  - Allergen [place] Tree  - Allergen white pine IgE  - Allergen English plantain IgE  - Allergen giant ragweed IgE  - Allergen lamb's quarter IgE  - Allergen Mugwort IgE  - Allergen ragweed short IgE  - Allergen Sagebrush [place] IgE  - Allergen Sheep Sorrel IgE  - Allergen thistle Russian IgE  - Allergen Weed Nettle IgE  - Allergen,  Kochia/Firebush  - IgE  - azelastine (ASTELIN) 0.1 % nasal spray  Dispense: 30 mL; Refill: 3      Itchy eyes  Got worse after stroke.  Likely secondary to impaired moisturization.  Systane seems to help more than a variety of antihistamine eyedrops that she tried in the past.  - We will see the results of serum IgE.  But if unremarkable, I would recommend to continue following up with Allergy.       Return if symptoms worsen or fail to improve, for depending on the blood test results.    Thank you for allowing us to participate in the care of this patient. Please feel free to contact us if there are any questions or concerns about the patient.    Disclaimer: This note consists of symbols derived from keyboarding, dictation and/or voice recognition software. As a result, there may be errors in the script that have gone undetected. Please consider this when interpreting information found in this chart.    Chase Diaz MD, FAAAAI, FACAAI  Allergy, Asthma and Immunology     [facility]       Again, thank you for allowing me to participate in the care of your patient.        Sincerely,        Chase Diaz MD

## 2022-06-10 LAB
A ALTERNATA IGE QN: <0.1 KU(A)/L
A FUMIGATUS IGE QN: <0.1 KU(A)/L
C HERBARUM IGE QN: <0.1 KU(A)/L
CALIF WALNUT POLN IGE QN: <0.1 KU(A)/L
CAT DANDER IGG QN: <0.1 KU(A)/L
CEDAR IGE QN: <0.1 KU(A)/L
COCKSFOOT IGE QN: <0.1 KU(A)/L
COMMON RAGWEED IGE QN: 0.25 KU(A)/L
COTTONWOOD IGE QN: <0.1 KU(A)/L
D FARINAE IGE QN: 0.13 KU(A)/L
D PTERONYSS IGE QN: <0.1 KU(A)/L
DOG DANDER+EPITH IGE QN: <0.1 KU(A)/L
E PURPURASCENS IGE QN: <0.1 KU(A)/L
EAST WHITE PINE IGE QN: <0.1 KU(A)/L
ENGL PLANTAIN IGE QN: <0.1 KU(A)/L
FIREBUSH IGE QN: <0.1 KU(A)/L
GIANT RAGWEED IGE QN: 0.16 KU(A)/L
GOOSEFOOT IGE QN: <0.1 KU(A)/L
IGE SERPL-ACNC: 6 KU/L (ref 0–114)
JOHNSON GRASS IGE QN: <0.1 KU(A)/L
MAPLE IGE QN: <0.1 KU(A)/L
MUGWORT IGE QN: <0.1 KU(A)/L
NETTLE IGE QN: <0.1 KU(A)/L
P NOTATUM IGE QN: <0.1 KU(A)/L
RED MULBERRY IGE QN: <0.1 KU(A)/L
SALTWORT IGE QN: <0.1 KU(A)/L
SHEEP SORREL IGE QN: <0.1 KU(A)/L
SILVER BIRCH IGE QN: <0.1 KU(A)/L
TIMOTHY IGE QN: <0.1 KU(A)/L
WHITE ASH IGE QN: <0.1 KU(A)/L
WHITE ELM IGE QN: <0.1 KU(A)/L
WHITE MULBERRY IGE QN: <0.1 KU(A)/L
WHITE OAK IGE QN: <0.1 KU(A)/L
WORMWOOD IGE QN: <0.1 KU(A)/L
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
## 2022-06-13 ENCOUNTER — OFFICE VISIT (OUTPATIENT)
Dept: SLEEP MEDICINE | Facility: CLINIC | Age: 77
End: 2022-06-13
Attending: FAMILY MEDICINE
Payer: COMMERCIAL

## 2022-06-13 VITALS
DIASTOLIC BLOOD PRESSURE: 71 MMHG | HEART RATE: 62 BPM | WEIGHT: 148.2 LBS | HEIGHT: 62 IN | OXYGEN SATURATION: 94 % | BODY MASS INDEX: 27.27 KG/M2 | SYSTOLIC BLOOD PRESSURE: 104 MMHG

## 2022-06-13 DIAGNOSIS — F51.04 PSYCHOPHYSIOLOGICAL INSOMNIA: ICD-10-CM

## 2022-06-13 DIAGNOSIS — R29.818 SUSPECTED SLEEP APNEA: Primary | ICD-10-CM

## 2022-06-13 DIAGNOSIS — G47.9 SLEEP DISORDER: ICD-10-CM

## 2022-06-13 PROCEDURE — 99203 OFFICE O/P NEW LOW 30 MIN: CPT | Performed by: PHYSICIAN ASSISTANT

## 2022-06-13 RX ORDER — ZOLPIDEM TARTRATE 5 MG/1
TABLET ORAL
Qty: 1 TABLET | Refills: 0 | Status: SHIPPED | OUTPATIENT
Start: 2022-06-13 | End: 2022-07-28

## 2022-06-13 NOTE — PATIENT INSTRUCTIONS
"      MY TREATMENT INFORMATION FOR SLEEP APNEA-  Kristen Thompson    DOCTOR : PALMA Antony-TYLER    Am I having a sleep study at a sleep center?  --->Due to normal delays, you will be contacted within 2-4 weeks to schedule    Am I having a home sleep study?  --->Watch the video for the device you are using:    -/drop off device-   https://www.GamyTechube.com/watch?v=yGGFBdELGhk    -Disposable device sent out require phone/computer application-   https://www.[employer].com/watch?v=BCce_vbiwxE      Frequently asked questions:  1. What is Obstructive Sleep Apnea (MYA)? MYA is the most common type of sleep apnea. Apnea means, \"without breath.\"  Apnea is most often caused by narrowing or collapse of the upper airway as muscles relax during sleep.   Almost everyone has occasional apneas. Most people with sleep apnea have had brief interruptions at night frequently for many years.  The severity of sleep apnea is related to how frequent and severe the events are.   2. What are the consequences of MYA? Symptoms include: feeling sleepy during the day, snoring loudly, gasping or stopping of breathing, trouble sleeping, and occasionally morning headaches or heartburn at night.  Sleepiness can be serious and even increase the risk of falling asleep while driving. Other health consequences may include development of high blood pressure and other cardiovascular disease in persons who are susceptible. Untreated MYA  can contribute to heart disease, stroke and diabetes.   3. What are the treatment options? In most situations, sleep apnea is a lifelong disease that must be managed with daily therapy. Medications are not effective for sleep apnea and surgery is generally not considered until other therapies have been tried. Your treatment is your choice . Continuous Positive Airway (CPAP) works right away and is the therapy that is effective in nearly everyone. An oral device to hold your jaw forward is usually the next most " reliable option. Other options include postioning devices (to keep you off your back), weight loss, and surgery including a tongue pacing device. There is more detail about some of these options below.  4. Are my sleep studies covered by insurance? Although we will request verification of coverage, we advise you also check in advance of the study to ensure there is coverage.    Important tips for those choosing CPAP and similar devices   Know your equipment:  CPAP is continuous positive airway pressure that prevents obstructive sleep apnea by keeping the throat from collapsing while you are sleeping. In most cases, the device is  smart  and can slowly self-adjusts if your throat collapses and keeps a record every day of how well you are treated-this information is available to you and your care team.  BPAP is bilevel positive airway pressure that keeps your throat open and also assists each breath with a pressure boost to maintain adequate breathing.  Special kinds of BPAP are used in patients who have inadequate breathing from lung or heart disease. In most cases, the device is  smart  and can slowly self-adjusts to assist breathing. Like CPAP, the device keeps a record of how well you are treated.  Your mask is your connection to the device. You get to choose what feels most comfortable and the staff will help to make sure if fits. Here: are some examples of the different masks that are available:       Key points to remember on your journey with sleep apnea:  Sleep study.  PAP devices often need to be adjusted during a sleep study to show that they are effective and adjusted right.  Good tips to remember: Try wearing just the mask during a quiet time during the day so your body adapts to wearing it. A humidifier is recommended for comfort in most cases to prevent drying of your nose and throat. Allergy medication from your provider may help you if you are having nasal congestion.  Getting settled-in. It takes  more than one night for most of us to get used to wearing a mask. Try wearing just the mask during a quiet time during the day so your body adapts to wearing it. A humidifier is recommended for comfort in most cases. Our team will work with you carefully on the first day and will be in contact within 4 days and again at 2 and 4 weeks for advice and remote device adjustments. Your therapy is evaluated by the device each day.   Use it every night. The more you are able to sleep naturally for 7-8 hours, the more likely you will have good sleep and to prevent health risks or symptoms from sleep apnea. Even if you use it 4 hours it helps. Occasionally all of us are unable to use a medical therapy, in sleep apnea, it is not dangerous to miss one night.   Communicate. Call our skilled team on the number provided on the first day if your visit for problems that make it difficult to wear the device. Over 2 out of 3 patients can learn to wear the device long-term with help from our team. Remember to call our team or your sleep providers if you are unable to wear the device as we may have other solutions for those who cannot adapt to mask CPAP therapy. It is recommended that you sleep your sleep provider within the first 3 months and yearly after that if you are not having problems.   Use it for your health. We encourage use of CPAP masks during daytime quiet periods to allow your face and brain to adapt to the sensation of CPAP so that it will be a more natural sensation to awaken to at night or during naps. This can be very useful during the first few weeks or months of adapting to CPAP though it does not help medically to wear CPAP during wakefulness and  should not be used as a strategy just to meet guidelines.  Take care of your equipment. Make sure you clean your mask and tubing using directions every day and that your filter and mask are replaced as recommended or if they are not working.     BESIDES CPAP, WHAT OTHER  THERAPIES ARE THERE?    Positioning Device  Positioning devices are generally used when sleep apnea is mild and only occurs on your back.This example shows a pillow that straps around the waist. It may be appropriate for those whose sleep study shows milder sleep apnea that occurs primarily when lying flat on one's back. Preliminary studies have shown benefit but effectiveness at home may need to be verified by a home sleep test. These devices are generally not covered by medical insurance.  Examples of devices that maintain sleeping on the back to prevent snoring and mild sleep apnea.    Belt type body positioner  http://Teleus.HelpHive/    Electronic reminder  http://nightshifttherapy.com/  http://www.Envestnet.HelpHive.au/      Oral Appliance  What is oral appliance therapy?  An oral appliance device fits on your teeth at night like a retainer used after having braces. The device is made by a specialized dentist and requires several visits over 1-2 months before a manufactured device is made to fit your teeth and is adjusted to prevent your sleep apnea. Once an oral device is working properly, snoring should be improved. A home sleep test may be recommended at that time if to determine whether the sleep apnea is adequately treated.       Some things to remember:  -Oral devices are often, but not always, covered by your medical insurance. Be sure to check with your insurance provider.   -If you are referred for oral therapy, you will be given a list of specialized dentists to consider or you may choose to visit the Web site of the American Academy of Dental Sleep Medicine  -Oral devices are less likely to work if you have severe sleep apnea or are extremely overweight.     More detailed information  An oral appliance is a small acrylic device that fits over the upper and lower teeth  (similar to a retainer or a mouth guard). This device slightly moves jaw forward, which moves the base of the tongue forward, opens the  airway, improves breathing for effective treat snoring and obstructive sleep apnea in perhaps 7 out of 10 people .  The best working devices are custom-made by a dental device  after a mold is made of the teeth 1, 2, 3.  When is an oral appliance indicated?  Oral appliance therapy is recommended as a first-line treatment for patients with primary snoring, mild sleep apnea, and for patients with moderate sleep apnea who prefer appliance therapy to use of CPAP4, 5. Severity of sleep apnea is determined by sleep testing and is based on the number of respiratory events per hour of sleep.   How successful is oral appliance therapy?  The success rate of oral appliance therapy in patients with mild sleep apnea is 75-80% while in patients with moderate sleep apnea it is 50-70%. The chance of success in patients with severe sleep apnea is 40-50%. The research also shows that oral appliances have a beneficial effect on the cardiovascular health of MYA patients at the same magnitude as CPAP therapy7.  Oral appliances should be a second-line treatment in cases of severe sleep apnea, but if not completely successful then a combination therapy utilizing CPAP plus oral appliance therapy may be effective. Oral appliances tend to be effective in a broad range of patients although studies show that the patients who have the highest success are females, younger patients, those with milder disease, and less severe obesity. 3, 6.   Finding a dentist that practices dental sleep medicine  Specific training is available through the American Academy of Dental Sleep Medicine for dentists interested in working in the field of sleep. To find a dentist who is educated in the field of sleep and the use of oral appliances, near you, visit the Web site of the American Academy of Dental Sleep Medicine.    References  1. brandi San al. Objectively measured vs self-reported compliance during oral appliance therapy for sleep-disordered  breathing. Chest 2013; 144(5): 7601-3899.  2. Abdirashid, et al. Objective measurement of compliance during oral appliance therapy for sleep-disordered breathing. Thorax 2013; 68(1): 91-96.  3. Nathan et al. Mandibular advancement devices in 620 men and women with MYA and snoring: tolerability and predictors of treatment success. Chest 2004; 125: 7538-0567.  4. Conrado et al. Oral appliances for snoring and MYA: a review. Sleep 2006; 29: 244-262.  5. Winnie et al. Oral appliance treatment for MYA: an update. J Clin Sleep Med 2014; 10(2): 215-227.  6. Nellie et al. Predictors of OSAH treatment outcome. J Dent Res 2007; 86: 1069-1211.      Weight Loss:    Weight loss is a long-term strategy that may improve sleep apnea in some patients.    Weight management is a personal decision and the decision should be based on your interest and the potential benefits.  If you are interested in exploring weight loss strategies, the following discussion covers the impact on weight loss on sleep apnea and the approaches that may be successful.    Being overweight does not necessarily mean you will have health consequences.  Those who have BMI over 35 or over 27 with existing medical conditions carries greater risk.   Weight loss decreases severity of sleep apnea in most people with obesity. For those with mild obesity who have developed snoring with weight gain, even 15-30 pound weight loss can improve and occasionally eliminate sleep apnea.  Structured and life-long dietary and health habits are necessary to lose weight and keep healthier weight levels.     Though there may be significant health benefits from weight loss, long-term weight loss is very difficult to achieve- studies show success with dietary management in less than 10% of people. In addition, substantial weight loss may require years of dietary control and may be difficult if patients have severe obesity. In these cases, surgical management may be  considered.  Finally, older individuals who have tolerated obesity without health complications may be less likely to benefit from weight loss strategies.      [unfilled]    Surgery:    Surgery for obstructive sleep apnea is considered generally only when other therapies fail to work. Surgery may be discussed with you if you are having a difficult time tolerating CPAP and or when there is an abnormal structure that requires surgical correction.  Nose and throat surgeries often enlarge the airway to prevent collapse.  Most of these surgeries create pain for 1-2 weeks and up to half of the most common surgeries are not effective throughout life.  You should carefully discuss the benefits and drawbacks to surgery with your sleep provider and surgeon to determine if it is the best solution for you.   More information  Surgery for MYA is directed at areas that are responsible for narrowing or complete obstruction of the airway during sleep.  There are a wide range of procedures available to enlarge and/or stabilize the airway to prevent blockage of breathing in the three major areas where it can occur: the palate, tongue, and nasal regions.  Successful surgical treatment depends on the accurate identification of the factors responsible for obstructive sleep apnea in each person.  A personalized approach is required because there is no single treatment that works well for everyone.  Because of anatomic variation, consultation with an examination by a sleep surgeon is a critical first step in determining what surgical options are best for each patient.  In some cases, examination during sedation may be recommended in order to guide the selection of procedures.  Patients will be counseled about risks and benefits as well as the typical recovery course after surgery. Surgery is typically not a cure for a person s MYA.  However, surgery will often significantly improve one s YMA severity (termed  success rate ).  Even in the  absence of a cure, surgery will decrease the cardiovascular risk associated with OSA7; improve overall quality of life8 (sleepiness, functionality, sleep quality, etc).      Palate Procedures:  Patients with MYA often have narrowing of their airway in the region of their tonsils and uvula.  The goals of palate procedures are to widen the airway in this region as well as to help the tissues resist collapse.  Modern palate procedure techniques focus on tissue conservation and soft tissue rearrangement, rather than tissue removal.  Often the uvula is preserved in this procedure. Residual sleep apnea is common in patient after pharyngoplasty with an average reduction in sleep apnea events of 33%2.      Tongue Procedures:  ExamWhile patients are awake, the muscles that surround the throat are active and keep this region open for breathing. These muscles relax during sleep, allowing the tongue and other structures to collapse and block breathing.  There are several different tongue procedures available.  Selection of a tongue base procedure depends on characteristics seen on physical exam.  Generally, procedures are aimed at removing bulky tissues in this area or preventing the back of the tongue from falling back during sleep.  Success rates for tongue surgery range from 50-62%3.    Hypoglossal Nerve Stimulation:  Hypoglossal nerve stimulation has recently received approval from the [place] Food and Drug Administration for the treatment of obstructive sleep apnea.  This is based on research showing that the system was safe and effective in treating sleep apnea6.  Results showed that the median AHI score decreased 68%, from 29.3 to 9.0. This therapy uses an implant system that senses breathing patterns and delivers mild stimulation to airway muscles, which keeps the airway open during sleep.  The system consists of three fully implanted components: a small generator (similar in size to a pacemaker), a breathing  sensor, and a stimulation lead.  Using a small handheld remote, a patient turns the therapy on before bed and off upon awakening.    Candidates for this device must be greater than 22 years of age, have moderate to severe MYA (AHI between 20-65), BMI less than 32, have tried CPAP/oral appliance without success, and have appropriate upper airway anatomy (determined by a sleep endoscopy performed by Dr. Spencer).    Hypoglossal Nerve Stimulation Pathway:    The sleep surgeon s office will work with the patient through the insurance prior-authorization process (including communications and appeals).    Nasal Procedures:  Nasal obstruction can interfere with nasal breathing during the day and night.  Studies have shown that relief of nasal obstruction can improve the ability of some patients to tolerate positive airway pressure therapy for obstructive sleep apnea1.  Treatment options include medications such as nasal saline, topical corticosteroid and antihistamine sprays, and oral medications such as antihistamines or decongestants. Non-surgical treatments can include external nasal dilators for selected patients. If these are not successful by themselves, surgery can improve the nasal airway either alone or in combination with these other options.      Combination Procedures:  Combination of surgical procedures and other treatments may be recommended, particularly if patients have more than one area of narrowing or persistent positional disease.  The success rate of combination surgery ranges from 66-80%2,3.    References  Keon SCOTT. The Role of the Nose in Snoring and Obstructive Sleep Apnoea: An Update.  Eur Arch Otorhinolaryngol. 2011; 268: 1365-73.   Cayden SM; Jay JA; Tyler JR; Pallanch JF; Carlos MB; Mauro SG; Lucius MIX. Surgical modifications of the upper airway for obstructive sleep apnea in adults: a systematic review and meta-analysis. SLEEP 2010;33(10):6193-2851. Tico DEMARCO. Hypopharyngeal surgery in  obstructive sleep apnea: an evidence-based medicine review.  Arch Otolaryngol Head Neck Surg. 2006 Feb;132(2):206-13.  Jorge YH1, Carter Y, Mikael MARY. The efficacy of anatomically based multilevel surgery for obstructive sleep apnea. Otolaryngol Head Neck Surg. 2003 Oct;129(4):327-35.  Tico DEMARCO, Goldberg A. Hypopharyngeal Surgery in Obstructive Sleep Apnea: An Evidence-Based Medicine Review. Arch Otolaryngol Head Neck Surg. 2006 Feb;132(2):206-13.  Lamonte COKER et al. Upper-Airway Stimulation for Obstructive Sleep Apnea.  N Engl J Med. 2014 Jan 9;370(2):139-49.  Moon Y et al. Increased Incidence of Cardiovascular Disease in Middle-aged Men with Obstructive Sleep Apnea. Am J Respir Crit Care Med; 2002 166: 159-165  Machucaabisai MAYFIELD et al. Studying Life Effects and Effectiveness of Palatopharyngoplasty (SLEEP) study: Subjective Outcomes of Isolated Uvulopalatopharyngoplasty. Otolaryngol Head Neck Surg. 2011; 144: 623-631.        WHAT IF I ONLY HAVE SNORING?    Mandibular advancement devices, lateral sleep positioning, long-term weight loss and treatment of nasal allergies have been shown to improve snoring.  Exercising tongue muscles with a game (https://www.ncbi.nlm.nih.gov/pubmed/33275015) or stimulating the tongue during the day with a device (https://doi.org/10.3390/ggh11770962) have improved snoring in some individuals.    Remember to Drive Safe... Drive Alive     Sleep health profoundly affects your health, mood, and your safety.  Thirty three percent of the population (one in three of us) is not getting enough sleep and many have a sleep disorder. Not getting enough sleep or having an untreated / undertreated sleep condition may make us sleepy without even knowing it. In fact, our driving could be dramatically impaired due to our sleep health. As your provider, here are some things I would like you to know about driving:     Here are some warning signs for impairment and dangerous drowsy driving:              -Having  been awake more than 16 hours               -Looking tired               -Eyelid drooping              -Head nodding (it could be too late at this point)              -Driving for more than 30 minutes     Some things you could do to make the driving safer if you are experiencing some drowsiness:              -Stop driving and rest              -Call for transportation              -Make sure your sleep disorder is adequately treated     Some things that have been shown NOT to work when experiencing drowsiness while driving:              -Turning on the radio              -Opening windows              -Eating any  distracting  /  entertaining  foods (e.g., sunflower seeds, candy, or any other)              -Talking on the phone      Your decision may not only impact your life, but also the life of others. Please, remember to drive safe for yourself and all of us.

## 2022-06-13 NOTE — RESULT ENCOUNTER NOTE
[employer] message sent:       Total serum IgE is within normal limits.  Serum IgE for regional aeroallergen normal with slight sensitivity to dust mite, and ragweed pollen.  - Recommend avoidance measures.  No changes in the recommendations to try azelastine 2 sprays each nostril twice daily.

## 2022-06-14 ENCOUNTER — HOSPITAL ENCOUNTER (OUTPATIENT)
Dept: MAMMOGRAPHY | Facility: CLINIC | Age: 77
Discharge: HOME | End: 2022-06-14
Attending: FAMILY MEDICINE | Admitting: FAMILY MEDICINE
Payer: COMMERCIAL

## 2022-06-14 DIAGNOSIS — Z12.31 VISIT FOR SCREENING MAMMOGRAM: ICD-10-CM

## 2022-06-14 PROCEDURE — 77067 SCR MAMMO BI INCL CAD: CPT

## 2022-07-01 ENCOUNTER — THERAPY VISIT (OUTPATIENT)
Dept: SLEEP MEDICINE | Facility: CLINIC | Age: 77
End: 2022-07-01
Payer: COMMERCIAL

## 2022-07-01 DIAGNOSIS — R29.818 SUSPECTED SLEEP APNEA: ICD-10-CM

## 2022-07-01 PROCEDURE — 95810 POLYSOM 6/> YRS 4/> PARAM: CPT | Performed by: OTOLARYNGOLOGY

## 2022-07-05 DIAGNOSIS — J30.0 VASOMOTOR RHINITIS: ICD-10-CM

## 2022-07-05 DIAGNOSIS — R09.82 POST-NASAL DRAINAGE: ICD-10-CM

## 2022-07-05 RX ORDER — AZELASTINE 1 MG/ML
2 SPRAY, METERED NASAL 2 TIMES DAILY PRN
Qty: 30 ML | Refills: 3 | OUTPATIENT
Start: 2022-07-05

## 2022-07-05 NOTE — TELEPHONE ENCOUNTER
Pt states that she has not even filled the first prescription. Will disregard.     Rola DUKES RN  Specialty/Allergy Clinics

## 2022-07-05 NOTE — TELEPHONE ENCOUNTER
Requested Prescriptions   Pending Prescriptions Disp Refills     azelastine (ASTELIN) 0.1 % nasal spray 30 mL 3     Sig: Spray 2 sprays into both nostrils 2 times daily as needed for rhinitis       There is no refill protocol information for this order        Last office visit: 6/9/2022 with prescribing provider:  Dr. Diaz    Future Office Visit:          [facility]

## 2022-07-05 NOTE — TELEPHONE ENCOUNTER
Last rx sent on 6/9/2022 with 3 additional refills. Too soon for refills.     My chart message sent to pt regarding this.     Rola DUKES RN  Specialty/Allergy Clinics

## 2022-07-08 ENCOUNTER — HOSPITAL ENCOUNTER (OUTPATIENT)
Dept: MAMMOGRAPHY | Facility: CLINIC | Age: 77
Discharge: HOME | End: 2022-07-08
Attending: FAMILY MEDICINE
Payer: COMMERCIAL

## 2022-07-08 ENCOUNTER — HOSPITAL ENCOUNTER (OUTPATIENT)
Dept: ULTRASOUND IMAGING | Facility: CLINIC | Age: 77
Discharge: HOME | End: 2022-07-08
Attending: FAMILY MEDICINE
Payer: COMMERCIAL

## 2022-07-08 DIAGNOSIS — R92.8 ABNORMAL MAMMOGRAM: ICD-10-CM

## 2022-07-08 PROCEDURE — 77061 BREAST TOMOSYNTHESIS UNI: CPT

## 2022-07-08 PROCEDURE — 76642 ULTRASOUND BREAST LIMITED: CPT | Mod: LT

## 2022-07-11 NOTE — DISCHARGE INSTRUCTIONS
Breast Biopsy Care Instructions      Site Care:   You may have some discomfort in the breast and may see some bruising at the needle site.   You will be given an ice pack which should be kept in place over the dressing until bedtime tonight.Always keep a gauze pad between your skin and the ice pack.   Wearing your bra continuously for 24 hours post biopsy will give optimal support to the biopsy site.    Activity:      You may go back to your normal routine.    No heavy lifting for 24 hours.    Diet and Medications:      You may go back to your regular diet.    Tylenol is the best choice to relieve your discomfort, the first 24 hours. If you have no bleeding on the first day, Ibuprofen (such as Advil, or Motrin), may be taken on the second day after for pain.    Do not take aspirin for 72 hours following your biopsy.    Questions:    If you have any questions about your procedure performed in Ultrasound, you may contact us at [phone].If you have any questions about your procedure performed in Mammography, you may contact us at [phone].    Results:      The pathology report on your biopsy is usually available within 72 hours. The Radiologist or your personal physician will call you with the results.    You will receive information about any further follow up from your physician.    Call your doctor if you have:      More than slight bleeding or significant pain, or experience swelling of the breast.    If your physician is unavailable, please call the Nurse Advice Line at [phone], or [facility] Emergency Department at [phone].      Patient:______________________________  Time:_________  Date:_____________    Instructor:____________________________   Time:_________  Date:_____________   Getting Your Breast Biopsy Results  Waiting for biopsy results is never easy. But in most cases, you will get your results within days. Your provider can tell you what to expect.   You may get the results during  a follow-up visit with your healthcare provider. Or your provider may call you with the results. Either way, your provider will discuss what the test results mean for you. The lump may not be cancer. (This is called a benign lump.) Many benign lumps need no care at all. If a lump is cancer (malignant), you will have many things to think about.   Try to take comfort from the fact that more treatment options exist now than ever before. More tests may be needed to learn more about the cancer, such as how big it is and where it is. This helps your provider plan treatment. Your treatment team will work with you to come up with the plan that's best for you.     If a lump is benign  Learning that a lump is not cancer can be a great relief. To stay healthy, be sure to have mammograms as often as directed. Also, be aware of how your breasts normally look and feel so you can notice any changes right away. If you notice any changes, see your doctor to have them checked.   Treating benign lumps  Lumps that come and go with your periods often don't need to be treated. Still, be sure to check with your healthcare provider. Sometimes limiting salt, caffeine, and alcohol can help relieve any soreness or swelling. If a fluid-filled cyst is tender, you may want to have it drained. Or a painful lump could be removed.   If a lump is cancer  Having breast cancer means that some cells in your breast are growing abnormally. These cells grow and divide faster than normal cells and can spread to other parts of your body. Yet today the outlook is hopeful. In fact, the [employer] [place] says that 9 out of 10 women are alive 5 years after a breast cancer diagnosis. And today there are new and better treatments, as well as more ways to learn details about each woman's cancer to help tailor treatment.   You can play an active role in your cancer care. Talk with your provider about what needs to happen next and the treatment options that  are best for you.   Treating breast cancer  There is no one right treatment for breast cancer. The best approach for you will depend on the exact type and the stage of cancer you have. It will also depend on your age, overall health, and your feelings about treatment and side effects. Give yourself some time to weigh all of your options. Learning as much as you can about breast cancer can help you make the decisions that are best for you.   [employer] last reviewed this educational content on 6/1/2020 2000-2021 [employer]. All rights reserved. This information is not intended as a substitute for professional medical care. Always follow your healthcare professional's instructions.

## 2022-07-12 ENCOUNTER — HOSPITAL ENCOUNTER (OUTPATIENT)
Dept: MAMMOGRAPHY | Facility: CLINIC | Age: 77
Discharge: HOME | End: 2022-07-12
Attending: FAMILY MEDICINE
Payer: COMMERCIAL

## 2022-07-12 ENCOUNTER — HOSPITAL ENCOUNTER (OUTPATIENT)
Dept: ULTRASOUND IMAGING | Facility: CLINIC | Age: 77
Discharge: HOME | End: 2022-07-12
Attending: FAMILY MEDICINE
Payer: COMMERCIAL

## 2022-07-12 DIAGNOSIS — N63.20 BREAST MASS, LEFT: ICD-10-CM

## 2022-07-12 DIAGNOSIS — N63.0 BREAST NODULE: ICD-10-CM

## 2022-07-12 DIAGNOSIS — R92.8 OTHER ABNORMAL AND INCONCLUSIVE FINDINGS ON DIAGNOSTIC IMAGING OF BREAST: ICD-10-CM

## 2022-07-12 PROCEDURE — 19081 BX BREAST 1ST LESION STRTCTC: CPT | Mod: LT

## 2022-07-12 PROCEDURE — 76642 ULTRASOUND BREAST LIMITED: CPT | Mod: LT

## 2022-07-12 PROCEDURE — 77065 DX MAMMO INCL CAD UNI: CPT | Mod: LT

## 2022-07-12 RX ORDER — LIDOCAINE HYDROCHLORIDE 10 MG/ML
15 INJECTION, SOLUTION EPIDURAL; INFILTRATION; INTRACAUDAL; PERINEURAL ONCE
Status: DISCONTINUED | OUTPATIENT
Start: 2022-07-12 | End: 2022-07-13 | Stop reason: HOSPADM

## 2022-07-18 ENCOUNTER — ANCILLARY PROCEDURE (OUTPATIENT)
Dept: MAMMOGRAPHY | Facility: CLINIC | Age: 77
End: 2022-07-18
Attending: FAMILY MEDICINE
Payer: COMMERCIAL

## 2022-07-18 DIAGNOSIS — R92.8 ABNORMAL FINDING ON BREAST IMAGING: ICD-10-CM

## 2022-07-18 LAB
CREAT BLD-MCNC: 0.6 MG/DL (ref 0.5–1)
GFR SERPL CREATININE-BSD FRML MDRD: >60 ML/MIN/1.73M2

## 2022-07-18 PROCEDURE — 19083 BX BREAST 1ST LESION US IMAG: CPT | Mod: LT | Performed by: RADIOLOGY

## 2022-07-18 PROCEDURE — 2894A MA STEREOTACTIC BREAST BIOPSY VACUUM LT: CPT | Mod: LT | Performed by: RADIOLOGY

## 2022-07-18 PROCEDURE — 77066 DX MAMMO INCL CAD BI: CPT | Performed by: RADIOLOGY

## 2022-07-18 PROCEDURE — G0279 TOMOSYNTHESIS, MAMMO: HCPCS | Performed by: RADIOLOGY

## 2022-07-18 PROCEDURE — 82565 ASSAY OF CREATININE: CPT

## 2022-07-18 RX ORDER — IOPAMIDOL 755 MG/ML
92 INJECTION, SOLUTION INTRAVASCULAR ONCE
Status: COMPLETED | OUTPATIENT
Start: 2022-07-18 | End: 2022-07-18

## 2022-07-18 RX ADMIN — IOPAMIDOL 92 ML: 755 INJECTION, SOLUTION INTRAVASCULAR at 10:47

## 2022-07-20 ENCOUNTER — TELEPHONE (OUTPATIENT)
Dept: MAMMOGRAPHY | Facility: CLINIC | Age: 77
End: 2022-07-20

## 2022-07-20 DIAGNOSIS — D18.00 HEMANGIOMA: Primary | ICD-10-CM

## 2022-07-20 NOTE — TELEPHONE ENCOUNTER
Spoke to patient regarding Left breast biopsy done on 7/18/22 with finding of Benign hemangioma. Notified patient that the Radiologist recommendation is Surgical Consultation, referral placed and  will call patient to schedule. Patient verbalized understanding and all questions and concerns answered to patients satisfaction.

## 2022-07-21 ENCOUNTER — PATIENT OUTREACH (OUTPATIENT)
Dept: ONCOLOGY | Facility: CLINIC | Age: 77
End: 2022-07-21

## 2022-07-21 NOTE — PROGRESS NOTES
New Patient Oncology Nurse Navigator Note     Referring provider: Cortney Hung MD     Referring Clinic/Organization: [facility]      Referred to (specialty:) Cancer Surgery      Date Referral Received: July 20, 2022     Evaluation for:  Benign breast condition     Clinical History (per Nurse review of records provided):      Patient had a bilateral screening mammogram on 6/14/22 and a round mass in the medial left breast at the 9 o'clock position, posterior depth, approximately 8 cm from the nipple. Right breast is stable appearing. Left breast diagnostic mammogram and ultrasound followed on 7/8 showing no sonographic abnormality is seen to correlate with the mammographic abnormality. Left diagnostic mammogram: In the medial and posterior aspect of the left breast the previously noted mass or cysts. Margins are slightly  lobulated and this is indeterminate. It measures 7 x 5 x 5 mm. No definite fatty hilum is identified, but this may represent a lymph node. Stereotactic biopsy is recommended and attempted on 7/12 but biopsy could not be performed due to technical difficulty.  A contrast-enhanced mammogram took place on 7/18 showing no enhancement of lesion within the medial left breast.  Biopsy was successfully conducted.  7/18/22   A. LEFT BREAST, 10:00 POSITION,10 CM FROM NIPPLE, BIOPSY:  -Benign hemangioma.  -Negative for atypia or malignancy.     Records Location: See bookmarked material    Writer received referral, reviewed for appropriate plan, and sent to New Patient Scheduling for completion.

## 2022-07-26 LAB — SLPCOMP: NORMAL

## 2022-07-27 NOTE — PROGRESS NOTES
Does Kristen have a CPAP/Bipap?  No     [place] Sleep Scale: 11      Sleep Study Follow-Up Visit:    Date on this visit: 7/29/2022    Kristen Thompson comes in today for follow-up of her sleep study done on 7/1/22 at the [facility] for possible sleep apnea.    Sleep latency 15.5 minutes with Ambien.  REM achieved.   REM latency 225.5 minutes.  Sleep efficiency 85.4%. Total sleep time 336 minutes.    Sleep architecture:  Stage 1, 7.4% (5%), stage 2, 57.1% (45-55%), stage 3, 28.6% (15-20%), stage REM, 6.8% (20-25%).  AHI was 8.2, without desaturations. RDI 11.3.  REM RDI 0, consistent with no REM MYA.  Supine RDI 43.2, consistent with severe SUPINE MYA.  Periodic Limb Movement Index 61.4/hour.         These findings were reviewed with patient.     Past medical/surgical history, family history, social history, medications and allergies were reviewed.      Problem List:  Patient Active Problem List    Diagnosis Date Noted     Hemiplegia and hemiparesis following cerebral infarction affecting unspecified side (H) 04/12/2022     Priority: Medium     Cerebrovascular accident (CVA), unspecified mechanism (H) 03/02/2021     Priority: Medium     Essential hypertension with goal blood pressure less than 130/80 03/02/2021     Priority: Medium     Gastroesophageal reflux disease without esophagitis 07/01/2020     Priority: Medium     Localized edema 06/16/2017     Priority: Medium     Cervicalgia 12/06/2016     Priority: Medium     Gastritis 09/16/2016     Priority: Medium     Chronic constipation 09/08/2016     Priority: Medium     Advance Care Planning 07/11/2014     Priority: Medium     Advance Care Planning: ACP Review and Resources Provided:  Reviewed chart for advance care plan.  Kristen Thompson has no plan or code status on file however states presence of ACP document. Copy requested. Added by Yasmeen Todd on 7/11/2014       Hyperlipidemia LDL goal <130 07/11/2014     Priority: Medium      CARDIOVASCULAR SCREENING; LDL GOAL LESS THAN 160 10/11/2012     Priority: Medium     HL (hearing loss) 10/11/2012     Priority: Medium     Insomnia 12/12/2011     Priority: Medium     Primary localized osteoarthrosis, lower leg 10/02/2007     Priority: Medium     Restless leg syndrome 02/28/2006     Priority: Medium     Female stress incontinence 02/28/2006     Priority: Medium        Impression/Plan:    MIld sleep apnea without hypoxemia- discussed cpap, oral appliance. She had previous watch pat study which showed severe sleep apnea, has lost 30# since that time. She will discuss a oral appliance for sleep apnea with her dentist, she uses a mouth guard already for bruxism.   She will follow up with me as needed    Fifteen minutes spent with patient, all of which were spent face-to-face counseling, consulting, coordinating plan of care.          CC: Cortney Hung

## 2022-07-28 ENCOUNTER — OFFICE VISIT (OUTPATIENT)
Dept: SURGERY | Facility: CLINIC | Age: 77
End: 2022-07-28
Attending: FAMILY MEDICINE
Payer: COMMERCIAL

## 2022-07-28 VITALS — TEMPERATURE: 98.1 F | SYSTOLIC BLOOD PRESSURE: 143 MMHG | DIASTOLIC BLOOD PRESSURE: 83 MMHG | HEART RATE: 74 BPM

## 2022-07-28 DIAGNOSIS — N63.20 LEFT BREAST MASS: ICD-10-CM

## 2022-07-28 DIAGNOSIS — N63.20 BREAST MASS, LEFT: Primary | ICD-10-CM

## 2022-07-28 DIAGNOSIS — N60.82 OTHER BENIGN MAMMARY DYSPLASIAS OF LEFT BREAST: ICD-10-CM

## 2022-07-28 PROCEDURE — 99204 OFFICE O/P NEW MOD 45 MIN: CPT | Performed by: SURGERY

## 2022-07-28 ASSESSMENT — PAIN SCALES - GENERAL: PAINLEVEL: NO PAIN (0)

## 2022-07-28 NOTE — NURSING NOTE
"Initial BP (!) 143/83 (BP Location: Right arm, Patient Position: Sitting, Cuff Size: Adult Regular)   Pulse 74   Temp 98.1  F (36.7  C) (Tympanic)  Estimated body mass index is 27.11 kg/m  as calculated from the following:    Height as of 6/13/22: 1.575 m (5' 2\").    Weight as of 6/13/22: 67.2 kg (148 lb 3.2 oz). .      Aleksandra Denny LPN on 7/28/2022 at 8:54 AM    "

## 2022-07-28 NOTE — PROGRESS NOTES
Assessment:    Kristen Thompson is seen in consultation for left abnormal breast imaging, at the request of Cortney Hugn MD.    Kristen is a 76 year old female with an ill-defined density of the left breast noted on her left mammography at 10:00 and 10 cm from the nipple.  Her core needle biopsy reveals hemangioma.    PLAN:  We have discussed the options of close observation with self breast exams, follow up left mammography and left breast ultrasound and physical exam versus excisional biopsy.   I recommend the later to rule out occult angiosarcoma.  We discussed this risk is low.  She wishes to proceed with surgery.    We have discussed the indication, alternatives, risks and expected recovery.  Specifically, we have discussed incision, scarring, breast deformity, volume loss, postoperative infection, anesthesia, postoperative restrictions and physical limitations.  We have discussed the recommended interventions and treatments for these complications.  All questions have been answered to the best of my ability.    Kristen elects excisional biopsy using Localizer.  We will schedule surgery at the patient's convenience.    She will need a pre-op.  She can remain on her Aspirin 81mg in the perioperative period.    Recommended time off work postop:  Not Applicable wks    HPI:  Kristen Thompson is a 76 year old female who presents to discuss her recent breast imaging.     Breast mass noted:  none  Skin rashes, dimpling or nipple changes:  none  Nipple discharge:   none  Breast pain:  No  Performs self breast exams: Yes  Previous breast biopsies:  Yes - right, 20 years ago, benign  Previous cyst aspiration:  No  Previous other breast surgery:  No     Hormonal history:  , first at 23, Unsure of menopause age, No HRT, No fertility treatment.    Family History:  Family history of breast cancer: Yes - 2 sisters and 2 nieces  Family history of ovarian cancer: No    Imaging:  All imaging studies reviewed by  me.    Recent Results (from the past 744 hour(s))   US Breast Left    Narrative    ULTRASOUND BREAST LEFT LIMITED 1-3 QUADRANTS, MA DIAGNOSTIC LEFT W/  BARRIE July 8, 2022 3:00 PM    HISTORY: Callback for asymmetry in the left breast.     COMPARISON: 6/14/2022.    Density: Heterogeneously dense.     FINDINGS:   Left breast ultrasound: No sonographic abnormality is seen to  correlate with the mammographic abnormality.    Left diagnostic mammogram: In the medial and posterior aspect of the  left breast the previously noted mass or cysts. Margins are slightly  lobulated and this is indeterminate. It measures 7 x 5 x 5 mm. No  definite fatty hilum is identified, but this may represent a lymph  node. Stereotactic biopsy is recommended.       Impression    IMPRESSION: Persistent mass in the medial aspect of the left breast.  This may represent a lymph node, but there is no definite fatty hilum.  Tissue diagnosis is recommended.    BI-RADS CATEGORY: 4 - Suspicious Abnormality-Biopsy Should Be  Considered.    RECOMMENDED FOLLOW-UP: Core biopsy of the left breast.    REKHA RASHEED MD         SYSTEM ID:  [id number]   MA Diagnostic Left w/Barrie    Narrative    ULTRASOUND BREAST LEFT LIMITED 1-3 QUADRANTS, MA DIAGNOSTIC LEFT W/  BARRIE July 8, 2022 3:00 PM    HISTORY: Callback for asymmetry in the left breast.     COMPARISON: 6/14/2022.    Density: Heterogeneously dense.     FINDINGS:   Left breast ultrasound: No sonographic abnormality is seen to  correlate with the mammographic abnormality.    Left diagnostic mammogram: In the medial and posterior aspect of the  left breast the previously noted mass or cysts. Margins are slightly  lobulated and this is indeterminate. It measures 7 x 5 x 5 mm. No  definite fatty hilum is identified, but this may represent a lymph  node. Stereotactic biopsy is recommended.       Impression    IMPRESSION: Persistent mass in the medial aspect of the left breast.  This may represent a lymph node,  but there is no definite fatty hilum.  Tissue diagnosis is recommended.    BI-RADS CATEGORY: 4 - Suspicious Abnormality-Biopsy Should Be  Considered.    RECOMMENDED FOLLOW-UP: Core biopsy of the left breast.    REKHA RASHEED MD         SYSTEM ID:  [id number]   US Breast Left    Narrative    US BREAST LEFT LIMITED 1-3 QUADRANTS, 7/12/2022 3:51 PM    HISTORY: Stereotactic biopsy could not be performed due to technical  limitations. A BB was placed at the site and mammography and  ultrasound was obtained.     COMPARISON:  7/12/2022     FINDINGS:  Extensive sonographic interrogation by the sonographer and  myself could not reveal a lesion in the area in question.       Impression    IMPRESSION: No sonographic abnormality identified in the area of the  mammographic finding. Attempt at stereotactic biopsy at a site that  has anca capability will be pursued.     BI-RADS CATEGORY: 4 - Suspicious Abnormality-Biopsy Should Be  Considered.    REKHA RASHEED MD         SYSTEM ID:  [id number]   MA Stereotactic Breast Biopsy Vacuum Assist Left    Narrative    STEREOTACTIC-GUIDED LEFT BREAST CORE BIOPSY;   SPECIMEN RADIOGRAPH;   CLIP PLACEMENT;   POSTBIOPSY DIGITAL MAMMOGRAM LEFT BREAST  7/12/2022 3:00 PM    INDICATION FOR PROCEDURE: Nodule in the medial left breast.    PROCEDURE: Following discussion of risks and benefits, consent was  obtained. The patient was placed on the stereotactic table and the  nodule was identified and targeted in the craniocaudal projection. The  medial location of this nodule made sampling impossible. We did  additional imaging and placed a BB over this lesion followed by  ultrasound to see if this could be visualized, but no sonographic  abnormality could be seen in the area.      Impression    IMPRESSION: Stereotactic biopsy not performed due to technical  problems. We will see if the biopsy can be performed at a different  site with anca biopsy capability.    REKHA RASHEED MD          SYSTEM ID:  [id number]   MA Diagnostic Digital Left    Narrative    STEREOTACTIC-GUIDED LEFT BREAST CORE BIOPSY;   SPECIMEN RADIOGRAPH;   CLIP PLACEMENT;   POSTBIOPSY DIGITAL MAMMOGRAM LEFT BREAST  7/12/2022 3:00 PM    INDICATION FOR PROCEDURE: Nodule in the medial left breast.    PROCEDURE: Following discussion of risks and benefits, consent was  obtained. The patient was placed on the stereotactic table and the  nodule was identified and targeted in the craniocaudal projection. The  medial location of this nodule made sampling impossible. We did  additional imaging and placed a BB over this lesion followed by  ultrasound to see if this could be visualized, but no sonographic  abnormality could be seen in the area.      Impression    IMPRESSION: Stereotactic biopsy not performed due to technical  problems. We will see if the biopsy can be performed at a different  site with anca biopsy capability.    REKHA RASHEED MD         SYSTEM ID:  [id number]   MA Stereotactic Breast Biopsy Vacuum Assist Left    Addendum: 7/20/2022    Addendum 1: Pathology results  LEFT BREAST, 10:00 POSITION,10 CM FROM NIPPLE, BIOPSY:  Benign hemangioma.  Negative for atypia or malignancy.    Pathology results are concordant with radiological findings.    RECOMMENDED FOLLOW-UP: Surgical Consultation.    ESTEVAN VELASQUEZ MD         SYSTEM ID:  [id number]      Narrative    EXAMINATION: US BREAST BIOPSY CORE NEEDLE LEFT, MA POST PROCEDURE  LEFT, MA STEREOTACTIC BREAST BIOPSY VACUUM LT, 7/18/2022 12:25 PM     HISTORY: Abnormal finding on breast imaging    COMPARISON: Same-day ultrasound, 7/18/2022, 7/12/2022, 7/18/2022    Initially, stereotactic guided biopsy of the medial left breast lesion  was attempted. However despite multiple attempts repositioning, a  biopsy could not be performed due to limited breast thickness and  proximity of the lesion to the superior plate. Following this, the  patient was brought to the ultrasound room     Targeted  "ultrasound evaluation was performed by the technologist and  radiologist.  0.6 x 0.5 x 0.2 cm isoechoic obscured mass within the left breast at  the 10:00 position 10 cm from the nipple, which is felt to be a  correlate to the mammographic finding. We proceeded with ultrasound  guided core needle biopsy..    CONSENT: The procedure, benefits and risks, were explained and  discussed with the patient. Risks included: infection, bleeding, and  the small possibility of even life threatening complications. Written  and verbal consent were obtained.    PROCEDURE: Using aseptic technique, up to 10 cc of 1% lidocaine  buffered with sodium bicarbonate for local anesthesia, ultrasound  guidance, and a biopsy needle were utilized to sample lesion. A  radiopaque biopsy marker was placed. Pressure was held over this area  for approximately 10 to 15 minutes until there was adequate  hemostasis. A dressing was placed and post biopsy care instructions  were discussed with the patient. There were no apparent complications.  The patient left our department in stable condition.  If multiple biopsies were performed, new equipment was used for each  site.    LESION A:  Location: Left , 10:00 position, 10 cm from nipple  Needle: 14 gauge core needle biopsy system  No. of passes: 3  Clip shape: BiomarC (shaped liked a \"barbell\") clip     Post procedure mammogram was performed. Biopsy clip is in satisfactory  position.      Impression    IMPRESSION:   1. Unable to perform stereotactic guided biopsy due to limited breast  thickness. However, the lesion could be located sonographically  2. Uncomplicated ultrasound-guided core needle biopsy.        I have personally reviewed the examination and initial interpretation  and I agree with the findings.    ENIO ALFARO MD         SYSTEM ID:  [id number]   MA Contrast Enhanced Mammogram w Teddy    Narrative    EXAMINATION: MA CONTRAST ENHANCED DIGITAL MAMMOGRAM, 7/18/2022 10:54  AM     HISTORY/FAMILY " HISTORY: Abnormal finding on breast imaging    COMPARISON: 7/12/2022, 7/8/2022, 6/14/2022, 9/20/2021.    TECHNIQUE: Following the administration of intravenous contrast,  dual-energy mammography of both breasts is performed.  Contrast: 92 mL  [id number].    FINDINGS:   BREAST DENSITY: Heterogeneously dense.    There is minimal background contrast enhancement. Lesion within the  medial left breast does not enhance. No other suspicious areas of  enhancement.      Impression    IMPRESSION: BI-RADS CATEGORY: 4 - Suspicious.      RECOMMENDED FOLLOW-UP: Biopsy.   Same day biopsy scheduled.    The patient was given the results of the examination.    I have personally reviewed the examination and initial interpretation  and I agree with the findings.    ENIO ALFARO MD         SYSTEM ID:  [id number]   US Breast Biopsy Core Needle Left    Addendum: 7/20/2022    Addendum 1: Pathology results  LEFT BREAST, 10:00 POSITION,10 CM FROM NIPPLE, BIOPSY:  Benign hemangioma.  Negative for atypia or malignancy.    Pathology results are concordant with radiological findings.    RECOMMENDED FOLLOW-UP: Surgical Consultation.    ESTEVAN VELASQUEZ MD         SYSTEM ID:  [id number]      Narrative    EXAMINATION: US BREAST BIOPSY CORE NEEDLE LEFT, MA POST PROCEDURE  LEFT, MA STEREOTACTIC BREAST BIOPSY VACUUM LT, 7/18/2022 12:25 PM     HISTORY: Abnormal finding on breast imaging    COMPARISON: Same-day ultrasound, 7/18/2022, 7/12/2022, 7/18/2022    Initially, stereotactic guided biopsy of the medial left breast lesion  was attempted. However despite multiple attempts repositioning, a  biopsy could not be performed due to limited breast thickness and  proximity of the lesion to the superior plate. Following this, the  patient was brought to the ultrasound room     Targeted ultrasound evaluation was performed by the technologist and  radiologist.  0.6 x 0.5 x 0.2 cm isoechoic obscured mass within the left breast at  the 10:00 position 10 cm from the  "nipple, which is felt to be a  correlate to the mammographic finding. We proceeded with ultrasound  guided core needle biopsy..    CONSENT: The procedure, benefits and risks, were explained and  discussed with the patient. Risks included: infection, bleeding, and  the small possibility of even life threatening complications. Written  and verbal consent were obtained.    PROCEDURE: Using aseptic technique, up to 10 cc of 1% lidocaine  buffered with sodium bicarbonate for local anesthesia, ultrasound  guidance, and a biopsy needle were utilized to sample lesion. A  radiopaque biopsy marker was placed. Pressure was held over this area  for approximately 10 to 15 minutes until there was adequate  hemostasis. A dressing was placed and post biopsy care instructions  were discussed with the patient. There were no apparent complications.  The patient left our department in stable condition.  If multiple biopsies were performed, new equipment was used for each  site.    LESION A:  Location: Left , 10:00 position, 10 cm from nipple  Needle: 14 gauge core needle biopsy system  No. of passes: 3  Clip shape: BiomarC (shaped liked a \"barbell\") clip     Post procedure mammogram was performed. Biopsy clip is in satisfactory  position.      Impression    IMPRESSION:   1. Unable to perform stereotactic guided biopsy due to limited breast  thickness. However, the lesion could be located sonographically  2. Uncomplicated ultrasound-guided core needle biopsy.        I have personally reviewed the examination and initial interpretation  and I agree with the findings.    ENIO ALFARO MD         SYSTEM ID:  [id number]   MA Post Procedure Left    Addendum: 7/20/2022    Addendum 1: Pathology results  LEFT BREAST, 10:00 POSITION,10 CM FROM NIPPLE, BIOPSY:  Benign hemangioma.  Negative for atypia or malignancy.    Pathology results are concordant with radiological findings.    RECOMMENDED FOLLOW-UP: Surgical Consultation.    ESTEVAN VELASQUEZ MD    "      SYSTEM ID:  [id number]      Narrative    EXAMINATION: US BREAST BIOPSY CORE NEEDLE LEFT, MA POST PROCEDURE  LEFT, MA STEREOTACTIC BREAST BIOPSY VACUUM LT, 7/18/2022 12:25 PM     HISTORY: Abnormal finding on breast imaging    COMPARISON: Same-day ultrasound, 7/18/2022, 7/12/2022, 7/18/2022    Initially, stereotactic guided biopsy of the medial left breast lesion  was attempted. However despite multiple attempts repositioning, a  biopsy could not be performed due to limited breast thickness and  proximity of the lesion to the superior plate. Following this, the  patient was brought to the ultrasound room     Targeted ultrasound evaluation was performed by the technologist and  radiologist.  0.6 x 0.5 x 0.2 cm isoechoic obscured mass within the left breast at  the 10:00 position 10 cm from the nipple, which is felt to be a  correlate to the mammographic finding. We proceeded with ultrasound  guided core needle biopsy..    CONSENT: The procedure, benefits and risks, were explained and  discussed with the patient. Risks included: infection, bleeding, and  the small possibility of even life threatening complications. Written  and verbal consent were obtained.    PROCEDURE: Using aseptic technique, up to 10 cc of 1% lidocaine  buffered with sodium bicarbonate for local anesthesia, ultrasound  guidance, and a biopsy needle were utilized to sample lesion. A  radiopaque biopsy marker was placed. Pressure was held over this area  for approximately 10 to 15 minutes until there was adequate  hemostasis. A dressing was placed and post biopsy care instructions  were discussed with the patient. There were no apparent complications.  The patient left our department in stable condition.  If multiple biopsies were performed, new equipment was used for each  site.    LESION A:  Location: Left , 10:00 position, 10 cm from nipple  Needle: 14 gauge core needle biopsy system  No. of passes: 3  Clip shape: BiomarC (shaped liked a  "\"barbell\") clip     Post procedure mammogram was performed. Biopsy clip is in satisfactory  position.      Impression    IMPRESSION:   1. Unable to perform stereotactic guided biopsy due to limited breast  thickness. However, the lesion could be located sonographically  2. Uncomplicated ultrasound-guided core needle biopsy.        I have personally reviewed the examination and initial interpretation  and I agree with the findings.    ENIO ALFARO MD         SYSTEM ID:  [id number]     Percutaneous core needle biopsy:   A. LEFT BREAST, 10:00 POSITION,10 CM FROM NIPPLE, BIOPSY:  -Benign hemangioma.  -Negative for atypia or malignancy.    Past Medical History:   has a past medical history of Arthritis (11/19/2013), Gastro-oesophageal reflux disease, GERD (gastroesophageal reflux disease) (10/02/2012), H/O total hysterectomy, HL (hearing loss) (10/11/2012), PONV (postoperative nausea and vomiting), and Squamous cell carcinoma.    Past Surgical History:  Past Surgical History:   Procedure Laterality Date     BIOPSY BREAST       COLONOSCOPY  10/30/2012    Procedure: COLONOSCOPY;  Colonoscopy;  Surgeon: Denise Bah MD;  Location: [facility]     ESOPHAGOSCOPY, GASTROSCOPY, DUODENOSCOPY (EGD), COMBINED N/A 9/15/2016    Procedure: COMBINED ESOPHAGOSCOPY, GASTROSCOPY, DUODENOSCOPY (EGD);  Surgeon: Bennie Godinez MD;  Location: [facility]     GALLBLADDER SURGERY       HYSTERECTOMY       knee replacment  2007     RELEASE TRIGGER FINGER Right 4/29/2016    Procedure: RELEASE TRIGGER FINGER;  Surgeon: Percy Sarmiento MD;  Location: [facility]     REPAIR BLADDER       ZZC RAD RESEC TONSIL/PILLARS          Social History:  Social History     Socioeconomic History     Marital status:      Spouse name: Not on file     Number of children: Not on file     Years of education: Not on file     Highest education level: Not on file   Occupational History     Not on file   Tobacco Use     Smoking status: Never Smoker     Smokeless " tobacco: Never Used   Substance and Sexual Activity     Alcohol use: No     Alcohol/week: 0.0 standard drinks     Drug use: No     Sexual activity: Not Currently     Partners: Male   Other Topics Concern     Parent/sibling w/ CABG, MI or angioplasty before 65F 55M? No   Social History Narrative    June 9, 2022    ENVIRONMENTAL HISTORY: [place] The home is heated with a forced air. They does have central air conditioning. The patient's bedroom is furnished with carpeting in bedroom and fabric window coverings.  Pets inside the house include 0. There is no history of cockroach or mice infestation. There is/are 0 smokers in the house.  The house does not have a damp basement.      Social Determinants of Health     Financial Resource Strain: Not on file   Food Insecurity: Not on file   Transportation Needs: Not on file   Physical Activity: Not on file   Stress: Not on file   Social Connections: Not on file   Intimate Partner Violence: Not on file   Housing Stability: Not on file      ROS:  The 10 point review of systems is negative other than noted in the HPI and above.    PE:  Vitals: BP (!) 143/83 (BP Location: Right arm, Patient Position: Sitting, Cuff Size: Adult Regular)   Pulse 74   Temp 98.1  F (36.7  C) (Tympanic)   General appearance: well-nourished, sitting comfortably, no apparent distress  Neck: Supple, without masses or lymphadenopathy   Respirations:  Unlabored  Extremities: Without edema  Neurologic: alert, speech is clear, moves all extremities with good strength  Psychiatric: Mood and affect are appropriate  Skin: Without lesions, rashes, or jaundice  Breast: Examined with Aleksandra Eduin    Symmetrical with no skin or nipple changes.     Contour is normal.   Parenchyma is mildly dense.   Masses- none    Ecchymosis- left, upper inner quadrant   Incisional scar- none    Lymph:      No supraclavicular/infraclavicular adenopathy.    Axillary adenopathy:  none      This note was created using voice recognition software. Undetected word substitutions or other errors may have occurred.     Time spent with the patient with greater that 50% of the time in discussion was 35 minutes.     Jeremy Berg, DO    Please route or send letter to:  Primary Care Provider (PCP)

## 2022-07-28 NOTE — LETTER
7/28/2022         RE: Kristen Thompson  [address] [place] [phone]        Dear Colleague,    Thank you for referring your patient, Kristen Thompson, to the [facility]. Please see a copy of my visit note below.    Assessment:    Kristen Thompson is seen in consultation for left abnormal breast imaging, at the request of Cortney Hung MD.    Kristen is a 76 year old female with an ill-defined density of the left breast noted on her left mammography at 10:00 and 10 cm from the nipple.  Her core needle biopsy reveals hemangioma.    PLAN:  We have discussed the options of close observation with self breast exams, follow up left mammography and left breast ultrasound and physical exam versus excisional biopsy.   I recommend the later to rule out occult angiosarcoma.  We discussed this risk is low.  She wishes to proceed with surgery.    We have discussed the indication, alternatives, risks and expected recovery.  Specifically, we have discussed incision, scarring, breast deformity, volume loss, postoperative infection, anesthesia, postoperative restrictions and physical limitations.  We have discussed the recommended interventions and treatments for these complications.  All questions have been answered to the best of my ability.    Kristen elects excisional biopsy using Localizer.  We will schedule surgery at the patient's convenience.    She will need a pre-op.  She can remain on her Aspirin 81mg in the perioperative period.    Recommended time off work postop:  Not Applicable wks    HPI:  Kristen Thompson is a 76 year old female who presents to discuss her recent breast imaging.     Breast mass noted:  none  Skin rashes, dimpling or nipple changes:  none  Nipple discharge:   none  Breast pain:  No  Performs self breast exams: Yes  Previous breast biopsies:  Yes - right, 20 years ago, benign  Previous cyst aspiration:  No  Previous other breast surgery:  No     Hormonal  history:  , first at 23, Unsure of menopause age, No HRT, No fertility treatment.    Family History:  Family history of breast cancer: Yes - 2 sisters and 2 nieces  Family history of ovarian cancer: No    Imaging:  All imaging studies reviewed by me.    Recent Results (from the past 744 hour(s))   US Breast Left    Narrative    ULTRASOUND BREAST LEFT LIMITED 1-3 QUADRANTS, MA DIAGNOSTIC LEFT W/  BARRIE 2022 3:00 PM    HISTORY: Callback for asymmetry in the left breast.     COMPARISON: 2022.    Density: Heterogeneously dense.     FINDINGS:   Left breast ultrasound: No sonographic abnormality is seen to  correlate with the mammographic abnormality.    Left diagnostic mammogram: In the medial and posterior aspect of the  left breast the previously noted mass or cysts. Margins are slightly  lobulated and this is indeterminate. It measures 7 x 5 x 5 mm. No  definite fatty hilum is identified, but this may represent a lymph  node. Stereotactic biopsy is recommended.       Impression    IMPRESSION: Persistent mass in the medial aspect of the left breast.  This may represent a lymph node, but there is no definite fatty hilum.  Tissue diagnosis is recommended.    BI-RADS CATEGORY: 4 - Suspicious Abnormality-Biopsy Should Be  Considered.    RECOMMENDED FOLLOW-UP: Core biopsy of the left breast.    REKHA RASHEED MD         SYSTEM ID:  [id number]   MA Diagnostic Left w/Barrie    Narrative    ULTRASOUND BREAST LEFT LIMITED 1-3 QUADRANTS, MA DIAGNOSTIC LEFT W/  BARRIE 2022 3:00 PM    HISTORY: Callback for asymmetry in the left breast.     COMPARISON: 2022.    Density: Heterogeneously dense.     FINDINGS:   Left breast ultrasound: No sonographic abnormality is seen to  correlate with the mammographic abnormality.    Left diagnostic mammogram: In the medial and posterior aspect of the  left breast the previously noted mass or cysts. Margins are slightly  lobulated and this is indeterminate. It measures 7  x 5 x 5 mm. No  definite fatty hilum is identified, but this may represent a lymph  node. Stereotactic biopsy is recommended.       Impression    IMPRESSION: Persistent mass in the medial aspect of the left breast.  This may represent a lymph node, but there is no definite fatty hilum.  Tissue diagnosis is recommended.    BI-RADS CATEGORY: 4 - Suspicious Abnormality-Biopsy Should Be  Considered.    RECOMMENDED FOLLOW-UP: Core biopsy of the left breast.    REKHA RASHEED MD         SYSTEM ID:  [id number]   US Breast Left    Narrative    US BREAST LEFT LIMITED 1-3 QUADRANTS, 7/12/2022 3:51 PM    HISTORY: Stereotactic biopsy could not be performed due to technical  limitations. A BB was placed at the site and mammography and  ultrasound was obtained.     COMPARISON:  7/12/2022     FINDINGS:  Extensive sonographic interrogation by the sonographer and  myself could not reveal a lesion in the area in question.       Impression    IMPRESSION: No sonographic abnormality identified in the area of the  mammographic finding. Attempt at stereotactic biopsy at a site that  has anca capability will be pursued.     BI-RADS CATEGORY: 4 - Suspicious Abnormality-Biopsy Should Be  Considered.    REKHA RASHEED MD         SYSTEM ID:  [id number]   MA Stereotactic Breast Biopsy Vacuum Assist Left    Narrative    STEREOTACTIC-GUIDED LEFT BREAST CORE BIOPSY;   SPECIMEN RADIOGRAPH;   CLIP PLACEMENT;   POSTBIOPSY DIGITAL MAMMOGRAM LEFT BREAST  7/12/2022 3:00 PM    INDICATION FOR PROCEDURE: Nodule in the medial left breast.    PROCEDURE: Following discussion of risks and benefits, consent was  obtained. The patient was placed on the stereotactic table and the  nodule was identified and targeted in the craniocaudal projection. The  medial location of this nodule made sampling impossible. We did  additional imaging and placed a BB over this lesion followed by  ultrasound to see if this could be visualized, but no sonographic  abnormality  could be seen in the area.      Impression    IMPRESSION: Stereotactic biopsy not performed due to technical  problems. We will see if the biopsy can be performed at a different  site with anca biopsy capability.    REKHA RASHEED MD         SYSTEM ID:  [id number]   MA Diagnostic Digital Left    Narrative    STEREOTACTIC-GUIDED LEFT BREAST CORE BIOPSY;   SPECIMEN RADIOGRAPH;   CLIP PLACEMENT;   POSTBIOPSY DIGITAL MAMMOGRAM LEFT BREAST  7/12/2022 3:00 PM    INDICATION FOR PROCEDURE: Nodule in the medial left breast.    PROCEDURE: Following discussion of risks and benefits, consent was  obtained. The patient was placed on the stereotactic table and the  nodule was identified and targeted in the craniocaudal projection. The  medial location of this nodule made sampling impossible. We did  additional imaging and placed a BB over this lesion followed by  ultrasound to see if this could be visualized, but no sonographic  abnormality could be seen in the area.      Impression    IMPRESSION: Stereotactic biopsy not performed due to technical  problems. We will see if the biopsy can be performed at a different  site with anca biopsy capability.    REKHA RASHEED MD         SYSTEM ID:  [id number]   MA Stereotactic Breast Biopsy Vacuum Assist Left    Addendum: 7/20/2022    Addendum 1: Pathology results  LEFT BREAST, 10:00 POSITION,10 CM FROM NIPPLE, BIOPSY:  Benign hemangioma.  Negative for atypia or malignancy.    Pathology results are concordant with radiological findings.    RECOMMENDED FOLLOW-UP: Surgical Consultation.    ESTEVAN VELASQUEZ MD         SYSTEM ID:  [id number]      Narrative    EXAMINATION: US BREAST BIOPSY CORE NEEDLE LEFT, MA POST PROCEDURE  LEFT, MA STEREOTACTIC BREAST BIOPSY VACUUM LT, 7/18/2022 12:25 PM     HISTORY: Abnormal finding on breast imaging    COMPARISON: Same-day ultrasound, 7/18/2022, 7/12/2022, 7/18/2022    Initially, stereotactic guided biopsy of the medial left breast lesion  was attempted.  "However despite multiple attempts repositioning, a  biopsy could not be performed due to limited breast thickness and  proximity of the lesion to the superior plate. Following this, the  patient was brought to the ultrasound room     Targeted ultrasound evaluation was performed by the technologist and  radiologist.  0.6 x 0.5 x 0.2 cm isoechoic obscured mass within the left breast at  the 10:00 position 10 cm from the nipple, which is felt to be a  correlate to the mammographic finding. We proceeded with ultrasound  guided core needle biopsy..    CONSENT: The procedure, benefits and risks, were explained and  discussed with the patient. Risks included: infection, bleeding, and  the small possibility of even life threatening complications. Written  and verbal consent were obtained.    PROCEDURE: Using aseptic technique, up to 10 cc of 1% lidocaine  buffered with sodium bicarbonate for local anesthesia, ultrasound  guidance, and a biopsy needle were utilized to sample lesion. A  radiopaque biopsy marker was placed. Pressure was held over this area  for approximately 10 to 15 minutes until there was adequate  hemostasis. A dressing was placed and post biopsy care instructions  were discussed with the patient. There were no apparent complications.  The patient left our department in stable condition.  If multiple biopsies were performed, new equipment was used for each  site.    LESION A:  Location: Left , 10:00 position, 10 cm from nipple  Needle: 14 gauge core needle biopsy system  No. of passes: 3  Clip shape: BiomarC (shaped liked a \"barbell\") clip     Post procedure mammogram was performed. Biopsy clip is in satisfactory  position.      Impression    IMPRESSION:   1. Unable to perform stereotactic guided biopsy due to limited breast  thickness. However, the lesion could be located sonographically  2. Uncomplicated ultrasound-guided core needle biopsy.        I have personally reviewed the examination and initial " interpretation  and I agree with the findings.    ENIO ALFARO MD         SYSTEM ID:  [id number]   MA Contrast Enhanced Mammogram w Teddy    Narrative    EXAMINATION: MA CONTRAST ENHANCED DIGITAL MAMMOGRAM, 7/18/2022 10:54  AM     HISTORY/FAMILY HISTORY: Abnormal finding on breast imaging    COMPARISON: 7/12/2022, 7/8/2022, 6/14/2022, 9/20/2021.    TECHNIQUE: Following the administration of intravenous contrast,  dual-energy mammography of both breasts is performed.  Contrast: 92 mL  [id number].    FINDINGS:   BREAST DENSITY: Heterogeneously dense.    There is minimal background contrast enhancement. Lesion within the  medial left breast does not enhance. No other suspicious areas of  enhancement.      Impression    IMPRESSION: BI-RADS CATEGORY: 4 - Suspicious.      RECOMMENDED FOLLOW-UP: Biopsy.   Same day biopsy scheduled.    The patient was given the results of the examination.    I have personally reviewed the examination and initial interpretation  and I agree with the findings.    ENIO ALFARO MD         SYSTEM ID:  [id number]   US Breast Biopsy Core Needle Left    Addendum: 7/20/2022    Addendum 1: Pathology results  LEFT BREAST, 10:00 POSITION,10 CM FROM NIPPLE, BIOPSY:  Benign hemangioma.  Negative for atypia or malignancy.    Pathology results are concordant with radiological findings.    RECOMMENDED FOLLOW-UP: Surgical Consultation.    ESTEVAN VELASQUEZ MD         SYSTEM ID:  [id number]      Narrative    EXAMINATION: US BREAST BIOPSY CORE NEEDLE LEFT, MA POST PROCEDURE  LEFT, MA STEREOTACTIC BREAST BIOPSY VACUUM LT, 7/18/2022 12:25 PM     HISTORY: Abnormal finding on breast imaging    COMPARISON: Same-day ultrasound, 7/18/2022, 7/12/2022, 7/18/2022    Initially, stereotactic guided biopsy of the medial left breast lesion  was attempted. However despite multiple attempts repositioning, a  biopsy could not be performed due to limited breast thickness and  proximity of the lesion to the superior plate. Following  "this, the  patient was brought to the ultrasound room     Targeted ultrasound evaluation was performed by the technologist and  radiologist.  0.6 x 0.5 x 0.2 cm isoechoic obscured mass within the left breast at  the 10:00 position 10 cm from the nipple, which is felt to be a  correlate to the mammographic finding. We proceeded with ultrasound  guided core needle biopsy..    CONSENT: The procedure, benefits and risks, were explained and  discussed with the patient. Risks included: infection, bleeding, and  the small possibility of even life threatening complications. Written  and verbal consent were obtained.    PROCEDURE: Using aseptic technique, up to 10 cc of 1% lidocaine  buffered with sodium bicarbonate for local anesthesia, ultrasound  guidance, and a biopsy needle were utilized to sample lesion. A  radiopaque biopsy marker was placed. Pressure was held over this area  for approximately 10 to 15 minutes until there was adequate  hemostasis. A dressing was placed and post biopsy care instructions  were discussed with the patient. There were no apparent complications.  The patient left our department in stable condition.  If multiple biopsies were performed, new equipment was used for each  site.    LESION A:  Location: Left , 10:00 position, 10 cm from nipple  Needle: 14 gauge core needle biopsy system  No. of passes: 3  Clip shape: BiomarC (shaped liked a \"barbell\") clip     Post procedure mammogram was performed. Biopsy clip is in satisfactory  position.      Impression    IMPRESSION:   1. Unable to perform stereotactic guided biopsy due to limited breast  thickness. However, the lesion could be located sonographically  2. Uncomplicated ultrasound-guided core needle biopsy.        I have personally reviewed the examination and initial interpretation  and I agree with the findings.    ENIO ALFARO MD         SYSTEM ID:  [id number]   MA Post Procedure Left    Addendum: 7/20/2022    Addendum 1: Pathology " results  LEFT BREAST, 10:00 POSITION,10 CM FROM NIPPLE, BIOPSY:  Benign hemangioma.  Negative for atypia or malignancy.    Pathology results are concordant with radiological findings.    RECOMMENDED FOLLOW-UP: Surgical Consultation.    ESTEVAN VELASQUEZ MD         SYSTEM ID:  [id number]      Narrative    EXAMINATION: US BREAST BIOPSY CORE NEEDLE LEFT, MA POST PROCEDURE  LEFT, MA STEREOTACTIC BREAST BIOPSY VACUUM LT, 7/18/2022 12:25 PM     HISTORY: Abnormal finding on breast imaging    COMPARISON: Same-day ultrasound, 7/18/2022, 7/12/2022, 7/18/2022    Initially, stereotactic guided biopsy of the medial left breast lesion  was attempted. However despite multiple attempts repositioning, a  biopsy could not be performed due to limited breast thickness and  proximity of the lesion to the superior plate. Following this, the  patient was brought to the ultrasound room     Targeted ultrasound evaluation was performed by the technologist and  radiologist.  0.6 x 0.5 x 0.2 cm isoechoic obscured mass within the left breast at  the 10:00 position 10 cm from the nipple, which is felt to be a  correlate to the mammographic finding. We proceeded with ultrasound  guided core needle biopsy..    CONSENT: The procedure, benefits and risks, were explained and  discussed with the patient. Risks included: infection, bleeding, and  the small possibility of even life threatening complications. Written  and verbal consent were obtained.    PROCEDURE: Using aseptic technique, up to 10 cc of 1% lidocaine  buffered with sodium bicarbonate for local anesthesia, ultrasound  guidance, and a biopsy needle were utilized to sample lesion. A  radiopaque biopsy marker was placed. Pressure was held over this area  for approximately 10 to 15 minutes until there was adequate  hemostasis. A dressing was placed and post biopsy care instructions  were discussed with the patient. There were no apparent complications.  The patient left our department in stable  "condition.  If multiple biopsies were performed, new equipment was used for each  site.    LESION A:  Location: Left , 10:00 position, 10 cm from nipple  Needle: 14 gauge core needle biopsy system  No. of passes: 3  Clip shape: BiomarC (shaped liked a \"barbell\") clip     Post procedure mammogram was performed. Biopsy clip is in satisfactory  position.      Impression    IMPRESSION:   1. Unable to perform stereotactic guided biopsy due to limited breast  thickness. However, the lesion could be located sonographically  2. Uncomplicated ultrasound-guided core needle biopsy.        I have personally reviewed the examination and initial interpretation  and I agree with the findings.    ENIO ALFARO MD         SYSTEM ID:  [id number]     Percutaneous core needle biopsy:   A. LEFT BREAST, 10:00 POSITION,10 CM FROM NIPPLE, BIOPSY:  -Benign hemangioma.  -Negative for atypia or malignancy.    Past Medical History:   has a past medical history of Arthritis (11/19/2013), Gastro-oesophageal reflux disease, GERD (gastroesophageal reflux disease) (10/02/2012), H/O total hysterectomy, HL (hearing loss) (10/11/2012), PONV (postoperative nausea and vomiting), and Squamous cell carcinoma.    Past Surgical History:  Past Surgical History:   Procedure Laterality Date     BIOPSY BREAST       COLONOSCOPY  10/30/2012    Procedure: COLONOSCOPY;  Colonoscopy;  Surgeon: Denise Bah MD;  Location: [facility]     ESOPHAGOSCOPY, GASTROSCOPY, DUODENOSCOPY (EGD), COMBINED N/A 9/15/2016    Procedure: COMBINED ESOPHAGOSCOPY, GASTROSCOPY, DUODENOSCOPY (EGD);  Surgeon: Bennie Godinez MD;  Location: [facility]     GALLBLADDER SURGERY       HYSTERECTOMY       knee replacment  2007     RELEASE TRIGGER FINGER Right 4/29/2016    Procedure: RELEASE TRIGGER FINGER;  Surgeon: Percy Sarmiento MD;  Location: [facility]     REPAIR BLADDER       ZZC RAD RESEC TONSIL/PILLARS          Social History:  Social History     Socioeconomic History     Marital " status:      Spouse name: Not on file     Number of children: Not on file     Years of education: Not on file     Highest education level: Not on file   Occupational History     Not on file   Tobacco Use     Smoking status: Never Smoker     Smokeless tobacco: Never Used   Substance and Sexual Activity     Alcohol use: No     Alcohol/week: 0.0 standard drinks     Drug use: No     Sexual activity: Not Currently     Partners: Male   Other Topics Concern     Parent/sibling w/ CABG, MI or angioplasty before 65F 55M? No   Social History Narrative    June 9, 2022    ENVIRONMENTAL HISTORY: [place] The home is heated with a forced air. They does have central air conditioning. The patient's bedroom is furnished with carpeting in bedroom and fabric window coverings.  Pets inside the house include 0. There is no history of cockroach or mice infestation. There is/are 0 smokers in the house.  The house does not have a damp basement.      Social Determinants of Health     Financial Resource Strain: Not on file   Food Insecurity: Not on file   Transportation Needs: Not on file   Physical Activity: Not on file   Stress: Not on file   Social Connections: Not on file   Intimate Partner Violence: Not on file   Housing Stability: Not on file      ROS:  The 10 point review of systems is negative other than noted in the HPI and above.    PE:  Vitals: BP (!) 143/83 (BP Location: Right arm, Patient Position: Sitting, Cuff Size: Adult Regular)   Pulse 74   Temp 98.1  F (36.7  C) (Tympanic)   General appearance: well-nourished, sitting comfortably, no apparent distress  Neck: Supple, without masses or lymphadenopathy   Respirations:  Unlabored  Extremities: Without edema  Neurologic: alert, speech is clear, moves all extremities with good strength  Psychiatric: Mood and affect are appropriate  Skin: Without lesions, rashes, or jaundice  Breast: Examined with Aleksandra Ortiz  with no skin or nipple changes.     Contour is normal.   Parenchyma is mildly dense.   Masses- none    Ecchymosis- left, upper inner quadrant   Incisional scar- none    Lymph:      No supraclavicular/infraclavicular adenopathy.    Axillary adenopathy: none      This note was created using voice recognition software. Undetected word substitutions or other errors may have occurred.     Time spent with the patient with greater that 50% of the time in discussion was 35 minutes.     Jeremy Berg, DO    Please route or send letter to:  Primary Care Provider (PCP)          Again, thank you for allowing me to participate in the care of your patient.        Sincerely,        Jeremy Berg, DO

## 2022-07-29 ENCOUNTER — OFFICE VISIT (OUTPATIENT)
Dept: SLEEP MEDICINE | Facility: CLINIC | Age: 77
End: 2022-07-29
Payer: COMMERCIAL

## 2022-07-29 ENCOUNTER — MYC MEDICAL ADVICE (OUTPATIENT)
Dept: FAMILY MEDICINE | Facility: CLINIC | Age: 77
End: 2022-07-29

## 2022-07-29 VITALS
BODY MASS INDEX: 27.23 KG/M2 | HEIGHT: 62 IN | DIASTOLIC BLOOD PRESSURE: 73 MMHG | WEIGHT: 148 LBS | HEART RATE: 53 BPM | OXYGEN SATURATION: 95 % | SYSTOLIC BLOOD PRESSURE: 106 MMHG

## 2022-07-29 DIAGNOSIS — G47.33 OSA (OBSTRUCTIVE SLEEP APNEA): Primary | ICD-10-CM

## 2022-07-29 PROCEDURE — 99213 OFFICE O/P EST LOW 20 MIN: CPT | Performed by: PHYSICIAN ASSISTANT

## 2022-07-29 ASSESSMENT — SLEEP AND FATIGUE QUESTIONNAIRES
HOW LIKELY ARE YOU TO NOD OFF OR FALL ASLEEP WHILE SITTING AND TALKING TO SOMEONE: WOULD NEVER DOZE
HOW LIKELY ARE YOU TO NOD OFF OR FALL ASLEEP WHILE SITTING INACTIVE IN A PUBLIC PLACE: SLIGHT CHANCE OF DOZING
HOW LIKELY ARE YOU TO NOD OFF OR FALL ASLEEP WHEN YOU ARE A PASSENGER IN A CAR FOR AN HOUR WITHOUT A BREAK: WOULD NEVER DOZE
HOW LIKELY ARE YOU TO NOD OFF OR FALL ASLEEP WHILE SITTING QUIETLY AFTER LUNCH WITHOUT ALCOHOL: MODERATE CHANCE OF DOZING
HOW LIKELY ARE YOU TO NOD OFF OR FALL ASLEEP WHILE SITTING AND READING: HIGH CHANCE OF DOZING
HOW LIKELY ARE YOU TO NOD OFF OR FALL ASLEEP WHILE WATCHING TV: HIGH CHANCE OF DOZING
HOW LIKELY ARE YOU TO NOD OFF OR FALL ASLEEP IN A CAR, WHILE STOPPED FOR A FEW MINUTES IN TRAFFIC: WOULD NEVER DOZE
HOW LIKELY ARE YOU TO NOD OFF OR FALL ASLEEP WHILE LYING DOWN TO REST IN THE AFTERNOON WHEN CIRCUMSTANCES PERMIT: MODERATE CHANCE OF DOZING

## 2022-08-01 NOTE — CONFIDENTIAL NOTE
Please help her schedule in my thurs hold 11 spot if this works for her or the 1 oclock . Thank you Cortney Hung M.D.

## 2022-08-04 ENCOUNTER — OFFICE VISIT (OUTPATIENT)
Dept: FAMILY MEDICINE | Facility: CLINIC | Age: 77
End: 2022-08-04
Payer: COMMERCIAL

## 2022-08-04 VITALS
BODY MASS INDEX: 25.95 KG/M2 | SYSTOLIC BLOOD PRESSURE: 120 MMHG | HEIGHT: 62 IN | TEMPERATURE: 98.2 F | WEIGHT: 141 LBS | OXYGEN SATURATION: 97 % | HEART RATE: 57 BPM | DIASTOLIC BLOOD PRESSURE: 78 MMHG

## 2022-08-04 DIAGNOSIS — I49.9 IRREGULAR HEART RHYTHM: ICD-10-CM

## 2022-08-04 DIAGNOSIS — I63.9 CEREBROVASCULAR ACCIDENT (CVA), UNSPECIFIED MECHANISM (H): ICD-10-CM

## 2022-08-04 DIAGNOSIS — I69.359 HEMIPLEGIA AND HEMIPARESIS FOLLOWING CEREBRAL INFARCTION AFFECTING UNSPECIFIED SIDE (H): ICD-10-CM

## 2022-08-04 DIAGNOSIS — E78.5 HYPERLIPIDEMIA LDL GOAL <130: ICD-10-CM

## 2022-08-04 DIAGNOSIS — Z01.818 PREOP GENERAL PHYSICAL EXAM: Primary | ICD-10-CM

## 2022-08-04 DIAGNOSIS — R92.8 ABNORMAL FINDING ON BREAST IMAGING: ICD-10-CM

## 2022-08-04 DIAGNOSIS — J30.2 SEASONAL ALLERGIC RHINITIS, UNSPECIFIED TRIGGER: ICD-10-CM

## 2022-08-04 LAB
ANION GAP SERPL CALCULATED.3IONS-SCNC: 3 MMOL/L (ref 3–14)
BASOPHILS # BLD AUTO: 0 10E3/UL (ref 0–0.2)
BASOPHILS NFR BLD AUTO: 1 %
BUN SERPL-MCNC: 13 MG/DL (ref 7–30)
CALCIUM SERPL-MCNC: 9.5 MG/DL (ref 8.5–10.1)
CHLORIDE BLD-SCNC: 106 MMOL/L (ref 94–109)
CHOLEST SERPL-MCNC: 166 MG/DL
CO2 SERPL-SCNC: 30 MMOL/L (ref 20–32)
CREAT SERPL-MCNC: 0.58 MG/DL (ref 0.52–1.04)
EOSINOPHIL # BLD AUTO: 0.1 10E3/UL (ref 0–0.7)
EOSINOPHIL NFR BLD AUTO: 1 %
ERYTHROCYTE [DISTWIDTH] IN BLOOD BY AUTOMATED COUNT: 12.3 % (ref 10–15)
FASTING STATUS PATIENT QL REPORTED: NO
GFR SERPL CREATININE-BSD FRML MDRD: >90 ML/MIN/1.73M2
GLUCOSE BLD-MCNC: 96 MG/DL (ref 70–99)
HCT VFR BLD AUTO: 42.4 % (ref 35–47)
HDLC SERPL-MCNC: 73 MG/DL
HGB BLD-MCNC: 13.6 G/DL (ref 11.7–15.7)
LDLC SERPL CALC-MCNC: 82 MG/DL
LYMPHOCYTES # BLD AUTO: 1.3 10E3/UL (ref 0.8–5.3)
LYMPHOCYTES NFR BLD AUTO: 23 %
MCH RBC QN AUTO: 33.3 PG (ref 26.5–33)
MCHC RBC AUTO-ENTMCNC: 32.1 G/DL (ref 31.5–36.5)
MCV RBC AUTO: 104 FL (ref 78–100)
MONOCYTES # BLD AUTO: 0.5 10E3/UL (ref 0–1.3)
MONOCYTES NFR BLD AUTO: 9 %
NEUTROPHILS # BLD AUTO: 3.7 10E3/UL (ref 1.6–8.3)
NEUTROPHILS NFR BLD AUTO: 67 %
NONHDLC SERPL-MCNC: 93 MG/DL
PLATELET # BLD AUTO: 242 10E3/UL (ref 150–450)
POTASSIUM BLD-SCNC: 4 MMOL/L (ref 3.4–5.3)
RBC # BLD AUTO: 4.09 10E6/UL (ref 3.8–5.2)
SODIUM SERPL-SCNC: 139 MMOL/L (ref 133–144)
TRIGL SERPL-MCNC: 57 MG/DL
WBC # BLD AUTO: 5.6 10E3/UL (ref 4–11)

## 2022-08-04 PROCEDURE — 93000 ELECTROCARDIOGRAM COMPLETE: CPT | Performed by: FAMILY MEDICINE

## 2022-08-04 PROCEDURE — 85025 COMPLETE CBC W/AUTO DIFF WBC: CPT | Performed by: FAMILY MEDICINE

## 2022-08-04 PROCEDURE — 36415 COLL VENOUS BLD VENIPUNCTURE: CPT | Performed by: FAMILY MEDICINE

## 2022-08-04 PROCEDURE — 80048 BASIC METABOLIC PNL TOTAL CA: CPT | Performed by: FAMILY MEDICINE

## 2022-08-04 PROCEDURE — 80061 LIPID PANEL: CPT | Performed by: FAMILY MEDICINE

## 2022-08-04 PROCEDURE — 99214 OFFICE O/P EST MOD 30 MIN: CPT | Performed by: FAMILY MEDICINE

## 2022-08-04 NOTE — PATIENT INSTRUCTIONS
Preparing for Your Surgery  Getting started  A nurse will call you to review your health history and instructions. They will give you an arrival time based on your scheduled surgery time. Please be ready to share:    Your doctor's clinic name and phone number    Your medical, surgical and anesthesia history    A list of allergies and sensitivities    A list of medicines, including herbal treatments and over-the-counter drugs    Whether the patient has a legal guardian (ask how to send us the papers in advance)  Please tell us if you're pregnant--or if there's any chance you might be pregnant. Some surgeries may injure a fetus (unborn baby), so they require a pregnancy test. Surgeries that are safe for a fetus don't always need a test, and you can choose whether to have one.   If you have a child who's having surgery, please ask for a copy of Preparing for Your Child's Surgery.    Preparing for surgery    Within 30 days of surgery: Have a pre-op exam (sometimes called an H&P, or History and Physical). This can be done at a clinic or pre-operative center.  ? If you're having a , you may not need this exam. Talk to your care team.    At your pre-op exam, talk to your care team about all medicines you take. If you need to stop any medicines before surgery, ask when to start taking them again.  ? We do this for your safety. Many medicines can make you bleed too much during surgery. Some change how well surgery (anesthesia) drugs work.    Call your insurance company to let them know you're having surgery. (If you don't have insurance, call [phone].)    Call your clinic if there's any change in your health. This includes signs of a cold or flu (sore throat, runny nose, cough, rash, fever). It also includes a scrape or scratch near the surgery site.    If you have questions on the day of surgery, call your hospital or surgery center.  COVID testing  You may need to be tested for COVID-19 before having  surgery. If so, we will give you instructions.  Eating and drinking guidelines  For your safety: Unless your surgeon tells you otherwise, follow the guidelines below.    Eat and drink as usual until 8 hours before surgery. After that, no food or milk.    Drink clear liquids until 2 hours before surgery. These are liquids you can see through, like water, Gatorade and Propel Water. You may also have black coffee and tea (no cream or milk).    Nothing by mouth within 2 hours of surgery. This includes gum, candy and breath mints.    If you drink alcohol: Stop drinking it the night before surgery.    If your care team tells you to take medicine on the morning of surgery, it's okay to take it with a sip of water.  Preventing infection    Shower or bathe the night before and morning of your surgery. Follow the instructions your clinic gave you. (If no instructions, use regular soap.)    Don't shave or clip hair near your surgery site. We'll remove the hair if needed.    Don't smoke or vape the morning of surgery. You may chew nicotine gum up to 2 hours before surgery. A nicotine patch is okay.  ? Note: Some surgeries require you to completely quit smoking and nicotine. Check with your surgeon.    Your care team will make every effort to keep you safe from infection. We will:  ? Clean our hands often with soap and water (or an alcohol-based hand rub).  ? Clean the skin at your surgery site with a special soap that kills germs.  ? Give you a special gown to keep you warm. (Cold raises the risk of infection.)  ? Wear special hair covers, masks, gowns and gloves during surgery.  ? Give antibiotic medicine, if prescribed. Not all surgeries need antibiotics.  What to bring on the day of surgery    Photo ID and insurance card    Copy of your health care directive, if you have one    Glasses and hearing aides (bring cases)  ? You can't wear contacts during surgery    Inhaler and eye drops, if you use them (tell us about these when  you arrive)    CPAP machine or breathing device, if you use them    A few personal items, if spending the night    If you have . . .  ? A pacemaker, ICD (cardiac defibrillator) or other implant: Bring the ID card.  ? An implanted stimulator: Bring the remote control.  ? A legal guardian: Bring a copy of the certified (court-stamped) guardianship papers.  Please remove any jewelry, including body piercings. Leave jewelry and other valuables at home.  If you're going home the day of surgery    You must have a responsible adult drive you home. They should stay with you overnight as well.    If you don't have someone to stay with you, and you aren't safe to go home alone, we may keep you overnight. Insurance often won't pay for this.  After surgery  If it's hard to control your pain or you need more pain medicine, please call your surgeon's office.  Questions?   If you have any questions for your care team, list them here: _________________________________________________________________________________________________________________________________________________________________________ ____________________________________ ____________________________________ ____________________________________  For informational purposes only. Not to replace the advice of your health care provider. Copyright   2003, 2019 [facility]. All rights reserved. Clinically reviewed by Aria Davies MD. [employer] [id number] - REV 07/21.

## 2022-08-04 NOTE — H&P (VIEW-ONLY)
[facility]  [address] [place] [address]  Phone: [phone]  Primary Provider: Cortney Kinney  Pre-op Performing Provider: CORTNEY KINNEY    PREOPERATIVE EVALUATION:  Today's date: 8/4/2022    Kristen Thompson is a 76 year old female who presents for a preoperative evaluation.    Surgical Information:  Surgery/Procedure: Seed Localized Left Breast Biopsy  Surgery Location: [facility]  Surgeon: Jeremy Berg DO  Surgery Date: 8/22/22  Time of Surgery: 1pm  Where patient plans to recover: At home with family  Fax number for surgical facility: Note does not need to be faxed, will be available electronically in Epic.    Type of Anesthesia Anticipated: Monitor Anesthesia Care    Assessment & Plan     The proposed surgical procedure is considered LOW risk.    Preop general physical exam, Abnormal finding on breast imaging  Patient comes for a pre-op physical for Seed Localized Left Breast Biopsy. No new complaints today, she is aware of risks and benefits discussed with surgery team.     hyperlipidemia  Patient agreeable to having lipid panel checked today.     Hemiplegia and hemiparesis following cerebral infarction affecting unspecified side (H), Cerebrovascular accident (CVA), unspecified mechanism (H)  Patient has no new deficits from CVA in February of 2021, she is well managed by neurology and cardiology, continue with plan of care.     Irregular heart rhythm  Patient reports she had abnormal heart rhythm while in the hospital last year and wants to have another EKG ordered to determine her current rhythm.       Possible Sleep Apnea:  Recent sleep study,  Uses a mouth guard and discussing oral appliance with dentist{     Risks and Recommendations:  The patient has the following additional risks and recommendations for perioperative complications:  Cardiovascular:   - Cardiology consulted - followed for CVA    Medication Instructions:  Patient is  to take all scheduled medications on the day of surgery     Surgery team okay with him even staying on the baby aspirin.    RECOMMENDATION:  APPROVAL GIVEN to proceed with proposed procedure pending review of diagnostic evaluation.      Subjective     HPI related to upcoming procedure: Left breast biopsy, noticed a lump when she went in for her yearly mammogram this year, has had cysts before and they were benign, hx of stroke last year in 2021, slight paralysis in parts of left face, arm, leg.      Preop Questions 8/4/2022   1. Have you ever had a heart attack or stroke? YES - Feb of 2021,    2. Have you ever had surgery on your heart or blood vessels, such as a stent placement, a coronary artery bypass, or surgery on an artery in your head, neck, heart, or legs? No   3. Do you have chest pain with activity? No   4. Do you have a history of  heart failure? No   5. Do you currently have a cold, bronchitis or symptoms of other infection? No   6. Do you have a cough, shortness of breath, or wheezing? YES - Cough for months, related to allergies   7. Do you or anyone in your family have previous history of blood clots? No   8. Do you or does anyone in your family have a serious bleeding problem such as prolonged bleeding following surgeries or cuts? No   9. Have you ever had problems with anemia or been told to take iron pills? YES - doesn't always eat iron rich foods, she reports   10. Have you had any abnormal blood loss such as black, tarry or bloody stools, or abnormal vaginal bleeding? No   11. Have you ever had a blood transfusion? No   12. Are you willing to have a blood transfusion if it is medically needed before, during, or after your surgery? Yes   13. Have you or any of your relatives ever had problems with anesthesia? YES - after coming out, has had vomiting   14. Do you have sleep apnea, excessive snoring or daytime drowsiness? YES - mild sleep apnea, no cpap needed, recent sleep study   14a. Do you have  "a CPAP machine? No   15. Do you have any artifical heart valves or other implanted medical devices like a pacemaker, defibrillator, or continuous glucose monitor? No   16. Do you have artificial joints? YES - Right knee   17. Are you allergic to latex? No     Health Care Directive:  Patient has a Health Care Directive on file      Preoperative Review of :   reviewed - no record of controlled substances prescribed.    Status of Chronic Conditions:  Stoke in 2021- Followed by neurology, cleared for annual visits  CVA - Followed by cardiology \"ongoing treatment of blood pressure, cholesterol with a statin, and aspirin\"    Review of Systems  Constitutional, neuro, ENT, endocrine, pulmonary, cardiac, gastrointestinal, genitourinary, musculoskeletal, integument and psychiatric systems are negative, except as otherwise noted.    Patient Active Problem List    Diagnosis Date Noted     Hemiplegia and hemiparesis following cerebral infarction affecting unspecified side (H) 04/12/2022     Priority: Medium     Cerebrovascular accident (CVA), unspecified mechanism (H) 03/02/2021     Priority: Medium     Essential hypertension with goal blood pressure less than 130/80 03/02/2021     Priority: Medium     Gastroesophageal reflux disease without esophagitis 07/01/2020     Priority: Medium     Localized edema 06/16/2017     Priority: Medium     Cervicalgia 12/06/2016     Priority: Medium     Gastritis 09/16/2016     Priority: Medium     Chronic constipation 09/08/2016     Priority: Medium     Advance Care Planning 07/11/2014     Priority: Medium     Advance Care Planning: ACP Review and Resources Provided:  Reviewed chart for advance care plan.  Kristen HILARIO Thompson has no plan or code status on file however states presence of ACP document. Copy requested. Added by Yasmeen Todd on 7/11/2014       Hyperlipidemia LDL goal <130 07/11/2014     Priority: Medium     CARDIOVASCULAR SCREENING; LDL GOAL LESS THAN 160 10/11/2012     " Priority: Medium     HL (hearing loss) 10/11/2012     Priority: Medium     Insomnia 12/12/2011     Priority: Medium     Primary localized osteoarthrosis, lower leg 10/02/2007     Priority: Medium     Restless leg syndrome 02/28/2006     Priority: Medium     Female stress incontinence 02/28/2006     Priority: Medium      Past Medical History:   Diagnosis Date     Arthritis 11/19/2013     Gastro-oesophageal reflux disease      GERD (gastroesophageal reflux disease) 10/02/2012     H/O total hysterectomy      HL (hearing loss) 10/11/2012     PONV (postoperative nausea and vomiting)      Squamous cell carcinoma      Past Surgical History:   Procedure Laterality Date     BIOPSY BREAST       COLONOSCOPY  10/30/2012    Procedure: COLONOSCOPY;  Colonoscopy;  Surgeon: Denise Bah MD;  Location: [facility]     ESOPHAGOSCOPY, GASTROSCOPY, DUODENOSCOPY (EGD), COMBINED N/A 9/15/2016    Procedure: COMBINED ESOPHAGOSCOPY, GASTROSCOPY, DUODENOSCOPY (EGD);  Surgeon: Bennie Godinez MD;  Location: [facility]     GALLBLADDER SURGERY       HYSTERECTOMY       knee replacment  2007     RELEASE TRIGGER FINGER Right 4/29/2016    Procedure: RELEASE TRIGGER FINGER;  Surgeon: Percy Sarmiento MD;  Location: [facility]     REPAIR BLADDER       ZZC RAD RESEC TONSIL/PILLARS       Current Outpatient Medications   Medication Sig Dispense Refill     amLODIPine (NORVASC) 5 MG tablet TAKE 1 TABLET BY MOUTH EVERY DAY 90 tablet 3     aspirin 81 MG EC tablet Take 81 mg by mouth daily       atorvastatin (LIPITOR) 10 MG tablet TAKE 1 TABLET BY MOUTH EVERY DAY 90 tablet 2     azelastine (ASTELIN) 0.1 % nasal spray Spray 2 sprays into both nostrils 2 times daily as needed for rhinitis 30 mL 3     CALCIUM PO Take by mouth daily        Cholecalciferol (VITAMIN D PO) Take 1,000 Units by mouth daily        DULoxetine (CYMBALTA) 20 MG capsule TAKE 1 CAPSULE BY MOUTH EVERY DAY 90 capsule 1     Fluticasone Propionate (FLONASE ALLERGY RELIEF NA) Spray in nostril  "as needed       MAGNESIUM PO Take 250 mg by mouth daily        Omega-3 Fatty Acids (OMEGA 3 PO) Take 2 capsules by mouth daily        omeprazole (PRILOSEC) 20 MG DR capsule TAKE 1 TABLET (20 MG) BY MOUTH 2 TIMES DAILY TAKE 30-60 MINUTES BEFORE A MEAL. 180 capsule 3     Polyethyl Glycol-Propyl Glycol (SYSTANE FREE OP) Apply 2 drops to eye as needed (dry eyes)       Polyethylene Glycol 3350 (MIRALAX PO)        traZODone (DESYREL) 50 MG tablet TAKE 1 TABLET BY MOUTH NIGHTLY AS NEEDED FOR SLEEP 90 tablet 2       Allergies   Allergen Reactions     Codeine Sulfate      Nausea and vomiting      Quinapril Difficulty breathing     Darvon-N [Propoxyphene Napsylate] Nausea and Vomiting        Social History     Tobacco Use     Smoking status: Never Smoker     Smokeless tobacco: Never Used   Substance Use Topics     Alcohol use: No     Alcohol/week: 0.0 standard drinks     Family History   Problem Relation Age of Onset     C.A.D. Mother      Cardiovascular Mother      Thyroid Disease Mother      Cancer Father         stomach ca     Cancer Sister      Eye Disorder Sister      C.A.D. Sister      Cerebrovascular Disease Sister      Breast Cancer Sister      Arthritis Sister      Cardiovascular Sister      Depression Sister      Heart Disease Sister      Neurologic Disorder Sister      Osteoporosis Sister      Thyroid Disease Sister      Depression Sister      Thyroid Disease Sister      Depression Sister      Thyroid Disease Sister      Obesity Sister      Neurologic Disorder Sister      History   Drug Use No         Objective     /78   Pulse 57   Temp 98.2  F (36.8  C) (Tympanic)   Ht 1.575 m (5' 2\")   Wt 64 kg (141 lb)   SpO2 97%   BMI 25.79 kg/m      Physical Exam    GENERAL APPEARANCE: healthy, alert and no distress     EYES: EOMI, PERRL     HENT: ear canals and TM's normal and nose and mouth without ulcers or lesions     NECK: no adenopathy, no asymmetry, masses, or scars and thyroid normal to palpation     RESP: " lungs clear to auscultation - no rales, rhonchi or wheezes     CV: regular rates and rhythm, normal S1 S2, no S3 or S4 and no murmur, click or rub     ABDOMEN:  soft, nontender, no HSM or masses and bowel sounds normal     MS: extremities normal- no gross deformities noted, no evidence of inflammation in joints, FROM in all extremities.     SKIN: no suspicious lesions or rashes     NEURO: mentation intact, speech normal and decreased tone on left side from face to leg, sensation intact     PSYCH: mentation appears normal. and affect normal/bright     LYMPHATICS: No cervical adenopathy    Recent Labs   Lab Test 07/18/22  0939 06/09/22  1540 12/07/21  1405   HGB  --  13.4 14.0   PLT  --  259 218   NA  --   --  140   POTASSIUM  --   --  3.8   CR 0.6  --  0.58        Diagnostics:  Labs pending at this time.  Results will be reviewed when available.   EKG required for tis and not completed in the last 90 days.   ekg normal unchanged from previous   Revised Cardiac Risk Index (RCRI):  The patient has the following serious cardiovascular risks for perioperative complications:   - Cerebrovascular Disease (TIA or CVA) = 1 point     RCRI Interpretation: 1 point: Class II (low risk - 0.9% complication rate)    Ayaka Gresham, FLORINDA, FNP Student       Signed Electronically by: Cortney Hung MD  Copy of this evaluation report is provided to requesting physician.

## 2022-08-04 NOTE — PROGRESS NOTES
[facility]  [address] [facility] [address]  Phone: [phone]  Primary Provider: Cortney Kinney  Pre-op Performing Provider: CORTNEY KINNEY    PREOPERATIVE EVALUATION:  Today's date: 8/4/2022    Kristen Thompson is a 76 year old female who presents for a preoperative evaluation.    Surgical Information:  Surgery/Procedure: Seed Localized Left Breast Biopsy  Surgery Location: [facility]  Surgeon: Jeremy Berg DO  Surgery Date: 8/22/22  Time of Surgery: 1pm  Where patient plans to recover: At home with family  Fax number for surgical facility: Note does not need to be faxed, will be available electronically in Epic.    Type of Anesthesia Anticipated: Monitor Anesthesia Care    Assessment & Plan     The proposed surgical procedure is considered LOW risk.    Preop general physical exam, Abnormal finding on breast imaging  Patient comes for a pre-op physical for Seed Localized Left Breast Biopsy. No new complaints today, she is aware of risks and benefits discussed with surgery team.     hyperlipidemia  Patient agreeable to having lipid panel checked today.     Hemiplegia and hemiparesis following cerebral infarction affecting unspecified side (H), Cerebrovascular accident (CVA), unspecified mechanism (H)  Patient has no new deficits from CVA in February of 2021, she is well managed by neurology and cardiology, continue with plan of care.     Irregular heart rhythm  Patient reports she had abnormal heart rhythm while in the hospital last year and wants to have another EKG ordered to determine her current rhythm.       Possible Sleep Apnea:  Recent sleep study,  Uses a mouth guard and discussing oral appliance with dentist{     Risks and Recommendations:  The patient has the following additional risks and recommendations for perioperative complications:  Cardiovascular:   - Cardiology consulted - followed for CVA    Medication Instructions:  Patient is  to take all scheduled medications on the day of surgery     Surgery team okay with him even staying on the baby aspirin.    RECOMMENDATION:  APPROVAL GIVEN to proceed with proposed procedure pending review of diagnostic evaluation.      Subjective     HPI related to upcoming procedure: Left breast biopsy, noticed a lump when she went in for her yearly mammogram this year, has had cysts before and they were benign, hx of stroke last year in 2021, slight paralysis in parts of left face, arm, leg.      Preop Questions 8/4/2022   1. Have you ever had a heart attack or stroke? YES - Feb of 2021,    2. Have you ever had surgery on your heart or blood vessels, such as a stent placement, a coronary artery bypass, or surgery on an artery in your head, neck, heart, or legs? No   3. Do you have chest pain with activity? No   4. Do you have a history of  heart failure? No   5. Do you currently have a cold, bronchitis or symptoms of other infection? No   6. Do you have a cough, shortness of breath, or wheezing? YES - Cough for months, related to allergies   7. Do you or anyone in your family have previous history of blood clots? No   8. Do you or does anyone in your family have a serious bleeding problem such as prolonged bleeding following surgeries or cuts? No   9. Have you ever had problems with anemia or been told to take iron pills? YES - doesn't always eat iron rich foods, she reports   10. Have you had any abnormal blood loss such as black, tarry or bloody stools, or abnormal vaginal bleeding? No   11. Have you ever had a blood transfusion? No   12. Are you willing to have a blood transfusion if it is medically needed before, during, or after your surgery? Yes   13. Have you or any of your relatives ever had problems with anesthesia? YES - after coming out, has had vomiting   14. Do you have sleep apnea, excessive snoring or daytime drowsiness? YES - mild sleep apnea, no cpap needed, recent sleep study   14a. Do you have  "a CPAP machine? No   15. Do you have any artifical heart valves or other implanted medical devices like a pacemaker, defibrillator, or continuous glucose monitor? No   16. Do you have artificial joints? YES - Right knee   17. Are you allergic to latex? No     Health Care Directive:  Patient has a Health Care Directive on file      Preoperative Review of :   reviewed - no record of controlled substances prescribed.    Status of Chronic Conditions:  Stoke in 2021- Followed by neurology, cleared for annual visits  CVA - Followed by cardiology \"ongoing treatment of blood pressure, cholesterol with a statin, and aspirin\"    Review of Systems  Constitutional, neuro, ENT, endocrine, pulmonary, cardiac, gastrointestinal, genitourinary, musculoskeletal, integument and psychiatric systems are negative, except as otherwise noted.    Patient Active Problem List    Diagnosis Date Noted     Hemiplegia and hemiparesis following cerebral infarction affecting unspecified side (H) 04/12/2022     Priority: Medium     Cerebrovascular accident (CVA), unspecified mechanism (H) 03/02/2021     Priority: Medium     Essential hypertension with goal blood pressure less than 130/80 03/02/2021     Priority: Medium     Gastroesophageal reflux disease without esophagitis 07/01/2020     Priority: Medium     Localized edema 06/16/2017     Priority: Medium     Cervicalgia 12/06/2016     Priority: Medium     Gastritis 09/16/2016     Priority: Medium     Chronic constipation 09/08/2016     Priority: Medium     Advance Care Planning 07/11/2014     Priority: Medium     Advance Care Planning: ACP Review and Resources Provided:  Reviewed chart for advance care plan.  Kritsen HILARIO Thompson has no plan or code status on file however states presence of ACP document. Copy requested. Added by Yasmeen Todd on 7/11/2014       Hyperlipidemia LDL goal <130 07/11/2014     Priority: Medium     CARDIOVASCULAR SCREENING; LDL GOAL LESS THAN 160 10/11/2012     " Priority: Medium     HL (hearing loss) 10/11/2012     Priority: Medium     Insomnia 12/12/2011     Priority: Medium     Primary localized osteoarthrosis, lower leg 10/02/2007     Priority: Medium     Restless leg syndrome 02/28/2006     Priority: Medium     Female stress incontinence 02/28/2006     Priority: Medium      Past Medical History:   Diagnosis Date     Arthritis 11/19/2013     Gastro-oesophageal reflux disease      GERD (gastroesophageal reflux disease) 10/02/2012     H/O total hysterectomy      HL (hearing loss) 10/11/2012     PONV (postoperative nausea and vomiting)      Squamous cell carcinoma      Past Surgical History:   Procedure Laterality Date     BIOPSY BREAST       COLONOSCOPY  10/30/2012    Procedure: COLONOSCOPY;  Colonoscopy;  Surgeon: Denise Bah MD;  Location: [facility]     ESOPHAGOSCOPY, GASTROSCOPY, DUODENOSCOPY (EGD), COMBINED N/A 9/15/2016    Procedure: COMBINED ESOPHAGOSCOPY, GASTROSCOPY, DUODENOSCOPY (EGD);  Surgeon: Bennie Godinez MD;  Location: [facility]     GALLBLADDER SURGERY       HYSTERECTOMY       knee replacment  2007     RELEASE TRIGGER FINGER Right 4/29/2016    Procedure: RELEASE TRIGGER FINGER;  Surgeon: Percy Sarmiento MD;  Location: [facility]     REPAIR BLADDER       ZZC RAD RESEC TONSIL/PILLARS       Current Outpatient Medications   Medication Sig Dispense Refill     amLODIPine (NORVASC) 5 MG tablet TAKE 1 TABLET BY MOUTH EVERY DAY 90 tablet 3     aspirin 81 MG EC tablet Take 81 mg by mouth daily       atorvastatin (LIPITOR) 10 MG tablet TAKE 1 TABLET BY MOUTH EVERY DAY 90 tablet 2     azelastine (ASTELIN) 0.1 % nasal spray Spray 2 sprays into both nostrils 2 times daily as needed for rhinitis 30 mL 3     CALCIUM PO Take by mouth daily        Cholecalciferol (VITAMIN D PO) Take 1,000 Units by mouth daily        DULoxetine (CYMBALTA) 20 MG capsule TAKE 1 CAPSULE BY MOUTH EVERY DAY 90 capsule 1     Fluticasone Propionate (FLONASE ALLERGY RELIEF NA) Spray in nostril  "as needed       MAGNESIUM PO Take 250 mg by mouth daily        Omega-3 Fatty Acids (OMEGA 3 PO) Take 2 capsules by mouth daily        omeprazole (PRILOSEC) 20 MG DR capsule TAKE 1 TABLET (20 MG) BY MOUTH 2 TIMES DAILY TAKE 30-60 MINUTES BEFORE A MEAL. 180 capsule 3     Polyethyl Glycol-Propyl Glycol (SYSTANE FREE OP) Apply 2 drops to eye as needed (dry eyes)       Polyethylene Glycol 3350 (MIRALAX PO)        traZODone (DESYREL) 50 MG tablet TAKE 1 TABLET BY MOUTH NIGHTLY AS NEEDED FOR SLEEP 90 tablet 2       Allergies   Allergen Reactions     Codeine Sulfate      Nausea and vomiting      Quinapril Difficulty breathing     Darvon-N [Propoxyphene Napsylate] Nausea and Vomiting        Social History     Tobacco Use     Smoking status: Never Smoker     Smokeless tobacco: Never Used   Substance Use Topics     Alcohol use: No     Alcohol/week: 0.0 standard drinks     Family History   Problem Relation Age of Onset     C.A.D. Mother      Cardiovascular Mother      Thyroid Disease Mother      Cancer Father         stomach ca     Cancer Sister      Eye Disorder Sister      C.A.D. Sister      Cerebrovascular Disease Sister      Breast Cancer Sister      Arthritis Sister      Cardiovascular Sister      Depression Sister      Heart Disease Sister      Neurologic Disorder Sister      Osteoporosis Sister      Thyroid Disease Sister      Depression Sister      Thyroid Disease Sister      Depression Sister      Thyroid Disease Sister      Obesity Sister      Neurologic Disorder Sister      History   Drug Use No         Objective     /78   Pulse 57   Temp 98.2  F (36.8  C) (Tympanic)   Ht 1.575 m (5' 2\")   Wt 64 kg (141 lb)   SpO2 97%   BMI 25.79 kg/m      Physical Exam    GENERAL APPEARANCE: healthy, alert and no distress     EYES: EOMI, PERRL     HENT: ear canals and TM's normal and nose and mouth without ulcers or lesions     NECK: no adenopathy, no asymmetry, masses, or scars and thyroid normal to palpation     RESP: " lungs clear to auscultation - no rales, rhonchi or wheezes     CV: regular rates and rhythm, normal S1 S2, no S3 or S4 and no murmur, click or rub     ABDOMEN:  soft, nontender, no HSM or masses and bowel sounds normal     MS: extremities normal- no gross deformities noted, no evidence of inflammation in joints, FROM in all extremities.     SKIN: no suspicious lesions or rashes     NEURO: mentation intact, speech normal and decreased tone on left side from face to leg, sensation intact     PSYCH: mentation appears normal. and affect normal/bright     LYMPHATICS: No cervical adenopathy    Recent Labs   Lab Test 07/18/22  0939 06/09/22  1540 12/07/21  1405   HGB  --  13.4 14.0   PLT  --  259 218   NA  --   --  140   POTASSIUM  --   --  3.8   CR 0.6  --  0.58        Diagnostics:  Labs pending at this time.  Results will be reviewed when available.   EKG required for tis and not completed in the last 90 days.   ekg normal unchanged from previous   Revised Cardiac Risk Index (RCRI):  The patient has the following serious cardiovascular risks for perioperative complications:   - Cerebrovascular Disease (TIA or CVA) = 1 point     RCRI Interpretation: 1 point: Class II (low risk - 0.9% complication rate)    Ayaka Gresham, FLORINDA, FNP Student       Signed Electronically by: Cortney Hung MD  Copy of this evaluation report is provided to requesting physician.

## 2022-08-06 NOTE — TELEPHONE ENCOUNTER
"Kristen says that she finished the antibiotics and still has \"some pain after  Eatting; not too bad.\" Reports that she is still having dark stools. Denies taking pepto bismol. Advised and offered her an appointment with the Provider. She declined and said that she would give it a few more days. Again I advised that she be seen. She said that she will think about it.   Mahamed Thomas RN    " Patient/Caregiver provided printed discharge information.

## 2022-08-18 RX ORDER — ACETAMINOPHEN 325 MG/1
650 TABLET ORAL
COMMUNITY
Start: 2021-02-26

## 2022-08-19 ENCOUNTER — HOSPITAL ENCOUNTER (OUTPATIENT)
Dept: MAMMOGRAPHY | Facility: CLINIC | Age: 77
Discharge: HOME | End: 2022-08-19
Attending: SURGERY
Payer: COMMERCIAL

## 2022-08-19 ENCOUNTER — HOSPITAL ENCOUNTER (OUTPATIENT)
Dept: ULTRASOUND IMAGING | Facility: CLINIC | Age: 77
Discharge: HOME | End: 2022-08-19
Attending: SURGERY
Payer: COMMERCIAL

## 2022-08-19 ENCOUNTER — ANESTHESIA EVENT (OUTPATIENT)
Dept: SURGERY | Facility: CLINIC | Age: 77
End: 2022-08-19
Payer: COMMERCIAL

## 2022-08-19 DIAGNOSIS — N63.20 BREAST MASS, LEFT: ICD-10-CM

## 2022-08-19 DIAGNOSIS — N60.82 OTHER BENIGN MAMMARY DYSPLASIAS OF LEFT BREAST: ICD-10-CM

## 2022-08-19 PROCEDURE — 250N000009 HC RX 250: Performed by: RADIOLOGY

## 2022-08-19 PROCEDURE — 999N000065 MA POST PROCEDURE LEFT

## 2022-08-19 PROCEDURE — A4648 IMPLANTABLE TISSUE MARKER: HCPCS

## 2022-08-19 RX ADMIN — LIDOCAINE HYDROCHLORIDE 4 ML: 10 INJECTION, SOLUTION EPIDURAL; INFILTRATION; INTRACAUDAL; PERINEURAL at 09:14

## 2022-08-19 NOTE — ANESTHESIA PREPROCEDURE EVALUATION
Anesthesia Pre-Procedure Evaluation    Patient: Kristen Thompson   MRN: [id number] : 1945        Procedure : Procedure(s):  Seed Localized Left Breast Biopsy          Past Medical History:   Diagnosis Date     Arthritis 2013     Gastro-oesophageal reflux disease      GERD (gastroesophageal reflux disease) 10/02/2012     H/O total hysterectomy      HL (hearing loss) 10/11/2012     PONV (postoperative nausea and vomiting)      Squamous cell carcinoma       Past Surgical History:   Procedure Laterality Date     BIOPSY BREAST       COLONOSCOPY  10/30/2012    Procedure: COLONOSCOPY;  Colonoscopy;  Surgeon: Denise Bah MD;  Location: [facility]     ESOPHAGOSCOPY, GASTROSCOPY, DUODENOSCOPY (EGD), COMBINED N/A 9/15/2016    Procedure: COMBINED ESOPHAGOSCOPY, GASTROSCOPY, DUODENOSCOPY (EGD);  Surgeon: Bennie Godinez MD;  Location: [facility]     GALLBLADDER SURGERY       HYSTERECTOMY       knee replacment       RELEASE TRIGGER FINGER Right 2016    Procedure: RELEASE TRIGGER FINGER;  Surgeon: Percy Sarmiento MD;  Location: [facility]     REPAIR BLADDER       ZZC RAD RESEC TONSIL/PILLARS        Allergies   Allergen Reactions     Codeine Sulfate      Nausea and vomiting      Quinapril Difficulty breathing     Darvon-N [Propoxyphene Napsylate] Nausea and Vomiting      Social History     Tobacco Use     Smoking status: Never Smoker     Smokeless tobacco: Never Used   Substance Use Topics     Alcohol use: No     Alcohol/week: 0.0 standard drinks      Wt Readings from Last 1 Encounters:   22 64 kg (141 lb)        Anesthesia Evaluation   Pt has had prior anesthetic.     History of anesthetic complications  - PONV.      ROS/MED HX  ENT/Pulmonary: Comment: [facility]      Neurologic:     (+) CVA, date: , with deficits, - Left Hemiplegia and hemiparesis following cerebral infarction affecting unspecified side . TIA,     Cardiovascular:     (+) Dyslipidemia hypertension-----    METS/Exercise Tolerance:  >4 METS    Hematologic:  - neg hematologic  ROS     Musculoskeletal:   (+) arthritis,     GI/Hepatic:     (+) GERD, Asymptomatic on medication,     Renal/Genitourinary:  - neg Renal ROS     Endo:  - neg endo ROS     Psychiatric/Substance Use:  - neg psychiatric ROS     Infectious Disease:  - neg infectious disease ROS     Malignancy:   (+) Malignancy, History of Skin.    Other:  - neg other ROS          Physical Exam    Airway        Mallampati: II   TM distance: > 3 FB   Neck ROM: full   Mouth opening: > 3 cm    Respiratory Devices and Support         Dental  no notable dental history         Cardiovascular   cardiovascular exam normal          Pulmonary   pulmonary exam normal                OUTSIDE LABS:  CBC:   Lab Results   Component Value Date    WBC 5.6 08/04/2022    WBC 6.3 06/09/2022    HGB 13.6 08/04/2022    HGB 13.4 06/09/2022    HCT 42.4 08/04/2022    HCT 41.1 06/09/2022     08/04/2022     06/09/2022     BMP:   Lab Results   Component Value Date     08/04/2022     12/07/2021    POTASSIUM 4.0 08/04/2022    POTASSIUM 3.8 12/07/2021    CHLORIDE 106 08/04/2022    CHLORIDE 106 12/07/2021    CO2 30 08/04/2022    CO2 30 12/07/2021    BUN 13 08/04/2022    BUN 22 12/07/2021    CR 0.58 08/04/2022    CR 0.6 07/18/2022    GLC 96 08/04/2022     (H) 12/07/2021     COAGS:   Lab Results   Component Value Date    PTT 27 01/25/2017    INR 1.02 01/25/2017     POC: No results found for: BGM, HCG, HCGS  HEPATIC:   Lab Results   Component Value Date    ALBUMIN 4.0 06/29/2020    PROTTOTAL 7.1 06/29/2020    ALT 31 06/29/2020    AST 20 06/29/2020    ALKPHOS 80 06/29/2020    BILITOTAL 0.5 06/29/2020     OTHER:   Lab Results   Component Value Date    HEATHER 9.5 08/04/2022    LIPASE 132 03/20/2018    TSH 1.54 09/08/2016    T4 0.90 07/11/2014    CRP <5.0 02/28/2013    SED 5 10/26/2015       Anesthesia Plan    ASA Status:  3   NPO Status:  NPO Appropriate    Anesthesia Type: General.     - Airway: ETT    Induction: Propofol.   Maintenance: TIVA.        Consents    Anesthesia Plan(s) and associated risks, benefits, and realistic alternatives discussed. Questions answered and patient/representative(s) expressed understanding.     - Discussed: Risks, Benefits and Alternatives for BOTH SEDATION and the PROCEDURE were discussed     - Discussed with:  Patient      - Extended Intubation/Ventilatory Support Discussed: No.      - Patient is DNR/DNI Status: No             Suspend during perioperative period? No.         Postoperative Care    Pain management: IV analgesics, Oral pain medications.   PONV prophylaxis: Ondansetron (or other 5HT-3), Dexamethasone or Solumedrol     Comments:                AHMET Leal CRNA

## 2022-08-20 DIAGNOSIS — K21.9 GASTROESOPHAGEAL REFLUX DISEASE WITHOUT ESOPHAGITIS: ICD-10-CM

## 2022-08-22 ENCOUNTER — HOSPITAL ENCOUNTER (OUTPATIENT)
Facility: CLINIC | Age: 77
Discharge: HOME | End: 2022-08-22
Attending: SURGERY | Admitting: SURGERY
Payer: COMMERCIAL

## 2022-08-22 ENCOUNTER — HOSPITAL ENCOUNTER (OUTPATIENT)
Dept: MAMMOGRAPHY | Facility: CLINIC | Age: 77
Discharge: HOME | End: 2022-08-22
Attending: SURGERY | Admitting: SURGERY
Payer: COMMERCIAL

## 2022-08-22 ENCOUNTER — ANESTHESIA (OUTPATIENT)
Dept: SURGERY | Facility: CLINIC | Age: 77
End: 2022-08-22
Payer: COMMERCIAL

## 2022-08-22 VITALS
HEIGHT: 62 IN | DIASTOLIC BLOOD PRESSURE: 72 MMHG | OXYGEN SATURATION: 94 % | TEMPERATURE: 97.9 F | HEART RATE: 68 BPM | RESPIRATION RATE: 15 BRPM | SYSTOLIC BLOOD PRESSURE: 122 MMHG | BODY MASS INDEX: 25.95 KG/M2 | WEIGHT: 141 LBS

## 2022-08-22 DIAGNOSIS — N63.20 BREAST MASS, LEFT: ICD-10-CM

## 2022-08-22 DIAGNOSIS — N63.0 BREAST MASS: Primary | ICD-10-CM

## 2022-08-22 PROCEDURE — 250N000011 HC RX IP 250 OP 636: Performed by: SURGERY

## 2022-08-22 PROCEDURE — 999N000141 HC STATISTIC PRE-PROCEDURE NURSING ASSESSMENT: Performed by: SURGERY

## 2022-08-22 PROCEDURE — 258N000003 HC RX IP 258 OP 636: Performed by: NURSE ANESTHETIST, CERTIFIED REGISTERED

## 2022-08-22 PROCEDURE — 88307 TISSUE EXAM BY PATHOLOGIST: CPT | Mod: TC | Performed by: SURGERY

## 2022-08-22 PROCEDURE — 76098 X-RAY EXAM SURGICAL SPECIMEN: CPT

## 2022-08-22 PROCEDURE — 250N000011 HC RX IP 250 OP 636: Performed by: NURSE ANESTHETIST, CERTIFIED REGISTERED

## 2022-08-22 PROCEDURE — 250N000009 HC RX 250: Performed by: SURGERY

## 2022-08-22 PROCEDURE — 710N000009 HC RECOVERY PHASE 1, LEVEL 1, PER MIN: Performed by: SURGERY

## 2022-08-22 PROCEDURE — 360N000075 HC SURGERY LEVEL 2, PER MIN: Performed by: SURGERY

## 2022-08-22 PROCEDURE — 19125 EXCISION BREAST LESION: CPT | Mod: LT | Performed by: SURGERY

## 2022-08-22 PROCEDURE — 250N000013 HC RX MED GY IP 250 OP 250 PS 637: Performed by: NURSE ANESTHETIST, CERTIFIED REGISTERED

## 2022-08-22 PROCEDURE — 250N000009 HC RX 250: Performed by: NURSE ANESTHETIST, CERTIFIED REGISTERED

## 2022-08-22 PROCEDURE — 272N000001 HC OR GENERAL SUPPLY STERILE: Performed by: SURGERY

## 2022-08-22 PROCEDURE — 370N000017 HC ANESTHESIA TECHNICAL FEE, PER MIN: Performed by: SURGERY

## 2022-08-22 PROCEDURE — 710N000012 HC RECOVERY PHASE 2, PER MINUTE: Performed by: SURGERY

## 2022-08-22 RX ORDER — LIDOCAINE HYDROCHLORIDE 10 MG/ML
INJECTION, SOLUTION INFILTRATION; PERINEURAL PRN
Status: DISCONTINUED | OUTPATIENT
Start: 2022-08-22 | End: 2022-08-22

## 2022-08-22 RX ORDER — CEFAZOLIN SODIUM/WATER 2 G/20 ML
2 SYRINGE (ML) INTRAVENOUS
Status: DISCONTINUED | OUTPATIENT
Start: 2022-08-22 | End: 2022-08-22 | Stop reason: HOSPADM

## 2022-08-22 RX ORDER — FENTANYL CITRATE 50 UG/ML
INJECTION, SOLUTION INTRAMUSCULAR; INTRAVENOUS PRN
Status: DISCONTINUED | OUTPATIENT
Start: 2022-08-22 | End: 2022-08-22

## 2022-08-22 RX ORDER — KETOROLAC TROMETHAMINE 15 MG/ML
15 INJECTION, SOLUTION INTRAMUSCULAR; INTRAVENOUS EVERY 6 HOURS PRN
Status: DISCONTINUED | OUTPATIENT
Start: 2022-08-22 | End: 2022-08-22 | Stop reason: HOSPADM

## 2022-08-22 RX ORDER — LIDOCAINE 40 MG/G
CREAM TOPICAL
Status: DISCONTINUED | OUTPATIENT
Start: 2022-08-22 | End: 2022-08-22 | Stop reason: HOSPADM

## 2022-08-22 RX ORDER — HYDROMORPHONE HCL IN WATER/PF 6 MG/30 ML
0.2 PATIENT CONTROLLED ANALGESIA SYRINGE INTRAVENOUS EVERY 5 MIN PRN
Status: DISCONTINUED | OUTPATIENT
Start: 2022-08-22 | End: 2022-08-22 | Stop reason: HOSPADM

## 2022-08-22 RX ORDER — ONDANSETRON 4 MG/1
4 TABLET, ORALLY DISINTEGRATING ORAL EVERY 30 MIN PRN
Status: DISCONTINUED | OUTPATIENT
Start: 2022-08-22 | End: 2022-08-22 | Stop reason: HOSPADM

## 2022-08-22 RX ORDER — PROPOFOL 10 MG/ML
INJECTION, EMULSION INTRAVENOUS PRN
Status: DISCONTINUED | OUTPATIENT
Start: 2022-08-22 | End: 2022-08-22

## 2022-08-22 RX ORDER — SODIUM CHLORIDE, SODIUM LACTATE, POTASSIUM CHLORIDE, CALCIUM CHLORIDE 600; 310; 30; 20 MG/100ML; MG/100ML; MG/100ML; MG/100ML
INJECTION, SOLUTION INTRAVENOUS CONTINUOUS
Status: DISCONTINUED | OUTPATIENT
Start: 2022-08-22 | End: 2022-08-22 | Stop reason: HOSPADM

## 2022-08-22 RX ORDER — MEPERIDINE HYDROCHLORIDE 25 MG/ML
12.5 INJECTION INTRAMUSCULAR; INTRAVENOUS; SUBCUTANEOUS
Status: DISCONTINUED | OUTPATIENT
Start: 2022-08-22 | End: 2022-08-22 | Stop reason: HOSPADM

## 2022-08-22 RX ORDER — DOCUSATE SODIUM 100 MG/1
100 CAPSULE, LIQUID FILLED ORAL 2 TIMES DAILY
Refills: 0 | COMMUNITY
Start: 2022-08-22

## 2022-08-22 RX ORDER — ACETAMINOPHEN 325 MG/1
975 TABLET ORAL ONCE
Status: COMPLETED | OUTPATIENT
Start: 2022-08-22 | End: 2022-08-22

## 2022-08-22 RX ORDER — MAGNESIUM SULFATE HEPTAHYDRATE 40 MG/ML
INJECTION, SOLUTION INTRAVENOUS PRN
Status: DISCONTINUED | OUTPATIENT
Start: 2022-08-22 | End: 2022-08-22

## 2022-08-22 RX ORDER — ONDANSETRON 2 MG/ML
4 INJECTION INTRAMUSCULAR; INTRAVENOUS EVERY 30 MIN PRN
Status: DISCONTINUED | OUTPATIENT
Start: 2022-08-22 | End: 2022-08-22 | Stop reason: HOSPADM

## 2022-08-22 RX ORDER — FENTANYL CITRATE 50 UG/ML
25 INJECTION, SOLUTION INTRAMUSCULAR; INTRAVENOUS EVERY 5 MIN PRN
Status: DISCONTINUED | OUTPATIENT
Start: 2022-08-22 | End: 2022-08-22 | Stop reason: HOSPADM

## 2022-08-22 RX ORDER — FENTANYL CITRATE 50 UG/ML
25 INJECTION, SOLUTION INTRAMUSCULAR; INTRAVENOUS
Status: DISCONTINUED | OUTPATIENT
Start: 2022-08-22 | End: 2022-08-22 | Stop reason: HOSPADM

## 2022-08-22 RX ORDER — KETAMINE HYDROCHLORIDE 10 MG/ML
INJECTION INTRAMUSCULAR; INTRAVENOUS PRN
Status: DISCONTINUED | OUTPATIENT
Start: 2022-08-22 | End: 2022-08-22

## 2022-08-22 RX ORDER — PHENYLEPHRINE HYDROCHLORIDE 10 MG/ML
INJECTION INTRAVENOUS PRN
Status: DISCONTINUED | OUTPATIENT
Start: 2022-08-22 | End: 2022-08-22

## 2022-08-22 RX ORDER — GLYCOPYRROLATE 0.2 MG/ML
INJECTION, SOLUTION INTRAMUSCULAR; INTRAVENOUS PRN
Status: DISCONTINUED | OUTPATIENT
Start: 2022-08-22 | End: 2022-08-22

## 2022-08-22 RX ORDER — HYDROXYZINE HYDROCHLORIDE 50 MG/1
50 TABLET, FILM COATED ORAL EVERY 6 HOURS PRN
Status: DISCONTINUED | OUTPATIENT
Start: 2022-08-22 | End: 2022-08-22 | Stop reason: HOSPADM

## 2022-08-22 RX ORDER — ONDANSETRON 2 MG/ML
INJECTION INTRAMUSCULAR; INTRAVENOUS PRN
Status: DISCONTINUED | OUTPATIENT
Start: 2022-08-22 | End: 2022-08-22

## 2022-08-22 RX ORDER — HYDROCODONE BITARTRATE AND ACETAMINOPHEN 5; 325 MG/1; MG/1
1 TABLET ORAL EVERY 6 HOURS PRN
Qty: 10 TABLET | Refills: 0 | Status: SHIPPED | OUTPATIENT
Start: 2022-08-22 | End: 2022-08-25

## 2022-08-22 RX ORDER — HYDROXYZINE HYDROCHLORIDE 25 MG/1
25 TABLET, FILM COATED ORAL EVERY 6 HOURS PRN
Status: DISCONTINUED | OUTPATIENT
Start: 2022-08-22 | End: 2022-08-22 | Stop reason: HOSPADM

## 2022-08-22 RX ORDER — PROPOFOL 10 MG/ML
INJECTION, EMULSION INTRAVENOUS CONTINUOUS PRN
Status: DISCONTINUED | OUTPATIENT
Start: 2022-08-22 | End: 2022-08-22

## 2022-08-22 RX ORDER — CEFAZOLIN SODIUM/WATER 2 G/20 ML
2 SYRINGE (ML) INTRAVENOUS SEE ADMIN INSTRUCTIONS
Status: DISCONTINUED | OUTPATIENT
Start: 2022-08-22 | End: 2022-08-22 | Stop reason: HOSPADM

## 2022-08-22 RX ORDER — BUPIVACAINE HYDROCHLORIDE AND EPINEPHRINE 5; 5 MG/ML; UG/ML
INJECTION, SOLUTION PERINEURAL PRN
Status: DISCONTINUED | OUTPATIENT
Start: 2022-08-22 | End: 2022-08-22 | Stop reason: HOSPADM

## 2022-08-22 RX ORDER — DEXAMETHASONE SODIUM PHOSPHATE 4 MG/ML
INJECTION, SOLUTION INTRA-ARTICULAR; INTRALESIONAL; INTRAMUSCULAR; INTRAVENOUS; SOFT TISSUE PRN
Status: DISCONTINUED | OUTPATIENT
Start: 2022-08-22 | End: 2022-08-22

## 2022-08-22 RX ADMIN — GLYCOPYRROLATE 0.2 MG: 0.2 INJECTION, SOLUTION INTRAMUSCULAR; INTRAVENOUS at 13:37

## 2022-08-22 RX ADMIN — DEXAMETHASONE SODIUM PHOSPHATE 4 MG: 4 INJECTION, SOLUTION INTRA-ARTICULAR; INTRALESIONAL; INTRAMUSCULAR; INTRAVENOUS; SOFT TISSUE at 13:46

## 2022-08-22 RX ADMIN — LIDOCAINE HYDROCHLORIDE 50 MG: 10 INJECTION, SOLUTION INFILTRATION; PERINEURAL at 13:37

## 2022-08-22 RX ADMIN — SUGAMMADEX 130 MG: 100 INJECTION, SOLUTION INTRAVENOUS at 14:28

## 2022-08-22 RX ADMIN — LIDOCAINE HYDROCHLORIDE 0.2 ML: 10 INJECTION, SOLUTION EPIDURAL; INFILTRATION; INTRACAUDAL; PERINEURAL at 13:09

## 2022-08-22 RX ADMIN — PROPOFOL 60 MG: 10 INJECTION, EMULSION INTRAVENOUS at 13:46

## 2022-08-22 RX ADMIN — FENTANYL CITRATE 50 MCG: 50 INJECTION, SOLUTION INTRAMUSCULAR; INTRAVENOUS at 13:39

## 2022-08-22 RX ADMIN — PROPOFOL 150 MCG/KG/MIN: 10 INJECTION, EMULSION INTRAVENOUS at 13:40

## 2022-08-22 RX ADMIN — Medication 2 G: at 13:30

## 2022-08-22 RX ADMIN — KETAMINE HYDROCHLORIDE 20 MG: 10 INJECTION, SOLUTION INTRAMUSCULAR; INTRAVENOUS at 13:47

## 2022-08-22 RX ADMIN — FENTANYL CITRATE 50 MCG: 50 INJECTION, SOLUTION INTRAMUSCULAR; INTRAVENOUS at 13:34

## 2022-08-22 RX ADMIN — MAGNESIUM SULFATE HEPTAHYDRATE 2 G: 40 INJECTION, SOLUTION INTRAVENOUS at 13:54

## 2022-08-22 RX ADMIN — PROPOFOL 140 MG: 10 INJECTION, EMULSION INTRAVENOUS at 13:40

## 2022-08-22 RX ADMIN — ONDANSETRON 4 MG: 2 INJECTION INTRAMUSCULAR; INTRAVENOUS at 14:23

## 2022-08-22 RX ADMIN — ACETAMINOPHEN 975 MG: 325 TABLET ORAL at 13:11

## 2022-08-22 RX ADMIN — SODIUM CHLORIDE, POTASSIUM CHLORIDE, SODIUM LACTATE AND CALCIUM CHLORIDE: 600; 310; 30; 20 INJECTION, SOLUTION INTRAVENOUS at 13:09

## 2022-08-22 RX ADMIN — FENTANYL CITRATE 25 MCG: 50 INJECTION, SOLUTION INTRAMUSCULAR; INTRAVENOUS at 15:18

## 2022-08-22 RX ADMIN — ROCURONIUM BROMIDE 40 MG: 50 INJECTION, SOLUTION INTRAVENOUS at 13:41

## 2022-08-22 RX ADMIN — PHENYLEPHRINE HYDROCHLORIDE 100 MCG: 10 INJECTION INTRAVENOUS at 14:09

## 2022-08-22 ASSESSMENT — ACTIVITIES OF DAILY LIVING (ADL)
ADLS_ACUITY_SCORE: 35
ADLS_ACUITY_SCORE: 29

## 2022-08-22 NOTE — ADDENDUM NOTE
Addendum  created 08/22/22 1505 by Elmira Allen APRN CRNA    Order list changed, Order sets accessed

## 2022-08-22 NOTE — ANESTHESIA CARE TRANSFER NOTE
Patient: Kristen Thompson    Procedure: Procedure(s):  Seed Localized Left Breast Biopsy       Diagnosis: Breast mass, left [N63.20]  Diagnosis Additional Information: No value filed.    Anesthesia Type:   General     Note:    Oropharynx: spontaneously breathing and oropharynx clear of all foreign objects  Level of Consciousness: awake  Oxygen Supplementation: face mask  Level of Supplemental Oxygen (L/min / FiO2): 8  Independent Airway: airway patency satisfactory and stable  Dentition: dentition unchanged  Vital Signs Stable: post-procedure vital signs reviewed and stable  Report to RN Given: handoff report given  Patient transferred to: PACU    Handoff Report: Identifed the Patient, Identified the Reponsible Provider, Reviewed the pertinent medical history, Discussed the surgical course, Reviewed Intra-OP anesthesia mangement and issues during anesthesia, Set expectations for post-procedure period and Allowed opportunity for questions and acknowledgement of understanding      Vitals:  Vitals Value Taken Time   /79 08/22/22 1445   Temp     Pulse 75 08/22/22 1449   Resp 15 08/22/22 1445   SpO2 99 % 08/22/22 1451   Vitals shown include unvalidated device data.    Electronically Signed By: AHMET Gaines CRNA  August 22, 2022  2:53 PM

## 2022-08-22 NOTE — DISCHARGE INSTRUCTIONS
HOME CARE FOLLOWING LUMPECTOMY      APPOINTMENT WITH YOUR SURGEON:  You will be scheduled to see your surgeon in 1 week to discuss your pathology results and for a wound check.    SUPPORT:  Wear a bra for support and comfort for 3-7 days, day and night.    DRAIN:  If you have a drain in place, you will need a separate appointment with a nurse in the office to have this removed.  You will have a form to keep track of the output of your drain.  When the output reaches a point where the total for a 24 hour period is less than 30cc s, you are ready to have the drain removed.  At that point you can call the office and talk to the nurse to arrange coming into the office to have this done.  If you have more than one drain in place, they may not all be removed at the same time, as the outputs can be very different from each.  The nurse will help you to manage this and help decide when the remaining drains will be taken out.    INCISIONAL CARE:  If you have a dressing in place, keep clean and dry for 48 hours; you may replace the gauze if it becomes soiled.  After 48 hours you may remove the dressing and shower.  Do not submerse incision in water for 1 week.  If you have a Dermabond dressing (a type of skin glue), you may shower immediately.  Sutures will absorb and do not need to be removed.  If present, leave the steri-strips (white paper tapes) in place for 14 days after surgery.  If present, leave Dermabond glue in place until it wears/flakes off.  You may expect a small amount of drainage from your incision.  A lump/ridge under the incision is normal and will gradually resolve.    BATHING:  You are allowed to bath (shallow water in a tub only) or shower 24-48 hours after your surgery.  Mild soap is OK to use near these sites.  When bathing, do not allow the incision or drain site to become submersed in water as this may increase the risk of infection.    ACTIVITY:  Cautiously resume exercise and strenuous activities  such as jogging, tennis, aerobics, etc. Also, be careful of stretching activities with operative side for two weeks.    If you had a  sentinel node biopsy  at the time of your surgery:  You have no restrictions in addition to those noted above.    If you had an  axillary node dissection  at the time of your surgery:  You are recommended to restrict the activity of the arm on the side the dissection was done on.  This means:  no reaching overhead until cleared to do so by your surgeon, no carrying weight on that side greater than a couple pounds, no injections/vaccinations/ blood samples from that side, and no blood pressure measurements on that side.  It is also recommended to elevate the arm on pillows several times a day to decrease/minimize swelling, and avoid sleeping on that arm.    DIET:  No restrictions.  Increased fluid intake is recommended. While taking pain medications, increase dietary fiber or add a fiber supplementation like Metamucil or Citrucel to help prevent constipation - a possible side effect of pain medications.    DISCOMFORT:  Local anesthetic placed at surgery should provide relief for 4-8 hours.  Begin taking pain pills before discomfort is severe.  Take the pain medication with some food, when possible, to minimize side effects.  Intermittent use of ice packs may help during the first 48 hours.  Expect gradual improvement.    RETURN APPOINTMENT:  Schedule a follow-up visit 2-3 weeks post-op.  Office Phone:  [phone]     CONTACT US IF THE FOLLOWING DEVELOPS:   1. A fever that is above 101     2. If there is a large amount of drainage, bleeding, or swelling.   3. Severe pain that is not relieved by your prescription.   4. Drainage that is thick, cloudy, yellow, green or white.   5. Any other questions not answered by  Frequently Asked Questions  sheet.      FREQUENTLY ASKED QUESTIONS:    Q:  How should my incision look?    A:  Normally your incision will appear slightly swollen with light  redness directly along the incision itself as it heals.  It may feel like a bump or ridge as the healing/scarring happens, and over time (3-4 months) this bump or ridge feeling should slowly go away.  In general, clear or pink watery drainage can be normal at first as your incision heals, but should decrease over time.    Q:  How do I know if my incision is infected?  A:  Look at your incision for signs of infection, like redness around the incision spreading to surrounding skin, or drainage of cloudy or foul-smelling drainage.  If you feel warm, check your temperature to see if you are running a fever.    **If any of these things occur, please notify the nurse at our office.  We may need you to come into the office for an incision check.      Q:  How do I take care of my incision?  A:  If you have a dressing in place - Starting the day after surgery, replace the dressing 1-2 times a day until there is no further drainage from the incision.  At that time, a dressing is no longer needed.  Try to minimize tape on the skin if irritation is occurring at the tape sites.  If you have significant irritation from tape on the skin, please call the office to discuss other method of dressing your incision.    Small pieces of tape called  steri-strips  may be present directly overlying your incision; these may be removed 10 days after surgery unless otherwise specified by your surgeon.  If these tapes start to loosen at the ends, you may trim them back until they fall off or are removed.    A:  If you had  Dermabond  tissue glue used as a dressing (this causes your incision to look shiny with a clear covering over it) - This type of dressing wears off with time and does not require more dressings over the top unless it is draining around the glue as it wears off.  Do not apply ointments or lotions over the incisions until the glue has completely worn off.    Q:  There is a piece of tape or a sticky  lead  still on my skin.  Can  I remove this?  A:  Sometimes the sticky  leads  used for monitoring during surgery or for evaluation in the emergency department are not all removed while you are in the hospital.  These sometimes have a tab or metal dot on them.  You can easily remove these on your own, like taking off a band-aid.  If there is a gel substance under the  lead , simply wipe/clean it off with a washcloth or paper towel.      Q:  What can I do to minimize constipation (very hard stools, or lack of stools)?  A:  Stay well hydrated.  Increase your dietary fiber intake or take a fiber supplement -with plenty of water.  Walk around frequently.  You may consider an over-the-counter stool-softener.  Your Pharmacist can assist you with choosing one that is stocked at your pharmacy.  Constipation is also one of the most common side effects of pain medication.  If you are using pain medication, be pro-active and try to PREVENT problems with constipation by taking the steps above BEFORE constipation becomes a problem.    Q:  What do I do if I need more pain medications?  A:  Call the office to receive refills.  Be aware that certain pain meds cannot be called into a pharmacy and actually require a paper prescription.  A change may be made in your pain med as you progress thru your recovery period or if you have side effects to certain meds.    --Pain meds are NOT refilled after 5pm on weekdays, and NOT AT ALL on the weekends, so please look ahead to prevent problems.      Q:  Why am I having a hard time sleeping now that I am at home?  A:  Many medications you receive while you are in the hospital can impact your sleep for a number of days after your surgery/hospitalization.  Decreased level of activity and naps during the day may also make sleeping at night difficult.  Try to minimize day-time naps, and get up frequently during the day to walk around your home during your recovery time.  Sleep aides may be of some help, but are not recommended  for long-term use.      Q:  I am having some back discomfort.  What should I do?  A:  This may be related to certain positioning that was required for your surgery, extended periods of time in bed, or other changes in your overall activity level.  You may try ice, heat, acetaminophen, or ibuprofen to treat this temporarily.  Note that many pain medications have acetaminophen in them and would state this on the prescription bottle.  Be sure not to exceed the maximum of 4000mg per day of acetaminophen.     **If the pain you are having does not resolve, is severe, or is a flare of back pain you have had on other occasions prior to surgery, please contact your primary physician for further recommendations or for an appointment to be examined at their office.    Q:  Why am I having headaches?  A:  Headaches can be caused by many things:  caffeine withdrawal, use of pain meds, dehydration, high blood pressure, lack of sleep, over-activity/exhaustion, flare-up of usual migraine headaches.  If you feel this is related to muscle tension (a band-like feeling around the head, or a pressure at the low-back of the head) you may try ice or heat to this area.  You may need to drink more fluids (try electrolyte drink like Gatorade), rest, or take your usual migraine medications.   **If your headaches do not resolve, worsen, are accompanied by other symptoms, or if your blood pressure is high, please call your primary physician for recommendation and/or examination.    Q:  I am unable to urinate.  What do I do?  A:  A small percentage of people can have difficulty urinating initially after surgery.  This includes being able to urinate only a very small amount at a time and feeling discomfort or pressure in the very low abdomen.  This is called  urinary retention , and is actually an urgent situation.  Proceed to your nearest Emergency department for evaluation (not an Urgent Care Center).  Sometimes the bladder does not work  correctly after certain medications you receive during surgery, or related to certain procedures.  You may need to have a catheter placed until your bladder recovers.  When planning to go to an Emergency department, it may help to call the ER to let them know you are coming in for this problem after a surgery.  This may help you get in quicker to be evaluated.  **If you have symptoms of a urinary tract infection, please contact your primary physician for the proper evaluation and treatment.          If you have other questions, please call the office Monday thru Friday between 8am and 5pm to discuss with the nurse.  # [phone]    There is a surgeon ON CALL on weekday evenings and over the weekend in case of urgent need only, and may be contacted at the same number.    If you are having an emergency, call 911 or proceed to your nearest emergency department.                        Same Day Surgery Discharge Instructions  Special Precautions After Surgery - Adult    It is not unusual to feel lightheaded or faint, up to 24 hours after surgery or while taking pain medication.  If you have these symptoms; sit for a few minutes before standing and have someone assist you when getting up.  You should rest and relax for the next 24 hours and must have someone stay with you for at least 24 hours after your discharge.  DO NOT DRIVE any vehicle or operate mechanical equipment for 24 hours following the end of your surgery.  DO NOT DRIVE while taking narcotic pain medications that have been prescribed by your physician.  If you had a limb operated on, you must be able to use it fully to drive.  DO NOT drink alcoholic beverages for 24 hours following surgery or while taking prescription pain medication.  Drink clear liquids (apple juice, ginger ale, broth, 7-Up, etc.).  Progress to your regular diet as you feel able.  Any questions call your physician and do not make important decisions for 24 hours.    Nausea and Vomiting:  Nausea and vomiting can occur any time after receiving anesthesia. If you experience nausea and vomiting we encourage you to move to a clear liquid diet and advance your diet as tolerated. If nausea and vomiting do not improve within 12 hours please call the surgeon or present to the Emergency department.     Break-through Bleeding: If your experience bleeding from your surgical site apply pressure and additional dressing per nurse instruction. For simple problems such as a saturated dressing, you may need to reinforce the dressing with more gauze and tape and put slight pressure on the site. If bleeding does not subside contact the surgeon or present to the Emergency Department.    Post-op Infection: If you develop a fever of 100.4 or greater, have pus like drainage, redness, swelling or severe pain at the surgical site not alleviated with pain medications; please contact the surgeon or present to the Emergency Department.   __________________________________________________________________________________________________________________________________  IMPORTANT NUMBERS:    [facility] [place] Main Number:  [phone], [phone]  Pharmacy:  [phone]  Same Day Surgery:  [phone], for general post-op questions call Monday - Thursday until 8:30 p.m., Fridays until 6:00 p.m.  Urgent Care:  [phone]  Nurse Advice Line:  [phone]                                                                          [facility]:  [phone]

## 2022-08-22 NOTE — ANESTHESIA POSTPROCEDURE EVALUATION
Patient: Kristen Thompson    Procedure: Procedure(s):  Seed Localized Left Breast Biopsy       Anesthesia Type:  General    Note:  Disposition: Outpatient   Postop Pain Control: Uneventful            Sign Out: Well controlled pain   PONV: No   Neuro/Psych: Uneventful            Sign Out: Acceptable/Baseline neuro status   Airway/Respiratory: Uneventful            Sign Out: Acceptable/Baseline resp. status   CV/Hemodynamics: Uneventful            Sign Out: Acceptable CV status; No obvious hypovolemia; No obvious fluid overload   Other NRE: NONE   DID A NON-ROUTINE EVENT OCCUR? No           Last vitals:  Vitals Value Taken Time   /79 08/22/22 1445   Temp     Pulse 75 08/22/22 1452   Resp 37 08/22/22 1452   SpO2 99 % 08/22/22 1452   Vitals shown include unvalidated device data.    Electronically Signed By: AHMET Gaines CRNA  August 22, 2022  2:53 PM

## 2022-08-22 NOTE — OP NOTE
Procedure Date: 08/22/22     PREOPERATIVE DIAGNOSIS: 1. Left breast hemangioma    POSTOPERATIVE DIAGNOSIS: Same     PROCEDURE: Left breast biopsy with RFID clip localization     ATTENDING SURGEON: Jeremy Berg DO    ESTIMATED BLOOD LOSS: 3 mL    ANESTHESIA: General Endotracheal plus Local Anesthesia (0.25% Marcaine and 1% Lidocaine in a 50:50 mixture)     INDICATIONS FOR PROCEDURE: : Kristen Thompson presents with a history of a left upper outer breast mass, biopsied via image-guided percutaneous means, with features of a hemangioma noted on histology. After discussion was held with the patient regarding indications, risks, benefits and alternatives, benefits, and risks, her questions were addressed and she understood and wished to proceed with left breast biopsy with RFID chip localaization. Specific risks discussed included bleeding, infection, seroma, need for additional treatment, nontherapeutic intervention, wound complication (such as dehiscence), recurrence, cosmetic deformity, potential need for additional surgical resection, potential for drain placement, lymphedema, and rare complications related to surgery and/or anesthesia such as venous thromboembolism and cardiorespiratory complications.     PROCEDURE: After informed consent was obtained, the patient was brought to the operating room and placed in the supine position on the Operating Room table. Anesthesia was then administered.  Ultrasound was used to localize the clip and RFID which was confirmed with the localizer device. The left breast was then prepped and draped in the typical sterile fashion. A time-out was performed to verify patient and procedure.     Additional local anesthetic was delivered at the site marked with ultrasound and the localizer device.  Flaps were raised circumferentially.  Dissection was carried out using the localizer to center the lesion within the specimen.   Once the partial mastectomy was complete, the specimen was  oriented using a the margin paint kit (of note the lateral and medial marking paints were swapped inadvertently during painting the specimen). The margins of the specimen appeared complete, and without evidence of any diseased tissue in proximity to the cut edges, RFID chip [id number] was removed within the specimen.  Hemostasis was again assured, and the cavity was cleansed using sterile irrigant. The primary specimen was sent to radiology, and mammographic confirmation of complete resection was received shortly thereafter.  The breast tissue was reapproximated to fill the cavity/void and mitigate seroma formation.     An instrument count, including laparotomy pads, sponges and needles was performed and found to be correct.       The subcutaneous tissue was closed in layers using interrupted 3-0 Vicryl suture and skin was closed using a subcuticular suture of 4-0 Monocryl. The skin was cleansed and dried, before administration of Dermabond glue. A compression bra was then administered.     The patient tolerated the procedure well, was allowed to recover and was transferred to the recovery room in stable condition.    Jeremy Berg DO on 8/22/2022 at 3:07 PM

## 2022-08-22 NOTE — INTERVAL H&P NOTE
"I have reviewed the surgical (or preoperative) H&P that is linked to this encounter, and examined the patient. There are no significant changes    Clinical Conditions Present on Arrival:  Clinically Significant Risk Factors Present on Admission                   # Overweight: Estimated body mass index is 25.79 kg/m  as calculated from the following:    Height as of this encounter: 1.575 m (5' 2\").    Weight as of this encounter: 64 kg (141 lb).       "

## 2022-08-24 LAB
PATH REPORT.COMMENTS IMP SPEC: NORMAL
PATH REPORT.COMMENTS IMP SPEC: NORMAL
PATH REPORT.FINAL DX SPEC: NORMAL
PATH REPORT.GROSS SPEC: NORMAL
PATH REPORT.MICROSCOPIC SPEC OTHER STN: NORMAL
PATH REPORT.RELEVANT HX SPEC: NORMAL
PHOTO IMAGE: NORMAL

## 2022-08-24 PROCEDURE — 88307 TISSUE EXAM BY PATHOLOGIST: CPT | Mod: 26 | Performed by: PATHOLOGY

## 2022-09-07 ENCOUNTER — MYC MEDICAL ADVICE (OUTPATIENT)
Dept: SURGERY | Facility: CLINIC | Age: 77
End: 2022-09-07

## 2022-09-13 ENCOUNTER — OFFICE VISIT (OUTPATIENT)
Dept: SURGERY | Facility: CLINIC | Age: 77
End: 2022-09-13
Payer: COMMERCIAL

## 2022-09-13 VITALS
HEART RATE: 55 BPM | HEIGHT: 62 IN | DIASTOLIC BLOOD PRESSURE: 75 MMHG | SYSTOLIC BLOOD PRESSURE: 120 MMHG | WEIGHT: 141.09 LBS | TEMPERATURE: 98 F | BODY MASS INDEX: 25.96 KG/M2

## 2022-09-13 DIAGNOSIS — Z98.890 S/P BREAST BIOPSY, LEFT: Primary | ICD-10-CM

## 2022-09-13 PROCEDURE — 99024 POSTOP FOLLOW-UP VISIT: CPT | Performed by: SURGERY

## 2022-09-13 NOTE — NURSING NOTE
"Initial /75 (BP Location: Right arm, Patient Position: Sitting, Cuff Size: Adult Regular)   Pulse 55   Temp 98  F (36.7  C) (Tympanic)   Ht 1.575 m (5' 2\")   Wt 64 kg (141 lb 1.5 oz)   BMI 25.81 kg/m   Estimated body mass index is 25.81 kg/m  as calculated from the following:    Height as of this encounter: 1.575 m (5' 2\").    Weight as of this encounter: 64 kg (141 lb 1.5 oz). .    Saima Price MA    "

## 2022-09-13 NOTE — PROGRESS NOTES
"General Surgery Post Op    Pt returns for follow up visit s/p left RFID localized breast biopsy on 8/22/22.    Patient has been doing well, tolerating diet. Bowels moving well. Pain controlled. No issues with wound healing/redness/drainage. No fevers.  She admits ecchymosis at the biopsy site.      Physical exam: Vitals: /75 (BP Location: Right arm, Patient Position: Sitting, Cuff Size: Adult Regular)   Pulse 55   Temp 98  F (36.7  C) (Tympanic)   Ht 1.575 m (5' 2\")   Wt 64 kg (141 lb 1.5 oz)   BMI 25.81 kg/m    BMI= Body mass index is 25.81 kg/m .    Exam:  Constitutional: healthy, alert and no distress  Skin: Examined with Saima Price MA.  Ecchymosis underlying incision with small hematoma.  No drainage.    Path:  A. Breast, left, partial mastectomy:  -Breast tissue with benign hemangioma, completely excised.    Assessment:     ICD-10-CM    1. S/P breast biopsy, left  Z98.890      Plan: Kristen Thompson was seen for follow-up after left breast biopsy.  Patient is doing well and recovering without issue at this time.  We reviewed the pathology which was benign.  We discussed routine post-operative care of wounds including avoiding sun exposure to wounds to limit scarring, no need for overlying ointments, and weight restrictions going forward.  Patient was instructed to call with any questions or concerns.  Kristen Thompson can follow-up on an as needed basis.  She should continue annual screening mammography and monthly self examination.      Jeremy Berg, DO on 9/13/2022 at 8:15 AM      "

## 2022-09-13 NOTE — LETTER
"    9/13/2022         RE: Kristen Thompson  [address] [place] [id number]        Dear Colleague,    Thank you for referring your patient, Kristen Thompson, to the [facility]. Please see a copy of my visit note below.    General Surgery Post Op    Pt returns for follow up visit s/p left RFID localized breast biopsy on 8/22/22.    Patient has been doing well, tolerating diet. Bowels moving well. Pain controlled. No issues with wound healing/redness/drainage. No fevers.  She admits ecchymosis at the biopsy site.      Physical exam: Vitals: /75 (BP Location: Right arm, Patient Position: Sitting, Cuff Size: Adult Regular)   Pulse 55   Temp 98  F (36.7  C) (Tympanic)   Ht 1.575 m (5' 2\")   Wt 64 kg (141 lb 1.5 oz)   BMI 25.81 kg/m    BMI= Body mass index is 25.81 kg/m .    Exam:  Constitutional: healthy, alert and no distress  Skin: Examined with Saima Price MA.  Ecchymosis underlying incision with small hematoma.  No drainage.    Path:  A. Breast, left, partial mastectomy:  -Breast tissue with benign hemangioma, completely excised.    Assessment:     ICD-10-CM    1. S/P breast biopsy, left  Z98.890      Plan: Kristen Thompson was seen for follow-up after left breast biopsy.  Patient is doing well and recovering without issue at this time.  We reviewed the pathology which was benign.  We discussed routine post-operative care of wounds including avoiding sun exposure to wounds to limit scarring, no need for overlying ointments, and weight restrictions going forward.  Patient was instructed to call with any questions or concerns.  Kristen Thompson can follow-up on an as needed basis.  She should continue annual screening mammography and monthly self examination.      Jeremy Berg, DO on 9/13/2022 at 8:15 AM          Again, thank you for allowing me to participate in the care of your patient.        Sincerely,        Jeremy Berg, DO    "

## 2022-10-13 ENCOUNTER — OFFICE VISIT (OUTPATIENT)
Dept: NEUROLOGY | Facility: CLINIC | Age: 77
End: 2022-10-13
Payer: COMMERCIAL

## 2022-10-13 VITALS
WEIGHT: 133 LBS | HEART RATE: 51 BPM | SYSTOLIC BLOOD PRESSURE: 124 MMHG | DIASTOLIC BLOOD PRESSURE: 73 MMHG | BODY MASS INDEX: 24.33 KG/M2

## 2022-10-13 DIAGNOSIS — M79.2 NEUROPATHIC PAIN: ICD-10-CM

## 2022-10-13 DIAGNOSIS — Z79.899 ENCOUNTER FOR LONG-TERM (CURRENT) USE OF MEDICATIONS: ICD-10-CM

## 2022-10-13 DIAGNOSIS — R20.2 PARESTHESIAS: Primary | ICD-10-CM

## 2022-10-13 DIAGNOSIS — Z86.73 HISTORY OF STROKE: ICD-10-CM

## 2022-10-13 PROCEDURE — 99215 OFFICE O/P EST HI 40 MIN: CPT | Performed by: PSYCHIATRY & NEUROLOGY

## 2022-10-13 RX ORDER — PREGABALIN 50 MG/1
50 CAPSULE ORAL 3 TIMES DAILY
Qty: 90 CAPSULE | Refills: 5 | Status: SHIPPED | OUTPATIENT
Start: 2022-10-13 | End: 2023-10-05

## 2022-10-13 NOTE — LETTER
10/13/2022         RE: Kristen Thompson  [address] [place] [id number]        Dear Colleague,    Thank you for referring your patient, Kristen Thompson, to the [facility] [place]. Please see a copy of my visit note below.    ESTABLISHED PATIENT NEUROLOGY NOTE    DATE OF VISIT: 10/13/2022  MRN: [id number]  PATIENT NAME: Kristen Thompson  YOB: 1945    Chief Complaint   Patient presents with     Stroke     Follow-up      SUBJECTIVE:                                                      HISTORY OF PRESENT ILLNESS:  Kristen is here for follow up regarding stroke.  Met the patient for initial consultation about a year and a half ago.    History as previously documented by (4.20.21):  Per chart review, the patient was admitted to [facility] on 2/13/21. She presented with Left-sided weakness, facial droop and speech difficulties. She received tPA and symptoms progressively resolved. Brain MRI showed a Right thalamic stroke. LDL was elevated so she was started on atorvastatin. CTA Echocardiogram was negative for PFO. No arrhythmia on telemetry. Aspirin, Plavix and amlodipine were also started. She was discharged to TCU for rehabilitation and 30-day event monitor was recommended. The TCU discharge note (2/26) indicates possible sinus arrhythmia.  Previous event monitor was negative for A. Fib, I do not have the most recent test on file.  She is following with Cardiology now.     Per primary care follow-up note, the plan was for 21 days of DAP and then continuation of aspirin alone.  She complained of some leg cramping and easy fatigability and tingling on the left side.  She had previously been on magnesium and asked Dr. Bauer about resuming this.  She was seen in the emergency room last month for arm pain.  Head CT and CTA were unremarkable.  Ultrasound ruled out embolism or stenosis.  Low dose of gabapentin was started then.     The patient has additional  history of chronic neck pain. RLS, GERD, hearing loss and stress incontinence.      Kristen is here with her daughter, who is a nurse.  She feels like she is slowly recovering.  Daughter has noticed improvement.  She still has some facial tingling and pain in the left arm.  She still has a limp, and says that when she is really tired the leg is even weaker.  She continues to have some problems with neck pain which leads to dizziness at times.  This is the reason for recent cervical spine imaging which showed multilevel degenerative changes.  Her chiropractor has told her that her C2 keeps slipping out of place.  She has an underlying speech impediment secondary to hearing loss.  She had very brief speech changes at the time of her stroke, but these quickly resolved.  She was also having some trouble with swallowing initially but this is no longer the case.  She endorses some TMJ pain in the left jaw.  She also has problems with dry mouth.  The gabapentin seems to be helping somewhat with her arm pain.  She is now taking 300mg total daily.  She is able to walk about 2 miles per day.  She does have some problems with knee pain on the left side, and wonders about if another injection would be helpful.      Daughter clarifies that she took the Plavix for 21 days and then switch to aspirin monotherapy.  Kristen was not on any medications prior to the stroke.     He does still have some headaches, but nothing severe.  She is somewhat frustrated with her slow progress.  Daughter feels like it is going well.  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    She still has residual tingling in her Left face, I and arm, some weakness on that side.  This has not really changed since she saw me last.  She continues to have pain in the left arm.  Since her stroke she has had more incontinence of bladder, though this was a problem before as well.  She says that  the gabapentin made her fall asleep anytime she sat down.     Kristen says she is now having some problems with her right hand falling asleep.  She also notices some numbness and tingling in the lower legs and feet at times.  These are new.    CURRENT MEDICATIONS:   acetaminophen (TYLENOL) 325 MG tablet, Take 650 mg by mouth  amLODIPine (NORVASC) 5 MG tablet, TAKE 1 TABLET BY MOUTH EVERY DAY  aspirin 81 MG EC tablet, Take 81 mg by mouth daily  atorvastatin (LIPITOR) 10 MG tablet, TAKE 1 TABLET BY MOUTH EVERY DAY  azelastine (ASTELIN) 0.1 % nasal spray, Spray 2 sprays into both nostrils 2 times daily as needed for rhinitis  CALCIUM PO, Take by mouth daily   Cholecalciferol (VITAMIN D PO), Take 1,000 Units by mouth daily   docusate sodium (COLACE) 100 MG capsule, Take 1 capsule (100 mg) by mouth 2 times daily Take while on opiate to prevent constipation  DULoxetine (CYMBALTA) 20 MG capsule, TAKE 1 CAPSULE BY MOUTH EVERY DAY  Fluticasone Propionate (FLONASE ALLERGY RELIEF NA), Spray in nostril as needed  MAGNESIUM PO, Take 250 mg by mouth daily   Omega-3 Fatty Acids (OMEGA 3 PO), Take 2 capsules by mouth daily   omeprazole (PRILOSEC) 20 MG DR capsule, TAKE 1 TABLET (20 MG) BY MOUTH 2 TIMES DAILY TAKE 30-60 MINUTES BEFORE A MEAL.  Polyethyl Glycol-Propyl Glycol (SYSTANE FREE OP), Apply 2 drops to eye as needed (dry eyes)  Polyethylene Glycol 3350 (MIRALAX PO),   traZODone (DESYREL) 50 MG tablet, TAKE 1 TABLET BY MOUTH NIGHTLY AS NEEDED FOR SLEEP    lidocaine 2%-EPINEPHrine 1:100,000 injection 20 mL        RECENT DIAGNOSTIC STUDIES:   Labs: No results found for any visits on 10/13/22.      REVIEW OF SYSTEMS:                                                      10-point review of systems is negative except as mentioned above in HPI.    EXAM:                                                      Physical Exam:   Vitals: /73   Pulse 51   Wt 60.3 kg (133 lb)   BMI 24.33 kg/m    BMI= Body mass index is 24.33  kg/m .  GENERAL: NAD.  HEENT: NC/AT.  CV: RRR. S1, S2.   NECK: No bruits.  PULM: Non-labored breathing.   Neurologic:  MENTAL STATUS: Alert, attentive. Speech impediment. Normal comprehension. Normal concentration. Adequate fund of knowledge.   CRANIAL NERVES: Discs technically difficult to visualize. Visual fields intact to confrontation. Pupils equally, round and reactive to light. Facial sensation and movement normal. EOM full. Hard of hearing. Trapezius strength intact. Palate moves symmetrically. Tongue midline.  MOTOR: Slight weakness with hip flexion on the left, otherwise strength is 5/5 in proximal and distal muscle groups of upper and lower extremities. Tone and bulk normal.   DTRs: Intact and symmetric in biceps, BR, patellae.  Cannot elicit ankle jerks.  Babinski down-going bilaterally.   SENSATION: Normal light touch and pinprick on the right.  Hypersensitivity with pinprick testing in the fingertips of the left hand and plantar surface of the left foot.  Intact proprioception at great toes. Vibration: Slightly decreased at left ankle compared to right.    COORDINATION: Normal finger nose finger on the right, dysmetria on the left.  Knee heel shin normal.   STATION AND GAIT: Slight sway with Romberg. Good postural reflexes. Casual gait reveals slight limp.  Right hand-dominant.    ASSESSMENT and PLAN:                                                      Assessment:    ICD-10-CM    1. Paresthesias  R20.2       2. History of stroke  Z86.73       3. Neuropathic pain  M79.2 pregabalin (LYRICA) 50 MG capsule      4. Encounter for long-term (current) use of medications  Z79.899            Ms. Thompson is a pleasant 76-year-old woman here for follow-up regarding stroke and with some new symptoms of paresthesias in the limbs.  She is having quite a bit of neuropathic pain that is residual from her stroke.  She had trouble with side effects on the gabapentin so I suggested we do a trial of Lyrica.  Regarding  the paresthesias, she certainly could have a peripheral nerve process going on but I would like to make sure that there is nothing new structurally from a central standpoint first.  Exam looks largely unchanged from the previous.  I would like to see him back in clinic in about 6 months.  We will adjust our evaluation plan in the meantime, based on test results.  She understands and agrees with the plan.    Plan:  -- Updated brain MRI and cervical spine imaging.  If this does not provide an explanation for your new numbness/tingling, I think I will have you do nerve conduction studies as the next step.  We will let you know.  -- For the nerve pain, lets do a trial of Lyrica (pregabalin): 50mg 2-3 times daily.  -- Continue to wear your wrist brace at night.  -- Continue the aspirin and Lipitor.  -- Return to neurology clinic in 6 months.  Happy birthday tomorrow!    Total Time: 40 minutes were spent with the patient and in chart review/documentation (as described above in Assessment and Plan)/coordinating the care on date of service.    Yasmin Quintanilla MD  Neurology    Dragon software used in the dictation of this note.                        Again, thank you for allowing me to participate in the care of your patient.        Sincerely,        Yasmin Quintanilla MD

## 2022-10-13 NOTE — PROGRESS NOTES
ESTABLISHED PATIENT NEUROLOGY NOTE    DATE OF VISIT: 10/13/2022  MRN: [id number]  PATIENT NAME: Kristen Thompson  YOB: 1945    Chief Complaint   Patient presents with     Stroke     Follow-up      SUBJECTIVE:                                                      HISTORY OF PRESENT ILLNESS:  Kristen is here for follow up regarding stroke.  Met the patient for initial consultation about a year and a half ago.    History as previously documented by (4.20.21):  Per chart review, the patient was admitted to [facility] on 2/13/21. She presented with Left-sided weakness, facial droop and speech difficulties. She received tPA and symptoms progressively resolved. Brain MRI showed a Right thalamic stroke. LDL was elevated so she was started on atorvastatin. CTA Echocardiogram was negative for PFO. No arrhythmia on telemetry. Aspirin, Plavix and amlodipine were also started. She was discharged to TCU for rehabilitation and 30-day event monitor was recommended. The TCU discharge note (2/26) indicates possible sinus arrhythmia.  Previous event monitor was negative for A. Fib, I do not have the most recent test on file.  She is following with Cardiology now.     Per primary care follow-up note, the plan was for 21 days of DAP and then continuation of aspirin alone.  She complained of some leg cramping and easy fatigability and tingling on the left side.  She had previously been on magnesium and asked Dr. Bauer about resuming this.  She was seen in the emergency room last month for arm pain.  Head CT and CTA were unremarkable.  Ultrasound ruled out embolism or stenosis.  Low dose of gabapentin was started then.     The patient has additional history of chronic neck pain. RLS, GERD, hearing loss and stress incontinence.      Kristen is here with her daughter, who is a nurse.  She feels like she is slowly recovering.  Daughter has noticed improvement.  She still has some facial tingling and pain in the left arm.   She still has a limp, and says that when she is really tired the leg is even weaker.  She continues to have some problems with neck pain which leads to dizziness at times.  This is the reason for recent cervical spine imaging which showed multilevel degenerative changes.  Her chiropractor has told her that her C2 keeps slipping out of place.  She has an underlying speech impediment secondary to hearing loss.  She had very brief speech changes at the time of her stroke, but these quickly resolved.  She was also having some trouble with swallowing initially but this is no longer the case.  She endorses some TMJ pain in the left jaw.  She also has problems with dry mouth.  The gabapentin seems to be helping somewhat with her arm pain.  She is now taking 300mg total daily.  She is able to walk about 2 miles per day.  She does have some problems with knee pain on the left side, and wonders about if another injection would be helpful.      Daughter clarifies that she took the Plavix for 21 days and then switch to aspirin monotherapy.  Kristen was not on any medications prior to the stroke.     He does still have some headaches, but nothing severe.  She is somewhat frustrated with her slow progress.  Daughter feels like it is going well.  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    She still has residual tingling in her Left face, I and arm, some weakness on that side.  This has not really changed since she saw me last.  She continues to have pain in the left arm.  Since her stroke she has had more incontinence of bladder, though this was a problem before as well.  She says that the gabapentin made her fall asleep anytime she sat down.     Kristen says she is now having some problems with her right hand falling asleep.  She also notices some numbness and tingling in the lower legs and feet at times.  These are new.    CURRENT MEDICATIONS:    acetaminophen (TYLENOL) 325 MG tablet, Take 650 mg by mouth  amLODIPine (NORVASC) 5 MG tablet, TAKE 1 TABLET BY MOUTH EVERY DAY  aspirin 81 MG EC tablet, Take 81 mg by mouth daily  atorvastatin (LIPITOR) 10 MG tablet, TAKE 1 TABLET BY MOUTH EVERY DAY  azelastine (ASTELIN) 0.1 % nasal spray, Spray 2 sprays into both nostrils 2 times daily as needed for rhinitis  CALCIUM PO, Take by mouth daily   Cholecalciferol (VITAMIN D PO), Take 1,000 Units by mouth daily   docusate sodium (COLACE) 100 MG capsule, Take 1 capsule (100 mg) by mouth 2 times daily Take while on opiate to prevent constipation  DULoxetine (CYMBALTA) 20 MG capsule, TAKE 1 CAPSULE BY MOUTH EVERY DAY  Fluticasone Propionate (FLONASE ALLERGY RELIEF NA), Spray in nostril as needed  MAGNESIUM PO, Take 250 mg by mouth daily   Omega-3 Fatty Acids (OMEGA 3 PO), Take 2 capsules by mouth daily   omeprazole (PRILOSEC) 20 MG DR capsule, TAKE 1 TABLET (20 MG) BY MOUTH 2 TIMES DAILY TAKE 30-60 MINUTES BEFORE A MEAL.  Polyethyl Glycol-Propyl Glycol (SYSTANE FREE OP), Apply 2 drops to eye as needed (dry eyes)  Polyethylene Glycol 3350 (MIRALAX PO),   traZODone (DESYREL) 50 MG tablet, TAKE 1 TABLET BY MOUTH NIGHTLY AS NEEDED FOR SLEEP    lidocaine 2%-EPINEPHrine 1:100,000 injection 20 mL        RECENT DIAGNOSTIC STUDIES:   Labs: No results found for any visits on 10/13/22.      REVIEW OF SYSTEMS:                                                      10-point review of systems is negative except as mentioned above in HPI.    EXAM:                                                      Physical Exam:   Vitals: /73   Pulse 51   Wt 60.3 kg (133 lb)   BMI 24.33 kg/m    BMI= Body mass index is 24.33 kg/m .  GENERAL: NAD.  HEENT: NC/AT.  CV: RRR. S1, S2.   NECK: No bruits.  PULM: Non-labored breathing.   Neurologic:  MENTAL STATUS: Alert, attentive. Speech impediment. Normal comprehension. Normal concentration. Adequate fund of knowledge.   CRANIAL NERVES: Discs  technically difficult to visualize. Visual fields intact to confrontation. Pupils equally, round and reactive to light. Facial sensation and movement normal. EOM full. Hard of hearing. Trapezius strength intact. Palate moves symmetrically. Tongue midline.  MOTOR: Slight weakness with hip flexion on the left, otherwise strength is 5/5 in proximal and distal muscle groups of upper and lower extremities. Tone and bulk normal.   DTRs: Intact and symmetric in biceps, BR, patellae.  Cannot elicit ankle jerks.  Babinski down-going bilaterally.   SENSATION: Normal light touch and pinprick on the right.  Hypersensitivity with pinprick testing in the fingertips of the left hand and plantar surface of the left foot.  Intact proprioception at great toes. Vibration: Slightly decreased at left ankle compared to right.    COORDINATION: Normal finger nose finger on the right, dysmetria on the left.  Knee heel shin normal.   STATION AND GAIT: Slight sway with Romberg. Good postural reflexes. Casual gait reveals slight limp.  Right hand-dominant.    ASSESSMENT and PLAN:                                                      Assessment:    ICD-10-CM    1. Paresthesias  R20.2       2. History of stroke  Z86.73       3. Neuropathic pain  M79.2 pregabalin (LYRICA) 50 MG capsule      4. Encounter for long-term (current) use of medications  Z79.899            Ms. Thompson is a pleasant 76-year-old woman here for follow-up regarding stroke and with some new symptoms of paresthesias in the limbs.  She is having quite a bit of neuropathic pain that is residual from her stroke.  She had trouble with side effects on the gabapentin so I suggested we do a trial of Lyrica.  Regarding the paresthesias, she certainly could have a peripheral nerve process going on but I would like to make sure that there is nothing new structurally from a central standpoint first.  Exam looks largely unchanged from the previous.  I would like to see him back in  clinic in about 6 months.  We will adjust our evaluation plan in the meantime, based on test results.  She understands and agrees with the plan.    Plan:  -- Updated brain MRI and cervical spine imaging.  If this does not provide an explanation for your new numbness/tingling, I think I will have you do nerve conduction studies as the next step.  We will let you know.  -- For the nerve pain, lets do a trial of Lyrica (pregabalin): 50mg 2-3 times daily.  -- Continue to wear your wrist brace at night.  -- Continue the aspirin and Lipitor.  -- Return to neurology clinic in 6 months.  Happy birthday tomorrow!    Total Time: 40 minutes were spent with the patient and in chart review/documentation (as described above in Assessment and Plan)/coordinating the care on date of service.    aYsmin Quintanilla MD  Neurology    Dragon software used in the dictation of this note.

## 2022-10-13 NOTE — PATIENT INSTRUCTIONS
Plan:  -- Updated brain MRI and cervical spine imaging.  If this does not provide an explanation for your new numbness/tingling, I think I will have you do nerve conduction studies as the next step.  We will let you know.  -- For the nerve pain, lets do a trial of Lyrica (pregabalin): 50mg 2-3 times daily.  -- Continue to wear your wrist brace at night.  -- Continue the aspirin and Lipitor.  -- Return to neurology clinic in 6 months.  Happy birthday tomorrow!

## 2022-10-13 NOTE — NURSING NOTE
"Kristen Thompson is a 76 year old female who presents for:  Chief Complaint   Patient presents with     Stroke     Follow-up         Initial Vitals:  /73   Pulse 51   Wt 60.3 kg (133 lb)   BMI 24.33 kg/m   Estimated body mass index is 24.33 kg/m  as calculated from the following:    Height as of 9/13/22: 1.575 m (5' 2\").    Weight as of this encounter: 60.3 kg (133 lb).. Body surface area is 1.62 meters squared. BP completed using cuff size: wrist cuff    Garland Robles  "

## 2022-10-19 ENCOUNTER — HOSPITAL ENCOUNTER (OUTPATIENT)
Dept: MRI IMAGING | Facility: CLINIC | Age: 77
Discharge: HOME | End: 2022-10-19
Attending: PSYCHIATRY & NEUROLOGY
Payer: COMMERCIAL

## 2022-10-19 DIAGNOSIS — R20.2 PARESTHESIAS: ICD-10-CM

## 2022-10-19 DIAGNOSIS — Z86.73 HISTORY OF STROKE: ICD-10-CM

## 2022-10-19 PROCEDURE — 255N000002 HC RX 255 OP 636: Performed by: PSYCHIATRY & NEUROLOGY

## 2022-10-19 PROCEDURE — 70553 MRI BRAIN STEM W/O & W/DYE: CPT

## 2022-10-19 PROCEDURE — 72156 MRI NECK SPINE W/O & W/DYE: CPT

## 2022-10-19 PROCEDURE — A9585 GADOBUTROL INJECTION: HCPCS | Performed by: PSYCHIATRY & NEUROLOGY

## 2022-10-19 RX ORDER — GADOBUTROL 604.72 MG/ML
6 INJECTION INTRAVENOUS ONCE
Status: COMPLETED | OUTPATIENT
Start: 2022-10-19 | End: 2022-10-19

## 2022-10-19 RX ADMIN — GADOBUTROL 6 ML: 604.72 INJECTION INTRAVENOUS at 13:20

## 2022-10-22 DIAGNOSIS — R20.2 PARESTHESIAS: Primary | ICD-10-CM

## 2022-10-22 NOTE — RESULT ENCOUNTER NOTE
Please let Kristen know that her brain MRI stable, it shows the old right thalamic stroke.  She does have some degenerative changes in her cervical spine, but nothing to explain left-sided weakness/sensory changes.  I am going to go ahead and order nerve conduction studies to look into her symptoms further.    Thank you,  Dr. Quintanilla

## 2022-11-03 DIAGNOSIS — G56.32 RADIAL NEUROPATHY, LEFT: ICD-10-CM

## 2022-11-03 DIAGNOSIS — M26.609 TMJ (TEMPOROMANDIBULAR JOINT SYNDROME): ICD-10-CM

## 2022-11-04 NOTE — TELEPHONE ENCOUNTER
Routing refill request to provider for review/approval because:  Drug interaction warning      Debbie Mayorga RN

## 2022-11-07 RX ORDER — DULOXETIN HYDROCHLORIDE 20 MG/1
CAPSULE, DELAYED RELEASE ORAL
Qty: 90 CAPSULE | Refills: 0 | Status: SHIPPED | OUTPATIENT
Start: 2022-11-07 | End: 2023-01-19

## 2022-11-10 ENCOUNTER — OFFICE VISIT (OUTPATIENT)
Dept: FAMILY MEDICINE | Facility: CLINIC | Age: 77
End: 2022-11-10
Payer: COMMERCIAL

## 2022-11-10 VITALS
HEART RATE: 67 BPM | BODY MASS INDEX: 25.58 KG/M2 | TEMPERATURE: 98.2 F | OXYGEN SATURATION: 97 % | HEIGHT: 62 IN | SYSTOLIC BLOOD PRESSURE: 108 MMHG | DIASTOLIC BLOOD PRESSURE: 70 MMHG | WEIGHT: 139 LBS

## 2022-11-10 DIAGNOSIS — G62.9 NEUROPATHY: Primary | ICD-10-CM

## 2022-11-10 DIAGNOSIS — G47.00 INSOMNIA, UNSPECIFIED TYPE: ICD-10-CM

## 2022-11-10 PROCEDURE — 99214 OFFICE O/P EST MOD 30 MIN: CPT | Performed by: FAMILY MEDICINE

## 2022-11-10 RX ORDER — TRAZODONE HYDROCHLORIDE 50 MG/1
50 TABLET, FILM COATED ORAL
Qty: 90 TABLET | Refills: 2 | Status: SHIPPED | OUTPATIENT
Start: 2022-11-10 | End: 2023-06-14

## 2022-11-10 RX ORDER — DULOXETIN HYDROCHLORIDE 60 MG/1
60 CAPSULE, DELAYED RELEASE ORAL DAILY
Qty: 90 CAPSULE | Refills: 0 | Status: SHIPPED | OUTPATIENT
Start: 2022-11-10 | End: 2022-12-11

## 2022-11-10 NOTE — PROGRESS NOTES
"  Assessment & Plan     Neuropathy  Will try increasing cymbalta  - DULoxetine (CYMBALTA) 60 MG capsule; Take 1 capsule (60 mg) by mouth daily    Insomnia, unspecified type  Take as needed - traZODone (DESYREL) 50 MG tablet; Take 1 tablet (50 mg) by mouth nightly as needed         BMI:   Estimated body mass index is 25.42 kg/m  as calculated from the following:    Height as of this encounter: 1.575 m (5' 2\").    Weight as of this encounter: 63 kg (139 lb).     Patient Instructions   Increase the duloxetine to 2 caps for 2 weeks then increase to 3 caps. I have sent in the 60 mg for when you run out of the 20's         Return in about 4 weeks (around 12/8/2022).    Cortney Hung MD  [facility] DESHAUN Peterson is a 77 year old, presenting for the following health issues:  Pain      HPI   The last 3 month increased left arm nerve pain.   Neurology recommended to try Lyrica. She would like to discuss.   Taking Advil once per day  Right hand and leg fall asleep at times.   Has appointment set up for the end of January for the never test.   Tracy Carpenter CMA          Review of Systems   Constitutional, HEENT, cardiovascular, pulmonary, gi and gu systems are negative, except as otherwise noted.      Objective    /70   Pulse 67   Temp 98.2  F (36.8  C) (Tympanic)   Ht 1.575 m (5' 2\")   Wt 63 kg (139 lb)   SpO2 97%   BMI 25.42 kg/m    Body mass index is 25.42 kg/m .  Physical Exam   GENERAL: healthy, alert and no distress  NEURO: Normal strength and tone, mentation intact and speech normal  NEURO: Normal strength and tone, sensory exam grossly normal, light touch and pinprick normal and mentation intact  Comprehensive back pain exam:  No tenderness  PSYCH: mentation appears normal, affect flat and anxious    No results found for any visits on 11/10/22.    Cortney Hung M.D.              "

## 2022-11-10 NOTE — PATIENT INSTRUCTIONS
Increase the duloxetine to 2 caps for 2 weeks then increase to 3 caps. I have sent in the 60 mg for when you run out of the 20's

## 2022-11-11 ENCOUNTER — OFFICE VISIT (OUTPATIENT)
Dept: ORTHOPEDICS | Facility: CLINIC | Age: 77
End: 2022-11-11
Payer: COMMERCIAL

## 2022-11-11 VITALS
DIASTOLIC BLOOD PRESSURE: 70 MMHG | WEIGHT: 139 LBS | HEIGHT: 62 IN | SYSTOLIC BLOOD PRESSURE: 108 MMHG | BODY MASS INDEX: 25.58 KG/M2

## 2022-11-11 DIAGNOSIS — M25.512 LEFT SHOULDER PAIN, UNSPECIFIED CHRONICITY: Primary | ICD-10-CM

## 2022-11-11 PROCEDURE — 20610 DRAIN/INJ JOINT/BURSA W/O US: CPT | Mod: LT | Performed by: PEDIATRICS

## 2022-11-11 RX ORDER — LIDOCAINE HYDROCHLORIDE 10 MG/ML
2 INJECTION, SOLUTION INFILTRATION; PERINEURAL
Status: DISCONTINUED | OUTPATIENT
Start: 2022-11-11 | End: 2024-04-30

## 2022-11-11 RX ORDER — TRIAMCINOLONE ACETONIDE 40 MG/ML
40 INJECTION, SUSPENSION INTRA-ARTICULAR; INTRAMUSCULAR
Status: DISCONTINUED | OUTPATIENT
Start: 2022-11-11 | End: 2024-04-30

## 2022-11-11 RX ADMIN — LIDOCAINE HYDROCHLORIDE 2 ML: 10 INJECTION, SOLUTION INFILTRATION; PERINEURAL at 15:09

## 2022-11-11 RX ADMIN — TRIAMCINOLONE ACETONIDE 40 MG: 40 INJECTION, SUSPENSION INTRA-ARTICULAR; INTRAMUSCULAR at 15:09

## 2022-11-11 NOTE — PATIENT INSTRUCTIONS
Reviewed again that this left shoulder pain is most consistent with rotator cuff source, though there is some mild underlying degenerative change in the shoulder area as well.  Reviewed again options, including with therapy, injection, additional imaging with MRI.  Following discussion, given good results from previous subacromial steroid injection, this was repeated today.  If not getting desired relief, or if any worsening or changing symptoms, we can reconsider therapy or potential for MRI scan.  Otherwise, follow-up is open-ended.  Contact clinic with any questions or concerns.      If you have any further questions for your physician or physician s care team you can contact them thru [employer] or by calling  [phone] and use option 3 to leave a voice message.   Messages received during business hours will be returned same day.            Injection Discharge Instructions    You may shower, however avoid swimming, tub baths or hot tubs for 24 hours following your procedure  You may have a mild to moderate increase in pain for several days following the injection.  It may take up to 14 days for the steroid medication to start working although you may feel the effect as early as a few days after the procedure.  You may use ice packs for 10-15 minutes, 3 to 4 times a day at the injection site for comfort  You may use anti-inflammatory medications (such as Ibuprofen or Aleve or Advil) if you are able to take safely, or acetaminophen (Tylenol) for pain control if necessary  If you were fasting, you may resume your normal diet and medications after the procedure  If you have diabetes, check your blood sugar more frequently than usual as your blood sugar may be higher than normal for 10-14 days following a steroid injection. Contact your doctor who manages your diabetes if your blood sugar is higher than usual    If you experience any of the following, call the clinic @ [phone]  - Fever over 100 degree F  -  Swelling, bleeding, redness, drainage, warmth at the injection site  - New or worsening pain

## 2022-11-11 NOTE — LETTER
11/11/2022         RE: Kristen Thompson  [address] [place] [id number]        Dear Colleague,    Thank you for referring your patient, Kristen Thompson, to the [facility]. Please see a copy of my visit note below.    ASSESSMENT & PLAN    Kristen was seen today for recheck.    Diagnoses and all orders for this visit:    Left shoulder pain, unspecified chronicity  -     Large Joint Injection/Arthocentesis: L subacromial bursa      Discussed injection, therapy, imaging.  Repeat injection given improvement after last.   Questions answered. Discussed signs and symptoms that may indicate more serious issues; the patient was instructed to seek appropriate care if noted. Kristen indicates understanding of these issues and agrees with the plan.      See Patient Instructions  Patient Instructions   Reviewed again that this left shoulder pain is most consistent with rotator cuff source, though there is some mild underlying degenerative change in the shoulder area as well.  Reviewed again options, including with therapy, injection, additional imaging with MRI.  Following discussion, given good results from previous subacromial steroid injection, this was repeated today.  If not getting desired relief, or if any worsening or changing symptoms, we can reconsider therapy or potential for MRI scan.  Otherwise, follow-up is open-ended.  Contact clinic with any questions or concerns.      If you have any further questions for your physician or physician s care team you can contact them thru [employer] or by calling  [phone] and use option 3 to leave a voice message.   Messages received during business hours will be returned same day.            Injection Discharge Instructions      You may shower, however avoid swimming, tub baths or hot tubs for 24 hours following your procedure    You may have a mild to moderate increase in pain for several days following the injection.    It may  "take up to 14 days for the steroid medication to start working although you may feel the effect as early as a few days after the procedure.    You may use ice packs for 10-15 minutes, 3 to 4 times a day at the injection site for comfort    You may use anti-inflammatory medications (such as Ibuprofen or Aleve or Advil) if you are able to take safely, or acetaminophen (Tylenol) for pain control if necessary    If you were fasting, you may resume your normal diet and medications after the procedure    If you have diabetes, check your blood sugar more frequently than usual as your blood sugar may be higher than normal for 10-14 days following a steroid injection. Contact your doctor who manages your diabetes if your blood sugar is higher than usual      If you experience any of the following, call the clinic @ [phone]  - Fever over 100 degree F  - Swelling, bleeding, redness, drainage, warmth at the injection site  - New or worsening pain        [facility]    SUBJECTIVE- Interim History November 11, 2022    Chief Complaint   Patient presents with     Left Shoulder - RECHECK       Kristen Thompson is a 77 year old female who is seen in f/u up for Left shoulder pain, unspecified chronicity. Since last visit on 11/8/21 patient has begun to have pain for the past 3 months.  Does feel she got good relief from the injection.  Has been working with her chiropractor as well.   Interested in a repeat injection today.  .  - Now ~ 1 year from initial onset      REVIEW OF SYSTEMS:  Review of Systems      OBJECTIVE:  /70   Ht 1.575 m (5' 2\")   Wt 63 kg (139 lb)   BMI 25.42 kg/m         Left Shoulder exam    ROM:      forward flexion ~120-130        abduction ~100-110       internal rotation 2-3 vertebral segments lower than right       external rotation lacking few deg compared to right  Pain with testing    Impingement testing:      positive (+) " Lissa       positive (+) empty can    Skin:      no visible deformities       well perfused       capillary refill brisk          RADIOLOGY:  None new. See previous notes.        Large Joint Injection/Arthocentesis: L subacromial bursa    Date/Time: 11/11/2022 3:09 PM  Performed by: Parveen Mehta DO  Authorized by: Parveen Mehta DO     Indications:  Pain  Needle Size:  25 G  Guidance: landmark guided    Approach:  Posterolateral  Location:  Shoulder      Site:  L subacromial bursa  Medications:  40 mg triamcinolone 40 MG/ML; 2 mL lidocaine 1 %  Outcome:  Tolerated well, no immediate complications  Procedure discussed: discussed risks, benefits, and alternatives    Consent Given by:  Patient  Timeout: timeout called immediately prior to procedure    Prep: patient was prepped and draped in usual sterile fashion                Again, thank you for allowing me to participate in the care of your patient.        Sincerely,        Parveen Mehta DO

## 2022-11-11 NOTE — PROGRESS NOTES
ASSESSMENT & PLAN    Kristen was seen today for recheck.    Diagnoses and all orders for this visit:    Left shoulder pain, unspecified chronicity  -     Large Joint Injection/Arthocentesis: L subacromial bursa      Discussed injection, therapy, imaging.  Repeat injection given improvement after last.   Questions answered. Discussed signs and symptoms that may indicate more serious issues; the patient was instructed to seek appropriate care if noted. Kristen indicates understanding of these issues and agrees with the plan.      See Patient Instructions  Patient Instructions   Reviewed again that this left shoulder pain is most consistent with rotator cuff source, though there is some mild underlying degenerative change in the shoulder area as well.  Reviewed again options, including with therapy, injection, additional imaging with MRI.  Following discussion, given good results from previous subacromial steroid injection, this was repeated today.  If not getting desired relief, or if any worsening or changing symptoms, we can reconsider therapy or potential for MRI scan.  Otherwise, follow-up is open-ended.  Contact clinic with any questions or concerns.      If you have any further questions for your physician or physician s care team you can contact them thru [employer] or by calling  [phone] and use option 3 to leave a voice message.   Messages received during business hours will be returned same day.            Injection Discharge Instructions      You may shower, however avoid swimming, tub baths or hot tubs for 24 hours following your procedure    You may have a mild to moderate increase in pain for several days following the injection.    It may take up to 14 days for the steroid medication to start working although you may feel the effect as early as a few days after the procedure.    You may use ice packs for 10-15 minutes, 3 to 4 times a day at the injection site for comfort    You may use anti-inflammatory  "medications (such as Ibuprofen or Aleve or Advil) if you are able to take safely, or acetaminophen (Tylenol) for pain control if necessary    If you were fasting, you may resume your normal diet and medications after the procedure    If you have diabetes, check your blood sugar more frequently than usual as your blood sugar may be higher than normal for 10-14 days following a steroid injection. Contact your doctor who manages your diabetes if your blood sugar is higher than usual      If you experience any of the following, call the clinic @ [phone]  - Fever over 100 degree F  - Swelling, bleeding, redness, drainage, warmth at the injection site  - New or worsening pain        [facility]    SUBJECTIVE- Interim History November 11, 2022    Chief Complaint   Patient presents with     Left Shoulder - RECHECK       Kristen Thompson is a 77 year old female who is seen in f/u up for Left shoulder pain, unspecified chronicity. Since last visit on 11/8/21 patient has begun to have pain for the past 3 months.  Does feel she got good relief from the injection.  Has been working with her chiropractor as well.   Interested in a repeat injection today.  .  - Now ~ 1 year from initial onset      REVIEW OF SYSTEMS:  Review of Systems      OBJECTIVE:  /70   Ht 1.575 m (5' 2\")   Wt 63 kg (139 lb)   BMI 25.42 kg/m         Left Shoulder exam    ROM:      forward flexion ~120-130        abduction ~100-110       internal rotation 2-3 vertebral segments lower than right       external rotation lacking few deg compared to right  Pain with testing    Impingement testing:      positive (+) Alcaraz       positive (+) empty can    Skin:      no visible deformities       well perfused       capillary refill brisk          RADIOLOGY:  None new. See previous notes.        Large Joint Injection/Arthocentesis: L subacromial bursa    Date/Time: 11/11/2022 3:09 " PM  Performed by: Parveen Mehta DO  Authorized by: Parveen Mehta DO     Indications:  Pain  Needle Size:  25 G  Guidance: landmark guided    Approach:  Posterolateral  Location:  Shoulder      Site:  L subacromial bursa  Medications:  40 mg triamcinolone 40 MG/ML; 2 mL lidocaine 1 %  Outcome:  Tolerated well, no immediate complications  Procedure discussed: discussed risks, benefits, and alternatives    Consent Given by:  Patient  Timeout: timeout called immediately prior to procedure    Prep: patient was prepped and draped in usual sterile fashion

## 2022-11-21 ENCOUNTER — HEALTH MAINTENANCE LETTER (OUTPATIENT)
Age: 77
End: 2022-11-21

## 2022-11-28 NOTE — TELEPHONE ENCOUNTER
Called and spoke to patient letting her know that our surgeons recommend 2 years. She thanked me for calling back so quickly. Her surgeon has passed away and the clinic has closed ([place].)  Guillermina Montes Certified Medical Assistant    No

## 2022-12-11 DIAGNOSIS — I10 ESSENTIAL HYPERTENSION WITH GOAL BLOOD PRESSURE LESS THAN 130/80: ICD-10-CM

## 2022-12-11 DIAGNOSIS — I63.9 CEREBROVASCULAR ACCIDENT (CVA), UNSPECIFIED MECHANISM (H): ICD-10-CM

## 2022-12-11 DIAGNOSIS — G62.9 NEUROPATHY: ICD-10-CM

## 2022-12-11 RX ORDER — AMLODIPINE BESYLATE 5 MG/1
TABLET ORAL
Qty: 90 TABLET | Refills: 3 | Status: SHIPPED | OUTPATIENT
Start: 2022-12-11 | End: 2023-11-08

## 2022-12-11 RX ORDER — DULOXETIN HYDROCHLORIDE 60 MG/1
CAPSULE, DELAYED RELEASE ORAL
Qty: 90 CAPSULE | Refills: 0 | Status: SHIPPED | OUTPATIENT
Start: 2022-12-11 | End: 2023-01-19 | Stop reason: DRUGHIGH

## 2023-01-19 ENCOUNTER — MYC MEDICAL ADVICE (OUTPATIENT)
Dept: FAMILY MEDICINE | Facility: CLINIC | Age: 78
End: 2023-01-19
Payer: COMMERCIAL

## 2023-01-19 DIAGNOSIS — M26.609 TMJ (TEMPOROMANDIBULAR JOINT SYNDROME): ICD-10-CM

## 2023-01-19 DIAGNOSIS — G56.32 RADIAL NEUROPATHY, LEFT: ICD-10-CM

## 2023-01-19 RX ORDER — DULOXETIN HYDROCHLORIDE 20 MG/1
20 CAPSULE, DELAYED RELEASE ORAL DAILY
Qty: 90 CAPSULE | Refills: 1 | Status: SHIPPED | OUTPATIENT
Start: 2023-01-19 | End: 2023-06-14

## 2023-03-14 DIAGNOSIS — R09.82 POST-NASAL DRAINAGE: ICD-10-CM

## 2023-03-14 DIAGNOSIS — J30.0 VASOMOTOR RHINITIS: ICD-10-CM

## 2023-03-14 RX ORDER — AZELASTINE 1 MG/ML
2 SPRAY, METERED NASAL 2 TIMES DAILY PRN
Qty: 30 ML | Refills: 0 | Status: SHIPPED | OUTPATIENT
Start: 2023-03-14 | End: 2023-06-13

## 2023-03-14 NOTE — TELEPHONE ENCOUNTER
Routing refill request to provider for review/approval because:  Due to age        Rola DUKES RN  Specialty/Allergy Clinics

## 2023-03-14 NOTE — TELEPHONE ENCOUNTER
One refill provided.  The patient needs to be seen for further refills. She either needs a follow-up appointment or can request further refills from PCP.      Chase Diaz MD

## 2023-03-14 NOTE — TELEPHONE ENCOUNTER
Last Written Prescription Date:    Last Fill Quantity: ,  # refills:    Last office visit: 6/9/2022 with prescribing provider:     Future Office Visit:   Next 5 appointments (look out 90 days)    Apr 04, 2023  2:20 PM  (Arrive by 2:05 PM)  Return Visit with Yasmin Quintanilla MD  [facility] [place] ([facility] ) [address] [place] [id number] [phone]             Requested Prescriptions   Pending Prescriptions Disp Refills     azelastine (ASTELIN) 0.1 % nasal spray 30 mL 3     Sig: Spray 2 sprays into both nostrils 2 times daily as needed for rhinitis       There is no refill protocol information for this order

## 2023-03-15 NOTE — TELEPHONE ENCOUNTER
Pt has viewed message.     Last read by Kristen Thompson at  4:51 PM on 3/14/2023.      Rola DUKES RN  Specialty/Allergy Clinics

## 2023-03-21 ENCOUNTER — ANCILLARY PROCEDURE (OUTPATIENT)
Dept: GENERAL RADIOLOGY | Facility: CLINIC | Age: 78
End: 2023-03-21
Attending: PEDIATRICS
Payer: COMMERCIAL

## 2023-03-21 ENCOUNTER — OFFICE VISIT (OUTPATIENT)
Dept: ORTHOPEDICS | Facility: CLINIC | Age: 78
End: 2023-03-21
Payer: COMMERCIAL

## 2023-03-21 VITALS — WEIGHT: 139 LBS | HEART RATE: 64 BPM | BODY MASS INDEX: 26.24 KG/M2 | HEIGHT: 61 IN

## 2023-03-21 DIAGNOSIS — M75.102 BILATERAL ROTATOR CUFF SYNDROME: ICD-10-CM

## 2023-03-21 DIAGNOSIS — M25.511 RIGHT SHOULDER PAIN, UNSPECIFIED CHRONICITY: Primary | ICD-10-CM

## 2023-03-21 DIAGNOSIS — R20.0 NUMBNESS AND TINGLING IN RIGHT HAND: ICD-10-CM

## 2023-03-21 DIAGNOSIS — M25.511 RIGHT SHOULDER PAIN: ICD-10-CM

## 2023-03-21 DIAGNOSIS — R20.2 NUMBNESS AND TINGLING IN RIGHT HAND: ICD-10-CM

## 2023-03-21 DIAGNOSIS — M75.101 BILATERAL ROTATOR CUFF SYNDROME: ICD-10-CM

## 2023-03-21 DIAGNOSIS — M25.512 LEFT SHOULDER PAIN, UNSPECIFIED CHRONICITY: ICD-10-CM

## 2023-03-21 PROCEDURE — 20610 DRAIN/INJ JOINT/BURSA W/O US: CPT | Mod: 50 | Performed by: PEDIATRICS

## 2023-03-21 PROCEDURE — 73030 X-RAY EXAM OF SHOULDER: CPT | Mod: TC | Performed by: RADIOLOGY

## 2023-03-21 PROCEDURE — 99214 OFFICE O/P EST MOD 30 MIN: CPT | Mod: 25 | Performed by: PEDIATRICS

## 2023-03-21 RX ORDER — LIDOCAINE HYDROCHLORIDE 10 MG/ML
2 INJECTION, SOLUTION INFILTRATION; PERINEURAL
Status: DISCONTINUED | OUTPATIENT
Start: 2023-03-21 | End: 2024-04-30

## 2023-03-21 RX ORDER — TRIAMCINOLONE ACETONIDE 40 MG/ML
40 INJECTION, SUSPENSION INTRA-ARTICULAR; INTRAMUSCULAR
Status: DISCONTINUED | OUTPATIENT
Start: 2023-03-21 | End: 2024-04-30

## 2023-03-21 RX ADMIN — TRIAMCINOLONE ACETONIDE 40 MG: 40 INJECTION, SUSPENSION INTRA-ARTICULAR; INTRAMUSCULAR at 14:28

## 2023-03-21 RX ADMIN — LIDOCAINE HYDROCHLORIDE 2 ML: 10 INJECTION, SOLUTION INFILTRATION; PERINEURAL at 14:28

## 2023-03-21 NOTE — PROGRESS NOTES
ASSESSMENT & PLAN    Kristen was seen today for pain and pain.    Diagnoses and all orders for this visit:    Right shoulder pain, unspecified chronicity  -     XR Shoulder Right G/E 3 Views; Future  -     Large Joint Injection/Arthocentesis: bilateral subacromial bursa    Left shoulder pain, unspecified chronicity  -     Large Joint Injection/Arthocentesis: bilateral subacromial bursa    Numbness and tingling in right hand    Bilateral rotator cuff syndrome  -     Large Joint Injection/Arthocentesis: bilateral subacromial bursa        See Patient Instructions  Patient Instructions   Reviewed nature of the bilateral shoulder pain.  Consistent with soft tissue source, rotator cuff.  With good results from previous left shoulder steroid injection, subacromial steroid injection repeated there today.  Also performed right shoulder subacromial steroid injection.  We also discussed the right hand ulnar 2 digit numbness.  Suspect this is related to ulnar neuropathy at the elbow.  Cervical radiculopathy is also consideration, though less favored based on clinical exam today as well as more recent MRI.  For this numbness and tingling, we discussed using a towel roll with sleep.  Basically, keep the elbow in a relatively straight position, wrap a towel around the elbow such that it will not much flexion, and try to sleep with this in place.  This may help improve numbness over time.  Otherwise, plan to leave follow-up open-ended.  Contact clinic for any questions or concerns.    If you have any further questions for your physician or physician s care team you can contact them thru MyChart or by calling  [phone] and use option 3 to leave a voice message.   Messages received during business hours will be returned same day.            Injection Discharge Instructions      You may shower, however avoid swimming, tub baths or hot tubs for 24 hours following your procedure    You may have a mild to moderate increase in pain for  "several days following the injection.    It may take up to 14 days for the steroid medication to start working although you may feel the effect as early as a few days after the procedure.    You may use ice packs for 10-15 minutes, 3 to 4 times a day at the injection site for comfort    You may use anti-inflammatory medications (such as Ibuprofen or Aleve or Advil) if you are able to take safely, or acetaminophen (Tylenol) for pain control if necessary    If you were fasting, you may resume your normal diet and medications after the procedure    If you have diabetes, check your blood sugar more frequently than usual as your blood sugar may be higher than normal for 10-14 days following a steroid injection. Contact your doctor who manages your diabetes if your blood sugar is higher than usual      If you experience any of the following, call the clinic @ [phone]  - Fever over 100 degree F  - Swelling, bleeding, redness, drainage, warmth at the injection site  - New or worsening pain        [place] [facility]    SUBJECTIVE- Interim History March 21, 2023    Chief Complaint   Patient presents with     Left Shoulder - Pain     Right Shoulder - Pain       Kristen Thompson is a 77 year old female who is seen in f/u up for bilateral shoulder recheck. Since last visit on 11/11/22, patient had received cortisone in the left shoulder. Pt states she felt relief all winter long, but would like repeat injection in the left shoulder today. Would like in the right shoulder today if possible.    - Now ~ 1 year from initial onset    **  Has also noted some ulnar 2 digit numbness on right, notes overnight, also with use of UE (e.g., writing).        REVIEW OF SYSTEMS:  Review of Systems      OBJECTIVE:  Pulse 64   Ht 1.549 m (5' 1\")   Wt 63 kg (139 lb)   BMI 26.26 kg/m             Right Shoulder exam    ROM:      forward flexion 120        abduction 90       internal " rotation thumb upper lumbar       external rotation mild limitation  Pain with all above    Impingement testing:      positive (+) Alcaraz       positive (+) empty can        Left shoulder:  Pain with impingement testing.        Tinel neg medial right elbow, but some local tenderness.        Spurling with neck pain, no clear radicular symptoms.        RADIOLOGY:  Final results and radiologist's interpretation, available in the [facility] health record.  Images were reviewed with the patient in the office today.  My personal interpretation of the performed imaging: no acute bony abnormality at right shoulder.      Recent Results (from the past 24 hour(s))   XR Shoulder Right G/E 3 Views    Narrative    XR SHOULDER RIGHT G/E 3 VIEWS 3/21/2023 2:10 PM     HISTORY: Right shoulder pain    COMPARISON: None.         Impression    IMPRESSION: The right glenohumeral and acromial clavicular joints are  negative for fracture or dislocation. There is mild hypertrophic  change of the AC joint.    NADIYA NATION MD         SYSTEM ID:  [id number]           Previous c-spine MRI:  No high grade stenosis on right at lower cervical levels, especially C7-T1.        MRI OF THE BRAIN WITHOUT AND WITH CONTRAST;  MRI OF THE CERVICAL SPINE WITHOUT AND WITH CONTRAST  10/19/2022 2:18  PM      HISTORY: Right thalamic stroke. Left-sided weakness. Neck pain and  associated dizziness.     COMPARISON: None available.     TECHNIQUE:   Head: Axial diffusion-weighted with ADC map, T2-weighted with fat  saturation, T1-weighted and turboFLAIR and coronal T1-weighted images  of the brain were obtained without intravenous contrast.  Following 6  mL Gadavist IV, axial turboFLAIR and coronal T1-weighted images of the  brain were obtained.      Cervical Spine: Multiplanar, multisequence MRI images of the cervical  spine were acquired without and with 6 mL Gadavist IV contrast.     FINDINGS:   HEAD MRI:  INTRACRANIAL CONTENTS: No acute or subacute infarct. No mass,  acute  hemorrhage, or extra-axial fluid collections. Mild burden of presumed  chronic small vessel ischemic change. Chronic lacunar infarct in the  right thalamus. Parenchymal signal is otherwise normal. Mild  generalized volume loss. Normal position of the cerebellar tonsils. No  pathologic enhancement.     SELLA: No significant abnormality accounting for technique.     OSSEOUS STRUCTURES/SOFT TISSUES: No aggressive osseous lesion  involving the calvarium, skull base, or visualized upper cervical  spine. The major intracranial vascular flow voids are maintained.     ORBITS: No significant abnormality accounting for technique.     SINUSES/MASTOIDS: No significant paranasal sinus mucosal disease. No  significant middle ear or mastoid effusion.     CERVICAL SPINE MRI: Normal vertebral body heights. Straightened  lordosis. 3 mm C5-C6 degenerative retrolisthesis. Normal marrow  signal. Normal cord signal. Visualized extraspinal soft tissues are  within normal limits. No abnormal contrast enhancement.     C2-C3: Normal disc height. No herniation. Ankylosed facets. No spinal  canal stenosis. No spinal canal or neural foraminal stenosis.     C3-C4: Normal disc height. No herniation. Mild bilateral facet  arthropathy. No spinal canal or neural foraminal stenosis.     C4-C5: Normal disc height. Mild posterior annular bulge. Mild  bilateral facet arthropathy. No spinal canal or neural foraminal  stenosis.     C5-C6: Moderate disc height loss. Mild to moderate circumferential  disc osteophyte complex. Mild bilateral facet arthropathy. Mild  effacement of the ventral spinal canal. No neural foraminal stenosis.     C6-C7: Moderate disc height loss. Mild circumferential disc osteophyte  complex. Mild bilateral facet arthropathy. No spinal canal or neural  foraminal stenosis.     C7-T1: Normal disc height. No herniation. No facet arthropathy. No  spinal canal stenosis. No right neural foraminal stenosis. No left  neural foraminal  stenosis.                                                                      IMPRESSION:  HEAD MRI:  1.  Chronic right thalamic infarct.  2.  Mild presumed chronic small vessel ischemic change and generalized  volume loss.     CERVICAL SPINE MRI:  1.  No high-grade spinal canal or neural foraminal stenosis.  2.  No marrow edema or abnormal cord signal.  3.  Moderate C5-C6 and C6-C7 degenerative change.  4.  Mild C3-C4 and C4-C5 degenerative change.  5.  Ankylosed C2-C3 facets.     LUANNE PIZARRO MD                Large Joint Injection/Arthocentesis: bilateral subacromial bursa    Date/Time: 3/21/2023 2:28 PM  Performed by: Parveen Mehta DO  Authorized by: Parveen Mehta DO     Indications:  Pain  Needle Size:  25 G  Guidance: landmark guided    Approach:  Posterolateral  Location:  Shoulder  Laterality:  Bilateral      Site:  Bilateral subacromial bursa  Medications (Right):  40 mg triamcinolone 40 MG/ML; 2 mL lidocaine 1 %  Medications (Left):  40 mg triamcinolone 40 MG/ML; 2 mL lidocaine 1 %  Outcome:  Tolerated well, no immediate complications  Consent Given by:  Patient  Timeout: timeout called immediately prior to procedure    Prep: patient was prepped and draped in usual sterile fashion

## 2023-03-21 NOTE — PATIENT INSTRUCTIONS
Reviewed nature of the bilateral shoulder pain.  Consistent with soft tissue source, rotator cuff.  With good results from previous left shoulder steroid injection, subacromial steroid injection repeated there today.  Also performed right shoulder subacromial steroid injection.  We also discussed the right hand ulnar 2 digit numbness.  Suspect this is related to ulnar neuropathy at the elbow.  Cervical radiculopathy is also consideration, though less favored based on clinical exam today as well as more recent MRI.  For this numbness and tingling, we discussed using a towel roll with sleep.  Basically, keep the elbow in a relatively straight position, wrap a towel around the elbow such that it will not much flexion, and try to sleep with this in place.  This may help improve numbness over time.  Otherwise, plan to leave follow-up open-ended.  Contact clinic for any questions or concerns.    If you have any further questions for your physician or physician s care team you can contact them thru [employer] or by calling  [phone] and use option 3 to leave a voice message.   Messages received during business hours will be returned same day.            Injection Discharge Instructions    You may shower, however avoid swimming, tub baths or hot tubs for 24 hours following your procedure  You may have a mild to moderate increase in pain for several days following the injection.  It may take up to 14 days for the steroid medication to start working although you may feel the effect as early as a few days after the procedure.  You may use ice packs for 10-15 minutes, 3 to 4 times a day at the injection site for comfort  You may use anti-inflammatory medications (such as Ibuprofen or Aleve or Advil) if you are able to take safely, or acetaminophen (Tylenol) for pain control if necessary  If you were fasting, you may resume your normal diet and medications after the procedure  If you have diabetes, check your blood sugar more  frequently than usual as your blood sugar may be higher than normal for 10-14 days following a steroid injection. Contact your doctor who manages your diabetes if your blood sugar is higher than usual    If you experience any of the following, call the clinic @ [phone]  - Fever over 100 degree F  - Swelling, bleeding, redness, drainage, warmth at the injection site  - New or worsening pain

## 2023-03-21 NOTE — LETTER
3/21/2023         RE: Kristen Thompson  [address] [place] [id number]        Dear Colleague,    Thank you for referring your patient, Kristen Thompson, to the [facility]. Please see a copy of my visit note below.    ASSESSMENT & PLAN    Kristen was seen today for pain and pain.    Diagnoses and all orders for this visit:    Right shoulder pain, unspecified chronicity  -     XR Shoulder Right G/E 3 Views; Future  -     Large Joint Injection/Arthocentesis: bilateral subacromial bursa    Left shoulder pain, unspecified chronicity  -     Large Joint Injection/Arthocentesis: bilateral subacromial bursa    Numbness and tingling in right hand    Bilateral rotator cuff syndrome  -     Large Joint Injection/Arthocentesis: bilateral subacromial bursa        See Patient Instructions  Patient Instructions   Reviewed nature of the bilateral shoulder pain.  Consistent with soft tissue source, rotator cuff.  With good results from previous left shoulder steroid injection, subacromial steroid injection repeated there today.  Also performed right shoulder subacromial steroid injection.  We also discussed the right hand ulnar 2 digit numbness.  Suspect this is related to ulnar neuropathy at the elbow.  Cervical radiculopathy is also consideration, though less favored based on clinical exam today as well as more recent MRI.  For this numbness and tingling, we discussed using a towel roll with sleep.  Basically, keep the elbow in a relatively straight position, wrap a towel around the elbow such that it will not much flexion, and try to sleep with this in place.  This may help improve numbness over time.  Otherwise, plan to leave follow-up open-ended.  Contact clinic for any questions or concerns.    If you have any further questions for your physician or physician s care team you can contact them thru MyChart or by calling  [phone] and use option 3 to leave a voice message.    Messages received during business hours will be returned same day.            Injection Discharge Instructions      You may shower, however avoid swimming, tub baths or hot tubs for 24 hours following your procedure    You may have a mild to moderate increase in pain for several days following the injection.    It may take up to 14 days for the steroid medication to start working although you may feel the effect as early as a few days after the procedure.    You may use ice packs for 10-15 minutes, 3 to 4 times a day at the injection site for comfort    You may use anti-inflammatory medications (such as Ibuprofen or Aleve or Advil) if you are able to take safely, or acetaminophen (Tylenol) for pain control if necessary    If you were fasting, you may resume your normal diet and medications after the procedure    If you have diabetes, check your blood sugar more frequently than usual as your blood sugar may be higher than normal for 10-14 days following a steroid injection. Contact your doctor who manages your diabetes if your blood sugar is higher than usual      If you experience any of the following, call the clinic @ [phone]  - Fever over 100 degree F  - Swelling, bleeding, redness, drainage, warmth at the injection site  - New or worsening pain        [facility]    SUBJECTIVE- Interim History March 21, 2023    Chief Complaint   Patient presents with     Left Shoulder - Pain     Right Shoulder - Pain       Kristen Thompson is a 77 year old female who is seen in f/u up for bilateral shoulder recheck. Since last visit on 11/11/22, patient had received cortisone in the left shoulder. Pt states she felt relief all winter long, but would like repeat injection in the left shoulder today. Would like in the right shoulder today if possible.    - Now ~ 1 year from initial onset    **  Has also noted some ulnar 2 digit numbness on right, notes overnight,  "also with use of UE (e.g., writing).        REVIEW OF SYSTEMS:  Review of Systems      OBJECTIVE:  Pulse 64   Ht 1.549 m (5' 1\")   Wt 63 kg (139 lb)   BMI 26.26 kg/m             Right Shoulder exam    ROM:      forward flexion 120        abduction 90       internal rotation thumb upper lumbar       external rotation mild limitation  Pain with all above    Impingement testing:      positive (+) Alcaraz       positive (+) empty can        Left shoulder:  Pain with impingement testing.        Tinel neg medial right elbow, but some local tenderness.        Spurling with neck pain, no clear radicular symptoms.        RADIOLOGY:  Final results and radiologist's interpretation, available in the [facility] health record.  Images were reviewed with the patient in the office today.  My personal interpretation of the performed imaging: no acute bony abnormality at right shoulder.      Recent Results (from the past 24 hour(s))   XR Shoulder Right G/E 3 Views    Narrative    XR SHOULDER RIGHT G/E 3 VIEWS 3/21/2023 2:10 PM     HISTORY: Right shoulder pain    COMPARISON: None.         Impression    IMPRESSION: The right glenohumeral and acromial clavicular joints are  negative for fracture or dislocation. There is mild hypertrophic  change of the AC joint.    NADIYA NATION MD         SYSTEM ID:  [id number]           Previous c-spine MRI:  No high grade stenosis on right at lower cervical levels, especially C7-T1.        MRI OF THE BRAIN WITHOUT AND WITH CONTRAST;  MRI OF THE CERVICAL SPINE WITHOUT AND WITH CONTRAST  10/19/2022 2:18  PM      HISTORY: Right thalamic stroke. Left-sided weakness. Neck pain and  associated dizziness.     COMPARISON: None available.     TECHNIQUE:   Head: Axial diffusion-weighted with ADC map, T2-weighted with fat  saturation, T1-weighted and turboFLAIR and coronal T1-weighted images  of the brain were obtained without intravenous contrast.  Following 6  mL Gadavist IV, axial turboFLAIR and coronal " T1-weighted images of the  brain were obtained.      Cervical Spine: Multiplanar, multisequence MRI images of the cervical  spine were acquired without and with 6 mL Gadavist IV contrast.     FINDINGS:   HEAD MRI:  INTRACRANIAL CONTENTS: No acute or subacute infarct. No mass, acute  hemorrhage, or extra-axial fluid collections. Mild burden of presumed  chronic small vessel ischemic change. Chronic lacunar infarct in the  right thalamus. Parenchymal signal is otherwise normal. Mild  generalized volume loss. Normal position of the cerebellar tonsils. No  pathologic enhancement.     SELLA: No significant abnormality accounting for technique.     OSSEOUS STRUCTURES/SOFT TISSUES: No aggressive osseous lesion  involving the calvarium, skull base, or visualized upper cervical  spine. The major intracranial vascular flow voids are maintained.     ORBITS: No significant abnormality accounting for technique.     SINUSES/MASTOIDS: No significant paranasal sinus mucosal disease. No  significant middle ear or mastoid effusion.     CERVICAL SPINE MRI: Normal vertebral body heights. Straightened  lordosis. 3 mm C5-C6 degenerative retrolisthesis. Normal marrow  signal. Normal cord signal. Visualized extraspinal soft tissues are  within normal limits. No abnormal contrast enhancement.     C2-C3: Normal disc height. No herniation. Ankylosed facets. No spinal  canal stenosis. No spinal canal or neural foraminal stenosis.     C3-C4: Normal disc height. No herniation. Mild bilateral facet  arthropathy. No spinal canal or neural foraminal stenosis.     C4-C5: Normal disc height. Mild posterior annular bulge. Mild  bilateral facet arthropathy. No spinal canal or neural foraminal  stenosis.     C5-C6: Moderate disc height loss. Mild to moderate circumferential  disc osteophyte complex. Mild bilateral facet arthropathy. Mild  effacement of the ventral spinal canal. No neural foraminal stenosis.     C6-C7: Moderate disc height loss. Mild  circumferential disc osteophyte  complex. Mild bilateral facet arthropathy. No spinal canal or neural  foraminal stenosis.     C7-T1: Normal disc height. No herniation. No facet arthropathy. No  spinal canal stenosis. No right neural foraminal stenosis. No left  neural foraminal stenosis.                                                                      IMPRESSION:  HEAD MRI:  1.  Chronic right thalamic infarct.  2.  Mild presumed chronic small vessel ischemic change and generalized  volume loss.     CERVICAL SPINE MRI:  1.  No high-grade spinal canal or neural foraminal stenosis.  2.  No marrow edema or abnormal cord signal.  3.  Moderate C5-C6 and C6-C7 degenerative change.  4.  Mild C3-C4 and C4-C5 degenerative change.  5.  Ankylosed C2-C3 facets.     LUANNE PIZARRO MD                Large Joint Injection/Arthocentesis: bilateral subacromial bursa    Date/Time: 3/21/2023 2:28 PM  Performed by: Parveen Mehta DO  Authorized by: Parveen Mehta DO     Indications:  Pain  Needle Size:  25 G  Guidance: landmark guided    Approach:  Posterolateral  Location:  Shoulder  Laterality:  Bilateral      Site:  Bilateral subacromial bursa  Medications (Right):  40 mg triamcinolone 40 MG/ML; 2 mL lidocaine 1 %  Medications (Left):  40 mg triamcinolone 40 MG/ML; 2 mL lidocaine 1 %  Outcome:  Tolerated well, no immediate complications  Consent Given by:  Patient  Timeout: timeout called immediately prior to procedure    Prep: patient was prepped and draped in usual sterile fashion                Again, thank you for allowing me to participate in the care of your patient.        Sincerely,        Parveen Mehta DO

## 2023-04-04 ENCOUNTER — OFFICE VISIT (OUTPATIENT)
Dept: NEUROLOGY | Facility: CLINIC | Age: 78
End: 2023-04-04
Payer: COMMERCIAL

## 2023-04-04 VITALS
HEART RATE: 68 BPM | DIASTOLIC BLOOD PRESSURE: 82 MMHG | SYSTOLIC BLOOD PRESSURE: 128 MMHG | WEIGHT: 136 LBS | BODY MASS INDEX: 25.7 KG/M2

## 2023-04-04 DIAGNOSIS — Z86.73 HISTORY OF STROKE: Primary | ICD-10-CM

## 2023-04-04 DIAGNOSIS — R53.82 CHRONIC FATIGUE: ICD-10-CM

## 2023-04-04 DIAGNOSIS — Z79.899 ENCOUNTER FOR LONG-TERM (CURRENT) USE OF MEDICATIONS: ICD-10-CM

## 2023-04-04 PROCEDURE — 99214 OFFICE O/P EST MOD 30 MIN: CPT | Performed by: PSYCHIATRY & NEUROLOGY

## 2023-04-04 RX ORDER — MODAFINIL 100 MG/1
100 TABLET ORAL DAILY
Qty: 30 TABLET | Refills: 5 | Status: SHIPPED | OUTPATIENT
Start: 2023-04-04 | End: 2024-04-09

## 2023-04-04 NOTE — PROGRESS NOTES
ESTABLISHED PATIENT NEUROLOGY NOTE    DATE OF VISIT: 4/4/2023  MRN: [id number]  PATIENT NAME: Kristen Thompson  YOB: 1945    Chief Complaint   Patient presents with     Stroke     6 mo follow-up   L sided still feels tingling/weakness; feels like frostbite  Read side effect of Lyrica; have not started      SUBJECTIVE:                                                      HISTORY OF PRESENT ILLNESS:  Kristen is here for follow up regarding residual stroke symptoms.  She was admitted to [facility] in February 2021 with left-sided weakness, facial droop and speech difficulties.  She received tPA and most of her symptoms resolved.  Brain MRI revealed right thalamic stroke.  She was started on atorvastatin.   echocardiogram was unremarkable, negative for PFO.  No history of arrhythmia, which she was following up with cardiology as well.  She did 21 days of DAPT and then aspirin alone.  When we met about 6 months ago she was still having some residual tingling in her face on the left, arm and some weakness on that side.  No big changes since her prior visit.  We had tried gabapentin for symptom management but this made her too sleepy.  I suggested we do a trial of Lyrica but she tells my nurse today that she decided against it because she read the side effects.  We did do some updated brain and cervical spine imaging and this was unremarkable.    Kristen says she was concerned about the possibility of weight gain on the Lyrica so she did not start it.   She says that the paresthesias are now painful, with the cold weather.   She is back to volunteering. She is very tired by 1pm. She says that her primary care provider increased her depression medication but this did not help and it made her feel strange so she went back to the lower dose.     CURRENT MEDICATIONS:   acetaminophen (TYLENOL) 325 MG tablet, Take 650 mg by mouth  amLODIPine (NORVASC) 5 MG tablet, TAKE 1 TABLET BY MOUTH EVERY DAY  aspirin 81  MG EC tablet, Take 81 mg by mouth daily  atorvastatin (LIPITOR) 10 MG tablet, TAKE 1 TABLET BY MOUTH EVERY DAY  azelastine (ASTELIN) 0.1 % nasal spray, Spray 2 sprays into both nostrils 2 times daily as needed for rhinitis  CALCIUM PO, Take by mouth daily   Cholecalciferol (VITAMIN D PO), Take 1,000 Units by mouth daily   docusate sodium (COLACE) 100 MG capsule, Take 1 capsule (100 mg) by mouth 2 times daily Take while on opiate to prevent constipation  DULoxetine (CYMBALTA) 20 MG capsule, Take 1 capsule (20 mg) by mouth daily  Fluticasone Propionate (FLONASE ALLERGY RELIEF NA), Spray in nostril as needed  MAGNESIUM PO, Take 250 mg by mouth daily   Omega-3 Fatty Acids (OMEGA 3 PO), Take 2 capsules by mouth daily   omeprazole (PRILOSEC) 20 MG DR capsule, TAKE 1 TABLET (20 MG) BY MOUTH 2 TIMES DAILY TAKE 30-60 MINUTES BEFORE A MEAL.  Polyethyl Glycol-Propyl Glycol (SYSTANE FREE OP), Apply 2 drops to eye as needed (dry eyes)  Polyethylene Glycol 3350 (MIRALAX PO),   pregabalin (LYRICA) 50 MG capsule, Take 1 capsule (50 mg) by mouth 3 times daily  traZODone (DESYREL) 50 MG tablet, Take 1 tablet (50 mg) by mouth nightly as needed    lidocaine 1 % injection 2 mL  lidocaine 1 % injection 2 mL  lidocaine 1 % injection 2 mL  lidocaine 2%-EPINEPHrine 1:100,000 injection 20 mL  triamcinolone (KENALOG-40) injection 40 mg  triamcinolone (KENALOG-40) injection 40 mg  triamcinolone (KENALOG-40) injection 40 mg        RECENT DIAGNOSTIC STUDIES:   Labs: No results found for any visits on 04/04/23.    REVIEW OF SYSTEMS:                                                      10-point review of systems is negative except as mentioned above in HPI.    EXAM:                                                      Physical Exam:   Vitals: /82   Pulse 68   Wt 61.7 kg (136 lb)   BMI 25.70 kg/m    BMI= Body mass index is 25.7 kg/m .  GENERAL: NAD.  HEENT: NC/AT.  CV: RRR. S1, S2.   NECK: No bruits.  PULM: Non-labored breathing.    Neurologic:  MENTAL STATUS: Alert, attentive. Speech impediment. Normal comprehension. Normal concentration. Adequate fund of knowledge.   CRANIAL NERVES: Discs technically difficult to visualize. Visual fields intact to confrontation. Pupils equally, round and reactive to light. Facial sensation and movement normal. EOM full. Hard of hearing. Trapezius strength intact. Palate moves symmetrically. Tongue midline.  MOTOR: Slight weakness with hip flexion on the left, otherwise strength is 5/5 in proximal and distal muscle groups of upper and lower extremities. Tone and bulk normal.   DTRs: Intact and symmetric in biceps, BR, patellae.  Cannot elicit ankle jerks.  Babinski down-going bilaterally.   SENSATION: Normal light touch and pinprick on the right.  Hypersensitivity with pinprick testing in the fingertips of the left hand and plantar surface of the left foot.  Intact proprioception at great toes. Vibration: Slightly decreased at left ankle compared to right.    COORDINATION: Normal finger nose finger on the right, dysmetria on the left.  Knee heel shin normal.   STATION AND GAIT: Slight sway with Romberg. Good postural reflexes. Casual gait reveals slight limp.  Right hand-dominant.    ASSESSMENT and PLAN:                                                      Assessment:    ICD-10-CM    1. History of stroke  Z86.73       2. Chronic fatigue  R53.82 modafinil (PROVIGIL) 100 MG tablet      3. Encounter for long-term (current) use of medications  Z79.899            Ms. Thompson is a pleasant 77-year-old woman here for follow-up regarding stroke, now suffering from some paresthesias and fatigue.  She has been hesitant to try the Lyrica so I suggested she start with just a low evening dose and see how she responds.  Her low energy seems to be the bigger issue which I am not sure is related to her stroke anymore.  I suggested we do a trial of modafinil to see if this helps her from an energy standpoint.  We will  plan to follow-up again in about 6 months.  I encouraged her to continue to stay active in the meantime.  Kristen understands and agrees with the plan.    Plan:  -- Let's try a dose of the Lyrica just in the evening before bedtime.  If you tolerate this well, you can add a morning dose.  -- For energy purposes, I would like to try modafinil: 100mg daily (take around noontime, if you can).  -- Keep up the good work with staying active!  -- Return to neurology clinic in 6 months.  Please let us know if any concerns arise in the meantime.    Total Time: 30 minutes were spent with the patient and in chart review/documentation (as described above in Assessment and Plan)/coordinating the care on date of service.    Yasmin Quintanilla MD  Neurology    Dragon software used in the dictation of this note.

## 2023-04-04 NOTE — PATIENT INSTRUCTIONS
Plan:  -- Let's try a dose of the Lyrica just in the evening before bedtime.  If you tolerate this well, you can add a morning dose.  -- For energy purposes, I would like to try modafinil: 100mg daily (take around noontime, if you can).  -- Keep up the good work with staying active!  -- Return to neurology clinic in 6 months.  Please let us know if any concerns arise in the meantime.

## 2023-04-04 NOTE — NURSING NOTE
"Kristen Thompson is a 77 year old female who presents for:  Chief Complaint   Patient presents with     Stroke     6 mo follow-up   L sided still feels tingling/weakness; feels like frostbite  Read side effect of Lyrica; have not started         Initial Vitals:  /82   Pulse 68   Wt 61.7 kg (136 lb)   BMI 25.70 kg/m   Estimated body mass index is 25.7 kg/m  as calculated from the following:    Height as of 3/21/23: 1.549 m (5' 1\").    Weight as of this encounter: 61.7 kg (136 lb).. Body surface area is 1.63 meters squared. BP completed using cuff size: wrist cuff    Garland Robles  "

## 2023-04-04 NOTE — LETTER
4/4/2023         RE: Kristen Thompson  [address] [place] [id number]        Dear Colleague,    Thank you for referring your patient, Kristen Thompson, to the [facility] [place]. Please see a copy of my visit note below.    ESTABLISHED PATIENT NEUROLOGY NOTE    DATE OF VISIT: 4/4/2023  MRN: [id number]  PATIENT NAME: Kristen Thompson  YOB: 1945    Chief Complaint   Patient presents with     Stroke     6 mo follow-up   L sided still feels tingling/weakness; feels like frostbite  Read side effect of Lyrica; have not started      SUBJECTIVE:                                                      HISTORY OF PRESENT ILLNESS:  Kristen is here for follow up regarding residual stroke symptoms.  She was admitted to [facility] in February 2021 with left-sided weakness, facial droop and speech difficulties.  She received tPA and most of her symptoms resolved.  Brain MRI revealed right thalamic stroke.  She was started on atorvastatin.   echocardiogram was unremarkable, negative for PFO.  No history of arrhythmia, which she was following up with cardiology as well.  She did 21 days of DAPT and then aspirin alone.  When we met about 6 months ago she was still having some residual tingling in her face on the left, arm and some weakness on that side.  No big changes since her prior visit.  We had tried gabapentin for symptom management but this made her too sleepy.  I suggested we do a trial of Lyrica but she tells my nurse today that she decided against it because she read the side effects.  We did do some updated brain and cervical spine imaging and this was unremarkable.    Kristen says she was concerned about the possibility of weight gain on the Lyrica so she did not start it.   She says that the paresthesias are now painful, with the cold weather.   She is back to volunteering. She is very tired by 1pm. She says that her primary care provider increased her  depression medication but this did not help and it made her feel strange so she went back to the lower dose.     CURRENT MEDICATIONS:   acetaminophen (TYLENOL) 325 MG tablet, Take 650 mg by mouth  amLODIPine (NORVASC) 5 MG tablet, TAKE 1 TABLET BY MOUTH EVERY DAY  aspirin 81 MG EC tablet, Take 81 mg by mouth daily  atorvastatin (LIPITOR) 10 MG tablet, TAKE 1 TABLET BY MOUTH EVERY DAY  azelastine (ASTELIN) 0.1 % nasal spray, Spray 2 sprays into both nostrils 2 times daily as needed for rhinitis  CALCIUM PO, Take by mouth daily   Cholecalciferol (VITAMIN D PO), Take 1,000 Units by mouth daily   docusate sodium (COLACE) 100 MG capsule, Take 1 capsule (100 mg) by mouth 2 times daily Take while on opiate to prevent constipation  DULoxetine (CYMBALTA) 20 MG capsule, Take 1 capsule (20 mg) by mouth daily  Fluticasone Propionate (FLONASE ALLERGY RELIEF NA), Spray in nostril as needed  MAGNESIUM PO, Take 250 mg by mouth daily   Omega-3 Fatty Acids (OMEGA 3 PO), Take 2 capsules by mouth daily   omeprazole (PRILOSEC) 20 MG DR capsule, TAKE 1 TABLET (20 MG) BY MOUTH 2 TIMES DAILY TAKE 30-60 MINUTES BEFORE A MEAL.  Polyethyl Glycol-Propyl Glycol (SYSTANE FREE OP), Apply 2 drops to eye as needed (dry eyes)  Polyethylene Glycol 3350 (MIRALAX PO),   pregabalin (LYRICA) 50 MG capsule, Take 1 capsule (50 mg) by mouth 3 times daily  traZODone (DESYREL) 50 MG tablet, Take 1 tablet (50 mg) by mouth nightly as needed    lidocaine 1 % injection 2 mL  lidocaine 1 % injection 2 mL  lidocaine 1 % injection 2 mL  lidocaine 2%-EPINEPHrine 1:100,000 injection 20 mL  triamcinolone (KENALOG-40) injection 40 mg  triamcinolone (KENALOG-40) injection 40 mg  triamcinolone (KENALOG-40) injection 40 mg        RECENT DIAGNOSTIC STUDIES:   Labs: No results found for any visits on 04/04/23.    REVIEW OF SYSTEMS:                                                      10-point review of systems is negative except as mentioned above in HPI.    EXAM:                                                       Physical Exam:   Vitals: /82   Pulse 68   Wt 61.7 kg (136 lb)   BMI 25.70 kg/m    BMI= Body mass index is 25.7 kg/m .  GENERAL: NAD.  HEENT: NC/AT.  CV: RRR. S1, S2.   NECK: No bruits.  PULM: Non-labored breathing.   Neurologic:  MENTAL STATUS: Alert, attentive. Speech impediment. Normal comprehension. Normal concentration. Adequate fund of knowledge.   CRANIAL NERVES: Discs technically difficult to visualize. Visual fields intact to confrontation. Pupils equally, round and reactive to light. Facial sensation and movement normal. EOM full. Hard of hearing. Trapezius strength intact. Palate moves symmetrically. Tongue midline.  MOTOR: Slight weakness with hip flexion on the left, otherwise strength is 5/5 in proximal and distal muscle groups of upper and lower extremities. Tone and bulk normal.   DTRs: Intact and symmetric in biceps, BR, patellae.  Cannot elicit ankle jerks.  Babinski down-going bilaterally.   SENSATION: Normal light touch and pinprick on the right.  Hypersensitivity with pinprick testing in the fingertips of the left hand and plantar surface of the left foot.  Intact proprioception at great toes. Vibration: Slightly decreased at left ankle compared to right.    COORDINATION: Normal finger nose finger on the right, dysmetria on the left.  Knee heel shin normal.   STATION AND GAIT: Slight sway with Romberg. Good postural reflexes. Casual gait reveals slight limp.  Right hand-dominant.    ASSESSMENT and PLAN:                                                      Assessment:    ICD-10-CM    1. History of stroke  Z86.73       2. Chronic fatigue  R53.82 modafinil (PROVIGIL) 100 MG tablet      3. Encounter for long-term (current) use of medications  Z79.899            Ms. Thompson is a pleasant 77-year-old woman here for follow-up regarding stroke, now suffering from some paresthesias and fatigue.  She has been hesitant to try the Lyrica so I suggested  she start with just a low evening dose and see how she responds.  Her low energy seems to be the bigger issue which I am not sure is related to her stroke anymore.  I suggested we do a trial of modafinil to see if this helps her from an energy standpoint.  We will plan to follow-up again in about 6 months.  I encouraged her to continue to stay active in the meantime.  Kristen understands and agrees with the plan.    Plan:  -- Let's try a dose of the Lyrica just in the evening before bedtime.  If you tolerate this well, you can add a morning dose.  -- For energy purposes, I would like to try modafinil: 100mg daily (take around noontime, if you can).  -- Keep up the good work with staying active!  -- Return to neurology clinic in 6 months.  Please let us know if any concerns arise in the meantime.    Total Time: 30 minutes were spent with the patient and in chart review/documentation (as described above in Assessment and Plan)/coordinating the care on date of service.    Yasmin Quintanilla MD  Neurology    Dragon software used in the dictation of this note.                        Again, thank you for allowing me to participate in the care of your patient.        Sincerely,        Yasmin Quintanilla MD

## 2023-04-20 DIAGNOSIS — K21.9 GASTROESOPHAGEAL REFLUX DISEASE WITHOUT ESOPHAGITIS: ICD-10-CM

## 2023-06-13 ENCOUNTER — MYC MEDICAL ADVICE (OUTPATIENT)
Dept: ALLERGY | Facility: CLINIC | Age: 78
End: 2023-06-13
Payer: COMMERCIAL

## 2023-06-13 DIAGNOSIS — M26.609 TMJ (TEMPOROMANDIBULAR JOINT SYNDROME): ICD-10-CM

## 2023-06-13 DIAGNOSIS — G56.32 RADIAL NEUROPATHY, LEFT: ICD-10-CM

## 2023-06-13 DIAGNOSIS — K21.9 GASTROESOPHAGEAL REFLUX DISEASE WITHOUT ESOPHAGITIS: ICD-10-CM

## 2023-06-13 DIAGNOSIS — J30.0 VASOMOTOR RHINITIS: ICD-10-CM

## 2023-06-13 DIAGNOSIS — G47.00 INSOMNIA, UNSPECIFIED TYPE: ICD-10-CM

## 2023-06-13 DIAGNOSIS — R09.82 POST-NASAL DRAINAGE: ICD-10-CM

## 2023-06-13 RX ORDER — AZELASTINE 1 MG/ML
2 SPRAY, METERED NASAL 2 TIMES DAILY PRN
Qty: 30 ML | Refills: 1 | Status: SHIPPED | OUTPATIENT
Start: 2023-06-13 | End: 2024-02-27

## 2023-06-13 NOTE — TELEPHONE ENCOUNTER
"Routing refill request to provider for review/approval because:  Due for annual wellness.         Requested Prescriptions   Pending Prescriptions Disp Refills     omeprazole (PRILOSEC) 20 MG DR capsule [Pharmacy Med Name: OMEPRAZOLE DR 20 MG CAPSULE] 180 capsule 2     Sig: TAKE 1 TABLET (20 MG) BY MOUTH 2 TIMES DAILY TAKE 30-60 MINUTES BEFORE A MEAL.       PPI Protocol Passed - 6/13/2023 12:52 PM        Passed - Not on Clopidogrel (unless Pantoprazole ordered)        Passed - No diagnosis of osteoporosis on record        Passed - Recent (12 mo) or future (30 days) visit within the authorizing provider's specialty     Patient has had an office visit with the authorizing provider or a provider within the authorizing providers department within the previous 12 mos or has a future within next 30 days. See \"Patient Info\" tab in inbasket, or \"Choose Columns\" in Meds & Orders section of the refill encounter.              Passed - Medication is active on med list        Passed - Patient is age 18 or older        Passed - No active pregnacy on record        Passed - No positive pregnancy test in past 12 months           DULoxetine (CYMBALTA) 20 MG capsule [Pharmacy Med Name: DULOXETINE HCL DR 20 MG CAP] 90 capsule 1     Sig: TAKE 1 CAPSULE BY MOUTH EVERY DAY       Serotonin-Norepinephrine Reuptake Inhibitors  Passed - 6/13/2023 12:52 PM        Passed - Blood pressure under 140/90 in past 12 months     BP Readings from Last 3 Encounters:   04/04/23 128/82   11/11/22 108/70   11/10/22 108/70                 Passed - Recent (12 mo) or future (30 days) visit within the authorizing provider's specialty     Patient has had an office visit with the authorizing provider or a provider within the authorizing providers department within the previous 12 mos or has a future within next 30 days. See \"Patient Info\" tab in inbasket, or \"Choose Columns\" in Meds & Orders section of the refill encounter.              Passed - Medication is " "active on med list        Passed - Patient is age 18 or older        Passed - No active pregnancy on record        Passed - No positive pregnancy test in past 12 months           traZODone (DESYREL) 50 MG tablet [Pharmacy Med Name: TRAZODONE 50 MG TABLET] 90 tablet 2     Sig: TAKE 1 TABLET (50 MG) BY MOUTH NIGHTLY AS NEEDED       Serotonin Modulators Passed - 6/13/2023 12:52 PM        Passed - Recent (12 mo) or future (30 days) visit within the authorizing provider's specialty     Patient has had an office visit with the authorizing provider or a provider within the authorizing providers department within the previous 12 mos or has a future within next 30 days. See \"Patient Info\" tab in inbasket, or \"Choose Columns\" in Meds & Orders section of the refill encounter.              Passed - Medication is active on med list        Passed - Patient is age 18 or older        Passed - No active pregnancy on record        Passed - No positive pregnancy test in past 12 months                 Ayaka Moise RN 06/13/23 1:01 PM  "

## 2023-06-13 NOTE — TELEPHONE ENCOUNTER
I can refill azelastine.  Was it helpful?    Can she combine Pataday with artificial tears?    Chsae Diaz MD

## 2023-06-14 RX ORDER — TRAZODONE HYDROCHLORIDE 50 MG/1
50 TABLET, FILM COATED ORAL
Qty: 90 TABLET | Refills: 2 | Status: SHIPPED | OUTPATIENT
Start: 2023-06-14 | End: 2024-01-19

## 2023-06-14 RX ORDER — DULOXETIN HYDROCHLORIDE 20 MG/1
CAPSULE, DELAYED RELEASE ORAL
Qty: 90 CAPSULE | Refills: 1 | Status: SHIPPED | OUTPATIENT
Start: 2023-06-14 | End: 2023-11-08

## 2023-07-07 DIAGNOSIS — J30.0 VASOMOTOR RHINITIS: ICD-10-CM

## 2023-07-07 DIAGNOSIS — R09.82 POST-NASAL DRAINAGE: ICD-10-CM

## 2023-07-07 RX ORDER — AZELASTINE 1 MG/ML
2 SPRAY, METERED NASAL 2 TIMES DAILY PRN
Qty: 30 ML | Refills: 1 | OUTPATIENT
Start: 2023-07-07

## 2023-07-07 NOTE — TELEPHONE ENCOUNTER
Last prescription sent on 6/13/23 with 1 additional refill. Too soon for refill.     Rola DUKES RN  Specialty/Allergy Clinics

## 2023-07-07 NOTE — TELEPHONE ENCOUNTER
Requested Prescriptions   Pending Prescriptions Disp Refills     azelastine (ASTELIN) 0.1 % nasal spray 30 mL 1     Sig: Spray 2 sprays into both nostrils 2 times daily as needed for rhinitis       There is no refill protocol information for this order        Last office visit: 6/9/2022 ; last virtual visit: Visit date not found with prescribing provider:  Dr. Moctezuma    Future Office Visit:   Next 5 appointments (look out 90 days)    Aug 24, 2023 10:00 AM  Return Visit with Chase Diaz MD  [facility] ([facility] ) [address] [place] [id number]  [phone]   Oct 05, 2023 12:40 PM  (Arrive by 12:25 PM)  Return Visit with Yasmin Quintanilla MD  [facility] [place] ([facility] ) [address] [place] [id number]  [phone]               Ephraim Sol  [facility]

## 2023-10-05 ENCOUNTER — OFFICE VISIT (OUTPATIENT)
Dept: NEUROLOGY | Facility: CLINIC | Age: 78
End: 2023-10-05
Payer: COMMERCIAL

## 2023-10-05 VITALS — SYSTOLIC BLOOD PRESSURE: 124 MMHG | DIASTOLIC BLOOD PRESSURE: 83 MMHG | HEART RATE: 67 BPM

## 2023-10-05 DIAGNOSIS — R53.83 OTHER FATIGUE: ICD-10-CM

## 2023-10-05 DIAGNOSIS — R20.0 NUMBNESS AND TINGLING OF BOTH UPPER EXTREMITIES: Primary | ICD-10-CM

## 2023-10-05 DIAGNOSIS — R20.2 NUMBNESS AND TINGLING OF BOTH UPPER EXTREMITIES: Primary | ICD-10-CM

## 2023-10-05 DIAGNOSIS — Z79.899 ENCOUNTER FOR LONG-TERM (CURRENT) USE OF MEDICATIONS: ICD-10-CM

## 2023-10-05 DIAGNOSIS — Z86.73 HISTORY OF STROKE: ICD-10-CM

## 2023-10-05 PROCEDURE — 99214 OFFICE O/P EST MOD 30 MIN: CPT | Performed by: PSYCHIATRY & NEUROLOGY

## 2023-10-05 NOTE — PATIENT INSTRUCTIONS
Plan:  -- I think we should do nerve conduction studies/EMG given the new right-sided numbness.  We will check both arms and hands to compare.  We will notify you of the results.  -- Continue the Cymbalta for now.  We could consider adding amitriptyline as well for nerve related pain.  -- Continue the modafinil as needed for the chronic fatigue.  -- Keep up the good work with staying active!  -- Return to neurology clinic in 6 months.

## 2023-10-05 NOTE — PROGRESS NOTES
ESTABLISHED PATIENT NEUROLOGY NOTE    DATE OF VISIT: 10/5/2023  MRN: [id number]  PATIENT NAME: Kristen Thompson  YOB: 1945    Chief Complaint   Patient presents with    Stroke     6 mo follow-up   R hand going to sleep; almost daily for the last few months     SUBJECTIVE:                                                      HISTORY OF PRESENT ILLNESS:  Kristen is here for follow up regarding residual stroke symptoms.    History as previously documented by me (4.4.23):    She was admitted to [facility] in February 2021 with left-sided weakness, facial droop and speech difficulties.  She received tPA and most of her symptoms resolved.  Brain MRI revealed right thalamic stroke.  She was started on atorvastatin.   echocardiogram was unremarkable, negative for PFO.  No history of arrhythmia, which she was following up with cardiology as well.  She did 21 days of DAPT and then aspirin alone.  When we met about 6 months ago she was still having some residual tingling in her face on the left, arm and some weakness on that side.  No big changes since her prior visit.  We had tried gabapentin for symptom management but this made her too sleepy.  I suggested we do a trial of Lyrica but she tells my nurse today that she decided against it because she read the side effects.  We did do some updated brain and cervical spine imaging and this was unremarkable.     Kristen says she was concerned about the possibility of weight gain on the Lyrica so she did not start it.   She says that the paresthesias are now painful, with the cold weather.   She is back to volunteering. She is very tired by 1pm. She says that her primary care provider increased her depression medication but this did not help and it made her feel strange so she went back to the lower dose.     Our plan was to try a dose of Lyrica just in the evening, to see how she responds.  In addition I added modafinil for her daytime fatigue/low energy.  I  encouraged Michael to continue to stay active.    Today Kristen complains of a new problem with her right hand going numb.  She has been getting injections in the shoulders in the interim.  Kristen tells me that the numbness mainly affects the right thumb and digits 4 and 5.  Her chiropractor has mentioned that she may have a pinched nerve.  She says the Lyrica was not helpful so she stopped this.     She does not take the modafinil every day, just when she feels that she will need it and it really does help from an energy standpoint.  It tends to wear off in the evening and then she is ready for bed.    She is up to 60mg nightly of the Cymbalta, through her PCP. This helps some.  She tried a lower dose but this was not helpful.  She has more pain than numbness now in the left arm, the leg is okay.  The Cymbalta helps her sleep and if she needs that she also take some Tylenol in the evening.    She thinks that maybe she was on amitriptyline a long time ago.    CURRENT MEDICATIONS:   acetaminophen (TYLENOL) 325 MG tablet, Take 650 mg by mouth  amLODIPine (NORVASC) 5 MG tablet, TAKE 1 TABLET BY MOUTH EVERY DAY  aspirin 81 MG EC tablet, Take 81 mg by mouth daily  atorvastatin (LIPITOR) 10 MG tablet, TAKE 1 TABLET BY MOUTH EVERY DAY  azelastine (ASTELIN) 0.1 % nasal spray, Spray 2 sprays into both nostrils 2 times daily as needed for rhinitis  CALCIUM PO, Take by mouth daily   Cholecalciferol (VITAMIN D PO), Take 1,000 Units by mouth daily   docusate sodium (COLACE) 100 MG capsule, Take 1 capsule (100 mg) by mouth 2 times daily Take while on opiate to prevent constipation  DULoxetine (CYMBALTA) 20 MG capsule, TAKE 1 CAPSULE BY MOUTH EVERY DAY  Fluticasone Propionate (FLONASE ALLERGY RELIEF NA), Spray in nostril as needed  MAGNESIUM PO, Take 250 mg by mouth daily   modafinil (PROVIGIL) 100 MG tablet, Take 1 tablet (100 mg) by mouth daily  Omega-3 Fatty Acids (OMEGA 3 PO), Take 2 capsules by mouth daily   omeprazole (PRILOSEC)  20 MG DR capsule, TAKE 1 TABLET (20 MG) BY MOUTH 2 TIMES DAILY TAKE 30-60 MINUTES BEFORE A MEAL.  Polyethyl Glycol-Propyl Glycol (SYSTANE FREE OP), Apply 2 drops to eye as needed (dry eyes)  Polyethylene Glycol 3350 (MIRALAX PO),   traZODone (DESYREL) 50 MG tablet, TAKE 1 TABLET (50 MG) BY MOUTH NIGHTLY AS NEEDED    lidocaine 1 % injection 2 mL  lidocaine 1 % injection 2 mL  lidocaine 1 % injection 2 mL  lidocaine 2%-EPINEPHrine 1:100,000 injection 20 mL  triamcinolone (KENALOG-40) injection 40 mg  triamcinolone (KENALOG-40) injection 40 mg  triamcinolone (KENALOG-40) injection 40 mg        RECENT DIAGNOSTIC STUDIES:   Labs: No results found for any visits on 10/05/23.      REVIEW OF SYSTEMS:                                                      10-point review of systems is negative except as mentioned above in HPI.    EXAM:                                                      Physical Exam:   Vitals: /83   Pulse 67   BMI= There is no height or weight on file to calculate BMI.  GENERAL: NAD.  HEENT: NC/AT.  CV: RRR. S1, S2.   NECK: No bruits.  PULM: Non-labored breathing.   Neurologic:  MENTAL STATUS: Alert, attentive. Speech impediment. Normal comprehension. Normal concentration. Adequate fund of knowledge.   CRANIAL NERVES: Discs technically difficult to visualize. Visual fields intact to confrontation. Pupils equally, round and reactive to light. Facial sensation and movement normal. EOM full. Hard of hearing. Trapezius strength intact. Tongue midline.  MOTOR: Strength is 5/5 in proximal and distal muscle groups of upper and lower extremities. Tone and bulk normal.   DTRs: Intact and symmetric in biceps, BR, patellae.  Cannot elicit ankle jerks.  Babinski down-going bilaterally.   SENSATION: Normal light touch and pinprick in the hands.  Intact proprioception at great toes.  Vibratory sensation is slightly decreased at the ankles.    COORDINATION: Normal finger nose finger on the right, dysmetria on the  left.  Knee heel shin normal.   STATION AND GAIT: Slight sway with Romberg. Casual gait reveals slight limp.  Right hand-dominant.    ASSESSMENT and PLAN:                                                      Assessment:    ICD-10-CM    1. Numbness and tingling of both upper extremities  R20.0 EMG    R20.2       2. History of stroke  Z86.73       3. Other fatigue  R53.83       4. Encounter for long-term (current) use of medications  Z79.899           Ms. Thompson is a pleasant 77-year-old woman here for follow-up regarding stroke.  She has chronic problems with fatigue and paresthesias since the stroke.  She is having new sensory changes in the right upper extremity that I cannot fully explain by imaging from last year.  I think we should do nerve conduction studies of both arms and hands and go from there.  We did talk about her pain management.  She is currently doing okay with the Cymbalta.  We could consider adding amitriptyline if needed in the future.  She has had a good response to the modafinil and is using this judiciously.  We will follow-up again in 6 months.  Michael understands and agrees with the plan.    Plan:  -- I think we should do nerve conduction studies/EMG given the new right-sided numbness.  We will check both arms and hands to compare.  We will notify you of the results.  -- Continue the Cymbalta for now.  We could consider adding amitriptyline as well for nerve related pain.  -- Continue the modafinil as needed for the chronic fatigue.  -- Keep up the good work with staying active!  -- Return to neurology clinic in 6 months.    Total Time: 30 minutes were spent with the patient and in chart review/documentation (as described above in Assessment and Plan)/coordinating the care on date of service.    Yasmin Quintanilla MD  Neurology    Dragon software used in the dictation of this note.

## 2023-10-05 NOTE — LETTER
10/5/2023         RE: Kristen Thompson  [address] [place] [id number]        Dear Colleague,    Thank you for referring your patient, Kristen Thompson, to the [facility] [place]. Please see a copy of my visit note below.    ESTABLISHED PATIENT NEUROLOGY NOTE    DATE OF VISIT: 10/5/2023  MRN: [id number]  PATIENT NAME: Kristen Thompson  YOB: 1945    Chief Complaint   Patient presents with     Stroke     6 mo follow-up   R hand going to sleep; almost daily for the last few months     SUBJECTIVE:                                                      HISTORY OF PRESENT ILLNESS:  Kristen is here for follow up regarding residual stroke symptoms.    History as previously documented by me (4.4.23):    She was admitted to [facility] in February 2021 with left-sided weakness, facial droop and speech difficulties.  She received tPA and most of her symptoms resolved.  Brain MRI revealed right thalamic stroke.  She was started on atorvastatin.   echocardiogram was unremarkable, negative for PFO.  No history of arrhythmia, which she was following up with cardiology as well.  She did 21 days of DAPT and then aspirin alone.  When we met about 6 months ago she was still having some residual tingling in her face on the left, arm and some weakness on that side.  No big changes since her prior visit.  We had tried gabapentin for symptom management but this made her too sleepy.  I suggested we do a trial of Lyrica but she tells my nurse today that she decided against it because she read the side effects.  We did do some updated brain and cervical spine imaging and this was unremarkable.     Kristen says she was concerned about the possibility of weight gain on the Lyrica so she did not start it.   She says that the paresthesias are now painful, with the cold weather.   She is back to volunteering. She is very tired by 1pm. She says that her primary care provider increased her  depression medication but this did not help and it made her feel strange so she went back to the lower dose.     Our plan was to try a dose of Lyrica just in the evening, to see how she responds.  In addition I added modafinil for her daytime fatigue/low energy.  I encouraged Michael to continue to stay active.    Today Kristen complains of a new problem with her right hand going numb.  She has been getting injections in the shoulders in the interim.  Kristen tells me that the numbness mainly affects the right thumb and digits 4 and 5.  Her chiropractor has mentioned that she may have a pinched nerve.  She says the Lyrica was not helpful so she stopped this.     She does not take the modafinil every day, just when she feels that she will need it and it really does help from an energy standpoint.  It tends to wear off in the evening and then she is ready for bed.    She is up to 60mg nightly of the Cymbalta, through her PCP. This helps some.  She tried a lower dose but this was not helpful.  She has more pain than numbness now in the left arm, the leg is okay.  The Cymbalta helps her sleep and if she needs that she also take some Tylenol in the evening.    She thinks that maybe she was on amitriptyline a long time ago.    CURRENT MEDICATIONS:   acetaminophen (TYLENOL) 325 MG tablet, Take 650 mg by mouth  amLODIPine (NORVASC) 5 MG tablet, TAKE 1 TABLET BY MOUTH EVERY DAY  aspirin 81 MG EC tablet, Take 81 mg by mouth daily  atorvastatin (LIPITOR) 10 MG tablet, TAKE 1 TABLET BY MOUTH EVERY DAY  azelastine (ASTELIN) 0.1 % nasal spray, Spray 2 sprays into both nostrils 2 times daily as needed for rhinitis  CALCIUM PO, Take by mouth daily   Cholecalciferol (VITAMIN D PO), Take 1,000 Units by mouth daily   docusate sodium (COLACE) 100 MG capsule, Take 1 capsule (100 mg) by mouth 2 times daily Take while on opiate to prevent constipation  DULoxetine (CYMBALTA) 20 MG capsule, TAKE 1 CAPSULE BY MOUTH EVERY DAY  Fluticasone Propionate  (FLONASE ALLERGY RELIEF NA), Spray in nostril as needed  MAGNESIUM PO, Take 250 mg by mouth daily   modafinil (PROVIGIL) 100 MG tablet, Take 1 tablet (100 mg) by mouth daily  Omega-3 Fatty Acids (OMEGA 3 PO), Take 2 capsules by mouth daily   omeprazole (PRILOSEC) 20 MG DR capsule, TAKE 1 TABLET (20 MG) BY MOUTH 2 TIMES DAILY TAKE 30-60 MINUTES BEFORE A MEAL.  Polyethyl Glycol-Propyl Glycol (SYSTANE FREE OP), Apply 2 drops to eye as needed (dry eyes)  Polyethylene Glycol 3350 (MIRALAX PO),   traZODone (DESYREL) 50 MG tablet, TAKE 1 TABLET (50 MG) BY MOUTH NIGHTLY AS NEEDED    lidocaine 1 % injection 2 mL  lidocaine 1 % injection 2 mL  lidocaine 1 % injection 2 mL  lidocaine 2%-EPINEPHrine 1:100,000 injection 20 mL  triamcinolone (KENALOG-40) injection 40 mg  triamcinolone (KENALOG-40) injection 40 mg  triamcinolone (KENALOG-40) injection 40 mg        RECENT DIAGNOSTIC STUDIES:   Labs: No results found for any visits on 10/05/23.      REVIEW OF SYSTEMS:                                                      10-point review of systems is negative except as mentioned above in HPI.    EXAM:                                                      Physical Exam:   Vitals: /83   Pulse 67   BMI= There is no height or weight on file to calculate BMI.  GENERAL: NAD.  HEENT: NC/AT.  CV: RRR. S1, S2.   NECK: No bruits.  PULM: Non-labored breathing.   Neurologic:  MENTAL STATUS: Alert, attentive. Speech impediment. Normal comprehension. Normal concentration. Adequate fund of knowledge.   CRANIAL NERVES: Discs technically difficult to visualize. Visual fields intact to confrontation. Pupils equally, round and reactive to light. Facial sensation and movement normal. EOM full. Hard of hearing. Trapezius strength intact. Tongue midline.  MOTOR: Strength is 5/5 in proximal and distal muscle groups of upper and lower extremities. Tone and bulk normal.   DTRs: Intact and symmetric in biceps, BR, patellae.  Cannot elicit ankle jerks.   Babinski down-going bilaterally.   SENSATION: Normal light touch and pinprick in the hands.  Intact proprioception at great toes.  Vibratory sensation is slightly decreased at the ankles.    COORDINATION: Normal finger nose finger on the right, dysmetria on the left.  Knee heel shin normal.   STATION AND GAIT: Slight sway with Romberg. Casual gait reveals slight limp.  Right hand-dominant.    ASSESSMENT and PLAN:                                                      Assessment:    ICD-10-CM    1. Numbness and tingling of both upper extremities  R20.0 EMG    R20.2       2. History of stroke  Z86.73       3. Other fatigue  R53.83       4. Encounter for long-term (current) use of medications  Z79.899           Ms. Thompson is a pleasant 77-year-old woman here for follow-up regarding stroke.  She has chronic problems with fatigue and paresthesias since the stroke.  She is having new sensory changes in the right upper extremity that I cannot fully explain by imaging from last year.  I think we should do nerve conduction studies of both arms and hands and go from there.  We did talk about her pain management.  She is currently doing okay with the Cymbalta.  We could consider adding amitriptyline if needed in the future.  She has had a good response to the modafinil and is using this judiciously.  We will follow-up again in 6 months.  Michael understands and agrees with the plan.    Plan:  -- I think we should do nerve conduction studies/EMG given the new right-sided numbness.  We will check both arms and hands to compare.  We will notify you of the results.  -- Continue the Cymbalta for now.  We could consider adding amitriptyline as well for nerve related pain.  -- Continue the modafinil as needed for the chronic fatigue.  -- Keep up the good work with staying active!  -- Return to neurology clinic in 6 months.    Total Time: 30 minutes were spent with the patient and in chart review/documentation (as described above in  Assessment and Plan)/coordinating the care on date of service.    Yasmin Quintanilla MD  Neurology    Dragon software used in the dictation of this note.                    Again, thank you for allowing me to participate in the care of your patient.        Sincerely,        Yasmin Quintanilla MD

## 2023-10-19 ENCOUNTER — HOSPITAL ENCOUNTER (OUTPATIENT)
Dept: MAMMOGRAPHY | Facility: CLINIC | Age: 78
Discharge: HOME | End: 2023-10-19
Attending: FAMILY MEDICINE | Admitting: FAMILY MEDICINE
Payer: COMMERCIAL

## 2023-10-19 DIAGNOSIS — Z12.31 VISIT FOR SCREENING MAMMOGRAM: ICD-10-CM

## 2023-10-19 PROCEDURE — 77067 SCR MAMMO BI INCL CAD: CPT

## 2023-10-25 ENCOUNTER — TELEPHONE (OUTPATIENT)
Dept: FAMILY MEDICINE | Facility: CLINIC | Age: 78
End: 2023-10-25
Payer: COMMERCIAL

## 2023-10-25 NOTE — TELEPHONE ENCOUNTER
Call placed to Patient.  Patient states that when she swallows, she feels like she has a lump in her throat.  Has been going on for 3 months.  Feels like it is behind her garcia apple.  Can not palpate lump.  Is able to swallow.  Never had this before.  Kept thinking that it would get better but has not.  Does have some paralysis in her neck and face.  Had appointment with neurologist recently but forgot to mention it to them.  Appointment scheduled with Dr Hung on 10/26/23 at 1540.  Noel Downs RN

## 2023-10-25 NOTE — TELEPHONE ENCOUNTER
Reason for Call:  Appointment Request    Patient requesting this type of appt:  office visit     Requested provider: Cortney Hung    Reason patient unable to be scheduled: Not within requested timeframe    When does patient want to be seen/preferred time: 1-2 weeks    Comments: lump in throat, painful over 3 months, not a sore throat    Could we send this information to you in [employer] or would you prefer to receive a phone call?:   Patient would like to be contacted via [employer]    Call taken on 10/25/2023 at 9:23 AM by Alisa Connolly

## 2023-10-26 ENCOUNTER — OFFICE VISIT (OUTPATIENT)
Dept: FAMILY MEDICINE | Facility: CLINIC | Age: 78
End: 2023-10-26
Payer: COMMERCIAL

## 2023-10-26 VITALS
SYSTOLIC BLOOD PRESSURE: 114 MMHG | DIASTOLIC BLOOD PRESSURE: 76 MMHG | TEMPERATURE: 98.8 F | BODY MASS INDEX: 29.64 KG/M2 | OXYGEN SATURATION: 96 % | HEIGHT: 61 IN | WEIGHT: 157 LBS | HEART RATE: 70 BPM

## 2023-10-26 DIAGNOSIS — R09.A2 GLOBUS SENSATION: Primary | ICD-10-CM

## 2023-10-26 DIAGNOSIS — R60.0 LOCALIZED EDEMA: ICD-10-CM

## 2023-10-26 DIAGNOSIS — G62.9 NEUROPATHY: ICD-10-CM

## 2023-10-26 DIAGNOSIS — I63.9 CEREBROVASCULAR ACCIDENT (CVA), UNSPECIFIED MECHANISM (H): ICD-10-CM

## 2023-10-26 DIAGNOSIS — E78.5 HYPERLIPIDEMIA LDL GOAL <130: ICD-10-CM

## 2023-10-26 DIAGNOSIS — I10 ESSENTIAL HYPERTENSION WITH GOAL BLOOD PRESSURE LESS THAN 130/80: ICD-10-CM

## 2023-10-26 PROCEDURE — 99214 OFFICE O/P EST MOD 30 MIN: CPT | Performed by: FAMILY MEDICINE

## 2023-10-26 RX ORDER — DULOXETIN HYDROCHLORIDE 60 MG/1
60 CAPSULE, DELAYED RELEASE ORAL DAILY
Qty: 90 CAPSULE | Refills: 3 | Status: SHIPPED | OUTPATIENT
Start: 2023-10-26 | End: 2024-10-02

## 2023-10-26 NOTE — PROGRESS NOTES
"  Assessment & Plan     Globus sensation  Will refer   - Adult ENT  Referral; Future    Neuropathy  She restarted this and will increase her dose   - DULoxetine (CYMBALTA) 60 MG capsule; Take 1 capsule (60 mg) by mouth daily    Cerebrovascular accident (CVA), unspecified mechanism (H)  Stil with residual   - Lipid panel reflex to direct LDL Fasting; Future    Hyperlipidemia LDL goal <130    - Lipid panel reflex to direct LDL Fasting; Future    Localized edema  Not really presnt now     Essential hypertension with goal blood pressure less than 130/80  Good control   - **CBC with platelets FUTURE 2mo; Future  - **Basic metabolic panel FUTURE 2mo; Future         BMI:   Estimated body mass index is 29.66 kg/m  as calculated from the following:    Height as of this encounter: 1.549 m (5' 1\").    Weight as of this encounter: 71.2 kg (157 lb).       There are no Patient Instructions on file for this visit.    Cortney Hung MD  [facility] DESHAUN Peterson is a 78 year old, presenting for the following health issues:  Throat Problem (Feels a lump in throat for months. Unsure if related to her stroke. )        10/26/2023     3:45 PM   Additional Questions   Roomed by Tracy VALLE CMA       HPI       She has had a globus sensation in the throat for a while she thought it was from the stroke  Not a lot of phlegm but she is feeling like there is something in her throat. Not having difficulty swallowing and she did have a swallow study after her stroke. Denies heartburn or PND      Review of Systems   Constitutional, HEENT, cardiovascular, pulmonary, gi and gu systems are negative, except as otherwise noted.      Objective    /76   Pulse 70   Temp 98.8  F (37.1  C) (Tympanic)   Ht 1.549 m (5' 1\")   Wt 71.2 kg (157 lb)   SpO2 96%   BMI 29.66 kg/m    Body mass index is 29.66 kg/m .  Physical Exam   GENERAL: healthy, alert and no distress  EYES: Eyes grossly normal to inspection, PERRL " and conjunctivae and sclerae normal  HENT: ear canals and TM's normal, nose and mouth without ulcers or lesions  NECK: no adenopathy, no asymmetry, masses, or scars and thyroid normal to palpation  RESP: lungs clear to auscultation - no rales, rhonchi or wheezes  CV: regular rate and rhythm, normal S1 S2, no S3 or S4, no murmur, click or rub, no peripheral edema and peripheral pulses strong    No results found for any visits on 10/26/23.    Cortney Hung M.D.

## 2023-11-03 ENCOUNTER — LAB (OUTPATIENT)
Dept: LAB | Facility: CLINIC | Age: 78
End: 2023-11-03
Payer: COMMERCIAL

## 2023-11-03 DIAGNOSIS — I63.9 CEREBROVASCULAR ACCIDENT (CVA), UNSPECIFIED MECHANISM (H): ICD-10-CM

## 2023-11-03 DIAGNOSIS — E78.5 HYPERLIPIDEMIA LDL GOAL <130: ICD-10-CM

## 2023-11-03 LAB
CHOLEST SERPL-MCNC: 188 MG/DL
HDLC SERPL-MCNC: 86 MG/DL
LDLC SERPL CALC-MCNC: 92 MG/DL
NONHDLC SERPL-MCNC: 102 MG/DL
TRIGL SERPL-MCNC: 52 MG/DL

## 2023-11-03 PROCEDURE — 80061 LIPID PANEL: CPT

## 2023-11-03 PROCEDURE — 36415 COLL VENOUS BLD VENIPUNCTURE: CPT

## 2023-11-03 NOTE — PROGRESS NOTES
Explained to patient that her labs were not expected until 12/25/2023. Patient insisted on her fasting labs to be drawn today 11/3/2023.

## 2023-11-08 ENCOUNTER — OFFICE VISIT (OUTPATIENT)
Dept: FAMILY MEDICINE | Facility: CLINIC | Age: 78
End: 2023-11-08
Payer: COMMERCIAL

## 2023-11-08 VITALS
TEMPERATURE: 99.1 F | HEART RATE: 72 BPM | DIASTOLIC BLOOD PRESSURE: 76 MMHG | OXYGEN SATURATION: 95 % | SYSTOLIC BLOOD PRESSURE: 124 MMHG

## 2023-11-08 DIAGNOSIS — I69.359 HEMIPLEGIA AND HEMIPARESIS FOLLOWING CEREBRAL INFARCTION AFFECTING UNSPECIFIED SIDE (H): ICD-10-CM

## 2023-11-08 DIAGNOSIS — Z00.00 ENCOUNTER FOR MEDICARE ANNUAL WELLNESS EXAM: Primary | ICD-10-CM

## 2023-11-08 DIAGNOSIS — I63.9 CEREBROVASCULAR ACCIDENT (CVA), UNSPECIFIED MECHANISM (H): ICD-10-CM

## 2023-11-08 DIAGNOSIS — G56.32 RADIAL NEUROPATHY, LEFT: ICD-10-CM

## 2023-11-08 DIAGNOSIS — I10 ESSENTIAL HYPERTENSION WITH GOAL BLOOD PRESSURE LESS THAN 130/80: ICD-10-CM

## 2023-11-08 DIAGNOSIS — Z23 NEED FOR VACCINE FOR TD (TETANUS-DIPHTHERIA): ICD-10-CM

## 2023-11-08 LAB
ANION GAP SERPL CALCULATED.3IONS-SCNC: 10 MMOL/L (ref 7–15)
BUN SERPL-MCNC: 15.5 MG/DL (ref 8–23)
CALCIUM SERPL-MCNC: 9.5 MG/DL (ref 8.8–10.2)
CHLORIDE SERPL-SCNC: 103 MMOL/L (ref 98–107)
CREAT SERPL-MCNC: 0.71 MG/DL (ref 0.51–0.95)
DEPRECATED HCO3 PLAS-SCNC: 26 MMOL/L (ref 22–29)
EGFRCR SERPLBLD CKD-EPI 2021: 88 ML/MIN/1.73M2
ERYTHROCYTE [DISTWIDTH] IN BLOOD BY AUTOMATED COUNT: 12.6 % (ref 10–15)
GLUCOSE SERPL-MCNC: 101 MG/DL (ref 70–99)
HCT VFR BLD AUTO: 42.5 % (ref 35–47)
HGB BLD-MCNC: 14.2 G/DL (ref 11.7–15.7)
MCH RBC QN AUTO: 32.9 PG (ref 26.5–33)
MCHC RBC AUTO-ENTMCNC: 33.4 G/DL (ref 31.5–36.5)
MCV RBC AUTO: 99 FL (ref 78–100)
PLATELET # BLD AUTO: 310 10E3/UL (ref 150–450)
POTASSIUM SERPL-SCNC: 4.5 MMOL/L (ref 3.4–5.3)
RBC # BLD AUTO: 4.31 10E6/UL (ref 3.8–5.2)
SODIUM SERPL-SCNC: 139 MMOL/L (ref 135–145)
WBC # BLD AUTO: 5 10E3/UL (ref 4–11)

## 2023-11-08 PROCEDURE — 99214 OFFICE O/P EST MOD 30 MIN: CPT | Mod: 25 | Performed by: FAMILY MEDICINE

## 2023-11-08 PROCEDURE — 85027 COMPLETE CBC AUTOMATED: CPT | Performed by: FAMILY MEDICINE

## 2023-11-08 PROCEDURE — 80048 BASIC METABOLIC PNL TOTAL CA: CPT | Performed by: FAMILY MEDICINE

## 2023-11-08 PROCEDURE — G0439 PPPS, SUBSEQ VISIT: HCPCS | Performed by: FAMILY MEDICINE

## 2023-11-08 PROCEDURE — 90714 TD VACC NO PRESV 7 YRS+ IM: CPT | Performed by: FAMILY MEDICINE

## 2023-11-08 PROCEDURE — 36415 COLL VENOUS BLD VENIPUNCTURE: CPT | Performed by: FAMILY MEDICINE

## 2023-11-08 PROCEDURE — 90471 IMMUNIZATION ADMIN: CPT | Performed by: FAMILY MEDICINE

## 2023-11-08 RX ORDER — DULOXETIN HYDROCHLORIDE 20 MG/1
20 CAPSULE, DELAYED RELEASE ORAL DAILY
Qty: 90 CAPSULE | Refills: 3 | Status: SHIPPED | OUTPATIENT
Start: 2023-11-08 | End: 2024-08-22 | Stop reason: DRUGHIGH

## 2023-11-08 RX ORDER — AMLODIPINE BESYLATE 5 MG/1
5 TABLET ORAL DAILY
Qty: 90 TABLET | Refills: 3 | Status: SHIPPED | OUTPATIENT
Start: 2023-11-08 | End: 2024-10-02

## 2023-11-08 ASSESSMENT — ACTIVITIES OF DAILY LIVING (ADL): CURRENT_FUNCTION: NO ASSISTANCE NEEDED

## 2023-11-08 NOTE — PATIENT INSTRUCTIONS
Patient Education   Personalized Prevention Plan  You are due for the preventive services outlined below.  Your care team is available to assist you in scheduling these services.  If you have already completed any of these items, please share that information with your care team to update in your medical record.  Health Maintenance Due   Topic Date Due     Zoster (Shingles) Vaccine (1 of 2) Never done     RSV VACCINE (Pregnancy & 60+) (1 - 1-dose 60+ series) Never done     Diptheria Tetanus Pertussis (DTAP/TDAP/TD) Vaccine (1 - Tdap) 11/30/2011     Pneumococcal Vaccine (3 - PPSV23 or PCV20) 10/10/2017     COVID-19 Vaccine (3 - Moderna risk series) 04/23/2021     FALL RISK ASSESSMENT  08/31/2022     ANNUAL REVIEW OF HM ORDERS  08/04/2023     Flu Vaccine (1) 09/01/2023

## 2023-11-08 NOTE — PROGRESS NOTES
"SUBJECTIVE:   Kristen is a 78 year old who presents for Preventive Visit.      11/8/2023     9:27 AM   Additional Questions   Roomed by Tracy VALLE CMA       Are you in the first 12 months of your Medicare coverage?  No    Healthy Habits:     In general, how would you rate your overall health?  Very good    Frequency of exercise:  2-3 days/week    Duration of exercise:  45-60 minutes    Do you usually eat at least 4 servings of fruit and vegetables a day, include whole grains    & fiber and avoid regularly eating high fat or \"junk\" foods?  No    Taking medications regularly:  Yes    Barriers to taking medications:  None    Medication side effects:  None    Ability to successfully perform activities of daily living:  No assistance needed    Home Safety:  No safety concerns identified    Hearing Impairment:  No hearing concerns    In the past 6 months, have you been bothered by leaking of urine?  No    In general, how would you rate your overall mental or emotional health?  Good    Additional concerns today:  No          Have you ever done Advance Care Planning? (For example, a Health Directive, POLST, or a discussion with a medical provider or your loved ones about your wishes): Yes, advance care planning is on file.      Fall risk  Fallen 2 or more times in the past year?: No  Any fall with injury in the past year?: No      Cognitive Screening   1) Repeat 3 items (Leader, Season, Table)    2) Clock draw: NORMAL  3) 3 item recall: Recalls 3 objects  Results: 3 items recalled: COGNITIVE IMPAIRMENT LESS LIKELY  Mini-CogTM Copyright ERNST Jorge. Licensed by the author for use in [facility]; reprinted with permission (jsoe@.[facility]). All rights reserved.        Do you have sleep apnea, excessive snoring or daytime drowsiness? : yes - mild     Reviewed and updated as needed this visit by clinical staff                  Reviewed and updated as needed this visit by Provider                 Social History     Tobacco " Use    Smoking status: Never    Smokeless tobacco: Never   Substance Use Topics    Alcohol use: No     Alcohol/week: 0.0 standard drinks of alcohol             8/31/2021     3:03 PM   Alcohol Use   Prescreen: >3 drinks/day or >7 drinks/week? No       Do you have a current opioid prescription? No  Do you use any other controlled substances or medications that are not prescribed by a provider? None              Current providers sharing in care for this patient include:   Patient Care Team:  Cortney Hung MD as PCP - General (Family Medicine)  Parveen Mehta,  as Assigned Musculoskeletal Provider  Yasmin Salgado MD as Assigned Neuroscience Provider  Cortney Hung MD as Assigned PCP  Chase Diza MD as Assigned Allergy Provider  Jeremías Proctor PA-C as Assigned Sleep Provider  Andreia Looney PA-C as Physician Assistant (Dermatology)  Jeremy Berg DO as Assigned Surgical Provider  Adriana Rubio PA-C as Physician Assistant (Dermatology)    The following health maintenance items are reviewed in Epic and correct as of today:  Health Maintenance   Topic Date Due    ZOSTER IMMUNIZATION (1 of 2) Never done    RSV VACCINE (Pregnancy & 60+) (1 - 1-dose 60+ series) Never done    DTAP/TDAP/TD IMMUNIZATION (1 - Tdap) 11/30/2011    Pneumococcal Vaccine: 65+ Years (3 - PPSV23 or PCV20) 10/10/2017    COVID-19 Vaccine (3 - Moderna risk series) 04/23/2021    MEDICARE ANNUAL WELLNESS VISIT  08/31/2022    FALL RISK ASSESSMENT  08/31/2022    ANNUAL REVIEW OF HM ORDERS  08/04/2023    INFLUENZA VACCINE (1) 09/01/2023    LIPID  11/03/2024    ADVANCE CARE PLANNING  09/06/2026    DEXA  10/31/2027    HEPATITIS C SCREENING  Completed    PHQ-2 (once per calendar year)  Completed    IPV IMMUNIZATION  Aged Out    HPV IMMUNIZATION  Aged Out    MENINGITIS IMMUNIZATION  Aged Out    RSV MONOCLONAL ANTIBODY  Aged Out    MAMMO SCREENING  Discontinued    COLORECTAL CANCER SCREENING   "Discontinued           Mammogram Screening - Patient over age 75, has elected to continue with screening.  Pertinent mammograms are reviewed under the imaging tab.    Review of Systems  Constitutional, HEENT, cardiovascular, pulmonary, gi and gu systems are negative, except as otherwise noted.    OBJECTIVE:   /76 (BP Location: Right arm, Patient Position: Sitting, Cuff Size: Adult Regular)   Pulse 72   Temp 99.1  F (37.3  C) (Tympanic)   SpO2 95%  Estimated body mass index is 29.66 kg/m  as calculated from the following:    Height as of 10/26/23: 1.549 m (5' 1\").    Weight as of 10/26/23: 71.2 kg (157 lb).  Physical Exam  GENERAL: healthy, alert and no distress  EYES: Eyes grossly normal to inspection, PERRL and conjunctivae and sclerae normal  RESP: lungs clear to auscultation - no rales, rhonchi or wheezes  CV: regular rate and rhythm, normal S1 S2, no S3 or S4, no murmur, click or rub, no peripheral edema and peripheral pulses strong    Diagnostic Test Results:  Labs reviewed in Epic    ASSESSMENT / PLAN:   (Z00.00) Encounter for Medicare annual wellness exam  (primary encounter diagnosis)  Comment:   Plan:     (I63.9) Cerebrovascular accident (CVA), unspecified mechanism (H)  Comment:   Plan: amLODIPine (NORVASC) 5 MG tablet        Cont medications     (I10) Essential hypertension with goal blood pressure less than 130/80  Comment:   Plan: amLODIPine (NORVASC) 5 MG tablet        Stable                 (G56.32) Radial neuropathy, left  Comment:   Plan: DULoxetine (CYMBALTA) 20 MG capsule        Will increase     (Z23) Need for vaccine for Td (tetanus-diphtheria)  Comment:   Plan:     (I69.359) Hemiplegia and hemiparesis following cerebral infarction affecting unspecified side (H)  Comment:   Plan: has recovered most function but tires ore easily           COUNSELING:  Reviewed preventive health counseling, as reflected in patient instructions      BMI:   Estimated body mass index is 29.66 kg/m  as " "calculated from the following:    Height as of 10/26/23: 1.549 m (5' 1\").    Weight as of 10/26/23: 71.2 kg (157 lb).         She reports that she has never smoked. She has never used smokeless tobacco.      Appropriate preventive services were discussed with this patient, including applicable screening as appropriate for fall prevention, nutrition, physical activity, Tobacco-use cessation, weight loss and cognition.  Checklist reviewing preventive services available has been given to the patient.    Reviewed patients plan of care and provided an AVS. The Basic Care Plan (routine screening as documented in Health Maintenance) for Kristen meets the Care Plan requirement. This Care Plan has been established and reviewed with the Patient.          Cortney Hung MD  [facility]    Identified Health Risks:  I have reviewed Opioid Use Disorder and Substance Use Disorder risk factors and made any needed referrals.   "

## 2023-12-08 ENCOUNTER — MYC MEDICAL ADVICE (OUTPATIENT)
Dept: FAMILY MEDICINE | Facility: CLINIC | Age: 78
End: 2023-12-08
Payer: COMMERCIAL

## 2023-12-08 NOTE — TELEPHONE ENCOUNTER
Call placed to Patient regarding breast pain.  Patient states that it is a sharp pain with occasional stabbing.  When stabbing pain is a 5-6/10.  States that pain catches her attention.  Denies drainage or lump in the area.  Feels good otherwise.  Pain has been going on for awhile.  Appointment scheduled with Dr Hung on 12/20/23 at 1540.  Noel Downs RN

## 2023-12-28 ENCOUNTER — OFFICE VISIT (OUTPATIENT)
Dept: DERMATOLOGY | Facility: CLINIC | Age: 78
End: 2023-12-28
Payer: COMMERCIAL

## 2023-12-28 DIAGNOSIS — L82.1 SEBORRHEIC KERATOSIS: ICD-10-CM

## 2023-12-28 DIAGNOSIS — L81.4 LENTIGO: ICD-10-CM

## 2023-12-28 DIAGNOSIS — D18.01 ANGIOMA OF SKIN: ICD-10-CM

## 2023-12-28 DIAGNOSIS — Z85.828 HISTORY OF NONMELANOMA SKIN CANCER: Primary | ICD-10-CM

## 2023-12-28 DIAGNOSIS — D22.9 NEVUS: ICD-10-CM

## 2023-12-28 DIAGNOSIS — D48.5 NEOPLASM OF UNCERTAIN BEHAVIOR OF SKIN: ICD-10-CM

## 2023-12-28 PROCEDURE — 99203 OFFICE O/P NEW LOW 30 MIN: CPT | Mod: 25 | Performed by: PHYSICIAN ASSISTANT

## 2023-12-28 PROCEDURE — 88305 TISSUE EXAM BY PATHOLOGIST: CPT | Performed by: PATHOLOGY

## 2023-12-28 PROCEDURE — 11102 TANGNTL BX SKIN SINGLE LES: CPT | Performed by: PHYSICIAN ASSISTANT

## 2023-12-28 PROCEDURE — 88342 IMHCHEM/IMCYTCHM 1ST ANTB: CPT | Performed by: PATHOLOGY

## 2023-12-28 ASSESSMENT — PAIN SCALES - GENERAL: PAINLEVEL: NO PAIN (0)

## 2023-12-28 NOTE — NURSING NOTE
Kristen Thompson's chief complaint for this visit includes:  Chief Complaint   Patient presents with    Skin Check     Patient is here for a skin check and would like to follow-up on right breast mole.      PCP: Cortney Hung    Referring Provider:  Adriana Rubio PA-C  [address] [place] [address]    There were no vitals taken for this visit.  No Pain (0)        Allergies   Allergen Reactions    Codeine Sulfate      Nausea and vomiting     Quinapril Difficulty breathing    Darvon-N [Propoxyphene Napsylate] Nausea and Vomiting         Do you need any medication refills at today's visit? No    Anyi Mcintyre MA

## 2023-12-28 NOTE — LETTER
12/28/2023         RE: Kristen Thompson  [address] [place] [id number]        Dear Colleague,    Thank you for referring your patient, Kristen Thompson, to the [facility]. Please see a copy of my visit note below.    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  See patient intake sheet.  ROS of the following were done and are negative: Constitutional, Eyes, Ears, Nose,   Mouth, Throat, Cardiovascular, Respiratory, GI, Genitourinary, Musculoskeletal,   Psychiatric, Endocrine, Allergic/Immunologic.    PHYSICAL EXAM:   There were no vitals taken for this visit.  Skin exam performed as follows: Type 2 skin. Mood appropriate  Alert and Oriented X 3. Well developed, well nourished in no distress.  General appearance: Normal  Head including face: Normal  Eyes: conjunctiva and lids: Normal  Mouth: Lips, teeth, gums: Normal  Neck: Normal  Chest-breast/axillae: Normal  Back: Normal  Spleen and liver: Normal  Cardiovascular: Exam of peripheral vascular system by observation for swelling, varicosities, edema: Normal  Genitalia: groin, buttocks: Normal  Extremities: digits/nails (clubbing): Normal  Eccrine and Apocrine glands: Normal  Right upper extremity: Normal  Left upper extremity: Normal  Right lower extremity: Normal  Left lower extremity: Normal  Skin: Scalp and body hair: See below    Pt deferred exam of breasts, groin, buttocks: No    Other physical  findings:  1. Multiple pigmented macules on extremities and trunk  2. Multiple pigmented macules on face, trunk and extremities  3. Multiple vascular papules on trunk, arms and legs  4. Multiple scattered keratotic plaques  5. 8 mm irregular macule on the right posterior shoulder         Except as noted above, no other signs of skin cancer or melanoma.     ASSESSMENT/PLAN:   Benign Above Waist skin cancer screening today. . Patient with history of NMSC  Advised on monthly self exams and 1 year  Patient Education: Appropriate brochures given.    Multiple benign appearing nevi on arms, legs and trunk. Discussed ABCDEs of melanoma and sunscreen.   Multiple lentigos on arms, legs and trunk. Advised benign, no treatment needed.  Multiple scattered angiomas. Advised benign, no treatment needed.   Seborrheic keratosis on arms, legs and trunk. Advised benign, no treatment needed.  R/o atypical lentigo on the right posterior shoulder. Shave biopsy performed.  Area cleaned and anesthetized with 1% lidocaine with epinephrine.  Dermablade used to remove the lesion and sent to pathology. Bleeding was cauterized. Pt tolerated procedure well with no complications.                 Follow-up: Yearly FSE / PRN sooner    1.) Patient was asked about new and changing moles. YES  2.) Patient received a complete physical skin examination: YES  3.) Patient was counseled to perform a monthly self skin examination: YES  Scribed By: Adriana Rubio, MS, PAMARTHA        Again, thank you for allowing me to participate in the care of your patient.        Sincerely,        Adriana Rubio PA-C

## 2023-12-28 NOTE — PROGRESS NOTES
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  See patient intake sheet.  ROS of the following were done and are negative: Constitutional, Eyes, Ears, Nose,   Mouth, Throat, Cardiovascular, Respiratory, GI, Genitourinary, Musculoskeletal,   Psychiatric, Endocrine, Allergic/Immunologic.    PHYSICAL EXAM:   There were no vitals taken for this visit.  Skin exam performed as follows: Type 2 skin. Mood appropriate  Alert and Oriented X 3. Well developed, well nourished in no distress.  General appearance: Normal  Head including face: Normal  Eyes: conjunctiva and lids: Normal  Mouth: Lips, teeth, gums: Normal  Neck: Normal  Chest-breast/axillae: Normal  Back: Normal  Spleen and liver: Normal  Cardiovascular: Exam of peripheral vascular system by observation for swelling, varicosities, edema: Normal  Genitalia: groin, buttocks: Normal  Extremities: digits/nails (clubbing): Normal  Eccrine and Apocrine glands: Normal  Right upper extremity: Normal  Left upper extremity: Normal  Right lower extremity: Normal  Left lower extremity: Normal  Skin: Scalp and body hair: See below    Pt deferred exam of breasts, groin, buttocks: No    Other physical findings:  1. Multiple pigmented macules on extremities and trunk  2. Multiple pigmented macules on face, trunk and extremities  3. Multiple vascular papules on trunk, arms and legs  4. Multiple scattered keratotic plaques  5. 8 mm irregular macule on the right  posterior shoulder         Except as noted above, no other signs of skin cancer or melanoma.     ASSESSMENT/PLAN:   Benign Above Waist skin cancer screening today. . Patient with history of NMSC  Advised on monthly self exams and 1 year  Patient Education: Appropriate brochures given.    Multiple benign appearing nevi on arms, legs and trunk. Discussed ABCDEs of melanoma and sunscreen.   Multiple lentigos on arms, legs and trunk. Advised benign, no treatment needed.  Multiple scattered angiomas. Advised benign, no treatment needed.   Seborrheic keratosis on arms, legs and trunk. Advised benign, no treatment needed.  R/o atypical lentigo on the right posterior shoulder. Shave biopsy performed.  Area cleaned and anesthetized with 1% lidocaine with epinephrine.  Dermablade used to remove the lesion and sent to pathology. Bleeding was cauterized. Pt tolerated procedure well with no complications.                 Follow-up: Yearly FSE / PRN sooner    1.) Patient was asked about new and changing moles. YES  2.) Patient received a complete physical skin examination: YES  3.) Patient was counseled to perform a monthly self skin examination: YES  Scribed By: Adriana Rubio MS, PA-C

## 2023-12-28 NOTE — PATIENT INSTRUCTIONS
Wound Care Instructions     FOR SUPERFICIAL WOUNDS     [facility]  [phone]                 AFTER 24 HOURS YOU SHOULD REMOVE THE BANDAGE AND BEGIN DAILY DRESSING CHANGES AS FOLLOWS:     1) Remove Dressing.     2) Clean and dry the area with tap water using a Q-tip or sterile gauze pad.     3) Apply Vaseline, Aquaphor, Polysporin ointment or Bacitracin ointment over entire wound.  Do NOT use Neosporin ointment.     4) Cover the wound with a band-aid, or a sterile non-stick gauze pad and micropore paper tape    REPEAT THESE INSTRUCTIONS AT LEAST ONCE A DAY UNTIL THE WOUND HAS COMPLETELY HEALED.    It is an old wives tale that a wound heals better when it is exposed to air and allowed to dry out. The wound will heal faster with a better cosmetic result if it is kept moist with ointment and covered with a bandage.    **Do not let the wound dry out.**    Supplies Needed:      *Cotton tipped applicators (Q-tips)    *Vaseline, Aquaphor, Polysporin or Bacitracin Ointment (NOT NEOSPORIN)    *Band-aids or non-stick gauze pads and micropore paper tape.      PATIENT INFORMATION:    During the healing process you will notice a number of changes. All wounds develop a small halo of redness surrounding the wound.  This means healing is occurring. Severe itching with extensive redness usually indicates sensitivity to the ointment or bandage tape used to dress the wound.  You should call our office if this develops.      Swelling  and/or discoloration around your surgical site is common, particularly when performed around the eye.    All wounds normally drain.  The larger the wound the more drainage there will be.  After 7-10 days, you will notice the wound beginning to shrink and new skin will begin to grow.  The wound is healed when you can see skin has formed over the entire area.  A healed wound has a healthy, shiny look to the surface and is red to dark pink in color to normalize.  Wounds may  take approximately 4-6 weeks to heal.  Larger wounds may take 6-8 weeks.  After the wound is healed you may discontinue dressing changes.    You may experience a sensation of tightness as your wound heals. This is normal and will gradually subside.    Your healed wound may be sensitive to temperature changes. This sensitivity improves with time, but if you re having a lot of discomfort, try to avoid temperature extremes.    Patients frequently experience itching after their wound appears to have healed because of the continue healing under the skin.  Plain Vaseline will help relieve the itching.      POSSIBLE COMPLICATIONS    BLEEDING:    Leave the bandage in place.  Use tightly rolled up gauze or a cloth to apply direct pressure over the bandage for 30  minutes.  Reapply pressure for an additional 30 minutes if necessary  Use additional gauze and tape to maintain pressure once the bleeding has stopped.

## 2023-12-29 ENCOUNTER — OFFICE VISIT (OUTPATIENT)
Dept: NEUROLOGY | Facility: CLINIC | Age: 78
End: 2023-12-29
Attending: PSYCHIATRY & NEUROLOGY
Payer: COMMERCIAL

## 2023-12-29 DIAGNOSIS — R20.2 NUMBNESS AND TINGLING OF BOTH UPPER EXTREMITIES: ICD-10-CM

## 2023-12-29 DIAGNOSIS — R20.0 NUMBNESS AND TINGLING OF BOTH UPPER EXTREMITIES: ICD-10-CM

## 2023-12-29 PROCEDURE — 95911 NRV CNDJ TEST 9-10 STUDIES: CPT | Performed by: PSYCHIATRY & NEUROLOGY

## 2023-12-29 PROCEDURE — 95886 MUSC TEST DONE W/N TEST COMP: CPT | Mod: RT | Performed by: PSYCHIATRY & NEUROLOGY

## 2023-12-29 NOTE — LETTER
12/29/2023         RE: Kristen Thompson  [address] [place] [id number]        Dear Colleague,    Thank you for referring your patient, Kristen Thompson, to the [facility] [place]. Please see a copy of my visit note below.    See procedure note.       Again, thank you for allowing me to participate in the care of your patient.        Sincerely,        Luis An MD

## 2023-12-29 NOTE — PROCEDURES
[facility]    Formerly [facility]  [address], [address]  [place] [address]  Tel: [phone]  Fax: [phone]          Full Name: Kristen Thompson Gender: Female  Patient ID: [id number] YOB: 1945      Visit Date: 12/29/2023 07:34  Age: 78 Years 2 Months Old  Interpreted By: Luis An MD   Ref Dr.: Yasmin Quintanilla MD  Tech:    Height: 5 feet 1 inch  Reason for referral: Evaluate bilateral uppers. c/o numbness in both hands > 6 months. Left side weakness. h/o right CVA. surgery in both hands.      Motor NCS      Nerve / Sites Lat Amp Dist Jere    ms mV cm m/s   R Median - APB      Wrist 5.26 3.1 7       Elbow 9.74 3.0 25 56   L Median - APB      Wrist 3.49 6.1 7       Elbow 7.81 5.9 24 56   R Ulnar - ADM      Wrist 2.50 10.7 7       B.Elbow 5.57 10.5 22 72      A.Elbow 7.76 9.5 15 69       F  Wave      Nerve Fmin    ms   R Ulnar - ADM 27.81       Sensory NCS      Nerve / Sites Onset Lat Pk Lat Amp.2-3 Dist Jere    ms ms  V cm m/s   R Median - II (Antidr)      Wrist 3.18 3.96 24.6 13 41   L Median - II (Antidr)      Wrist 2.60 3.39 44.9 13 50   R Ulnar - V (Antidr)      Wrist 2.14 2.92 24.6 11 52   R Median, Ulnar - Transcarpal comparison      Median Palm 2.08 2.66 28.2 8 38      Ulnar Palm 1.35 1.72 25.2 8 59   L Median, Ulnar - Transcarpal comparison      Median Palm 1.61 2.14 33.0 8 50      Ulnar Palm 1.25 1.67 30.1 8 64       EMG Summary Table     Spontaneous MUAP Rcmt Note   Muscle Fib PSW Fasc IA # Amp Dur PPP Rate Type   R. Brachioradialis None None None N N N N N N N   R. Pronator teres None None None N N N N N N N   R. Biceps brachii None None None N N N N N N N   R. Deltoid None None None N N N N N N N   R. Triceps brachii None None None N N N N N N N   R. Flexor carpi ulnaris None None None N N N N N N N   R. First dorsal interosseous None None None N N N N N N N   R. Abductor pollicis brevis None None None N N N  N N N N   L. Brachioradialis None None None N N N N N N N   L. Pronator teres None None None N N N N N N N   L. Biceps brachii None None None N N N N N N N   L. Deltoid None None None N N N N N N N   L. Triceps brachii None None None N N N N N N N   L. Anconeus None None None N N N N N N N   L. First dorsal interosseous None None None N N N N N N N   L. Abductor pollicis brevis None None None N N N N N N N       SUMMARY  Nerve conduction and EMG study of bilateral upper extremities shows:    Prolonged right median nerve distal motor latency with decreased amplitude and normal conduction velocity.  Normal left median nerve distal motor latency with low normal amplitude and normal conduction velocity.  Normal right ulnar distal motor latency, amplitude and conduction velocity.  Abnormal right and normal left median and normal right ulnar sensory SNAPs.  Prolonged bilateral median-ulnar transcarpal peak latency comparison.  Monopolar needle exam is normal.      CLINICAL INTERPRETATION:  This is an abnormal nerve conduction and EMG study.  The study is suggestive of bilateral median neuropathy (R>L; suspected carpal tunnel syndrome). Further clinical correlation is needed.     Luis An MD  Neurologist  [facility]  Tel:- [phone]

## 2024-01-03 LAB
PATH REPORT.COMMENTS IMP SPEC: ABNORMAL
PATH REPORT.COMMENTS IMP SPEC: YES
PATH REPORT.FINAL DX SPEC: ABNORMAL
PATH REPORT.GROSS SPEC: ABNORMAL
PATH REPORT.MICROSCOPIC SPEC OTHER STN: ABNORMAL
PATH REPORT.RELEVANT HX SPEC: ABNORMAL

## 2024-01-04 ENCOUNTER — TELEPHONE (OUTPATIENT)
Dept: DERMATOLOGY | Facility: CLINIC | Age: 79
End: 2024-01-04
Payer: COMMERCIAL

## 2024-01-04 DIAGNOSIS — D03.9 MELANOMA IN SITU (H): Primary | ICD-10-CM

## 2024-01-04 NOTE — TELEPHONE ENCOUNTER
Scheduled for MOHS Surgery. Mohs Pre-op letter sent via US mail and My chart. Lashae Jenkins RN

## 2024-01-04 NOTE — TELEPHONE ENCOUNTER
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

## 2024-01-04 NOTE — LETTER
"January 4, 2024    Kristen RICH Jay  [address] [place] [id number]        Dear Kristen,     You are scheduled for Mohs Surgery on:     Tuesday January 16 th at 7:45 am.     Please check in at Dermatology Clinic [facility]".   (2nd Floor, last  Clinic on right up staircase or elevator -past OB/GYN clinic)    You don't need to arrive more than 5-10 minutes prior to your appointment time.     Be sure to eat a good breakfast and bathe and wash your hair prior to Surgery.    If you are taking any anti-coagulants that are prescribed by your Doctor (such as Coumadin/warfarin, Plavix, Aspirin, Ibuprofen), please continue taking them.     However, If you are taking anti-coagulants over the counter without  a Doctor's order for a Medical condition, please discontinue them 10 days prior to Surgery.      Please wear loose comfortable clothing as it could possibly be 4-6 hours until your surgery is completed depending upon how many layers of tissue need to be removed.     Wi-fi access is available.     Sincerely,     Kaushal Angela MD/ Lashae Jenkins RN          "

## 2024-01-06 DIAGNOSIS — G56.03 BILATERAL CARPAL TUNNEL SYNDROME: Primary | ICD-10-CM

## 2024-01-15 NOTE — PROGRESS NOTES
Surgical Office Location :   [facility]  [address] [place] [address]

## 2024-01-16 ENCOUNTER — OFFICE VISIT (OUTPATIENT)
Dept: DERMATOLOGY | Facility: CLINIC | Age: 79
End: 2024-01-16
Payer: COMMERCIAL

## 2024-01-16 DIAGNOSIS — D03.61 MELANOMA IN SITU OF RIGHT SHOULDER (H): Primary | ICD-10-CM

## 2024-01-16 PROCEDURE — 17313 MOHS 1 STAGE T/A/L: CPT | Performed by: DERMATOLOGY

## 2024-01-16 NOTE — NURSING NOTE
"Initial There were no vitals taken for this visit. Estimated body mass index is 29.66 kg/m  as calculated from the following:    Height as of 10/26/23: 1.549 m (5' 1\").    Weight as of 10/26/23: 71.2 kg (157 lb). .    "

## 2024-01-16 NOTE — LETTER
1/16/2024         RE: Kristen Thompson  [address] [facility] [address]        Dear Colleague,    Thank you for referring your patient, Kristen Thompson, to the [facility]. Please see a copy of my visit note below.    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  Patient has no other skin complaints today.  Remainder of the HPI, Meds, PMH, Allergies, FH, and SH was reviewed in chart.      Past Medical History:   Diagnosis Date     Arthritis 11/19/2013     Gastro-oesophageal reflux disease      GERD (gastroesophageal reflux disease) 10/02/2012     H/O total hysterectomy      HL (hearing loss) 10/11/2012     Malignant melanoma (H)      PONV (postoperative nausea and vomiting)      Squamous cell carcinoma        Past Surgical History:   Procedure Laterality Date     BIOPSY BREAST       BIOPSY BREAST Left 8/22/2022    Procedure: Seed Localized Left Breast Biopsy;  Surgeon: Jeremy Berg DO;  Location: [facility]     COLONOSCOPY  10/30/2012    Procedure: COLONOSCOPY;  Colonoscopy;  Surgeon: Denise Bah MD;  Location: [facility]     ESOPHAGOSCOPY, GASTROSCOPY, DUODENOSCOPY (EGD), COMBINED N/A 9/15/2016    Procedure: COMBINED ESOPHAGOSCOPY, GASTROSCOPY, DUODENOSCOPY (EGD);  Surgeon: Bennie Godinez MD;  Location: [facility]     GALLBLADDER SURGERY       HYSTERECTOMY       knee replacment  2007     RELEASE TRIGGER FINGER Right 4/29/2016    Procedure: RELEASE TRIGGER FINGER;  Surgeon: Percy Sarmiento MD;  Location: [facility]     REPAIR BLADDER       ZZC RAD RESEC TONSIL/PILLARS          Family History   Problem Relation Age of Onset     C.A.D. Mother      Cardiovascular Mother      Thyroid Disease Mother      Cancer Father         stomach ca     Cancer Sister       Eye Disorder Sister      C.A.D. Sister      Cerebrovascular Disease Sister      Breast Cancer Sister      Arthritis Sister      Cardiovascular Sister      Depression Sister      Heart Disease Sister      Neurologic Disorder Sister      Osteoporosis Sister      Thyroid Disease Sister      Depression Sister      Thyroid Disease Sister      Depression Sister      Thyroid Disease Sister      Obesity Sister      Neurologic Disorder Sister        Social History     Socioeconomic History     Marital status:      Spouse name: Not on file     Number of children: Not on file     Years of education: Not on file     Highest education level: Not on file   Occupational History     Not on file   Tobacco Use     Smoking status: Never     Smokeless tobacco: Never   Substance and Sexual Activity     Alcohol use: No     Alcohol/week: 0.0 standard drinks of alcohol     Drug use: No     Sexual activity: Not Currently     Partners: Male   Other Topics Concern     Parent/sibling w/ CABG, MI or angioplasty before 65F 55M? No   Social History Narrative    June 9, 2022    ENVIRONMENTAL HISTORY: [place] The home is heated with a forced air. They does have central air conditioning. The patient's bedroom is furnished with carpeting in bedroom and fabric window coverings.  Pets inside the house include 0. There is no history of cockroach or mice infestation. There is/are 0 smokers in the house.  The house does not have a damp basement.      Social Determinants of Health     Financial Resource Strain: Not on file   Food Insecurity: Not on file   Transportation Needs: Not on file   Physical Activity: Not on file   Stress: Not on file   Social Connections: Not on file   Interpersonal Safety: Not on file   Housing Stability: Not on file       Outpatient Encounter Medications as of 1/16/2024   Medication Sig Dispense Refill     acetaminophen (TYLENOL) 325 MG tablet Take 650 mg by mouth        amLODIPine (NORVASC) 5 MG tablet Take 1 tablet (5 mg) by mouth daily 90 tablet 3     aspirin 81 MG EC tablet Take 81 mg by mouth daily       atorvastatin (LIPITOR) 10 MG tablet TAKE 1 TABLET BY MOUTH EVERY DAY 90 tablet 3     azelastine (ASTELIN) 0.1 % nasal spray Spray 2 sprays into both nostrils 2 times daily as needed for rhinitis 30 mL 1     CALCIUM PO Take by mouth daily        Cholecalciferol (VITAMIN D PO) Take 1,000 Units by mouth daily        docusate sodium (COLACE) 100 MG capsule Take 1 capsule (100 mg) by mouth 2 times daily Take while on opiate to prevent constipation  0     DULoxetine (CYMBALTA) 20 MG capsule Take 1 capsule (20 mg) by mouth daily 90 capsule 3     DULoxetine (CYMBALTA) 60 MG capsule Take 1 capsule (60 mg) by mouth daily 90 capsule 3     Fluticasone Propionate (FLONASE ALLERGY RELIEF NA) Spray in nostril as needed       MAGNESIUM PO Take 250 mg by mouth daily        modafinil (PROVIGIL) 100 MG tablet Take 1 tablet (100 mg) by mouth daily 30 tablet 5     Omega-3 Fatty Acids (OMEGA 3 PO) Take 2 capsules by mouth daily        omeprazole (PRILOSEC) 20 MG DR capsule TAKE 1 TABLET (20 MG) BY MOUTH 2 TIMES DAILY TAKE 30-60 MINUTES BEFORE A MEAL. 180 capsule 2     Polyethyl Glycol-Propyl Glycol (SYSTANE FREE OP) Apply 2 drops to eye as needed (dry eyes)       Polyethylene Glycol 3350 (MIRALAX PO)        traZODone (DESYREL) 50 MG tablet TAKE 1 TABLET (50 MG) BY MOUTH NIGHTLY AS NEEDED 90 tablet 2     Facility-Administered Encounter Medications as of 1/16/2024   Medication Dose Route Frequency Provider Last Rate Last Admin     lidocaine 1 % injection 2 mL  2 mL   LornaenholParveen tomlinson, DO   2 mL at 03/21/23 1428     lidocaine 1 % injection 2 mL  2 mL   AhrenholParveen tomlinson, DO   2 mL at 03/21/23 1428     lidocaine 1 % injection 2 mL  2 mL   AhrenholzParveen, DO   2 mL at 11/11/22 1509     lidocaine 2%-EPINEPHrine 1:100,000 injection 20 mL  20 mL Intradermal Once Cortney Hung MD          triamcinolone (KENALOG-40) injection 40 mg  40 mg   LornaenholParveen tomlinson, DO   40 mg at 03/21/23 1428     triamcinolone (KENALOG-40) injection 40 mg  40 mg   AhrenholzParveen, DO   40 mg at 03/21/23 1428     triamcinolone (KENALOG-40) injection 40 mg  40 mg   LornaenholParveen tomlinson, DO   40 mg at 11/11/22 1509             O:   NAD, WDWN, Alert & Oriented, Mood & Affect wnl, Vitals stable   General appearance normal   Vitals stable   Alert, oriented and in no acute distress      Following lymph nodes palpated: axilla no lad  R post shoulder red plaque 8mm       Eyes: Conjunctivae/lids:Normal     ENT: Lips, buccal mucosa, tongue: normal    MSK:Normal    Cardiovascular: peripheral edema none    Pulm: Breathing Normal    Neuro/Psych: Orientation:Alert and Orientedx3 ; Mood/Affect:normal       A/P:  R post shoulder melanoma in situ   MELANOMA DISCUSSED WITH PATIENT:  I discussed the specifics of tumor, prognosis, metachronous melanoma, self exam, and genetics with the patient. I explained the need for monthly skin exams including and taught the patient how to do this. Patient was asked about new or changing moles . I discussed with patient signs and symptoms that could arise in the setting of recurrent locoregional or metastatic disease. In addition, the need to undergo every 4 month dermatologic full skin survey and evaluation given that patients with a diagnosis of melanoma are at risk of recurrence (local and distant) and of subsequent de radha melanoma.  . I reviewed treatment options, including a discussion of wide excision (the gold standard) versus Mohs surgery with MART-1 immunostains.     Note: MART-1 (Melanoma Antigen Recognized by T-cells) antibody immunostaining was used during Mohs surgery as per standard protocol, in addition to routine processing of all specimens with hematoxylin and eosin. The peripheral margins/edges were processed with the MART-1 stain (1 specimens total). The center was examined  with hematoxylin & eosin and MART-1 immunostains. The patient was informed of the procedure and its risk/benefits during the consent for the procedure.    One or more of the reagents used in immunohistochemical testing in this case may not have been cleared or approved by the U.S. Food and Drug Administration (FDA). The FDA has determined that such clearance or approval is not necessary. These tests are used for clinical purposes. They should not be regarded as investigational or for research. These reagents  performance characteristics have been determined by [facility]. This laboratory is certified under the Clinical Laboratory Improvement Amendments of 1988 (CLIA-88) as qualified to perform high complexity clinical laboratory testing.      MOHS:   Aggressive histology    The rationale for Mohs surgery was discussed with the patient and consent was obtained.  The risks and benefits as well as alternatives to therapy were discussed, in detail.  Specifically, the risks of infection, scarring, bleeding, prolonged wound healing, incomplete removal, allergy to anesthesia, nerve injury and recurrence were addressed.  Indication for Mohs was Aggressive histology. Prior to the procedure, the treatment site was clearly identified and, if available, confirmed with previous photos and confirmed by the patient   All components of the Universal Protocol/PAUSE rule were completed.  The Mohs surgeon operated in two distinct and integrated capacities as the surgeon and pathologist.      The area was prepped with Betasept.  A rim of normal appearing skin was marked circumferentially around the lesion.  The area was infiltrated with local anesthesia.  The tumor was first debulked to remove all clinically apparent tumor.  An incision following the standard Mohs approach was done and the specimen was oriented,mapped and placed in 1 block(s).  Each specimen was then chromacoded and processed in the Mohs laboratory  using standard Mohs technique and submitted for frozen section histology.  Frozen section analysis showed no residual tumor but CLEAR MARGINS.      The tumor was excised using standard Mohs technique in 1 stages(s).  MART 1 stains were performed on 1 specimens. CLEAR MARGINS OBTAINED and Final defect size was 1.8 cm.     We discussed the options for wound management in full with the patient including risks/benefits/ possible outcomes.      REPAIR SECOND INTENT: We discussed the options for wound management in full with the patient including risks/benefits/possible outcomes. Decision made to allow the wound to heal by second intention. Cautery was used for for hemostasis. EBL minimal; complications none; wound care routine.  The patient was discharged in good condition and will return in one month or prn for wound evaluation.  It was a pleasure speaking to Kristen Thompson today.  Previous clinic notes and pertinent laboratory tests were reviewed prior to Kristen Thompson's visit.  Signs and Symptoms of skin cancer discussed with patient.  Patient encouraged to perform monthly skin exams.  UV precautions reviewed with patient.  Risks of non-melanoma skin cancer discussed with patient   Return to clinic 6 months        Again, thank you for allowing me to participate in the care of your patient.        Sincerely,        Kaushal Angela MD

## 2024-01-16 NOTE — PROGRESS NOTES
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  Patient has no other skin complaints today.  Remainder of the HPI, Meds, PMH, Allergies, FH, and SH was reviewed in chart.      Past Medical History:   Diagnosis Date    Arthritis 11/19/2013    Gastro-oesophageal reflux disease     GERD (gastroesophageal reflux disease) 10/02/2012    H/O total hysterectomy     HL (hearing loss) 10/11/2012    Malignant melanoma (H)     PONV (postoperative nausea and vomiting)     Squamous cell carcinoma        Past Surgical History:   Procedure Laterality Date    BIOPSY BREAST      BIOPSY BREAST Left 8/22/2022    Procedure: Seed Localized Left Breast Biopsy;  Surgeon: Jeremy Berg DO;  Location: [facility]    COLONOSCOPY  10/30/2012    Procedure: COLONOSCOPY;  Colonoscopy;  Surgeon: Denise Bah MD;  Location: [facility]    ESOPHAGOSCOPY, GASTROSCOPY, DUODENOSCOPY (EGD), COMBINED N/A 9/15/2016    Procedure: COMBINED ESOPHAGOSCOPY, GASTROSCOPY, DUODENOSCOPY (EGD);  Surgeon: Bennie Godinez MD;  Location: [facility]    GALLBLADDER SURGERY      HYSTERECTOMY      knee replacment  2007    RELEASE TRIGGER FINGER Right 4/29/2016    Procedure: RELEASE TRIGGER FINGER;  Surgeon: Percy Sarmiento MD;  Location: [facility]    REPAIR BLADDER      ZZC RAD RESEC TONSIL/PILLARS          Family History   Problem Relation Age of Onset    C.A.D. Mother     Cardiovascular Mother     Thyroid Disease Mother     Cancer Father         stomach ca    Cancer Sister     Eye Disorder Sister     C.A.D. Sister     Cerebrovascular Disease Sister     Breast Cancer Sister     Arthritis Sister     Cardiovascular Sister     Depression Sister     Heart Disease Sister     Neurologic Disorder Sister     Osteoporosis Sister     Thyroid Disease Sister     Depression Sister     Thyroid Disease Sister     Depression Sister     Thyroid Disease Sister      Obesity Sister     Neurologic Disorder Sister        Social History     Socioeconomic History    Marital status:      Spouse name: Not on file    Number of children: Not on file    Years of education: Not on file    Highest education level: Not on file   Occupational History    Not on file   Tobacco Use    Smoking status: Never    Smokeless tobacco: Never   Substance and Sexual Activity    Alcohol use: No     Alcohol/week: 0.0 standard drinks of alcohol    Drug use: No    Sexual activity: Not Currently     Partners: Male   Other Topics Concern    Parent/sibling w/ CABG, MI or angioplasty before 65F 55M? No   Social History Narrative    June 9, 2022    ENVIRONMENTAL HISTORY: [place] The home is heated with a forced air. They does have central air conditioning. The patient's bedroom is furnished with carpeting in bedroom and fabric window coverings.  Pets inside the house include 0. There is no history of cockroach or mice infestation. There is/are 0 smokers in the house.  The house does not have a damp basement.      Social Determinants of Health     Financial Resource Strain: Not on file   Food Insecurity: Not on file   Transportation Needs: Not on file   Physical Activity: Not on file   Stress: Not on file   Social Connections: Not on file   Interpersonal Safety: Not on file   Housing Stability: Not on file       Outpatient Encounter Medications as of 1/16/2024   Medication Sig Dispense Refill    acetaminophen (TYLENOL) 325 MG tablet Take 650 mg by mouth      amLODIPine (NORVASC) 5 MG tablet Take 1 tablet (5 mg) by mouth daily 90 tablet 3    aspirin 81 MG EC tablet Take 81 mg by mouth daily      atorvastatin (LIPITOR) 10 MG tablet TAKE 1 TABLET BY MOUTH EVERY DAY 90 tablet 3    azelastine (ASTELIN) 0.1 % nasal spray Spray 2 sprays into both nostrils 2 times daily as needed for rhinitis 30 mL 1    CALCIUM PO Take by mouth daily       Cholecalciferol (VITAMIN D  PO) Take 1,000 Units by mouth daily       docusate sodium (COLACE) 100 MG capsule Take 1 capsule (100 mg) by mouth 2 times daily Take while on opiate to prevent constipation  0    DULoxetine (CYMBALTA) 20 MG capsule Take 1 capsule (20 mg) by mouth daily 90 capsule 3    DULoxetine (CYMBALTA) 60 MG capsule Take 1 capsule (60 mg) by mouth daily 90 capsule 3    Fluticasone Propionate (FLONASE ALLERGY RELIEF NA) Spray in nostril as needed      MAGNESIUM PO Take 250 mg by mouth daily       modafinil (PROVIGIL) 100 MG tablet Take 1 tablet (100 mg) by mouth daily 30 tablet 5    Omega-3 Fatty Acids (OMEGA 3 PO) Take 2 capsules by mouth daily       omeprazole (PRILOSEC) 20 MG DR capsule TAKE 1 TABLET (20 MG) BY MOUTH 2 TIMES DAILY TAKE 30-60 MINUTES BEFORE A MEAL. 180 capsule 2    Polyethyl Glycol-Propyl Glycol (SYSTANE FREE OP) Apply 2 drops to eye as needed (dry eyes)      Polyethylene Glycol 3350 (MIRALAX PO)       traZODone (DESYREL) 50 MG tablet TAKE 1 TABLET (50 MG) BY MOUTH NIGHTLY AS NEEDED 90 tablet 2     Facility-Administered Encounter Medications as of 1/16/2024   Medication Dose Route Frequency Provider Last Rate Last Admin    lidocaine 1 % injection 2 mL  2 mL   LornaenholParveen tomlinson, DO   2 mL at 03/21/23 1428    lidocaine 1 % injection 2 mL  2 mL   LornaenholParveen tomlinson, DO   2 mL at 03/21/23 1428    lidocaine 1 % injection 2 mL  2 mL   LornaenholParveen tomlinson, DO   2 mL at 11/11/22 1509    lidocaine 2%-EPINEPHrine 1:100,000 injection 20 mL  20 mL Intradermal Once Cortney Hung MD        triamcinolone (KENALOG-40) injection 40 mg  40 mg   LornaenholParveen tomlinson, DO   40 mg at 03/21/23 1428    triamcinolone (KENALOG-40) injection 40 mg  40 mg   LornaenholParveen tomlinson, DO   40 mg at 03/21/23 1428    triamcinolone (KENALOG-40) injection 40 mg  40 mg   LornaenholParveen tomlinson, DO   40 mg at 11/11/22 1509             O:   NAD, WDWN, Alert & Oriented, Mood & Affect wnl, Vitals stable   General appearance  normal   Vitals stable   Alert, oriented and in no acute distress      Following lymph nodes palpated: axilla no lad  R post shoulder red plaque 8mm       Eyes: Conjunctivae/lids:Normal     ENT: Lips, buccal mucosa, tongue: normal    MSK:Normal    Cardiovascular: peripheral edema none    Pulm: Breathing Normal    Neuro/Psych: Orientation:Alert and Orientedx3 ; Mood/Affect:normal       A/P:  R post shoulder melanoma in situ   MELANOMA DISCUSSED WITH PATIENT:  I discussed the specifics of tumor, prognosis, metachronous melanoma, self exam, and genetics with the patient. I explained the need for monthly skin exams including and taught the patient how to do this. Patient was asked about new or changing moles . I discussed with patient signs and symptoms that could arise in the setting of recurrent locoregional or metastatic disease. In addition, the need to undergo every 4 month dermatologic full skin survey and evaluation given that patients with a diagnosis of melanoma are at risk of recurrence (local and distant) and of subsequent de radha melanoma.  . I reviewed treatment options, including a discussion of wide excision (the gold standard) versus Mohs surgery with MART-1 immunostains.     Note: MART-1 (Melanoma Antigen Recognized by T-cells) antibody immunostaining was used during Mohs surgery as per standard protocol, in addition to routine processing of all specimens with hematoxylin and eosin. The peripheral margins/edges were processed with the MART-1 stain (1 specimens total). The center was examined with hematoxylin & eosin and MART-1 immunostains. The patient was informed of the procedure and its risk/benefits during the consent for the procedure.    One or more of the reagents used in immunohistochemical testing in this case may not have been cleared or approved by the U.S. Food and Drug Administration (FDA). The FDA has determined that such clearance or approval is not necessary. These tests are used for  clinical purposes. They should not be regarded as investigational or for research. These reagents  performance characteristics have been determined by [facility]. This laboratory is certified under the Clinical Laboratory Improvement Amendments of 1988 (CLIA-88) as qualified to perform high complexity clinical laboratory testing.      MOHS:   Aggressive histology    The rationale for Mohs surgery was discussed with the patient and consent was obtained.  The risks and benefits as well as alternatives to therapy were discussed, in detail.  Specifically, the risks of infection, scarring, bleeding, prolonged wound healing, incomplete removal, allergy to anesthesia, nerve injury and recurrence were addressed.  Indication for Mohs was Aggressive histology. Prior to the procedure, the treatment site was clearly identified and, if available, confirmed with previous photos and confirmed by the patient   All components of the Universal Protocol/PAUSE rule were completed.  The Mohs surgeon operated in two distinct and integrated capacities as the surgeon and pathologist.      The area was prepped with Betasept.  A rim of normal appearing skin was marked circumferentially around the lesion.  The area was infiltrated with local anesthesia.  The tumor was first debulked to remove all clinically apparent tumor.  An incision following the standard Mohs approach was done and the specimen was oriented,mapped and placed in 1 block(s).  Each specimen was then chromacoded and processed in the Mohs laboratory using standard Mohs technique and submitted for frozen section histology.  Frozen section analysis showed no residual tumor but CLEAR MARGINS.      The tumor was excised using standard Mohs technique in 1 stages(s).  MART 1 stains were performed on 1 specimens. CLEAR MARGINS OBTAINED and Final defect size was 1.8 cm.     We discussed the options for wound management in full with the patient including risks/benefits/  possible outcomes.      REPAIR SECOND INTENT: We discussed the options for wound management in full with the patient including risks/benefits/possible outcomes. Decision made to allow the wound to heal by second intention. Cautery was used for for hemostasis. EBL minimal; complications none; wound care routine.  The patient was discharged in good condition and will return in one month or prn for wound evaluation.  It was a pleasure speaking to Kristen Thompson today.  Previous clinic notes and pertinent laboratory tests were reviewed prior to Kristen Thompsno's visit.  Signs and Symptoms of skin cancer discussed with patient.  Patient encouraged to perform monthly skin exams.  UV precautions reviewed with patient.  Risks of non-melanoma skin cancer discussed with patient   Return to clinic 6 months

## 2024-01-16 NOTE — PATIENT INSTRUCTIONS
Open Wound Care     for __Right Posterior Shoulder____________        No strenuous activity for 48 hours    Take Tylenol as needed for discomfort.                                                .         Do not drink alcoholic beverages for 48 hours.    Keep the pressure bandage in place for 24 hours. If the bandage becomes blood tinged or loose, reinforce it with gauze and tape.        (Refer to the reverse side of this page for management of bleeding).    Remove bandage in 24 hours and begin wound care as follows:     Clean area with tap water using a Q tip or gauze pad, (shower / bathe normally)  Dry wound with Q tip or gauze pad  Apply Aquaphor, Vaseline, Polysporin or Bacitracin Ointment with a Q tip  Do NOT use Neosporin Ointment *  Cover the wound with a band-aid or nonstick gauze pad and paper tape.  Repeat wound care once a day until wound is completely healed.    It is an old wives tale that a wound heals better when it is exposed to air and allowed to dry out. The wound will heal faster with a better cosmetic result if it is kept moist with ointment and covered with a bandage.  Do not let the wound dry out.      Supplies Needed:                Qtips or gauze pads                Polysporin or Bacitracin Ointment                Bandaids or nonstick gauze pads and paper tape    Wound care kits and brown paper tape are available for purchase at   the pharmacy.    BLEEDING:    Use tightly rolled up gauze or cloth to apply direct pressure over the bandage for 20   minutes.  Reapply pressure for an additional 20 minutes if necessary  Call the office or go to the nearest emergency room if pressure fails to stop the bleeding.  Use additional gauze and tape to maintain pressure once the bleeding has stopped.  Begin wound care 24 hours after surgery as directed.                  WOUND HEALING    One week after surgery a pink / red halo will form around the outside of the wound.   This is new skin.  The center of the  wound will appear yellowish white and produce some drainage.  The pink halo will slowly migrate in toward the center of the wound until the wound is covered with new shiny pink skin.  There will be no more drainage when the wound is completely healed.  It will take six months to one year for the redness to fade.  The scar may be itchy, tight and sensitive to extreme temperatures for a year after the surgery.  Massaging the area several times a day for several minutes after the wound is completely healed will help the scar soften and normalize faster. Begin massage only after healing is complete.      In case of emergency call: Dr Angela: [phone]    [facility]: [phone]    [facility]:[phone]

## 2024-01-19 ENCOUNTER — TRANSFERRED RECORDS (OUTPATIENT)
Dept: HEALTH INFORMATION MANAGEMENT | Facility: CLINIC | Age: 79
End: 2024-01-19
Payer: COMMERCIAL

## 2024-01-19 DIAGNOSIS — E78.5 HYPERLIPIDEMIA, UNSPECIFIED HYPERLIPIDEMIA TYPE: ICD-10-CM

## 2024-01-19 DIAGNOSIS — I63.9 CEREBROVASCULAR ACCIDENT (CVA), UNSPECIFIED MECHANISM (H): ICD-10-CM

## 2024-01-19 DIAGNOSIS — G47.00 INSOMNIA, UNSPECIFIED TYPE: ICD-10-CM

## 2024-01-19 DIAGNOSIS — K21.9 GASTROESOPHAGEAL REFLUX DISEASE WITHOUT ESOPHAGITIS: ICD-10-CM

## 2024-01-19 RX ORDER — ATORVASTATIN CALCIUM 10 MG/1
TABLET, FILM COATED ORAL
Qty: 90 TABLET | Refills: 3 | Status: SHIPPED | OUTPATIENT
Start: 2024-01-19

## 2024-01-19 RX ORDER — TRAZODONE HYDROCHLORIDE 50 MG/1
50 TABLET, FILM COATED ORAL
Qty: 90 TABLET | Refills: 2 | Status: SHIPPED | OUTPATIENT
Start: 2024-01-19 | End: 2024-10-02

## 2024-01-19 NOTE — TELEPHONE ENCOUNTER
Routing refill request to provider for review/approval because:  Failed protocol  Medication interaction            Requested Prescriptions   Pending Prescriptions Disp Refills    traZODone (DESYREL) 50 MG tablet [Pharmacy Med Name: TRAZODONE 50 MG TABLET] 90 tablet 2     Sig: TAKE 1 TABLET (50 MG) BY MOUTH NIGHTLY AS NEEDED       Serotonin Modulators Passed - 1/19/2024  8:08 AM        Passed - Recent (12 mo) or future (30 days) visit within the authorizing provider's specialty     The patient must have completed an in-person or virtual visit within the past 12 months or has a future visit scheduled within the next 90 days with the authorizing provider s specialty.  Urgent care and e-visits do not quality as an office visit for this protocol.          Passed - Medication is active on med list        Passed - Patient is age 18 or older        Passed - No active pregnancy on record        Passed - No positive pregnancy test in past 12 months          atorvastatin (LIPITOR) 10 MG tablet [Pharmacy Med Name: ATORVASTATIN 10 MG TABLET] 90 tablet 3     Sig: TAKE 1 TABLET BY MOUTH EVERY DAY       Antihyperlipidemic agents Failed - 1/19/2024  8:08 AM        Failed - Normal serum ALT on record in past 12 mos     Recent Labs   Lab Test 06/29/20  1048   ALT 31             Passed - Lipid panel on file in past 12 mos     Recent Labs   Lab Test 11/03/23  1027   CHOL 188   TRIG 52   HDL 86   LDL 92   NHDL 102               Passed - Recent (12 mo) or future (30 days) visit within the authorizing provider's specialty     The patient must have completed an in-person or virtual visit within the past 12 months or has a future visit scheduled within the next 90 days with the authorizing provider s specialty.  Urgent care and e-visits do not quality as an office visit for this protocol.          Passed - Medication is active on med list        Passed - Patient is age 18 years or older        Passed - No active pregnancy on record         Passed - No positive pregnancy test in past 12 mos          omeprazole (PRILOSEC) 20 MG DR capsule [Pharmacy Med Name: OMEPRAZOLE DR 20 MG CAPSULE] 180 capsule 2     Sig: TAKE 1 CAPSULE (20 MG) BY MOUTH 2 TIMES DAILY 30-60 MINUTES BEFORE A MEAL.       PPI Protocol Passed - 1/19/2024  8:08 AM        Passed - Medication is active on med list        Passed - Medication indicated for associated diagnosis     The medication is prescribed for one or more of the following conditions:     Erosive esophagitis    Gastric hypersecretion   Gastric ulcer   Gastroesophageal reflux disease   Helicobacter pylori gastrointestinal tract infection   Ulcer of duodenum   Drug-induced peptic ulcer   Esophageal stricture   Gastrointestinal hemorrhage   Indigestion   Stress ulcer   Zollinger-Martino syndrome   Subramanian s esophagus   Laryngopharyngeal reflux          Passed - Recent (12 mo) or future (90 days) visit within the authorizing provider's specialty     The patient must have completed an in-person or virtual visit within the past 12 months or has a future visit scheduled within the next 90 days with the authorizing provider s specialty.  Urgent care and e-visits do not quality as an office visit for this protocol.          Passed - Patient is age 18 or older        Passed - No active pregnacy on record        Passed - No positive pregnancy test in past 12 months                 Noel Downs RN 01/19/24 11:08 AM

## 2024-02-02 ENCOUNTER — TRANSFERRED RECORDS (OUTPATIENT)
Dept: HEALTH INFORMATION MANAGEMENT | Facility: CLINIC | Age: 79
End: 2024-02-02
Payer: COMMERCIAL

## 2024-02-27 DIAGNOSIS — J30.0 VASOMOTOR RHINITIS: ICD-10-CM

## 2024-02-27 DIAGNOSIS — R09.82 POST-NASAL DRAINAGE: ICD-10-CM

## 2024-02-27 RX ORDER — AZELASTINE 1 MG/ML
2 SPRAY, METERED NASAL 2 TIMES DAILY PRN
Qty: 30 ML | Refills: 0 | Status: SHIPPED | OUTPATIENT
Start: 2024-02-27

## 2024-02-27 NOTE — TELEPHONE ENCOUNTER
Requested Prescriptions   Pending Prescriptions Disp Refills    azelastine (ASTELIN) 0.1 % nasal spray 30 mL 1     Sig: Spray 2 sprays into both nostrils 2 times daily as needed for rhinitis       There is no refill protocol information for this order          Last office visit: 6/9/2022 ; last virtual visit: Visit date not found with prescribing provider:  Chase Diaz   Future Office Visit:   Next 5 appointments (look out 90 days)      Apr 02, 2024  9:40 AM  (Arrive by 9:25 AM)  Return Visit with Yasmin Quintanilla MD  [facility] [place] ([facility] ) [address] [place] [id number] [phone]           Thank you,  Patricia PATEL [facility]  [facility]

## 2024-02-27 NOTE — TELEPHONE ENCOUNTER
Routing refill request to provider for review/approval because:  Patient needs to be seen because it has been more than 1 year since last office visit.    Last visit 6/9/22. No future visits at this time.    Rola DUKES RN  Specialty/Allergy Clinics

## 2024-04-02 ENCOUNTER — OFFICE VISIT (OUTPATIENT)
Dept: NEUROLOGY | Facility: CLINIC | Age: 79
End: 2024-04-02
Payer: COMMERCIAL

## 2024-04-02 VITALS
HEART RATE: 63 BPM | WEIGHT: 166 LBS | SYSTOLIC BLOOD PRESSURE: 124 MMHG | BODY MASS INDEX: 31.37 KG/M2 | DIASTOLIC BLOOD PRESSURE: 82 MMHG

## 2024-04-02 DIAGNOSIS — R20.0 NUMBNESS AND TINGLING OF BOTH UPPER EXTREMITIES: ICD-10-CM

## 2024-04-02 DIAGNOSIS — R20.2 NUMBNESS AND TINGLING OF BOTH UPPER EXTREMITIES: ICD-10-CM

## 2024-04-02 DIAGNOSIS — Z79.899 ENCOUNTER FOR LONG-TERM (CURRENT) USE OF MEDICATIONS: ICD-10-CM

## 2024-04-02 DIAGNOSIS — G56.03 BILATERAL CARPAL TUNNEL SYNDROME: Primary | ICD-10-CM

## 2024-04-02 DIAGNOSIS — Z86.73 HISTORY OF STROKE: ICD-10-CM

## 2024-04-02 DIAGNOSIS — M79.2 NEUROPATHIC PAIN: ICD-10-CM

## 2024-04-02 DIAGNOSIS — R53.82 CHRONIC FATIGUE: ICD-10-CM

## 2024-04-02 PROCEDURE — 99213 OFFICE O/P EST LOW 20 MIN: CPT | Performed by: PSYCHIATRY & NEUROLOGY

## 2024-04-02 NOTE — PATIENT INSTRUCTIONS
Plan:  Let's get you in to see the hand specialist/Orthopedics.  Continue wearing the wrist splints at night.  Continue the chiropractic and acupuncture treatments.  Continue Cymbalta and Modafinil.  Return to Neurology clinic in 6 months.

## 2024-04-02 NOTE — LETTER
4/2/2024         RE: Kristen Thompson  [address] [place] [id number]        Dear Colleague,    Thank you for referring your patient, Kristen Thompson, to the [facility] [place]. Please see a copy of my visit note below.    ESTABLISHED PATIENT NEUROLOGY NOTE    DATE OF VISIT: 4/2/2024  MRN: [id number]  PATIENT NAME: Kristen Thompson  YOB: 1945    Chief Complaint   Patient presents with     Follow Up     Pt has been using her wrist braces at night and doesn't see any relief from it.  Left Ear; decreased of hearing.      SUBJECTIVE:                                                      HISTORY OF PRESENT ILLNESS:  Kristen is here for follow up regarding residual stroke symptoms and carpal tunnel syndrome.  She had nerve testing completed in December 2023 and this showed R>L median neuropathies.  I recommended a wrist splint.  She takes modafinil for fatigue.  She also takes Cymbalta through her primary provider.  We talked about adding amitriptyline in the future if needed.    She has had some sensations of something in her throat and a fluttering noise in her Left ear.  She has seen ENT.  She has tried steroids for this.  She has been told it is related to hearing loss, or worsening per audiology.  Hearing aids help some of the time.  Kristen tells me she is still having a lot of pain in her left wrist.  She is using her wrist splints at night but has not noticed improvement.  The paresthesias are worse in the right hand compared to the left but this is her dominant hand.  She has seen orthopedics in the past for her shoulders and knees.  She notices more droopiness and sensory changes in the face and left side when she is tired.    She continues to have left eye pain and tells me she has seen for eye doctors for this and they cannot find anything wrong.  She does use an eye gel and eyedrops for this.    The Cymbalta helps quite a bit with her emotional  lability.  She is not sure if it helps from a pain standpoint.    CURRENT MEDICATIONS:   acetaminophen (TYLENOL) 325 MG tablet, Take 650 mg by mouth  amLODIPine (NORVASC) 5 MG tablet, Take 1 tablet (5 mg) by mouth daily  aspirin 81 MG EC tablet, Take 81 mg by mouth daily  atorvastatin (LIPITOR) 10 MG tablet, TAKE 1 TABLET BY MOUTH EVERY DAY  azelastine (ASTELIN) 0.1 % nasal spray, Spray 2 sprays into both nostrils 2 times daily as needed for rhinitis  CALCIUM PO, Take by mouth daily   Cholecalciferol (VITAMIN D PO), Take 1,000 Units by mouth daily   docusate sodium (COLACE) 100 MG capsule, Take 1 capsule (100 mg) by mouth 2 times daily Take while on opiate to prevent constipation  DULoxetine (CYMBALTA) 20 MG capsule, Take 1 capsule (20 mg) by mouth daily  DULoxetine (CYMBALTA) 60 MG capsule, Take 1 capsule (60 mg) by mouth daily  Fluticasone Propionate (FLONASE ALLERGY RELIEF NA), Spray in nostril as needed  MAGNESIUM PO, Take 250 mg by mouth daily   modafinil (PROVIGIL) 100 MG tablet, Take 1 tablet (100 mg) by mouth daily  Omega-3 Fatty Acids (OMEGA 3 PO), Take 2 capsules by mouth daily   omeprazole (PRILOSEC) 20 MG DR capsule, TAKE 1 CAPSULE (20 MG) BY MOUTH 2 TIMES DAILY 30-60 MINUTES BEFORE A MEAL.  Polyethyl Glycol-Propyl Glycol (SYSTANE FREE OP), Apply 2 drops to eye as needed (dry eyes)  Polyethylene Glycol 3350 (MIRALAX PO),   traZODone (DESYREL) 50 MG tablet, TAKE 1 TABLET (50 MG) BY MOUTH NIGHTLY AS NEEDED    lidocaine 1 % injection 2 mL  lidocaine 1 % injection 2 mL  lidocaine 1 % injection 2 mL  lidocaine 2%-EPINEPHrine 1:100,000 injection 20 mL  triamcinolone (KENALOG-40) injection 40 mg  triamcinolone (KENALOG-40) injection 40 mg  triamcinolone (KENALOG-40) injection 40 mg        RECENT DIAGNOSTIC STUDIES:   Labs: No results found for any visits on 04/02/24.    Electromyogram (12.29.23):  SUMMARY  Nerve conduction and EMG study of bilateral upper extremities shows:     Prolonged right median nerve  distal motor latency with decreased amplitude and normal conduction velocity.  Normal left median nerve distal motor latency with low normal amplitude and normal conduction velocity.  Normal right ulnar distal motor latency, amplitude and conduction velocity.  Abnormal right and normal left median and normal right ulnar sensory SNAPs.  Prolonged bilateral median-ulnar transcarpal peak latency comparison.  Monopolar needle exam is normal.        CLINICAL INTERPRETATION:  This is an abnormal nerve conduction and EMG study.  The study is suggestive of bilateral median neuropathy (R>L; suspected carpal tunnel syndrome). Further clinical correlation is needed.     REVIEW OF SYSTEMS:                                                      10-point review of systems is negative except as mentioned above in HPI.    EXAM:                                                      Physical Exam:   Vitals: /82   Pulse 63   Wt 75.3 kg (166 lb)   BMI 31.37 kg/m    BMI= Body mass index is 31.37 kg/m .  GENERAL: NAD.  HEENT: NC/AT.  CV: RRR. S1, S2.   NECK: No bruits.  PULM: Non-labored breathing.   Neurologic:  MENTAL STATUS: Alert, attentive. Speech impediment. Normal comprehension. Normal concentration. Adequate fund of knowledge.   CRANIAL NERVES: Discs technically difficult to visualize. Visual fields intact to confrontation. Pupils equally, round and reactive to light. Facial sensation and movement normal. EOM full. Hard of hearing. Trapezius strength intact. Tongue midline.  MOTOR: Strength is 5/5 in proximal and distal muscle groups of upper and lower extremities. Tone and bulk normal.   DTRs: Intact and symmetric in biceps, BR, patellae.  Cannot elicit ankle jerks.  Babinski down-going bilaterally.   SENSATION: Hyperesthesia with pinprick in the fingertips on the left.  COORDINATION: Normal finger nose finger on the right, dysmetria on the left.  Knee heel shin normal.   STATION AND GAIT: Slight sway with Romberg. Casual  gait - slight limp.  Right hand-dominant.    ASSESSMENT and PLAN:                                                      Assessment:    ICD-10-CM    1. Bilateral carpal tunnel syndrome  G56.03 Orthopedic  Referral      2. Numbness and tingling of both upper extremities  R20.0     R20.2       3. History of stroke  Z86.73       4. Chronic fatigue  R53.82       5. Neuropathic pain  M79.2       6. Encounter for long-term (current) use of medications  Z79.899           Ms. Thompson is a pleasant stroke and carpal tunnel syndrome.  We focused mainly on the carpal tunnel syndrome today.  She is not really noticing improvement with the wrist splints so I will send her to a hand specialist to consider additional treatment options.  Stroke symptoms are worse when she is tired which is to be expected.  I recommend she continue Cymbalta and modafinil.  We have talked about adding amitriptyline but the Cymbalta is really helping her from a mood standpoint so I do not make any changes at this time.  We will plan to follow-up again in 6 months.  Kristen expressed understanding and agreement with the plan.    Plan:  Let's get you in to see the hand specialist/Orthopedics.  Continue wearing the wrist splints at night.  Continue the chiropractic and acupuncture treatments.  Continue Cymbalta and Modafinil.  Return to Neurology clinic in 6 months.     Total Time: 22 minutes were spent with the patient and in chart review/documentation (as described above in Assessment and Plan)/coordinating the care on date of service.    Yasmin Quintanilla MD  Neurology    Dragon software used in the dictation of this note.                    Again, thank you for allowing me to participate in the care of your patient.        Sincerely,        Yasmin Quintanilla MD

## 2024-04-02 NOTE — PROGRESS NOTES
ESTABLISHED PATIENT NEUROLOGY NOTE    DATE OF VISIT: 4/2/2024  MRN: [id number]  PATIENT NAME: Kristen Thompson  YOB: 1945    Chief Complaint   Patient presents with    Follow Up     Pt has been using her wrist braces at night and doesn't see any relief from it.  Left Ear; decreased of hearing.      SUBJECTIVE:                                                      HISTORY OF PRESENT ILLNESS:  Kristen is here for follow up regarding residual stroke symptoms and carpal tunnel syndrome.  She had nerve testing completed in December 2023 and this showed R>L median neuropathies.  I recommended a wrist splint.  She takes modafinil for fatigue.  She also takes Cymbalta through her primary provider.  We talked about adding amitriptyline in the future if needed.    She has had some sensations of something in her throat and a fluttering noise in her Left ear.  She has seen ENT.  She has tried steroids for this.  She has been told it is related to hearing loss, or worsening per audiology.  Hearing aids help some of the time.  Kristen tells me she is still having a lot of pain in her left wrist.  She is using her wrist splints at night but has not noticed improvement.  The paresthesias are worse in the right hand compared to the left but this is her dominant hand.  She has seen orthopedics in the past for her shoulders and knees.  She notices more droopiness and sensory changes in the face and left side when she is tired.    She continues to have left eye pain and tells me she has seen for eye doctors for this and they cannot find anything wrong.  She does use an eye gel and eyedrops for this.    The Cymbalta helps quite a bit with her emotional lability.  She is not sure if it helps from a pain standpoint.    CURRENT MEDICATIONS:   acetaminophen (TYLENOL) 325 MG tablet, Take 650 mg by mouth  amLODIPine (NORVASC) 5 MG tablet, Take 1 tablet (5 mg) by mouth daily  aspirin 81 MG EC tablet, Take 81 mg by mouth  daily  atorvastatin (LIPITOR) 10 MG tablet, TAKE 1 TABLET BY MOUTH EVERY DAY  azelastine (ASTELIN) 0.1 % nasal spray, Spray 2 sprays into both nostrils 2 times daily as needed for rhinitis  CALCIUM PO, Take by mouth daily   Cholecalciferol (VITAMIN D PO), Take 1,000 Units by mouth daily   docusate sodium (COLACE) 100 MG capsule, Take 1 capsule (100 mg) by mouth 2 times daily Take while on opiate to prevent constipation  DULoxetine (CYMBALTA) 20 MG capsule, Take 1 capsule (20 mg) by mouth daily  DULoxetine (CYMBALTA) 60 MG capsule, Take 1 capsule (60 mg) by mouth daily  Fluticasone Propionate (FLONASE ALLERGY RELIEF NA), Spray in nostril as needed  MAGNESIUM PO, Take 250 mg by mouth daily   modafinil (PROVIGIL) 100 MG tablet, Take 1 tablet (100 mg) by mouth daily  Omega-3 Fatty Acids (OMEGA 3 PO), Take 2 capsules by mouth daily   omeprazole (PRILOSEC) 20 MG DR capsule, TAKE 1 CAPSULE (20 MG) BY MOUTH 2 TIMES DAILY 30-60 MINUTES BEFORE A MEAL.  Polyethyl Glycol-Propyl Glycol (SYSTANE FREE OP), Apply 2 drops to eye as needed (dry eyes)  Polyethylene Glycol 3350 (MIRALAX PO),   traZODone (DESYREL) 50 MG tablet, TAKE 1 TABLET (50 MG) BY MOUTH NIGHTLY AS NEEDED    lidocaine 1 % injection 2 mL  lidocaine 1 % injection 2 mL  lidocaine 1 % injection 2 mL  lidocaine 2%-EPINEPHrine 1:100,000 injection 20 mL  triamcinolone (KENALOG-40) injection 40 mg  triamcinolone (KENALOG-40) injection 40 mg  triamcinolone (KENALOG-40) injection 40 mg        RECENT DIAGNOSTIC STUDIES:   Labs: No results found for any visits on 04/02/24.    Electromyogram (12.29.23):  SUMMARY  Nerve conduction and EMG study of bilateral upper extremities shows:     Prolonged right median nerve distal motor latency with decreased amplitude and normal conduction velocity.  Normal left median nerve distal motor latency with low normal amplitude and normal conduction velocity.  Normal right ulnar distal motor latency, amplitude and conduction velocity.  Abnormal  right and normal left median and normal right ulnar sensory SNAPs.  Prolonged bilateral median-ulnar transcarpal peak latency comparison.  Monopolar needle exam is normal.        CLINICAL INTERPRETATION:  This is an abnormal nerve conduction and EMG study.  The study is suggestive of bilateral median neuropathy (R>L; suspected carpal tunnel syndrome). Further clinical correlation is needed.     REVIEW OF SYSTEMS:                                                      10-point review of systems is negative except as mentioned above in HPI.    EXAM:                                                      Physical Exam:   Vitals: /82   Pulse 63   Wt 75.3 kg (166 lb)   BMI 31.37 kg/m    BMI= Body mass index is 31.37 kg/m .  GENERAL: NAD.  HEENT: NC/AT.  CV: RRR. S1, S2.   NECK: No bruits.  PULM: Non-labored breathing.   Neurologic:  MENTAL STATUS: Alert, attentive. Speech impediment. Normal comprehension. Normal concentration. Adequate fund of knowledge.   CRANIAL NERVES: Discs technically difficult to visualize. Visual fields intact to confrontation. Pupils equally, round and reactive to light. Facial sensation and movement normal. EOM full. Hard of hearing. Trapezius strength intact. Tongue midline.  MOTOR: Strength is 5/5 in proximal and distal muscle groups of upper and lower extremities. Tone and bulk normal.   DTRs: Intact and symmetric in biceps, BR, patellae.  Cannot elicit ankle jerks.  Babinski down-going bilaterally.   SENSATION: Hyperesthesia with pinprick in the fingertips on the left.  COORDINATION: Normal finger nose finger on the right, dysmetria on the left.  Knee heel shin normal.   STATION AND GAIT: Slight sway with Romberg. Casual gait - slight limp.  Right hand-dominant.    ASSESSMENT and PLAN:                                                      Assessment:    ICD-10-CM    1. Bilateral carpal tunnel syndrome  G56.03 Orthopedic  Referral      2. Numbness and tingling of both upper  extremities  R20.0     R20.2       3. History of stroke  Z86.73       4. Chronic fatigue  R53.82       5. Neuropathic pain  M79.2       6. Encounter for long-term (current) use of medications  Z79.899           Ms. Thompson is a pleasant stroke and carpal tunnel syndrome.  We focused mainly on the carpal tunnel syndrome today.  She is not really noticing improvement with the wrist splints so I will send her to a hand specialist to consider additional treatment options.  Stroke symptoms are worse when she is tired which is to be expected.  I recommend she continue Cymbalta and modafinil.  We have talked about adding amitriptyline but the Cymbalta is really helping her from a mood standpoint so I do not make any changes at this time.  We will plan to follow-up again in 6 months.  Kristen expressed understanding and agreement with the plan.    Plan:  Let's get you in to see the hand specialist/Orthopedics.  Continue wearing the wrist splints at night.  Continue the chiropractic and acupuncture treatments.  Continue Cymbalta and Modafinil.  Return to Neurology clinic in 6 months.     Total Time: 22 minutes were spent with the patient and in chart review/documentation (as described above in Assessment and Plan)/coordinating the care on date of service.    Yasmin Quintanilla MD  Neurology    Dragon software used in the dictation of this note.

## 2024-04-03 NOTE — PROGRESS NOTES
ASSESSMENT & PLAN    Kristen was seen today for pain and pain.    Diagnoses and all orders for this visit:    Bilateral carpal tunnel syndrome  -     Orthopedic  Referral          See Patient Instructions  Patient Instructions   Reviewed nature of the bilateral upper extremity symptoms.  EMG demonstrated bilateral carpal tunnel syndrome.  We also discussed potential for some ulnar neuropathy component with symptoms into the ring and small fingers as well.  We also discussed potential for referral to hand therapy; if interested, contact clinic.  With lack of benefit from routine nighttime wrist bracing, we discussed trial of a steroid injection next.  Discussed pursuing this with imaging guidance; will get you set up with one of my colleagues for bilateral carpal tunnel steroid injection.  From there, plan to monitor 2 weeks, and then contact clinic with an update.  Contact clinic with any other questions or concerns in the meantime.    If you have any further questions for your physician or physician s care team you can contact them thru MyChart or by calling [phone].      Parveen Mehta [facility] [place]      CC: Yasmin Quintanilla      -----  Chief Complaint   Patient presents with    Left Hand - Pain    Right Hand - Pain       SUBJECTIVE  Kristen Thompson is a/an 78 year old female who is seen in consultation at the request of  Yasmin Quintanilla M.D. for evaluation of bilateral hand pain.     The patient is seen by themselves.  The patient is Right handed    Onset: 1 years(s) ago. Reports insidious onset without acute precipitating event.  Location of Pain: bilateral wrist/hand   Worsened by: writing, all the time   Better with: nothing   Treatments tried: rest/activity avoidance and Tylenol splints at night   Associated symptoms: pain, numbness and tingling - constant    Orthopedic/Surgical history: YES - Date: 2 trigger releases -  "years ago   Social History/Occupation: retired       **  Above information per rooming staff.  Additional history:  Has tingling in right > left forearm and into hand; notes sensation change in thumb and in ulnar 2 digits. Also with pain in wrist area.  Has been using wrist braces regularly for past ~6 months, obtained through home medical.      REVIEW OF SYSTEMS:  Review of Systems    OBJECTIVE:  Ht 1.549 m (5' 1\")   Wt 75.3 kg (166 lb)   BMI 31.37 kg/m           Hand/wrist (bilateral):    Motion:  Forearm pronation , forearm supination no change.  Wrist flexion, extension with some wrist, forearm discomfort; no change in sensation  Digit motion grossly intact    Sensation:  Grossly intact notes sensation change more in thumb, ulnar 2 digits.    Radial pulses normal, +2/4, capillary refill brisk.    Palpation:  Tender volar wrist right, with some fullness palpable there    Tinel pos bilat. Phalen attempted, notes wrist discomfort.            Elbows:    No change sensation with elbow motion bilat  No tenderness cubital tunnel  No significant change with tinel cubital tunnel        RADIOLOGY:  None new today.    Previous right wrist x-ray 7/22/19:    Three views of the right wrist demonstrate degenerative change at the   first carpometacarpal joint and qiktgo-mdnywetl-uzxosifis articulation. No   acute osseous abnormality identified.     Parveen Mehta, DO, CAQ               EMG:      [facility]     [facility], [address]  [place] [address]  Tel: [phone]  Fax: [phone]            Full Name:   Kristen Thompson                       Gender:             Female  Patient ID:    [id number]                                 YOB: 1945      Visit Date:                      12/29/2023 07:34  Age:                               78 Years 2 Months Old  Interpreted By:              Luis An MD   Ref DrVerna:      "                     Yasmin Quintanilla MD  Tech:                              ST   Height:                           5 feet 1 inch  Reason for referral:      Evaluate bilateral uppers. c/o numbness in both hands > 6 months. Left side weakness. h/o right CVA. surgery in both hands.      Motor NCS      Nerve / Sites Lat Amp Dist Jere     ms mV cm m/s   R Median - APB      Wrist 5.26 3.1 7        Elbow 9.74 3.0 25 56   L Median - APB      Wrist 3.49 6.1 7        Elbow 7.81 5.9 24 56   R Ulnar - ADM      Wrist 2.50 10.7 7        B.Elbow 5.57 10.5 22 72      A.Elbow 7.76 9.5 15 69       F  Wave      Nerve Fmin     ms   R Ulnar - ADM 27.81       Sensory NCS      Nerve / Sites Onset Lat Pk Lat Amp.2-3 Dist Jere     ms ms  V cm m/s   R Median - II (Antidr)      Wrist 3.18 3.96 24.6 13 41   L Median - II (Antidr)      Wrist 2.60 3.39 44.9 13 50   R Ulnar - V (Antidr)      Wrist 2.14 2.92 24.6 11 52   R Median, Ulnar - Transcarpal comparison      Median Palm 2.08 2.66 28.2 8 38      Ulnar Palm 1.35 1.72 25.2 8 59   L Median, Ulnar - Transcarpal comparison      Median Palm 1.61 2.14 33.0 8 50      Ulnar Palm 1.25 1.67 30.1 8 64                    EMG Summary Table       Spontaneous MUAP Rcmt Note   Muscle Fib PSW Fasc IA # Amp Dur PPP Rate Type   R. Brachioradialis None None None N N N N N N N   R. Pronator teres None None None N N N N N N N   R. Biceps brachii None None None N N N N N N N   R. Deltoid None None None N N N N N N N   R. Triceps brachii None None None N N N N N N N   R. Flexor carpi ulnaris None None None N N N N N N N   R. First dorsal interosseous None None None N N N N N N N   R. Abductor pollicis brevis None None None N N N N N N N   L. Brachioradialis None None None N N N N N N N   L. Pronator teres None None None N N N N N N N   L. Biceps brachii None None None N N N N N N N   L. Deltoid None None None N N N N N N N   L. Triceps brachii None None None N N N N N N N   L. Anconeus None None None N N N N N N N    L. First dorsal interosseous None None None N N N N N N N   L. Abductor pollicis brevis None None None N N N N N N N         SUMMARY  Nerve conduction and EMG study of bilateral upper extremities shows:     Prolonged right median nerve distal motor latency with decreased amplitude and normal conduction velocity.  Normal left median nerve distal motor latency with low normal amplitude and normal conduction velocity.  Normal right ulnar distal motor latency, amplitude and conduction velocity.  Abnormal right and normal left median and normal right ulnar sensory SNAPs.  Prolonged bilateral median-ulnar transcarpal peak latency comparison.  Monopolar needle exam is normal.        CLINICAL INTERPRETATION:  This is an abnormal nerve conduction and EMG study.  The study is suggestive of bilateral median neuropathy (R>L; suspected carpal tunnel syndrome). Further clinical correlation is needed.      Luis An MD  Neurologist  [facility] Neu[facility]  Tel:- [phone]

## 2024-04-09 DIAGNOSIS — J30.0 VASOMOTOR RHINITIS: ICD-10-CM

## 2024-04-09 DIAGNOSIS — R09.82 POST-NASAL DRAINAGE: ICD-10-CM

## 2024-04-09 DIAGNOSIS — R53.82 CHRONIC FATIGUE: ICD-10-CM

## 2024-04-09 RX ORDER — MODAFINIL 100 MG/1
100 TABLET ORAL DAILY
Qty: 30 TABLET | Refills: 5 | Status: SHIPPED | OUTPATIENT
Start: 2024-04-09

## 2024-04-09 RX ORDER — AZELASTINE 1 MG/ML
2 SPRAY, METERED NASAL 2 TIMES DAILY PRN
Qty: 30 ML | Refills: 0 | OUTPATIENT
Start: 2024-04-09

## 2024-04-09 NOTE — TELEPHONE ENCOUNTER
Refill request for the following medication (s) listed below.    Pending Prescriptions:                       Disp   Refills    modafinil (PROVIGIL) 100 MG tablet [Pharm*30 tab*             Sig: TAKE 1 TABLET BY MOUTH EVERY DAY      Last office visit provider:  04/02/24  Next appointment scheduled: 10/22/24      Medication T'd for review and signature  Carline Muniz MA on 4/9/2024 at 2:06 PM

## 2024-04-09 NOTE — TELEPHONE ENCOUNTER
Pt was informed on 2/27/24 that this medication was filled as a one time refill and she would need to be seen for any further refills. No appointment scheduled. Pt will need appointment for refills.     Dr Joe Regalado received a refill request for azelastine nasal spray. He has refilled this one time with no further refills until you are seen for follow up. You will need to schedule a follow up appointment with him for refills. As your last appointment was 6/9/2022.     Please call to schedule an appointment [phone].     [place] on Tuesdays and Wednesdays.  [place] on Thursdays and Fridays.    Last read by Kristen Thompson at  8:04 AM on 2/28/2024.    Rola DUKES RN  Specialty/Allergy Clinics

## 2024-04-10 ENCOUNTER — OFFICE VISIT (OUTPATIENT)
Dept: ORTHOPEDICS | Facility: CLINIC | Age: 79
End: 2024-04-10
Attending: PSYCHIATRY & NEUROLOGY
Payer: COMMERCIAL

## 2024-04-10 VITALS — WEIGHT: 166 LBS | HEIGHT: 61 IN | BODY MASS INDEX: 31.34 KG/M2

## 2024-04-10 DIAGNOSIS — G56.03 BILATERAL CARPAL TUNNEL SYNDROME: ICD-10-CM

## 2024-04-10 PROCEDURE — 99214 OFFICE O/P EST MOD 30 MIN: CPT | Performed by: PEDIATRICS

## 2024-04-10 NOTE — LETTER
4/10/2024         RE: Kristen Thompson  [address] [place] [id number]        Dear Colleague,    Thank you for referring your patient, Kristen Thompson, to the [facility]. Please see a copy of my visit note below.    ASSESSMENT & PLAN    Kristen was seen today for pain and pain.    Diagnoses and all orders for this visit:    Bilateral carpal tunnel syndrome  -     Orthopedic  Referral          See Patient Instructions  Patient Instructions   Reviewed nature of the bilateral upper extremity symptoms.  EMG demonstrated bilateral carpal tunnel syndrome.  We also discussed potential for some ulnar neuropathy component with symptoms into the ring and small fingers as well.  We also discussed potential for referral to hand therapy; if interested, contact clinic.  With lack of benefit from routine nighttime wrist bracing, we discussed trial of a steroid injection next.  Discussed pursuing this with imaging guidance; will get you set up with one of my colleagues for bilateral carpal tunnel steroid injection.  From there, plan to monitor 2 weeks, and then contact clinic with an update.  Contact clinic with any other questions or concerns in the meantime.    If you have any further questions for your physician or physician s care team you can contact them thru [employer] or by calling [phone].      Parveen Mehta DO  [facility]      CC: Yasmin Quintanilla      -----  Chief Complaint   Patient presents with     Left Hand - Pain     Right Hand - Pain       SUBJECTIVE  Kristen Thompson is a/an 78 year old female who is seen in consultation at the request of  Yasmin Quintanilla M.D. for evaluation of bilateral hand pain.     The patient is seen by themselves.  The patient is Right handed    Onset: 1 years(s) ago. Reports insidious onset without acute precipitating event.  Location of Pain: bilateral  "wrist/hand   Worsened by: writing, all the time   Better with: nothing   Treatments tried: rest/activity avoidance and Tylenol splints at night   Associated symptoms: pain, numbness and tingling - constant    Orthopedic/Surgical history: YES - Date: 2 trigger releases - years ago   Social History/Occupation: retired       **  Above information per rooming staff.  Additional history:  Has tingling in right > left forearm and into hand; notes sensation change in thumb and in ulnar 2 digits. Also with pain in wrist area.  Has been using wrist braces regularly for past ~6 months, obtained through home medical.      REVIEW OF SYSTEMS:  Review of Systems    OBJECTIVE:  Ht 1.549 m (5' 1\")   Wt 75.3 kg (166 lb)   BMI 31.37 kg/m           Hand/wrist (bilateral):    Motion:  Forearm pronation , forearm supination no change.  Wrist flexion, extension with some wrist, forearm discomfort; no change in sensation  Digit motion grossly intact    Sensation:  Grossly intact notes sensation change more in thumb, ulnar 2 digits.    Radial pulses normal, +2/4, capillary refill brisk.    Palpation:  Tender volar wrist right, with some fullness palpable there    Tinel pos bilat. Phalen attempted, notes wrist discomfort.            Elbows:    No change sensation with elbow motion bilat  No tenderness cubital tunnel  No significant change with tinel cubital tunnel        RADIOLOGY:  None new today.    Previous right wrist x-ray 7/22/19:    Three views of the right wrist demonstrate degenerative change at the   first carpometacarpal joint and xvtafh-hxfvofaz-rybilksxb articulation. No   acute osseous abnormality identified.     Parveen Mehta, DO, CAQ               EMG:      [facility]     [facility], [address]  [place] [address]  Tel: [phone]  Fax: [phone]            Full Name:   Kristen HILARIO CabralJay                       Gender:             " Female  Patient ID:    [id number]                                 YOB: 1945      Visit Date:                      12/29/2023 07:34  Age:                               78 Years 2 Months Old  Interpreted By:              Luis An MD   Ref Dr.:                          Yasmin Quintanilla MD  Tech:                              ST   Height:                           5 feet 1 inch  Reason for referral:      Evaluate bilateral uppers. c/o numbness in both hands > 6 months. Left side weakness. h/o right CVA. surgery in both hands.      Motor NCS      Nerve / Sites Lat Amp Dist Jere     ms mV cm m/s   R Median - APB      Wrist 5.26 3.1 7        Elbow 9.74 3.0 25 56   L Median - APB      Wrist 3.49 6.1 7        Elbow 7.81 5.9 24 56   R Ulnar - ADM      Wrist 2.50 10.7 7        B.Elbow 5.57 10.5 22 72      A.Elbow 7.76 9.5 15 69       F  Wave      Nerve Fmin     ms   R Ulnar - ADM 27.81       Sensory NCS      Nerve / Sites Onset Lat Pk Lat Amp.2-3 Dist Jere     ms ms  V cm m/s   R Median - II (Antidr)      Wrist 3.18 3.96 24.6 13 41   L Median - II (Antidr)      Wrist 2.60 3.39 44.9 13 50   R Ulnar - V (Antidr)      Wrist 2.14 2.92 24.6 11 52   R Median, Ulnar - Transcarpal comparison      Median Palm 2.08 2.66 28.2 8 38      Ulnar Palm 1.35 1.72 25.2 8 59   L Median, Ulnar - Transcarpal comparison      Median Palm 1.61 2.14 33.0 8 50      Ulnar Palm 1.25 1.67 30.1 8 64                    EMG Summary Table       Spontaneous MUAP Rcmt Note   Muscle Fib PSW Fasc IA # Amp Dur PPP Rate Type   R. Brachioradialis None None None N N N N N N N   R. Pronator teres None None None N N N N N N N   R. Biceps brachii None None None N N N N N N N   R. Deltoid None None None N N N N N N N   R. Triceps brachii None None None N N N N N N N   R. Flexor carpi ulnaris None None None N N N N N N N   R. First dorsal interosseous None None None N N N N N N N   R. Abductor pollicis brevis None None None N N N N N N N   L.  Brachioradialis None None None N N N N N N N   L. Pronator teres None None None N N N N N N N   L. Biceps brachii None None None N N N N N N N   L. Deltoid None None None N N N N N N N   L. Triceps brachii None None None N N N N N N N   L. Anconeus None None None N N N N N N N   L. First dorsal interosseous None None None N N N N N N N   L. Abductor pollicis brevis None None None N N N N N N N         SUMMARY  Nerve conduction and EMG study of bilateral upper extremities shows:     Prolonged right median nerve distal motor latency with decreased amplitude and normal conduction velocity.  Normal left median nerve distal motor latency with low normal amplitude and normal conduction velocity.  Normal right ulnar distal motor latency, amplitude and conduction velocity.  Abnormal right and normal left median and normal right ulnar sensory SNAPs.  Prolonged bilateral median-ulnar transcarpal peak latency comparison.  Monopolar needle exam is normal.        CLINICAL INTERPRETATION:  This is an abnormal nerve conduction and EMG study.  The study is suggestive of bilateral median neuropathy (R>L; suspected carpal tunnel syndrome). Further clinical correlation is needed.      Luis An MD  Neurologist  [facility] Neurology [facility]  Tel:- [phone]                   Again, thank you for allowing me to participate in the care of your patient.        Sincerely,        Parveen Mehta DO

## 2024-04-10 NOTE — PATIENT INSTRUCTIONS
Reviewed nature of the bilateral upper extremity symptoms.  EMG demonstrated bilateral carpal tunnel syndrome.  We also discussed potential for some ulnar neuropathy component with symptoms into the ring and small fingers as well.  We also discussed potential for referral to hand therapy; if interested, contact clinic.  With lack of benefit from routine nighttime wrist bracing, we discussed trial of a steroid injection next.  Discussed pursuing this with imaging guidance; will get you set up with one of my colleagues for bilateral carpal tunnel steroid injection.  From there, plan to monitor 2 weeks, and then contact clinic with an update.  Contact clinic with any other questions or concerns in the meantime.    If you have any further questions for your physician or physician s care team you can contact them thru [employer] or by calling [phone].

## 2024-04-16 ENCOUNTER — TELEPHONE (OUTPATIENT)
Dept: NEUROLOGY | Facility: CLINIC | Age: 79
End: 2024-04-16
Payer: COMMERCIAL

## 2024-04-16 NOTE — TELEPHONE ENCOUNTER
Prior Authorization Retail Medication Request    Medication/Dose: modafinil (PROVIGIL) 100 MG tablet  Diagnosis and ICD code (if different than what is on RX):    New/renewal/insurance change PA/secondary ins. PA:  Previously Tried and Failed:    Rationale:      Insurance   Primary: [facility]  Insurance ID:  [id number]     Secondary (if applicable):  Insurance ID:      Pharmacy Information (if different than what is on RX)  Name:  [id number] [place] [address]   Phone:  [phone]   Fax:

## 2024-04-29 NOTE — TELEPHONE ENCOUNTER
Retail Pharmacy Prior Authorization Team   Phone: [phone]    PA Initiation    Medication: MODAFINIL 100 MG PO TABS  Insurance Company: [employer] - Phone [phone] Fax [phone]  Pharmacy Filling the Rx: [id number] - [place] [address]  Filling Pharmacy Phone: [phone]  Filling Pharmacy Fax:    Start Date: 4/29/2024

## 2024-04-30 ENCOUNTER — OFFICE VISIT (OUTPATIENT)
Dept: ORTHOPEDICS | Facility: CLINIC | Age: 79
End: 2024-04-30
Payer: COMMERCIAL

## 2024-04-30 VITALS — WEIGHT: 166 LBS | HEIGHT: 61 IN | BODY MASS INDEX: 31.34 KG/M2

## 2024-04-30 DIAGNOSIS — G56.03 BILATERAL CARPAL TUNNEL SYNDROME: Primary | ICD-10-CM

## 2024-04-30 PROCEDURE — 76942 ECHO GUIDE FOR BIOPSY: CPT | Performed by: FAMILY MEDICINE

## 2024-04-30 PROCEDURE — 20526 THER INJECTION CARP TUNNEL: CPT | Mod: 50 | Performed by: FAMILY MEDICINE

## 2024-04-30 RX ADMIN — LIDOCAINE HYDROCHLORIDE 2 ML: 10 INJECTION, SOLUTION INFILTRATION; PERINEURAL at 14:11

## 2024-04-30 RX ADMIN — TRIAMCINOLONE ACETONIDE 40 MG: 40 INJECTION, SUSPENSION INTRA-ARTICULAR; INTRAMUSCULAR at 14:11

## 2024-04-30 NOTE — PROGRESS NOTES
Kristen Mcdanielbridge  :  1945  DOS: 2024  MRN: [id number]    Sports Medicine Clinic Procedure    Ultrasound Guided Bilateral Carpal Tunnel Injection    Clinical History: Gradual onset of bilateral hand numbness/tingling over the past 1+ years.  Currently treating with OTC pain medication, activity avoidance, & wrist braces with minimal relief.    Diagnosis:   1. Bilateral carpal tunnel syndrome       Referring Physician: Parveen Mehta DO  Hand / Upper Extremity Injection/Arthrocentesis: bilateral carpal tunnel    Date/Time: 2024 2:11 PM    Performed by: Parveen Maldonado DO  Authorized by: Parveen Maldonado DO    Indications:  Therapeutic, diagnostic and pain  Needle Size:  25 G  Guidance: ultrasound    Condition: carpal tunnel    Laterality:  Bilateral    Site:  Bilateral carpal tunnel  Medications (Right):  40 mg triamcinolone 40 MG/ML; 2 mL lidocaine 1 %  Medications (Left):  40 mg triamcinolone 40 MG/ML; 2 mL lidocaine 1 %  Outcome:  Tolerated well, no immediate complications  Procedure discussed: discussed risks, benefits, and alternatives    Timeout: timeout called immediately prior to procedure    Prep: patient was prepped and draped in usual sterile fashion     Hydrodissection of median nerve in carpal tunnel performed, with fenestration of transverse carpal ligament      Ultrasound images of procedure were permanently stored.       Impression:  Successful bilateral US guided carpal tunnel corticosteroid injection    Plan:  Follow up with Dr Mehta as previously discussed.  Expectations and goals of CSI reviewed  Often 2-3 days for steroid effect, and can take up to two weeks for maximum effect  We discussed modified progressive pain-free activity as tolerated  Do not overuse in first two weeks if feeling better due to concern for vulnerability while steroid is working  Supportive care reviewed  All questions were answered today  Contact us with additional  questions or concerns  Signs and sx of concern reviewed      Parveen Maldonado DO, NATTY  Primary Care Sports Medicine  [facility]

## 2024-04-30 NOTE — LETTER
2024         RE: Krsiten Thompson  [address] [place] [phone]        Dear Colleague,    Thank you for referring your patient, Kristen Thompson, to the [facility] [place]. Please see a copy of my visit note below.    Kristen Thompson  :  1945  DOS: 2024  MRN: [id number]    Sports Medicine Clinic Procedure    Ultrasound Guided Bilateral Carpal Tunnel Injection    Clinical History: Gradual onset of bilateral hand numbness/tingling over the past 1+ years.  Currently treating with OTC pain medication, activity avoidance, & wrist braces with minimal relief.    Diagnosis:   1. Bilateral carpal tunnel syndrome       Referring Physician: Parveen Mehta DO  Hand / Upper Extremity Injection/Arthrocentesis: bilateral carpal tunnel    Date/Time: 2024 2:11 PM    Performed by: Parveen Maldonado DO  Authorized by: Parveen Maldonado DO    Indications:  Therapeutic, diagnostic and pain  Needle Size:  25 G  Guidance: ultrasound    Condition: carpal tunnel    Laterality:  Bilateral    Site:  Bilateral carpal tunnel  Medications (Right):  40 mg triamcinolone 40 MG/ML; 2 mL lidocaine 1 %  Medications (Left):  40 mg triamcinolone 40 MG/ML; 2 mL lidocaine 1 %  Outcome:  Tolerated well, no immediate complications  Procedure discussed: discussed risks, benefits, and alternatives    Timeout: timeout called immediately prior to procedure    Prep: patient was prepped and draped in usual sterile fashion     Hydrodissection of median nerve in carpal tunnel performed, with fenestration of transverse carpal ligament      Ultrasound images of procedure were permanently stored.       Impression:  Successful bilateral US guided carpal tunnel corticosteroid injection    Plan:  Follow up with Dr Mehta as previously discussed.  Expectations and goals of CSI reviewed  Often 2-3 days for steroid effect, and can take up to two weeks for maximum effect  We  discussed modified progressive pain-free activity as tolerated  Do not overuse in first two weeks if feeling better due to concern for vulnerability while steroid is working  Supportive care reviewed  All questions were answered today  Contact us with additional questions or concerns  Signs and sx of concern reviewed      Parveen Maldonado DO, NATTY  Primary Care Sports Medicine  [facility]       Again, thank you for allowing me to participate in the care of your patient.        Sincerely,        Parveen Maldonado DO

## 2024-05-01 NOTE — TELEPHONE ENCOUNTER
MyPrintCloud message sent to patient    Salvatore Peterson,    Your insurance has denied the modafinil.   Medicare plans will not cover this medication anymore unless you have narcolepsy, sleep apnea or sleep shift work disorder.     You do have the option to pay out of pocket for this medication using [employer]. There are several different options for pharmacies we can send the prescription to. Here is a link    [email or web address]    Please let us know how you would like to proceed.    Aaron

## 2024-05-01 NOTE — TELEPHONE ENCOUNTER
PRIOR AUTHORIZATION DENIED    Medication: MODAFINIL 100 MG PO TABS  Insurance Company: [employer] - Phone [phone] Fax [phone]  Denial Date: 5/1/2024  Denial Reason(s):           Appeal Information:         Patient Notified: No

## 2024-05-02 RX ORDER — LIDOCAINE HYDROCHLORIDE 10 MG/ML
2 INJECTION, SOLUTION INFILTRATION; PERINEURAL
Status: SHIPPED | OUTPATIENT
Start: 2024-04-30

## 2024-05-02 RX ORDER — TRIAMCINOLONE ACETONIDE 40 MG/ML
40 INJECTION, SUSPENSION INTRA-ARTICULAR; INTRAMUSCULAR
Status: SHIPPED | OUTPATIENT
Start: 2024-04-30

## 2024-06-12 ENCOUNTER — TELEPHONE (OUTPATIENT)
Dept: ORTHOPEDICS | Facility: CLINIC | Age: 79
End: 2024-06-12
Payer: COMMERCIAL

## 2024-06-12 NOTE — TELEPHONE ENCOUNTER
Called patient and scheduled appointment with Dr. Cole on 6/19 in [place].     Tayler BLAND RN, Specialty Clinic 06/12/24 10:14 AM

## 2024-06-12 NOTE — TELEPHONE ENCOUNTER
Appointment     Per [facility]-reviewed by clinic team before scheduling     Reason for Call: Patient is requesting to be scheduled for right knee-prior knee replacement in 2007. Unsure of the name of the surgeon, it was in [place]. No current imaging     Could we send this information to you in [place] or would you prefer to receive a phone call?:   Patient would prefer a phone call   Okay to leave a detailed message?: No at Cell number on file:    Telephone Information:   Mobile [phone]

## 2024-06-12 NOTE — TELEPHONE ENCOUNTER
Called patient back and let her know that Dr. Cole actually cannot see her due to this being a previous knee replacement. Patient confirmed understanding, but she does not want to be seen at the [facility], as requested by Dr. Cole, so she is calling back sports medicine to see if they can help her in any way.     Tayler BLAND RN, Specialty Clinic 06/12/24 2:47 PM

## 2024-06-28 NOTE — PROGRESS NOTES
ASSESSMENT & PLAN    Kristen was seen today for pain.    Diagnoses and all orders for this visit:    Lateral pain of right hip  -     Large Joint Injection/Arthocentesis: R greater trochanteric bursa    Chronic right hip pain  -     XR Pelvis and Hip Right 1 View; Future  -     Large Joint Injection/Arthocentesis: R greater trochanteric bursa    Chronic pain of right knee  -     XR Knee Standing AP [place] Bilat Lat Right; Future    History of total knee arthroplasty, right  -     XR Knee Standing AP [place] Bilat Lat Right; Future      Has had right knee pain in past, following TKA. See previous notes.  With hip area pain, appears more focal lateral hip. Less likely lumbar radicular at this point, but lumbar radiculopathy not excluded.  Start with right lateral hip steroid injection. If significant improvement in symptoms, then continue with monitoring.  If ongoing issues, consider additional imaging (possibly MRI hip/pelvis, vs possibly MRI lumbar), physical therapy.  See below.  Questions answered. Discussed signs and symptoms that may indicate more serious issues; the patient was instructed to seek appropriate care if noted. Kristen indicates understanding of these issues and agrees with the plan.        See Patient Instructions  Patient Instructions   Reviewed nature of the right hip and lower extremity pain.  There is focal tenderness over the greater trochanter, and with rating symptoms in the thigh, this appears more consistent with greater trochanteric pain syndrome (likely underlying gluteal tendinopathy, potential for trochanteric bursitis).  We also discussed potential for other component to this pain, including lumbar radiculopathy.  They are radiating symptoms all the way into the lower leg as well, though not clearly neurologic by history.  For next steps, we discussed considerations around additional imaging (hip/pelvis MRI, less likely lumbar MRI), rehab exercises, and trial of a lateral hip steroid  injection.  Following discussion, right lateral hip steroid injection done today, primarily therapeutic, but will also have diagnostic value.  Plan to monitor 2 weeks to begin.  If improving well, then follow-up is open-ended.  Otherwise, contact them if any other questions or concerns, or if desiring a change course.    If you have any further questions for your physician or physician s care team you can contact them thru [facility] or by calling [phone].        Injection Discharge Instructions    You may shower, however avoid swimming, tub baths or hot tubs for 24 hours following your procedure  You may have a mild to moderate increase in pain for several days following the injection.  It may take up to 14 days for the steroid medication to start working although you may feel the effect as early as a few days after the procedure.  You may use ice packs for 10-15 minutes, 3 to 4 times a day at the injection site for comfort  You may use anti-inflammatory medications (such as Ibuprofen or Aleve or Advil) if you are able to take safely, or acetaminophen (Tylenol) for pain control if necessary  If you were fasting, you may resume your normal diet and medications after the procedure  If you have diabetes, check your blood sugar more frequently than usual as your blood sugar may be higher than normal for 10-14 days following a steroid injection. Contact your doctor who manages your diabetes if your blood sugar is higher than usual    If you experience any of the following, call the clinic @ [phone]  - Fever over 100 degree F  - Swelling, bleeding, redness, drainage, warmth at the injection site  - New or worsening pain      Parveen Mehta [facility]    -----  Chief Complaint   Patient presents with    Right Hip - Pain       SUBJECTIVE  Kristen Thompson is a/an 78 year old female who is seen as a self referral for evaluation of Right hip.     The patient is seen by  "themselves.    Onset: 3 month(s) ago. Reports insidious onset without acute precipitating event.  Location of Pain: right hip, lateral hip and into upper leg  Worsened by: first few steps, worse in the morning, gets better with movement, increase in pain at end of the day, tender to the touch  Better with:   Treatments tried: ibuprofen, various creams, acupuncture and chiropractic  Associated symptoms: no distal numbness or tingling; denies swelling or warmth    Orthopedic/Surgical history: NO  Social History/Occupation: Retired    Patient requested right knee XR today, noting right knee pain, hx of TKA 17 years ago. Notes that the knee is diffusely painful. Painful since surgery. Unsure if hip pain is related to knee or if hip is bothering the knee.        **  Above information per rooming staff.  Additional history:  Is satisfied with current status of carpal tunnel syndrome, having done injections.        Hip pain:  Pain is lateral right hip. Notes pain with sidelying at times, though generally ok at night. However, as soon as gets up from bed then gets pain.    Can get pain that radiates to right lateral thigh. Also gets aching sensation in right lateral lower leg. Had been having some cramping in that area also.  No clear N/T. Not like symptoms from CTS.      **  We had previously reviewed right knee pain following TKA. Past x-rays, bone scan. See notes from a few years ago.        REVIEW OF SYSTEMS:  Review of Systems    OBJECTIVE:  Ht 1.549 m (5' 1\")   Wt 75.3 kg (166 lb)   BMI 31.37 kg/m           Low back exam:    ROM: no change in right LE pain    Special tests:      slump test neg for any radicular pain            Right hip exam    Inspection:      no edema or ecchymosis in hip area    ROM:     grossly intact active and passive ROM   Pain laterally with active motion, including ER and IR    Tender:      greater trochanter focally tender, notes this is site of primary pain    Special Tests:      lateral " pain with FADIR       Lateral pain with log roll          Right Knee exam    Inspection:   No clear effusion   no ecchymosis  Well healed scar from TKA    ROM: baseline tightness/stiffness with AROM        RADIOLOGY:  Final results and radiologist's interpretation, available in the [facility] health record.  Images were reviewed with the patient in the office today.  My personal interpretation of the performed imaging: mild right hip degenerative change. Some lumbar degenerative change. No clear acute bony abnormality noted.  Right TKA. Left knee arthrosis.      XR Pelvis and Hip Right 1 View    Narrative    PELVIS AND HIP RIGHT 1 VIEW   7/2/2024 3:07 PM     HISTORY: Lateral right hip pain; Chronic right hip pain.    COMPARISON: None.      Impression    IMPRESSION: Degenerative changes in the visualized spine. The bones  are diffusely demineralized. Mild osteoarthrosis of the right hip. The  left hip appears normal. There is no evidence of fracture or  osteonecrosis.    LUDWIG WHITTEN MD         SYSTEM ID:  [id number]   XR Knee Standing AP [place] Bilat Lat Right    Narrative    KNEE STANDING AP [place] BILATERAL LATERAL RIGHT   7/2/2024 3:07 PM     HISTORY: History of right knee replacement 17 years ago; Chronic pain  of right knee.    COMPARISON: None.      Impression    IMPRESSION:    Right: Total knee arthroplasty. Excellent position and alignment of  components. There is no evidence of hardware loosening or  periprosthetic fracture. No significant effusion. No new findings.  Diffuse bone demineralization again noted.    Left: Moderate osteoarthrosis of the medial compartment. Mild  osteoarthrosis of the lateral compartment. Moderate patellofemoral  osteoarthrosis. The findings have progressed. Diffuse bone  demineralization again noted.    LUDWIG WHITTEN MD         SYSTEM ID:  [id number]               Large Joint Injection/Arthocentesis: R greater trochanteric bursa    Date/Time: 7/2/2024 3:56 PM    Performed  by: Parveen Mehta DO  Authorized by: Parveen Mehta DO    Indications:  Pain  Needle Size:  25 G  Approach:  Lateral  Location:  Hip      Site:  R greater trochanteric bursa  Medications:  40 mg triamcinolone 40 MG/ML; 2 mL lidocaine 1 %  Outcome:  Tolerated well, no immediate complications  Procedure discussed: discussed risks, benefits, and alternatives    Consent Given by:  Patient  Timeout: timeout called immediately prior to procedure    Prep: patient was prepped and draped in usual sterile fashion                30 minutes spent by me on the date of the encounter doing chart review, history and exam, documentation and further activities per the note, not including injection

## 2024-07-02 ENCOUNTER — ANCILLARY PROCEDURE (OUTPATIENT)
Dept: GENERAL RADIOLOGY | Facility: CLINIC | Age: 79
End: 2024-07-02
Attending: PEDIATRICS
Payer: COMMERCIAL

## 2024-07-02 ENCOUNTER — OFFICE VISIT (OUTPATIENT)
Dept: ORTHOPEDICS | Facility: CLINIC | Age: 79
End: 2024-07-02
Payer: COMMERCIAL

## 2024-07-02 VITALS — HEIGHT: 61 IN | BODY MASS INDEX: 31.34 KG/M2 | WEIGHT: 166 LBS

## 2024-07-02 DIAGNOSIS — M25.551 CHRONIC RIGHT HIP PAIN: ICD-10-CM

## 2024-07-02 DIAGNOSIS — G89.29 CHRONIC PAIN OF RIGHT KNEE: ICD-10-CM

## 2024-07-02 DIAGNOSIS — G89.29 CHRONIC RIGHT HIP PAIN: ICD-10-CM

## 2024-07-02 DIAGNOSIS — Z96.651 HISTORY OF TOTAL KNEE ARTHROPLASTY, RIGHT: ICD-10-CM

## 2024-07-02 DIAGNOSIS — M25.561 CHRONIC PAIN OF RIGHT KNEE: ICD-10-CM

## 2024-07-02 DIAGNOSIS — M25.551 LATERAL PAIN OF RIGHT HIP: Primary | ICD-10-CM

## 2024-07-02 PROCEDURE — 73502 X-RAY EXAM HIP UNI 2-3 VIEWS: CPT | Mod: TC | Performed by: RADIOLOGY

## 2024-07-02 PROCEDURE — 20610 DRAIN/INJ JOINT/BURSA W/O US: CPT | Mod: RT | Performed by: PEDIATRICS

## 2024-07-02 PROCEDURE — 99214 OFFICE O/P EST MOD 30 MIN: CPT | Mod: 25 | Performed by: PEDIATRICS

## 2024-07-02 PROCEDURE — 73562 X-RAY EXAM OF KNEE 3: CPT | Mod: TC | Performed by: RADIOLOGY

## 2024-07-02 RX ADMIN — TRIAMCINOLONE ACETONIDE 40 MG: 40 INJECTION, SUSPENSION INTRA-ARTICULAR; INTRAMUSCULAR at 15:56

## 2024-07-02 RX ADMIN — LIDOCAINE HYDROCHLORIDE 2 ML: 10 INJECTION, SOLUTION INFILTRATION; PERINEURAL at 15:56

## 2024-07-02 NOTE — LETTER
7/2/2024      Kristen Thompson  [address] [phone]      Dear Colleague,    Thank you for referring your patient, Kristen Thompson, to the [facility]. Please see a copy of my visit note below.    ASSESSMENT & PLAN    Kristen was seen today for pain.    Diagnoses and all orders for this visit:    Lateral pain of right hip  -     Large Joint Injection/Arthocentesis: R greater trochanteric bursa    Chronic right hip pain  -     XR Pelvis and Hip Right 1 View; Future  -     Large Joint Injection/Arthocentesis: R greater trochanteric bursa    Chronic pain of right knee  -     XR Knee Standing AP [place] Bilat Lat Right; Future    History of total knee arthroplasty, right  -     XR Knee Standing AP [place] Bilat Lat Right; Future      Has had right knee pain in past, following TKA. See previous notes.  With hip area pain, appears more focal lateral hip. Less likely lumbar radicular at this point, but lumbar radiculopathy not excluded.  Start with right lateral hip steroid injection. If significant improvement in symptoms, then continue with monitoring.  If ongoing issues, consider additional imaging (possibly MRI hip/pelvis, vs possibly MRI lumbar), physical therapy.  See below.  Questions answered. Discussed signs and symptoms that may indicate more serious issues; the patient was instructed to seek appropriate care if noted. Kristen indicates understanding of these issues and agrees with the plan.        See Patient Instructions  Patient Instructions   Reviewed nature of the right hip and lower extremity pain.  There is focal tenderness over the greater trochanter, and with rating symptoms in the thigh, this appears more consistent with greater trochanteric pain syndrome (likely underlying gluteal tendinopathy, potential for trochanteric bursitis).  We also discussed potential for other component to this pain, including lumbar radiculopathy.  They are radiating  symptoms all the way into the lower leg as well, though not clearly neurologic by history.  For next steps, we discussed considerations around additional imaging (hip/pelvis MRI, less likely lumbar MRI), rehab exercises, and trial of a lateral hip steroid injection.  Following discussion, right lateral hip steroid injection done today, primarily therapeutic, but will also have diagnostic value.  Plan to monitor 2 weeks to begin.  If improving well, then follow-up is open-ended.  Otherwise, contact them if any other questions or concerns, or if desiring a change course.    If you have any further questions for your physician or physician s care team you can contact them thru [employer] or by calling [phone].        Injection Discharge Instructions    You may shower, however avoid swimming, tub baths or hot tubs for 24 hours following your procedure  You may have a mild to moderate increase in pain for several days following the injection.  It may take up to 14 days for the steroid medication to start working although you may feel the effect as early as a few days after the procedure.  You may use ice packs for 10-15 minutes, 3 to 4 times a day at the injection site for comfort  You may use anti-inflammatory medications (such as Ibuprofen or Aleve or Advil) if you are able to take safely, or acetaminophen (Tylenol) for pain control if necessary  If you were fasting, you may resume your normal diet and medications after the procedure  If you have diabetes, check your blood sugar more frequently than usual as your blood sugar may be higher than normal for 10-14 days following a steroid injection. Contact your doctor who manages your diabetes if your blood sugar is higher than usual    If you experience any of the following, call the clinic @ [phone]  - Fever over 100 degree F  - Swelling, bleeding, redness, drainage, warmth at the injection site  - New or worsening pain      DO AMANDA Cunningham [facility]  [facility]    -----  Chief Complaint   Patient presents with     Right Hip - Pain       SUBJECTIVE  Kristen Thompson is a/an 78 year old female who is seen as a self referral for evaluation of Right hip.     The patient is seen by themselves.    Onset: 3 month(s) ago. Reports insidious onset without acute precipitating event.  Location of Pain: right hip, lateral hip and into upper leg  Worsened by: first few steps, worse in the morning, gets better with movement, increase in pain at end of the day, tender to the touch  Better with:   Treatments tried: ibuprofen, various creams, acupuncture and chiropractic  Associated symptoms: no distal numbness or tingling; denies swelling or warmth    Orthopedic/Surgical history: NO  Social History/Occupation: Retired    Patient requested right knee XR today, noting right knee pain, hx of TKA 17 years ago. Notes that the knee is diffusely painful. Painful since surgery. Unsure if hip pain is related to knee or if hip is bothering the knee.        **  Above information per rooming staff.  Additional history:  Is satisfied with current status of carpal tunnel syndrome, having done injections.        Hip pain:  Pain is lateral right hip. Notes pain with sidelying at times, though generally ok at night. However, as soon as gets up from bed then gets pain.    Can get pain that radiates to right lateral thigh. Also gets aching sensation in right lateral lower leg. Had been having some cramping in that area also.  No clear N/T. Not like symptoms from CTS.      **  We had previously reviewed right knee pain following TKA. Past x-rays, bone scan. See notes from a few years ago.        REVIEW OF SYSTEMS:  Review of Systems    OBJECTIVE:  Ht 1.549 m (5' 1\")   Wt 75.3 kg (166 lb)   BMI 31.37 kg/m           Low back exam:    ROM: no change in right LE pain    Special tests:      slump test neg for any radicular pain            Right hip exam    Inspection:     "  no edema or ecchymosis in hip area    ROM:     grossly intact active and passive ROM   Pain laterally with active motion, including ER and IR    Tender:      greater trochanter focally tender, notes this is site of primary pain    Special Tests:      lateral pain with FADIR       Lateral pain with log roll          Right Knee exam    Inspection:   No clear effusion   no ecchymosis  Well healed scar from TKA    ROM: baseline tightness/stiffness with AROM        RADIOLOGY:  Final results and radiologist's interpretation, available in the [facility] health record.  Images were reviewed with the patient in the office today.  My personal interpretation of the performed imaging: mild right hip degenerative change. Some lumbar degenerative change. No clear acute bony abnormality noted.  Right TKA. Left knee arthrosis.      XR Pelvis and Hip Right 1 View    Narrative    PELVIS AND HIP RIGHT 1 VIEW   7/2/2024 3:07 PM     HISTORY: Lateral right hip pain; Chronic right hip pain.    COMPARISON: None.      Impression    IMPRESSION: Degenerative changes in the visualized spine. The bones  are diffusely demineralized. Mild osteoarthrosis of the right hip. The  left hip appears normal. There is no evidence of fracture or  osteonecrosis.    LUDWIG WHITTEN MD         SYSTEM ID:  [id number]   XR Knee Standing AP [place] Bilat Lat Right    Narrative    KNEE STANDING AP [place] BILATERAL LATERAL RIGHT   7/2/2024 3:07 PM     HISTORY: History of right knee replacement 17 years ago; Chronic pain  of right knee.    COMPARISON: None.      Impression    IMPRESSION:    Right: Total knee arthroplasty. Excellent position and alignment of  components. There is no evidence of hardware loosening or  periprosthetic fracture. No significant effusion. No new findings.  Diffuse bone demineralization again noted.    Left: Moderate osteoarthrosis of the medial compartment. Mild  osteoarthrosis of the lateral compartment. Moderate  patellofemoral  osteoarthrosis. The findings have progressed. Diffuse bone  demineralization again noted.    LUDWIG WHITTEN MD         SYSTEM ID:  [id number]               Large Joint Injection/Arthocentesis: R greater trochanteric bursa    Date/Time: 7/2/2024 3:56 PM    Performed by: Parveen Mehta DO  Authorized by: Parveen Mehta DO    Indications:  Pain  Needle Size:  25 G  Approach:  Lateral  Location:  Hip      Site:  R greater trochanteric bursa  Medications:  40 mg triamcinolone 40 MG/ML; 2 mL lidocaine 1 %  Outcome:  Tolerated well, no immediate complications  Procedure discussed: discussed risks, benefits, and alternatives    Consent Given by:  Patient  Timeout: timeout called immediately prior to procedure    Prep: patient was prepped and draped in usual sterile fashion                30 minutes spent by me on the date of the encounter doing chart review, history and exam, documentation and further activities per the note, not including injection           Again, thank you for allowing me to participate in the care of your patient.        Sincerely,        Parveen Mehta DO

## 2024-07-02 NOTE — PATIENT INSTRUCTIONS
Reviewed nature of the right hip and lower extremity pain.  There is focal tenderness over the greater trochanter, and with rating symptoms in the thigh, this appears more consistent with greater trochanteric pain syndrome (likely underlying gluteal tendinopathy, potential for trochanteric bursitis).  We also discussed potential for other component to this pain, including lumbar radiculopathy.  They are radiating symptoms all the way into the lower leg as well, though not clearly neurologic by history.  For next steps, we discussed considerations around additional imaging (hip/pelvis MRI, less likely lumbar MRI), rehab exercises, and trial of a lateral hip steroid injection.  Following discussion, right lateral hip steroid injection done today, primarily therapeutic, but will also have diagnostic value.  Plan to monitor 2 weeks to begin.  If improving well, then follow-up is open-ended.  Otherwise, contact them if any other questions or concerns, or if desiring a change course.    If you have any further questions for your physician or physician s care team you can contact them thru "[employer] [facility] [employer]t or by calling [phone].        Injection Discharge Instructions    You may shower, however avoid swimming, tub baths or hot tubs for 24 hours following your procedure  You may have a mild to moderate increase in pain for several days following the injection.  It may take up to 14 days for the steroid medication to start working although you may feel the effect as early as a few days after the procedure.  You may use ice packs for 10-15 minutes, 3 to 4 times a day at the injection site for comfort  You may use anti-inflammatory medications (such as Ibuprofen or Aleve or Advil) if you are able to take safely, or acetaminophen (Tylenol) for pain control if necessary  If you were fasting, you may resume your normal diet and medications after the procedure  If you have diabetes, check your blood sugar more frequently than usual as  your blood sugar may be higher than normal for 10-14 days following a steroid injection. Contact your doctor who manages your diabetes if your blood sugar is higher than usual    If you experience any of the following, call the clinic @ [phone]  - Fever over 100 degree F  - Swelling, bleeding, redness, drainage, warmth at the injection site  - New or worsening pain

## 2024-07-07 RX ORDER — LIDOCAINE HYDROCHLORIDE 10 MG/ML
2 INJECTION, SOLUTION INFILTRATION; PERINEURAL
Status: SHIPPED | OUTPATIENT
Start: 2024-07-02

## 2024-07-07 RX ORDER — TRIAMCINOLONE ACETONIDE 40 MG/ML
40 INJECTION, SUSPENSION INTRA-ARTICULAR; INTRAMUSCULAR
Status: SHIPPED | OUTPATIENT
Start: 2024-07-02

## 2024-07-18 ENCOUNTER — OFFICE VISIT (OUTPATIENT)
Dept: DERMATOLOGY | Facility: CLINIC | Age: 79
End: 2024-07-18
Payer: COMMERCIAL

## 2024-07-18 DIAGNOSIS — Z86.006 HISTORY OF MELANOMA IN SITU: ICD-10-CM

## 2024-07-18 DIAGNOSIS — D22.9 NEVUS: Primary | ICD-10-CM

## 2024-07-18 DIAGNOSIS — L82.0 INFLAMED SEBORRHEIC KERATOSIS: ICD-10-CM

## 2024-07-18 DIAGNOSIS — L81.4 LENTIGO: ICD-10-CM

## 2024-07-18 DIAGNOSIS — D18.01 ANGIOMA OF SKIN: ICD-10-CM

## 2024-07-18 DIAGNOSIS — L82.1 SEBORRHEIC KERATOSIS: ICD-10-CM

## 2024-07-18 DIAGNOSIS — Z85.828 HISTORY OF SCC (SQUAMOUS CELL CARCINOMA) OF SKIN: ICD-10-CM

## 2024-07-18 PROCEDURE — 99213 OFFICE O/P EST LOW 20 MIN: CPT | Mod: 25 | Performed by: PHYSICIAN ASSISTANT

## 2024-07-18 PROCEDURE — 17110 DESTRUCTION B9 LES UP TO 14: CPT | Performed by: PHYSICIAN ASSISTANT

## 2024-07-18 ASSESSMENT — PAIN SCALES - GENERAL: PAINLEVEL: NO PAIN (0)

## 2024-07-18 NOTE — NURSING NOTE
Kristen Thompson's chief complaint for this visit includes:  Chief Complaint   Patient presents with    Skin Check     Patient is here for a 6 month skin check and presents concerns with right post shoulder itching from mohs procedure done in 1/16/2024.      PCP: Cortney Hung    Referring Provider:  Adriana Rubio PA-C  [address] [place] [address]    There were no vitals taken for this visit.  No Pain (0)        Allergies   Allergen Reactions    Codeine Sulfate      Nausea and vomiting     Quinapril Difficulty breathing    Darvon-N [Propoxyphene Napsylate] Nausea and Vomiting         Do you need any medication refills at today's visit? No    Anyi Mcintyre MA

## 2024-07-18 NOTE — LETTER
7/18/2024      Kristen Thompson  [address] [place] [id number]      Dear Colleague,    Thank you for referring your patient, Kristen Thompson, to the [facility]. Please see a copy of my visit note below.    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  See patient intake sheet.  ROS of the following were done and are negative: Constitutional, Eyes, Ears, Nose,   Mouth, Throat, Cardiovascular, Respiratory, GI, Genitourinary, Musculoskeletal,   Psychiatric, Endocrine, Allergic/Immunologic.    PHYSICAL EXAM:   There were no vitals taken for this visit.  Skin exam performed as follows: Type 2 skin. Mood appropriate  Alert and Oriented X 3. Well developed, well nourished in no distress.  General appearance: Normal  Head including face: Normal  Eyes: conjunctiva and lids: Normal  Mouth: Lips, teeth, gums: Normal  Neck: Normal  Chest-breast/axillae: Normal  Back: Normal  Spleen and liver: Normal  Cardiovascular: Exam of peripheral vascular system by observation for swelling, varicosities, edema: Normal  Genitalia: groin, buttocks: Normal  Extremities: digits/nails (clubbing): Normal  Eccrine and Apocrine glands: Normal  Right upper extremity: Normal  Left upper extremity: Normal  Right lower extremity: Normal  Left lower extremity: Normal  Skin: Scalp and body hair: See below    Pt deferred exam of  breasts, groin, buttocks: No    Other physical findings:  1. Multiple pigmented macules on extremities and trunk  2. Multiple pigmented macules on face, trunk and extremities  3. Multiple vascular papules on trunk, arms and legs  4. Multiple scattered keratotic plaques           Except as noted above, no other signs of skin cancer or melanoma.     ASSESSMENT/PLAN:   Benign Above Waist skin cancer screening today. . Patient with history of melanoma in situ and NMSC  Advised on monthly self exams and 1 year  Patient Education: Appropriate brochures given.    Multiple benign appearing nevi on arms, legs and trunk. Discussed ABCDEs of melanoma and sunscreen.   Multiple lentigos on arms, legs and trunk. Advised benign, no treatment needed.  Multiple scattered angiomas. Advised benign, no treatment needed.   Seborrheic keratosis on arms, legs and trunk. Advised benign, no treatment needed.            Follow-up: Yearly FSE / PRN sooner    1.) Patient was asked about new and changing moles. YES  2.) Patient received a complete physical skin examination: YES  3.) Patient was counseled to perform a monthly self skin examination: YES  Scribed By: Adriana Rubio, MS, PA-C        Again, thank you for allowing me to participate in the care of your patient.        Sincerely,        Adriana Rubio PA-C

## 2024-07-18 NOTE — PROGRESS NOTES
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  See patient intake sheet.  ROS of the following were done and are negative: Constitutional, Eyes, Ears, Nose,   Mouth, Throat, Cardiovascular, Respiratory, GI, Genitourinary, Musculoskeletal,   Psychiatric, Endocrine, Allergic/Immunologic.    PHYSICAL EXAM:   There were no vitals taken for this visit.  Skin exam performed as follows: Type 2 skin. Mood appropriate  Alert and Oriented X 3. Well developed, well nourished in no distress.  General appearance: Normal  Head including face: Normal  Eyes: conjunctiva and lids: Normal  Mouth: Lips, teeth, gums: Normal  Neck: Normal  Chest-breast/axillae: Normal  Back: Normal  Spleen and liver: Normal  Cardiovascular: Exam of peripheral vascular system by observation for swelling, varicosities, edema: Normal  Genitalia: groin, buttocks: Normal  Extremities: digits/nails (clubbing): Normal  Eccrine and Apocrine glands: Normal  Right upper extremity: Normal  Left upper extremity: Normal  Right lower extremity: Normal  Left lower extremity: Normal  Skin: Scalp and body hair: See below    Pt deferred exam of breasts, groin, buttocks: No    Other physical findings:  1. Multiple pigmented macules on extremities and trunk  2. Multiple pigmented macules on face, trunk and extremities  3. Multiple vascular papules on trunk, arms and legs  4. Multiple  scattered keratotic plaques  5. Inflamed keratotic papule on the right chest, left upper arm           Except as noted above, no other signs of skin cancer or melanoma.     ASSESSMENT/PLAN:   Benign Above Waist skin cancer screening today. . Patient with history of melanoma in situ and NMSC  Advised on monthly self exams and 1 year  Patient Education: Appropriate brochures given.    Multiple benign appearing nevi on arms, legs and trunk. Discussed ABCDEs of melanoma and sunscreen.   Multiple lentigos on arms, legs and trunk. Advised benign, no treatment needed.  Multiple scattered angiomas. Advised benign, no treatment needed.   Seborrheic keratosis on arms, legs and trunk. Advised benign, no treatment needed.  Inflamed seborrheic keratosis on the right chest, left upper arm. As physically tender cryosurgery performed. Advised on post op care.             Follow-up: Yearly FSE / PRN sooner    1.) Patient was asked about new and changing moles. YES  2.) Patient received a complete physical skin examination: YES  3.) Patient was counseled to perform a monthly self skin examination: YES  Scribed By: Adriana Rubio, MS, PA-C

## 2024-08-13 ENCOUNTER — HOSPITAL ENCOUNTER (OUTPATIENT)
Dept: MRI IMAGING | Facility: CLINIC | Age: 79
Discharge: HOME | End: 2024-08-13
Attending: PEDIATRICS | Admitting: PEDIATRICS
Payer: COMMERCIAL

## 2024-08-13 PROCEDURE — 73721 MRI JNT OF LWR EXTRE W/O DYE: CPT | Mod: 26 | Performed by: RADIOLOGY

## 2024-08-13 PROCEDURE — 72148 MRI LUMBAR SPINE W/O DYE: CPT

## 2024-08-13 PROCEDURE — 73721 MRI JNT OF LWR EXTRE W/O DYE: CPT | Mod: RT

## 2024-08-15 ENCOUNTER — TELEPHONE (OUTPATIENT)
Dept: ORTHOPEDICS | Facility: CLINIC | Age: 79
End: 2024-08-15
Payer: COMMERCIAL

## 2024-08-15 NOTE — CONFIDENTIAL NOTE
Other: Patient is requesting a cortisone shot in her right hip> Has be seeing provider Tyson. Patient states that the provider to her she needs an Ultra sound guided shot in her hip. Patient has an appt on 9/5/24. Told patient she my not get shot that day. Does this appt work for provider?     Could we send this information to you in [employer] or would you prefer to receive a phone call?:   Patient would prefer a phone call   Okay to leave a detailed message?: Yes at Cell number on file:    Telephone Information:   Mobile [phone]

## 2024-08-22 ENCOUNTER — OFFICE VISIT (OUTPATIENT)
Dept: FAMILY MEDICINE | Facility: CLINIC | Age: 79
End: 2024-08-22
Payer: COMMERCIAL

## 2024-08-22 VITALS
OXYGEN SATURATION: 97 % | RESPIRATION RATE: 16 BRPM | BODY MASS INDEX: 26.11 KG/M2 | DIASTOLIC BLOOD PRESSURE: 66 MMHG | WEIGHT: 138.2 LBS | HEART RATE: 70 BPM | TEMPERATURE: 97.9 F | SYSTOLIC BLOOD PRESSURE: 132 MMHG

## 2024-08-22 DIAGNOSIS — R23.3 PETECHIAE: Primary | ICD-10-CM

## 2024-08-22 DIAGNOSIS — R60.0 BILATERAL LEG EDEMA: ICD-10-CM

## 2024-08-22 LAB
ERYTHROCYTE [DISTWIDTH] IN BLOOD BY AUTOMATED COUNT: 13 % (ref 10–15)
HCT VFR BLD AUTO: 39 % (ref 35–47)
HGB BLD-MCNC: 12.7 G/DL (ref 11.7–15.7)
MCH RBC QN AUTO: 33.2 PG (ref 26.5–33)
MCHC RBC AUTO-ENTMCNC: 32.6 G/DL (ref 31.5–36.5)
MCV RBC AUTO: 102 FL (ref 78–100)
PLATELET # BLD AUTO: 249 10E3/UL (ref 150–450)
RBC # BLD AUTO: 3.83 10E6/UL (ref 3.8–5.2)
WBC # BLD AUTO: 5 10E3/UL (ref 4–11)

## 2024-08-22 PROCEDURE — 99213 OFFICE O/P EST LOW 20 MIN: CPT | Performed by: FAMILY MEDICINE

## 2024-08-22 PROCEDURE — 36415 COLL VENOUS BLD VENIPUNCTURE: CPT | Performed by: FAMILY MEDICINE

## 2024-08-22 PROCEDURE — 85027 COMPLETE CBC AUTOMATED: CPT | Performed by: FAMILY MEDICINE

## 2024-08-22 NOTE — PROGRESS NOTES
A/P:      ICD-10-CM    1. Petechiae  R23.3 CBC with platelets     CBC with platelets      2. Bilateral leg edema  R60.0 Compression Sleeve/Stocking Order for DME - ONLY FOR DME        Patient Instructions   The red spots on your legs are called petechiae.  This can happen in the lower legs when there is swelling or people are on their feet for a long time.  Your aspirin is making this more likely since it inhibits your platelet function.  Please continue your aspirin thought, that is important!    This is not dangerous and will resolve over a number of weeks but might well return again.    We are checking your platelets today just to be on the safe side, I expect they will be normal.    You can consider wearing compressions socks for days you are going to be on your feet for a long time or sitting for a long time (like car or plane travel).  They might help a bit with the swelling and the red spots.      Deirdre Peterson is a 78 year old, presenting for the following health issues:  Edema        8/22/2024     4:03 PM   Additional Questions   Roomed by Saima PATEL   Accompanied by Self      HPI       Redness and swelling of both ankles for the last 2 weeks, has intermittent for the last 3 years    Has had persistent swelling for some time, has been pretty mild and generally not bothersome.  Compression has been recommended in the past but she has generally not pursued.    Has had this intermittent appearance of red spots as well.  Alexis to come and go, might be there for 2-3 weeks and then resolve before returning at some point in the future.    No significant bruising noted.  No easy bleeding noted.    Has been feeling well, no change in her baseline state of health.  No CP/SOB, orthopnea and PND.            Review of Systems  Constitutional, HEENT, cardiovascular, pulmonary, gi and gu systems are negative, except as otherwise noted.      Objective    /66   Pulse 70   Temp 97.9  F (36.6  C) (Tympanic)   Resp 16    Wt 62.7 kg (138 lb 3.2 oz)   SpO2 97%   BMI 26.11 kg/m    Body mass index is 26.11 kg/m .  Physical Exam   GENERAL: alert and no distress  RESP: lungs clear to auscultation - no rales, rhonchi or wheezes  CV: regular rate and rhythm, normal S1 S2, no S3 or S4, no murmur, click or rub, no peripheral edema  MS: 1+ pitting edema to lower shin with associated venous stasis and scattered petechiae john  PSYCH: mentation appears normal, affect normal/bright    Epic reviewed        Signed Electronically by: Padmini Bauman DO

## 2024-08-22 NOTE — PATIENT INSTRUCTIONS
The red spots on your legs are called petechiae.  This can happen in the lower legs when there is swelling or people are on their feet for a long time.  Your aspirin is making this more likely since it inhibits your platelet function.  Please continue your aspirin thought, that is important!    This is not dangerous and will resolve over a number of weeks but might well return again.    We are checking your platelets today just to be on the safe side, I expect they will be normal.    You can consider wearing compressions socks for days you are going to be on your feet for a long time or sitting for a long time (like car or plane travel).  They might help a bit with the swelling and the red spots.

## 2024-09-05 ENCOUNTER — OFFICE VISIT (OUTPATIENT)
Dept: ORTHOPEDICS | Facility: CLINIC | Age: 79
End: 2024-09-05
Attending: PEDIATRICS
Payer: COMMERCIAL

## 2024-09-05 DIAGNOSIS — M25.551 CHRONIC RIGHT HIP PAIN: ICD-10-CM

## 2024-09-05 DIAGNOSIS — M16.11 PRIMARY OSTEOARTHRITIS OF RIGHT HIP: Primary | ICD-10-CM

## 2024-09-05 DIAGNOSIS — G89.29 CHRONIC RIGHT HIP PAIN: ICD-10-CM

## 2024-09-05 PROCEDURE — 20611 DRAIN/INJ JOINT/BURSA W/US: CPT | Mod: RT | Performed by: FAMILY MEDICINE

## 2024-09-05 RX ORDER — ROPIVACAINE HYDROCHLORIDE 5 MG/ML
2 INJECTION, SOLUTION EPIDURAL; INFILTRATION; PERINEURAL
Status: SHIPPED | OUTPATIENT
Start: 2024-09-05

## 2024-09-05 RX ORDER — BETAMETHASONE SODIUM PHOSPHATE AND BETAMETHASONE ACETATE 3; 3 MG/ML; MG/ML
6 INJECTION, SUSPENSION INTRA-ARTICULAR; INTRALESIONAL; INTRAMUSCULAR; SOFT TISSUE
Status: SHIPPED | OUTPATIENT
Start: 2024-09-05

## 2024-09-05 RX ADMIN — BETAMETHASONE SODIUM PHOSPHATE AND BETAMETHASONE ACETATE 6 MG: 3; 3 INJECTION, SUSPENSION INTRA-ARTICULAR; INTRALESIONAL; INTRAMUSCULAR; SOFT TISSUE at 09:39

## 2024-09-05 RX ADMIN — ROPIVACAINE HYDROCHLORIDE 2 ML: 5 INJECTION, SOLUTION EPIDURAL; INFILTRATION; PERINEURAL at 09:39

## 2024-09-05 NOTE — LETTER
9/5/2024      Kristen Thmopson  [address] [place] [id number]      Dear Colleague,    Thank you for referring your patient, Kristen Thompson, to the [facility]. Please see a copy of my visit note below.    Large Joint Injection/Arthocentesis: R hip joint    Date/Time: 9/5/2024 9:39 AM    Performed by: Cuate De La Fuente MD  Authorized by: Cuate De La Fuente MD    Indications:  Pain and osteoarthritis  Needle Size:  22 G  Guidance: ultrasound    Approach:  Anterior  Location:  Hip      Site:  R hip joint  Medications:  6 mg betamethasone acet & sod phos 6 (3-3) MG/ML; 2 mL ROPivacaine 5 MG/ML  Outcome:  Tolerated well, no immediate complications  Procedure discussed: discussed risks, benefits, and alternatives    Consent Given by:  Patient  Timeout: timeout called immediately prior to procedure    Prep: patient was prepped and draped in usual sterile fashion     Ultrasound images of procedure were permanently stored.   Referred by Dr. Mehta     Patient reported improvement of pain after the numbing portion right hip joint steroid injection.  Ultrasound guided images were permanently stored.  Aftercare instructions given to patient.  Plan to follow-up as previously discussed with referring provider.     Cuate De La Fuente MD [facility]      I was present with the resident during the history and exam.  I discussed the case with the resident and agree with the findings as documented in the assessment and plan.         Again, thank you for allowing me to participate in the care of your patient.        Sincerely,        Cuate De La Fuente MD

## 2024-09-05 NOTE — PATIENT INSTRUCTIONS
[facility] [place]    Procedure: Right hip joint steroid injection     Follow-up: 3+ mon as needed for repeat steroid injections  You may shower, however avoid swimming, tub baths or hot tubs for 24 hours following your procedure  You may have a mild to moderate increase in pain for several days following the injection.  It may take up to 14 days for the steroid medication to start working although you may feel the effect as early as a few days after the procedure.  You may use ice packs for 10-15 minutes, 3 to 4 times a day at the injection site for comfort  You may use anti-inflammatory medications (such as Ibuprofen or Aleve or Advil) or Tylenol for pain control if necessary  If you were fasting, you may resume your normal diet and medications after the procedure  If you have diabetes, check your blood sugar more frequently than usual as your blood sugar may be higher than normal for 10-14 days following a steroid injection. Contact your doctor who manages your diabetes if your blood sugar is higher than usual    If you experience any of the following, call [facility] @ [phone] or [phone]  -Fever over 100 degree F  -Swelling, bleeding, redness, drainage, warmth at the injection site  - New or worsening pain     It was great seeing you today!    Cuate De La Fuente

## 2024-09-05 NOTE — PROGRESS NOTES
Large Joint Injection/Arthocentesis: R hip joint    Date/Time: 9/5/2024 9:39 AM    Performed by: Cuate De La Fuente MD  Authorized by: Cuate De La Fuente MD    Indications:  Pain and osteoarthritis  Needle Size:  22 G  Guidance: ultrasound    Approach:  Anterior  Location:  Hip      Site:  R hip joint  Medications:  6 mg betamethasone acet & sod phos 6 (3-3) MG/ML; 2 mL ROPivacaine 5 MG/ML  Outcome:  Tolerated well, no immediate complications  Procedure discussed: discussed risks, benefits, and alternatives    Consent Given by:  Patient  Timeout: timeout called immediately prior to procedure    Prep: patient was prepped and draped in usual sterile fashion     Ultrasound images of procedure were permanently stored.   Referred by Dr. Mehta     Patient reported improvement of pain after the numbing portion right hip joint steroid injection.  Ultrasound guided images were permanently stored.  Aftercare instructions given to patient.  Plan to follow-up as previously discussed with referring provider.     Cuate De La Fuente MD [facility]      I was present with the resident during the history and exam.  I discussed the case with the resident and agree with the findings as documented in the assessment and plan.

## 2024-10-01 DIAGNOSIS — G62.9 NEUROPATHY: ICD-10-CM

## 2024-10-01 DIAGNOSIS — G47.00 INSOMNIA, UNSPECIFIED TYPE: ICD-10-CM

## 2024-10-01 DIAGNOSIS — I10 ESSENTIAL HYPERTENSION WITH GOAL BLOOD PRESSURE LESS THAN 130/80: ICD-10-CM

## 2024-10-01 DIAGNOSIS — I63.9 CEREBROVASCULAR ACCIDENT (CVA), UNSPECIFIED MECHANISM (H): ICD-10-CM

## 2024-10-01 DIAGNOSIS — K21.9 GASTROESOPHAGEAL REFLUX DISEASE WITHOUT ESOPHAGITIS: ICD-10-CM

## 2024-10-02 RX ORDER — DULOXETIN HYDROCHLORIDE 60 MG/1
60 CAPSULE, DELAYED RELEASE ORAL DAILY
Qty: 90 CAPSULE | Refills: 0 | Status: SHIPPED | OUTPATIENT
Start: 2024-10-02 | End: 2024-11-13

## 2024-10-02 RX ORDER — AMLODIPINE BESYLATE 5 MG/1
5 TABLET ORAL DAILY
Qty: 90 TABLET | Refills: 0 | Status: SHIPPED | OUTPATIENT
Start: 2024-10-02 | End: 2024-11-13

## 2024-10-02 RX ORDER — TRAZODONE HYDROCHLORIDE 50 MG/1
50 TABLET, FILM COATED ORAL
Qty: 90 TABLET | Refills: 0 | Status: SHIPPED | OUTPATIENT
Start: 2024-10-02 | End: 2024-11-13

## 2024-10-02 NOTE — TELEPHONE ENCOUNTER
Has upcoming appointment with Dr. Cortney Hung on 11/13/24.   Prescription approved per [facility] Refill Protocol.  Julie Behrendt RN

## 2024-10-02 NOTE — TELEPHONE ENCOUNTER
Routing refill request to provider for review/approval because:  Drug interaction warning    Last office visit: 8/22/2024 ; last virtual visit: Visit date not found with prescribing provider:     Future Office Visit:   Next 5 appointments (look out 90 days)      Nov 13, 2024 8:30 AM  (Arrive by 8:10 AM)  Annual Wellness Visit with Cortney Hung MD  [facility] ([facility] ) [address] [facility] [address]  [phone]           Julie Behrendt RN

## 2024-10-08 ENCOUNTER — OFFICE VISIT (OUTPATIENT)
Dept: ORTHOPEDICS | Facility: CLINIC | Age: 79
End: 2024-10-08
Payer: COMMERCIAL

## 2024-10-08 VITALS
WEIGHT: 137 LBS | HEIGHT: 61 IN | SYSTOLIC BLOOD PRESSURE: 130 MMHG | BODY MASS INDEX: 25.86 KG/M2 | DIASTOLIC BLOOD PRESSURE: 83 MMHG

## 2024-10-08 DIAGNOSIS — R20.2 NUMBNESS AND TINGLING IN RIGHT HAND: Primary | ICD-10-CM

## 2024-10-08 DIAGNOSIS — G56.03 BILATERAL CARPAL TUNNEL SYNDROME: ICD-10-CM

## 2024-10-08 DIAGNOSIS — G56.21 CUBITAL TUNNEL SYNDROME ON RIGHT: ICD-10-CM

## 2024-10-08 DIAGNOSIS — R20.0 NUMBNESS AND TINGLING IN RIGHT HAND: Primary | ICD-10-CM

## 2024-10-08 PROCEDURE — 99213 OFFICE O/P EST LOW 20 MIN: CPT | Mod: 25 | Performed by: FAMILY MEDICINE

## 2024-10-08 PROCEDURE — 20526 THER INJECTION CARP TUNNEL: CPT | Mod: 50 | Performed by: FAMILY MEDICINE

## 2024-10-08 RX ORDER — TRIAMCINOLONE ACETONIDE 40 MG/ML
40 INJECTION, SUSPENSION INTRA-ARTICULAR; INTRAMUSCULAR
Status: SHIPPED | OUTPATIENT
Start: 2024-10-08

## 2024-10-08 RX ORDER — LIDOCAINE HYDROCHLORIDE 10 MG/ML
2 INJECTION, SOLUTION INFILTRATION; PERINEURAL
Status: SHIPPED | OUTPATIENT
Start: 2024-10-08

## 2024-10-08 RX ADMIN — LIDOCAINE HYDROCHLORIDE 2 ML: 10 INJECTION, SOLUTION INFILTRATION; PERINEURAL at 17:00

## 2024-10-08 RX ADMIN — TRIAMCINOLONE ACETONIDE 40 MG: 40 INJECTION, SUSPENSION INTRA-ARTICULAR; INTRAMUSCULAR at 17:00

## 2024-10-08 NOTE — PROGRESS NOTES
Kristen Thompson  :  1945  DOS: 10/8/2024  MRN: [id number]    Sports Medicine Clinic Visit    PCP: Cortney Hung    Kristen Thompson is a 78 year old Right hand dominant female who is seen in follow-up presenting with bilateral hand numbness/tingling.    Interim History - 2024  Since last visit on 24 patient has moderate-severe bilateral hand numbness/tingling over the past 4 - 6 weeks.  Bilateral hand carpal tunnel injection completed on 24 provided relief for ~ 4+ months.  Patient notes waking with significant hand numbness over the past 4 weeks.  Desires repeat injections today.  No new injury in the interim.      Review of Systems  Musculoskeletal: as above  Remainder of review of systems is negative including constitutional, CV, pulmonary, GI, Skin and Neurologic except as noted in HPI or medical history.    Past Medical History:   Diagnosis Date    Arthritis 2013    Gastro-oesophageal reflux disease     GERD (gastroesophageal reflux disease) 10/02/2012    H/O total hysterectomy     HL (hearing loss) 10/11/2012    Malignant melanoma (H)     PONV (postoperative nausea and vomiting)     Squamous cell carcinoma      Past Surgical History:   Procedure Laterality Date    BIOPSY BREAST      BIOPSY BREAST Left 2022    Procedure: Seed Localized Left Breast Biopsy;  Surgeon: Jeremy Berg DO;  Location: [facility]    COLONOSCOPY  10/30/2012    Procedure: COLONOSCOPY;  Colonoscopy;  Surgeon: Denise Bah MD;  Location: [facility]    ESOPHAGOSCOPY, GASTROSCOPY, DUODENOSCOPY (EGD), COMBINED N/A 9/15/2016    Procedure: COMBINED ESOPHAGOSCOPY, GASTROSCOPY, DUODENOSCOPY (EGD);  Surgeon: Bennie Godinez MD;  Location: [facility]    GALLBLADDER SURGERY      HYSTERECTOMY      knee replacment  2007    RELEASE TRIGGER FINGER Right 2016    Procedure: RELEASE TRIGGER FINGER;  Surgeon: Percy Sarmiento MD;  Location: [facility] OR    REPAIR BLADDER      ZZC RAD RESEC  "TONSIL/PILLARS       Family History   Problem Relation Age of Onset    C.A.D. Mother     Cardiovascular Mother     Thyroid Disease Mother     Cancer Father         stomach ca    Cancer Sister     Eye Disorder Sister     C.A.D. Sister     Cerebrovascular Disease Sister     Breast Cancer Sister     Arthritis Sister     Cardiovascular Sister     Depression Sister     Heart Disease Sister     Neurologic Disorder Sister     Osteoporosis Sister     Thyroid Disease Sister     Depression Sister     Thyroid Disease Sister     Depression Sister     Thyroid Disease Sister     Obesity Sister     Neurologic Disorder Sister          Objective  /83   Ht 1.549 m (5' 1\")   Wt 62.1 kg (137 lb)   BMI 25.89 kg/m      General: healthy, alert and in no distress    HEENT: no scleral icterus or conjunctival erythema   Skin: no suspicious lesions or rash. No jaundice.   CV: regular rhythm by palpation, 2+ distal pulses, no pedal edema    Resp: normal respiratory effort without conversational dyspnea   Psych: normal mood and affect    Gait: nonantalgic, appropriate coordination and balance   Neuro: normal light touch sensory exam of the extremities. Motor strength as noted below     Bilateral Wrist and Hand exam    Inspection:       No swelling, bruising or deformity bilateral    Tender:       Mild over flexor retinaculum and flexor muscles of forearm, R>L    Non Tender:       Remainder of the Wrist and Hand b/l,      anatomic snuffbox b/l,      scapholunate interval b/l  and      TFCC b/l    ROM:       Full and symmetric active and passive range of motion of the forearm, wrist and digits bilateral and      Pain with passive flexion and extension on the R>L wrist generally    Strength:       5/5 strength in the muscles of the hand, wrist and forearm bilateral    Special Tests:        positive (+) Tinel's test mild R,       positive (+) Phatlen's test b/l,       neg (-) TFCC compression test bilateral and       neg (-) Finkelstein's " maneuver bilateral    Neurovascular:       2+ radial pulses bilaterally with brisk capillary refill,      normal sensation to light touch in the radial, median and ulnar nerve distributions and      abnormal sensation with CTS testing as above    Bilateral Elbow exam:    Inspection:       no ecchymosis       no edema or effusion    ROM:       full with flexion, extension, forearm supination and pronation.    Strength:       flexion 5/5       extension 5/5       forearm supination 5/5       forearm pronation 5/5    Tender:       Posteromedial right elbow, over cubital tunnel, mild    Non-Tender:       remainder of elbow area       lateral epicondyle       medial epicondyle       radial head       olecranon       antecubital space    Sensation:       + Roma'ls at cubital tunnel     Skin:       well perfused       capillary refill less than 2 seconds      Radiology:  Prior EMG reviewed in detail today    Hand / Upper Extremity Injection/Arthrocentesis: bilateral carpal tunnel    Date/Time: 10/8/2024 5:00 PM    Performed by: Parveen Maldonado DO  Authorized by: Parveen Maldonado DO    Indications:  Therapeutic, diagnostic and pain  Needle Size:  25 G  Guidance: ultrasound    Condition: carpal tunnel    Laterality:  Bilateral    Site:  Bilateral carpal tunnel  Medications (Right):  40 mg triamcinolone 40 MG/ML; 2 mL lidocaine 1 %  Medications (Left):  40 mg triamcinolone 40 MG/ML; 2 mL lidocaine 1 %  Outcome:  Tolerated well, no immediate complications  Procedure discussed: discussed risks, benefits, and alternatives    Timeout: timeout called immediately prior to procedure    Prep: patient was prepped and draped in usual sterile fashion     Hydrodissection of median nerve in carpal tunnel performed, with fenestration of transverse carpal ligament      Ultrasound images of procedure were permanently stored.       Assessment:  1. Numbness and tingling in right hand    2. Bilateral carpal tunnel syndrome     3. Cubital tunnel syndrome on right        Plan:  Discussed the assessment with the patient.  Follow up: prn based on progress  Reviewed prior clinical course  Somewhat delayed onset of CSI last time, but then worked well for about 5 months per patient  Requesting repeat US guided CSI today  Reviewed alternatives and compliments including OT, bracing strategies, OTC medications, HEP, activity modification  After discussion of the relative risks and goals, US guided b/l carpal tunnel CSI performed today  Also with milder right UE cubital tunnel sx  Tubigrip provided as well as handout, use sleeve at night to prevent deep bending  We discussed modified progressive pain-free activity as tolerated  Expectations and goals of CSI reviewed  Often 2-3 days for steroid effect, and can take up to two weeks for maximum effect  We discussed modified progressive pain-free activity as tolerated  Do not overuse in first two weeks if feeling better due to concern for vulnerability while steroid is working  Supportive care reviewed  All questions were answered today  Contact us with additional questions or concerns  Signs and sx of concern reviewed      Parveen Maldonado DO, CAQ  Sports Medicine Physician  [facility]          Disclaimer: This note consists of symbols derived from keyboarding, dictation and/or voice recognition software. As a result, there may be errors in the script that have gone undetected. Please consider this when interpreting information found in this chart.   Airway patent, Nasal mucosa clear. Mouth with normal mucosa. Throat has no vesicles, no oropharyngeal exudates and uvula is midline.

## 2024-10-22 ENCOUNTER — OFFICE VISIT (OUTPATIENT)
Dept: NEUROLOGY | Facility: CLINIC | Age: 79
End: 2024-10-22
Payer: COMMERCIAL

## 2024-10-22 VITALS
WEIGHT: 137 LBS | HEART RATE: 64 BPM | DIASTOLIC BLOOD PRESSURE: 87 MMHG | HEIGHT: 61 IN | SYSTOLIC BLOOD PRESSURE: 121 MMHG | BODY MASS INDEX: 25.86 KG/M2

## 2024-10-22 DIAGNOSIS — Z79.899 ENCOUNTER FOR LONG-TERM (CURRENT) USE OF MEDICATIONS: ICD-10-CM

## 2024-10-22 DIAGNOSIS — Z86.73 HISTORY OF STROKE: ICD-10-CM

## 2024-10-22 DIAGNOSIS — G56.03 BILATERAL CARPAL TUNNEL SYNDROME: Primary | ICD-10-CM

## 2024-10-22 PROCEDURE — 99213 OFFICE O/P EST LOW 20 MIN: CPT | Performed by: PSYCHIATRY & NEUROLOGY

## 2024-10-22 NOTE — PROGRESS NOTES
ESTABLISHED PATIENT NEUROLOGY NOTE    DATE OF VISIT: 10/22/2024  MRN: [id number]  PATIENT NAME: Kristen Thompson  YOB: 1945    Chief Complaint   Patient presents with    Stroke     Patient has no concerns. 6 month follow up     SUBJECTIVE:                                                      HISTORY OF PRESENT ILLNESS:  Kristen is here for follow up regarding  residual stroke symptoms and carpal tunnel syndrome.      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  I recommended a wrist splint.  She takes modafinil for fatigue.  She also takes Cymbalta through her primary provider.  We talked about adding amitriptyline in the future if needed.     She has had some sensations of something in her throat and a fluttering noise in her Left ear.  She has seen ENT.  She has tried steroids for this.  She has been told it is related to hearing loss, or worsening per audiology.  Hearing aids help some of the time.  Kristen tells me she is still having a lot of pain in her left wrist.  She is using her wrist splints at night but has not noticed improvement.  The paresthesias are worse in the right hand compared to the left but this is her dominant hand.  She has seen orthopedics in the past for her shoulders and knees.  She notices more droopiness and sensory changes in the face and left side when she is tired.     She continues to have left eye pain and tells me she has seen for eye doctors for this and they cannot find anything wrong.  She does use an eye gel and eyedrops for this.     The Cymbalta helps quite a bit with her emotional lability.  She is not sure if it helps from a pain standpoint.    Our plan was to have Kristen see a hand specialist through orthopedics and can continue wearing the wrist splints at night.  I also recommended she continue the chiropractic and acupuncture treatments as well as Cymbalta and  modafinil.  There was a little bit of an issue getting the modafinil covered but it seems that this was resolved in the interim.    Per chart review, Kristen did have localized triamcinolone/lidocaine injections in the wrist in April with good response and then repeat injections for recurrence of symptoms earlier this month.    Kristen says that she is pretty stable.  She does have some cramping in her left hand at times, this is the side affected by her stroke.  She was happy with the 5 months of relief she got with the wrist injections.  She is still having a little bit of tingling in the left hand as well.  She is still doing acupuncture and chiropractic treatments  She has arthritis and bursitis and a tear in her Right hip. She has been doing cortisone shots with this as well, with some relief.   She is on Magnesium.     No new neurologic concerns.    Kristen tells me that she is embarrassed to admit how many jars of jelly and canned items she has put together this year.  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    CURRENT MEDICATIONS:   Current Outpatient Medications   Medication Sig Dispense Refill    acetaminophen (TYLENOL) 325 MG tablet Take 650 mg by mouth      amLODIPine (NORVASC) 5 MG tablet TAKE 1 TABLET BY MOUTH EVERY DAY 90 tablet 0    aspirin 81 MG EC tablet Take 81 mg by mouth daily      atorvastatin (LIPITOR) 10 MG tablet TAKE 1 TABLET BY MOUTH EVERY DAY 90 tablet 3    azelastine (ASTELIN) 0.1 % nasal spray Spray 2 sprays into both nostrils 2 times daily as needed for rhinitis 30 mL 0    CALCIUM PO Take by mouth daily       Cholecalciferol (VITAMIN D PO) Take 1,000 Units by mouth daily       docusate sodium (COLACE) 100 MG capsule Take 1 capsule (100 mg) by mouth 2 times daily Take while on opiate to prevent constipation  0    DULoxetine (CYMBALTA) 60 MG capsule TAKE 1 CAPSULE BY MOUTH EVERY DAY 90 capsule 0    Fluticasone Propionate (FLONASE  "ALLERGY RELIEF NA) Spray in nostril as needed      MAGNESIUM PO Take 250 mg by mouth daily       modafinil (PROVIGIL) 100 MG tablet TAKE 1 TABLET BY MOUTH EVERY DAY 30 tablet 5    Omega-3 Fatty Acids (OMEGA 3 PO) Take 2 capsules by mouth daily       omeprazole (PRILOSEC) 20 MG DR capsule TAKE 1 CAPSULE (20 MG) BY MOUTH 2 TIMES DAILY 30-60 MINUTES BEFORE A MEAL. 180 capsule 0    Polyethyl Glycol-Propyl Glycol (SYSTANE FREE OP) Apply 2 drops to eye as needed (dry eyes)      Polyethylene Glycol 3350 (MIRALAX PO)       traZODone (DESYREL) 50 MG tablet TAKE 1 TABLET (50 MG) BY MOUTH NIGHTLY AS NEEDED 90 tablet 0     Current Facility-Administered Medications   Medication Dose Route Frequency Provider Last Rate Last Admin    2 mL ropivacaine (NAROPIN) injection 5 mg/mL  2 mL      2 mL at 09/05/24 0939    betamethasone acet & sod phos (CELESTONE) injection 6 mg  6 mg      6 mg at 09/05/24 0939    lidocaine 1 % injection 2 mL  2 mL      2 mL at 10/08/24 1700    lidocaine 1 % injection 2 mL  2 mL      2 mL at 10/08/24 1700    lidocaine 2%-EPINEPHrine 1:100,000 injection 20 mL  20 mL Intradermal Once Cortney Hung MD        triamcinolone (KENALOG-40) injection 40 mg  40 mg      40 mg at 10/08/24 1700    triamcinolone (KENALOG-40) injection 40 mg  40 mg      40 mg at 10/08/24 1700       RECENT DIAGNOSTIC STUDIES:   Labs: No results found for any visits on 10/22/24.    REVIEW OF SYSTEMS:                                                      10-point review of systems is negative except as mentioned above in HPI.    EXAM:                                                      Physical Exam:   Vitals: /87 (BP Location: Right arm, Patient Position: Sitting)   Pulse 64   Ht 1.549 m (5' 1\")   Wt 62.1 kg (137 lb)   BMI 25.89 kg/m    BMI= Body mass index is 25.89 kg/m .  GENERAL: NAD.  HEENT: NC/AT.  PULM: Non-labored breathing.   Neurologic:  MENTAL STATUS: Alert, attentive. Speech impediment. Normal comprehension. Normal " concentration. Adequate fund of knowledge.   CRANIAL NERVES: Discs technically difficult to visualize. Visual fields intact to confrontation. Pupils equally, round and reactive to light. Facial sensation and movement normal. EOM full. Hard of hearing. Trapezius strength intact. Tongue midline.  MOTOR: Strength is 5/5 in proximal and distal muscle groups of upper and lower extremities. Tone and bulk normal.   DTRs: Intact and symmetric in biceps, BR, patellae.  Cannot elicit ankle jerks.  Babinski down-going bilaterally.   SENSATION: Intact pinprick in the fingertips on the left.  Previous hyperesthesia not present today.  COORDINATION: Normal finger nose finger on the right, dysmetria on the left.  Knee heel shin normal.   STATION AND GAIT:  Casual gait - slight limp.  Right hand-dominant.     ASSESSMENT and PLAN:                                                      Assessment:    ICD-10-CM    1. Bilateral carpal tunnel syndrome  G56.03       2. History of stroke  Z86.73       3. Encounter for long-term (current) use of medications  Z79.899           Ms. Thompson is a pleasant 79-year-old woman with history of stroke and carpal tunnel syndrome.  She feels happy with her current progress with the cortisone injections for the CTS.  She is not interested in surgery if she can avoid it.  No new stroke concerns, she does have some cramping in her hand which is likely multifactorial.  I reassured Kristen that I do not think this is anything to be concerned about.  We will plan to follow-up again in 6 months.  She expressed understanding and agreement with the plan    Plan:  Continue the periodic wrist injections with Dr. Maldonado.  Continue the modafinil and  Cymbalta.  Continue current regimen for stroke risk prevention.  Let's follow-up again in 6 months.  Please let me know if any concerns arise in the meantime.    Total Time: 25 minutes were spent with the patient and in chart review/documentation (as described above in  Assessment and Plan)/coordinating the care on date of service.    Yasmin Quintanilla MD  Neurology    Dragon software used in the dictation of this note.

## 2024-10-22 NOTE — NURSING NOTE
Chief Complaint   Patient presents with    Stroke     Patient has no concerns. 6 month follow up     Aye Tarango on 10/22/2024 at 7:51 AM

## 2024-10-22 NOTE — PATIENT INSTRUCTIONS
Plan:  Continue the periodic wrist injections with Dr. Maldonado.  Continue the modafinil and  Cymbalta.  Continue current regimen for stroke risk prevention.  Let's follow-up again in 6 months.  Please let me know if any concerns arise in the meantime.

## 2024-10-22 NOTE — LETTER
10/22/2024      Kristen Thompson  [address] [place] [id number]      Dear Colleague,    Thank you for referring your patient, Kristen Thompson, to the [facility] [place]. Please see a copy of my visit note below.    ESTABLISHED PATIENT NEUROLOGY NOTE    DATE OF VISIT: 10/22/2024  MRN: [id number]  PATIENT NAME: Kristen Thompson  YOB: 1945    Chief Complaint   Patient presents with     Stroke     Patient has no concerns. 6 month follow up     SUBJECTIVE:                                                      HISTORY OF PRESENT ILLNESS:  Kristen is here for follow up regarding  residual stroke symptoms and carpal tunnel syndrome.      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  I recommended a wrist splint.  She takes modafinil for fatigue.  She also takes Cymbalta through her primary provider.  We talked about adding amitriptyline in the future if needed.     She has had some sensations of something in her throat and a fluttering noise in her Left ear.  She has seen ENT.  She has tried steroids for this.  She has been told it is related to hearing loss, or worsening per audiology.  Hearing aids help some of the time.  Kristen tells me she is still having a lot of pain in her left wrist.  She is using her wrist splints at night but has not noticed improvement.  The paresthesias are worse in the right hand compared to the left but this is her dominant hand.  She has seen orthopedics in the past for her shoulders and knees.  She notices more droopiness and sensory changes in the face and left side when she is tired.     She continues to have left eye pain and tells me she has seen for eye doctors for this and they cannot find anything wrong.  She does use an eye gel and eyedrops for this.     The Cymbalta helps quite a bit with her emotional lability.  She is not sure if it  helps from a pain standpoint.    Our plan was to have Kristen see a hand specialist through orthopedics and can continue wearing the wrist splints at night.  I also recommended she continue the chiropractic and acupuncture treatments as well as Cymbalta and modafinil.  There was a little bit of an issue getting the modafinil covered but it seems that this was resolved in the interim.    Per chart review, Kristen did have localized triamcinolone/lidocaine injections in the wrist in April with good response and then repeat injections for recurrence of symptoms earlier this month.    Kristen says that she is pretty stable.  She does have some cramping in her left hand at times, this is the side affected by her stroke.  She was happy with the 5 months of relief she got with the wrist injections.  She is still having a little bit of tingling in the left hand as well.  She is still doing acupuncture and chiropractic treatments  She has arthritis and bursitis and a tear in her Right hip. She has been doing cortisone shots with this as well, with some relief.   She is on Magnesium.     No new neurologic concerns.    Kristen tells me that she is embarrassed to admit how many jars of jelly and canned items she has put together this year.  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    CURRENT MEDICATIONS:   Current Outpatient Medications   Medication Sig Dispense Refill     acetaminophen (TYLENOL) 325 MG tablet Take 650 mg by mouth       amLODIPine (NORVASC) 5 MG tablet TAKE 1 TABLET BY MOUTH EVERY DAY 90 tablet 0     aspirin 81 MG EC tablet Take 81 mg by mouth daily       atorvastatin (LIPITOR) 10 MG tablet TAKE 1 TABLET BY MOUTH EVERY DAY 90 tablet 3     azelastine (ASTELIN) 0.1 % nasal spray Spray 2 sprays into both nostrils 2 times daily as needed for rhinitis 30 mL 0     CALCIUM PO Take by mouth daily        Cholecalciferol (VITAMIN D PO) Take 1,000 Units by mouth daily         docusate sodium (COLACE) 100 MG capsule Take 1 capsule (100 mg) by mouth 2 times daily Take while on opiate to prevent constipation  0     DULoxetine (CYMBALTA) 60 MG capsule TAKE 1 CAPSULE BY MOUTH EVERY DAY 90 capsule 0     Fluticasone Propionate (FLONASE ALLERGY RELIEF NA) Spray in nostril as needed       MAGNESIUM PO Take 250 mg by mouth daily        modafinil (PROVIGIL) 100 MG tablet TAKE 1 TABLET BY MOUTH EVERY DAY 30 tablet 5     Omega-3 Fatty Acids (OMEGA 3 PO) Take 2 capsules by mouth daily        omeprazole (PRILOSEC) 20 MG DR capsule TAKE 1 CAPSULE (20 MG) BY MOUTH 2 TIMES DAILY 30-60 MINUTES BEFORE A MEAL. 180 capsule 0     Polyethyl Glycol-Propyl Glycol (SYSTANE FREE OP) Apply 2 drops to eye as needed (dry eyes)       Polyethylene Glycol 3350 (MIRALAX PO)        traZODone (DESYREL) 50 MG tablet TAKE 1 TABLET (50 MG) BY MOUTH NIGHTLY AS NEEDED 90 tablet 0     Current Facility-Administered Medications   Medication Dose Route Frequency Provider Last Rate Last Admin     2 mL ropivacaine (NAROPIN) injection 5 mg/mL  2 mL      2 mL at 09/05/24 0939     betamethasone acet & sod phos (CELESTONE) injection 6 mg  6 mg      6 mg at 09/05/24 0939     lidocaine 1 % injection 2 mL  2 mL      2 mL at 10/08/24 1700     lidocaine 1 % injection 2 mL  2 mL      2 mL at 10/08/24 1700     lidocaine 2%-EPINEPHrine 1:100,000 injection 20 mL  20 mL Intradermal Once Cortney Hung MD         triamcinolone (KENALOG-40) injection 40 mg  40 mg      40 mg at 10/08/24 1700     triamcinolone (KENALOG-40) injection 40 mg  40 mg      40 mg at 10/08/24 1700       RECENT DIAGNOSTIC STUDIES:   Labs: No results found for any visits on 10/22/24.    REVIEW OF SYSTEMS:                                                      10-point review of systems is negative except as mentioned above in HPI.    EXAM:                                                      Physical Exam:   Vitals: /87 (BP Location: Right arm, Patient Position:  "Sitting)   Pulse 64   Ht 1.549 m (5' 1\")   Wt 62.1 kg (137 lb)   BMI 25.89 kg/m    BMI= Body mass index is 25.89 kg/m .  GENERAL: NAD.  HEENT: NC/AT.  PULM: Non-labored breathing.   Neurologic:  MENTAL STATUS: Alert, attentive. Speech impediment. Normal comprehension. Normal concentration. Adequate fund of knowledge.   CRANIAL NERVES: Discs technically difficult to visualize. Visual fields intact to confrontation. Pupils equally, round and reactive to light. Facial sensation and movement normal. EOM full. Hard of hearing. Trapezius strength intact. Tongue midline.  MOTOR: Strength is 5/5 in proximal and distal muscle groups of upper and lower extremities. Tone and bulk normal.   DTRs: Intact and symmetric in biceps, BR, patellae.  Cannot elicit ankle jerks.  Babinski down-going bilaterally.   SENSATION: Intact pinprick in the fingertips on the left.  Previous hyperesthesia not present today.  COORDINATION: Normal finger nose finger on the right, dysmetria on the left.  Knee heel shin normal.   STATION AND GAIT:  Casual gait - slight limp.  Right hand-dominant.     ASSESSMENT and PLAN:                                                      Assessment:    ICD-10-CM    1. Bilateral carpal tunnel syndrome  G56.03       2. History of stroke  Z86.73       3. Encounter for long-term (current) use of medications  Z79.899           Ms. Thompson is a pleasant 79-year-old woman with history of stroke and carpal tunnel syndrome.  She feels happy with her current progress with the cortisone injections for the CTS.  She is not interested in surgery if she can avoid it.  No new stroke concerns, she does have some cramping in her hand which is likely multifactorial.  I reassured Kristen that I do not think this is anything to be concerned about.  We will plan to follow-up again in 6 months.  She expressed understanding and agreement with the plan    Plan:  Continue the periodic wrist injections with Dr. Maldonado.  Continue the " modafinil and  Cymbalta.  Continue current regimen for stroke risk prevention.  Let's follow-up again in 6 months.  Please let me know if any concerns arise in the meantime.    Total Time: 25 minutes were spent with the patient and in chart review/documentation (as described above in Assessment and Plan)/coordinating the care on date of service.    Yasmin Quintanilla MD  Neurology    Dragon software used in the dictation of this note.                    Again, thank you for allowing me to participate in the care of your patient.        Sincerely,        Yasmin Quintanilla MD

## 2024-11-13 ENCOUNTER — OFFICE VISIT (OUTPATIENT)
Dept: FAMILY MEDICINE | Facility: CLINIC | Age: 79
End: 2024-11-13
Payer: COMMERCIAL

## 2024-11-13 ENCOUNTER — ORDERS ONLY (AUTO-RELEASED) (OUTPATIENT)
Dept: FAMILY MEDICINE | Facility: CLINIC | Age: 79
End: 2024-11-13

## 2024-11-13 VITALS
HEART RATE: 77 BPM | TEMPERATURE: 98.1 F | BODY MASS INDEX: 23.55 KG/M2 | SYSTOLIC BLOOD PRESSURE: 124 MMHG | WEIGHT: 128 LBS | HEIGHT: 62 IN | DIASTOLIC BLOOD PRESSURE: 78 MMHG | OXYGEN SATURATION: 96 %

## 2024-11-13 DIAGNOSIS — K21.9 GASTROESOPHAGEAL REFLUX DISEASE WITHOUT ESOPHAGITIS: ICD-10-CM

## 2024-11-13 DIAGNOSIS — Z00.00 ENCOUNTER FOR MEDICARE ANNUAL WELLNESS EXAM: Primary | ICD-10-CM

## 2024-11-13 DIAGNOSIS — I63.9 CEREBROVASCULAR ACCIDENT (CVA), UNSPECIFIED MECHANISM (H): ICD-10-CM

## 2024-11-13 DIAGNOSIS — G62.9 NEUROPATHY: ICD-10-CM

## 2024-11-13 DIAGNOSIS — E78.5 HYPERLIPIDEMIA LDL GOAL <130: ICD-10-CM

## 2024-11-13 DIAGNOSIS — Z12.11 SCREENING FOR COLON CANCER: ICD-10-CM

## 2024-11-13 DIAGNOSIS — I10 ESSENTIAL HYPERTENSION WITH GOAL BLOOD PRESSURE LESS THAN 130/80: ICD-10-CM

## 2024-11-13 DIAGNOSIS — E78.5 HYPERLIPIDEMIA, UNSPECIFIED HYPERLIPIDEMIA TYPE: ICD-10-CM

## 2024-11-13 DIAGNOSIS — J30.0 VASOMOTOR RHINITIS: ICD-10-CM

## 2024-11-13 DIAGNOSIS — R09.82 POST-NASAL DRAINAGE: ICD-10-CM

## 2024-11-13 DIAGNOSIS — G47.00 INSOMNIA, UNSPECIFIED TYPE: ICD-10-CM

## 2024-11-13 LAB
ALBUMIN SERPL BCG-MCNC: 4.2 G/DL (ref 3.5–5.2)
ALP SERPL-CCNC: 88 U/L (ref 40–150)
ALT SERPL W P-5'-P-CCNC: 21 U/L (ref 0–50)
ANION GAP SERPL CALCULATED.3IONS-SCNC: 8 MMOL/L (ref 7–15)
AST SERPL W P-5'-P-CCNC: 21 U/L (ref 0–45)
BILIRUB SERPL-MCNC: 0.5 MG/DL
BUN SERPL-MCNC: 8.4 MG/DL (ref 8–23)
CALCIUM SERPL-MCNC: 9.7 MG/DL (ref 8.8–10.4)
CHLORIDE SERPL-SCNC: 103 MMOL/L (ref 98–107)
CHOLEST SERPL-MCNC: 192 MG/DL
CREAT SERPL-MCNC: 0.7 MG/DL (ref 0.51–0.95)
EGFRCR SERPLBLD CKD-EPI 2021: 89 ML/MIN/1.73M2
FASTING STATUS PATIENT QL REPORTED: YES
FASTING STATUS PATIENT QL REPORTED: YES
GLUCOSE SERPL-MCNC: 101 MG/DL (ref 70–99)
HCO3 SERPL-SCNC: 30 MMOL/L (ref 22–29)
HDLC SERPL-MCNC: 83 MG/DL
LDLC SERPL CALC-MCNC: 96 MG/DL
NONHDLC SERPL-MCNC: 109 MG/DL
POTASSIUM SERPL-SCNC: 4.1 MMOL/L (ref 3.4–5.3)
PROT SERPL-MCNC: 6.1 G/DL (ref 6.4–8.3)
SODIUM SERPL-SCNC: 141 MMOL/L (ref 135–145)
TRIGL SERPL-MCNC: 63 MG/DL

## 2024-11-13 PROCEDURE — 99397 PER PM REEVAL EST PAT 65+ YR: CPT | Performed by: FAMILY MEDICINE

## 2024-11-13 PROCEDURE — 80053 COMPREHEN METABOLIC PANEL: CPT | Performed by: FAMILY MEDICINE

## 2024-11-13 PROCEDURE — 80061 LIPID PANEL: CPT | Performed by: FAMILY MEDICINE

## 2024-11-13 PROCEDURE — 36415 COLL VENOUS BLD VENIPUNCTURE: CPT | Performed by: FAMILY MEDICINE

## 2024-11-13 PROCEDURE — 99214 OFFICE O/P EST MOD 30 MIN: CPT | Mod: 25 | Performed by: FAMILY MEDICINE

## 2024-11-13 RX ORDER — ATORVASTATIN CALCIUM 10 MG/1
10 TABLET, FILM COATED ORAL DAILY
Qty: 90 TABLET | Refills: 3 | Status: SHIPPED | OUTPATIENT
Start: 2024-11-13

## 2024-11-13 RX ORDER — TRAZODONE HYDROCHLORIDE 50 MG/1
50 TABLET, FILM COATED ORAL
Qty: 90 TABLET | Refills: 3 | Status: SHIPPED | OUTPATIENT
Start: 2024-11-13

## 2024-11-13 RX ORDER — AZELASTINE 1 MG/ML
2 SPRAY, METERED NASAL 2 TIMES DAILY PRN
Qty: 30 ML | Refills: 3 | Status: SHIPPED | OUTPATIENT
Start: 2024-11-13

## 2024-11-13 RX ORDER — DULOXETIN HYDROCHLORIDE 60 MG/1
60 CAPSULE, DELAYED RELEASE ORAL DAILY
Qty: 90 CAPSULE | Refills: 3 | Status: SHIPPED | OUTPATIENT
Start: 2024-11-13

## 2024-11-13 RX ORDER — AMLODIPINE BESYLATE 5 MG/1
5 TABLET ORAL DAILY
Qty: 90 TABLET | Refills: 3 | Status: SHIPPED | OUTPATIENT
Start: 2024-11-13

## 2024-11-13 NOTE — PROGRESS NOTES
Preventive Care Visit  [facility] DESHAUN Hung MD, Family Medicine  Nov 13, 2024      Assessment & Plan     Encounter for Medicare annual wellness exam      Hyperlipidemia LDL goal <130  Check today and lfts   - Lipid panel reflex to direct LDL Non-fasting; Future  - Comprehensive metabolic panel (BMP + Alb, Alk Phos, ALT, AST, Total. Bili, TP); Future  - Lipid panel reflex to direct LDL Non-fasting  - Comprehensive metabolic panel (BMP + Alb, Alk Phos, ALT, AST, Total. Bili, TP)    Essential hypertension with goal blood pressure less than 130/80  Well controlled   - amLODIPine (NORVASC) 5 MG tablet; Take 1 tablet (5 mg) by mouth daily.  - Comprehensive metabolic panel (BMP + Alb, Alk Phos, ALT, AST, Total. Bili, TP); Future  - Comprehensive metabolic panel (BMP + Alb, Alk Phos, ALT, AST, Total. Bili, TP)    Neuropathy  Doing well   - DULoxetine (CYMBALTA) 60 MG capsule; Take 1 capsule (60 mg) by mouth daily.    Cerebrovascular accident (CVA), unspecified mechanism (H)  Cont control of blood pressure   - atorvastatin (LIPITOR) 10 MG tablet; Take 1 tablet (10 mg) by mouth daily.  - amLODIPine (NORVASC) 5 MG tablet; Take 1 tablet (5 mg) by mouth daily.    Hyperlipidemia, unspecified hyperlipidemia type  Check today   - atorvastatin (LIPITOR) 10 MG tablet; Take 1 tablet (10 mg) by mouth daily.    Insomnia, unspecified type  Uses most nights   - traZODone (DESYREL) 50 MG tablet; Take 1 tablet (50 mg) by mouth nightly as needed for sleep.    Gastroesophageal reflux disease without esophagitis  Cont for stomacj   - omeprazole (PRILOSEC) 20 MG DR capsule; Take 1 capsule (20 mg) by mouth 2 times daily.    Post-nasal drainage  Not seeing allergy anymore   - azelastine (ASTELIN) 0.1 % nasal spray; Spray 2 sprays into both nostrils 2 times daily as needed for rhinitis.    Vasomotor rhinitis  As above   - azelastine (ASTELIN) 0.1 % nasal spray; Spray 2 sprays into both nostrils 2 times daily as needed  for rhinitis.    Screening for colon cancer    - COLOGAGNIESZKA([employer]); Future            See Patient Instructions    Subjective   Kristen is a 79 year old, presenting for the following:  Medicare Visit        11/13/2024     8:22 AM   Additional Questions   Roomed by Tracy VALLE CMA   Working on the hoip and lower back     HPI      How many servings of fruits and vegetables do you eat daily?  4 or more  On average, how many sweetened beverages do you drink each day (Examples: soda, juice, sweet tea, etc.  Do NOT count diet or artificially sweetened beverages)?   0  How many days per week do you exercise enough to make your heart beat faster? 3 or less  How many minutes a day do you exercise enough to make your heart beat faster?   How many days per week do you miss taking your medication? 0    Health Care Directive  Patient has a Health Care Directive on file  Advance care planning document is on file and is current.      8/31/2021   General Health   How would you rate your overall physical health? Poor            8/31/2021   Nutrition   At least 4 servings of fruits and vegetables/day No            8/31/2021   Exercise   Frequency of exercise: 4-5 days/week               8/31/2021   Activities of Daily Living- Home Safety   Needs help with the following daily activites NO assistance is needed   Safety concerns in the home None of the above             No data to display                  8/31/2021   Hearing Screening   Hearing concerns? Difficulty following a conversation in a noisy restaurant or crowded room    Feel that people are mumbling or not speaking clearly    Difficulty following dialogue in the theater    Difficult to understand a speaker at a public meeting or [employer] service    Need to ask people to speak up or repeat themselves    Find that men's voices are easier to understand than woman's    Difficulty understanding soft or whispered speech    Difficulty understanding speech on the telephone        Multiple values from one day are sorted in reverse-chronological order            No data to display                       Today's PHQ-2 Score:       4/2/2024     9:47 AM   PHQ-2 ( 1999 Pf[employer])   Q1: Little interest or pleasure in doing things 0   Q2: Feeling down, depressed or hopeless 0   PHQ-2 Score 0         8/31/2021   Substance Use   Alcohol more than 3/day or more than 7/wk No        Social History     Tobacco Use    Smoking status: Never    Smokeless tobacco: Never   Substance Use Topics    Alcohol use: No     Alcohol/week: 0.0 standard drinks of alcohol    Drug use: No           10/19/2023   LAST FHS-7 RESULTS   1st degree relative breast or ovarian cancer Yes   Any relative bilateral breast cancer No   Any male have breast cancer No   Any ONE woman have BOTH breast AND ovarian cancer No   Any woman with breast cancer before 50yrs Yes   2 or more relatives with breast AND/OR ovarian cancer Yes   2 or more relatives with breast AND/OR bowel cancer Yes           Mammogram Screening - After age 74- determine frequency with patient based on health status, life expectancy and patient goals    ASCVD Risk   The ASCVD Risk score (Lizy SEHRMAN, et al., 2019) failed to calculate for the following reasons:    Risk score cannot be calculated because patient has a medical history suggesting prior/existing ASCVD            Reviewed and updated as needed this visit by Provider                    Past Medical History:   Diagnosis Date    Arthritis 11/19/2013    Gastro-oesophageal reflux disease     GERD (gastroesophageal reflux disease) 10/02/2012    H/O total hysterectomy     HL (hearing loss) 10/11/2012    Malignant melanoma (H)     PONV (postoperative nausea and vomiting)     Squamous cell carcinoma      Current providers sharing in care for this patient include:  Patient Care Team:  Cortney Hung MD as PCP - General (Family Medicine)  Yasmin Salgado MD as Assigned Neuroscience  "Provider  Cortney Hung MD as Assigned PCP  Andreia Looney PA-C as Physician Assistant (Dermatology)  Adriana Rubio PA-C as Physician Assistant (Dermatology)  Adriana Rubio PA-C as Assigned Surgical Provider  Parveen Maldonado DO as Assigned Musculoskeletal Provider    The following health maintenance items are reviewed in Epic and correct as of today:  Health Maintenance   Topic Date Due    ZOSTER IMMUNIZATION (1 of 2) Never done    Pneumococcal Vaccine: 65+ Years (3 of 3 - PPSV23 or PCV20) 09/25/2020    RSV VACCINE (1 - 1-dose 75+ series) Never done    DTAP/TDAP/TD IMMUNIZATION (1 - Tdap) 11/09/2023    INFLUENZA VACCINE (1) 09/01/2024    COVID-19 Vaccine (3 - 2024-25 season) 09/01/2024    LIPID  11/03/2024    BMP  11/08/2024    ANNUAL REVIEW OF HM ORDERS  11/08/2024    FALL RISK ASSESSMENT  11/08/2024    MEDICARE ANNUAL WELLNESS VISIT  11/13/2025    GLUCOSE  11/08/2026    DEXA  10/31/2027    ADVANCE CARE PLANNING  11/16/2028    HEPATITIS C SCREENING  Completed    PHQ-2 (once per calendar year)  Completed    HPV IMMUNIZATION  Aged Out    MENINGITIS IMMUNIZATION  Aged Out    RSV MONOCLONAL ANTIBODY  Aged Out    MAMMO SCREENING  Discontinued    COLORECTAL CANCER SCREENING  Discontinued         Review of Systems  Constitutional, HEENT, cardiovascular, pulmonary, gi and gu systems are negative, except as otherwise noted.     Objective    Exam  /78 (BP Location: Right arm, Patient Position: Chair, Cuff Size: Adult Small)   Pulse 77   Temp 98.1  F (36.7  C) (Tympanic)   Ht 1.575 m (5' 2\")   Wt 58.1 kg (128 lb)   SpO2 96%   BMI 23.41 kg/m     Estimated body mass index is 25.89 kg/m  as calculated from the following:    Height as of 10/22/24: 1.549 m (5' 1\").    Weight as of 10/22/24: 62.1 kg (137 lb).    Physical Exam  GENERAL: alert and no distress  RESP: lungs clear to auscultation - no rales, rhonchi or wheezes  CV: regular rate and rhythm, normal S1 S2, no S3 or S4, no murmur, " click or rub, no peripheral edema   NEURO: Normal strength and tone, mentation intact and speech normal  PSYCH: mentation appears normal, affect normal/bright        11/13/2024   Mini Cog   Clock Draw Score 2 Normal    2 Normal   3 Item Recall 3 objects recalled    3 objects recalled   Mini Cog Total Score 5    5       Multiple values from one day are sorted in reverse-chronological order              Signed Electronically by: Cortney Hung MD

## 2024-11-13 NOTE — PATIENT INSTRUCTIONS
Patient Education   Preventive Care Advice   This is general advice given by our system to help you stay healthy. However, your care team may have specific advice just for you. Please talk to your care team about your preventive care needs.  Nutrition  Eat 5 or more servings of fruits and vegetables each day.  Try wheat bread, brown rice and whole grain pasta (instead of white bread, rice, and pasta).  Get enough calcium and vitamin D. Check the label on foods and aim for 100% of the RDA (recommended daily allowance).  Lifestyle  Exercise at least 150 minutes each week  (30 minutes a day, 5 days a week).  Do muscle strengthening activities 2 days a week. These help control your weight and prevent disease.  No smoking.  Wear sunscreen to prevent skin cancer.  Have a dental exam and cleaning every 6 months.  Yearly exams  See your health care team every year to talk about:  Any changes in your health.  Any medicines your care team has prescribed.  Preventive care, family planning, and ways to prevent chronic diseases.  Shots (vaccines)   HPV shots (up to age 26), if you've never had them before.  Hepatitis B shots (up to age 59), if you've never had them before.  COVID-19 shot: Get this shot when it's due.  Flu shot: Get a flu shot every year.  Tetanus shot: Get a tetanus shot every 10 years.  Pneumococcal, hepatitis A, and RSV shots: Ask your care team if you need these based on your risk.  Shingles shot (for age 50 and up)  General health tests  Diabetes screening:  Starting at age 35, Get screened for diabetes at least every 3 years.  If you are younger than age 35, ask your care team if you should be screened for diabetes.  Cholesterol test: At age 39, start having a cholesterol test every 5 years, or more often if advised.  Bone density scan (DEXA): At age 50, ask your care team if you should have this scan for osteoporosis (brittle bones).  Hepatitis C: Get tested at least once in your life.  STIs (sexually  transmitted infections)  Before age 24: Ask your care team if you should be screened for STIs.  After age 24: Get screened for STIs if you're at risk. You are at risk for STIs (including HIV) if:  You are sexually active with more than one person.  You don't use condoms every time.  You or a partner was diagnosed with a sexually transmitted infection.  If you are at risk for HIV, ask about PrEP medicine to prevent HIV.  Get tested for HIV at least once in your life, whether you are at risk for HIV or not.  Cancer screening tests  Cervical cancer screening: If you have a cervix, begin getting regular cervical cancer screening tests starting at age 21.  Breast cancer scan (mammogram): If you've ever had breasts, begin having regular mammograms starting at age 40. This is a scan to check for breast cancer.  Colon cancer screening: It is important to start screening for colon cancer at age 45.  Have a colonoscopy test every 10 years (or more often if you're at risk) Or, ask your provider about stool tests like a FIT test every year or Cologuard test every 3 years.  To learn more about your testing options, visit:   .  For help making a decision, visit:   https://bit.ly/ws95939.  Prostate cancer screening test: If you have a prostate, ask your care team if a prostate cancer screening test (PSA) at age 55 is right for you.  Lung cancer screening: If you are a current or former smoker ages 50 to 80, ask your care team if ongoing lung cancer screenings are right for you.  For informational purposes only. Not to replace the advice of your health care provider. Copyright   2023 [employer]. All rights reserved. Clinically reviewed by the [facility] Transitions Program. Rippld [id number] - REV 01/24.

## 2024-11-15 ENCOUNTER — OFFICE VISIT (OUTPATIENT)
Dept: ORTHOPEDICS | Facility: CLINIC | Age: 79
End: 2024-11-15
Payer: COMMERCIAL

## 2024-11-15 VITALS — HEIGHT: 62 IN | WEIGHT: 128 LBS | BODY MASS INDEX: 23.55 KG/M2

## 2024-11-15 DIAGNOSIS — M25.551 CHRONIC RIGHT HIP PAIN: Primary | ICD-10-CM

## 2024-11-15 DIAGNOSIS — G89.29 CHRONIC RIGHT HIP PAIN: Primary | ICD-10-CM

## 2024-11-15 DIAGNOSIS — G89.29 CHRONIC RIGHT-SIDED LOW BACK PAIN WITHOUT SCIATICA: ICD-10-CM

## 2024-11-15 DIAGNOSIS — M54.50 CHRONIC RIGHT-SIDED LOW BACK PAIN WITHOUT SCIATICA: ICD-10-CM

## 2024-11-15 RX ORDER — LIDOCAINE HYDROCHLORIDE 10 MG/ML
4 INJECTION, SOLUTION INFILTRATION; PERINEURAL
Status: COMPLETED | OUTPATIENT
Start: 2024-11-15 | End: 2024-11-15

## 2024-11-15 RX ORDER — BETAMETHASONE SODIUM PHOSPHATE AND BETAMETHASONE ACETATE 3; 3 MG/ML; MG/ML
6 INJECTION, SUSPENSION INTRA-ARTICULAR; INTRALESIONAL; INTRAMUSCULAR; SOFT TISSUE
Status: COMPLETED | OUTPATIENT
Start: 2024-11-15 | End: 2024-11-15

## 2024-11-15 RX ADMIN — BETAMETHASONE SODIUM PHOSPHATE AND BETAMETHASONE ACETATE 6 MG: 3; 3 INJECTION, SUSPENSION INTRA-ARTICULAR; INTRALESIONAL; INTRAMUSCULAR; SOFT TISSUE at 10:29

## 2024-11-15 RX ADMIN — LIDOCAINE HYDROCHLORIDE 4 ML: 10 INJECTION, SOLUTION INFILTRATION; PERINEURAL at 10:29

## 2024-11-15 ASSESSMENT — PAIN SCALES - GENERAL: PAINLEVEL_OUTOF10: EXTREME PAIN (8)

## 2024-11-15 NOTE — PROGRESS NOTES
ASSESSMENT & PLAN    Kristen was seen today for pain.    Diagnoses and all orders for this visit:    Chronic right hip pain  -     Physical Therapy  Referral; Future  -     Large Joint Injection/Arthocentesis: R greater trochanteric bursa    Chronic right-sided low back pain without sciatica  -     Physical Therapy  Referral; Future        # Acute on Chronic Right Hip and Low Back Pain: Kristen Thompson  was seen today for acute on chronic right hip/low back pain. 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 On examination there are positive findings of tenderness to palpation over the greater trochanter, right hip joint, right sciatic notch. Imaging findings showed mild hip arthritis, irritated gluteal tendon, mild disc herniations in the lumbar spine on right hip and lumbar MRIs. Likely cause of patient's condition due to irritated gluteal tendon vs hip joint. Other possible conditions contributing to symptoms include lumbar radiculopathy.  Counseled patient on nature of condition and treatment options.  Given this plan as below, follow-up 1 mon if not improving, sooner if worsening.     Image Findings: irritated gluteal tendon, lumbar disc herniations, right hip arthritis on MRI studies  Treatment: Activities as tolerated, home exercises given today, PT referral placed  Medications/Injections: Limited tylenol/ibuprofen for pain for 1-2 weeks, Topical Voltaren gel, right greater trochanter steroid injection   Follow-up: In one month if symptoms do not improve, sooner if worsening  Can consider repeat hip joint vs lumbar steroid injection     -----    SUBJECTIVE:  Kristen Thompson is a 79 year old female who is seen in follow-up for right hip pain. They were last seen 9/5/2024 and right hip joint steroid injection was performed.  Provided mild relief. The patient is seen by themselves.    Since their last visit reports hip radiating to knee.  They  "indicate that their current pain level is moderate. They have tried right hip joint steroid injection 9/5/24 by Dr. De La Fuente, right GT steroid injection by Dr. Mehta 7/2/24, right hip and lumbar MRI 8/13/24.        Patient's past medical, surgical, social, and family histories were reviewed today and no changes are noted.    REVIEW OF SYSTEMS:  Constitutional: NEGATIVE for fever, chills, change in weight  Skin: NEGATIVE for worrisome rashes, moles or lesions  GI/: NEGATIVE for bowel or bladder changes  Neuro: NEGATIVE for weakness, dizziness or paresthesias    OBJECTIVE:  Ht 1.575 m (5' 2\")   Wt 58.1 kg (128 lb)   BMI 23.41 kg/m     General: healthy, alert and in no distress  HEENT: no scleral icterus or conjunctival erythema  Skin: no suspicious lesions or rash. No jaundice.  CV: regular rhythm by palpation, no pedal edema  Resp: normal respiratory effort without conversational dyspnea   Psych: normal mood and affect  Gait: normal steady gait with appropriate coordination and balance  Neuro: normal light touch sensory exam of the extremities.    MSK:    RIGHT HIP  Inspection:    No swelling, bruising, discoloration, or obvious deformity or asymmetry  Palpation:    Tender about the anterior groin/joint line and greater trochanteric region. Otherwise all other landmarks are nontender.    Crepitus is Absent  Active Range of Motion:     Flexion full, extension full / IR full / ER  full  Strength:    Flexion 5/5 / extension 5/5 / adduction 5/5 / abduction 5/5  Special Tests:    Positive: anterior impingement (FADIR)    Negative: DIANE    Independent visualization of the below image:  EXAM: MR LUMBAR SPINE W/O CONTRAST  LOCATION: [facility]  DATE: 8/13/2024     INDICATION: evaluate radiating symptoms from right hip area to lower extremity  COMPARISON: None.  TECHNIQUE: Routine Lumbar Spine MRI without IV contrast.     FINDINGS:   Five lumbar type vertebral body numbering convention. " Preserved vertebral body heights, alignment and marrow signal. Conus medullaris at L1. Normal cauda equina nerve roots. Normal bony pelvis. No acute extraspinal abnormality. Bilateral relatively   symmetric fatty infiltration/atrophy involving the multifidus, erector spinae and quadratus lumborum musculature.     T12-L1: Preserved disc height and signal for age. Symmetric disc bulge Normal facets for age. No high-grade spinal canal or neural foraminal stenosis.      L1-L2: Preserved disc height and signal for age. Normal facets for age. No high-grade spinal canal or neural foraminal stenosis.     L2-L3: Mild loss of disc height with relatively preserved signal for age. Symmetric disc bulge. Normal facets for age. No high-grade spinal canal or neural foraminal stenosis.      L3-L4: Preserved disc height and signal for age. Symmetric disc bulge. Normal facets for age. No high-grade spinal canal or neural foraminal stenosis.     L4-L5: Preserved disc height and signal for age. Symmetric disc bulge, bilateral facet arthropathy contributes to mild bilateral neural foraminal stenosis. Bilateral joint effusions. No high-grade spinal canal or neural foraminal stenosis.     L5-S1: Preserved disc height and signal for age. Symmetric disc bulge, bilateral facet arthropathy contribute to mild bilateral neural foraminal stenosis. No high-grade spinal canal or neural foraminal stenosis.                                                                      IMPRESSION:  1.  Multilevel lumbar spondylosis, as above. Prominent facet osteoarthrosis at L4-L5 and L5-S1. No high-grade spinal canal or neural foraminal stenosis at any level.  2.  No evidence for acute bone or soft tissue injury involving the lumbar spine.  3.  Bilateral paraspinal muscular fatty infiltration/atrophy. Findings have been reported in the setting of chronic low back pain. Consider a supervised rehabilitation/strength training program as a component of  treatment.    Patient's conditions were thoroughly discussed during today's visit with total time spent face-to-face with the patient and documentation outside of procedure timebeing 20 minutes.    MR  right   hip without contrast 8/14/2024 9:05 AM     Techniques: Multiplanar multisequence imaging of the right hip was  obtained without  administration of intra-articular or intravenous  contrast using routing protocol.     History: evaluate right hip area pain, more lateral hip, with  radiation to lower extremity; Hip pain; Hip pain without trauma or  other complicating feature; Hip x-ray result available; No  known/automatically detected potential contraindications to MRI;  Chronic right hip pain; Chronic right hip pain; Right leg pain      Comparison: Radiographs dated 7/2/2024     Findings:     Osseous structures  Osseous structures: Subcortical edema and cystic changes of the  femoral head in close proximity to the fovea associated with focal  cartilage thinning. Otherwise no fracture, stress reaction, avascular  necrosis, or focal osseous lesion is seen.     Articular cartilage and labrum  Assessment limited on this non-arthrographic study due to relative  lack of joint distension.     Articular cartilage: Cartilage thinning involving the anterosuperior  acetabulum.     Labrum: Anterior superior tear suggested on this non-arthrographic  study.     Ligament teres and transverse ligament of acetabulum: Intact.     Joint or bursal effusion     Joint effusion: A physiologic amount of joint fluid.     Bursal effusion: Minimal nonspecific edema over the greater  trochanter. No substantial iliopsoas or trochanteric bursal effusion.     Muscles and tendons  Muscles and tendons: Proximal hamstrings, rectus femoris, sartorius,  and iliopsoas tendons intact. Gluteus minimus and medius distal edema  and demonstrate low-grade undersurface tearing. The visualized  adductor muscles are unremarkable.      Nerves:  The  visualized course of the sciatic nerve is unremarkable.     Other Findings:  Inguinal hernia.                                                                      Impression:  1. Moderate grade degenerative changes of the femoral acetabular joint  with anterosuperior labral tearing and cartilage thinning. Bone marrow  edema and subcortical cystic change in the medial femoral head deep to  significant cartilage thinning close to the fovea.  2. Low-grade tendinopathy of the gluteus minimus and medius tendons  this low-grade undersurface tearing.  3. Small inguinal hernia, fluid and fat-containing.     JUTTA ELLERMANN, MD Jarrod Yamanaka MD, [facility]    Disclaimer: This note consists of symbols derived from keyboarding, dictation and/or voice recognition software. As a result, there may be errors in the script that have gone undetected. Please consider this when interpreting information found in this chart.    Large Joint Injection/Arthocentesis: R greater trochanteric bursa    Date/Time: 11/15/2024 10:29 AM    Performed by: Cuate De La Fuente MD  Authorized by: Cuate De La Fuente MD    Indications:  Pain  Needle Size:  22 G  Guidance: ultrasound    Approach:  Lateral  Location:  Hip      Site:  R greater trochanteric bursa  Medications:  6 mg betamethasone acet & sod phos 6 (3-3) MG/ML; 4 mL lidocaine 1 %  Outcome:  Tolerated well, no immediate complications  Procedure discussed: discussed risks, benefits, and alternatives    Consent Given by:  Patient  Timeout: timeout called immediately prior to procedure    Prep: patient was prepped and draped in usual sterile fashion     Ultrasound images of procedure were permanently stored.     Patient reported some improvement of pain after the numbing portion right greater trochanter steroid injection.  Ultrasound guided images were permanently stored.   Aftercare instructions given to patient.  Plan to follow-up as discussed above.      Cuate De La Fuente MD [facility]          Patient's conditions were thoroughly discussed during today's visit with greater than 50% of the visit spent counseling the patient with total time spent face-to-face with the patient outside of procedure time being 20 minutes.

## 2024-11-15 NOTE — LETTER
11/15/2024      Kristen Thompson  [address] [place] [id number]      Dear Colleague,    Thank you for referring your patient, Kristen Thompson, to the [facility] [place]. Please see a copy of my visit note below.    ASSESSMENT & PLAN    Kristen was seen today for pain.    Diagnoses and all orders for this visit:    Chronic right hip pain  -     Physical Therapy  Referral; Future  -     Large Joint Injection/Arthocentesis: R greater trochanteric bursa    Chronic right-sided low back pain without sciatica  -     Physical Therapy  Referral; Future        # Acute on Chronic Right Hip and Low Back Pain: Kristen Thompson  was seen today for acute on chronic right hip/low back pain. 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 On examination there are positive findings of tenderness to palpation over the greater trochanter, right hip joint, right sciatic notch. Imaging findings showed mild hip arthritis, irritated gluteal tendon, mild disc herniations in the lumbar spine on right hip and lumbar MRIs. Likely cause of patient's condition due to irritated gluteal tendon vs hip joint. Other possible conditions contributing to symptoms include lumbar radiculopathy.  Counseled patient on nature of condition and treatment options.  Given this plan as below, follow-up 1 mon if not improving, sooner if worsening.     Image Findings: irritated gluteal tendon, lumbar disc herniations, right hip arthritis on MRI studies  Treatment: Activities as tolerated, home exercises given today, PT referral placed  Medications/Injections: Limited tylenol/ibuprofen for pain for 1-2 weeks, Topical Voltaren gel, right greater trochanter steroid injection   Follow-up: In one month if symptoms do not improve, sooner if worsening  Can consider repeat hip joint vs lumbar steroid injection     -----    SUBJECTIVE:  Kristen Thompson is a 79 year old  "female who is seen in follow-up for right hip pain. They were last seen 9/5/2024 and right hip joint steroid injection was performed.  Provided mild relief. The patient is seen by themselves.    Since their last visit reports hip radiating to knee.  They indicate that their current pain level is moderate. They have tried right hip joint steroid injection 9/5/24 by Dr. De La Fuente, right GT steroid injection by Dr. Mehta 7/2/24, right hip and lumbar MRI 8/13/24.        Patient's past medical, surgical, social, and family histories were reviewed today and no changes are noted.    REVIEW OF SYSTEMS:  Constitutional: NEGATIVE for fever, chills, change in weight  Skin: NEGATIVE for worrisome rashes, moles or lesions  GI/: NEGATIVE for bowel or bladder changes  Neuro: NEGATIVE for weakness, dizziness or paresthesias    OBJECTIVE:  Ht 1.575 m (5' 2\")   Wt 58.1 kg (128 lb)   BMI 23.41 kg/m     General: healthy, alert and in no distress  HEENT: no scleral icterus or conjunctival erythema  Skin: no suspicious lesions or rash. No jaundice.  CV: regular rhythm by palpation, no pedal edema  Resp: normal respiratory effort without conversational dyspnea   Psych: normal mood and affect  Gait: normal steady gait with appropriate coordination and balance  Neuro: normal light touch sensory exam of the extremities.    MSK:    RIGHT HIP  Inspection:    No swelling, bruising, discoloration, or obvious deformity or asymmetry  Palpation:    Tender about the anterior groin/joint line and greater trochanteric region. Otherwise all other landmarks are nontender.    Crepitus is Absent  Active Range of Motion:     Flexion full, extension full / IR full / ER  full  Strength:    Flexion 5/5 / extension 5/5 / adduction 5/5 / abduction 5/5  Special Tests:    Positive: anterior impingement (FADIR)    Negative: DIANE    Independent visualization of the below image:  EXAM: MR LUMBAR SPINE W/O CONTRAST  LOCATION: [facility]" CENTER  DATE: 8/13/2024     INDICATION: evaluate radiating symptoms from right hip area to lower extremity  COMPARISON: None.  TECHNIQUE: Routine Lumbar Spine MRI without IV contrast.     FINDINGS:   Five lumbar type vertebral body numbering convention. Preserved vertebral body heights, alignment and marrow signal. Conus medullaris at L1. Normal cauda equina nerve roots. Normal bony pelvis. No acute extraspinal abnormality. Bilateral relatively   symmetric fatty infiltration/atrophy involving the multifidus, erector spinae and quadratus lumborum musculature.     T12-L1: Preserved disc height and signal for age. Symmetric disc bulge Normal facets for age. No high-grade spinal canal or neural foraminal stenosis.      L1-L2: Preserved disc height and signal for age. Normal facets for age. No high-grade spinal canal or neural foraminal stenosis.     L2-L3: Mild loss of disc height with relatively preserved signal for age. Symmetric disc bulge. Normal facets for age. No high-grade spinal canal or neural foraminal stenosis.      L3-L4: Preserved disc height and signal for age. Symmetric disc bulge. Normal facets for age. No high-grade spinal canal or neural foraminal stenosis.     L4-L5: Preserved disc height and signal for age. Symmetric disc bulge, bilateral facet arthropathy contributes to mild bilateral neural foraminal stenosis. Bilateral joint effusions. No high-grade spinal canal or neural foraminal stenosis.     L5-S1: Preserved disc height and signal for age. Symmetric disc bulge, bilateral facet arthropathy contribute to mild bilateral neural foraminal stenosis. No high-grade spinal canal or neural foraminal stenosis.                                                                      IMPRESSION:  1.  Multilevel lumbar spondylosis, as above. Prominent facet osteoarthrosis at L4-L5 and L5-S1. No high-grade spinal canal or neural foraminal stenosis at any level.  2.  No evidence for acute bone or soft tissue  injury involving the lumbar spine.  3.  Bilateral paraspinal muscular fatty infiltration/atrophy. Findings have been reported in the setting of chronic low back pain. Consider a supervised rehabilitation/strength training program as a component of treatment.    Patient's conditions were thoroughly discussed during today's visit with total time spent face-to-face with the patient and documentation outside of procedure timebeing 20 minutes.    MR  right   hip without contrast 8/14/2024 9:05 AM     Techniques: Multiplanar multisequence imaging of the right hip was  obtained without  administration of intra-articular or intravenous  contrast using routing protocol.     History: evaluate right hip area pain, more lateral hip, with  radiation to lower extremity; Hip pain; Hip pain without trauma or  other complicating feature; Hip x-ray result available; No  known/automatically detected potential contraindications to MRI;  Chronic right hip pain; Chronic right hip pain; Right leg pain      Comparison: Radiographs dated 7/2/2024     Findings:     Osseous structures  Osseous structures: Subcortical edema and cystic changes of the  femoral head in close proximity to the fovea associated with focal  cartilage thinning. Otherwise no fracture, stress reaction, avascular  necrosis, or focal osseous lesion is seen.     Articular cartilage and labrum  Assessment limited on this non-arthrographic study due to relative  lack of joint distension.     Articular cartilage: Cartilage thinning involving the anterosuperior  acetabulum.     Labrum: Anterior superior tear suggested on this non-arthrographic  study.     Ligament teres and transverse ligament of acetabulum: Intact.     Joint or bursal effusion     Joint effusion: A physiologic amount of joint fluid.     Bursal effusion: Minimal nonspecific edema over the greater  trochanter. No substantial iliopsoas or trochanteric bursal effusion.     Muscles and tendons  Muscles and  tendons: Proximal hamstrings, rectus femoris, sartorius,  and iliopsoas tendons intact. Gluteus minimus and medius distal edema  and demonstrate low-grade undersurface tearing. The visualized  adductor muscles are unremarkable.      Nerves:  The visualized course of the sciatic nerve is unremarkable.     Other Findings:  Inguinal hernia.                                                                      Impression:  1. Moderate grade degenerative changes of the femoral acetabular joint  with anterosuperior labral tearing and cartilage thinning. Bone marrow  edema and subcortical cystic change in the medial femoral head deep to  significant cartilage thinning close to the fovea.  2. Low-grade tendinopathy of the gluteus minimus and medius tendons  this low-grade undersurface tearing.  3. Small inguinal hernia, fluid and fat-containing.     JUTTA ELLERMANN, MD Jarrod Yamanaka MD, [facility]    Disclaimer: This note consists of symbols derived from keyboarding, dictation and/or voice recognition software. As a result, there may be errors in the script that have gone undetected. Please consider this when interpreting information found in this chart.    Large Joint Injection/Arthocentesis: R greater trochanteric bursa    Date/Time: 11/15/2024 10:29 AM    Performed by: Cuate De La Fuente MD  Authorized by: Cuate De La Fuente MD    Indications:  Pain  Needle Size:  22 G  Guidance: ultrasound    Approach:  Lateral  Location:  Hip      Site:  R greater trochanteric bursa  Medications:  6 mg betamethasone acet & sod phos 6 (3-3) MG/ML; 4 mL lidocaine 1 %  Outcome:  Tolerated well, no immediate complications  Procedure discussed: discussed risks, benefits, and alternatives    Consent Given by:  Patient  Timeout: timeout called immediately prior to procedure    Prep: patient was prepped and draped in usual sterile fashion     Ultrasound images of procedure were permanently stored.      Patient reported some improvement of pain after the numbing portion right greater trochanter steroid injection.  Ultrasound guided images were permanently stored.   Aftercare instructions given to patient.  Plan to follow-up as discussed above.     Cuate De La Fuente MD [facility]          Patient's conditions were thoroughly discussed during today's visit with greater than 50% of the visit spent counseling the patient with total time spent face-to-face with the patient outside of procedure time being 20 minutes.      Again, thank you for allowing me to participate in the care of your patient.        Sincerely,        Cuate De La Fuente MD

## 2024-11-15 NOTE — PATIENT INSTRUCTIONS
# Acute on Chronic Right Hip and Low Back Pain: Kristen Thompson  was seen today for acute on chronic right hip/low back pain. 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 On examination there are positive findings of tenderness to palpation over the greater trochanter, right hip joint, right sciatic notch. Imaging findings showed mild hip arthritis, irritated gluteal tendon, mild disc herniations in the lumbar spine on right hip and lumbar MRIs. Likely cause of patient's condition due to irritated gluteal tendon vs hip joint. Other possible conditions contributing to symptoms include lumbar radiculopathy.  Counseled patient on nature of condition and treatment options.  Given this plan as below, follow-up 1 mon if not improving, sooner if worsening.     Image Findings: irritated gluteal tendon, lumbar disc herniations, right hip arthritis on MRI studies  Treatment: Activities as tolerated, home exercises given today, PT referral placed  Medications/Injections: Limited tylenol/ibuprofen for pain for 1-2 weeks, Topical Voltaren gel, right greater trochanter steroid injection   Follow-up: In one month if symptoms do not improve, sooner if worsening  Can consider repeat hip joint vs lumbar steroid injection     Please call [phone]   Ask for my team if you have any questions or concerns    If you have not yet received the influenza vaccine but would like to get one, please call  [phone] or you can schedule via [facility]    It was great seeing you again today!    Cuate De La Fuente MD, CAQSM     FS Injection Discharge Instructions    Procedure: right greater trochanter bursa steroid injection     You may shower, however avoid swimming, tub baths or hot tubs for 24 hours following your procedure  You may have a mild to moderate increase in pain for several days following the injection.  It may take up to 14 days for the steroid medication to start working although  you may feel the effect as early as a few days after the procedure.  You may use ice packs for 10-15 minutes, 3 to 4 times a day at the injection site for comfort  You may use anti-inflammatory medications (such as Ibuprofen or Aleve or Advil) or Tylenol for pain control if necessary  If you were fasting, you may resume your normal diet and medications after the procedure  If you have diabetes, check your blood sugar more frequently than usual as your blood sugar may be higher than normal for 10-14 days following a steroid injection. Contact your doctor who manages your diabetes if your blood sugar is higher than usual    If you experience any of the following, call [facility] @ [phone] or [phone]  -Fever over 100 degree F  -Swelling, bleeding, redness, drainage, warmth at the injection site  - New or worsening pain

## 2024-11-19 ENCOUNTER — HOSPITAL ENCOUNTER (OUTPATIENT)
Dept: MAMMOGRAPHY | Facility: CLINIC | Age: 79
Discharge: HOME | End: 2024-11-19
Attending: FAMILY MEDICINE
Payer: COMMERCIAL

## 2024-11-19 DIAGNOSIS — Z12.31 VISIT FOR SCREENING MAMMOGRAM: ICD-10-CM

## 2024-11-19 PROCEDURE — 77063 BREAST TOMOSYNTHESIS BI: CPT

## 2024-11-21 ENCOUNTER — THERAPY VISIT (OUTPATIENT)
Dept: PHYSICAL THERAPY | Facility: CLINIC | Age: 79
End: 2024-11-21
Attending: FAMILY MEDICINE
Payer: COMMERCIAL

## 2024-11-21 DIAGNOSIS — M25.551 CHRONIC RIGHT HIP PAIN: ICD-10-CM

## 2024-11-21 DIAGNOSIS — G89.29 CHRONIC RIGHT-SIDED LOW BACK PAIN WITHOUT SCIATICA: ICD-10-CM

## 2024-11-21 DIAGNOSIS — M54.50 CHRONIC RIGHT-SIDED LOW BACK PAIN WITHOUT SCIATICA: ICD-10-CM

## 2024-11-21 DIAGNOSIS — G89.29 CHRONIC RIGHT HIP PAIN: ICD-10-CM

## 2024-11-21 ASSESSMENT — ACTIVITIES OF DAILY LIVING (ADL)
WALK: ACTIVITY IS VERY DIFFICULT
SPORTS_SCORE(%): INCOMPLETE
GOING DOWN 1 FLIGHT OF STAIRS: MODERATE DIFFICULTY
TWISTING/PIVOTING_ON_INVOLVED_LEG: MODERATE DIFFICULTY
LIMPING: THE SYMPTOM PREVENTS ME FROM ALL DAILY ACTIVITIES
HOW_WOULD_YOU_RATE_YOUR_CURRENT_LEVEL_OF_FUNCTION?: ABNORMAL
HEAVY_WORK: EXTREME DIFFICULTY
WALKING_FOR_APPROXIMATELY_10_MINUTES: MODERATE DIFFICULTY
WALKING_INITIALLY: MODERATE DIFFICULTY
STANDING_FOR_15_MINUTES: SLIGHT DIFFICULTY
PAIN: THE SYMPTOM AFFECTS MY ACTIVITY MODERATELY
GETTING INTO AND OUT OF AN AVERAGE CAR: EXTREME DIFFICULTY
HEAVY_WORK: EXTREME DIFFICULTY
AS_A_RESULT_OF_YOUR_KNEE_INJURY,_HOW_WOULD_YOU_RATE_YOUR_CURRENT_LEVEL_OF_DAILY_ACTIVITY?: ABNORMAL
LIGHT_TO_MODERATE_WORK: MODERATE DIFFICULTY
GO DOWN STAIRS: ACTIVITY IS FAIRLY DIFFICULT
GOING UP 1 FLIGHT OF STAIRS: MODERATE DIFFICULTY
SPORTS_HIGHEST_POTENTIAL_SCORE: 32
SWELLING: I DO NOT HAVE THE SYMPTOM
ADL_TOTAL_ITEM_SCORE: 0
DEEP SQUATTING: EXTREME DIFFICULTY
TWISTING/PIVOTING ON INVOLVED LEG: MODERATE DIFFICULTY
SITTING FOR 15 MINUTES: SLIGHT DIFFICULTY
GO UP STAIRS: ACTIVITY IS VERY DIFFICULT
STANDING FOR 15 MINUTES: SLIGHT DIFFICULTY
PUTTING_ON_SOCKS_AND_SHOES: EXTREME DIFFICULTY
WALKING_DOWN_STEEP_HILLS: MODERATE DIFFICULTY
SPORTS_TOTAL_ITEM_SCORE: INCOMPLETE
HOW_WOULD_YOU_RATE_YOUR_CURRENT_LEVEL_OF_FUNCTION_DURING_YOUR_USUAL_ACTIVITIES_OF_DAILY_LIVING_FROM_0_TO_100_WITH_100_BEING_YOUR_LEVEL_OF_FUNCTION_PRIOR_TO_YOUR_HIP_PROBLEM_AND_0_BEING_THE_INABILITY_TO_PERFORM_ANY_OF_YOUR_USUAL_DAILY_ACTIVITIES?: 50
PAIN: THE SYMPTOM AFFECTS MY ACTIVITY MODERATELY
HOS_ADL_ITEM_SCORE_TOTAL: 34
AS_A_RESULT_OF_YOUR_KNEE_INJURY,_HOW_WOULD_YOU_RATE_YOUR_CURRENT_LEVEL_OF_DAILY_ACTIVITY?: ABNORMAL
SITTING_FOR_15_MINUTES: SLIGHT DIFFICULTY
ADL_COUNT: 17
ROLLING_OVER_IN_BED: NO DIFFICULTY AT ALL
ROLLING OVER IN BED: NO DIFFICULTY AT ALL
SWELLING: I DO NOT HAVE THE SYMPTOM
GO UP STAIRS: ACTIVITY IS VERY DIFFICULT
SPORTS_COUNT: 8
GETTING_INTO_AND_OUT_OF_A_BATHTUB: MODERATE DIFFICULTY
HOS_ADL_HIGHEST_POTENTIAL_SCORE: 68
WALKING_UP_STEEP_HILLS: SLIGHT DIFFICULTY
SQUAT: I AM UNABLE TO DO THE ACTIVITY
HOW_WOULD_YOU_RATE_YOUR_CURRENT_LEVEL_OF_FUNCTION_DURING_YOUR_USUAL_ACTIVITIES_OF_DAILY_LIVING_FROM_0_TO_100_WITH_100_BEING_YOUR_LEVEL_OF_FUNCTION_PRIOR_TO_YOUR_HIP_PROBLEM_AND_0_BEING_THE_INABILITY_TO_PERFORM_ANY_OF_YOUR_USUAL_DAILY_ACTIVITIES?: 50
GOING_UP_1_FLIGHT_OF_STAIRS: MODERATE DIFFICULTY
ADL_HIGHEST_POTENTIAL_SCORE: 68
HOW_WOULD_YOU_RATE_YOUR_CURRENT_LEVEL_OF_FUNCTION_DURING_YOUR_SPORTS_RELATED_ACTIVITIES_FROM_0_TO_100_WITH_100_BEING_YOUR_LEVEL_OF_FUNCTION_PRIOR_TO_YOUR_HIP_PROBLEM_AND_0_BEING_THE_INABILITY_TO_PERFORM_ANY_OF_YOUR_USUAL_DAILY_ACTIVITIES?: 50
WEAKNESS: I DO NOT HAVE THE SYMPTOM
RISE FROM A CHAIR: ACTIVITY IS SOMEWHAT DIFFICULT
KNEE_ACTIVITY_OF_DAILY_LIVING_SCORE: 42.86
WALKING_UP_STEEP_HILLS: SLIGHT DIFFICULTY
KNEEL ON THE FRONT OF YOUR KNEE: I AM UNABLE TO DO THE ACTIVITY
ADL_SCORE(%): 0
KNEE_ACTIVITY_OF_DAILY_LIVING_SUM: 30
STIFFNESS: THE SYMPTOM AFFECTS MY ACTIVITY MODERATELY
SQUAT: I AM UNABLE TO DO THE ACTIVITY
KNEEL ON THE FRONT OF YOUR KNEE: I AM UNABLE TO DO THE ACTIVITY
WALKING_INITIALLY: MODERATE DIFFICULTY
GETTING_INTO_AND_OUT_OF_A_BATHTUB: MODERATE DIFFICULTY
PLEASE_INDICATE_YOR_PRIMARY_REASON_FOR_REFERRAL_TO_THERAPY:: HIP
WEAKNESS: I DO NOT HAVE THE SYMPTOM
WALK: ACTIVITY IS VERY DIFFICULT
HOW_WOULD_YOU_RATE_THE_OVERALL_FUNCTION_OF_YOUR_KNEE_DURING_YOUR_USUAL_DAILY_ACTIVITIES?: ABNORMAL
GETTING_INTO_AND_OUT_OF_AN_AVERAGE_CAR: EXTREME DIFFICULTY
PLEASE_INDICATE_YOR_PRIMARY_REASON_FOR_REFERRAL_TO_THERAPY:: KNEE
WALKING_DOWN_STEEP_HILLS: MODERATE DIFFICULTY
RECREATIONAL_ACTIVITIES: MODERATE DIFFICULTY
LIGHT_TO_MODERATE_WORK: MODERATE DIFFICULTY
RISE FROM A CHAIR: ACTIVITY IS SOMEWHAT DIFFICULT
WALKING_15_MINUTES_OR_GREATER: MODERATE DIFFICULTY
STIFFNESS: THE SYMPTOM AFFECTS MY ACTIVITY MODERATELY
RAW_SCORE: 30
RECREATIONAL ACTIVITIES: MODERATE DIFFICULTY
SIT WITH YOUR KNEE BENT: ACTIVITY IS NOT DIFFICULT
STEPPING UP AND DOWN CURBS: EXTREME DIFFICULTY
GIVING WAY, BUCKLING OR SHIFTING OF KNEE: THE SYMPTOM AFFECTS MY ACTIVITY MODERATELY
GOING_DOWN_1_FLIGHT_OF_STAIRS: MODERATE DIFFICULTY
GO DOWN STAIRS: ACTIVITY IS FAIRLY DIFFICULT
WALKING_15_MINUTES_OR_GREATER: MODERATE DIFFICULTY
HOW_WOULD_YOU_RATE_THE_CURRENT_FUNCTION_OF_YOUR_KNEE_DURING_YOUR_USUAL_DAILY_ACTIVITIES_ON_A_SCALE_FROM_0_TO_100_WITH_100_BEING_YOUR_LEVEL_OF_KNEE_FUNCTION_PRIOR_TO_YOUR_INJURY_AND_0_BEING_THE_INABILITY_TO_PERFORM_ANY_OF_YOUR_USUAL_DAILY_ACTIVITIES?: 50
HOW_WOULD_YOU_RATE_THE_OVERALL_FUNCTION_OF_YOUR_KNEE_DURING_YOUR_USUAL_DAILY_ACTIVITIES?: ABNORMAL
HOW_WOULD_YOU_RATE_THE_CURRENT_FUNCTION_OF_YOUR_KNEE_DURING_YOUR_USUAL_DAILY_ACTIVITIES_ON_A_SCALE_FROM_0_TO_100_WITH_100_BEING_YOUR_LEVEL_OF_KNEE_FUNCTION_PRIOR_TO_YOUR_INJURY_AND_0_BEING_THE_INABILITY_TO_PERFORM_ANY_OF_YOUR_USUAL_DAILY_ACTIVITIES?: 50
PUTTING ON SOCKS AND SHOES: EXTREME DIFFICULTY
STAND: ACTIVITY IS FAIRLY DIFFICULT
GIVING WAY, BUCKLING OR SHIFTING OF KNEE: THE SYMPTOM AFFECTS MY ACTIVITY MODERATELY
LOW_IMPACT_ACTIVITIES_LIKE_FAST_WALKING: EXTREME DIFFICULTY
SIT WITH YOUR KNEE BENT: ACTIVITY IS NOT DIFFICULT
STEPPING_UP_AND_DOWN_CURBS: EXTREME DIFFICULTY
LIMPING: THE SYMPTOM PREVENTS ME FROM ALL DAILY ACTIVITIES
WALKING_APPROXIMATELY_10_MINUTES: MODERATE DIFFICULTY
HOS_ADL_SCORE(%): 50
STAND: ACTIVITY IS FAIRLY DIFFICULT
DEEP_SQUATTING: EXTREME DIFFICULTY

## 2024-11-21 NOTE — PROGRESS NOTES
PHYSICAL THERAPY EVALUATION  Type of Visit: Evaluation       Fall Risk Screen:  Fall screen completed by: PT  Have you fallen 2 or more times in the past year?: No  Have you fallen and had an injury in the past year?: No  Is patient a fall risk?: No    Subjective         Presenting condition or subjective complaint: (Patient-Rptd) hip pain down front of leg into knee and outside right hip. Pain has been present for 6+ months with unknown cause. 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 Had a series of 3 injections in the R hip. Reduced pain below the knee but not much else for pain reduction at the hip.   Date of onset: 05/21/24    Relevant medical history:     Dates & types of surgery: (Patient-Rptd) right knee replacement 2007    Prior diagnostic imaging/testing results: (Patient-Rptd) MRI; X-ray     Prior therapy history for the same diagnosis, illness or injury: (Patient-Rptd) No      Living Environment  Social support: (Patient-Rptd) Alone   Type of home: (Patient-Rptd) House; 1 level   Stairs to enter the home: (Patient-Rptd) No       Ramp: (Patient-Rptd) No   Stairs inside the home: (Patient-Rptd) No       Help at home: (Patient-Rptd) None  Equipment owned: (Patient-Rptd) Walker with wheels     Employment: (Patient-Rptd) Not Applicable    Hobbies/Interests: (Patient-Rptd) volunteer [employer]    Patient goals for therapy: (Patient-Rptd) not limp    Pain assessment: See objective evaluation for additional pain details     Objective   LUMBAR SPINE EVALUATION  PAIN: Pain Location: R hip (lateral and into the groin - more sharp), and radiating down front of thigh to the knee.   Pain is Exacerbated By: lifting, carrying, walking, stairs, household chores, personal cares, STS,     POSTURE: WNL  GAIT:   Weightbearing Status: WBAT  Assistive Device(s): None  Gait Deviations: Antalgic   ROM/STRENGTH:   (Degrees) Left AROM Left MMT  Right  AROM Right MMT   Hip Flexion WNL 5 Painful WNL 4   Hip Extension       Hip Abduction WNL 4+ WNL 4   Hip Adduction       Hip Internal Rotation limited 4+ Pain - limited 4   Hip External Rotation WNL 4+ WNL 4+   Knee Flexion       Knee Extension       Lumbar Side glide WNL Pain WNL   Lumbar Flexion WNL - mild pain    Lumbar Extension WNL     PELVIC/SI SCREEN:  Left upslip and left anterior shift.   NEURAL TENSION: Lumbar WNL  FLEXIBILITY: WNL  LUMBAR/HIP Special Tests:  positive SCOUR, DIANE, FADIR on R. Negative Slump/SLR B    PALPATION:  TTP at ASIS/PSIS greater trochanter on R . Increased tension       Assessment & Plan   CLINICAL IMPRESSIONS  Medical Diagnosis: Chronic right hip pain, Chronic right sided low back pain without sciatica    Treatment Diagnosis: Chronic right hip pain, Chronic right sided low back pain without sciatica   Impression/Assessment: Patient is a 79 year old female with Right hip and leg pain complaints.  The following significant findings have been identified: Pain, Decreased ROM/flexibility, Decreased joint mobility, Impaired gait, Impaired muscle performance, and Decreased activity tolerance. These impairments interfere with their ability to perform self care tasks, work tasks, recreational activities, household chores, driving , household mobility, and community mobility as compared to previous level of function.     Clinical Decision Making (Complexity):  Clinical Presentation: Stable/Uncomplicated  Clinical Presentation Rationale: based on medical and personal factors listed in PT evaluation  Clinical Decision Making (Complexity): Low complexity    PLAN OF CARE  Treatment Interventions:  Modalities: Cryotherapy, E-stim, Hot Pack  Interventions: Gait Training, Manual Therapy, Neuromuscular Re-education, Therapeutic Activity, Therapeutic Exercise    Long Term Goals     PT Goal 1  Goal Identifier: LTG 1  Goal Description: Pt will be able to perform lifting, carrying, squatting, and  bending activities with minimal pain in order to improve tolerance to daily activities and volunteering.  Target Date: 01/30/25      Frequency of Treatment: 1x EOW  Duration of Treatment: 10 weeks    Education Assessment:   Learner/Method: No Barriers to Learning    Risks and benefits of evaluation/treatment have been explained.   Patient/Family/caregiver agrees with Plan of Care.     Evaluation Time:     PT Eval, Low Complexity Minutes (08428): 15    Signing Clinician: Dianne Dubose, PT        [facility]                                                                                   OUTPATIENT PHYSICAL THERAPY      PLAN OF TREATMENT FOR OUTPATIENT REHABILITATION   Patient's Last Name, First Name, Kristen Gil YOB: 1945   Provider's Name   [facility]   Medical Record No.  [id number]     Onset Date: 05/21/24  Start of Care Date: 11/21/24     Medical Diagnosis:  Chronic right hip pain, Chronic right sided low back pain without sciatica      PT Treatment Diagnosis:  Chronic right hip pain, Chronic right sided low back pain without sciatica Plan of Treatment  Frequency/Duration: 1x EOW/ 10 weeks    Certification date from 11/21/24 to 01/30/25         See note for plan of treatment details and functional goals     Dianne Dubose, PT                         I CERTIFY THE NEED FOR THESE SERVICES FURNISHED UNDER        THIS PLAN OF TREATMENT AND WHILE UNDER MY CARE     (Physician attestation of this document indicates review and certification of the therapy plan).              Referring Provider:  Cuate De La Fuente    Initial Assessment  See Epic Evaluation- Start of Care Date: 11/21/24

## 2024-11-27 LAB — NONINV COLON CA DNA+OCC BLD SCRN STL QL: NEGATIVE

## 2024-12-05 ENCOUNTER — THERAPY VISIT (OUTPATIENT)
Dept: PHYSICAL THERAPY | Facility: CLINIC | Age: 79
End: 2024-12-05
Payer: COMMERCIAL

## 2024-12-05 DIAGNOSIS — G89.29 CHRONIC RIGHT HIP PAIN: Primary | ICD-10-CM

## 2024-12-05 DIAGNOSIS — M25.551 CHRONIC RIGHT HIP PAIN: Primary | ICD-10-CM

## 2024-12-05 DIAGNOSIS — M54.50 CHRONIC RIGHT-SIDED LOW BACK PAIN WITHOUT SCIATICA: ICD-10-CM

## 2024-12-05 DIAGNOSIS — G89.29 CHRONIC RIGHT-SIDED LOW BACK PAIN WITHOUT SCIATICA: ICD-10-CM

## 2024-12-12 DIAGNOSIS — R53.82 CHRONIC FATIGUE: ICD-10-CM

## 2024-12-12 RX ORDER — MODAFINIL 100 MG/1
100 TABLET ORAL DAILY
Qty: 30 TABLET | Refills: 5 | Status: SHIPPED | OUTPATIENT
Start: 2024-12-12

## 2024-12-12 NOTE — TELEPHONE ENCOUNTER
Refill request for: modafinil (PROVIGIL) 100 MG tablet    Directions: TAKE 1 TABLET BY MOUTH EVERY DAY     LOV: 10/22/24  NOV: 4/24/25    30 day supply with 5 refills Medication T'd for review and signature  Ines PATEL CMA on 12/12/2024 at 1:17 PM  [facility]

## 2024-12-13 ENCOUNTER — TELEPHONE (OUTPATIENT)
Dept: NEUROLOGY | Facility: CLINIC | Age: 79
End: 2024-12-13
Payer: COMMERCIAL

## 2024-12-13 NOTE — TELEPHONE ENCOUNTER
[employer] requesting documentation/chart notes for the cause of the chronic fatigue.  Case ID: [id number]   P: [phone]  F: [phone]  Faxed information on 12/13/24      Submited via [employer]

## 2024-12-13 NOTE — TELEPHONE ENCOUNTER
PRIOR AUTHORIZATION DENIED    Medication: MODAFINIL 100 MG PO TABS  Insurance Company: [employer] [place] - Phone [phone] Fax [phone]  Denial Date: 12/13/2024  Denial Reason(s): Not a covered diagnosis          Appeal Information:         Patient Notified: No

## 2024-12-16 NOTE — TELEPHONE ENCOUNTER
[place] message sent to patient    Salvatore Peterson,    Your Modafinil has been denied by your insurance. Often time, this medication is not covered under Medicare plans unless for very specific diagnosis. When this happens, we recommend the patients try to pay out of pocket using GoodRx. Depending on which pharmacy you would use, here are some price options for a 90 day supply:    Please let us know how you would like to proceed (If you want us to send the prescription

## 2025-01-11 ENCOUNTER — MYC MEDICAL ADVICE (OUTPATIENT)
Dept: NEUROLOGY | Facility: CLINIC | Age: 80
End: 2025-01-11
Payer: COMMERCIAL

## 2025-01-23 ENCOUNTER — OFFICE VISIT (OUTPATIENT)
Dept: DERMATOLOGY | Facility: CLINIC | Age: 80
End: 2025-01-23
Payer: COMMERCIAL

## 2025-01-23 DIAGNOSIS — D18.01 ANGIOMA OF SKIN: ICD-10-CM

## 2025-01-23 DIAGNOSIS — L57.0 ACTINIC KERATOSIS: ICD-10-CM

## 2025-01-23 DIAGNOSIS — D22.9 NEVUS: Primary | ICD-10-CM

## 2025-01-23 DIAGNOSIS — Z85.828 HISTORY OF SCC (SQUAMOUS CELL CARCINOMA) OF SKIN: ICD-10-CM

## 2025-01-23 DIAGNOSIS — L81.4 LENTIGO: ICD-10-CM

## 2025-01-23 DIAGNOSIS — Z86.006 HISTORY OF MELANOMA IN SITU: ICD-10-CM

## 2025-01-23 DIAGNOSIS — L82.1 SEBORRHEIC KERATOSIS: ICD-10-CM

## 2025-01-23 NOTE — PROGRESS NOTES
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  See patient intake sheet.  ROS of the following were done and are negative: Constitutional, Eyes, Ears, Nose,   Mouth, Throat, Cardiovascular, Respiratory, GI, Genitourinary, Musculoskeletal,   Psychiatric, Endocrine, Allergic/Immunologic.    PHYSICAL EXAM:   There were no vitals taken for this visit.  Skin exam performed as follows: Type 2 skin. Mood appropriate  Alert and Oriented X 3. Well developed, well nourished in no distress.  General appearance: Normal  Head including face: Normal  Eyes: conjunctiva and lids: Normal  Mouth: Lips, teeth, gums: Normal  Neck: Normal  Chest-breast/axillae: Normal  Back: Normal  Spleen and liver: Normal  Cardiovascular: Exam of peripheral vascular system by observation for swelling, varicosities, edema: Normal  Genitalia: groin, buttocks: Normal  Extremities: digits/nails (clubbing): Normal  Eccrine and Apocrine glands: Normal  Right upper extremity: Normal  Left upper extremity: Normal  Right lower extremity: Normal  Left lower extremity: Normal  Skin: Scalp and body hair: See below    Pt deferred exam of breasts, groin, buttocks: No    Other physical findings:  1. Multiple pigmented macules on extremities and trunk  2. Multiple pigmented macules on face, trunk and extremities  3. Multiple vascular papules on trunk, arms and legs  4. Multiple  scattered keratotic plaques  5. Pink gritty papule on the left nasal tip x 2           Except as noted above, no other signs of skin cancer or melanoma.     ASSESSMENT/PLAN:   Benign Above Waist skin cancer screening today. . Patient with history of melanoma in situ and NMSC  Advised on monthly self exams and 1 year  Patient Education: Appropriate brochures given.    Multiple benign appearing nevi on arms, legs and trunk. Discussed ABCDEs of melanoma and sunscreen.   Multiple lentigos on arms, legs and trunk. Advised benign, no treatment needed.  Multiple scattered angiomas. Advised benign, no treatment needed.   Seborrheic keratosis on arms, legs and trunk. Advised benign, no treatment needed.  Actinic keratosis on the left nasal tip x 2. As precancerous, cryosurgery performed. Advised on blistering and post-op care. Advised if not resolved in 1-2 months to return for evaluation            Follow-up: Yearly FSE / PRN sooner    1.) Patient was asked about new and changing moles. YES  2.) Patient received a complete physical skin examination: YES  3.) Patient was counseled to perform a monthly self skin examination: YES  Scribed By: Adriana Rubio, MS, PA-C

## 2025-01-23 NOTE — PATIENT INSTRUCTIONS
WOUND CARE INSTRUCTIONS   FOR CRYOSURGERY   Right Nasal Tip  This area treated with liquid nitrogen should form a blister (areas treated may or may not blister-skin may just turn dark and slough off). You do not need to bandage the area unless a blister forms and breaks (which may be a few days). When the blister breaks, begin daily dressing changes as follows:  1) Clean and dry the area with tap water using clean Q-tip or sterile gauze pad.   2) Apply Polysporin ointment or Bacitracin ointment over entire wound. Do NOT use Neosporin ointment.   3) Cover the wound with a band-aid or sterile non-stick gauze pad and micropore paper tape.   REPEAT THESE INSTRUCTIONS AT LEAST ONCE A DAY UNTIL THE WOUND HAS COMPLETELY HEALED.   It is an old wives tale that a wound heals better when it is exposed to air and allowed to dry out. The wound will heal faster with a better cosmetic result if it is kept moist with ointment and covered with a bandage.   Do not let the wound dry out.   IMPORTANT INFORMATION ON REVERSE SIDE   Supplies Needed:   *Cotton tipped applicators (Q-tips)   *Polysporin ointment or Bacitracin ointment (NOT NEOSPORIN)   *Band-aids, or non stick gauze pads and micropore paper tape   PATIENT INFORMATION   During the healing process you will notice a number of changes. All wounds develop a small halo of redness surrounding the wound. This means healing is occurring. Severe itching with extensive redness usually indicates sensitivity to the ointment or bandage tape used to dress the wound. You should call our office if this develops.   Swelling and/or discoloration around your surgical site is common, particularly when performed around the eye.   All wounds normally drain. The larger the wound the more drainage there will be. After 7-10 days, you will notice the wound beginning to shrink and new skin will begin to grow. The wound is healed when you can see skin has formed over the entire area. A healed wound has  a healthy, shiny look to the surface and is red to dark pink in color to normalize. Wounds may take approximately 4-6 weeks to heal. Larger wounds may take 6-8 weeks. After the wound is healed you may discontinue dressing changes.   You may experience a sensation of tightness as your wound heals. This is normal and will gradually subside.   Your healed wound may be sensitive to temperature changes. This sensitivity improves with time, but if you re having a lot of discomfort, try to avoid temperature extremes.   Patients frequently experience itching after their wound appears to have healed because of the continue healing under the skin. Plain Vaseline will help relieve the itching.

## 2025-01-23 NOTE — LETTER
1/23/2025      Kristen Thompson  [address] [place] [id number]      Dear Colleague,    Thank you for referring your patient, Kristen Thompson, to the [facility]. Please see a copy of my visit note below.    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  See patient intake sheet.  ROS of the following were done and are negative: Constitutional, Eyes, Ears, Nose,   Mouth, Throat, Cardiovascular, Respiratory, GI, Genitourinary, Musculoskeletal,   Psychiatric, Endocrine, Allergic/Immunologic.    PHYSICAL EXAM:   There were no vitals taken for this visit.  Skin exam performed as follows: Type 2 skin. Mood appropriate  Alert and Oriented X 3. Well developed, well nourished in no distress.  General appearance: Normal  Head including face: Normal  Eyes: conjunctiva and lids: Normal  Mouth: Lips, teeth, gums: Normal  Neck: Normal  Chest-breast/axillae: Normal  Back: Normal  Spleen and liver: Normal  Cardiovascular: Exam of peripheral vascular system by observation for swelling, varicosities, edema: Normal  Genitalia: groin, buttocks: Normal  Extremities: digits/nails (clubbing): Normal  Eccrine and Apocrine glands: Normal  Right upper extremity: Normal  Left upper extremity: Normal  Right lower extremity: Normal  Left lower extremity: Normal  Skin: Scalp and body hair: See below    Pt deferred exam of  breasts, groin, buttocks: No    Other physical findings:  1. Multiple pigmented macules on extremities and trunk  2. Multiple pigmented macules on face, trunk and extremities  3. Multiple vascular papules on trunk, arms and legs  4. Multiple scattered keratotic plaques  5. Pink gritty papule on the left nasal tip x 2           Except as noted above, no other signs of skin cancer or melanoma.     ASSESSMENT/PLAN:   Benign Above Waist skin cancer screening today. . Patient with history of melanoma in situ and NMSC  Advised on monthly self exams and 1 year  Patient Education: Appropriate brochures given.    Multiple benign appearing nevi on arms, legs and trunk. Discussed ABCDEs of melanoma and sunscreen.   Multiple lentigos on arms, legs and trunk. Advised benign, no treatment needed.  Multiple scattered angiomas. Advised benign, no treatment needed.   Seborrheic keratosis on arms, legs and trunk. Advised benign, no treatment needed.  Actinic keratosis on the left nasal tip x 2. As precancerous, cryosurgery performed. Advised on blistering and post-op care. Advised if not resolved in 1-2 months to return for evaluation            Follow-up: Yearly FSE / PRN sooner    1.) Patient was asked about new and changing moles. YES  2.) Patient received a complete physical skin examination: YES  3.) Patient was counseled to perform a monthly self skin examination: YES  Scribed By: Adriana Rubio, MS, PACherylC        Again, thank you for allowing me to participate in the care of your patient.        Sincerely,        Adriana Rubio PA-C    Electronically signed

## 2025-01-28 ENCOUNTER — TELEPHONE (OUTPATIENT)
Dept: DERMATOLOGY | Facility: CLINIC | Age: 80
End: 2025-01-28
Payer: COMMERCIAL

## 2025-01-28 NOTE — TELEPHONE ENCOUNTER
[facility] Call Center    Phone Message    May a detailed message be left on voicemail: yes     Reason for Call: Other: Pt would like to schedule  6 month follow-up but no appointments are available in [place] or [place] for Adriana. Please call back to schedule.     Action Taken: Other: [place] [facility]    Travel Screening: Not Applicable

## 2025-01-28 NOTE — TELEPHONE ENCOUNTER
Patient Contact    Attempt # 1    Was call answered?  No left message that provider has given notice and will not be at [facility] past 4/29. Advised she can schedule with Dr. Angela or Krystyna Looney- or we can hold her chart and schedule with Toya silverio      Thank you,    Sherri ORANTESRN BSN  [facility]- [phone]

## 2025-01-29 NOTE — TELEPHONE ENCOUNTER
Patient Contact    Attempt # 2    Was call answered?  No    Called patient. No answer. Left message to call back. Clinic number was provided.     Ayaka Gupta LPN   [facility]   [phone]

## 2025-01-30 NOTE — TELEPHONE ENCOUNTER
Patient Contact    Attempt # 3    Was call answered?  No left message that Adriana is leaving [place] but we can schedule with one of the other provider.  3rd attempt to reach patient- closing encounter    Thank you,    Sherri ORANTESRN BSN  [facility]- [phone]

## 2025-02-09 DIAGNOSIS — R09.82 POST-NASAL DRAINAGE: ICD-10-CM

## 2025-02-09 DIAGNOSIS — J30.0 VASOMOTOR RHINITIS: ICD-10-CM

## 2025-02-09 RX ORDER — AZELASTINE HYDROCHLORIDE 137 UG/1
2 SPRAY, METERED NASAL 2 TIMES DAILY PRN
Qty: 30 ML | Refills: 1 | Status: SHIPPED | OUTPATIENT
Start: 2025-02-09

## 2025-02-18 PROBLEM — G89.29 CHRONIC RIGHT HIP PAIN: Status: RESOLVED | Noted: 2024-11-21 | Resolved: 2025-02-18

## 2025-02-18 PROBLEM — G89.29 CHRONIC RIGHT-SIDED LOW BACK PAIN WITHOUT SCIATICA: Status: RESOLVED | Noted: 2024-11-21 | Resolved: 2025-02-18

## 2025-02-18 PROBLEM — M25.551 CHRONIC RIGHT HIP PAIN: Status: RESOLVED | Noted: 2024-11-21 | Resolved: 2025-02-18

## 2025-02-18 PROBLEM — M54.50 CHRONIC RIGHT-SIDED LOW BACK PAIN WITHOUT SCIATICA: Status: RESOLVED | Noted: 2024-11-21 | Resolved: 2025-02-18

## 2025-02-23 ENCOUNTER — APPOINTMENT (OUTPATIENT)
Dept: CT IMAGING | Facility: CLINIC | Age: 80
End: 2025-02-23
Attending: STUDENT IN AN ORGANIZED HEALTH CARE EDUCATION/TRAINING PROGRAM
Payer: COMMERCIAL

## 2025-02-23 ENCOUNTER — HOSPITAL ENCOUNTER (EMERGENCY)
Facility: CLINIC | Age: 80
Discharge: HOME | End: 2025-02-23
Attending: STUDENT IN AN ORGANIZED HEALTH CARE EDUCATION/TRAINING PROGRAM | Admitting: STUDENT IN AN ORGANIZED HEALTH CARE EDUCATION/TRAINING PROGRAM
Payer: COMMERCIAL

## 2025-02-23 VITALS
HEART RATE: 81 BPM | TEMPERATURE: 98.4 F | BODY MASS INDEX: 23.41 KG/M2 | RESPIRATION RATE: 18 BRPM | DIASTOLIC BLOOD PRESSURE: 84 MMHG | SYSTOLIC BLOOD PRESSURE: 136 MMHG | WEIGHT: 128 LBS | OXYGEN SATURATION: 96 %

## 2025-02-23 DIAGNOSIS — R19.7 NAUSEA VOMITING AND DIARRHEA: ICD-10-CM

## 2025-02-23 DIAGNOSIS — R11.2 NAUSEA VOMITING AND DIARRHEA: ICD-10-CM

## 2025-02-23 LAB
ALBUMIN UR-MCNC: 20 MG/DL
ANION GAP SERPL CALCULATED.3IONS-SCNC: 13 MMOL/L (ref 7–15)
APPEARANCE UR: CLEAR
BASOPHILS # BLD AUTO: 0 10E3/UL (ref 0–0.2)
BASOPHILS NFR BLD AUTO: 0 %
BILIRUB UR QL STRIP: NEGATIVE
BUN SERPL-MCNC: 12.9 MG/DL (ref 8–23)
CALCIUM SERPL-MCNC: 9.3 MG/DL (ref 8.8–10.4)
CHLORIDE SERPL-SCNC: 102 MMOL/L (ref 98–107)
COLOR UR AUTO: YELLOW
CREAT SERPL-MCNC: 0.58 MG/DL (ref 0.51–0.95)
EGFRCR SERPLBLD CKD-EPI 2021: >90 ML/MIN/1.73M2
EOSINOPHIL # BLD AUTO: 0 10E3/UL (ref 0–0.7)
EOSINOPHIL NFR BLD AUTO: 0 %
ERYTHROCYTE [DISTWIDTH] IN BLOOD BY AUTOMATED COUNT: 12.4 % (ref 10–15)
GLUCOSE SERPL-MCNC: 94 MG/DL (ref 70–99)
GLUCOSE UR STRIP-MCNC: NEGATIVE MG/DL
HCO3 SERPL-SCNC: 24 MMOL/L (ref 22–29)
HCT VFR BLD AUTO: 42 % (ref 35–47)
HGB BLD-MCNC: 14.3 G/DL (ref 11.7–15.7)
HGB UR QL STRIP: ABNORMAL
HYALINE CASTS: 1 /LPF
IMM GRANULOCYTES # BLD: 0 10E3/UL
IMM GRANULOCYTES NFR BLD: 0 %
KETONES UR STRIP-MCNC: 40 MG/DL
LEUKOCYTE ESTERASE UR QL STRIP: ABNORMAL
LYMPHOCYTES # BLD AUTO: 1.5 10E3/UL (ref 0.8–5.3)
LYMPHOCYTES NFR BLD AUTO: 27 %
MCH RBC QN AUTO: 33.8 PG (ref 26.5–33)
MCHC RBC AUTO-ENTMCNC: 34 G/DL (ref 31.5–36.5)
MCV RBC AUTO: 99 FL (ref 78–100)
MONOCYTES # BLD AUTO: 1 10E3/UL (ref 0–1.3)
MONOCYTES NFR BLD AUTO: 18 %
MUCOUS THREADS #/AREA URNS LPF: PRESENT /LPF
NEUTROPHILS # BLD AUTO: 3 10E3/UL (ref 1.6–8.3)
NEUTROPHILS NFR BLD AUTO: 54 %
NITRATE UR QL: NEGATIVE
NRBC # BLD AUTO: 0 10E3/UL
NRBC BLD AUTO-RTO: 0 /100
PH UR STRIP: 6 [PH] (ref 5–7)
PLATELET # BLD AUTO: 265 10E3/UL (ref 150–450)
POTASSIUM SERPL-SCNC: 3.6 MMOL/L (ref 3.4–5.3)
RBC # BLD AUTO: 4.23 10E6/UL (ref 3.8–5.2)
RBC URINE: 3 /HPF
SODIUM SERPL-SCNC: 139 MMOL/L (ref 135–145)
SP GR UR STRIP: 1.02 (ref 1–1.03)
SQUAMOUS EPITHELIAL: 1 /HPF
UROBILINOGEN UR STRIP-MCNC: NORMAL MG/DL
WBC # BLD AUTO: 5.5 10E3/UL (ref 4–11)
WBC URINE: 2 /HPF

## 2025-02-23 PROCEDURE — 250N000011 HC RX IP 250 OP 636: Performed by: STUDENT IN AN ORGANIZED HEALTH CARE EDUCATION/TRAINING PROGRAM

## 2025-02-23 PROCEDURE — 36415 COLL VENOUS BLD VENIPUNCTURE: CPT | Performed by: STUDENT IN AN ORGANIZED HEALTH CARE EDUCATION/TRAINING PROGRAM

## 2025-02-23 PROCEDURE — 250N000009 HC RX 250: Performed by: STUDENT IN AN ORGANIZED HEALTH CARE EDUCATION/TRAINING PROGRAM

## 2025-02-23 PROCEDURE — 80048 BASIC METABOLIC PNL TOTAL CA: CPT | Performed by: STUDENT IN AN ORGANIZED HEALTH CARE EDUCATION/TRAINING PROGRAM

## 2025-02-23 PROCEDURE — 99284 EMERGENCY DEPT VISIT MOD MDM: CPT | Performed by: STUDENT IN AN ORGANIZED HEALTH CARE EDUCATION/TRAINING PROGRAM

## 2025-02-23 PROCEDURE — 96361 HYDRATE IV INFUSION ADD-ON: CPT | Performed by: STUDENT IN AN ORGANIZED HEALTH CARE EDUCATION/TRAINING PROGRAM

## 2025-02-23 PROCEDURE — 258N000003 HC RX IP 258 OP 636: Performed by: STUDENT IN AN ORGANIZED HEALTH CARE EDUCATION/TRAINING PROGRAM

## 2025-02-23 PROCEDURE — 96374 THER/PROPH/DIAG INJ IV PUSH: CPT | Mod: 59 | Performed by: STUDENT IN AN ORGANIZED HEALTH CARE EDUCATION/TRAINING PROGRAM

## 2025-02-23 PROCEDURE — 81003 URINALYSIS AUTO W/O SCOPE: CPT | Performed by: STUDENT IN AN ORGANIZED HEALTH CARE EDUCATION/TRAINING PROGRAM

## 2025-02-23 PROCEDURE — 96375 TX/PRO/DX INJ NEW DRUG ADDON: CPT | Performed by: STUDENT IN AN ORGANIZED HEALTH CARE EDUCATION/TRAINING PROGRAM

## 2025-02-23 PROCEDURE — 85004 AUTOMATED DIFF WBC COUNT: CPT | Performed by: STUDENT IN AN ORGANIZED HEALTH CARE EDUCATION/TRAINING PROGRAM

## 2025-02-23 PROCEDURE — 82374 ASSAY BLOOD CARBON DIOXIDE: CPT | Performed by: STUDENT IN AN ORGANIZED HEALTH CARE EDUCATION/TRAINING PROGRAM

## 2025-02-23 PROCEDURE — 74177 CT ABD & PELVIS W/CONTRAST: CPT

## 2025-02-23 PROCEDURE — 99285 EMERGENCY DEPT VISIT HI MDM: CPT | Mod: 25 | Performed by: STUDENT IN AN ORGANIZED HEALTH CARE EDUCATION/TRAINING PROGRAM

## 2025-02-23 RX ORDER — ONDANSETRON 2 MG/ML
4 INJECTION INTRAMUSCULAR; INTRAVENOUS ONCE
Status: COMPLETED | OUTPATIENT
Start: 2025-02-23 | End: 2025-02-23

## 2025-02-23 RX ORDER — IOPAMIDOL 755 MG/ML
63 INJECTION, SOLUTION INTRAVASCULAR ONCE
Status: COMPLETED | OUTPATIENT
Start: 2025-02-23 | End: 2025-02-23

## 2025-02-23 RX ORDER — KETOROLAC TROMETHAMINE 15 MG/ML
10 INJECTION, SOLUTION INTRAMUSCULAR; INTRAVENOUS ONCE
Status: COMPLETED | OUTPATIENT
Start: 2025-02-23 | End: 2025-02-23

## 2025-02-23 RX ORDER — ONDANSETRON 4 MG/1
4 TABLET, ORALLY DISINTEGRATING ORAL EVERY 8 HOURS PRN
Qty: 10 TABLET | Refills: 0 | Status: SHIPPED | OUTPATIENT
Start: 2025-02-23

## 2025-02-23 RX ADMIN — SODIUM CHLORIDE 56 ML: 9 INJECTION, SOLUTION INTRAVENOUS at 02:46

## 2025-02-23 RX ADMIN — ONDANSETRON 4 MG: 2 INJECTION, SOLUTION INTRAMUSCULAR; INTRAVENOUS at 02:09

## 2025-02-23 RX ADMIN — IOPAMIDOL 63 ML: 755 INJECTION, SOLUTION INTRAVENOUS at 02:46

## 2025-02-23 RX ADMIN — KETOROLAC TROMETHAMINE 10 MG: 15 INJECTION, SOLUTION INTRAMUSCULAR; INTRAVENOUS at 03:51

## 2025-02-23 RX ADMIN — PROCHLORPERAZINE EDISYLATE 5 MG: 5 INJECTION INTRAMUSCULAR; INTRAVENOUS at 03:51

## 2025-02-23 RX ADMIN — SODIUM CHLORIDE 1000 ML: 0.9 INJECTION, SOLUTION INTRAVENOUS at 02:58

## 2025-02-23 ASSESSMENT — COLUMBIA-SUICIDE SEVERITY RATING SCALE - C-SSRS
6. HAVE YOU EVER DONE ANYTHING, STARTED TO DO ANYTHING, OR PREPARED TO DO ANYTHING TO END YOUR LIFE?: NO
1. IN THE PAST MONTH, HAVE YOU WISHED YOU WERE DEAD OR WISHED YOU COULD GO TO SLEEP AND NOT WAKE UP?: NO
2. HAVE YOU ACTUALLY HAD ANY THOUGHTS OF KILLING YOURSELF IN THE PAST MONTH?: NO

## 2025-02-23 ASSESSMENT — ACTIVITIES OF DAILY LIVING (ADL)
ADLS_ACUITY_SCORE: 41

## 2025-02-23 NOTE — DISCHARGE INSTRUCTIONS
Your CAT scan showed evidence of inflammation of your bowels, this is called colitis.  There is also inflammation of your stomach and this is called gastritis.  I am not exactly sure what is causing your symptoms.  It is usually due to an infection such as the virus.  You can use the prescribed Zofran as needed for nausea and vomiting.  Make sure you are drinking plenty of fluids.  You can use Tylenol and ibuprofen as needed for pain.  Eat small bland meals and advance your diet as tolerated.  I would like you to follow-up with your regular doctor later this week to ensure you are improving.  You should talk to them to see if they think you need a colonoscopy for further evaluation.  If you have more diarrhea, Imodium can be helpful.  It is safe to take Imodium as long as the diarrhea is not bloody.  You can also try some Mylanta for your upset stomach.  If you develop any severe symptoms return to the ER.

## 2025-02-23 NOTE — ED PROVIDER NOTES
History     Chief Complaint   Patient presents with    Nausea    Abdominal Pain    Dysuria            HPI  Kristen Thompson is a 79 year old female who has history of CVA, chronic back pain, hypertension, GERD, chronic constipation, hyperlipidemia, restless leg syndrome, who presents to the ER for evaluation of abdominal pain and vomiting.  History is provided by both the patient and her family member at the bedside.  Patient's been having vomiting for the last 2 days.  Also been having diarrhea.  She is complaining of right sided abdominal pain.  Seem to be getting better today until she started vomiting again tonight.  She endorses urinary urgency, but only drops come out.  She denies dysuria or hematuria.  She denies back pain.  Last bowel movement was earlier today and she states it was small.  She has not any diarrhea since yesterday.  No fevers.  No bloody vomit.  No bloody stools.  Abdominal surgical history notable for cholecystectomy and hysterectomy.  No known ill contacts.  No alcohol or drugs.  Chronic speech deficits and chronic left-sided deficits from an old stroke.    Allergies:  Allergies   Allergen Reactions    Codeine Sulfate      Nausea and vomiting     Quinapril Difficulty breathing    Darvon-N [Propoxyphene Napsylate] Nausea and Vomiting       Problem List:    Patient Active Problem List    Diagnosis Date Noted    Hemiplegia and hemiparesis following cerebral infarction affecting unspecified side (H) 04/12/2022     Priority: Medium    Cerebrovascular accident (CVA), unspecified mechanism (H) 03/02/2021     Priority: Medium    Essential hypertension with goal blood pressure less than 130/80 03/02/2021     Priority: Medium    Gastroesophageal reflux disease without esophagitis 07/01/2020     Priority: Medium    Localized edema 06/16/2017     Priority: Medium    Cervicalgia 12/06/2016     Priority: Medium    Gastritis 09/16/2016     Priority: Medium    Chronic constipation 09/08/2016      Priority: Medium    Hyperlipidemia LDL goal <130 07/11/2014     Priority: Medium    CARDIOVASCULAR SCREENING; LDL GOAL LESS THAN 160 10/11/2012     Priority: Medium    HL (hearing loss) 10/11/2012     Priority: Medium    Insomnia 12/12/2011     Priority: Medium    Primary localized osteoarthrosis, lower leg 10/02/2007     Priority: Medium    Restless leg syndrome 02/28/2006     Priority: Medium    Female stress incontinence 02/28/2006     Priority: Medium        Past Medical History:    Past Medical History:   Diagnosis Date    Arthritis 11/19/2013    Gastro-oesophageal reflux disease     GERD (gastroesophageal reflux disease) 10/02/2012    H/O total hysterectomy     HL (hearing loss) 10/11/2012    Malignant melanoma (H)     PONV (postoperative nausea and vomiting)     Squamous cell carcinoma        Past Surgical History:    Past Surgical History:   Procedure Laterality Date    BIOPSY BREAST      BIOPSY BREAST Left 8/22/2022    Procedure: Seed Localized Left Breast Biopsy;  Surgeon: Jeremy Berg DO;  Location: [place] OR    COLONOSCOPY  10/30/2012    Procedure: COLONOSCOPY;  Colonoscopy;  Surgeon: Denise Bah MD;  Location: [place] [facility]    ESOPHAGOSCOPY, GASTROSCOPY, DUODENOSCOPY (EGD), COMBINED N/A 9/15/2016    Procedure: COMBINED ESOPHAGOSCOPY, GASTROSCOPY, DUODENOSCOPY (EGD);  Surgeon: Bennie Godinez MD;  Location: [place] [facility]    GALLBLADDER SURGERY      HYSTERECTOMY      knee replacment  2007    RELEASE TRIGGER FINGER Right 4/29/2016    Procedure: RELEASE TRIGGER FINGER;  Surgeon: Percy Sarmiento MD;  Location: [place] OR    REPAIR BLADDER      ZZC RAD RESEC TONSIL/PILLARS         Family History:    Family History   Problem Relation Age of Onset    C.A.D. Mother     Cardiovascular Mother     Thyroid Disease Mother     Cancer Father         stomach ca    Cancer Sister     Eye Disorder Sister     C.A.D. Sister     Cerebrovascular Disease Sister     Breast Cancer Sister     Arthritis Sister      Cardiovascular Sister     Depression Sister     Heart Disease Sister     Neurologic Disorder Sister     Osteoporosis Sister     Thyroid Disease Sister     Depression Sister     Thyroid Disease Sister     Depression Sister     Thyroid Disease Sister     Obesity Sister     Neurologic Disorder Sister        Social History:  Marital Status:   [5]  Social History     Tobacco Use    Smoking status: Never    Smokeless tobacco: Never   Substance Use Topics    Alcohol use: No     Alcohol/week: 0.0 standard drinks of alcohol    Drug use: No        Medications:    ondansetron (ZOFRAN ODT) 4 MG ODT tab  acetaminophen (TYLENOL) 325 MG tablet  amLODIPine (NORVASC) 5 MG tablet  aspirin 81 MG EC tablet  atorvastatin (LIPITOR) 10 MG tablet  azelastine 137 MCG/SPRAY SOLN  B Complex-Folic Acid (B COMPLEX PLUS PO)  CALCIUM PO  Cholecalciferol (VITAMIN D PO)  docusate sodium (COLACE) 100 MG capsule  DULoxetine (CYMBALTA) 60 MG capsule  Fluticasone Propionate (FLONASE ALLERGY RELIEF NA)  MAGNESIUM PO  modafinil (PROVIGIL) 100 MG tablet  Omega-3 Fatty Acids (OMEGA 3 PO)  omeprazole (PRILOSEC) 20 MG DR capsule  Polyethyl Glycol-Propyl Glycol (SYSTANE FREE OP)  Polyethylene Glycol 3350 (MIRALAX PO)  traZODone (DESYREL) 50 MG tablet          Review of Systems  See HPI  Physical Exam   BP: (!) 143/83  Pulse: 72  Temp: 98.4  F (36.9  C)  Resp: 18  Weight: 58.1 kg (128 lb)  SpO2: 95 %      Physical Exam  /84   Pulse 81   Temp 98.4  F (36.9  C) (Oral)   Resp 18   Wt 58.1 kg (128 lb)   SpO2 96%   BMI 23.41 kg/m    General: alert, interactive, in no apparent distress  Head: atraumatic  Nose: no rhinorrhea or epistaxis  Ears: no external auditory canal discharge or bleeding.    Eyes: Sclera nonicteric. Conjunctiva noninjected. PERRL, EOMI  Mouth: no tonsillar erythema, edema, or exudate.  Moist mucous membranes  Neck: supple, moving spontaneously no midline cervical tenderness  Lungs: No increased work of breathing.  Clear to  auscultation bilaterally.  CV: RRR, peripheral pulses palpable and symmetric  Abdomen: soft, mild generalized tenderness throughout, worst on the right lower part of the abdomen  Extremities: Warm and well-perfused.    Skin: no rash or diaphoresis  Neuro: CN II-XII grossly intact, following commands, moving all extremities, slurred speech but I am able to understand    ED Course        Procedures             Critical Care time:  none             Results for orders placed or performed during the hospital encounter of 02/23/25 (from the past 24 hours)   CBC with platelets, differential    Narrative    The following orders were created for panel order CBC with platelets, differential.  Procedure                               Abnormality         Status                     ---------                               -----------         ------                     CBC with platelets and d...[[id number]]  Abnormal            Final result                 Please view results for these tests on the individual orders.   Basic metabolic panel   Result Value Ref Range    Sodium 139 135 - 145 mmol/L    Potassium 3.6 3.4 - 5.3 mmol/L    Chloride 102 98 - 107 mmol/L    Carbon Dioxide (CO2) 24 22 - 29 mmol/L    Anion Gap 13 7 - 15 mmol/L    Urea Nitrogen 12.9 8.0 - 23.0 mg/dL    Creatinine 0.58 0.51 - 0.95 mg/dL    GFR Estimate >90 >60 mL/min/1.73m2    Calcium 9.3 8.8 - 10.4 mg/dL    Glucose 94 70 - 99 mg/dL   CBC with platelets and differential   Result Value Ref Range    WBC Count 5.5 4.0 - 11.0 10e3/uL    RBC Count 4.23 3.80 - 5.20 10e6/uL    Hemoglobin 14.3 11.7 - 15.7 g/dL    Hematocrit 42.0 35.0 - 47.0 %    MCV 99 78 - 100 fL    MCH 33.8 (H) 26.5 - 33.0 pg    MCHC 34.0 31.5 - 36.5 g/dL    RDW 12.4 10.0 - 15.0 %    Platelet Count 265 150 - 450 10e3/uL    % Neutrophils 54 %    % Lymphocytes 27 %    % Monocytes 18 %    % Eosinophils 0 %    % Basophils 0 %    % Immature Granulocytes 0 %    NRBCs per 100 WBC 0 <1 /100    Absolute  Neutrophils 3.0 1.6 - 8.3 10e3/uL    Absolute Lymphocytes 1.5 0.8 - 5.3 10e3/uL    Absolute Monocytes 1.0 0.0 - 1.3 10e3/uL    Absolute Eosinophils 0.0 0.0 - 0.7 10e3/uL    Absolute Basophils 0.0 0.0 - 0.2 10e3/uL    Absolute Immature Granulocytes 0.0 <=0.4 10e3/uL    Absolute NRBCs 0.0 10e3/uL   UA Macroscopic with reflex to Microscopic and Culture    Specimen: Urine, Clean Catch   Result Value Ref Range    Color Urine Yellow Colorless, Straw, Light Yellow, Yellow    Appearance Urine Clear Clear    Glucose Urine Negative Negative mg/dL    Bilirubin Urine Negative Negative    Ketones Urine 40 (A) Negative mg/dL    Specific Gravity Urine 1.023 1.003 - 1.035    Blood Urine Trace (A) Negative    pH Urine 6.0 5.0 - 7.0    Protein Albumin Urine 20 (A) Negative mg/dL    Urobilinogen Urine Normal Normal, 2.0 mg/dL    Nitrite Urine Negative Negative    Leukocyte Esterase Urine Small (A) Negative    Mucus Urine Present (A) None Seen /LPF    RBC Urine 3 (H) <=2 /HPF    WBC Urine 2 <=5 /HPF    Squamous Epithelials Urine 1 <=1 /HPF    Hyaline Casts Urine 1 <=2 /LPF    Narrative    Urine Culture not indicated   CT Abdomen Pelvis w Contrast    Narrative    EXAM: CT ABDOMEN PELVIS W CONTRAST  LOCATION: [facility]  DATE: 2/23/2025    INDICATION: Vomiting diarrhea, right sided abdominal pain  COMPARISON: None.  TECHNIQUE: CT scan of the abdomen and pelvis was performed following injection of IV contrast. Multiplanar reformats were obtained. Dose reduction techniques were used.  CONTRAST: 63 mL Isovue 370    FINDINGS:   LOWER CHEST: Normal.    HEPATOBILIARY: Mild hepatic steatosis. Prior cholecystectomy . There is mild central and extra hepatic biliary duct dilatation, similar to prior exam, favored to reflect chronic changes of the reservoir effect. No calcified gallstones are identified.    PANCREAS: No significant mass, duct dilatation, or inflammatory change.    SPLEEN: Normal size.    ADRENAL GLANDS:  No significant nodules.    KIDNEYS/BLADDER: No significant mass, stone, or hydronephrosis.    BOWEL: The stomach is normal in size and caliber. There is mild thickening of the gastric antrum and pylorus (image 43, series 4). The duodenum is otherwise normal in size and caliber. The small bowel is normal in size and caliber. There is mild mural   thickening of the ascending colon, with relative sparing of the cecum (image 24, series 4). The mural thickening extends to the hepatic flexure with relative sparing of the transverse colon. Borderline thickening of the descending colon and sigmoid   flexure are noted, although these may be exaggerated due to under distention (image 96, series 5; image 43, series 4). Diverticulosis is seen throughout the descending and sigmoid colon. No evidence of diverticulitis.    LYMPH NODES: No lymphadenopathy.    VASCULATURE: No abdominal aortic aneurysm. Atherosclerotic calcifications of the abdominal aorta and iliac vessels are noted.    PELVIC ORGANS: No pelvic masses.    MUSCULOSKELETAL: Mild osteopenia. Discogenic and posterior facet degenerative changes lower lumbar spine..      Impression    IMPRESSION:   1.  Mild mural thickening of the ascending colon, hepatic flexure, with borderline thickening of the descending colon and sigmoid flexure, findings are concerning for a infectious and/or inflammatory colitis.  2.  Mild thickening of the gastric antrum and pylorus, may reflect changes of gastritis.  3.  Mild hepatic steatosis.  4.  Diverticulosis of the descending and sigmoid colon without evidence of diverticulitis.         Medications   ondansetron (ZOFRAN) injection 4 mg (4 mg Intravenous $Given 2/23/25 0209)   sodium chloride 0.9% BOLUS 1,000 mL (0 mLs Intravenous Stopped 2/23/25 0440)   iopamidol (ISOVUE-370) solution 63 mL (63 mLs Intravenous $Given 2/23/25 0246)   sodium chloride 0.9 % bag 500mL for CT scan flush use (56 mLs Intravenous $Given 2/23/25 0246)   ketorolac  (TORADOL) injection 10 mg (10 mg Intravenous $Given 2/23/25 0351)   prochlorperazine (COMPAZINE) injection 5 mg (5 mg Intravenous $Given 2/23/25 0351)       Assessments & Plan (with Medical Decision Making)     I have reviewed the nursing notes.    I have reviewed the findings, diagnosis, plan and need for follow up with the patient.          Medical Decision Making  Kristen Thompson is a 79 year old female who has history of CVA, chronic back pain, hypertension, GERD, chronic constipation, hyperlipidemia, restless leg syndrome, who presents to the ER for evaluation of abdominal pain and vomiting.  Vital signs reviewed and notable for mild hypertension otherwise reassuring.  Patient has some generalized abdominal tenderness, worse on the right.  She is given Zofran, and a fluid bolus.  She declines the need for pain medications.  Her labs are all relatively reassuring.  CBC is normal.  BMP is normal.  UA is without evidence of acute infection.  Her CT shows evidence of wall thickening of the colon consistent with colitis.  She also has evidence of gastritis.  Unclear etiology of this.  Low suspicion for ischemic colitis.  She has no bloody stools to suggest inflammatory colitis such as Crohn's or ulcerative colitis.  Suspect that this is related to viral illness.  I recommend supportive cares.  Patient still having some nausea and required Compazine and Toradol.  She is feeling much better after these medications.  She is tolerating p.o.  Hospitalization was considered, but I do not think the patient needs to be hospitalized.  I think outpatient management is appropriate at this time.  I recommended she push fluids and use Tylenol or ibuprofen as needed for pain.  Zofran prescription given.  I also placed an urgent referral to primary care.  Return precautions discussed as well.        Discharge Medication List as of 2/23/2025  4:40 AM        START taking these medications    Details   ondansetron (ZOFRAN ODT) 4  MG ODT tab Take 1 tablet (4 mg) by mouth every 8 hours as needed for nausea., Disp-10 tablet, R-0, [facility]             Final diagnoses:   Nausea vomiting and diarrhea - Suspect colitis       2/23/2025   [facility] EMERGENCY DEPT       Dmitriy Landeros MD  02/23/25 [id number]

## 2025-02-23 NOTE — ED TRIAGE NOTES
Triage Assessment (Adult)       Row Name 02/23/25 0210          Triage Assessment    Airway WDL WDL        Respiratory WDL    Respiratory WDL WDL        Skin Circulation/Temperature WDL    Skin Circulation/Temperature WDL WDL        Cardiac WDL    Cardiac WDL WDL        Peripheral/Neurovascular WDL    Peripheral Neurovascular WDL WDL        Cognitive/Neuro/Behavioral WDL    Cognitive/Neuro/Behavioral WDL WDL

## 2025-02-23 NOTE — ED TRIAGE NOTES
Nausea vomiting since Thursday and is in ED because she can't pee but feels like she has to pee. And has abdominal pain in the right radiating to the left

## 2025-02-28 PROBLEM — D03.61 MELANOMA IN SITU OF RIGHT SHOULDER (H): Status: ACTIVE | Noted: 2025-02-28

## 2025-02-28 PROBLEM — I69.359 HEMIPLEGIA AND HEMIPARESIS FOLLOWING CEREBRAL INFARCTION AFFECTING UNSPECIFIED SIDE (H): Status: RESOLVED | Noted: 2022-04-12 | Resolved: 2025-02-28

## 2025-03-11 DIAGNOSIS — J30.0 VASOMOTOR RHINITIS: ICD-10-CM

## 2025-03-11 DIAGNOSIS — R09.82 POST-NASAL DRAINAGE: ICD-10-CM

## 2025-03-12 RX ORDER — AZELASTINE HYDROCHLORIDE 137 UG/1
2 SPRAY, METERED NASAL 2 TIMES DAILY PRN
Qty: 30 ML | Refills: 1 | OUTPATIENT
Start: 2025-03-12

## 2025-03-21 ENCOUNTER — APPOINTMENT (OUTPATIENT)
Dept: CT IMAGING | Facility: CLINIC | Age: 80
DRG: 481 | End: 2025-03-21
Attending: NURSE PRACTITIONER
Payer: COMMERCIAL

## 2025-03-21 ENCOUNTER — APPOINTMENT (OUTPATIENT)
Dept: GENERAL RADIOLOGY | Facility: CLINIC | Age: 80
DRG: 481 | End: 2025-03-21
Attending: NURSE PRACTITIONER
Payer: COMMERCIAL

## 2025-03-21 ENCOUNTER — HOSPITAL ENCOUNTER (INPATIENT)
Facility: CLINIC | Age: 80
LOS: 3 days | Discharge: HOME HEALTH | DRG: 481 | End: 2025-03-24
Attending: NURSE PRACTITIONER | Admitting: INTERNAL MEDICINE
Payer: COMMERCIAL

## 2025-03-21 DIAGNOSIS — Z86.73 HISTORY OF STROKE: Primary | ICD-10-CM
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
## 2025-03-21 DIAGNOSIS — M97.11XA PERIPROSTHETIC FRACTURE AROUND INTERNAL PROSTHETIC RIGHT KNEE JOINT, INITIAL ENCOUNTER: ICD-10-CM

## 2025-03-21 PROBLEM — K21.9 GASTROESOPHAGEAL REFLUX DISEASE WITHOUT ESOPHAGITIS: Status: ACTIVE | Noted: 2020-07-01

## 2025-03-21 PROBLEM — R53.82 CHRONIC FATIGUE: Status: ACTIVE | Noted: 2025-03-21

## 2025-03-21 PROBLEM — I63.9 CEREBROVASCULAR ACCIDENT (CVA), UNSPECIFIED MECHANISM (H): Status: RESOLVED | Noted: 2021-03-02 | Resolved: 2025-03-21

## 2025-03-21 PROBLEM — I69.359 HEMIPLEGIA AND HEMIPARESIS FOLLOWING CEREBRAL INFARCTION AFFECTING UNSPECIFIED SIDE (H): Status: ACTIVE | Noted: 2022-04-12

## 2025-03-21 LAB
ABO + RH BLD: NORMAL
ANION GAP SERPL CALCULATED.3IONS-SCNC: 10 MMOL/L (ref 7–15)
BASOPHILS # BLD AUTO: 0 10E3/UL (ref 0–0.2)
BASOPHILS NFR BLD AUTO: 0 %
BLD GP AB SCN SERPL QL: NEGATIVE
BUN SERPL-MCNC: 14.2 MG/DL (ref 8–23)
CALCIUM SERPL-MCNC: 9 MG/DL (ref 8.8–10.4)
CHLORIDE SERPL-SCNC: 105 MMOL/L (ref 98–107)
CREAT SERPL-MCNC: 0.63 MG/DL (ref 0.51–0.95)
EGFRCR SERPLBLD CKD-EPI 2021: >90 ML/MIN/1.73M2
EOSINOPHIL # BLD AUTO: 0.1 10E3/UL (ref 0–0.7)
EOSINOPHIL NFR BLD AUTO: 0 %
ERYTHROCYTE [DISTWIDTH] IN BLOOD BY AUTOMATED COUNT: 12.3 % (ref 10–15)
GLUCOSE SERPL-MCNC: 98 MG/DL (ref 70–99)
HCO3 SERPL-SCNC: 26 MMOL/L (ref 22–29)
HCT VFR BLD AUTO: 38.6 % (ref 35–47)
HGB BLD-MCNC: 12.8 G/DL (ref 11.7–15.7)
IMM GRANULOCYTES # BLD: 0.1 10E3/UL
IMM GRANULOCYTES NFR BLD: 1 %
LYMPHOCYTES # BLD AUTO: 1 10E3/UL (ref 0.8–5.3)
LYMPHOCYTES NFR BLD AUTO: 8 %
MCH RBC QN AUTO: 33 PG (ref 26.5–33)
MCHC RBC AUTO-ENTMCNC: 33.2 G/DL (ref 31.5–36.5)
MCV RBC AUTO: 100 FL (ref 78–100)
MONOCYTES # BLD AUTO: 0.6 10E3/UL (ref 0–1.3)
MONOCYTES NFR BLD AUTO: 5 %
NEUTROPHILS # BLD AUTO: 10.6 10E3/UL (ref 1.6–8.3)
NEUTROPHILS NFR BLD AUTO: 86 %
NRBC # BLD AUTO: 0 10E3/UL
NRBC BLD AUTO-RTO: 0 /100
PLATELET # BLD AUTO: 259 10E3/UL (ref 150–450)
POTASSIUM SERPL-SCNC: 3.8 MMOL/L (ref 3.4–5.3)
RBC # BLD AUTO: 3.88 10E6/UL (ref 3.8–5.2)
SODIUM SERPL-SCNC: 141 MMOL/L (ref 135–145)
SPECIMEN EXP DATE BLD: NORMAL
WBC # BLD AUTO: 12.4 10E3/UL (ref 4–11)

## 2025-03-21 PROCEDURE — 250N000013 HC RX MED GY IP 250 OP 250 PS 637: Performed by: PHYSICIAN ASSISTANT

## 2025-03-21 PROCEDURE — 73560 X-RAY EXAM OF KNEE 1 OR 2: CPT | Mod: RT

## 2025-03-21 PROCEDURE — 72125 CT NECK SPINE W/O DYE: CPT

## 2025-03-21 PROCEDURE — 99285 EMERGENCY DEPT VISIT HI MDM: CPT | Mod: 25

## 2025-03-21 PROCEDURE — 96375 TX/PRO/DX INJ NEW DRUG ADDON: CPT

## 2025-03-21 PROCEDURE — 73700 CT LOWER EXTREMITY W/O DYE: CPT | Mod: RT

## 2025-03-21 PROCEDURE — 36415 COLL VENOUS BLD VENIPUNCTURE: CPT | Performed by: NURSE PRACTITIONER

## 2025-03-21 PROCEDURE — 250N000011 HC RX IP 250 OP 636: Performed by: NURSE PRACTITIONER

## 2025-03-21 PROCEDURE — 250N000013 HC RX MED GY IP 250 OP 250 PS 637: Performed by: NURSE PRACTITIONER

## 2025-03-21 PROCEDURE — 99223 1ST HOSP IP/OBS HIGH 75: CPT | Performed by: PHYSICIAN ASSISTANT

## 2025-03-21 PROCEDURE — 93005 ELECTROCARDIOGRAM TRACING: CPT

## 2025-03-21 PROCEDURE — 85025 COMPLETE CBC W/AUTO DIFF WBC: CPT | Performed by: NURSE PRACTITIONER

## 2025-03-21 PROCEDURE — 36415 COLL VENOUS BLD VENIPUNCTURE: CPT | Performed by: PHYSICIAN ASSISTANT

## 2025-03-21 PROCEDURE — 70450 CT HEAD/BRAIN W/O DYE: CPT

## 2025-03-21 PROCEDURE — 120N000001 HC R&B MED SURG/OB

## 2025-03-21 PROCEDURE — 73502 X-RAY EXAM HIP UNI 2-3 VIEWS: CPT

## 2025-03-21 PROCEDURE — 86900 BLOOD TYPING SEROLOGIC ABO: CPT | Performed by: PHYSICIAN ASSISTANT

## 2025-03-21 PROCEDURE — 96376 TX/PRO/DX INJ SAME DRUG ADON: CPT

## 2025-03-21 PROCEDURE — 80048 BASIC METABOLIC PNL TOTAL CA: CPT | Performed by: NURSE PRACTITIONER

## 2025-03-21 PROCEDURE — 99418 PROLNG IP/OBS E/M EA 15 MIN: CPT | Performed by: PHYSICIAN ASSISTANT

## 2025-03-21 PROCEDURE — 93010 ELECTROCARDIOGRAM REPORT: CPT | Performed by: EMERGENCY MEDICINE

## 2025-03-21 PROCEDURE — 96374 THER/PROPH/DIAG INJ IV PUSH: CPT

## 2025-03-21 PROCEDURE — 99285 EMERGENCY DEPT VISIT HI MDM: CPT | Mod: 25 | Performed by: EMERGENCY MEDICINE

## 2025-03-21 RX ORDER — ONDANSETRON 2 MG/ML
4 INJECTION INTRAMUSCULAR; INTRAVENOUS ONCE
Status: COMPLETED | OUTPATIENT
Start: 2025-03-21 | End: 2025-03-21

## 2025-03-21 RX ORDER — OXYCODONE HYDROCHLORIDE 5 MG/1
10 TABLET ORAL EVERY 4 HOURS PRN
Status: DISCONTINUED | OUTPATIENT
Start: 2025-03-21 | End: 2025-03-24 | Stop reason: HOSPADM

## 2025-03-21 RX ORDER — KETOROLAC TROMETHAMINE 15 MG/ML
10 INJECTION, SOLUTION INTRAMUSCULAR; INTRAVENOUS ONCE
Status: COMPLETED | OUTPATIENT
Start: 2025-03-21 | End: 2025-03-21

## 2025-03-21 RX ORDER — TRAZODONE HYDROCHLORIDE 50 MG/1
50 TABLET ORAL AT BEDTIME
COMMUNITY

## 2025-03-21 RX ORDER — NALOXONE HYDROCHLORIDE 0.4 MG/ML
0.2 INJECTION, SOLUTION INTRAMUSCULAR; INTRAVENOUS; SUBCUTANEOUS
Status: DISCONTINUED | OUTPATIENT
Start: 2025-03-21 | End: 2025-03-24 | Stop reason: HOSPADM

## 2025-03-21 RX ORDER — OXYCODONE HYDROCHLORIDE 5 MG/1
5 TABLET ORAL EVERY 4 HOURS PRN
Status: DISCONTINUED | OUTPATIENT
Start: 2025-03-21 | End: 2025-03-21

## 2025-03-21 RX ORDER — ACETAMINOPHEN 500 MG
1000 TABLET ORAL AT BEDTIME
COMMUNITY

## 2025-03-21 RX ORDER — CARBOXYMETHYLCELLULOSE SODIUM 5 MG/ML
1 SOLUTION/ DROPS OPHTHALMIC
Status: DISCONTINUED | OUTPATIENT
Start: 2025-03-21 | End: 2025-03-24 | Stop reason: HOSPADM

## 2025-03-21 RX ORDER — HYDROMORPHONE HCL IN WATER/PF 6 MG/30 ML
0.2 PATIENT CONTROLLED ANALGESIA SYRINGE INTRAVENOUS EVERY 30 MIN PRN
Status: DISCONTINUED | OUTPATIENT
Start: 2025-03-21 | End: 2025-03-21

## 2025-03-21 RX ORDER — HYDROMORPHONE HCL IN WATER/PF 6 MG/30 ML
0.2 PATIENT CONTROLLED ANALGESIA SYRINGE INTRAVENOUS
Status: DISCONTINUED | OUTPATIENT
Start: 2025-03-21 | End: 2025-03-21

## 2025-03-21 RX ORDER — TRAZODONE HYDROCHLORIDE 50 MG/1
50 TABLET ORAL AT BEDTIME
Status: DISCONTINUED | OUTPATIENT
Start: 2025-03-21 | End: 2025-03-24 | Stop reason: HOSPADM

## 2025-03-21 RX ORDER — PROCHLORPERAZINE MALEATE 5 MG/1
5 TABLET ORAL EVERY 6 HOURS PRN
Status: DISCONTINUED | OUTPATIENT
Start: 2025-03-21 | End: 2025-03-24 | Stop reason: HOSPADM

## 2025-03-21 RX ORDER — NALOXONE HYDROCHLORIDE 0.4 MG/ML
0.4 INJECTION, SOLUTION INTRAMUSCULAR; INTRAVENOUS; SUBCUTANEOUS
Status: DISCONTINUED | OUTPATIENT
Start: 2025-03-21 | End: 2025-03-24 | Stop reason: HOSPADM

## 2025-03-21 RX ORDER — AMOXICILLIN 250 MG
2 CAPSULE ORAL 2 TIMES DAILY PRN
Status: DISCONTINUED | OUTPATIENT
Start: 2025-03-21 | End: 2025-03-24 | Stop reason: HOSPADM

## 2025-03-21 RX ORDER — SALIVA STIMULANT COMB. NO.3
1 SPRAY, NON-AEROSOL (ML) MUCOUS MEMBRANE 4 TIMES DAILY PRN
Status: DISCONTINUED | OUTPATIENT
Start: 2025-03-21 | End: 2025-03-24 | Stop reason: HOSPADM

## 2025-03-21 RX ORDER — OMEPRAZOLE 20 MG/1
20 CAPSULE, DELAYED RELEASE ORAL 2 TIMES DAILY
Status: DISCONTINUED | OUTPATIENT
Start: 2025-03-21 | End: 2025-03-21

## 2025-03-21 RX ORDER — MODAFINIL 100 MG/1
100 TABLET ORAL DAILY PRN
Status: DISCONTINUED | OUTPATIENT
Start: 2025-03-21 | End: 2025-03-24 | Stop reason: HOSPADM

## 2025-03-21 RX ORDER — CALCIUM CARBONATE 500 MG/1
1000 TABLET, CHEWABLE ORAL 4 TIMES DAILY PRN
Status: DISCONTINUED | OUTPATIENT
Start: 2025-03-21 | End: 2025-03-24 | Stop reason: HOSPADM

## 2025-03-21 RX ORDER — ONDANSETRON 4 MG/1
4 TABLET, ORALLY DISINTEGRATING ORAL EVERY 6 HOURS PRN
Status: DISCONTINUED | OUTPATIENT
Start: 2025-03-21 | End: 2025-03-24 | Stop reason: HOSPADM

## 2025-03-21 RX ORDER — OXYCODONE HYDROCHLORIDE 5 MG/1
5 TABLET ORAL EVERY 4 HOURS PRN
Status: DISCONTINUED | OUTPATIENT
Start: 2025-03-21 | End: 2025-03-24 | Stop reason: HOSPADM

## 2025-03-21 RX ORDER — PANTOPRAZOLE SODIUM 20 MG/1
20 TABLET, DELAYED RELEASE ORAL
Status: DISCONTINUED | OUTPATIENT
Start: 2025-03-22 | End: 2025-03-24 | Stop reason: HOSPADM

## 2025-03-21 RX ORDER — HYDROMORPHONE HCL IN WATER/PF 6 MG/30 ML
0.4 PATIENT CONTROLLED ANALGESIA SYRINGE INTRAVENOUS
Status: DISCONTINUED | OUTPATIENT
Start: 2025-03-21 | End: 2025-03-24 | Stop reason: HOSPADM

## 2025-03-21 RX ORDER — DULOXETIN HYDROCHLORIDE 30 MG/1
60 CAPSULE, DELAYED RELEASE ORAL EVERY EVENING
Status: DISCONTINUED | OUTPATIENT
Start: 2025-03-21 | End: 2025-03-24 | Stop reason: HOSPADM

## 2025-03-21 RX ORDER — ASPIRIN 81 MG/1
81 TABLET ORAL DAILY
Status: DISCONTINUED | OUTPATIENT
Start: 2025-03-21 | End: 2025-03-24

## 2025-03-21 RX ORDER — ATORVASTATIN CALCIUM 10 MG/1
10 TABLET, FILM COATED ORAL DAILY
Status: DISCONTINUED | OUTPATIENT
Start: 2025-03-22 | End: 2025-03-24 | Stop reason: HOSPADM

## 2025-03-21 RX ORDER — METHOCARBAMOL 500 MG/1
250 TABLET ORAL EVERY 6 HOURS PRN
Status: DISCONTINUED | OUTPATIENT
Start: 2025-03-21 | End: 2025-03-24 | Stop reason: HOSPADM

## 2025-03-21 RX ORDER — OXYCODONE AND ACETAMINOPHEN 5; 325 MG/1; MG/1
1 TABLET ORAL ONCE
Status: COMPLETED | OUTPATIENT
Start: 2025-03-21 | End: 2025-03-21

## 2025-03-21 RX ORDER — ONDANSETRON 2 MG/ML
4 INJECTION INTRAMUSCULAR; INTRAVENOUS EVERY 6 HOURS PRN
Status: DISCONTINUED | OUTPATIENT
Start: 2025-03-21 | End: 2025-03-24 | Stop reason: HOSPADM

## 2025-03-21 RX ORDER — MODAFINIL 100 MG/1
100 TABLET ORAL DAILY PRN
COMMUNITY

## 2025-03-21 RX ORDER — HYDROMORPHONE HCL IN WATER/PF 6 MG/30 ML
0.4 PATIENT CONTROLLED ANALGESIA SYRINGE INTRAVENOUS
Status: DISCONTINUED | OUTPATIENT
Start: 2025-03-21 | End: 2025-03-21

## 2025-03-21 RX ORDER — HYDROXYZINE HYDROCHLORIDE 10 MG/1
10 TABLET, FILM COATED ORAL EVERY 6 HOURS PRN
Status: DISCONTINUED | OUTPATIENT
Start: 2025-03-21 | End: 2025-03-24 | Stop reason: HOSPADM

## 2025-03-21 RX ORDER — HYDROMORPHONE HCL IN WATER/PF 6 MG/30 ML
0.2 PATIENT CONTROLLED ANALGESIA SYRINGE INTRAVENOUS
Status: DISCONTINUED | OUTPATIENT
Start: 2025-03-21 | End: 2025-03-24 | Stop reason: HOSPADM

## 2025-03-21 RX ORDER — ACETAMINOPHEN 325 MG/1
975 TABLET ORAL 3 TIMES DAILY
Status: DISCONTINUED | OUTPATIENT
Start: 2025-03-21 | End: 2025-03-24 | Stop reason: HOSPADM

## 2025-03-21 RX ORDER — AMOXICILLIN 250 MG
1 CAPSULE ORAL 2 TIMES DAILY PRN
Status: DISCONTINUED | OUTPATIENT
Start: 2025-03-21 | End: 2025-03-24 | Stop reason: HOSPADM

## 2025-03-21 RX ORDER — POLYETHYLENE GLYCOL 3350 17 G/17G
17 POWDER, FOR SOLUTION ORAL EVERY EVENING
Status: DISCONTINUED | OUTPATIENT
Start: 2025-03-21 | End: 2025-03-24 | Stop reason: HOSPADM

## 2025-03-21 RX ORDER — AMLODIPINE BESYLATE 5 MG/1
5 TABLET ORAL DAILY
Status: DISCONTINUED | OUTPATIENT
Start: 2025-03-22 | End: 2025-03-24 | Stop reason: HOSPADM

## 2025-03-21 RX ORDER — LIDOCAINE 40 MG/G
CREAM TOPICAL
Status: DISCONTINUED | OUTPATIENT
Start: 2025-03-21 | End: 2025-03-24 | Stop reason: HOSPADM

## 2025-03-21 RX ADMIN — TRAZODONE HYDROCHLORIDE 50 MG: 50 TABLET ORAL at 22:20

## 2025-03-21 RX ADMIN — HYDROMORPHONE HYDROCHLORIDE 0.2 MG: 0.2 INJECTION, SOLUTION INTRAMUSCULAR; INTRAVENOUS; SUBCUTANEOUS at 13:36

## 2025-03-21 RX ADMIN — OXYCODONE 5 MG: 5 TABLET ORAL at 22:20

## 2025-03-21 RX ADMIN — ACETAMINOPHEN 975 MG: 325 TABLET, FILM COATED ORAL at 20:12

## 2025-03-21 RX ADMIN — POLYETHYLENE GLYCOL 3350 17 G: 17 POWDER, FOR SOLUTION ORAL at 20:12

## 2025-03-21 RX ADMIN — OXYCODONE HYDROCHLORIDE AND ACETAMINOPHEN 1 TABLET: 5; 325 TABLET ORAL at 17:13

## 2025-03-21 RX ADMIN — ONDANSETRON 4 MG: 2 INJECTION INTRAMUSCULAR; INTRAVENOUS at 15:52

## 2025-03-21 RX ADMIN — HYDROMORPHONE HYDROCHLORIDE 0.2 MG: 0.2 INJECTION, SOLUTION INTRAMUSCULAR; INTRAVENOUS; SUBCUTANEOUS at 15:52

## 2025-03-21 RX ADMIN — DULOXETINE HYDROCHLORIDE 60 MG: 30 CAPSULE, DELAYED RELEASE ORAL at 20:12

## 2025-03-21 RX ADMIN — KETOROLAC TROMETHAMINE 10 MG: 15 INJECTION, SOLUTION INTRAMUSCULAR; INTRAVENOUS at 13:32

## 2025-03-21 RX ADMIN — METHOCARBAMOL 250 MG: 500 TABLET ORAL at 20:12

## 2025-03-21 RX ADMIN — ONDANSETRON 4 MG: 2 INJECTION INTRAMUSCULAR; INTRAVENOUS at 13:36

## 2025-03-21 ASSESSMENT — COLUMBIA-SUICIDE SEVERITY RATING SCALE - C-SSRS
6. HAVE YOU EVER DONE ANYTHING, STARTED TO DO ANYTHING, OR PREPARED TO DO ANYTHING TO END YOUR LIFE?: NO
2. HAVE YOU ACTUALLY HAD ANY THOUGHTS OF KILLING YOURSELF IN THE PAST MONTH?: NO
1. IN THE PAST MONTH, HAVE YOU WISHED YOU WERE DEAD OR WISHED YOU COULD GO TO SLEEP AND NOT WAKE UP?: NO

## 2025-03-21 ASSESSMENT — ACTIVITIES OF DAILY LIVING (ADL)
ADLS_ACUITY_SCORE: 23
ADLS_ACUITY_SCORE: 43
ADLS_ACUITY_SCORE: 25
ADLS_ACUITY_SCORE: 41
ADLS_ACUITY_SCORE: 23
ADLS_ACUITY_SCORE: 41
ADLS_ACUITY_SCORE: 43
ADLS_ACUITY_SCORE: 41

## 2025-03-21 NOTE — ED NOTES
"[facility]   Admission Handoff    The patient is Kristen Thompson, 79 year old who arrived in the ED by AMBULANCE from home with a complaint of Fall  . The patient's current symptoms are new and during this time the symptoms have decreased. In the ED, patient was diagnosed with   Final diagnoses:   Periprosthetic fracture around internal prosthetic right knee joint, initial encounter   Fall, initial encounter         Needed?: No    Allergies:    Allergies   Allergen Reactions    Codeine Sulfate      Nausea and vomiting     Quinapril Difficulty breathing    Darvon-N [Propoxyphene Napsylate] Nausea and Vomiting       Past Medical Hx:   Past Medical History:   Diagnosis Date    Arthritis 11/19/2013    Gastro-oesophageal reflux disease     GERD (gastroesophageal reflux disease) 10/02/2012    H/O total hysterectomy     HL (hearing loss) 10/11/2012    Malignant melanoma (H)     PONV (postoperative nausea and vomiting)     Squamous cell carcinoma        Initial vitals were: BP: (!) 150/96  Pulse: 70  Temp: 97.7  F (36.5  C)  Resp: 16  Height: 157.5 cm (5' 2\")  SpO2: 95 %   Recent vital Signs: BP (!) 150/96   Pulse 70   Temp 97.7  F (36.5  C) (Oral)   Resp 16   Ht 1.575 m (5' 2\")   SpO2 95%   BMI 24.25 kg/m      Elimination Status: Continent: Yes     Activity Level: 2 assist    Fall Status: mobility and meds  nonskid shoes/slippers when out of bed and patient and family education    Baseline Mental status: WDL  Current Mental Status changes: at basesline    Infection present or suspected this encounter: no  Sepsis suspected: No    Isolation type: n/a    Bariatric equipment needed?: No    In the ED these meds were given:   Medications   ondansetron (ZOFRAN) injection 4 mg (4 mg Intravenous $Given 3/21/25 2886)   ketorolac (TORADOL) injection 10 mg (10 mg Intravenous $Given 3/21/25 1332)   ondansetron (ZOFRAN) injection 4 mg (4 mg Intravenous $Given 3/21/25 5042) "   oxyCODONE-acetaminophen (PERCOCET) 5-325 MG per tablet 1 tablet (1 tablet Oral $Given 3/21/25 1710)       Drips running?  No    Home pump  No    Current LDAs: Peripheral IV    Results:   Labs/Imaging  Ordered and Resulted from Time of ED Arrival Up to the Time of Departure from the ED  Results for orders placed or performed during the hospital encounter of 03/21/25 (from the past 24 hours)   Head CT w/o contrast    Narrative    EXAM: CT HEAD W/O CONTRAST, CT CERVICAL SPINE W/O CONTRAST  LOCATION: [facility]  DATE: 3/21/2025    INDICATION: fall. hit  head.  COMPARISON: MRI 10/19/2022. CT 2/13/2021.   TECHNIQUE:   1) Routine CT Head without IV contrast. Multiplanar reformats. Dose reduction techniques were used.  2) Routine CT Cervical Spine without IV contrast. Multiplanar reformats. Dose reduction techniques were used.    FINDINGS:   HEAD CT:   INTRACRANIAL CONTENTS: Scattered small vessel ischemic changes throughout the white matter and diffuse parenchymal volume loss commensurate with age. Associated ex vacuo dilatation of the ventricular system. No intracranial hemorrhage, extraaxial   collection, or mass effect.  No CT evidence of acute infarct. Right thalamic chronic lacunar infarct or perivascular space.    VISUALIZED ORBITS/SINUSES/MASTOIDS: No intraorbital abnormality. No paranasal sinus mucosal disease. No middle ear or mastoid effusion.    BONES/SOFT TISSUES: No acute abnormality.    CERVICAL SPINE:  VERTEBRAE: Vertebral body heights maintained. No subluxation. No acute fracture. Diffuse osseous demineralization. Mild-to-moderate degenerative findings throughout the cervical spine with facet arthropathy, vertebral body osteophyte formation, and disc   height loss.    CANAL: No osseous narrowing of the spinal canal.    PARASPINAL: Paraspinal soft tissue within normal limits.      Impression    IMPRESSION:  HEAD CT:  1.  No CT evidence for acute intracranial process.  2.  Brain  atrophy and presumed chronic microvascular ischemic changes as above.    CERVICAL SPINE CT:  1.  No acute traumatic abnormality within the cervical spine.   CT Cervical Spine w/o Contrast    Narrative    EXAM: CT HEAD W/O CONTRAST, CT CERVICAL SPINE W/O CONTRAST  LOCATION: [facility]  DATE: 3/21/2025    INDICATION: fall. hit  head.  COMPARISON: MRI 10/19/2022. CT 2/13/2021.   TECHNIQUE:   1) Routine CT Head without IV contrast. Multiplanar reformats. Dose reduction techniques were used.  2) Routine CT Cervical Spine without IV contrast. Multiplanar reformats. Dose reduction techniques were used.    FINDINGS:   HEAD CT:   INTRACRANIAL CONTENTS: Scattered small vessel ischemic changes throughout the white matter and diffuse parenchymal volume loss commensurate with age. Associated ex vacuo dilatation of the ventricular system. No intracranial hemorrhage, extraaxial   collection, or mass effect.  No CT evidence of acute infarct. Right thalamic chronic lacunar infarct or perivascular space.    VISUALIZED ORBITS/SINUSES/MASTOIDS: No intraorbital abnormality. No paranasal sinus mucosal disease. No middle ear or mastoid effusion.    BONES/SOFT TISSUES: No acute abnormality.    CERVICAL SPINE:  VERTEBRAE: Vertebral body heights maintained. No subluxation. No acute fracture. Diffuse osseous demineralization. Mild-to-moderate degenerative findings throughout the cervical spine with facet arthropathy, vertebral body osteophyte formation, and disc   height loss.    CANAL: No osseous narrowing of the spinal canal.    PARASPINAL: Paraspinal soft tissue within normal limits.      Impression    IMPRESSION:  HEAD CT:  1.  No CT evidence for acute intracranial process.  2.  Brain atrophy and presumed chronic microvascular ischemic changes as above.    CERVICAL SPINE CT:  1.  No acute traumatic abnormality within the cervical spine.   XR Knee Right 1/2 Views    Narrative    EXAM: XR KNEE RIGHT 1/2  VIEWS  LOCATION: [facility]  DATE: 3/21/2025    INDICATION: fall, landing on her right knee and then right hip.  COMPARISON: None.      Impression    IMPRESSION: Right total knee arthroplasty with patellar resurfacing. Acute minimally displaced periprosthetic fracture of the lateral aspect of the distal femoral metadiaphysis. No radiographic evidence of hardware loosening. Small lipohemarthrosis and   soft tissue swelling about the knee.   XR Pelvis w Hip Right G/E 2 Views    Narrative    EXAM: XR PELVIS AND HIP RIGHT 2 VIEWS  LOCATION: [facility]  DATE: 3/21/2025    INDICATION: fall. landing on right knee then the right hip. (right knee and right hip pain)  COMPARISON: None.      Impression    IMPRESSION: No acute fracture identified in the right hip or elsewhere in the pelvis. No malalignment. 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 Additional degenerative changes in the lumbar spine, SI joints and pubic symphysis. Osseous   demineralization.   EKG 12 lead   Result Value Ref Range    Systolic Blood Pressure  mmHg    Diastolic Blood Pressure  mmHg    Ventricular Rate 75 BPM    Atrial Rate 75 BPM    AZ Interval 150 ms    QRS Duration 70 ms     ms    QTc 475 ms    P Axis 65 degrees    R AXIS 19 degrees    T Axis 10 degrees    Interpretation ECG       Sinus rhythm with sinus arrhythmia  Nonspecific T wave abnormality  Abnormal ECG  When compared with ECG of 25-Jan-2017 08:29,  Nonspecific T wave abnormality now evident in Anterior leads     CBC with platelets differential    Narrative    The following orders were created for panel order CBC with platelets differential.  Procedure                               Abnormality         Status                     ---------                               -----------         ------                     CBC with platelets and ...[[id number]]  Abnormal            Final result                 Please view  results for these tests on the individual orders.   Basic metabolic panel   Result Value Ref Range    Sodium 141 135 - 145 mmol/L    Potassium 3.8 3.4 - 5.3 mmol/L    Chloride 105 98 - 107 mmol/L    Carbon Dioxide (CO2) 26 22 - 29 mmol/L    Anion Gap 10 7 - 15 mmol/L    Urea Nitrogen 14.2 8.0 - 23.0 mg/dL    Creatinine 0.63 0.51 - 0.95 mg/dL    GFR Estimate >90 >60 mL/min/1.73m2    Calcium 9.0 8.8 - 10.4 mg/dL    Glucose 98 70 - 99 mg/dL   CBC with platelets and differential   Result Value Ref Range    WBC Count 12.4 (H) 4.0 - 11.0 10e3/uL    RBC Count 3.88 3.80 - 5.20 10e6/uL    Hemoglobin 12.8 11.7 - 15.7 g/dL    Hematocrit 38.6 35.0 - 47.0 %     78 - 100 fL    MCH 33.0 26.5 - 33.0 pg    MCHC 33.2 31.5 - 36.5 g/dL    RDW 12.3 10.0 - 15.0 %    Platelet Count 259 150 - 450 10e3/uL    % Neutrophils 86 %    % Lymphocytes 8 %    % Monocytes 5 %    % Eosinophils 0 %    % Basophils 0 %    % Immature Granulocytes 1 %    NRBCs per 100 WBC 0 <1 /100    Absolute Neutrophils 10.6 (H) 1.6 - 8.3 10e3/uL    Absolute Lymphocytes 1.0 0.8 - 5.3 10e3/uL    Absolute Monocytes 0.6 0.0 - 1.3 10e3/uL    Absolute Eosinophils 0.1 0.0 - 0.7 10e3/uL    Absolute Basophils 0.0 0.0 - 0.2 10e3/uL    Absolute Immature Granulocytes 0.1 <=0.4 10e3/uL    Absolute NRBCs 0.0 10e3/uL   CT Knee Right w/o Contrast    Narrative    EXAM: CT KNEE RIGHT W/O CONTRAST  LOCATION: [facility]  DATE: 3/21/2025    INDICATION: Distal femur fracture.  COMPARISON: Knee radiographic exam 3/21/2025.  TECHNIQUE: Noncontrast. Axial, sagittal and coronal thin-section reconstruction. Dose reduction techniques were used.     FINDINGS:     BONES:  -Redemonstrated minimally displaced periprosthetic lateral distal femoral metaphyseal fracture. The fracture extends to the lateral femoral condyle. Postop total knee arthroplasty with patellar resurfacing. No periprosthetic patella, proximal tibia, or   proximal fibula fracture. No finding for  component loosening. No bone lesion.    SOFT TISSUES:  -Associated small to moderate-sized knee joint lipohemarthrosis. Soft tissue swelling/blood products along the anterior and lateral distal femur. Intrinsic muscle bulk is preserved. Distal quadriceps and patellar tendons intact by CT criteria.      Impression    IMPRESSION:  1.  Acute minimally displaced periprosthetic lateral distal right femoral metaphyseal fracture extending to the lateral femoral condyle.  2.  Small to moderate-sized knee joint lipohemarthrosis.  3.  Postop right total knee arthroplasty with patellar resurfacing.          For the majority of the shift this patient's behavior was Green     Cardiac Rhythm: Normal Sinus  Pt needs tele? No  Skin/wound Issues: None    Code Status: Full Code    Pain control: fair    Nausea control: good    Abnormal labs/tests/findings requiring intervention: fx    Patient tested for COVID 19 prior to admission: NO     OBS brochure/video discussed/provided to patient/family: N/A     Family present during ED course? Yes     Family Comments/Social Situation comments: daughter with pt who is a PICC and peds RN    Tasks needing completion: None    Mile Mejias RN

## 2025-03-21 NOTE — ED TRIAGE NOTES
Pt slipped and fell, c/o right femur pain     Triage Assessment (Adult)       Row Name 03/21/25 1257          Triage Assessment    Airway WDL WDL        Respiratory WDL    Respiratory WDL WDL        Peripheral/Neurovascular WDL    Peripheral Neurovascular WDL WDL

## 2025-03-21 NOTE — MEDICATION SCRIBE - ADMISSION MEDICATION HISTORY
Medication Scribe Admission Medication History    Admission medication history is complete. The information provided in this note is only as accurate as the sources available at the time of the update.    Information Source(s): Patient via in-person    Pertinent Information: Medications reviewed with patient at bedside-Patient took all AM medications today    Changes made to PTA medication list:  Added: Acetaminophen 1000 mg at bedtime, Modafanil 100 mg PRN, Trazodone 50 mg at bedtime, Systane Ointment at bedtime, Cannabis CBN gummy qhs  Deleted: Magnesium 250 mg, Acetaminophen 325 mg, Trazodone 50 mg PRN  Changed: B Complex, Calcium, Flonase, Miralax Powder added directions to all    Allergies reviewed with patient and updates made in EHR: yes    Medication History Completed By: Anyi Michaels 3/21/2025 5:41 PM    Current Facility-Administered Medications for the 3/21/25 encounter (Hospital Encounter)   Medication    lidocaine 2%-EPINEPHrine 1:100,000 injection 20 mL     PTA Med List   Medication Sig Note Last Dose/Taking    acetaminophen (TYLENOL) 500 MG tablet Take 1,000 mg by mouth at bedtime.  3/20/2025 Bedtime    artificial tears OINT ophthalmic ointment Place 1 inch into both eyes at bedtime.  3/20/2025 Bedtime    modafinil (PROVIGIL) 100 MG tablet Take 100 mg by mouth daily as needed.  Unknown    NEW MED Take 1 Gum by mouth at bedtime. 3/21/2025: Cannabis CMN Gummy 3/20/2025 Bedtime    traZODone (DESYREL) 50 MG tablet Take 50 mg by mouth at bedtime.  3/20/2025 Bedtime    amLODIPine (NORVASC) 5 MG tablet Take 1 tablet (5 mg) by mouth daily.  3/21/2025 Morning    atorvastatin (LIPITOR) 10 MG tablet Take 1 tablet (10 mg) by mouth daily.  3/21/2025 Morning    azelastine 137 MCG/SPRAY SOLN SPRAY 2 SPRAYS INTO BOTH NOSTRILS 2 TIMES DAILY AS NEEDED FOR RHINITIS.  Past Month    B Complex-Folic Acid (B COMPLEX PLUS PO) Take 1 capsule by mouth daily.  3/21/2025 Morning    CALCIUM PO Take 2 tablets by mouth daily.   3/21/2025 Morning    Cholecalciferol (VITAMIN D PO) Take 1,000 Units by mouth daily   3/21/2025 Morning    DULoxetine (CYMBALTA) 60 MG capsule Take 1 capsule (60 mg) by mouth daily.  3/20/2025 Evening    Fluticasone Propionate (FLONASE ALLERGY RELIEF NA) Spray 2 sprays into both nostrils daily as needed.  3/21/2025 Morning    Omega-3 Fatty Acids (OMEGA 3 PO) Take 2 capsules by mouth daily   3/21/2025 Morning    omeprazole (PRILOSEC) 20 MG DR capsule Take 1 capsule (20 mg) by mouth 2 times daily.  3/21/2025 Morning    ondansetron (ZOFRAN ODT) 4 MG ODT tab Take 1 tablet (4 mg) by mouth every 8 hours as needed for nausea.  Unknown    Polyethyl Glycol-Propyl Glycol (SYSTANE FREE OP) Place 2 drops into both eyes every 2 hours as needed (dry eyes).  3/20/2025 Evening    Polyethylene Glycol 3350 (MIRALAX PO) Take 17 g by mouth every evening.  3/20/2025 Evening

## 2025-03-21 NOTE — ED PROVIDER NOTES
History     Chief Complaint   Patient presents with    Fall     HPI  Kristen Thompson is a 79 year old female with history of Htn, CVA, GERD, RLS, and hyperlipidemia who presents via EMS after falling and complaining of right knee pain.  Patient was volunteering at a [employer] by [employer] today.  She tripped over a box and landed on her right knee and then onto her right hip.  She was unable to get up on her own.  EMS was called and they helped her onto the gurney.  She has been unable to bear weight on her right leg.  She tells me most of her pain is in the anterior aspect of her right knee.  She also has some mild pain in her right hip.  She states she did mildly hit her head on the ground, but denies any headache or loss of consciousness.  She is not on any blood thinner medication.  She denies any neck or back pain.    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, initial encounter 03/21/2025     Priority: Medium    Fall, initial encounter 03/21/2025     Priority: Medium    History of stroke 03/21/2025     Priority: Medium    Chronic fatigue 03/21/2025     Priority: Medium    Melanoma in situ of right shoulder (H) 02/28/2025     Priority: Medium    Hemiplegia and hemiparesis following cerebral infarction affecting unspecified side (H) 04/12/2022     Priority: Medium    Essential hypertension with goal blood pressure less than 130/80 03/02/2021     Priority: Medium    Gastroesophageal reflux disease without esophagitis 07/01/2020     Priority: Medium    Localized edema 06/16/2017     Priority: Medium    Cervicalgia 12/06/2016     Priority: Medium    Gastritis 09/16/2016     Priority: Medium    Chronic constipation 09/08/2016     Priority: Medium     Hyperlipidemia LDL goal <130 07/11/2014     Priority: Medium    CARDIOVASCULAR SCREENING; LDL GOAL LESS THAN 160 10/11/2012     Priority: Medium    HL (hearing loss) 10/11/2012     Priority: Medium    Insomnia 12/12/2011     Priority: Medium    Primary localized osteoarthrosis, lower leg 10/02/2007     Priority: Medium    Restless leg syndrome 02/28/2006     Priority: Medium    Female stress incontinence 02/28/2006     Priority: Medium        Past Medical History:    Past Medical History:   Diagnosis Date    Arthritis 11/19/2013    Cerebrovascular accident (CVA), unspecified mechanism (H) 03/02/2021    Gastro-oesophageal reflux disease     GERD (gastroesophageal reflux disease) 10/02/2012    H/O total hysterectomy     HL (hearing loss) 10/11/2012    Malignant melanoma (H)     PONV (postoperative nausea and vomiting)     Squamous cell carcinoma        Past Surgical History:    Past Surgical History:   Procedure Laterality Date    BIOPSY BREAST      BIOPSY BREAST Left 8/22/2022    Procedure: Seed Localized Left Breast Biopsy;  Surgeon: Jeremy Berg DO;  Location: [facility]    COLONOSCOPY  10/30/2012    Procedure: COLONOSCOPY;  Colonoscopy;  Surgeon: Denise Bah MD;  Location: [facility]    ESOPHAGOSCOPY, GASTROSCOPY, DUODENOSCOPY (EGD), COMBINED N/A 9/15/2016    Procedure: COMBINED ESOPHAGOSCOPY, GASTROSCOPY, DUODENOSCOPY (EGD);  Surgeon: Bennie Godinez MD;  Location: [facility]    GALLBLADDER SURGERY      HYSTERECTOMY      knee replacment  2007    RELEASE TRIGGER FINGER Right 4/29/2016    Procedure: RELEASE TRIGGER FINGER;  Surgeon: Percy Sarmiento MD;  Location: [facility]    REPAIR BLADDER      ZZC RAD RESEC TONSIL/PILLARS         Family History:    Family History   Problem Relation Age of Onset    C.A.D. Mother     Cardiovascular Mother     Thyroid Disease Mother     Cancer Father         stomach ca    Cancer Sister     Eye Disorder Sister     C.A.D. Sister     Cerebrovascular Disease Sister      "Breast Cancer Sister     Arthritis Sister     Cardiovascular Sister     Depression Sister     Heart Disease Sister     Neurologic Disorder Sister     Osteoporosis Sister     Thyroid Disease Sister     Depression Sister     Thyroid Disease Sister     Depression Sister     Thyroid Disease Sister     Obesity Sister     Neurologic Disorder Sister        Social History:  Marital Status:   [5]  Social History     Tobacco Use    Smoking status: Never    Smokeless tobacco: Never   Substance Use Topics    Alcohol use: No     Alcohol/week: 0.0 standard drinks of alcohol    Drug use: No        Medications:    No current outpatient medications on file.        Review of Systems  As mentioned above in the history present illness. All other systems were reviewed and are negative.    Physical Exam   BP: (!) 150/96  Pulse: 70  Temp: 97.7  F (36.5  C)  Resp: 16  Height: 157.5 cm (5' 2\")  Weight: 61.9 kg (136 lb 7.4 oz)  SpO2: 95 %      Physical Exam  Constitutional:       General: She is in acute distress (she appears in pain, very shaky.).      Appearance: She is well-developed. She is not ill-appearing.   HENT:      Head: Normocephalic and atraumatic.      Right Ear: External ear normal.      Left Ear: External ear normal.      Nose: Nose normal.      Mouth/Throat:      Mouth: Mucous membranes are moist.   Eyes:      Conjunctiva/sclera: Conjunctivae normal.   Cardiovascular:      Rate and Rhythm: Normal rate and regular rhythm.      Heart sounds: Normal heart sounds. No murmur heard.  Pulmonary:      Effort: Pulmonary effort is normal. No respiratory distress.      Breath sounds: Normal breath sounds.   Abdominal:      General: Bowel sounds are normal. There is no distension.      Palpations: Abdomen is soft.      Tenderness: There is no abdominal tenderness.   Musculoskeletal:      Cervical back: Muscular tenderness (reports chronic neck pain, does not feel any worse than normal when palpating.) present. No spinous process " tenderness.      Right hip: Tenderness (lateral aspect.) present. No deformity. Decreased range of motion (right hip pain illicited with rocking of the pelvis. unable to perform ROM due to acuity of pain in her right knee.).      Left hip: Normal.      Right knee: No swelling, deformity, erythema or ecchymosis. Decreased range of motion (unable to perform any ROM due to acuity of pain.). Tenderness (anterior aspect) present.      Left knee: Normal.      Right lower leg: Normal.      Right ankle: Normal.      Left ankle: Normal.      Right foot: Normal.      Left foot: Normal.   Skin:     General: Skin is warm and dry.      Findings: No rash.   Neurological:      General: No focal deficit present.      Mental Status: She is alert and oriented to person, place, and time.         ED Course     ED Course as of 03/21/25 1953   Fri Mar 21, 2025   1345 Patient was given IV pain medication. Patient able to roll on her side for me to perform exam of her spine.   1430 Daughter at bedside. Updated on plan for imaging.   1443 Patient in imaging.   1553 I consulted [facility] and spoke with PALMA Masters.  Recommends getting a CT for further evaluation of the right knee.  She can then be put in a knee immobilizer.  Plan is to admit her regardless given her age and inability to ambulate.  EKG and basic lab work has been done.  If there is plan for surgery, they indicate possibly tomorrow or on Sunday. Will determine NPO status after the CT findings.   1624 I spoke with PALMA Mullen, who accepts patient for admission but pending final labs and CT results and plan with orthopedics. I will update Marichuy once I talk to ortho after the CT.   1711 I spoke with orthopedics, Dr. Gillis, with [facility].  I read the CT report to him.  He plans to look at that images later this evening.  Tentative plan is if she needs surgery at will be around noon tomorrow.  Keep her n.p.o. after midnight.  There is a  possibility that it might be nonsurgical and he will contact her family/daughter after he has had a chance to look at the images in detail.     Procedures       EKG Interpretation:      Interpreted by AHMET Cervantes CNP and Dr. Osullivan.  Time reviewed:1542   Symptoms at time of EKG: None   Rhythm: Normal sinus with Sinus arrhythmia  Rate: Normal  Axis: Normal  Ectopy: None  Conduction: Normal  ST Segments/ T Waves: nonspecific T-wave abnormality, no acute ischemic changes.  Q Waves: None  Comparison to prior: compared to 8/4/2022, subtle T-wave changes (flattening in v1 and v2)    Clinical Impression: NSR with sinus arrhythmia. No acute ischemic changes.             Results for orders placed or performed during the hospital encounter of 03/21/25 (from the past 24 hours)   Head CT w/o contrast    Narrative    EXAM: CT HEAD W/O CONTRAST, CT CERVICAL SPINE W/O CONTRAST  LOCATION: [facility]  DATE: 3/21/2025    INDICATION: fall. hit  head.  COMPARISON: MRI 10/19/2022. CT 2/13/2021.   TECHNIQUE:   1) Routine CT Head without IV contrast. Multiplanar reformats. Dose reduction techniques were used.  2) Routine CT Cervical Spine without IV contrast. Multiplanar reformats. Dose reduction techniques were used.    FINDINGS:   HEAD CT:   INTRACRANIAL CONTENTS: Scattered small vessel ischemic changes throughout the white matter and diffuse parenchymal volume loss commensurate with age. Associated ex vacuo dilatation of the ventricular system. No intracranial hemorrhage, extraaxial   collection, or mass effect.  No CT evidence of acute infarct. Right thalamic chronic lacunar infarct or perivascular space.    VISUALIZED ORBITS/SINUSES/MASTOIDS: No intraorbital abnormality. No paranasal sinus mucosal disease. No middle ear or mastoid effusion.    BONES/SOFT TISSUES: No acute abnormality.    CERVICAL SPINE:  VERTEBRAE: Vertebral body heights maintained. No subluxation. No acute fracture. Diffuse  osseous demineralization. Mild-to-moderate degenerative findings throughout the cervical spine with facet arthropathy, vertebral body osteophyte formation, and disc   height loss.    CANAL: No osseous narrowing of the spinal canal.    PARASPINAL: Paraspinal soft tissue within normal limits.      Impression    IMPRESSION:  HEAD CT:  1.  No CT evidence for acute intracranial process.  2.  Brain atrophy and presumed chronic microvascular ischemic changes as above.    CERVICAL SPINE CT:  1.  No acute traumatic abnormality within the cervical spine.   CT Cervical Spine w/o Contrast    Narrative    EXAM: CT HEAD W/O CONTRAST, CT CERVICAL SPINE W/O CONTRAST  LOCATION: [facility]  DATE: 3/21/2025    INDICATION: fall. hit  head.  COMPARISON: MRI 10/19/2022. CT 2/13/2021.   TECHNIQUE:   1) Routine CT Head without IV contrast. Multiplanar reformats. Dose reduction techniques were used.  2) Routine CT Cervical Spine without IV contrast. Multiplanar reformats. Dose reduction techniques were used.    FINDINGS:   HEAD CT:   INTRACRANIAL CONTENTS: Scattered small vessel ischemic changes throughout the white matter and diffuse parenchymal volume loss commensurate with age. Associated ex vacuo dilatation of the ventricular system. No intracranial hemorrhage, extraaxial   collection, or mass effect.  No CT evidence of acute infarct. Right thalamic chronic lacunar infarct or perivascular space.    VISUALIZED ORBITS/SINUSES/MASTOIDS: No intraorbital abnormality. No paranasal sinus mucosal disease. No middle ear or mastoid effusion.    BONES/SOFT TISSUES: No acute abnormality.    CERVICAL SPINE:  VERTEBRAE: Vertebral body heights maintained. No subluxation. No acute fracture. Diffuse osseous demineralization. Mild-to-moderate degenerative findings throughout the cervical spine with facet arthropathy, vertebral body osteophyte formation, and disc   height loss.    CANAL: No osseous narrowing of the spinal  canal.    PARASPINAL: Paraspinal soft tissue within normal limits.      Impression    IMPRESSION:  HEAD CT:  1.  No CT evidence for acute intracranial process.  2.  Brain atrophy and presumed chronic microvascular ischemic changes as above.    CERVICAL SPINE CT:  1.  No acute traumatic abnormality within the cervical spine.   XR Knee Right 1/2 Views    Narrative    EXAM: XR KNEE RIGHT 1/2 VIEWS  LOCATION: [facility]  DATE: 3/21/2025    INDICATION: fall, landing on her right knee and then right hip.  COMPARISON: None.      Impression    IMPRESSION: Right total knee arthroplasty with patellar resurfacing. Acute minimally displaced periprosthetic fracture of the lateral aspect of the distal femoral metadiaphysis. No radiographic evidence of hardware loosening. Small lipohemarthrosis and   soft tissue swelling about the knee.   XR Pelvis w Hip Right G/E 2 Views    Narrative    EXAM: XR PELVIS AND HIP RIGHT 2 VIEWS  LOCATION: [facility]  DATE: 3/21/2025    INDICATION: fall. landing on right knee then the right hip. (right knee and right hip pain)  COMPARISON: None.      Impression    IMPRESSION: No acute fracture identified in the right hip or elsewhere in the pelvis. No malalignment. 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 Additional degenerative changes in the lumbar spine, SI joints and pubic symphysis. Osseous   demineralization.   EKG 12 lead   Result Value Ref Range    Systolic Blood Pressure  mmHg    Diastolic Blood Pressure  mmHg    Ventricular Rate 75 BPM    Atrial Rate 75 BPM    LA Interval 150 ms    QRS Duration 70 ms     ms    QTc 475 ms    P Axis 65 degrees    R AXIS 19 degrees    T Axis 10 degrees    Interpretation ECG       Sinus rhythm with sinus arrhythmia  Nonspecific T wave abnormality  Abnormal ECG  When compared with ECG of 25-Jan-2017 08:29,  Nonspecific T wave abnormality now evident in Anterior leads     CBC with  platelets differential    Narrative    The following orders were created for panel order CBC with platelets differential.  Procedure                               Abnormality         Status                     ---------                               -----------         ------                     CBC with platelets and ...[[id number]]  Abnormal            Final result                 Please view results for these tests on the individual orders.   Basic metabolic panel   Result Value Ref Range    Sodium 141 135 - 145 mmol/L    Potassium 3.8 3.4 - 5.3 mmol/L    Chloride 105 98 - 107 mmol/L    Carbon Dioxide (CO2) 26 22 - 29 mmol/L    Anion Gap 10 7 - 15 mmol/L    Urea Nitrogen 14.2 8.0 - 23.0 mg/dL    Creatinine 0.63 0.51 - 0.95 mg/dL    GFR Estimate >90 >60 mL/min/1.73m2    Calcium 9.0 8.8 - 10.4 mg/dL    Glucose 98 70 - 99 mg/dL   CBC with platelets and differential   Result Value Ref Range    WBC Count 12.4 (H) 4.0 - 11.0 10e3/uL    RBC Count 3.88 3.80 - 5.20 10e6/uL    Hemoglobin 12.8 11.7 - 15.7 g/dL    Hematocrit 38.6 35.0 - 47.0 %     78 - 100 fL    MCH 33.0 26.5 - 33.0 pg    MCHC 33.2 31.5 - 36.5 g/dL    RDW 12.3 10.0 - 15.0 %    Platelet Count 259 150 - 450 10e3/uL    % Neutrophils 86 %    % Lymphocytes 8 %    % Monocytes 5 %    % Eosinophils 0 %    % Basophils 0 %    % Immature Granulocytes 1 %    NRBCs per 100 WBC 0 <1 /100    Absolute Neutrophils 10.6 (H) 1.6 - 8.3 10e3/uL    Absolute Lymphocytes 1.0 0.8 - 5.3 10e3/uL    Absolute Monocytes 0.6 0.0 - 1.3 10e3/uL    Absolute Eosinophils 0.1 0.0 - 0.7 10e3/uL    Absolute Basophils 0.0 0.0 - 0.2 10e3/uL    Absolute Immature Granulocytes 0.1 <=0.4 10e3/uL    Absolute NRBCs 0.0 10e3/uL   ABO/Rh type and screen *Canceled*    Narrative    The following orders were created for panel order ABO/Rh type and screen.  Procedure                               Abnormality         Status                     ---------                               -----------          ------                     Adult Type and Screen[[id number]]                                                        Please view results for these tests on the individual orders.   CT Knee Right w/o Contrast    Narrative    EXAM: CT KNEE RIGHT W/O CONTRAST  LOCATION: [facility]  DATE: 3/21/2025    INDICATION: Distal femur fracture.  COMPARISON: Knee radiographic exam 3/21/2025.  TECHNIQUE: Noncontrast. Axial, sagittal and coronal thin-section reconstruction. Dose reduction techniques were used.     FINDINGS:     BONES:  -Redemonstrated minimally displaced periprosthetic lateral distal femoral metaphyseal fracture. The fracture extends to the lateral femoral condyle. Postop total knee arthroplasty with patellar resurfacing. No periprosthetic patella, proximal tibia, or   proximal fibula fracture. No finding for component loosening. No bone lesion.    SOFT TISSUES:  -Associated small to moderate-sized knee joint lipohemarthrosis. Soft tissue swelling/blood products along the anterior and lateral distal femur. Intrinsic muscle bulk is preserved. Distal quadriceps and patellar tendons intact by CT criteria.      Impression    IMPRESSION:  1.  Acute minimally displaced periprosthetic lateral distal right femoral metaphyseal fracture extending to the lateral femoral condyle.  2.  Small to moderate-sized knee joint lipohemarthrosis.  3.  Postop right total knee arthroplasty with patellar resurfacing.      ABO/Rh type and screen *Canceled*    Narrative    The following orders were created for panel order ABO/Rh type and screen.  Procedure                               Abnormality         Status                     ---------                               -----------         ------                       Please view results for these tests on the individual orders.           Assessments & Plan (with Medical Decision Making)     79 year old female who suffered a fracture to the right distal femur,  periprosthetic right knee fracture, after she tripped over a box while volunteering at [employer] today. She landed on the right knee, then fell onto her right hip and did hit the right side of her head. She had mild lateral hip pain with palpation on my exam. No spine tenderness or complain to pain. No LOC and no complaint of headache. She has chronic neck pain but was not worse than her baseline. Given the distracting injury with her right knee fracture and age I did obtain head CT, cervical spine CT, pelvis with right hip xray. No other acute findings. See imaging findings above.  I obtained EKG and basic labs in anticipation of surgical intervention. No worrisome findings.  I consulted with [facility], Dr. Gillis, who plans to review the CT images but instructed to have patient admitted, placed in knee immobilizer, NPO after midnight, and tentatively if needing operative repair will be done tomorrow around noon.     I discussed the above with patient and her daughter who are in agreement with plan.  I spoke with PALMA Mullen who accepts patient for admission.    Patient was given IV Toradol, IV Zofran and IV Dilaudid. She developed a bit of stomach cramps after the IV dilaudid so has been switched to p.o. Percocet.       Current Discharge Medication List          Final diagnoses:   Periprosthetic fracture around internal prosthetic right knee joint, initial encounter   Fall, initial encounter       3/21/2025   [facility] EMERGENCY DEPT       Nirav, AHMET Tavarez CNP  03/21/25 2003

## 2025-03-21 NOTE — H&P
"[facility]    History and Physical - Hospitalist Service       Date of Admission:  3/21/2025    Assessment & Plan      Kristen Thompson is a 79 year old female admitted on 3/21/2025. She has a past medical history significant for stroke with residual left-sided hemiplegia, hypertension, dyslipidemia, chronic fatigue, restless leg syndrome, GERD, insomnia, hearing loss who presented to the emergency department for evaluation of right leg pain after a fall and was found to have a periprosthetic right distal femur fracture.    Periprosthetic fracture around internal prosthetic right knee joint, initial encounter  History of prior right TKA in 2007. Had a fall on 3/21/2025 and was found to have acute periprosthetic fracture.    X-ray right knee - \"Right total knee arthroplasty with patellar resurfacing. Acute minimally displaced periprosthetic fracture of the lateral aspect of the distal femoral metadiaphysis. No radiographic evidence of hardware loosening. Small lipohemarthrosis and soft tissue swelling about the knee.\"    CT right knee - \"1.  Acute minimally displaced periprosthetic lateral distal right femoral metaphyseal fracture extending to the lateral femoral condyle.  2.  Small to moderate-sized knee joint lipohemarthrosis.  3.  Postop right total knee arthroplasty with patellar resurfacing.\"    Case discussed between emergency department and on-call ortho, who is aware that fracture is katie-prosthetic. There is a chance that the fracture could be managed non-surgically, but unclear at time of admission.  - Ortho consult; possible surgery on 3/22 around noon   - NPO midnight  - Holding home aspirin   - Analgesia: scheduled acetaminophen, prn oxycodone, prn IV dilaudid, prn Robaxin, prn Atarax  - Knee immobilizer in place, patient can be up with assist with immobilizer in place    Fall, initial encounter  Tripped and fell on 3/21, no dizziness or syncope. Landed on right knee, right " "hip, right forearm, and hit her head, but no loss of consciousness. On aspirin but not anticoagulation.     IMAGING:  XR PELVIS AND RIGHT HIP - \"No acute fracture identified in the right hip or elsewhere in the pelvis. No malalignment. 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 Additional degenerative changes in the lumbar spine, SI joints and pubic symphysis. Osseous demineralization.\"    HEAD CT - \"1.  No CT evidence for acute intracranial process.  2.  Brain atrophy and presumed chronic microvascular ischemic changes as above.\"     CERVICAL SPINE CT - \"1.  No acute traumatic abnormality within the cervical spine.\"    - No other obvious injury to address. Will need PT/OT post-operatively    Surgery Clearance and RCRI Risk Assessment:    Anesthesia issues: No  Baseline Activity: > 4 METS  Chest Pain: No  Shortness of breath: No  Cardiac Risk Factors/Assessment:                High Risk Surgery: No              History Ischemic Heart Disease: No              History of Congestive Heart Failure: No              History of CVA: Yes              Preoperative Treatment with Insulin: No              Preoperative Creatinine greater than 2.0: No              Total Number of Points: 1 = 6.0% risk of major cardiac event  - Holding home aspirin pre-operatively.   - Patient may proceed to surgery without additional work-up or optimization if surgical management is decided upon.     History of stroke  Hemiplegia and hemiparesis following cerebral infarction affecting unspecified side (H)  Stroke in 2021 (right thalamic), has some residual left sided weakness and numbness (upper > lower extremity). No known arrhythmias. Managed prior to admission with aspirin 81 mg daily, in addition to statin and antihypertensive management noted below.   - See above regarding aspirin     Essential hypertension with goal blood pressure less than 130/80  Blood pressure stable. Managed prior to admission with amlodipine 5 mg daily, " continue with holding parameters.    Hyperlipidemia LDL goal <130  Managed prior to admission with atorvastatin 40 mg daily, continue.     Insomnia  Managed prior to admission with trazodone 50 mg q hs, continue.     Gastroesophageal reflux disease without esophagitis  Managed prior to admission with omeprazole 20 mg bid, continue.     Chronic fatigue  Managed prior to admission with Provigil 100 mg daily prn, continue.     Chronic constipation  Takes daily Miralax, continue.     Neuropathy   Managed prior to admission with Cymbalta 60 mg daily, continue.     HL (hearing loss)  Patient does better when she can read lips, so requests no masking if possible.             Diet: NPO for Procedure/Surgery per Anesthesia Guidelines Except for: Meds; Clear liquids before procedure/surgery: ADULT (Age GREATER than or Equal to 18 years) - Clear liquids 2 hours before procedure/surgery  Combination Diet Low Saturated Fat Na <2400mg Diet  NPO for Procedure/Surgery per Anesthesia Guidelines Except for: Meds; Clear liquids before procedure/surgery: ADULT (Age GREATER than or Equal to 18 years) - Clear liquids 2 hours before procedure/surgery    DVT Prophylaxis: Pneumatic Compression Devices  Fong Catheter: Not present  Lines: None     Cardiac Monitoring: None  Code Status: Full Code - discussed at time of admission     Clinically Significant Risk Factors Present on Admission                 # Drug Induced Platelet Defect: home medication list includes an antiplatelet medication   # Hypertension: Noted on problem list                      Disposition Plan     Medically Ready for Discharge: Anticipated in 2-4 Days         The patient's care was discussed with the Attending Physician, Dr. Reji Burton, Patient, and Patient's Family.    Marichuy Sanches PA-C  Hospitalist Service  [facility]  Securely message with [employer] (more info)  Text page via [employer] Paging/[employer]  "    ______________________________________________________________________    Chief Complaint   \"I tripped and fell and hurt my right leg.\"    History is obtained from the patient and her daughter, review of EMR, and emergency department sign out from Suresh Gastelum CNP.     History of Present Illness   Kristen Thompson is a 79 year old female with a past medical history significant for stroke with residual left-sided hemiplegia, hypertension, dyslipidemia, chronic fatigue, restless leg syndrome, GERD, insomnia, hearing loss who presented to the emergency department for evaluation of right leg pain after a fall and was found to have a periprosthetic right distal femur fracture.    Patient was in her usual state of health and she woke this morning.  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  There is no loss of consciousness or significant head trauma.  However, patient had immediate pain in her right knee and was unable to get up on her own or bear any weight.  She was brought to the emergency department where workup revealed an acute minimally displaced periprosthetic distal right femur fracture.    Patient denies any syncope, dizziness, lightheadedness, chest pain, palpitations, breathing difficulties preceding her fall.  She has chronic aches and pains that are unchanged from baseline, but does have some new pain in the areas of impact from her fall (right knee, right hip, right forearm).    Review of systems  She recently had presumed norovirus about 2 weeks ago, has since recovered back to baseline.  Does not sleep well at baseline, unchanged.  Prior history of stroke with residual left-sided numbness and tingling and minimal weakness, unchanged from baseline.  She gets some nausea and upset stomach with opioid use, but no life-threatening allergies.  She has " some chronic constipation managed well with Miralax.  The remainder review of systems is negative.      Past Medical History    Past Medical History:   Diagnosis Date    Arthritis 11/19/2013    Cerebrovascular accident (CVA), unspecified mechanism (H) 03/02/2021    Gastro-oesophageal reflux disease     GERD (gastroesophageal reflux disease) 10/02/2012    H/O total hysterectomy     HL (hearing loss) 10/11/2012    Malignant melanoma (H)     PONV (postoperative nausea and vomiting)     Squamous cell carcinoma        Past Surgical History   Past Surgical History:   Procedure Laterality Date    BIOPSY BREAST      BIOPSY BREAST Left 8/22/2022    Procedure: Seed Localized Left Breast Biopsy;  Surgeon: Jeremy Berg DO;  Location: [facility]    COLONOSCOPY  10/30/2012    Procedure: COLONOSCOPY;  Colonoscopy;  Surgeon: Denise Bah MD;  Location: [facility]    ESOPHAGOSCOPY, GASTROSCOPY, DUODENOSCOPY (EGD), COMBINED N/A 9/15/2016    Procedure: COMBINED ESOPHAGOSCOPY, GASTROSCOPY, DUODENOSCOPY (EGD);  Surgeon: Bennie Godinez MD;  Location: [facility]    GALLBLADDER SURGERY      HYSTERECTOMY      knee replacment  2007    RELEASE TRIGGER FINGER Right 4/29/2016    Procedure: RELEASE TRIGGER FINGER;  Surgeon: Percy Sarmiento MD;  Location: [facility]    REPAIR BLADDER      ZZC RAD RESEC TONSIL/PILLARS         Prior to Admission Medications   Prior to Admission Medications   Prescriptions Last Dose Informant Patient Reported? Taking?   B Complex-Folic Acid (B COMPLEX PLUS PO) 3/21/2025 Morning Self Yes Yes   Sig: Take 1 capsule by mouth daily.   CALCIUM PO 3/21/2025 Morning Self Yes Yes   Sig: Take 2 tablets by mouth daily.   Cholecalciferol (VITAMIN D PO) 3/21/2025 Morning Self Yes Yes   Sig: Take 1,000 Units by mouth daily    DULoxetine (CYMBALTA) 60 MG capsule 3/20/2025 Evening Self No Yes   Sig: Take 1 capsule (60 mg) by mouth daily.   Fluticasone Propionate (FLONASE ALLERGY RELIEF NA) 3/21/2025 Morning Self Yes Yes    Sig: Spray 2 sprays into both nostrils daily as needed.   NEW MED 3/20/2025 Bedtime Self Yes Yes   Sig: Take 1 Gum by mouth at bedtime.   Omega-3 Fatty Acids (OMEGA 3 PO) 3/21/2025 Morning Self Yes Yes   Sig: Take 2 capsules by mouth daily    Polyethyl Glycol-Propyl Glycol (SYSTANE FREE OP) 3/20/2025 Evening Self Yes Yes   Sig: Place 2 drops into both eyes every 2 hours as needed (dry eyes).   Polyethylene Glycol 3350 (MIRALAX PO) 3/20/2025 Evening Self Yes Yes   Sig: Take 17 g by mouth every evening.   acetaminophen (TYLENOL) 500 MG tablet 3/20/2025 Bedtime Self Yes Yes   Sig: Take 1,000 mg by mouth at bedtime.   amLODIPine (NORVASC) 5 MG tablet 3/21/2025 Morning Self No Yes   Sig: Take 1 tablet (5 mg) by mouth daily.   artificial tears OINT ophthalmic ointment 3/20/2025 Bedtime Self Yes Yes   Sig: Place 1 inch into both eyes at bedtime.   aspirin 81 MG EC tablet 3/21/2025 Morning Self Yes Yes   Sig: Take 81 mg by mouth daily   atorvastatin (LIPITOR) 10 MG tablet 3/21/2025 Morning Self No Yes   Sig: Take 1 tablet (10 mg) by mouth daily.   azelastine 137 MCG/SPRAY SOLN Past Month Self No Yes   Sig: SPRAY 2 SPRAYS INTO BOTH NOSTRILS 2 TIMES DAILY AS NEEDED FOR RHINITIS.   modafinil (PROVIGIL) 100 MG tablet Unknown Self Yes Yes   Sig: Take 100 mg by mouth daily as needed.   omeprazole (PRILOSEC) 20 MG DR capsule 3/21/2025 Morning Self No Yes   Sig: Take 1 capsule (20 mg) by mouth 2 times daily.   ondansetron (ZOFRAN ODT) 4 MG ODT tab Unknown Self No Yes   Sig: Take 1 tablet (4 mg) by mouth every 8 hours as needed for nausea.   traZODone (DESYREL) 50 MG tablet 3/20/2025 Bedtime Self Yes Yes   Sig: Take 50 mg by mouth at bedtime.      Facility-Administered Medications Last Administration Doses Remaining   lidocaine 2%-EPINEPHrine 1:100,000 injection 20 mL None recorded 1           Review of Systems    The 10 point Review of Systems is negative other than noted in the HPI or here.     Social History   I have reviewed  this patient's social history and updated it with pertinent information if needed.  Social History     Tobacco Use    Smoking status: Never    Smokeless tobacco: Never   Substance Use Topics    Alcohol use: No     Alcohol/week: 0.0 standard drinks of alcohol    Drug use: No         Family History   I have reviewed this patient's family history and updated it with pertinent information if needed.  Family History   Problem Relation Age of Onset    C.A.D. Mother     Cardiovascular Mother     Thyroid Disease Mother     Cancer Father         stomach ca    Cancer Sister     Eye Disorder Sister     C.A.D. Sister     Cerebrovascular Disease Sister     Breast Cancer Sister     Arthritis Sister     Cardiovascular Sister     Depression Sister     Heart Disease Sister     Neurologic Disorder Sister     Osteoporosis Sister     Thyroid Disease Sister     Depression Sister     Thyroid Disease Sister     Depression Sister     Thyroid Disease Sister     Obesity Sister     Neurologic Disorder Sister         Physical Exam   Vital Signs: Temp: 98.8  F (37.1  C) Temp src: Oral BP: 129/81 Pulse: 81   Resp: 16 SpO2: 95 % O2 Device: None (Room air)    Weight: 136 lbs 7.44 oz    Constitutional: Alert, oriented, cooperative. No apparent distress, appears nontoxic. Speaking in full coherent sentences.     Eyes: Eyes are clear, pupils are reactive. No scleral icterus.    HEENT: Oropharynx is clear and moist, no lesions. Normocephalic, no evidence of cranial trauma.      Cardiovascular: Regular rhythm and rate, normal S1 and S2. No murmur, rubs, or gallops. Peripheral pulses intact bilaterally. No lower extremity edema.    Respiratory: Non-labored breathing on room air. Lung sounds are clear to auscultation bilaterally without wheezes, rhonchi, or crackles.    GI: Soft, non-distended. Tenderness to palpation of right lower quadrant, no rebound or guarding. No hepatosplenomegaly or masses appreciated. Normal bowel sounds.     Musculoskeletal:  Right knee in immobilizer, otherwise without obvious deformity. Range of motion not assessed. Normal muscle bulk and tone. Distal CMS intact.      Skin: Warm and dry, no rashes or ecchymoses. No mottling of skin.      Neurologic: Patient moves all extremities. Gross strength and sensation are equal bilaterally.    Genitourinary: Deferred      Medical Decision Making       100 MINUTES SPENT BY ME on the date of service doing chart review, history, exam, documentation & further activities per the note.  MANAGEMENT DISCUSSED with the following over the past 24 hours: Dr. Reji Burton   NOTE(S)/MEDICAL RECORDS REVIEWED over the past 24 hours: Care Everywhere discharge summary 2/13/21, emergency department notes 3/21/25  Tests ORDERED & REVIEWED in the past 24 hours:  - BMP  - CBC  Tests personally interpreted in the past 24 hours:  SUPPLEMENTAL HISTORY, in addition to the patient's history, over the past 24 hours obtained from:   - Daughter       Data     I have personally reviewed the following data over the past 24 hrs:    12.4 (H)  \   12.8   / 259     141 105 14.2 /  98   3.8 26 0.63 \       Imaging results reviewed over the past 24 hrs:   Recent Results (from the past 24 hours)   Head CT w/o contrast    Narrative    EXAM: CT HEAD W/O CONTRAST, CT CERVICAL SPINE W/O CONTRAST  LOCATION: [facility]  DATE: 3/21/2025    INDICATION: fall. hit  head.  COMPARISON: MRI 10/19/2022. CT 2/13/2021.   TECHNIQUE:   1) Routine CT Head without IV contrast. Multiplanar reformats. Dose reduction techniques were used.  2) Routine CT Cervical Spine without IV contrast. Multiplanar reformats. Dose reduction techniques were used.    FINDINGS:   HEAD CT:   INTRACRANIAL CONTENTS: Scattered small vessel ischemic changes throughout the white matter and diffuse parenchymal volume loss commensurate with age. Associated ex vacuo dilatation of the ventricular system. No intracranial hemorrhage, extraaxial   collection,  or mass effect.  No CT evidence of acute infarct. Right thalamic chronic lacunar infarct or perivascular space.    VISUALIZED ORBITS/SINUSES/MASTOIDS: No intraorbital abnormality. No paranasal sinus mucosal disease. No middle ear or mastoid effusion.    BONES/SOFT TISSUES: No acute abnormality.    CERVICAL SPINE:  VERTEBRAE: Vertebral body heights maintained. No subluxation. No acute fracture. Diffuse osseous demineralization. Mild-to-moderate degenerative findings throughout the cervical spine with facet arthropathy, vertebral body osteophyte formation, and disc   height loss.    CANAL: No osseous narrowing of the spinal canal.    PARASPINAL: Paraspinal soft tissue within normal limits.      Impression    IMPRESSION:  HEAD CT:  1.  No CT evidence for acute intracranial process.  2.  Brain atrophy and presumed chronic microvascular ischemic changes as above.    CERVICAL SPINE CT:  1.  No acute traumatic abnormality within the cervical spine.   CT Cervical Spine w/o Contrast    Narrative    EXAM: CT HEAD W/O CONTRAST, CT CERVICAL SPINE W/O CONTRAST  LOCATION: [facility]  DATE: 3/21/2025    INDICATION: fall. hit  head.  COMPARISON: MRI 10/19/2022. CT 2/13/2021.   TECHNIQUE:   1) Routine CT Head without IV contrast. Multiplanar reformats. Dose reduction techniques were used.  2) Routine CT Cervical Spine without IV contrast. Multiplanar reformats. Dose reduction techniques were used.    FINDINGS:   HEAD CT:   INTRACRANIAL CONTENTS: Scattered small vessel ischemic changes throughout the white matter and diffuse parenchymal volume loss commensurate with age. Associated ex vacuo dilatation of the ventricular system. No intracranial hemorrhage, extraaxial   collection, or mass effect.  No CT evidence of acute infarct. Right thalamic chronic lacunar infarct or perivascular space.    VISUALIZED ORBITS/SINUSES/MASTOIDS: No intraorbital abnormality. No paranasal sinus mucosal disease. No middle ear  or mastoid effusion.    BONES/SOFT TISSUES: No acute abnormality.    CERVICAL SPINE:  VERTEBRAE: Vertebral body heights maintained. No subluxation. No acute fracture. Diffuse osseous demineralization. Mild-to-moderate degenerative findings throughout the cervical spine with facet arthropathy, vertebral body osteophyte formation, and disc   height loss.    CANAL: No osseous narrowing of the spinal canal.    PARASPINAL: Paraspinal soft tissue within normal limits.      Impression    IMPRESSION:  HEAD CT:  1.  No CT evidence for acute intracranial process.  2.  Brain atrophy and presumed chronic microvascular ischemic changes as above.    CERVICAL SPINE CT:  1.  No acute traumatic abnormality within the cervical spine.   XR Knee Right 1/2 Views    Narrative    EXAM: XR KNEE RIGHT 1/2 VIEWS  LOCATION: [facility]  DATE: 3/21/2025    INDICATION: fall, landing on her right knee and then right hip.  COMPARISON: None.      Impression    IMPRESSION: Right total knee arthroplasty with patellar resurfacing. Acute minimally displaced periprosthetic fracture of the lateral aspect of the distal femoral metadiaphysis. No radiographic evidence of hardware loosening. Small lipohemarthrosis and   soft tissue swelling about the knee.   XR Pelvis w Hip Right G/E 2 Views    Narrative    EXAM: XR PELVIS AND HIP RIGHT 2 VIEWS  LOCATION: [facility]  DATE: 3/21/2025    INDICATION: fall. landing on right knee then the right hip. (right knee and right hip pain)  COMPARISON: None.      Impression    IMPRESSION: No acute fracture identified in the right hip or elsewhere in the pelvis. No malalignment. 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 Additional degenerative changes in the lumbar spine, SI joints and pubic symphysis. Osseous   demineralization.   CT Knee Right w/o Contrast    Narrative    EXAM: CT KNEE RIGHT W/O CONTRAST  LOCATION: [facility]  DATE: 3/21/2025    INDICATION: Distal femur fracture.  COMPARISON: Knee radiographic exam 3/21/2025.  TECHNIQUE: Noncontrast. Axial, sagittal and coronal thin-section reconstruction. Dose reduction techniques were used.     FINDINGS:     BONES:  -Redemonstrated minimally displaced periprosthetic lateral distal femoral metaphyseal fracture. The fracture extends to the lateral femoral condyle. Postop total knee arthroplasty with patellar resurfacing. No periprosthetic patella, proximal tibia, or   proximal fibula fracture. No finding for component loosening. No bone lesion.    SOFT TISSUES:  -Associated small to moderate-sized knee joint lipohemarthrosis. Soft tissue swelling/blood products along the anterior and lateral distal femur. Intrinsic muscle bulk is preserved. Distal quadriceps and patellar tendons intact by CT criteria.      Impression    IMPRESSION:  1.  Acute minimally displaced periprosthetic lateral distal right femoral metaphyseal fracture extending to the lateral femoral condyle.  2.  Small to moderate-sized knee joint lipohemarthrosis.  3.  Postop right total knee arthroplasty with patellar resurfacing.

## 2025-03-21 NOTE — PLAN OF CARE
"[facility] ADMISSION NOTE    Patient admitted to room 2301 at approximately 1830 via cart from emergency room. Patient was accompanied by daughter.     Verbal SBAR report received from ISIDRA Treadwell prior to patient arrival.     Patient trasferred to bed via air moustapha. Patient alert and oriented X 4. Pain is controlled with current analgesics.  Medication(s) being used: see MAR.  . Admission vital signs: Blood pressure (!) 150/96, pulse 70, temperature 97.7  F (36.5  C), temperature source Oral, resp. rate 16, height 1.575 m (5' 2\"), SpO2 95%, not currently breastfeeding. Patient was oriented to plan of care, call light, bed controls, tv, telephone, bathroom, and visiting hours.     Risk Assessment    The following safety risks were identified during admission: fall. Yellow risk band applied: YES.     Skin Initial Assessment    This writer admitted this patient and completed a full skin assessment and Jean Carlos score in the Adult PCS flowsheet.   Photo documentation of skin problem and/or wound competed via [employer] application (located under Media):  N/A    Appropriate interventions initiated as needed.     Secondary skin check completed by Radha AGUILAR RN.    Jean Carlos Risk Assessment  Sensory Perception: 4-->no impairment  Moisture: 3-->occasionally moist  Activity: 2-->chairfast  Mobility: 2-->very limited  Nutrition: 3-->adequate  Friction and Shear: 3-->no apparent problem  Jean Carlos Score: 17  Moisture Interventions: Urinary collection device  Sensory/Activity/Mobility Interventions: Pillows between bony prominence  Mattress: Standard gel/foam mattress (IsoFlex, Atmos Air, etc.)  Bed Frame: Standard width and length    Education    Patient has a Bent to Observation order: No  Observation education completed and documented: N/A      Catalina Mccartney RN      "

## 2025-03-21 NOTE — PROGRESS NOTES
Skin affirmation note    Admitting nurse completed full skin assessment, Jean Carlos score and Jean Carlos interventions. This writer agrees with the initial skin assessment findings.

## 2025-03-22 ENCOUNTER — ANESTHESIA EVENT (OUTPATIENT)
Dept: SURGERY | Facility: CLINIC | Age: 80
DRG: 481 | End: 2025-03-22
Payer: COMMERCIAL

## 2025-03-22 ENCOUNTER — APPOINTMENT (OUTPATIENT)
Dept: GENERAL RADIOLOGY | Facility: CLINIC | Age: 80
DRG: 481 | End: 2025-03-22
Attending: STUDENT IN AN ORGANIZED HEALTH CARE EDUCATION/TRAINING PROGRAM
Payer: COMMERCIAL

## 2025-03-22 ENCOUNTER — ANESTHESIA (OUTPATIENT)
Dept: SURGERY | Facility: CLINIC | Age: 80
DRG: 481 | End: 2025-03-22
Payer: COMMERCIAL

## 2025-03-22 LAB
ANION GAP SERPL CALCULATED.3IONS-SCNC: 10 MMOL/L (ref 7–15)
BUN SERPL-MCNC: 13 MG/DL (ref 8–23)
CALCIUM SERPL-MCNC: 8.9 MG/DL (ref 8.8–10.4)
CHLORIDE SERPL-SCNC: 101 MMOL/L (ref 98–107)
CREAT SERPL-MCNC: 0.67 MG/DL (ref 0.51–0.95)
EGFRCR SERPLBLD CKD-EPI 2021: 90 ML/MIN/1.73M2
ERYTHROCYTE [DISTWIDTH] IN BLOOD BY AUTOMATED COUNT: 12.3 % (ref 10–15)
GLUCOSE SERPL-MCNC: 103 MG/DL (ref 70–99)
HCO3 SERPL-SCNC: 27 MMOL/L (ref 22–29)
HCT VFR BLD AUTO: 37.8 % (ref 35–47)
HGB BLD-MCNC: 12.4 G/DL (ref 11.7–15.7)
INR PPP: 1.15 (ref 0.85–1.15)
MCH RBC QN AUTO: 33 PG (ref 26.5–33)
MCHC RBC AUTO-ENTMCNC: 32.8 G/DL (ref 31.5–36.5)
MCV RBC AUTO: 101 FL (ref 78–100)
PLATELET # BLD AUTO: 248 10E3/UL (ref 150–450)
POTASSIUM SERPL-SCNC: 3.8 MMOL/L (ref 3.4–5.3)
RBC # BLD AUTO: 3.76 10E6/UL (ref 3.8–5.2)
SODIUM SERPL-SCNC: 138 MMOL/L (ref 135–145)
WBC # BLD AUTO: 7.2 10E3/UL (ref 4–11)

## 2025-03-22 PROCEDURE — 370N000017 HC ANESTHESIA TECHNICAL FEE, PER MIN: Performed by: STUDENT IN AN ORGANIZED HEALTH CARE EDUCATION/TRAINING PROGRAM

## 2025-03-22 PROCEDURE — 250N000013 HC RX MED GY IP 250 OP 250 PS 637: Performed by: STUDENT IN AN ORGANIZED HEALTH CARE EDUCATION/TRAINING PROGRAM

## 2025-03-22 PROCEDURE — 250N000011 HC RX IP 250 OP 636: Performed by: STUDENT IN AN ORGANIZED HEALTH CARE EDUCATION/TRAINING PROGRAM

## 2025-03-22 PROCEDURE — 120N000001 HC R&B MED SURG/OB

## 2025-03-22 PROCEDURE — 258N000003 HC RX IP 258 OP 636: Performed by: NURSE ANESTHETIST, CERTIFIED REGISTERED

## 2025-03-22 PROCEDURE — 80048 BASIC METABOLIC PNL TOTAL CA: CPT | Performed by: PHYSICIAN ASSISTANT

## 2025-03-22 PROCEDURE — 710N000009 HC RECOVERY PHASE 1, LEVEL 1, PER MIN: Performed by: STUDENT IN AN ORGANIZED HEALTH CARE EDUCATION/TRAINING PROGRAM

## 2025-03-22 PROCEDURE — C1713 ANCHOR/SCREW BN/BN,TIS/BN: HCPCS | Performed by: STUDENT IN AN ORGANIZED HEALTH CARE EDUCATION/TRAINING PROGRAM

## 2025-03-22 PROCEDURE — 36415 COLL VENOUS BLD VENIPUNCTURE: CPT | Performed by: PHYSICIAN ASSISTANT

## 2025-03-22 PROCEDURE — 0QSB04Z REPOSITION RIGHT LOWER FEMUR WITH INTERNAL FIXATION DEVICE, OPEN APPROACH: ICD-10-PCS | Performed by: STUDENT IN AN ORGANIZED HEALTH CARE EDUCATION/TRAINING PROGRAM

## 2025-03-22 PROCEDURE — C1769 GUIDE WIRE: HCPCS | Performed by: STUDENT IN AN ORGANIZED HEALTH CARE EDUCATION/TRAINING PROGRAM

## 2025-03-22 PROCEDURE — 999N000065 XR KNEE PORT RIGHT 1/2 VIEWS: Mod: RT

## 2025-03-22 PROCEDURE — 250N000013 HC RX MED GY IP 250 OP 250 PS 637: Performed by: INTERNAL MEDICINE

## 2025-03-22 PROCEDURE — 250N000011 HC RX IP 250 OP 636: Performed by: NURSE ANESTHETIST, CERTIFIED REGISTERED

## 2025-03-22 PROCEDURE — 85610 PROTHROMBIN TIME: CPT | Performed by: PHYSICIAN ASSISTANT

## 2025-03-22 PROCEDURE — 250N000009 HC RX 250: Performed by: NURSE ANESTHETIST, CERTIFIED REGISTERED

## 2025-03-22 PROCEDURE — 360N000084 HC SURGERY LEVEL 4 W/ FLUORO, PER MIN: Performed by: STUDENT IN AN ORGANIZED HEALTH CARE EDUCATION/TRAINING PROGRAM

## 2025-03-22 PROCEDURE — 85027 COMPLETE CBC AUTOMATED: CPT | Performed by: PHYSICIAN ASSISTANT

## 2025-03-22 PROCEDURE — 999N000179 XR SURGERY CARM FLUORO LESS THAN 5 MIN W STILLS

## 2025-03-22 PROCEDURE — 250N000009 HC RX 250: Performed by: STUDENT IN AN ORGANIZED HEALTH CARE EDUCATION/TRAINING PROGRAM

## 2025-03-22 PROCEDURE — 272N000001 HC OR GENERAL SUPPLY STERILE: Performed by: STUDENT IN AN ORGANIZED HEALTH CARE EDUCATION/TRAINING PROGRAM

## 2025-03-22 PROCEDURE — 250N000011 HC RX IP 250 OP 636: Performed by: PHYSICIAN ASSISTANT

## 2025-03-22 PROCEDURE — 250N000013 HC RX MED GY IP 250 OP 250 PS 637: Performed by: PHYSICIAN ASSISTANT

## 2025-03-22 PROCEDURE — 99233 SBSQ HOSP IP/OBS HIGH 50: CPT | Performed by: STUDENT IN AN ORGANIZED HEALTH CARE EDUCATION/TRAINING PROGRAM

## 2025-03-22 PROCEDURE — 271N000001 HC OR GENERAL SUPPLY NON-STERILE: Performed by: STUDENT IN AN ORGANIZED HEALTH CARE EDUCATION/TRAINING PROGRAM

## 2025-03-22 DEVICE — IMP SCR SYN OPTILINK LOK VA ST 5.0X40MM 42.231.240: Type: IMPLANTABLE DEVICE | Site: LEG | Status: FUNCTIONAL

## 2025-03-22 DEVICE — IMP SCR SYN OPTILINK LOK VA ST 5.0X38MM 42.231.238: Type: IMPLANTABLE DEVICE | Site: LEG | Status: FUNCTIONAL

## 2025-03-22 DEVICE — IMP SCR SYN OPTILINK VA ST T25 5.0X85MM 42.231.285: Type: IMPLANTABLE DEVICE | Site: LEG | Status: FUNCTIONAL

## 2025-03-22 DEVICE — IMP SCR SYN OPTILINK VA ST T25 5.0X75MM 42.231.275: Type: IMPLANTABLE DEVICE | Site: LEG | Status: FUNCTIONAL

## 2025-03-22 DEVICE — IMPLANTABLE DEVICE: Type: IMPLANTABLE DEVICE | Site: LEG | Status: FUNCTIONAL

## 2025-03-22 DEVICE — IMP SCR SYN OPTILINK LOK VA ST 5.0X36MM 42.231.236: Type: IMPLANTABLE DEVICE | Site: LEG | Status: FUNCTIONAL

## 2025-03-22 DEVICE — IMP SCR SYN OPTILINK VA ST T25 5.0X80MM 42.231.280: Type: IMPLANTABLE DEVICE | Site: LEG | Status: FUNCTIONAL

## 2025-03-22 DEVICE — IMP SCR BONE SYN OPTILINK ST T25 STRDR 70MM 42.231.270: Type: IMPLANTABLE DEVICE | Site: LEG | Status: FUNCTIONAL

## 2025-03-22 RX ORDER — ONDANSETRON 2 MG/ML
4 INJECTION INTRAMUSCULAR; INTRAVENOUS EVERY 30 MIN PRN
Status: DISCONTINUED | OUTPATIENT
Start: 2025-03-22 | End: 2025-03-22 | Stop reason: HOSPADM

## 2025-03-22 RX ORDER — PROPOFOL 10 MG/ML
INJECTION, EMULSION INTRAVENOUS PRN
Status: DISCONTINUED | OUTPATIENT
Start: 2025-03-22 | End: 2025-03-22

## 2025-03-22 RX ORDER — NALOXONE HYDROCHLORIDE 0.4 MG/ML
0.1 INJECTION, SOLUTION INTRAMUSCULAR; INTRAVENOUS; SUBCUTANEOUS
Status: DISCONTINUED | OUTPATIENT
Start: 2025-03-22 | End: 2025-03-22 | Stop reason: HOSPADM

## 2025-03-22 RX ORDER — LIDOCAINE HYDROCHLORIDE 20 MG/ML
INJECTION, SOLUTION INFILTRATION; PERINEURAL PRN
Status: DISCONTINUED | OUTPATIENT
Start: 2025-03-22 | End: 2025-03-22

## 2025-03-22 RX ORDER — SODIUM CHLORIDE, SODIUM LACTATE, POTASSIUM CHLORIDE, CALCIUM CHLORIDE 600; 310; 30; 20 MG/100ML; MG/100ML; MG/100ML; MG/100ML
INJECTION, SOLUTION INTRAVENOUS CONTINUOUS PRN
Status: DISCONTINUED | OUTPATIENT
Start: 2025-03-22 | End: 2025-03-22

## 2025-03-22 RX ORDER — ONDANSETRON 2 MG/ML
4 INJECTION INTRAMUSCULAR; INTRAVENOUS EVERY 30 MIN PRN
Status: CANCELLED | OUTPATIENT
Start: 2025-03-22

## 2025-03-22 RX ORDER — SODIUM CHLORIDE, SODIUM LACTATE, POTASSIUM CHLORIDE, CALCIUM CHLORIDE 600; 310; 30; 20 MG/100ML; MG/100ML; MG/100ML; MG/100ML
INJECTION, SOLUTION INTRAVENOUS CONTINUOUS
Status: DISCONTINUED | OUTPATIENT
Start: 2025-03-22 | End: 2025-03-22 | Stop reason: HOSPADM

## 2025-03-22 RX ORDER — DEXAMETHASONE SODIUM PHOSPHATE 4 MG/ML
4 INJECTION, SOLUTION INTRA-ARTICULAR; INTRALESIONAL; INTRAMUSCULAR; INTRAVENOUS; SOFT TISSUE
Status: DISCONTINUED | OUTPATIENT
Start: 2025-03-22 | End: 2025-03-22 | Stop reason: HOSPADM

## 2025-03-22 RX ORDER — BUPIVACAINE HYDROCHLORIDE 7.5 MG/ML
INJECTION, SOLUTION INTRASPINAL
Status: COMPLETED | OUTPATIENT
Start: 2025-03-22 | End: 2025-03-22

## 2025-03-22 RX ORDER — DEXAMETHASONE SODIUM PHOSPHATE 4 MG/ML
INJECTION, SOLUTION INTRA-ARTICULAR; INTRALESIONAL; INTRAMUSCULAR; INTRAVENOUS; SOFT TISSUE PRN
Status: DISCONTINUED | OUTPATIENT
Start: 2025-03-22 | End: 2025-03-22

## 2025-03-22 RX ORDER — HYDROMORPHONE HCL IN WATER/PF 6 MG/30 ML
0.4 PATIENT CONTROLLED ANALGESIA SYRINGE INTRAVENOUS EVERY 5 MIN PRN
Status: DISCONTINUED | OUTPATIENT
Start: 2025-03-22 | End: 2025-03-22 | Stop reason: HOSPADM

## 2025-03-22 RX ORDER — BUPIVACAINE HYDROCHLORIDE 5 MG/ML
INJECTION, SOLUTION PERINEURAL PRN
Status: DISCONTINUED | OUTPATIENT
Start: 2025-03-22 | End: 2025-03-22 | Stop reason: HOSPADM

## 2025-03-22 RX ORDER — DEXAMETHASONE SODIUM PHOSPHATE 4 MG/ML
4 INJECTION, SOLUTION INTRA-ARTICULAR; INTRALESIONAL; INTRAMUSCULAR; INTRAVENOUS; SOFT TISSUE
Status: CANCELLED | OUTPATIENT
Start: 2025-03-22

## 2025-03-22 RX ORDER — CEFAZOLIN SODIUM 1 G/3ML
1 INJECTION, POWDER, FOR SOLUTION INTRAMUSCULAR; INTRAVENOUS EVERY 8 HOURS
Status: COMPLETED | OUTPATIENT
Start: 2025-03-22 | End: 2025-03-23

## 2025-03-22 RX ORDER — NALOXONE HYDROCHLORIDE 0.4 MG/ML
0.1 INJECTION, SOLUTION INTRAMUSCULAR; INTRAVENOUS; SUBCUTANEOUS
Status: CANCELLED | OUTPATIENT
Start: 2025-03-22

## 2025-03-22 RX ORDER — CEFAZOLIN SODIUM/WATER 2 G/20 ML
SYRINGE (ML) INTRAVENOUS PRN
Status: DISCONTINUED | OUTPATIENT
Start: 2025-03-22 | End: 2025-03-22

## 2025-03-22 RX ORDER — ONDANSETRON 4 MG/1
4 TABLET, ORALLY DISINTEGRATING ORAL EVERY 30 MIN PRN
Status: DISCONTINUED | OUTPATIENT
Start: 2025-03-22 | End: 2025-03-22 | Stop reason: HOSPADM

## 2025-03-22 RX ORDER — KETOROLAC TROMETHAMINE 15 MG/ML
15 INJECTION, SOLUTION INTRAMUSCULAR; INTRAVENOUS EVERY 6 HOURS PRN
Status: DISCONTINUED | OUTPATIENT
Start: 2025-03-22 | End: 2025-03-24 | Stop reason: HOSPADM

## 2025-03-22 RX ORDER — HYDROMORPHONE HCL IN WATER/PF 6 MG/30 ML
0.2 PATIENT CONTROLLED ANALGESIA SYRINGE INTRAVENOUS EVERY 5 MIN PRN
Status: DISCONTINUED | OUTPATIENT
Start: 2025-03-22 | End: 2025-03-22 | Stop reason: HOSPADM

## 2025-03-22 RX ORDER — ONDANSETRON 4 MG/1
4 TABLET, ORALLY DISINTEGRATING ORAL EVERY 30 MIN PRN
Status: CANCELLED | OUTPATIENT
Start: 2025-03-22

## 2025-03-22 RX ORDER — LIDOCAINE HYDROCHLORIDE AND EPINEPHRINE 10; 10 MG/ML; UG/ML
INJECTION, SOLUTION INFILTRATION; PERINEURAL PRN
Status: DISCONTINUED | OUTPATIENT
Start: 2025-03-22 | End: 2025-03-22 | Stop reason: HOSPADM

## 2025-03-22 RX ORDER — FENTANYL CITRATE 50 UG/ML
50 INJECTION, SOLUTION INTRAMUSCULAR; INTRAVENOUS EVERY 5 MIN PRN
Status: DISCONTINUED | OUTPATIENT
Start: 2025-03-22 | End: 2025-03-22 | Stop reason: HOSPADM

## 2025-03-22 RX ORDER — FENTANYL CITRATE 50 UG/ML
25 INJECTION, SOLUTION INTRAMUSCULAR; INTRAVENOUS EVERY 5 MIN PRN
Status: DISCONTINUED | OUTPATIENT
Start: 2025-03-22 | End: 2025-03-22 | Stop reason: HOSPADM

## 2025-03-22 RX ORDER — PROPOFOL 10 MG/ML
INJECTION, EMULSION INTRAVENOUS CONTINUOUS PRN
Status: DISCONTINUED | OUTPATIENT
Start: 2025-03-22 | End: 2025-03-22

## 2025-03-22 RX ADMIN — PHENYLEPHRINE HYDROCHLORIDE 100 MCG: 10 INJECTION INTRAVENOUS at 15:54

## 2025-03-22 RX ADMIN — PHENYLEPHRINE HYDROCHLORIDE 100 MCG: 10 INJECTION INTRAVENOUS at 16:30

## 2025-03-22 RX ADMIN — ACETAMINOPHEN 975 MG: 325 TABLET, FILM COATED ORAL at 08:36

## 2025-03-22 RX ADMIN — HYDROMORPHONE HYDROCHLORIDE 0.4 MG: 0.2 INJECTION, SOLUTION INTRAMUSCULAR; INTRAVENOUS; SUBCUTANEOUS at 21:41

## 2025-03-22 RX ADMIN — CEFAZOLIN 1 G: 1 INJECTION, POWDER, FOR SOLUTION INTRAMUSCULAR; INTRAVENOUS at 22:50

## 2025-03-22 RX ADMIN — SODIUM CHLORIDE, SODIUM LACTATE, POTASSIUM CHLORIDE, AND CALCIUM CHLORIDE: .6; .31; .03; .02 INJECTION, SOLUTION INTRAVENOUS at 14:50

## 2025-03-22 RX ADMIN — METHOCARBAMOL 250 MG: 500 TABLET ORAL at 05:54

## 2025-03-22 RX ADMIN — AMLODIPINE BESYLATE 5 MG: 5 TABLET ORAL at 08:36

## 2025-03-22 RX ADMIN — HYDROXYZINE HYDROCHLORIDE 10 MG: 10 TABLET ORAL at 05:54

## 2025-03-22 RX ADMIN — OXYCODONE 5 MG: 5 TABLET ORAL at 19:04

## 2025-03-22 RX ADMIN — TRAZODONE HYDROCHLORIDE 50 MG: 50 TABLET ORAL at 21:30

## 2025-03-22 RX ADMIN — PROPOFOL 100 MCG/KG/MIN: 10 INJECTION, EMULSION INTRAVENOUS at 15:15

## 2025-03-22 RX ADMIN — DEXAMETHASONE SODIUM PHOSPHATE 4 MG: 4 INJECTION, SOLUTION INTRA-ARTICULAR; INTRALESIONAL; INTRAMUSCULAR; INTRAVENOUS; SOFT TISSUE at 15:44

## 2025-03-22 RX ADMIN — LIDOCAINE HYDROCHLORIDE 20 MG: 20 INJECTION, SOLUTION INFILTRATION; PERINEURAL at 15:15

## 2025-03-22 RX ADMIN — OXYCODONE 10 MG: 5 TABLET ORAL at 08:36

## 2025-03-22 RX ADMIN — MINERAL OIL, WHITE PETROLATUM 1 G: .03; .94 OINTMENT OPHTHALMIC at 22:51

## 2025-03-22 RX ADMIN — OXYCODONE 5 MG: 5 TABLET ORAL at 20:18

## 2025-03-22 RX ADMIN — PHENYLEPHRINE HYDROCHLORIDE 100 MCG: 10 INJECTION INTRAVENOUS at 15:32

## 2025-03-22 RX ADMIN — KETOROLAC TROMETHAMINE 15 MG: 15 INJECTION, SOLUTION INTRAMUSCULAR; INTRAVENOUS at 20:43

## 2025-03-22 RX ADMIN — BUPIVACAINE HYDROCHLORIDE IN DEXTROSE 1.6 ML: 7.5 INJECTION, SOLUTION SUBARACHNOID at 15:14

## 2025-03-22 RX ADMIN — PHENYLEPHRINE HYDROCHLORIDE 100 MCG: 10 INJECTION INTRAVENOUS at 15:44

## 2025-03-22 RX ADMIN — HYDROXYZINE HYDROCHLORIDE 10 MG: 10 TABLET ORAL at 19:07

## 2025-03-22 RX ADMIN — PHENYLEPHRINE HYDROCHLORIDE 100 MCG: 10 INJECTION INTRAVENOUS at 15:38

## 2025-03-22 RX ADMIN — ACETAMINOPHEN 975 MG: 325 TABLET, FILM COATED ORAL at 21:29

## 2025-03-22 RX ADMIN — PANTOPRAZOLE SODIUM 20 MG: 20 TABLET, DELAYED RELEASE ORAL at 05:54

## 2025-03-22 RX ADMIN — PROPOFOL 20 MG: 10 INJECTION, EMULSION INTRAVENOUS at 15:09

## 2025-03-22 RX ADMIN — OXYCODONE 5 MG: 5 TABLET ORAL at 12:46

## 2025-03-22 RX ADMIN — Medication 2 G: at 15:05

## 2025-03-22 RX ADMIN — OXYCODONE 5 MG: 5 TABLET ORAL at 02:48

## 2025-03-22 RX ADMIN — LIDOCAINE HYDROCHLORIDE 40 MG: 20 INJECTION, SOLUTION INFILTRATION; PERINEURAL at 15:09

## 2025-03-22 RX ADMIN — ATORVASTATIN CALCIUM 10 MG: 10 TABLET, FILM COATED ORAL at 08:36

## 2025-03-22 ASSESSMENT — ACTIVITIES OF DAILY LIVING (ADL)
ADLS_ACUITY_SCORE: 25
ADLS_ACUITY_SCORE: 26
ADLS_ACUITY_SCORE: 25
ADLS_ACUITY_SCORE: 26
ADLS_ACUITY_SCORE: 25
ADLS_ACUITY_SCORE: 26
ADLS_ACUITY_SCORE: 25
ADLS_ACUITY_SCORE: 25
ADLS_ACUITY_SCORE: 26
ADLS_ACUITY_SCORE: 25
ADLS_ACUITY_SCORE: 26
ADLS_ACUITY_SCORE: 27
ADLS_ACUITY_SCORE: 25

## 2025-03-22 NOTE — ANESTHESIA CARE TRANSFER NOTE
Patient: Kristen Thompson    Procedure: Procedure(s):  OPEN REDUCTION INTERNAL FIXATION, FRACTURE, FEMUR, DISTAL       Diagnosis: Periprosthetic fracture around internal prosthetic right knee joint, initial encounter [M97.11XA]  Diagnosis Additional Information: No value filed.    Anesthesia Type:   No value filed.     Note:    Oropharynx: oropharynx clear of all foreign objects  Level of Consciousness: awake  Oxygen Supplementation: room air    Independent Airway: airway patency satisfactory and stable  Dentition: dentition unchanged  Vital Signs Stable: post-procedure vital signs reviewed and stable  Report to RN Given: handoff report given  Patient transferred to: PACU    Handoff Report: Identifed the Patient, Identified the Reponsible Provider, Reviewed the pertinent medical history, Discussed the surgical course, Reviewed Intra-OP anesthesia mangement and issues during anesthesia, Set expectations for post-procedure period and Allowed opportunity for questions and acknowledgement of understanding      Vitals:  Vitals Value Taken Time   /79 03/22/25 1635   Temp     Pulse 67 03/22/25 1642   Resp 15 03/22/25 1642   SpO2 93 % 03/22/25 1642   Vitals shown include unfiled device data.    Electronically Signed By: AHMET Lucio CRNA  March 22, 2025  4:42 PM

## 2025-03-22 NOTE — PROGRESS NOTES
Brief op note    Full consult to follow. Right distal femur periprosthetic fracture. Minimally displaced. Hx of stroke with slight weakness to the left side. Discussed with patients daughter over the phone. Reviewed risks, benefits, alternatives to non operative treatment and surgical fixation. Patients daughter believes surgery would be best for her mother. Will continue discussion tomorrow morning. Tentative plan for ORIF right distal femur at 1300 tomorrow. NPO at midnight. Ok for lovenox this evening if needed.     Dayne Wong MD

## 2025-03-22 NOTE — OP NOTE
DATE OF SURGERY: 3/22/2025   PREOPERATIVE DIAGNOSIS:  Right closed distal femur periprosthetic femur fracture   POSTOPERATIVE DIAGNOSIS:  Right closed distal femur periprosthetic femur fracture      PROCEDURE:  Right distal femur ORIF      SURGEON: Dayne Wong MD  ASSISTANT:  PALMA Rowland. A skilled assistant was necessary for all portions of the surgery due to its complexity including retraction, fracture exposure, plate application and fixation, closure.     ANESTHESIA:  spinal   TOURNIQUET TIME:   none   ESTIMATED BLOOD LOSS: 200 cc  SPECIMENS:  None.   DRAINS:  None.   COMPLICATIONS:  None apparent.     INTRAOPERATIVE FINDINGS: Minimally displaced fracture    IMPLANTS:  Synthes distal femur periarticular locking plate.     BRIEF HISTORY:  79 year old female with a history of right TKA who fell while yesterday sustaining a non displaced periprosthetic distal femur fracture. She was admitted. Risks, benefits, and alternatives were discussed with patient and daughter. We elected to proceed with surgical fixation to allow better post op mobilization.     DESCRIPTION OF PROCEDURE:  The patient was identified in the preoperative holding area.  The informed consent was reviewed. The operative site was identified and marked. The patient was brought to the operating room and anesthesia was induced. The right lower extremity was prepped and draped. IV antibiotics were given prior to incision. Surgical timeout was completed.     1% lidocaine with epinephrine was injected the planned surgical field.  A lateral incision was made over the lateral femoral condyle taken down to the IT band.  This was cleared with a Quinones.  IT band was incised in line with the incision.  Distal femur was easily identified and a Quinones was used to work proximally along the bone.  Self-retaining was placed.  X-rays were taken to evaluate fracture, and plate positioning. Fracture alignment was very good.  To reduce the fracture low better,  some valgus was placed through the knee and the fracture site and this did improve the fracture line on the lateral metaphysis of the femur, however, the valgus alignment of the knee was not appropriate.  The lower extremities brought back to a straight position.  A plate was chosen and a percutaneous aiming arm was attached to it.  It was placed in the wound in position slightly anterior on the femur.  X-rays were checked both AP and lateral.  The plate was pinned into position.  Once in good position, 5 distal locking screws were placed.  We then transition and placed 4 locking screws in the proximal femoral diaphysis.  The plate was left off bone as to avoid any malreduction.  After completion of the 4 proximal screws, 6 locking screw was placed distally just proximal to the previous 5.  X-rays were taken along the way to ensure that no screws to be proud or outside bone.  Final x-rays were then taken.  Wounds were thoroughly irrigated.  Incisions were closed with 0 Vicryl for the IT band, 2-0 and 3-0 Monocryl for skin.  Steri-Strips and a bulky soft dressing was applied.  All sponge and needle counts were correct at the end of the case.      POSTOPERATIVE PLAN:    Patient will return to the hospital for PT and pain control.  She will be weightbearing as tolerated with a walker for 6 weeks.  Range of motion as tolerated.  No brace needed unless she is having quad weakness and then the brace can be utilized to allow her to mobilize better.  Ancef x 24 hours.  Postop x-rays.  Aspirin for DVT prophylaxis.  Follow-up in 2 weeks for wound check.      Dayne Wong MD

## 2025-03-22 NOTE — PLAN OF CARE
"  Problem: Adult Inpatient Plan of Care  Goal: Plan of Care Review  Description: The Plan of Care Review/Shift note should be completed every shift.  The Outcome Evaluation is a brief statement about your assessment that the patient is improving, declining, or no change.  This information will be displayed automatically on your shiftnote.  Outcome: Progressing  Goal: Patient-Specific Goal (Individualized)  Description: You can add care plan individualizations to a care plan. Examples of Individualization might be:  \"Parent requests to be called daily at 9am for status\", \"I have a hard time hearing out of my right ear\", or \"Do not touch me to wake me up as it startlesme\".  Outcome: Progressing  Goal: Absence of Hospital-Acquired Illness or Injury  Outcome: Progressing  Intervention: Identify and Manage Fall Risk  Recent Flowsheet Documentation  Taken 3/21/2025 1941 by Olive Ivy, RN  Safety Promotion/Fall Prevention:   activity supervised   clutter free environment maintained   room near nurse's station   supervised activity   lighting adjusted  Intervention: Prevent Infection  Recent Flowsheet Documentation  Taken 3/21/2025 1941 by Olive Ivy, RN  Infection Prevention:   rest/sleep promoted   hand hygiene promoted  Goal: Optimal Comfort and Wellbeing  Outcome: Progressing  Intervention: Monitor Pain and Promote Comfort  Recent Flowsheet Documentation  Taken 3/21/2025 2101 by Olive Ivy, RN  Pain Management Interventions:   repositioned   rest  Taken 3/21/2025 2012 by Olive Ivy, RN  Pain Management Interventions: medication (see MAR)  Goal: Readiness for Transition of Care  Outcome: Progressing  Intervention: Mutually Develop Transition Plan  Recent Flowsheet Documentation  Taken 3/21/2025 2017 by Olive Ivy, RN  Equipment Currently Used at Home:   walker, rolling   grab bar, toilet   grab bar, tub/shower   raised toilet seat   shower chair     Problem: Delirium  Goal: Optimal " Coping  Outcome: Progressing  Goal: Improved Behavioral Control  Outcome: Progressing  Intervention: Minimize Safety Risk  Recent Flowsheet Documentation  Taken 3/21/2025 1941 by Olive Ivy, RN  Enhanced Safety Measures:   pain management   review medications for side effects with activity   room near unit station  Goal: Improved Attention and Thought Clarity  Outcome: Progressing  Goal: Improved Sleep  Outcome: Progressing   Goal Outcome Evaluation:       Alert and oriented x4. NPO at midnight for surgery tomorrow afternoon. Pre-op bath with shar wipes was complete and linen/clothes were changed. Purewick in place. Robaxin, tylenol, and oxycodone given for for pain management. Right knee immobilizer in place.

## 2025-03-22 NOTE — PROGRESS NOTES
"Pt A&Ox4, VSS, able to make needs known, utilizing call light appropriately. Bedfast at this time. PRN Oxy 5mg as well as Atarax administered for 4/10 pain, see MAR. Denies nausea. Ice to surgical site. Pt tolerated PO fluids and some crackers thus far. Saline locked; Pt and daughter prefer to hold off on IV fluids at this time. Room Air. Capno removed per pt and daughters request and prefers to not have oxygen administered. Daughter remains at bedside, involved with cares. Purewick in place.     Vital signs:  Temp: 98  F (36.7  C) Temp src: Oral BP: 124/74 Pulse: 61   Resp: 16 SpO2: (!) 91 % O2 Device: None (Room air)   Height: 157.5 cm (5' 2\") Weight: 64.1 kg (141 lb 5 oz)  Estimated body mass index is 25.85 kg/m  as calculated from the following:    Height as of this encounter: 1.575 m (5' 2\").    Weight as of this encounter: 64.1 kg (141 lb 5 oz).       "

## 2025-03-22 NOTE — PROGRESS NOTES
[facility] TRANSPORT NOTE  Data:   Reason for Transport: Post-surgery    Kristen Thompson was transported to Med/Surg unit via cart at 1745. Patient was accompanied by Registered Nurse. Equipment used for transport: None. Family was aware of reason for transport: Yes; at bedside    Action:  Report: received from Moo JEAN RN.    Response:  Patient's condition upon return was stable.      Shantel Huber RN

## 2025-03-22 NOTE — PLAN OF CARE
Problem: Adult Inpatient Plan of Care  Goal: Absence of Hospital-Acquired Illness or Injury  Outcome: Not Progressing  Intervention: Identify and Manage Fall Risk  Recent Flowsheet Documentation  Taken 3/22/2025 0200 by Aye Rios RN  Safety Promotion/Fall Prevention: room near nurse's station  Intervention: Prevent Infection  Recent Flowsheet Documentation  Taken 3/22/2025 0200 by Aye Rios, RN  Infection Prevention: rest/sleep promoted     Problem: Adult Inpatient Plan of Care  Goal: Optimal Comfort and Wellbeing  Outcome: Not Progressing   Goal Outcome Evaluation:      Plan of Care Reviewed With: patient    Overall Patient Progress: no changeOverall Patient Progress: no change    Outcome Evaluation: Pt alert, oriented, bedrest. NPO at midnight. Surgery today at approximately 1300. Prn oxycodone given for pain. Denies nausea, SOB. IV saline locked. Knee immobilizer in place.

## 2025-03-22 NOTE — CONSULTS
[facility] Consultation    Consultation   Level of consult: Consult, follow and place orders    Kristen Thompson,  1945, MRN [id number]     Admitting Dx: Periprosthetic fracture around internal prosthetic right knee joint, initial encounter [M97.11XA]  Fall, initial encounter [W19.XXXA]     PCP: Cortney Hung, [phone]     Code status:  Full Code     Extended Emergency Contact Information  Primary Emergency Contact: Neva Thompson  Address: [address] [place] [address]  Mobile Phone: [phone]  Relation: Daughter         Assessment: 79-year-old female with a history of right total knee arthroplasty now with right nondisplaced distal femur fracture    Plan:  Reviewed the patient's injury with her and her daughter.  We discussed nonsurgical and surgical options.  Nonsurgical option would be for bracing and nonweightbearing for 6 weeks.  This would likely limit her mobility given her age and difficulty with nonweightbearing.  We discussed surgical intervention for ORIF and postoperative expectations would be weightbearing as tolerated the walker for 4 to 6 weeks.  This does pose a risk of infection or stiffness.  There may be hardware complications.  Anesthesia would also be a risk for her given her age.  Ultimately, the patient daughter elected to proceed with surgery to allow for earlier mobilization.  Will plan surgery later today.  Anticipate weightbearing as tolerated with an assistive device postoperatively.  Aspirin 81 mg twice daily for 4 weeks postop.  Disposition pending    Principal Problem:    Periprosthetic fracture around internal prosthetic right knee joint, initial encounter  Active Problems:    HL (hearing loss)    Hyperlipidemia LDL goal <130    Chronic constipation    Restless leg syndrome    Insomnia    Gastroesophageal reflux disease without esophagitis    Essential hypertension with goal blood pressure less than 130/80     Hemiplegia and hemiparesis following cerebral infarction affecting unspecified side (H)    Fall, initial encounter    History of stroke    Chronic fatigue       Chief Complaint  Right periprosthetic distal femur fracture     HPI  We have been requested by Myron WALDROP to evaluate Kristen Thompson who is a 79 year old year old female for right knee periprosthetic femur fracture.  The patient had a fall yesterday, 3/21/2025.  She landed on the knee and was unable to get up.  She is found of a nondisplaced distal femur fracture.  No other injuries identified.  She is otherwise healthy and very active.  She has not had great luck with this knee replacement on this right side.  She has had some pain ever since it was done.  She also has a history of stroke with some mild weakness on the left side.     History is obtained from the patient and her daughter     Past Medical History  Past Medical History:   Diagnosis Date    Arthritis 11/19/2013    Cerebrovascular accident (CVA), unspecified mechanism (H) 03/02/2021    Gastro-oesophageal reflux disease     GERD (gastroesophageal reflux disease) 10/02/2012    H/O total hysterectomy     HL (hearing loss) 10/11/2012    Malignant melanoma (H)     PONV (postoperative nausea and vomiting)     Squamous cell carcinoma        Surgical History  Past Surgical History:   Procedure Laterality Date    BIOPSY BREAST      BIOPSY BREAST Left 8/22/2022    Procedure: Seed Localized Left Breast Biopsy;  Surgeon: Jeremy Berg DO;  Location: [facility]    COLONOSCOPY  10/30/2012    Procedure: COLONOSCOPY;  Colonoscopy;  Surgeon: Denise Bah MD;  Location: [facility]    ESOPHAGOSCOPY, GASTROSCOPY, DUODENOSCOPY (EGD), COMBINED N/A 9/15/2016    Procedure: COMBINED ESOPHAGOSCOPY, GASTROSCOPY, DUODENOSCOPY (EGD);  Surgeon: Bennie Godinez MD;  Location: [facility]    GALLBLADDER SURGERY      HYSTERECTOMY      knee replacment  2007    RELEASE TRIGGER FINGER Right 4/29/2016    Procedure:  RELEASE TRIGGER FINGER;  Surgeon: Percy Sarmiento MD;  Location: [facility]    REPAIR BLADDER      ZZC RAD RESEC TONSIL/PILLARS          Social History  Social History     Socioeconomic History    Marital status:      Spouse name: Not on file    Number of children: Not on file    Years of education: Not on file    Highest education level: Not on file   Occupational History    Not on file   Tobacco Use    Smoking status: Never    Smokeless tobacco: Never   Substance and Sexual Activity    Alcohol use: No     Alcohol/week: 0.0 standard drinks of alcohol    Drug use: No    Sexual activity: Not Currently     Partners: Male   Other Topics Concern    Parent/sibling w/ CABG, MI or angioplasty before 65F 55M? No   Social History Narrative    June 9, 2022    ENVIRONMENTAL HISTORY: [place] The home is heated with a forced air. They does have central air conditioning. The patient's bedroom is furnished with carpeting in bedroom and fabric window coverings.  Pets inside the house include 0. There is no history of cockroach or mice infestation. There is/are 0 smokers in the house.  The house does not have a damp basement.      Social Drivers of Health     Financial Resource Strain: Low Risk  (3/21/2025)    Financial Resource Strain     Within the past 12 months, have you or your family members you live with been unable to get utilities (heat, electricity) when it was really needed?: No   Food Insecurity: Low Risk  (3/21/2025)    Food Insecurity     Within the past 12 months, did you worry that your food would run out before you got money to buy more?: No     Within the past 12 months, did the food you bought just not last and you didn t have money to get more?: No   Transportation Needs: Low Risk  (3/21/2025)    Transportation Needs     Within the past 12 months, has lack of transportation kept you from medical appointments, getting your medicines, non-medical meetings or  appointments, work, or from getting things that you need?: No   Physical Activity: Not on file   Stress: Not on file   Social Connections: Not on file   Interpersonal Safety: Low Risk  (3/21/2025)    Interpersonal Safety     Do you feel physically and emotionally safe where you currently live?: Yes     Within the past 12 months, have you been hit, slapped, kicked or otherwise physically hurt by someone?: No     Within the past 12 months, have you been humiliated or emotionally abused in other ways by your partner or ex-partner?: No   Housing Stability: Low Risk  (3/21/2025)    Housing Stability     Do you have housing? : Yes     Are you worried about losing your housing?: No       Family History  Family History   Problem Relation Age of Onset    C.A.D. Mother     Cardiovascular Mother     Thyroid Disease Mother     Cancer Father         stomach ca    Cancer Sister     Eye Disorder Sister     C.A.D. Sister     Cerebrovascular Disease Sister     Breast Cancer Sister     Arthritis Sister     Cardiovascular Sister     Depression Sister     Heart Disease Sister     Neurologic Disorder Sister     Osteoporosis Sister     Thyroid Disease Sister     Depression Sister     Thyroid Disease Sister     Depression Sister     Thyroid Disease Sister     Obesity Sister     Neurologic Disorder Sister         Allergies:  Codeine sulfate, Quinapril, and Darvon-n [propoxyphene napsylate]      Current Medications:  Current Facility-Administered Medications   Medication Dose Route Frequency Provider Last Rate Last Admin    acetaminophen (TYLENOL) tablet 975 mg  975 mg Oral TID Marichuy Sanches PA-C   975 mg at 03/22/25 0836    amLODIPine (NORVASC) tablet 5 mg  5 mg Oral Daily Marichuy Sanches PA-C   5 mg at 03/22/25 0836    artificial saliva (BIOTENE MT) solution 1 spray  1 spray Mouth/Throat 4x Daily PRN Marichuy Sanches PA-C        artificial tears ophthalmic ointment 1 g  1 inch Both Eyes At Bedtime Marichuy Sanches  VERA        [Held by provider] aspirin EC tablet 81 mg  81 mg Oral Daily Marichuy Sanches PA-C        atorvastatin (LIPITOR) tablet 10 mg  10 mg Oral Daily Marichuy Sanches PA-C   10 mg at 03/22/25 0836    benzocaine-menthol (CHLORASEPTIC) 6-10 MG lozenge 1 lozenge  1 lozenge Buccal Q1H PRN Marichuy Sanches PA-C        calcium carbonate (TUMS) chewable tablet 1,000 mg  1,000 mg Oral 4x Daily PRN Meseret Gastelum APRN CNP        carboxymethylcellulose PF (REFRESH PLUS) 0.5 % ophthalmic solution 1 drop  1 drop Both Eyes Q1H PRN Reji Burton MD        DULoxetine (CYMBALTA) DR capsule 60 mg  60 mg Oral QPM Marichuy Sanches PA-C   60 mg at 03/21/25 2012    HOLD: ALL Anticoagulant medications until AFTER surgery   Does not apply HOLD Marichuy Sanches PA-C        HYDROmorphone (DILAUDID) injection 0.2 mg  0.2 mg Intravenous Q2H PRN Marichuy Sanches PA-C        Or    HYDROmorphone (DILAUDID) injection 0.4 mg  0.4 mg Intravenous Q2H PRN Marichuy Sanches PA-C        hydrOXYzine HCl (ATARAX) tablet 10 mg  10 mg Oral Q6H PRN Marichuy Sanches PA-C   10 mg at 03/22/25 0554    lidocaine (LMX4) kit   Topical Q1H PRN Marichuy Sanches PA-C        lidocaine 1 % 0.1-1 mL  0.1-1 mL Other Q1H PRN Marichuy Sanches PA-C        melatonin tablet 1 mg  1 mg Oral At Bedtime PRN Marichuy Sanches PA-C        methocarbamol (ROBAXIN) half-tab 250 mg  250 mg Oral Q6H PRN Marichuy Sanches PA-C   250 mg at 03/22/25 0554    modafinil (PROVIGIL) tablet 100 mg  100 mg Oral Daily PRN Marichuy Sanches PA-C        naloxone (NARCAN) injection 0.2 mg  0.2 mg Intravenous Q2 Min PRN Reji Burton MD        Or    naloxone (NARCAN) injection 0.4 mg  0.4 mg Intravenous Q2 Min PRN Reji Burton MD        Or    naloxone (NARCAN) injection 0.2 mg  0.2 mg Intramuscular Q2 Min PRN Reji Burton MD        Or    naloxone (NARCAN) injection 0.4 mg  0.4 mg Intramuscular Q2 Min PRN Reji Burton MD         ondansetron (ZOFRAN ODT) ODT tab 4 mg  4 mg Oral Q6H PRN Marichuy Sanches PA-C        Or    ondansetron (ZOFRAN) injection 4 mg  4 mg Intravenous Q6H PRN Marichuy Sanches PA-C        oxyCODONE (ROXICODONE) tablet 5 mg  5 mg Oral Q4H PRN Marichuy Sanches PA-C   5 mg at 03/22/25 0248    Or    oxyCODONE (ROXICODONE) tablet 10 mg  10 mg Oral Q4H PRN Marichuy Sanches PA-C   10 mg at 03/22/25 0836    pantoprazole (PROTONIX) EC tablet 20 mg  20 mg Oral Reji Foster MD   20 mg at 03/22/25 0554    polyethylene glycol (MIRALAX) Packet 17 g  17 g Oral QPM Marichuy Sanches PA-C   17 g at 03/21/25 2012    prochlorperazine (COMPAZINE) injection 5 mg  5 mg Intravenous Q6H PRN Marichuy Sanches PA-C        Or    prochlorperazine (COMPAZINE) tablet 5 mg  5 mg Oral Q6H PRN Marichuy Sanches PA-C        senna-docusate (SENOKOT-S/PERICOLACE) 8.6-50 MG per tablet 1 tablet  1 tablet Oral BID PRN Marichuy Sanches PA-C        Or    senna-docusate (SENOKOT-S/PERICOLACE) 8.6-50 MG per tablet 2 tablet  2 tablet Oral BID PRN Marichuy Sanches PA-C        sodium chloride (PF) 0.9% PF flush 3 mL  3 mL Intracatheter Q8H JANES Marichuy Sanches PA-C   3 mL at 03/22/25 0554    sodium chloride (PF) 0.9% PF flush 3 mL  3 mL Intracatheter q1 min prn Marichuy Sanches PA-C        traZODone (DESYREL) tablet 50 mg  50 mg Oral At Bedtime Marichuy Sanches PA-C   50 mg at 03/21/25 2220       Review of Systems:  The Review of Systems is negative other than noted in the HPI    Physical Exam:  Temp:  [97.7  F (36.5  C)-98.9  F (37.2  C)] 98.9  F (37.2  C)  Pulse:  [64-81] 70  Resp:  [16-18] 16  BP: (115-150)/(69-96) 141/83  SpO2:  [92 %-95 %] 93 %    Right lower extremity:  Surgical incision well-healed over the anterior knee  Large effusion in the knee  Range of motion deferred  She is neurovascular intact in the foot       Pertinent Labs  Lab Results: personally reviewed.  Lab Results    Component Value Date    WBC 7.2 03/22/2025    HGB 12.4 03/22/2025    HCT 37.8 03/22/2025     (H) 03/22/2025     03/22/2025     Recent Labs   Lab 03/22/25  0525   INR 1.15       Pertinent Radiology  Radiology Results: images and radiology report reviewed  Recent Results (from the past 24 hours)   Head CT w/o contrast    Narrative    EXAM: CT HEAD W/O CONTRAST, CT CERVICAL SPINE W/O CONTRAST  LOCATION: [facility]  DATE: 3/21/2025    INDICATION: fall. hit  head.  COMPARISON: MRI 10/19/2022. CT 2/13/2021.   TECHNIQUE:   1) Routine CT Head without IV contrast. Multiplanar reformats. Dose reduction techniques were used.  2) Routine CT Cervical Spine without IV contrast. Multiplanar reformats. Dose reduction techniques were used.    FINDINGS:   HEAD CT:   INTRACRANIAL CONTENTS: Scattered small vessel ischemic changes throughout the white matter and diffuse parenchymal volume loss commensurate with age. Associated ex vacuo dilatation of the ventricular system. No intracranial hemorrhage, extraaxial   collection, or mass effect.  No CT evidence of acute infarct. Right thalamic chronic lacunar infarct or perivascular space.    VISUALIZED ORBITS/SINUSES/MASTOIDS: No intraorbital abnormality. No paranasal sinus mucosal disease. No middle ear or mastoid effusion.    BONES/SOFT TISSUES: No acute abnormality.    CERVICAL SPINE:  VERTEBRAE: Vertebral body heights maintained. No subluxation. No acute fracture. Diffuse osseous demineralization. Mild-to-moderate degenerative findings throughout the cervical spine with facet arthropathy, vertebral body osteophyte formation, and disc   height loss.    CANAL: No osseous narrowing of the spinal canal.    PARASPINAL: Paraspinal soft tissue within normal limits.      Impression    IMPRESSION:  HEAD CT:  1.  No CT evidence for acute intracranial process.  2.  Brain atrophy and presumed chronic microvascular ischemic changes as above.    CERVICAL  SPINE CT:  1.  No acute traumatic abnormality within the cervical spine.   CT Cervical Spine w/o Contrast    Narrative    EXAM: CT HEAD W/O CONTRAST, CT CERVICAL SPINE W/O CONTRAST  LOCATION: [facility]  DATE: 3/21/2025    INDICATION: fall. hit  head.  COMPARISON: MRI 10/19/2022. CT 2/13/2021.   TECHNIQUE:   1) Routine CT Head without IV contrast. Multiplanar reformats. Dose reduction techniques were used.  2) Routine CT Cervical Spine without IV contrast. Multiplanar reformats. Dose reduction techniques were used.    FINDINGS:   HEAD CT:   INTRACRANIAL CONTENTS: Scattered small vessel ischemic changes throughout the white matter and diffuse parenchymal volume loss commensurate with age. Associated ex vacuo dilatation of the ventricular system. No intracranial hemorrhage, extraaxial   collection, or mass effect.  No CT evidence of acute infarct. Right thalamic chronic lacunar infarct or perivascular space.    VISUALIZED ORBITS/SINUSES/MASTOIDS: No intraorbital abnormality. No paranasal sinus mucosal disease. No middle ear or mastoid effusion.    BONES/SOFT TISSUES: No acute abnormality.    CERVICAL SPINE:  VERTEBRAE: Vertebral body heights maintained. No subluxation. No acute fracture. Diffuse osseous demineralization. Mild-to-moderate degenerative findings throughout the cervical spine with facet arthropathy, vertebral body osteophyte formation, and disc   height loss.    CANAL: No osseous narrowing of the spinal canal.    PARASPINAL: Paraspinal soft tissue within normal limits.      Impression    IMPRESSION:  HEAD CT:  1.  No CT evidence for acute intracranial process.  2.  Brain atrophy and presumed chronic microvascular ischemic changes as above.    CERVICAL SPINE CT:  1.  No acute traumatic abnormality within the cervical spine.   XR Knee Right 1/2 Views    Narrative    EXAM: XR KNEE RIGHT 1/2 VIEWS  LOCATION: [facility]  DATE: 3/21/2025    INDICATION:  fall, landing on her right knee and then right hip.  COMPARISON: None.      Impression    IMPRESSION: Right total knee arthroplasty with patellar resurfacing. Acute minimally displaced periprosthetic fracture of the lateral aspect of the distal femoral metadiaphysis. No radiographic evidence of hardware loosening. Small lipohemarthrosis and   soft tissue swelling about the knee.   XR Pelvis w Hip Right G/E 2 Views    Narrative    EXAM: XR PELVIS AND HIP RIGHT 2 VIEWS  LOCATION: [facility]  DATE: 3/21/2025    INDICATION: fall. landing on right knee then the right hip. (right knee and right hip pain)  COMPARISON: None.      Impression    IMPRESSION: No acute fracture identified in the right hip or elsewhere in the pelvis. No malalignment. 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 Additional degenerative changes in the lumbar spine, SI joints and pubic symphysis. Osseous   demineralization.   CT Knee Right w/o Contrast    Narrative    EXAM: CT KNEE RIGHT W/O CONTRAST  LOCATION: [facility]  DATE: 3/21/2025    INDICATION: Distal femur fracture.  COMPARISON: Knee radiographic exam 3/21/2025.  TECHNIQUE: Noncontrast. Axial, sagittal and coronal thin-section reconstruction. Dose reduction techniques were used.     FINDINGS:     BONES:  -Redemonstrated minimally displaced periprosthetic lateral distal femoral metaphyseal fracture. The fracture extends to the lateral femoral condyle. Postop total knee arthroplasty with patellar resurfacing. No periprosthetic patella, proximal tibia, or   proximal fibula fracture. No finding for component loosening. No bone lesion.    SOFT TISSUES:  -Associated small to moderate-sized knee joint lipohemarthrosis. Soft tissue swelling/blood products along the anterior and lateral distal femur. Intrinsic muscle bulk is preserved. Distal quadriceps and patellar tendons intact by CT criteria.      Impression    IMPRESSION:  1.   Acute minimally displaced periprosthetic lateral distal right femoral metaphyseal fracture extending to the lateral femoral condyle.  2.  Small to moderate-sized knee joint lipohemarthrosis.  3.  Postop right total knee arthroplasty with patellar resurfacing.          Attestation:  Total time: 55 minutes     Dayne Wong MD

## 2025-03-22 NOTE — PROGRESS NOTES
"[facility]    Medicine Progress Note - Hospitalist Service    Date of Admission:  3/21/2025    Assessment & Plan      Kristen Thompson is a 79 year old female admitted on 3/21/2025. She has a past medical history significant for stroke with residual left-sided hemiplegia, hypertension, dyslipidemia, chronic fatigue, restless leg syndrome, GERD, insomnia, hearing loss who presented to the emergency department for evaluation of right leg pain after a fall and was found to have a periprosthetic right distal femur fracture.    Periprosthetic fracture around internal prosthetic right knee joint, initial encounter  History of prior right TKA in 2007. Had a fall on 3/21/2025 and was found to have acute periprosthetic fracture.    X-ray right knee - \"Right total knee arthroplasty with patellar resurfacing. Acute minimally displaced periprosthetic fracture of the lateral aspect of the distal femoral metadiaphysis. No radiographic evidence of hardware loosening. Small lipohemarthrosis and soft tissue swelling about the knee.\"    CT right knee - \"1.  Acute minimally displaced periprosthetic lateral distal right femoral metaphyseal fracture extending to the lateral femoral condyle.  2.  Small to moderate-sized knee joint lipohemarthrosis.  3.  Postop right total knee arthroplasty with patellar resurfacing.\"    Case discussed between emergency department and on-call ortho, who is aware that fracture is katie-prosthetic. There is a chance that the fracture could be managed non-surgically, but unclear at time of admission.  - Ortho consult; OR today ~1pm  - Regular diet after procedure  - Holding home aspirin   - Analgesia: scheduled acetaminophen, prn oxycodone, prn IV dilaudid, prn Robaxin, prn Atarax  - Knee immobilizer in place, patient can be up with assist with immobilizer in place    Fall, initial encounter  Tripped and fell on 3/21, no dizziness or syncope. Landed on right knee, right hip, " "right forearm, and hit her head, but no loss of consciousness. On aspirin but not anticoagulation.     IMAGING:  XR PELVIS AND RIGHT HIP - \"No acute fracture identified in the right hip or elsewhere in the pelvis. No malalignment. 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 Additional degenerative changes in the lumbar spine, SI joints and pubic symphysis. Osseous demineralization.\"    HEAD CT - \"1.  No CT evidence for acute intracranial process.  2.  Brain atrophy and presumed chronic microvascular ischemic changes as above.\"     CERVICAL SPINE CT - \"1.  No acute traumatic abnormality within the cervical spine.\"    - No other obvious injury to address. Will need PT/OT post-operatively    Surgery Clearance and RCRI Risk Assessment:    Anesthesia issues: No  Baseline Activity: > 4 METS  Chest Pain: No  Shortness of breath: No  Cardiac Risk Factors/Assessment:                High Risk Surgery: No              History Ischemic Heart Disease: No              History of Congestive Heart Failure: No              History of CVA: Yes              Preoperative Treatment with Insulin: No              Preoperative Creatinine greater than 2.0: No              Total Number of Points: 1 = 6.0% risk of major cardiac event  - Holding home aspirin pre-operatively.   - Patient may proceed to surgery without additional work-up or optimization if surgical management is decided upon.     History of stroke  Hemiplegia and hemiparesis following cerebral infarction affecting unspecified side (H)  Stroke in 2021 (right thalamic), has some residual left sided weakness and numbness (upper > lower extremity). No known arrhythmias. Managed prior to admission with aspirin 81 mg daily, in addition to statin and antihypertensive management noted below.   - See above regarding aspirin     Essential hypertension with goal blood pressure less than 130/80  Blood pressure stable. Managed prior to admission with amlodipine 5 mg daily, " "continue with holding parameters.    Hyperlipidemia LDL goal <130  Managed prior to admission with atorvastatin 40 mg daily, continue.     Insomnia  Managed prior to admission with trazodone 50 mg q hs, continue.     Gastroesophageal reflux disease without esophagitis  Managed prior to admission with omeprazole 20 mg bid, continue.     Chronic fatigue  Managed prior to admission with Provigil 100 mg daily prn, continue.     Chronic constipation  Takes daily Miralax, continue.     Neuropathy   Managed prior to admission with Cymbalta 60 mg daily, continue.     HL (hearing loss)  Patient does better when she can read lips, so requests no masking if possible.             Diet: NPO for Procedure/Surgery per Anesthesia Guidelines Except for: Meds; Clear liquids before procedure/surgery: ADULT (Age GREATER than or Equal to 18 years) - Clear liquids 2 hours before procedure/surgery    DVT Prophylaxis: Pneumatic Compression Devices  Fong Catheter: Not present  Lines: None     Cardiac Monitoring: None  Code Status: Full Code      Clinically Significant Risk Factors Present on Admission                 # Drug Induced Platelet Defect: home medication list includes an antiplatelet medication   # Hypertension: Noted on problem list           # Overweight: Estimated body mass index is 25.85 kg/m  as calculated from the following:    Height as of this encounter: 1.575 m (5' 2\").    Weight as of this encounter: 64.1 kg (141 lb 5 oz).              Social Drivers of Health            Disposition Plan     Medically Ready for Discharge: Anticipated Tomorrow             Dao Myers DO  Hospitalist Service  [facility]  Securely message with [employer] (more info)  Text page via [employer] Paging/Directory   ______________________________________________________________________    Interval History   No acute events. OR today    Physical Exam   Vital Signs: Temp: 98  F (36.7  C) Temp src: Oral BP: 118/72 Pulse: 65   " Resp: 16 SpO2: (!) 91 % O2 Device: None (Room air)    Weight: 141 lbs 5.04 oz    Physical Exam  Constitutional:       General: Pt is not in acute distress.  HENT:      Head: Normocephalic and atraumatic.      Nose: Nose normal.   Eyes:      Conjunctiva/sclera: Conjunctivae normal.   Pulmonary:      Effort: Pulmonary effort is normal.   Abdominal:      General: Abdomen is flat.   Skin:     Findings: No rash.   Neurological:      General: No focal deficit present.      Mental Status: Pt is alert.   Psychiatric:         Mood and Affect: Mood normal.       Medical Decision Making       55 MINUTES SPENT BY ME on the date of service doing chart review, history, exam, documentation & further activities per the note.      Data     I have personally reviewed the following data over the past 24 hrs:    7.2  \   12.4   / 248     138 101 13.0 /  103 (H)   3.8 27 0.67 \     INR:  1.15 PTT:  N/A   D-dimer:  N/A Fibrinogen:  N/A       Imaging results reviewed over the past 24 hrs:   Recent Results (from the past 24 hours)   Head CT w/o contrast    Narrative    EXAM: CT HEAD W/O CONTRAST, CT CERVICAL SPINE W/O CONTRAST  LOCATION: [facility]  DATE: 3/21/2025    INDICATION: fall. hit  head.  COMPARISON: MRI 10/19/2022. CT 2/13/2021.   TECHNIQUE:   1) Routine CT Head without IV contrast. Multiplanar reformats. Dose reduction techniques were used.  2) Routine CT Cervical Spine without IV contrast. Multiplanar reformats. Dose reduction techniques were used.    FINDINGS:   HEAD CT:   INTRACRANIAL CONTENTS: Scattered small vessel ischemic changes throughout the white matter and diffuse parenchymal volume loss commensurate with age. Associated ex vacuo dilatation of the ventricular system. No intracranial hemorrhage, extraaxial   collection, or mass effect.  No CT evidence of acute infarct. Right thalamic chronic lacunar infarct or perivascular space.    VISUALIZED ORBITS/SINUSES/MASTOIDS: No intraorbital  abnormality. No paranasal sinus mucosal disease. No middle ear or mastoid effusion.    BONES/SOFT TISSUES: No acute abnormality.    CERVICAL SPINE:  VERTEBRAE: Vertebral body heights maintained. No subluxation. No acute fracture. Diffuse osseous demineralization. Mild-to-moderate degenerative findings throughout the cervical spine with facet arthropathy, vertebral body osteophyte formation, and disc   height loss.    CANAL: No osseous narrowing of the spinal canal.    PARASPINAL: Paraspinal soft tissue within normal limits.      Impression    IMPRESSION:  HEAD CT:  1.  No CT evidence for acute intracranial process.  2.  Brain atrophy and presumed chronic microvascular ischemic changes as above.    CERVICAL SPINE CT:  1.  No acute traumatic abnormality within the cervical spine.   CT Cervical Spine w/o Contrast    Narrative    EXAM: CT HEAD W/O CONTRAST, CT CERVICAL SPINE W/O CONTRAST  LOCATION: [facility]  DATE: 3/21/2025    INDICATION: fall. hit  head.  COMPARISON: MRI 10/19/2022. CT 2/13/2021.   TECHNIQUE:   1) Routine CT Head without IV contrast. Multiplanar reformats. Dose reduction techniques were used.  2) Routine CT Cervical Spine without IV contrast. Multiplanar reformats. Dose reduction techniques were used.    FINDINGS:   HEAD CT:   INTRACRANIAL CONTENTS: Scattered small vessel ischemic changes throughout the white matter and diffuse parenchymal volume loss commensurate with age. Associated ex vacuo dilatation of the ventricular system. No intracranial hemorrhage, extraaxial   collection, or mass effect.  No CT evidence of acute infarct. Right thalamic chronic lacunar infarct or perivascular space.    VISUALIZED ORBITS/SINUSES/MASTOIDS: No intraorbital abnormality. No paranasal sinus mucosal disease. No middle ear or mastoid effusion.    BONES/SOFT TISSUES: No acute abnormality.    CERVICAL SPINE:  VERTEBRAE: Vertebral body heights maintained. No subluxation. No acute fracture.  Diffuse osseous demineralization. Mild-to-moderate degenerative findings throughout the cervical spine with facet arthropathy, vertebral body osteophyte formation, and disc   height loss.    CANAL: No osseous narrowing of the spinal canal.    PARASPINAL: Paraspinal soft tissue within normal limits.      Impression    IMPRESSION:  HEAD CT:  1.  No CT evidence for acute intracranial process.  2.  Brain atrophy and presumed chronic microvascular ischemic changes as above.    CERVICAL SPINE CT:  1.  No acute traumatic abnormality within the cervical spine.   XR Knee Right 1/2 Views    Narrative    EXAM: XR KNEE RIGHT 1/2 VIEWS  LOCATION: [facility]  DATE: 3/21/2025    INDICATION: fall, landing on her right knee and then right hip.  COMPARISON: None.      Impression    IMPRESSION: Right total knee arthroplasty with patellar resurfacing. Acute minimally displaced periprosthetic fracture of the lateral aspect of the distal femoral metadiaphysis. No radiographic evidence of hardware loosening. Small lipohemarthrosis and   soft tissue swelling about the knee.   XR Pelvis w Hip Right G/E 2 Views    Narrative    EXAM: XR PELVIS AND HIP RIGHT 2 VIEWS  LOCATION: [facility]  DATE: 3/21/2025    INDICATION: fall. landing on right knee then the right hip. (right knee and right hip pain)  COMPARISON: None.      Impression    IMPRESSION: No acute fracture identified in the right hip or elsewhere in the pelvis. No malalignment. 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 Additional degenerative changes in the lumbar spine, SI joints and pubic symphysis. Osseous   demineralization.   CT Knee Right w/o Contrast    Narrative    EXAM: CT KNEE RIGHT W/O CONTRAST  LOCATION: [facility]  DATE: 3/21/2025    INDICATION: Distal femur fracture.  COMPARISON: Knee radiographic exam 3/21/2025.  TECHNIQUE: Noncontrast. Axial, sagittal and coronal  thin-section reconstruction. Dose reduction techniques were used.     FINDINGS:     BONES:  -Redemonstrated minimally displaced periprosthetic lateral distal femoral metaphyseal fracture. The fracture extends to the lateral femoral condyle. Postop total knee arthroplasty with patellar resurfacing. No periprosthetic patella, proximal tibia, or   proximal fibula fracture. No finding for component loosening. No bone lesion.    SOFT TISSUES:  -Associated small to moderate-sized knee joint lipohemarthrosis. Soft tissue swelling/blood products along the anterior and lateral distal femur. Intrinsic muscle bulk is preserved. Distal quadriceps and patellar tendons intact by CT criteria.      Impression    IMPRESSION:  1.  Acute minimally displaced periprosthetic lateral distal right femoral metaphyseal fracture extending to the lateral femoral condyle.  2.  Small to moderate-sized knee joint lipohemarthrosis.  3.  Postop right total knee arthroplasty with patellar resurfacing.

## 2025-03-22 NOTE — ANESTHESIA PREPROCEDURE EVALUATION
Anesthesia Pre-Procedure Evaluation    Patient: Kristen Thompson   MRN: [id number] : 1945        Procedure : Procedure(s):  OPEN REDUCTION INTERNAL FIXATION, FRACTURE, FEMUR, DISTAL          Past Medical History:   Diagnosis Date    Arthritis 2013    Cerebrovascular accident (CVA), unspecified mechanism (H) 2021    Gastro-oesophageal reflux disease     GERD (gastroesophageal reflux disease) 10/02/2012    H/O total hysterectomy     HL (hearing loss) 10/11/2012    Malignant melanoma (H)     PONV (postoperative nausea and vomiting)     Squamous cell carcinoma       Past Surgical History:   Procedure Laterality Date    BIOPSY BREAST      BIOPSY BREAST Left 2022    Procedure: Seed Localized Left Breast Biopsy;  Surgeon: Jeremy Berg DO;  Location: [facility] OR    COLONOSCOPY  10/30/2012    Procedure: COLONOSCOPY;  Colonoscopy;  Surgeon: Denise Bah MD;  Location: [facility]    ESOPHAGOSCOPY, GASTROSCOPY, DUODENOSCOPY (EGD), COMBINED N/A 9/15/2016    Procedure: COMBINED ESOPHAGOSCOPY, GASTROSCOPY, DUODENOSCOPY (EGD);  Surgeon: Bennie Godinez MD;  Location: [facility]    GALLBLADDER SURGERY      HYSTERECTOMY      knee replacment      RELEASE TRIGGER FINGER Right 2016    Procedure: RELEASE TRIGGER FINGER;  Surgeon: Percy Sarmiento MD;  Location: [facility] OR    REPAIR BLADDER      ZZC RAD RESEC TONSIL/PILLARS        Allergies   Allergen Reactions    Codeine Sulfate      Nausea and vomiting     Quinapril Difficulty breathing    Darvon-N [Propoxyphene Napsylate] Nausea and Vomiting      Social History     Tobacco Use    Smoking status: Never    Smokeless tobacco: Never   Substance Use Topics    Alcohol use: No     Alcohol/week: 0.0 standard drinks of alcohol      Wt Readings from Last 1 Encounters:   25 64.1 kg (141 lb 5 oz)        Anesthesia Evaluation   Pt has had prior anesthetic.     History of anesthetic complications  - PONV.      ROS/MED HX  ENT/Pulmonary:     (+)       "     allergic rhinitis,                             Neurologic:     (+)    peripheral neuropathy,      CVA, date: 2021, with deficits, - left sided weakness, left facial droop.                TIA: Left sided weakness.   Cardiovascular:     (+) Dyslipidemia hypertension- -   -  - -                                      METS/Exercise Tolerance:     Hematologic:       Musculoskeletal:   (+)  arthritis,   fracture, Fracture location: RLE,         GI/Hepatic:     (+) GERD,                   Renal/Genitourinary:  - neg Renal ROS     Endo:  - neg endo ROS     Psychiatric/Substance Use:  - neg psychiatric ROS     Infectious Disease:  - neg infectious disease ROS     Malignancy:  - neg malignancy ROS     Other:  - neg other ROS          Physical Exam    Airway  airway exam normal      Mallampati: III   TM distance: > 3 FB   Neck ROM: full   Mouth opening: > 3 cm    Respiratory Devices and Support         Dental           Cardiovascular   cardiovascular exam normal       Rhythm and rate: regular and normal     Pulmonary   pulmonary exam normal        breath sounds clear to auscultation           OUTSIDE LABS:  CBC:   Lab Results   Component Value Date    WBC 7.2 03/22/2025    WBC 12.4 (H) 03/21/2025    HGB 12.4 03/22/2025    HGB 12.8 03/21/2025    HCT 37.8 03/22/2025    HCT 38.6 03/21/2025     03/22/2025     03/21/2025     BMP:   Lab Results   Component Value Date     03/22/2025     03/21/2025    POTASSIUM 3.8 03/22/2025    POTASSIUM 3.8 03/21/2025    CHLORIDE 101 03/22/2025    CHLORIDE 105 03/21/2025    CO2 27 03/22/2025    CO2 26 03/21/2025    BUN 13.0 03/22/2025    BUN 14.2 03/21/2025    CR 0.67 03/22/2025    CR 0.63 03/21/2025     (H) 03/22/2025    GLC 98 03/21/2025     COAGS:   Lab Results   Component Value Date    PTT 27 01/25/2017    INR 1.15 03/22/2025     POC: No results found for: \"BGM\", \"HCG\", \"HCGS\"  HEPATIC:   Lab Results   Component Value Date    ALBUMIN 4.2 11/13/2024    " "PROTTOTAL 6.1 (L) 11/13/2024    ALT 21 11/13/2024    AST 21 11/13/2024    ALKPHOS 88 11/13/2024    BILITOTAL 0.5 11/13/2024     OTHER:   Lab Results   Component Value Date    HEATHER 8.9 03/22/2025    LIPASE 132 03/20/2018    TSH 1.54 09/08/2016    T4 0.90 07/11/2014    CRP <5.0 02/28/2013    SED 5 10/26/2015       Anesthesia Plan    ASA Status:  2    NPO Status:  NPO Appropriate    Anesthesia Type: Spinal.   Induction: Intravenous, Propofol.   Maintenance: TIVA.        Consents    Anesthesia Plan(s) and associated risks, benefits, and realistic alternatives discussed. Questions answered and patient/representative(s) expressed understanding.     - Discussed: Risks, Benefits and Alternatives for BOTH SEDATION and the PROCEDURE were discussed     - Discussed with:  Patient       - Patient is DNR/DNI Status: No          Postoperative Care    Pain management: Multi-modal analgesia, Peripheral nerve block (Single Shot), Oral pain medications, IV analgesics.   PONV prophylaxis: Ondansetron (or other 5HT-3), Dexamethasone or Solumedrol, Background Propofol Infusion     Comments:               AHMET Lucio CRNA    Clinically Significant Risk Factors Present on Admission                 # Drug Induced Platelet Defect: home medication list includes an antiplatelet medication   # Hypertension: Noted on problem list           # Overweight: Estimated body mass index is 25.85 kg/m  as calculated from the following:    Height as of this encounter: 1.575 m (5' 2\").    Weight as of this encounter: 64.1 kg (141 lb 5 oz).                "

## 2025-03-22 NOTE — ANESTHESIA POSTPROCEDURE EVALUATION
Patient: Kristen Thompson    Procedure: Procedure(s):  OPEN REDUCTION INTERNAL FIXATION, FRACTURE, FEMUR, DISTAL       Anesthesia Type:  Spinal    Note:  Disposition: Inpatient   Postop Pain Control: Uneventful            Sign Out: Well controlled pain   PONV: No   Neuro/Psych: Uneventful            Sign Out: Acceptable/Baseline neuro status   Airway/Respiratory: Uneventful            Sign Out: Acceptable/Baseline resp. status   CV/Hemodynamics: Uneventful            Sign Out: Acceptable CV status; No obvious hypovolemia; No obvious fluid overload   Other NRE: NONE   DID A NON-ROUTINE EVENT OCCUR? No           Last vitals:  Vitals Value Taken Time   /82 03/22/25 1730   Temp 36.9  C (98.5  F) 03/22/25 1635   Pulse 70 03/22/25 1736   Resp 17 03/22/25 1736   SpO2 92 % 03/22/25 1741   Vitals shown include unfiled device data.    Electronically Signed By: AHMET Lucio CRNA  March 22, 2025  6:31 PM

## 2025-03-22 NOTE — ANESTHESIA PROCEDURE NOTES
"Intrathecal injection Procedure Note    Pre-Procedure   Staff -        CRNA: Dana Lentz APRN CRNA       Performed By: CRNA       Location: OR       Procedure Start/Stop Times: 3/22/2025 3:10 PM and 3/22/2025 3:14 PM       Pre-Anesthestic Checklist: patient identified, IV checked, risks and benefits discussed, informed consent, monitors and equipment checked, pre-op evaluation, at physician/surgeon's request and post-op pain management  Timeout:       Correct Patient: Yes        Correct Procedure: Yes        Correct Site: Yes        Correct Position: Yes   Procedure Documentation  Procedure: intrathecal injection         Patient Position: sitting       Skin prep: Betadine       Insertion Site: L3-4. (midline approach).       Needle Gauge: 25.        Needle Length (Inches): 3.5        Spinal Needle Type: Pencan       Introducer used       Introducer: 20 G       # of attempts: 1 and  # of redirects:  0    Assessment/Narrative         Paresthesias: No.       CSF fluid: clear.    Medication(s) Administered   0.75% Hyperbaric Bupivacaine (Intrathecal) - Intrathecal   1.6 mL - 3/22/2025 3:14:00 PM  Medication Administration Time: 3/22/2025 3:10 PM      FOR [facility] ONLY:   Pain Team Contact information: please page the Pain Team Via [employer]. Search \"Pain\". During daytime hours, please page the attending first. At night please page the resident first.      "

## 2025-03-23 ENCOUNTER — APPOINTMENT (OUTPATIENT)
Dept: OCCUPATIONAL THERAPY | Facility: CLINIC | Age: 80
DRG: 481 | End: 2025-03-23
Payer: COMMERCIAL

## 2025-03-23 ENCOUNTER — APPOINTMENT (OUTPATIENT)
Dept: PHYSICAL THERAPY | Facility: CLINIC | Age: 80
DRG: 481 | End: 2025-03-23
Payer: COMMERCIAL

## 2025-03-23 LAB
ALBUMIN SERPL BCG-MCNC: 3.7 G/DL (ref 3.5–5.2)
ANION GAP SERPL CALCULATED.3IONS-SCNC: 12 MMOL/L (ref 7–15)
ATRIAL RATE - MUSE: 75 BPM
BUN SERPL-MCNC: 15.4 MG/DL (ref 8–23)
CALCIUM SERPL-MCNC: 9.3 MG/DL (ref 8.8–10.4)
CHLORIDE SERPL-SCNC: 97 MMOL/L (ref 98–107)
CREAT SERPL-MCNC: 0.62 MG/DL (ref 0.51–0.95)
DIASTOLIC BLOOD PRESSURE - MUSE: NORMAL MMHG
EGFRCR SERPLBLD CKD-EPI 2021: >90 ML/MIN/1.73M2
ERYTHROCYTE [DISTWIDTH] IN BLOOD BY AUTOMATED COUNT: 12.1 % (ref 10–15)
GLUCOSE SERPL-MCNC: 126 MG/DL (ref 70–99)
HCO3 SERPL-SCNC: 26 MMOL/L (ref 22–29)
HCT VFR BLD AUTO: 36.1 % (ref 35–47)
HGB BLD-MCNC: 12.4 G/DL (ref 11.7–15.7)
INTERPRETATION ECG - MUSE: NORMAL
MCH RBC QN AUTO: 33.4 PG (ref 26.5–33)
MCHC RBC AUTO-ENTMCNC: 34.3 G/DL (ref 31.5–36.5)
MCV RBC AUTO: 97 FL (ref 78–100)
P AXIS - MUSE: 65 DEGREES
PHOSPHATE SERPL-MCNC: 4.1 MG/DL (ref 2.5–4.5)
PLATELET # BLD AUTO: 244 10E3/UL (ref 150–450)
POTASSIUM SERPL-SCNC: 3.9 MMOL/L (ref 3.4–5.3)
PR INTERVAL - MUSE: 150 MS
QRS DURATION - MUSE: 70 MS
QT - MUSE: 426 MS
QTC - MUSE: 475 MS
R AXIS - MUSE: 19 DEGREES
RBC # BLD AUTO: 3.71 10E6/UL (ref 3.8–5.2)
SODIUM SERPL-SCNC: 135 MMOL/L (ref 135–145)
SYSTOLIC BLOOD PRESSURE - MUSE: NORMAL MMHG
T AXIS - MUSE: 10 DEGREES
VENTRICULAR RATE- MUSE: 75 BPM
WBC # BLD AUTO: 12.4 10E3/UL (ref 4–11)

## 2025-03-23 PROCEDURE — 250N000013 HC RX MED GY IP 250 OP 250 PS 637: Performed by: STUDENT IN AN ORGANIZED HEALTH CARE EDUCATION/TRAINING PROGRAM

## 2025-03-23 PROCEDURE — 80069 RENAL FUNCTION PANEL: CPT | Performed by: STUDENT IN AN ORGANIZED HEALTH CARE EDUCATION/TRAINING PROGRAM

## 2025-03-23 PROCEDURE — 250N000011 HC RX IP 250 OP 636: Performed by: STUDENT IN AN ORGANIZED HEALTH CARE EDUCATION/TRAINING PROGRAM

## 2025-03-23 PROCEDURE — 36415 COLL VENOUS BLD VENIPUNCTURE: CPT | Performed by: STUDENT IN AN ORGANIZED HEALTH CARE EDUCATION/TRAINING PROGRAM

## 2025-03-23 PROCEDURE — 120N000001 HC R&B MED SURG/OB

## 2025-03-23 PROCEDURE — 97535 SELF CARE MNGMENT TRAINING: CPT | Mod: GO

## 2025-03-23 PROCEDURE — 97165 OT EVAL LOW COMPLEX 30 MIN: CPT | Mod: GO

## 2025-03-23 PROCEDURE — 85027 COMPLETE CBC AUTOMATED: CPT | Performed by: STUDENT IN AN ORGANIZED HEALTH CARE EDUCATION/TRAINING PROGRAM

## 2025-03-23 PROCEDURE — 97530 THERAPEUTIC ACTIVITIES: CPT | Mod: GP | Performed by: PHYSICAL THERAPIST

## 2025-03-23 PROCEDURE — 97161 PT EVAL LOW COMPLEX 20 MIN: CPT | Mod: GP | Performed by: PHYSICAL THERAPIST

## 2025-03-23 PROCEDURE — 97116 GAIT TRAINING THERAPY: CPT | Mod: GP | Performed by: PHYSICAL THERAPIST

## 2025-03-23 PROCEDURE — 99232 SBSQ HOSP IP/OBS MODERATE 35: CPT | Performed by: STUDENT IN AN ORGANIZED HEALTH CARE EDUCATION/TRAINING PROGRAM

## 2025-03-23 RX ADMIN — AMLODIPINE BESYLATE 5 MG: 5 TABLET ORAL at 07:50

## 2025-03-23 RX ADMIN — ATORVASTATIN CALCIUM 10 MG: 10 TABLET, FILM COATED ORAL at 07:50

## 2025-03-23 RX ADMIN — ACETAMINOPHEN 975 MG: 325 TABLET, FILM COATED ORAL at 15:44

## 2025-03-23 RX ADMIN — POLYETHYLENE GLYCOL 3350 17 G: 17 POWDER, FOR SOLUTION ORAL at 17:13

## 2025-03-23 RX ADMIN — OXYCODONE 10 MG: 5 TABLET ORAL at 03:23

## 2025-03-23 RX ADMIN — TRAZODONE HYDROCHLORIDE 50 MG: 50 TABLET ORAL at 21:45

## 2025-03-23 RX ADMIN — CEFAZOLIN 1 G: 1 INJECTION, POWDER, FOR SOLUTION INTRAMUSCULAR; INTRAVENOUS at 07:49

## 2025-03-23 RX ADMIN — ACETAMINOPHEN 975 MG: 325 TABLET, FILM COATED ORAL at 07:50

## 2025-03-23 RX ADMIN — ACETAMINOPHEN 975 MG: 325 TABLET, FILM COATED ORAL at 21:45

## 2025-03-23 RX ADMIN — DULOXETINE HYDROCHLORIDE 60 MG: 30 CAPSULE, DELAYED RELEASE ORAL at 17:13

## 2025-03-23 RX ADMIN — PANTOPRAZOLE SODIUM 20 MG: 20 TABLET, DELAYED RELEASE ORAL at 07:50

## 2025-03-23 RX ADMIN — OXYCODONE 10 MG: 5 TABLET ORAL at 07:50

## 2025-03-23 RX ADMIN — OXYCODONE 10 MG: 5 TABLET ORAL at 12:52

## 2025-03-23 RX ADMIN — OXYCODONE 10 MG: 5 TABLET ORAL at 17:12

## 2025-03-23 ASSESSMENT — ACTIVITIES OF DAILY LIVING (ADL)
DEPENDENT_IADLS:: INDEPENDENT
ADLS_ACUITY_SCORE: 26
ADLS_ACUITY_SCORE: 28
ADLS_ACUITY_SCORE: 25
ADLS_ACUITY_SCORE: 25
ADLS_ACUITY_SCORE: 26
PREVIOUS_RESPONSIBILITIES: HOUSEKEEPING;MEAL PREP;LAUNDRY;SHOPPING;MEDICATION MANAGEMENT;FINANCES;DRIVING
ADLS_ACUITY_SCORE: 28
ADLS_ACUITY_SCORE: 28
ADLS_ACUITY_SCORE: 26
ADLS_ACUITY_SCORE: 28
ADLS_ACUITY_SCORE: 28
ADLS_ACUITY_SCORE: 26
ADLS_ACUITY_SCORE: 28
ADLS_ACUITY_SCORE: 26
ADLS_ACUITY_SCORE: 26
ADLS_ACUITY_SCORE: 28
ADLS_ACUITY_SCORE: 28
ADLS_ACUITY_SCORE: 26
ADLS_ACUITY_SCORE: 26
ADLS_ACUITY_SCORE: 25
ADLS_ACUITY_SCORE: 25
ADLS_ACUITY_SCORE: 26
ADLS_ACUITY_SCORE: 28
ADLS_ACUITY_SCORE: 26

## 2025-03-23 NOTE — PROGRESS NOTES
03/23/25 1000   Appointment Info   Signing Clinician's Name / Credentials (OT) Nona Khanna OTR/L   Living Environment   People in Home alone   Current Living Arrangements house   Home Accessibility wheelchair accessible   Living Environment Comments Single level house, no steps to enter. Has ADA height toilet, walk in shower with grab bars.   Self-Care   Usual Activity Tolerance excellent   Current Activity Tolerance moderate   Equipment Currently Used at Home walker, rolling;grab bar, toilet;grab bar, tub/shower;raised toilet seat;shower chair  (Has DME avail at home, doesn't use at baseline)   Fall history within last six months yes   Number of times patient has fallen within last six months 1   Activity/Exercise/Self-Care Comment Ind ADLs/IADLs at baseline, volunteers at food shelf 2x/wk. Residual L sided weakness from prior CVA.   Instrumental Activities of Daily Living (IADL)   Previous Responsibilities housekeeping;meal prep;laundry;shopping;medication management;finances;driving   General Information   Onset of Illness/Injury or Date of Surgery 03/21/25   Referring Physician Dayne Wong MD   Patient/Family Therapy Goal Statement (OT) go home   Additional Occupational Profile Info/Pertinent History of Current Problem Kristen Thompson is a 79 year old female admitted on 3/21/2025. She has a past medical history significant for stroke with residual left-sided hemiplegia, hypertension, dyslipidemia, chronic fatigue, restless leg syndrome, GERD, insomnia, hearing loss who presented to the emergency department for evaluation of right leg pain after a fall and was found to have a periprosthetic right distal femur fracture.   Existing Precautions/Restrictions   (WBAT with walker x 6 weeks. May use brace as needed with ambulation if quad weakness, otherwise does not need brace.)   Limitations/Impairments hearing   Right Lower Extremity (Weight-bearing Status) weight-bearing as tolerated (WBAT)    General Observations and Info Pleasant and cooperative. Familiar with rehab process from prior CVA   Cognitive Status Examination   Orientation Status orientation to person, place and time   Cognitive Status Comments no concerns   Visual Perception   Visual Impairment/Limitations corrective lenses full-time   Pain Assessment   Patient Currently in Pain Yes, see Vital Sign flowsheet   Posture   Posture not impaired   Range of Motion Comprehensive   General Range of Motion no range of motion deficits identified   Strength Comprehensive (MMT)   General Manual Muscle Testing (MMT) Assessment no strength deficits identified   Comment, General Manual Muscle Testing (MMT) Assessment mild LUE weakness following prior CVA, does not impact functional performance   Muscle Tone Assessment   Muscle Tone Quick Adds No deficits were identified   Coordination   Upper Extremity Coordination No deficits were identified   Transfers   Transfers bed-chair transfer;sit-stand transfer;toilet transfer   Transfer Skill: Bed to Chair/Chair to Bed   Bed-Chair [place] (Transfers) supervision   Assistive Device (Bed-Chair Transfers) rolling walker   Sit-Stand Transfer   Sit-Stand [place] (Transfers) supervision   Assistive Device (Sit-Stand Transfers) walker, front-wheeled   Toilet Transfer   Type (Toilet Transfer) sit-stand;stand-sit   [place] Level (Toilet Transfer) supervision   Assistive Device (Toilet Transfer) grab bars/safety frame;walker, front-wheeled   Toilet Transfer Comments simulated home set up   Balance   Balance Comments WFL with FWW   Activities of Daily Living   BADL Assessment/Intervention lower body dressing;grooming;toileting   Lower Body Dressing Assessment/Training   Position (Lower Body Dressing) supported sitting   Comment, (Lower Body Dressing) mod A without AE. Has reacher and SA at home   [place] Level (Lower Body Dressing) moderate assist (50% patient effort)   Grooming Assessment/Training    Position (Grooming) supported standing   [place] Level (Grooming) wash face, hands;oral care regimen;supervision   Toileting   Position (Toileting) unsupported sitting   Assistive Devices (Toileting) grab bar, toilet   [place] Level (Toileting) toileting skills;adjust/manage clothing;perform perineal hygiene;supervision   Clinical Impression   Criteria for Skilled Therapeutic Interventions Met (OT) Yes, treatment indicated   OT Diagnosis decreased functional ind   Influenced by the following impairments post surgical precautions   OT Problem List-Impairments impacting ADL problems related to;pain;post-surgical precautions   Assessment of Occupational Performance 1-3 Performance Deficits   Identified Performance Deficits LB ADLs, functional tx's   Planned Therapy Interventions (OT) ADL retraining;transfer training   Clinical Decision Making Complexity (OT) problem focused assessment/low complexity   Risk & Benefits of therapy have been explained evaluation/treatment results reviewed;care plan/treatment goals reviewed;risks/benefits reviewed;current/potential barriers reviewed;participants voiced agreement with care plan;participants included;patient   Clinical Impression Comments Pt will benefit from ongoing OT to maximize safety and ind with ADLs/mobility   OT Total Evaluation Time   OT Eval, Low Complexity Minutes (31958) 10   OT Goals   Therapy Frequency (OT) 6 times/week   OT Predicted Duration/Target Date for Goal Attainment 03/30/25   OT Goals Lower Body Dressing;Transfers   OT: Lower Body Dressing Supervision/stand-by assist;using adaptive equipment;within precautions   OT: Transfer Supervision/stand-by assist  (simulate walk in shower tx with standard shower chair)   Interventions   Interventions Quick Adds Self-Care/Home Management   Self-Care/Home Management   Self-Care/Home Mgmt/ADL, Compensatory, Meal Prep Minutes (65440) 15   Symptoms Noted During/After Treatment (Meal Preparation/Planning  Training) increased pain   Treatment Detail/Skilled Intervention Pt up in chair upon arrival, agreeable to participate in therapy. Pt req mod A to pull up socks prior to mobility. Therapist educated on AE for LE dressing, and Pt reports she has reacher/SA at home and was trained on how to use them after her CVA. Pt giacomo's SBA with STS from recliner and ambulation to sink with FWW. Therapist provided walker safety education including hand placement during STS transitions, placement of FWW during ADLs/transfers, and keeping both hands on walker at all times. Pt giacomo's SBA with 3 g/h tasks at sink, tolerates standing x4 mins. She ambulated to BR with SBA and walker. Therapist educated on toilet tx technique, simulated home set up. Pt giacomo's SBA with toilet tx and all aspects of toileting. Pt ambulated back to chair with SBA and FWW, transferred into chair without assistance and demo's controlled descent. Pt left in chair, all needs in reach, alarm engaged.   OT Discharge Planning   OT Plan Sun 1/6: practice LE dressing with AE, simulate walk in shower tx with shower chair   OT Discharge Recommendation (DC Rec) home with assist;home with home care occupational therapy   OT Rationale for DC Rec Reports someone can stay with her for a night or 2. Recommend home OT/PT to ensure safety with mobility/ADLs in home environment.   OT Brief overview of current status SBA short distance ambulation with FWW, toilet tx, STS, g/h in stance, toileting. Mod A LE dressing without AE (has AE at home and has been trained to use it during prior rehab   OT Total Distance Amb During Session (feet) 25   Total Session Time   Timed Code Treatment Minutes 15   Total Session Time (sum of timed and untimed services) 25

## 2025-03-23 NOTE — PROGRESS NOTES
"                  [facility] Orthopaedics Progress Note      Post-operative Day: 1 Day Post-Op    Procedure(s):  OPEN REDUCTION INTERNAL FIXATION, FRACTURE, FEMUR, DISTAL      Subjective:    Pain: moderate, well controlled with oral pain medication  Chest pain, SOB:  No    Has been able to ambulate with A1 and walker, nurse and patient state she did not need the brace for short walks to the bathroom     Objective:  Blood pressure 134/74, pulse 66, temperature 98.3  F (36.8  C), temperature source Oral, resp. rate 20, height 1.575 m (5' 2\"), weight 64.1 kg (141 lb 5 oz), SpO2 93%, not currently breastfeeding.    Patient Vitals for the past 24 hrs:   BP Temp Temp src Pulse Resp SpO2   03/23/25 0323 134/74 98.3  F (36.8  C) Oral -- 20 93 %   03/22/25 2322 134/83 98.4  F (36.9  C) Oral 66 18 (!) 91 %   03/22/25 1843 124/74 -- -- -- -- --   03/22/25 1800 125/75 98  F (36.7  C) Oral 61 16 (!) 91 %   03/22/25 1741 -- -- -- -- -- 92 %   03/22/25 1730 123/82 -- -- 69 13 92 %   03/22/25 1715 (!) 115/92 -- -- 64 10 96 %   03/22/25 1700 129/84 -- -- 65 11 92 %   03/22/25 1645 134/81 -- -- 67 10 95 %   03/22/25 1635 111/79 98.5  F (36.9  C) Axillary 67 14 94 %   03/22/25 1225 118/72 98  F (36.7  C) Oral 65 16 (!) 91 %   03/22/25 0826 (!) 141/83 98.9  F (37.2  C) Oral 70 16 93 %       Wt Readings from Last 4 Encounters:   03/22/25 64.1 kg (141 lb 5 oz)   02/28/25 60.1 kg (132 lb 9.6 oz)   02/23/25 58.1 kg (128 lb)   11/15/24 58.1 kg (128 lb)         Motor function, sensation, and circulation of bilateral lower extremities intact   Yes  Wound status: incisions are clean dry and intact. Yes  Post op dressing intact. Yes  Calf tenderness: Bilateral  No    Pertinent Labs   Lab Results: personally reviewed.     Recent Labs   Lab Test 03/22/25  0525 03/21/25  1548 02/23/25  0213 01/28/17  1507 01/25/17  0812   INR 1.15  --   --   --  1.02   WBC 7.2 12.4* 5.5   < > 5.4   HGB 12.4 12.8 14.3   < > 11.4*   HCT 37.8 38.6 42.0   < > 35.2 "   * 100 99   < > 92    259 265   < > 308    141 139   < > 142    < > = values in this interval not displayed.       Plan: Anticoagulation protocol:  mg daily              Pain medications:oxycodone, tylenol, and hydroxyzine            Weight bearing status:  WBAT with walker x 6 weeks. May use brace as needed with ambulation if quad weakness, otherwise does not need brace.             Disposition:  Home vs TCU pending PT, medical clearance and discussion with family            Continue cares and rehabilitation     Report completed by:  MERRY CHAPA PA-C  Call [phone] with ortho questions  Date: 3/23/2025  Time: 8:06 AM

## 2025-03-23 NOTE — PLAN OF CARE
"Goal Outcome Evaluation:    Plan of Care Reviewed With: patient    Overall Patient Progress: improving    Problem: Adult Inpatient Plan of Care  Goal: Plan of Care Review    Outcome: Progressing  Flowsheets (Taken 3/23/2025 4186)  Plan of Care Reviewed With: patient  Overall Patient Progress: improving    Pt A&Ox4, VSS, able to make needs known, utilizing call light appropriately. Room Air. Saline locked. Pt ambulating well with A1/walker. Moderate pain at surgical site; Dressing CDI. Ice to site as needed. PRN Oxy 10mg x3 has provided adequate relief. Voiding. Plan to discharge home with home care tomorrow 3/24/25.    Vital signs:  Temp: 97.6  F (36.4  C) Temp src: Oral BP: 111/66 Pulse: 72   Resp: 18 SpO2: 92 % O2 Device: None (Room air)   Height: 157.5 cm (5' 2\") Weight: 64.1 kg (141 lb 5 oz)  Estimated body mass index is 25.85 kg/m  as calculated from the following:    Height as of this encounter: 1.575 m (5' 2\").    Weight as of this encounter: 64.1 kg (141 lb 5 oz).            "

## 2025-03-23 NOTE — PROGRESS NOTES
"[facility]    Medicine Progress Note - Hospitalist Service    Date of Admission:  3/21/2025    Assessment & Plan   Kristen Thompson is a 79 year old female admitted on 3/21/2025. She has a past medical history significant for stroke with residual left-sided hemiplegia, hypertension, dyslipidemia, chronic fatigue, restless leg syndrome, GERD, insomnia, hearing loss who presented to the emergency department for evaluation of right leg pain after a fall and was found to have a periprosthetic right distal femur fracture.      3/23: doing well post-op but having a lot of pain. May be able to avoid placement if works with PT/OT tomorrow. Will reassess tomorrow while working on pain and hope for home with home health.     Periprosthetic fracture around internal prosthetic right knee joint, initial encounter  History of prior right TKA in 2007. Had a fall on 3/21/2025 and was found to have acute periprosthetic fracture.    X-ray right knee - \"Right total knee arthroplasty with patellar resurfacing. Acute minimally displaced periprosthetic fracture of the lateral aspect of the distal femoral metadiaphysis. No radiographic evidence of hardware loosening. Small lipohemarthrosis and soft tissue swelling about the knee.\"    CT right knee - \"1.  Acute minimally displaced periprosthetic lateral distal right femoral metaphyseal fracture extending to the lateral femoral condyle.  2.  Small to moderate-sized knee joint lipohemarthrosis.  3.  Postop right total knee arthroplasty with patellar resurfacing.\"    Case discussed between emergency department and on-call ortho, who is aware that fracture is katie-prosthetic. There is a chance that the fracture could be managed non-surgically, but unclear at time of admission.  - Ortho following: WBAT x6 weeks, ROM - AT , No brace unless weak quad, ASA for DVT ppx  - Regular diet   - Holding home aspirin   - Analgesia: scheduled acetaminophen, prn oxycodone, prn " "IV dilaudid, prn Robaxin, prn Atarax  - Knee immobilizer in place, patient can be up with assist with immobilizer in place    Fall, initial encounter  Tripped and fell on 3/21, no dizziness or syncope. Landed on right knee, right hip, right forearm, and hit her head, but no loss of consciousness. On aspirin but not anticoagulation.     IMAGING:  XR PELVIS AND RIGHT HIP - \"No acute fracture identified in the right hip or elsewhere in the pelvis. No malalignment. 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 Additional degenerative changes in the lumbar spine, SI joints and pubic symphysis. Osseous demineralization.\"    HEAD CT - \"1.  No CT evidence for acute intracranial process.  2.  Brain atrophy and presumed chronic microvascular ischemic changes as above.\"     CERVICAL SPINE CT - \"1.  No acute traumatic abnormality within the cervical spine.\"    - No other obvious injury to address. Will need PT/OT post-operatively    Surgery Clearance and RCRI Risk Assessment:    Anesthesia issues: No  Baseline Activity: > 4 METS  Chest Pain: No  Shortness of breath: No  Cardiac Risk Factors/Assessment:                High Risk Surgery: No              History Ischemic Heart Disease: No              History of Congestive Heart Failure: No              History of CVA: Yes              Preoperative Treatment with Insulin: No              Preoperative Creatinine greater than 2.0: No              Total Number of Points: 1 = 6.0% risk of major cardiac event  - Holding home aspirin pre-operatively.   - Patient may proceed to surgery without additional work-up or optimization if surgical management is decided upon.     History of stroke  Hemiplegia and hemiparesis following cerebral infarction affecting unspecified side (H)  Stroke in 2021 (right thalamic), has some residual left sided weakness and numbness (upper > lower extremity). No known arrhythmias. Managed prior to admission with aspirin 81 mg daily, in addition to " "statin and antihypertensive management noted below.   - See above regarding aspirin     Essential hypertension with goal blood pressure less than 130/80  Blood pressure stable. Managed prior to admission with amlodipine 5 mg daily, continue with holding parameters.    Hyperlipidemia LDL goal <130  Managed prior to admission with atorvastatin 40 mg daily, continue.     Insomnia  Managed prior to admission with trazodone 50 mg q hs, continue.     Gastroesophageal reflux disease without esophagitis  Managed prior to admission with omeprazole 20 mg bid, continue.     Chronic fatigue  Managed prior to admission with Provigil 100 mg daily prn, continue.     Chronic constipation  Takes daily Miralax, continue.     Neuropathy   Managed prior to admission with Cymbalta 60 mg daily, continue.     HL (hearing loss)  Patient does better when she can read lips, so requests no masking if possible.             Diet: Advance Diet as Tolerated: Regular Diet Adult; Regular Diet Adult    DVT Prophylaxis: Low Risk/Ambulatory with no VTE prophylaxis indicated and Per Ortho  Fong Catheter: Not present  Lines: None     Cardiac Monitoring: None  Code Status: Full Code      Clinically Significant Risk Factors          # Hypochloremia: Lowest Cl = 97 mmol/L in last 2 days, will monitor as appropriate          # Hypertension: Noted on problem list            # Overweight: Estimated body mass index is 25.85 kg/m  as calculated from the following:    Height as of this encounter: 1.575 m (5' 2\").    Weight as of this encounter: 64.1 kg (141 lb 5 oz)., PRESENT ON ADMISSION       # Financial/Environmental Concerns: none         Social Drivers of Health            Disposition Plan     Medically Ready for Discharge: Anticipated Tomorrow             Dao Myers DO  Hospitalist Service  [facility]  Securely message with [employer] (more info)  Text page via [employer] Paging/Directory "   ______________________________________________________________________    Interval History   NAEO    Physical Exam   Vital Signs: Temp: 97.6  F (36.4  C) Temp src: Oral BP: 111/66 Pulse: 72   Resp: 18 SpO2: 92 % O2 Device: None (Room air)    Weight: 141 lbs 5.04 oz    Physical Exam  Constitutional:       General: Pt is not in acute distress.  HENT:      Head: Normocephalic and atraumatic.      Nose: Nose normal.   Eyes:      Conjunctiva/sclera: Conjunctivae normal.   Pulmonary:      Effort: Pulmonary effort is normal.   Abdominal:      General: Abdomen is flat.   Skin:     Findings: No rash.   Neurological:      General: No focal deficit present.      Mental Status: Pt is alert.   Psychiatric:         Mood and Affect: Mood normal.       Medical Decision Making       45 MINUTES SPENT BY ME on the date of service doing chart review, history, exam, documentation & further activities per the note.      Data     I have personally reviewed the following data over the past 24 hrs:    12.4 (H)  \   12.4   / 244     135 97 (L) 15.4 /  126 (H)   3.9 26 0.62 \     ALT: N/A AST: N/A AP: N/A TBILI: N/A   ALB: 3.7 TOT PROTEIN: N/A LIPASE: N/A       Imaging results reviewed over the past 24 hrs:   Recent Results (from the past 24 hours)   XR Surgery RAHEL L/T 5 Min Fluoro w Stills    Narrative    This exam was marked as non-reportable because it will not be read by a   radiologist or a [place] non-radiologist provider.         XR Knee Port Right 1/2 Views    Narrative    EXAM: XR KNEE PORT RIGHT 1/2 VIEWS  LOCATION: [facility]  DATE: 3/22/2025    INDICATION: s p distal femur ORIF  COMPARISON: CT and radiographs 03/21/2025      Impression    IMPRESSION: Right TKA. Interval lateral plate and screw fixation across the mildly displaced periprosthetic fracture of the lateral distal femur. No change in alignment. Postoperative soft tissue gas about the right knee. Small volume lipohemarthrosis.    Demineralization.

## 2025-03-23 NOTE — PROGRESS NOTES
Patient vital signs are at baseline: Yes  Patient able to ambulate as they were prior to admission or with assist devices provided by therapies during their stay:  Yes  Patient MUST void prior to discharge:  Yes  Patient able to tolerate oral intake:  Yes  Pain has adequate pain control using Oral analgesics:  Yes  Does patient have an identified :  Yes  Has goal D/C date and time been discussed with patient:  Yes    Pt alert, oriented, assist 1 with walker. Prn oxycodone, dilaudid, given for pain. Purewick in place. Dressing is CDI, CMS +. [place]ine locked, on RA.

## 2025-03-23 NOTE — CONSULTS
Care Management Initial Consult    General Information  Assessment completed with: Patient,    Type of CM/SW Visit: Initial Assessment    Primary Care Provider verified and updated as needed: Yes   Readmission within the last 30 days: no previous admission in last 30 days      Reason for Consult: discharge planning  Advance Care Planning: Advance Care Planning Reviewed: no concerns identified       Communication Assessment  Patient's communication style: spoken language (English or Bilingual)    Hearing Difficulty or Deaf: yes   Wear Glasses or Blind: yes    Cognitive  Cognitive/Neuro/Behavioral: WDL     Arousal Level: arouses to voice                Living Environment:   People in home: alone     Current living Arrangements:        Able to return to prior arrangements: yes     Family/Social Support:  Care provided by: self  Provides care for: no one  Marital Status:   Support system: Children          Description of Support System: Supportive, Involved    Support Assessment: Adequate family and caregiver support, Adequate social supports    Current Resources:   Patient receiving home care services: No  Community Resources: None  Equipment currently used at home: walker, rolling, grab bar, toilet, grab bar, tub/shower, shower chair, raised toilet seat  Supplies currently used at home:      Employment/Financial:  Employment Status: retired     Financial Concerns: none      Does the patient's insurance plan have a 3 day qualifying hospital stay waiver?  Yes   Which insurance plan 3 day waiver is available? Alternative insurance waiver  Will the waiver be used for post-acute placement? No    Lifestyle & Psychosocial Needs:  Social Drivers of Health     Food Insecurity: Low Risk  (3/21/2025)    Food Insecurity     Within the past 12 months, did you worry that your food would run out before you got money to buy more?: No     Within the past 12 months, did the food you bought just not last and you didn t have money  to get more?: No   Depression: Not at risk (1/23/2025)    PHQ-2     PHQ-2 Score: 0   Housing Stability: Low Risk  (3/21/2025)    Housing Stability     Do you have housing? : Yes     Are you worried about losing your housing?: No   Tobacco Use: Low Risk  (2/28/2025)    Patient History     Smoking Tobacco Use: Never     Smokeless Tobacco Use: Never     Passive Exposure: Not on file   Financial Resource Strain: Low Risk  (3/21/2025)    Financial Resource Strain     Within the past 12 months, have you or your family members you live with been unable to get utilities (heat, electricity) when it was really needed?: No   Alcohol Use: Not on file   Transportation Needs: Low Risk  (3/21/2025)    Transportation Needs     Within the past 12 months, has lack of transportation kept you from medical appointments, getting your medicines, non-medical meetings or appointments, work, or from getting things that you need?: No   Physical Activity: Not on file   Interpersonal Safety: Low Risk  (3/21/2025)    Interpersonal Safety     Do you feel physically and emotionally safe where you currently live?: Yes     Within the past 12 months, have you been hit, slapped, kicked or otherwise physically hurt by someone?: No     Within the past 12 months, have you been humiliated or emotionally abused in other ways by your partner or ex-partner?: No   Stress: Not on file   Social Connections: Not on file   Health Literacy: Not on file     Functional Status:  Prior to admission patient needed assistance:   Dependent ADLs:: Ambulation-walker  Dependent IADLs:: Independent     Mental Health Status:  Mental Health Status: No Current Concerns       Chemical Dependency Status:  Chemical Dependency Status: No Current Concerns           Values/Beliefs:  Spiritual, Cultural Beliefs, Gnosticism Practices, Values that affect care: no             Discussed  Partnership in Safe Discharge Planning  document with patient/family: Yes    Additional  Information:    Referral received for discharge planning.     Met with patient at bedside and introduced self/role. Patient lives alone in a one level home in [place]. At baseline, pt is independent with ADLs and IADLs.     Discussed therapy recommendations for home care and having someone stay with her for a night or two. Patient is in agreement with home care and does not have an agency preference. Pt also states that a family member can stay with her.    Patient has been accepted by [facility] (Phone: [phone]) for PT/OT/RN/HHA services.    PLAN: Home with HC- PT/OT/RN/HHA      PATRICIO JEAN BAPTISTE RN

## 2025-03-23 NOTE — PROGRESS NOTES
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, L side is her weak side from the stroke   General Information   Onset of Illness/Injury or Date of Surgery 03/21/25   Referring Physician Dayne Wong MD   Patient/Family Therapy Goals Statement (PT) would like to return home but states DTR wants TCU   Pertinent History of Current Problem (include personal factors and/or comorbidities that impact the POC) tripped and fell 3/21, Right closed distal femur periprosthetic femur fracture, s/p ORIF   Existing Precautions/Restrictions fall  (brace if quad weakness, otherwise no brace needed, use walker WBAT for 6 wks)   Weight-Bearing Status - RLE weight-bearing as tolerated   General Observations [place] and not wearing haring aides   Cognition   Affect/Mental Status (Cognition) WNL   Orientation Status (Cognition) oriented x 4   Pain Assessment   Patient Currently in Pain Yes, see Vital Sign flowsheet   Strength (Manual Muscle Testing)   Strength Comments can do B SLR, slow on R LE   Bed Mobility   Comment, (Bed Mobility) sit to supine no rail, HOB flat, raised height like home, SBA slow but safe   Transfers   Comment, (Transfers) S sit<>stand FWW, WBAT, no immobilizer   Gait/Stairs (Locomotion)   [place] Level (Gait) supervision   Assistive Device (Gait) walker, front-wheeled   Distance in Feet (Gait) 20   Pattern (Gait) step-to   Deviations/Abnormal Patterns (Gait) stride length decreased;weight shifting  decreased   Maintains Weight-bearing Status (Gait) able to maintain   Clinical Impression   Criteria for Skilled Therapeutic Intervention Yes, treatment indicated   PT Diagnosis (PT) impaired functional mobility   Influenced by the following impairments pain, weakness   Functional limitations due to impairments gait, bed mob, transfers   Clinical Presentation (PT Evaluation Complexity) stable   Clinical Presentation Rationale clinical judgement   Clinical Decision Making (Complexity) low complexity   Planned Therapy Interventions (PT) bed mobility training;gait training;home exercise program;patient/family education;ROM (range of motion);strengthening;transfer training;progressive activity/exercise;risk factor education;home program guidelines   Risk & Benefits of therapy have been explained evaluation/treatment results reviewed;care plan/treatment goals reviewed;risks/benefits reviewed;current/potential barriers reviewed;participants voiced agreement with care plan;patient   PT Total Evaluation Time   PT Eval, Low Complexity Minutes (23030) 10   Physical Therapy Goals   PT Frequency Daily   PT Predicted Duration/Target Date for Goal Attainment 03/28/25   PT Goals Bed Mobility;Transfers;Gait   PT: Bed Mobility Modified independent;Supine to/from sit   PT: Transfers Modified independent;Sit to/from stand;Bed to/from chair;Assistive device   PT: Gait Modified independent;Rolling walker;100 feet   Interventions   Interventions Quick Adds Gait Training;Therapeutic Activity   Therapeutic Activity   Therapeutic Activities: dynamic activities to improve functional performance Minutes (11407) 10   Symptoms Noted During/After Treatment None   Treatment Detail/Skilled Intervention cues for hand placement sit<>stand, had pt try bed mobility with bed set up as it is at home, SBA as bed is high and slow to get self scooted back far enough that would not slide off, reports harder without the rail, edu that could use a sturdy arm  chair next to her bed but reports the side table is build into the headboard so can't move it and not enough room, could get a bedrail, supine to sit supervision, edu about home vs TCU, at this point is moving well but would not rec going home alone, if DTR could stay a few days and then after that would know what she might need help with and she states she has other people that could help her out too, at [facility] edu that typically they do not let you get up on your own due to fall risk and need to call to have someone with you otherwise you are usually sitting or lying unless working with therapy 1-2 x a day, pt prefers to not do that as she does not do well with sitting around, rec pt see OT to get further rec's to help make decision   Gait Training   Gait Training Minutes (44181) 10   Symptoms Noted During/After Treatment (Gait Training) none   Treatment Detail/Skilled Intervention education about UE WB as needed but try for LE WBAT, one time had to cue to look down as the walker was going to run into a chair leg, pt saw it and then could adjust to avoid it, cues to back up until can feel the chair before sitting, edu that rec FWW due to UE WB and slow mariam, not to use 4WW yet   Distance in Feet 100   [place] Level (Gait Training) stand-by assist   Physical Assistance Level (Gait Training) verbal cues   Weight Bearing (Gait Training) weight-bearing as tolerated   Assistive Device (Gait Training) rolling walker   Pattern Analysis (Gait Training) swing-to gait   Gait Analysis Deviations decreased mariam;increased time in double stance;decreased step length;decreased weight-shifting ability   Impairments (Gait Analysis/Training) pain;ROM decreased;strength decreased   PT Discharge Planning   PT Plan daily- gait, transfers, bed mob- R distal femur ORIF WBAT with walker 6 wks, lives alone, no steps   PT Discharge Recommendation (DC Rec) home with assist;home with home care physical therapy;Transitional Care  Facility   PT Rationale for DC Rec pt is moving well with SBA but would not rec she return home alone, if DTR could stay with her a few days and get home PT, then see what she needs or wants help with and she states has other people that would help her too, if has to return home alone then rec TCU for a short stay, pt prefers home as she states she does not do well sitting around and does not like the idea that she can't just get up and move around on her own at TCU   PT Brief overview of current status supervision for transfers, SBA for gait and bed mobility with walker, no immobilizer used and no buckling or quad weakess, can raise R LE indep to get it up and down from the bed   PT Total Distance Amb During Session (feet) 120   PT Equipment Needed at Discharge   (has FWW and 4WW)

## 2025-03-24 VITALS
HEIGHT: 62 IN | SYSTOLIC BLOOD PRESSURE: 115 MMHG | DIASTOLIC BLOOD PRESSURE: 72 MMHG | WEIGHT: 141.09 LBS | BODY MASS INDEX: 25.96 KG/M2 | TEMPERATURE: 98.1 F | RESPIRATION RATE: 16 BRPM | OXYGEN SATURATION: 94 % | HEART RATE: 73 BPM

## 2025-03-24 PROCEDURE — 250N000013 HC RX MED GY IP 250 OP 250 PS 637: Performed by: STUDENT IN AN ORGANIZED HEALTH CARE EDUCATION/TRAINING PROGRAM

## 2025-03-24 PROCEDURE — 97530 THERAPEUTIC ACTIVITIES: CPT | Mod: GP

## 2025-03-24 PROCEDURE — 97116 GAIT TRAINING THERAPY: CPT | Mod: GP

## 2025-03-24 RX ORDER — ASPIRIN 81 MG/1
81 TABLET ORAL 2 TIMES DAILY
Qty: 30 TABLET | Refills: 0 | Status: CANCELLED | OUTPATIENT
Start: 2025-03-24

## 2025-03-24 RX ORDER — ASPIRIN 81 MG/1
81 TABLET ORAL 2 TIMES DAILY
Status: DISCONTINUED | OUTPATIENT
Start: 2025-03-24 | End: 2025-03-24

## 2025-03-24 RX ORDER — ASPIRIN 325 MG
325 TABLET, DELAYED RELEASE (ENTERIC COATED) ORAL DAILY
Qty: 30 TABLET | Refills: 0 | Status: SHIPPED | OUTPATIENT
Start: 2025-03-24

## 2025-03-24 RX ORDER — ASPIRIN 81 MG/1
81 TABLET ORAL 2 TIMES DAILY
Status: DISCONTINUED | OUTPATIENT
Start: 2025-03-24 | End: 2025-03-24 | Stop reason: HOSPADM

## 2025-03-24 RX ORDER — ASPIRIN 81 MG/1
TABLET ORAL
Qty: 90 TABLET | Refills: 0 | Status: CANCELLED | OUTPATIENT
Start: 2025-04-23 | End: 2025-06-22

## 2025-03-24 RX ORDER — ASPIRIN 325 MG
325 TABLET, DELAYED RELEASE (ENTERIC COATED) ORAL DAILY
Status: DISCONTINUED | OUTPATIENT
Start: 2025-03-24 | End: 2025-03-24

## 2025-03-24 RX ORDER — OXYCODONE HYDROCHLORIDE 10 MG/1
5-10 TABLET ORAL EVERY 4 HOURS PRN
Qty: 10 TABLET | Refills: 0 | Status: SHIPPED | OUTPATIENT
Start: 2025-03-24

## 2025-03-24 RX ORDER — ASPIRIN 81 MG/1
81 TABLET ORAL DAILY
Status: SHIPPED
Start: 2025-04-23

## 2025-03-24 RX ADMIN — OXYCODONE 5 MG: 5 TABLET ORAL at 07:01

## 2025-03-24 RX ADMIN — OXYCODONE 5 MG: 5 TABLET ORAL at 12:44

## 2025-03-24 RX ADMIN — ACETAMINOPHEN 975 MG: 325 TABLET, FILM COATED ORAL at 08:25

## 2025-03-24 RX ADMIN — AMLODIPINE BESYLATE 5 MG: 5 TABLET ORAL at 08:25

## 2025-03-24 RX ADMIN — ATORVASTATIN CALCIUM 10 MG: 10 TABLET, FILM COATED ORAL at 08:25

## 2025-03-24 RX ADMIN — PANTOPRAZOLE SODIUM 20 MG: 20 TABLET, DELAYED RELEASE ORAL at 05:53

## 2025-03-24 RX ADMIN — ASPIRIN 81 MG: 81 TABLET ORAL at 08:25

## 2025-03-24 ASSESSMENT — ACTIVITIES OF DAILY LIVING (ADL)
ADLS_ACUITY_SCORE: 32
ADLS_ACUITY_SCORE: 33
ADLS_ACUITY_SCORE: 32
ADLS_ACUITY_SCORE: 33
ADLS_ACUITY_SCORE: 28
ADLS_ACUITY_SCORE: 33
ADLS_ACUITY_SCORE: 28
ADLS_ACUITY_SCORE: 32
ADLS_ACUITY_SCORE: 28
ADLS_ACUITY_SCORE: 32
ADLS_ACUITY_SCORE: 32

## 2025-03-24 NOTE — PLAN OF CARE
Problem: Pain Acute  Goal: Optimal Pain Control and Function  Outcome: Progressing  Intervention: Prevent or Manage Pain  Recent Flowsheet Documentation  Taken 3/24/2025 0000 by Devick, Karen M, RN  Medication Review/Management: medications reviewed     Goal Outcome Evaluation: Patient vital signs are at baseline: Yes  Patient able to ambulate as they were prior to admission or with assist devices provided by therapies during their stay:  Yes  Patient MUST void prior to discharge:  Yes  Patient able to tolerate oral intake:  Yes  Pain has adequate pain control using Oral analgesics:  Yes  Does patient have an identified :  Yes  Has goal D/C date and time been discussed with patient:  Yes

## 2025-03-24 NOTE — PLAN OF CARE
"  Problem: Adult Inpatient Plan of Care  Goal: Plan of Care Review  Description: The Plan of Care Review/Shift note should be completed every shift.  The Outcome Evaluation is a brief statement about your assessment that the patient is improving, declining, or no change.  This information will be displayed automatically on your shiftnote.  Outcome: Progressing  Goal: Patient-Specific Goal (Individualized)  Description: You can add care plan individualizations to a care plan. Examples of Individualization might be:  \"Parent requests to be called daily at 9am for status\", \"I have a hard time hearing out of my right ear\", or \"Do not touch me to wake me up as it startlesme\".  Outcome: Progressing  Goal: Absence of Hospital-Acquired Illness or Injury  Outcome: Progressing  Intervention: Prevent and Manage VTE (Venous Thromboembolism) Risk  Recent Flowsheet Documentation  Taken 3/23/2025 2057 by Olive vIy RN  VTE Prevention/Management: SCDs on (sequential compression devices)  Goal: Optimal Comfort and Wellbeing  Outcome: Progressing  Goal: Readiness for Transition of Care  Outcome: Progressing     Problem: Delirium  Goal: Optimal Coping  Outcome: Progressing  Goal: Improved Behavioral Control  Outcome: Progressing  Goal: Improved Attention and Thought Clarity  Outcome: Progressing  Goal: Improved Sleep  Outcome: Progressing     Problem: Pain Acute  Goal: Optimal Pain Control and Function  Outcome: Progressing   Goal Outcome Evaluation:       Alert and oriented x4. Up with assist of 1 walker and gait belt. Dressing remains clean dry and intact. Ice applied. No PRN medications given this shift. Pt was emotional this evening about current circumstances.     Care from [id number].                  "

## 2025-03-24 NOTE — DISCHARGE SUMMARY
"[facility]  Hospitalist Discharge Summary      Date of Admission:  3/21/2025  Date of Discharge:  3/24/2025  Discharging Provider: Dao Myers DO  Discharge Service: Hospitalist Service    Discharge Diagnoses   Kristen Thompson is a 79 year old female admitted on 3/21/2025. She has a past medical history significant for stroke with residual left-sided hemiplegia, hypertension, dyslipidemia, chronic fatigue, restless leg syndrome, GERD, insomnia, hearing loss who presented to the emergency department for evaluation of right leg pain after a fall and was found to have a periprosthetic right distal femur fracture. Patient discharged home.    Periprosthetic fracture around internal prosthetic right knee joint, initial encounter  Fall, initial encounter  Surgery Clearance and RCRI Risk Assessment:    History of stroke  Hemiplegia and hemiparesis following cerebral infarction affecting unspecified side (H)  Essential hypertension with goal blood pressure less than 130/80  Hyperlipidemia LDL goal <130  Insomnia  Gastroesophageal reflux disease without esophagitis  Chronic fatigue  Chronic constipation  Neuropathy   HL (hearing loss)    Clinically Significant Risk Factors     # Overweight: Estimated body mass index is 25.81 kg/m  as calculated from the following:    Height as of this encounter: 1.575 m (5' 2\").    Weight as of this encounter: 64 kg (141 lb 1.5 oz).       Follow-ups Needed After Discharge   Follow-up Appointments       Follow Up      Follow up with Dr.Andrew Wong for your right distal femur ORIF at 2 weeks post op. Call [facility] scheduling at [phone] to make an appointment. Call 's care team at [phone] with questions.        Hospital Follow-up with Existing Primary Care Provider (PCP)      Please see details below         Schedule Primary Care visit within: 30 Days               Unresulted Labs Ordered in the Past 30 Days of this " "Admission       No orders found from 2/19/2025 to 3/22/2025.        These results will be followed up by n/a    Discharge Disposition   Discharged to home  Condition at discharge: Good    Hospital Course   Kristen Thompson is a 79 year old female admitted on 3/21/2025. She has a past medical history significant for stroke with residual left-sided hemiplegia, hypertension, dyslipidemia, chronic fatigue, restless leg syndrome, GERD, insomnia, hearing loss who presented to the emergency department for evaluation of right leg pain after a fall and was found to have a periprosthetic right distal femur fracture.      3/23: doing well post-op but having a lot of pain. May be able to avoid placement if works with PT/OT tomorrow. Will reassess tomorrow while working on pain and hope for home with home health.     Periprosthetic fracture around internal prosthetic right knee joint, initial encounter  History of prior right TKA in 2007. Had a fall on 3/21/2025 and was found to have acute periprosthetic fracture.    X-ray right knee - \"Right total knee arthroplasty with patellar resurfacing. Acute minimally displaced periprosthetic fracture of the lateral aspect of the distal femoral metadiaphysis. No radiographic evidence of hardware loosening. Small lipohemarthrosis and soft tissue swelling about the knee.\"    CT right knee - \"1.  Acute minimally displaced periprosthetic lateral distal right femoral metaphyseal fracture extending to the lateral femoral condyle.  2.  Small to moderate-sized knee joint lipohemarthrosis.  3.  Postop right total knee arthroplasty with patellar resurfacing.\"    Case discussed between emergency department and on-call ortho, who is aware that fracture is katie-prosthetic. There is a chance that the fracture could be managed non-surgically, but unclear at time of admission.  - Ortho following: WBAT x6 weeks, ROM - AT , No brace unless weak quad, ASA for DVT ppx  - Regular diet   - Holding home " "aspirin   - Analgesia: scheduled acetaminophen, prn oxycodone, prn IV dilaudid, prn Robaxin, prn Atarax  - Knee immobilizer in place, patient can be up with assist with immobilizer in place    Fall, initial encounter  Tripped and fell on 3/21, no dizziness or syncope. Landed on right knee, right hip, right forearm, and hit her head, but no loss of consciousness. On aspirin but not anticoagulation.     IMAGING:  XR PELVIS AND RIGHT HIP - \"No acute fracture identified in the right hip or elsewhere in the pelvis. No malalignment. 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 Additional degenerative changes in the lumbar spine, SI joints and pubic symphysis. Osseous demineralization.\"    HEAD CT - \"1.  No CT evidence for acute intracranial process.  2.  Brain atrophy and presumed chronic microvascular ischemic changes as above.\"     CERVICAL SPINE CT - \"1.  No acute traumatic abnormality within the cervical spine.\"    - No other obvious injury to address. Will need PT/OT post-operatively    Surgery Clearance and RCRI Risk Assessment:    Anesthesia issues: No  Baseline Activity: > 4 METS  Chest Pain: No  Shortness of breath: No  Cardiac Risk Factors/Assessment:                High Risk Surgery: No              History Ischemic Heart Disease: No              History of Congestive Heart Failure: No              History of CVA: Yes              Preoperative Treatment with Insulin: No              Preoperative Creatinine greater than 2.0: No              Total Number of Points: 1 = 6.0% risk of major cardiac event  - Holding home aspirin pre-operatively.   - Patient may proceed to surgery without additional work-up or optimization if surgical management is decided upon.     History of stroke  Hemiplegia and hemiparesis following cerebral infarction affecting unspecified side (H)  Stroke in 2021 (right thalamic), has some residual left sided weakness and numbness (upper > lower extremity). No known arrhythmias. " Managed prior to admission with aspirin 81 mg daily, in addition to statin and antihypertensive management noted below.   - See above regarding aspirin     Essential hypertension with goal blood pressure less than 130/80  Blood pressure stable. Managed prior to admission with amlodipine 5 mg daily, continue with holding parameters.    Hyperlipidemia LDL goal <130  Managed prior to admission with atorvastatin 40 mg daily, continue.     Insomnia  Managed prior to admission with trazodone 50 mg q hs, continue.     Gastroesophageal reflux disease without esophagitis  Managed prior to admission with omeprazole 20 mg bid, continue.     Chronic fatigue  Managed prior to admission with Provigil 100 mg daily prn, continue.     Chronic constipation  Takes daily Miralax, continue.     Neuropathy   Managed prior to admission with Cymbalta 60 mg daily, continue.     HL (hearing loss)  Patient does better when she can read lips, so requests no masking if possible.       Consultations This Hospital Stay   ORTHOPEDIC SURGERY IP CONSULT  PHYSICAL THERAPY ADULT IP CONSULT  OCCUPATIONAL THERAPY ADULT IP CONSULT  CARE MANAGEMENT / SOCIAL WORK IP CONSULT    Code Status   Full Code    Time Spent on this Encounter   I, Dao Myers DO, discharged this patient today but I did not personally see the patient today and will not be billing for the patient's discharge.       Dao Myers DO  [facility] MEDICAL SURGICAL  [address] [facility] [address]  Phone: [phone]  Fax: [phone]  ______________________________________________________________________    Physical Exam   Vital Signs: Temp: 98.1  F (36.7  C) Temp src: Oral BP: 115/72 Pulse: 73   Resp: 16 SpO2: 94 % O2 Device: None (Room air)    Weight: 141 lbs 1.51 oz  N/a       Primary Care Physician   Cortney Hung    Discharge Orders      Primary Care - Care Coordination Referral      Home Care Referral      Reason for your hospital stay    You were  hospitalized for a distal femur fracture and after repair you were discharged home.     Activity    Your activity upon discharge: activity as tolerated    Weight bear as tolerated. Range of motion as tolerated. Knee brace not needed unless quadriceps muscle is weak.     Follow Up    Follow up with Dr.Andrew Wong for your right distal femur ORIF at 2 weeks post op. Call [facility] scheduling at [phone] to make an appointment. Call 's care team at [phone] with questions.     Wound care and dressings    Instructions to care for your wound at home: Leave post op dressings in place until follow up if they remain clean, dry and intact. You may reinforce them as needed for mild loosening. Cover with clear plastic and keep dry when showering. If dressings become loose or soaked, ok to change as needed with dry gauze and tape. If there is excess drainage, increasing redness, tenderness or swelling, contact your surgical team.     Diet    Follow this diet upon discharge: Current Diet:Orders Placed This Encounter      Advance Diet as Tolerated: Regular Diet Adult; Regular Diet Adult     Hospital Follow-up with Existing Primary Care Provider (PCP)    Please see details below            Significant Results and Procedures   Results for orders placed or performed during the hospital encounter of 03/21/25   XR Pelvis w Hip Right G/E 2 Views    Narrative    EXAM: XR PELVIS AND HIP RIGHT 2 VIEWS  LOCATION: [facility]  DATE: 3/21/2025    INDICATION: fall. landing on right knee then the right hip. (right knee and right hip pain)  COMPARISON: None.      Impression    IMPRESSION: No acute fracture identified in the right hip or elsewhere in the pelvis. No malalignment. 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 Additional degenerative changes in the lumbar spine, SI joints and pubic symphysis. Osseous   demineralization.   CT Cervical Spine w/o Contrast     Narrative    EXAM: CT HEAD W/O CONTRAST, CT CERVICAL SPINE W/O CONTRAST  LOCATION: [facility] [place]  DATE: 3/21/2025    INDICATION: fall. hit  head.  COMPARISON: MRI 10/19/2022. CT 2/13/2021.   TECHNIQUE:   1) Routine CT Head without IV contrast. Multiplanar reformats. Dose reduction techniques were used.  2) Routine CT Cervical Spine without IV contrast. Multiplanar reformats. Dose reduction techniques were used.    FINDINGS:   HEAD CT:   INTRACRANIAL CONTENTS: Scattered small vessel ischemic changes throughout the white matter and diffuse parenchymal volume loss commensurate with age. Associated ex vacuo dilatation of the ventricular system. No intracranial hemorrhage, extraaxial   collection, or mass effect.  No CT evidence of acute infarct. Right thalamic chronic lacunar infarct or perivascular space.    VISUALIZED ORBITS/SINUSES/MASTOIDS: No intraorbital abnormality. No paranasal sinus mucosal disease. No middle ear or mastoid effusion.    BONES/SOFT TISSUES: No acute abnormality.    CERVICAL SPINE:  VERTEBRAE: Vertebral body heights maintained. No subluxation. No acute fracture. Diffuse osseous demineralization. Mild-to-moderate degenerative findings throughout the cervical spine with facet arthropathy, vertebral body osteophyte formation, and disc   height loss.    CANAL: No osseous narrowing of the spinal canal.    PARASPINAL: Paraspinal soft tissue within normal limits.      Impression    IMPRESSION:  HEAD CT:  1.  No CT evidence for acute intracranial process.  2.  Brain atrophy and presumed chronic microvascular ischemic changes as above.    CERVICAL SPINE CT:  1.  No acute traumatic abnormality within the cervical spine.   Head CT w/o contrast    Narrative    EXAM: CT HEAD W/O CONTRAST, CT CERVICAL SPINE W/O CONTRAST  LOCATION: [facility] [place]  DATE: 3/21/2025    INDICATION: fall. hit  head.  COMPARISON: MRI 10/19/2022. CT 2/13/2021.   TECHNIQUE:   1)  Routine CT Head without IV contrast. Multiplanar reformats. Dose reduction techniques were used.  2) Routine CT Cervical Spine without IV contrast. Multiplanar reformats. Dose reduction techniques were used.    FINDINGS:   HEAD CT:   INTRACRANIAL CONTENTS: Scattered small vessel ischemic changes throughout the white matter and diffuse parenchymal volume loss commensurate with age. Associated ex vacuo dilatation of the ventricular system. No intracranial hemorrhage, extraaxial   collection, or mass effect.  No CT evidence of acute infarct. Right thalamic chronic lacunar infarct or perivascular space.    VISUALIZED ORBITS/SINUSES/MASTOIDS: No intraorbital abnormality. No paranasal sinus mucosal disease. No middle ear or mastoid effusion.    BONES/SOFT TISSUES: No acute abnormality.    CERVICAL SPINE:  VERTEBRAE: Vertebral body heights maintained. No subluxation. No acute fracture. Diffuse osseous demineralization. Mild-to-moderate degenerative findings throughout the cervical spine with facet arthropathy, vertebral body osteophyte formation, and disc   height loss.    CANAL: No osseous narrowing of the spinal canal.    PARASPINAL: Paraspinal soft tissue within normal limits.      Impression    IMPRESSION:  HEAD CT:  1.  No CT evidence for acute intracranial process.  2.  Brain atrophy and presumed chronic microvascular ischemic changes as above.    CERVICAL SPINE CT:  1.  No acute traumatic abnormality within the cervical spine.   XR Knee Right 1/2 Views    Narrative    EXAM: XR KNEE RIGHT 1/2 VIEWS  LOCATION: [facility]  DATE: 3/21/2025    INDICATION: fall, landing on her right knee and then right hip.  COMPARISON: None.      Impression    IMPRESSION: Right total knee arthroplasty with patellar resurfacing. Acute minimally displaced periprosthetic fracture of the lateral aspect of the distal femoral metadiaphysis. No radiographic evidence of hardware loosening. Small lipohemarthrosis and    soft tissue swelling about the knee.   CT Knee Right w/o Contrast    Narrative    EXAM: CT KNEE RIGHT W/O CONTRAST  LOCATION: [facility]  DATE: 3/21/2025    INDICATION: Distal femur fracture.  COMPARISON: Knee radiographic exam 3/21/2025.  TECHNIQUE: Noncontrast. Axial, sagittal and coronal thin-section reconstruction. Dose reduction techniques were used.     FINDINGS:     BONES:  -Redemonstrated minimally displaced periprosthetic lateral distal femoral metaphyseal fracture. The fracture extends to the lateral femoral condyle. Postop total knee arthroplasty with patellar resurfacing. No periprosthetic patella, proximal tibia, or   proximal fibula fracture. No finding for component loosening. No bone lesion.    SOFT TISSUES:  -Associated small to moderate-sized knee joint lipohemarthrosis. Soft tissue swelling/blood products along the anterior and lateral distal femur. Intrinsic muscle bulk is preserved. Distal quadriceps and patellar tendons intact by CT criteria.      Impression    IMPRESSION:  1.  Acute minimally displaced periprosthetic lateral distal right femoral metaphyseal fracture extending to the lateral femoral condyle.  2.  Small to moderate-sized knee joint lipohemarthrosis.  3.  Postop right total knee arthroplasty with patellar resurfacing.      XR Surgery RAHEL L/T 5 Min Fluoro w Stills    Narrative    This exam was marked as non-reportable because it will not be read by a   radiologist or a [facility] non-radiologist provider.         XR Knee Port Right 1/2 Views    Narrative    EXAM: XR KNEE PORT RIGHT 1/2 VIEWS  LOCATION: [facility]  DATE: 3/22/2025    INDICATION: s p distal femur ORIF  COMPARISON: CT and radiographs 03/21/2025      Impression    IMPRESSION: Right TKA. Interval lateral plate and screw fixation across the mildly displaced periprosthetic fracture of the lateral distal femur. No change in alignment. Postoperative soft tissue gas  about the right knee. Small volume lipohemarthrosis.   Demineralization.       Discharge Medications   Current Discharge Medication List        START taking these medications    Details   oxyCODONE (ROXICODONE) 10 MG tablet Take 0.5-1 tablets (5-10 mg) by mouth every 4 hours as needed for moderate to severe pain (5mg for moderate, 10mg for severe).  Qty: 10 tablet, Refills: 0    Associated Diagnoses: Periprosthetic fracture around internal prosthetic right knee joint, initial encounter           CONTINUE these medications which have CHANGED    Details   !! aspirin (ASA) 325 MG EC tablet Take 1 tablet (325 mg) by mouth daily.  Qty: 30 tablet, Refills: 0    Associated Diagnoses: Periprosthetic fracture around internal prosthetic right knee joint, initial encounter      !! aspirin 81 MG EC tablet Take 1 tablet (81 mg) by mouth daily.    Comments: Please notify patient to start taking 81mg asa daily AFTER she completes 30 days of 325mg Daily (for orthopedic vte ppx)  Associated Diagnoses: History of stroke       !! - Potential duplicate medications found. Please discuss with provider.        CONTINUE these medications which have NOT CHANGED    Details   acetaminophen (TYLENOL) 500 MG tablet Take 1,000 mg by mouth at bedtime.      amLODIPine (NORVASC) 5 MG tablet Take 1 tablet (5 mg) by mouth daily.  Qty: 90 tablet, Refills: 3    Associated Diagnoses: Essential hypertension with goal blood pressure less than 130/80; Cerebrovascular accident (CVA), unspecified mechanism (H)      artificial tears OINT ophthalmic ointment Place 1 inch into both eyes at bedtime.      atorvastatin (LIPITOR) 10 MG tablet Take 1 tablet (10 mg) by mouth daily.  Qty: 90 tablet, Refills: 3    Associated Diagnoses: Cerebrovascular accident (CVA), unspecified mechanism (H); Hyperlipidemia, unspecified hyperlipidemia type      azelastine 137 MCG/SPRAY SOLN SPRAY 2 SPRAYS INTO BOTH NOSTRILS 2 TIMES DAILY AS NEEDED FOR RHINITIS.  Qty: 30 mL, Refills: 1     Associated Diagnoses: Post-nasal drainage; Vasomotor rhinitis      B Complex-Folic Acid (B COMPLEX PLUS PO) Take 1 capsule by mouth daily.      CALCIUM PO Take 2 tablets by mouth daily.      Cholecalciferol (VITAMIN D PO) Take 1,000 Units by mouth daily       DULoxetine (CYMBALTA) 60 MG capsule Take 1 capsule (60 mg) by mouth daily.  Qty: 90 capsule, Refills: 3    Associated Diagnoses: Neuropathy      Fluticasone Propionate (FLONASE ALLERGY RELIEF NA) Spray 2 sprays into both nostrils daily as needed.    Associated Diagnoses: Seasonal allergic rhinitis, unspecified trigger      modafinil (PROVIGIL) 100 MG tablet Take 100 mg by mouth daily as needed.      NEW MED Take 1 Gum by mouth at bedtime.      Omega-3 Fatty Acids (OMEGA 3 PO) Take 2 capsules by mouth daily       omeprazole (PRILOSEC) 20 MG DR capsule Take 1 capsule (20 mg) by mouth 2 times daily.  Qty: 180 capsule, Refills: 3    Associated Diagnoses: Gastroesophageal reflux disease without esophagitis      ondansetron (ZOFRAN ODT) 4 MG ODT tab Take 1 tablet (4 mg) by mouth every 8 hours as needed for nausea.  Qty: 10 tablet, Refills: 0      Polyethyl Glycol-Propyl Glycol (SYSTANE FREE OP) Place 2 drops into both eyes every 2 hours as needed (dry eyes).      Polyethylene Glycol 3350 (MIRALAX PO) Take 17 g by mouth every evening.      traZODone (DESYREL) 50 MG tablet Take 50 mg by mouth at bedtime.           Allergies   Allergies   Allergen Reactions    Codeine Sulfate      Nausea and vomiting     Quinapril Difficulty breathing    Darvon-N [Propoxyphene Napsylate] Nausea and Vomiting

## 2025-03-24 NOTE — PROGRESS NOTES
"[facility] Orthopa[facility]ics Progress Note      Post-operative Day: 2 Days Post-Op    Procedure(s):  OPEN REDUCTION INTERNAL FIXATION, FRACTURE, FEMUR, DISTAL right   Subjective:    Pt just finished working with PT, reports increased pain in the right knee. She is feeling comfortable with her restrictions and exercises and is planning on going home with home cares for support.     Chest pain, SOB:  no  Nausea, vomiting:  no  Lightheadedness, dizziness:  no  Neuro:  Patient denies new onset numbness or paresthesias      Objective:  Blood pressure 115/72, pulse 73, temperature 98.1  F (36.7  C), temperature source Oral, resp. rate 16, height 1.575 m (5' 2\"), weight 64 kg (141 lb 1.5 oz), SpO2 94%, not currently breastfeeding.    Patient Vitals for the past 24 hrs:   BP Temp Temp src Pulse Resp SpO2 Weight   03/24/25 0733 115/72 98.1  F (36.7  C) Oral 73 16 94 % --   03/24/25 0557 -- -- -- -- -- -- 64 kg (141 lb 1.5 oz)   03/24/25 0335 138/71 98.7  F (37.1  C) Oral 74 16 95 % --   03/23/25 2352 119/68 98  F (36.7  C) Oral 64 16 94 % --   03/23/25 1934 127/70 98.6  F (37  C) Oral 66 18 95 % --   03/23/25 1720 128/75 -- -- -- -- -- --   03/23/25 1500 -- 98.8  F (37.1  C) Oral 74 18 94 % --   03/23/25 1108 111/66 97.6  F (36.4  C) Oral 72 18 92 % --       Wt Readings from Last 4 Encounters:   03/24/25 64 kg (141 lb 1.5 oz)   02/28/25 60.1 kg (132 lb 9.6 oz)   02/23/25 58.1 kg (128 lb)   11/15/24 58.1 kg (128 lb)       Gen: A&O x 3. NAD. Appears tired.   Wound status: Covered, post op ABD/tape dressings are c/d/I.   Circulation, motion and sensation: Dorsiflexion/plantarflexion intact and equal bilaterally; distal lower extremity sensation is intact and equal bilaterally. Foot and toes are warm and well perfused.    Swelling: mild  Calf tenderness: calves are soft and non-tender bilaterally     Pertinent Labs   Lab Results: personally reviewed.     Recent Labs   Lab Test 03/23/25  0825 03/22/25  0525 03/21/25  1548 " 01/28/17  1507 01/25/17  0812   INR  --  1.15  --   --  1.02   WBC 12.4* 7.2 12.4*   < > 5.4   HGB 12.4 12.4 12.8   < > 11.4*   HCT 36.1 37.8 38.6   < > 35.2   MCV 97 101* 100   < > 92    248 259   < > 308    138 141   < > 142    < > = values in this interval not displayed.       Plan:   Continue current cares and rehabilitation.  Anticoagulation protocol:  mg daily  x 30  days  Pain medications:  oxycodone and tylenol  Weight bearing status:  WBAT with walker, brace as needed  Disposition:  Orthopedically stable. Discharge per medicine, plan for home with home care.              Report completed by:  Emery Hernandez PA-C  Date: 3/24/2025  Time: 10:42 AM

## 2025-03-24 NOTE — PROGRESS NOTES
Occupational Therapy Discharge Summary    Reason for therapy discharge:    Discharged to home with home therapy.    Progress towards therapy goal(s). See goals on Care Plan in [facility] electronic health record for goal details.  Goals partially met.  Barriers to achieving goals:   discharge from facility.    Therapy recommendation(s):    Continued therapy is recommended.  Rationale/Recommendations:  Home OT to maximize independence/safety with ADLs/IADLs and related mobility.

## 2025-03-24 NOTE — PROGRESS NOTES
Care Management Discharge Note    Discharge Date: 03/24/2025  Discharge Disposition: Home Care  Discharge Services: Home Care  Discharge DME: None  Discharge Transportation: family or friend will provide    Private pay costs discussed: Not applicable  Does the patient's insurance plan have a 3 day qualifying hospital stay waiver?  Yes   Which insurance plan 3 day waiver is available? Alternative insurance waiver  Will the waiver be used for post-acute placement? No  Patient/family educated on Medicare website which has current facility and service quality ratings: yes    Education Provided on the Discharge Plan: Yes  Persons Notified of Discharge Plans: Patient  Patient/Family in Agreement with the Plan: yes    Handoff Referral Completed: Yes, [facility] PCP: Internal handoff referral completed    Additional Information:    Per IDT rounds, patient is medically ready for discharge today.     Patient will discharge home with [facility] (Phone: [phone]) PT/OT/RN/HHA services. Patient is in agreement with discharge plan.     PLAN: Home with - PT/OT/RN/HHA    PATRICIO JEAN BAPTISTE RN

## 2025-03-24 NOTE — PROGRESS NOTES
[facility] DISCHARGE NOTE    Patient discharged to home at 1:35 PM via wheel chair. Accompanied by daughter and staff. Discharge instructions reviewed with patient, opportunity offered to ask questions. Prescriptions sent to patients preferred pharmacy. All belongings sent with patient.    Rubi Zayas RN

## 2025-03-24 NOTE — PLAN OF CARE
"Goal Outcome Evaluation:      Plan of Care Reviewed With: patient    Overall Patient Progress: improvingOverall Patient Progress: improving    Outcome Evaluation: A&Ox4. Able to make needs known. States pain was at a 6/10 after sleeping through the night but is under control. Pain treated per MAR with stated relief. Up SBA1 GB Walker. Pt states she feels ready to go home.    Visit Vitals  /72 (BP Location: Right arm)   Pulse 73   Temp 98.1  F (36.7  C) (Oral)   Resp 16       Problem: Adult Inpatient Plan of Care  Goal: Plan of Care Review  Description: The Plan of Care Review/Shift note should be completed every shift.  The Outcome Evaluation is a brief statement about your assessment that the patient is improving, declining, or no change.  This information will be displayed automatically on your shiftnote.  Outcome: Adequate for Care Transition  Flowsheets (Taken 3/24/2025 1015)  Outcome Evaluation: A&Ox4. Able to make needs known. States pain was at a 6/10 after sleeping through the night but is under control. Pain treated per MAR with stated relief. Up SBA1 GB Walker. Pt states she feels ready to go home.  Plan of Care Reviewed With: patient  Overall Patient Progress: improving  Goal: Patient-Specific Goal (Individualized)  Description: You can add care plan individualizations to a care plan. Examples of Individualization might be:  \"Parent requests to be called daily at 9am for status\", \"I have a hard time hearing out of my right ear\", or \"Do not touch me to wake me up as it startlesme\".  Outcome: Adequate for Care Transition  Goal: Absence of Hospital-Acquired Illness or Injury  Outcome: Adequate for Care Transition  Intervention: Identify and Manage Fall Risk  Recent Flowsheet Documentation  Taken 3/24/2025 9827 by Rubi To RN  Safety Promotion/Fall Prevention:   activity supervised   assistive device/personal items within reach   clutter free environment maintained   mobility aid in " reach   nonskid shoes/slippers when out of bed   safety round/check completed   supervised activity  Intervention: Prevent and Manage VTE (Venous Thromboembolism) Risk  Recent Flowsheet Documentation  Taken 3/24/2025 0825 by Rubi To RN  VTE Prevention/Management: (frequent movement, anticoagulation therapy)   SCDs off (sequential compression devices)   other (see comments)  Goal: Optimal Comfort and Wellbeing  Outcome: Adequate for Care Transition  Intervention: Monitor Pain and Promote Comfort  Recent Flowsheet Documentation  Taken 3/24/2025 0825 by Rubi To RN  Pain Management Interventions:   medication (see MAR)   repositioned   rest  Goal: Readiness for Transition of Care  Outcome: Adequate for Care Transition

## 2025-03-25 ENCOUNTER — PATIENT OUTREACH (OUTPATIENT)
Dept: CARE COORDINATION | Facility: CLINIC | Age: 80
End: 2025-03-25
Payer: COMMERCIAL

## 2025-03-25 ENCOUNTER — TELEPHONE (OUTPATIENT)
Dept: FAMILY MEDICINE | Facility: CLINIC | Age: 80
End: 2025-03-25
Payer: COMMERCIAL

## 2025-03-25 NOTE — PROGRESS NOTES
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  She stated she is doing well.  She is trying not to take the oxycodone during the day.  Is taking Tylenol during the day and oxycodone to sleep at night.  She has not called to schedule her [facility] appointment yet but will call this afternoon.  Unable to complete the full Post Discharge Assessment due to the brief nature of the call.  Completed to the best of my ability.  Unable to offer CCC due to the brief nature of the call as well.  How are your symptoms? (Red Flag symptoms escalate to triage hotline per guidelines): Improved  Do you know how to contact your clinic care team if you have future questions or changes to your health status? : Yes  Does the patient have their discharge instructions? : Yes  Does the patient have questions regarding their discharge instructions? : No  Were you started on any new medications or were there changes to any of your previous medications? : Yes  Does the patient have all of their medications?: Yes  Do you have questions regarding any of your medications? : No  Do you have all of your needed medical supplies or equipment (DME)?  (i.e. oxygen tank, CPAP, cane, etc.): Yes    Post-op ([facility]A Only)  If the patient had a surgery or procedure, do they have any questions  for a nurse?: No    Post-op (Clinicians Only)  Did the patient have surgery or a procedure: Yes  Incision: closed, healing  Incision site pain: Yes  Closure: suture (Incisions were closed with 0 Vicryl for the IT band, 2-0 and 3-0 Monocryl for skin.  Steri-Strips and a bulky soft dressing was applied.)  PO Intake: regular diet        Follow up Plan     Discharge Follow-Up  Discharge follow up appointment scheduled in alignment with recommended follow up timeframe or Transitions of Risk Category? (Low = within 30 days; Moderate= within 14 days; High= within 7 days): No  Patient's follow up appointment not scheduled:  (see note above.  Patient will call [facility] today to schedule.  Patient already has PCP follow up appointment scheduled)    Future Appointments   Date Time Provider Department Center   4/8/2025  4:00 PM Janett Reid MD [facility]   4/24/2025  9:30 AM Yasmin Salgado MD [facility]   10/2/2025  1:30 PM Andreia Looney PA-C [facility]       Outpatient Plan as outlined on AVS reviewed with patient.    For any urgent concerns, please contact our 24 hour nurse triage line: [phone] ([phone])       Estefania Ron RN

## 2025-03-25 NOTE — TELEPHONE ENCOUNTER
Home Care is calling regarding an established patient with [facility].  Requesting orders from: Cortney Hung  FYI: RN able to provide verbal orders.  Sending as FYI only.  Is this a request for a temporary pause in the home care episode?  No    Orders Requested    Skilled Nursing  Request for initial certification (first set of orders)   Frequency: 1 x week for 4 weeks, then once every other week for 4 weeks  RN gave verbal order: Yes      Physical Therapy  Request for initial certification (first set of orders)   Frequency: Once  RN gave verbal order: Yes              Lashae Ellison RN

## 2025-03-25 NOTE — PROGRESS NOTES
Clinic Care Coordination Contact  [facility]/Voicemail    Clinical Data: Care Coordinator Outreach    Outreach Documentation Number of Outreach Attempt   3/25/2025  11:45 AM 1       Unable to leave a message due to: no answer      Plan: Care Coordinator will try to reach patient again in 1-2 business days.    TCM Episode created  UTC x 1

## 2025-03-27 ENCOUNTER — TELEPHONE (OUTPATIENT)
Dept: FAMILY MEDICINE | Facility: CLINIC | Age: 80
End: 2025-03-27
Payer: COMMERCIAL

## 2025-03-27 NOTE — TELEPHONE ENCOUNTER
Home Care is calling regarding an established patient with [facility].  Requesting orders from: Cortney HungI: RN able to provide verbal orders.  Sending as FYI only.  Is this a request for a temporary pause in the home care episode?  No    Orders Requested    Physical Therapy  Request for initial certification (first set of orders)   Frequency: starting next week 2x for 1 week then 1x a week for 7 weeks.   RN gave verbal order: Yes    Call back : [phone]   Ayaka Moise RN

## 2025-03-28 ENCOUNTER — HOSPITAL ENCOUNTER (EMERGENCY)
Facility: CLINIC | Age: 80
Discharge: HOME | End: 2025-03-28
Attending: PHYSICIAN ASSISTANT | Admitting: PHYSICIAN ASSISTANT
Payer: COMMERCIAL

## 2025-03-28 VITALS
OXYGEN SATURATION: 97 % | HEART RATE: 75 BPM | RESPIRATION RATE: 18 BRPM | SYSTOLIC BLOOD PRESSURE: 138 MMHG | DIASTOLIC BLOOD PRESSURE: 87 MMHG | TEMPERATURE: 97.7 F

## 2025-03-28 DIAGNOSIS — N39.0 URINARY TRACT INFECTION: ICD-10-CM

## 2025-03-28 LAB
ALBUMIN UR-MCNC: NEGATIVE MG/DL
AMORPH CRY #/AREA URNS HPF: ABNORMAL /HPF
APPEARANCE UR: ABNORMAL
BACTERIA #/AREA URNS HPF: ABNORMAL /HPF
BILIRUB UR QL STRIP: NEGATIVE
COLOR UR AUTO: YELLOW
GLUCOSE UR STRIP-MCNC: NEGATIVE MG/DL
HGB UR QL STRIP: NEGATIVE
KETONES UR STRIP-MCNC: NEGATIVE MG/DL
LEUKOCYTE ESTERASE UR QL STRIP: ABNORMAL
MUCOUS THREADS #/AREA URNS LPF: PRESENT /LPF
NITRATE UR QL: NEGATIVE
PH UR STRIP: 8 [PH] (ref 5–7)
RBC URINE: <1 /HPF
SP GR UR STRIP: 1.01 (ref 1–1.03)
SQUAMOUS EPITHELIAL: <1 /HPF
UROBILINOGEN UR STRIP-MCNC: 2 MG/DL
WBC URINE: 100 /HPF

## 2025-03-28 PROCEDURE — 87186 SC STD MICRODIL/AGAR DIL: CPT | Performed by: PHYSICIAN ASSISTANT

## 2025-03-28 PROCEDURE — G0463 HOSPITAL OUTPT CLINIC VISIT: HCPCS | Performed by: PHYSICIAN ASSISTANT

## 2025-03-28 PROCEDURE — 81003 URINALYSIS AUTO W/O SCOPE: CPT | Performed by: PHYSICIAN ASSISTANT

## 2025-03-28 PROCEDURE — 99213 OFFICE O/P EST LOW 20 MIN: CPT | Performed by: PHYSICIAN ASSISTANT

## 2025-03-28 RX ORDER — CEPHALEXIN 500 MG/1
500 CAPSULE ORAL 2 TIMES DAILY
Qty: 20 CAPSULE | Refills: 0 | Status: SHIPPED | OUTPATIENT
Start: 2025-03-28 | End: 2025-04-04

## 2025-03-28 ASSESSMENT — ENCOUNTER SYMPTOMS
CONSTITUTIONAL NEGATIVE: 1
GASTROINTESTINAL NEGATIVE: 1
FREQUENCY: 1
DYSURIA: 1
FEVER: 0
MUSCULOSKELETAL NEGATIVE: 1

## 2025-03-28 ASSESSMENT — ACTIVITIES OF DAILY LIVING (ADL): ADLS_ACUITY_SCORE: 51

## 2025-03-29 NOTE — ED PROVIDER NOTES
History     Chief Complaint   Patient presents with    Rule out Urinary Tract Infection     HPI  Kristen Thompson is a 79 year old female who presents to Urgent Care with complaints of possible urinary tract infection.  Patient reports dysuria and increased urinary urgency and frequency starting today.  She states she has had episodes of urinary incontinence as well.  Patient was recently catheterized for urinary retention after having ORIF surgery on a distal femur fracture 1 week ago (periprosthetic fracture around internal prosthetic of right knee).  States it has been many years since she has last had a UTI.  Denies fevers, chills, nausea, vomiting, back or flank pain, or hematuria.      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, initial encounter 03/21/2025     Priority: Medium    Fall, initial encounter 03/21/2025     Priority: Medium    History of stroke 03/21/2025     Priority: Medium    Chronic fatigue 03/21/2025     Priority: Medium    Melanoma in situ of right shoulder (H) 02/28/2025     Priority: Medium    Hemiplegia and hemiparesis following cerebral infarction affecting unspecified side (H) 04/12/2022     Priority: Medium    Essential hypertension with goal blood pressure less than 130/80 03/02/2021     Priority: Medium    Gastroesophageal reflux disease without esophagitis 07/01/2020     Priority: Medium    Localized edema 06/16/2017     Priority: Medium    Cervicalgia 12/06/2016     Priority: Medium    Gastritis 09/16/2016     Priority: Medium    Chronic constipation 09/08/2016     Priority: Medium    Hyperlipidemia LDL goal <130 07/11/2014     Priority: Medium    CARDIOVASCULAR SCREENING; LDL GOAL LESS THAN 160  10/11/2012     Priority: Medium    HL (hearing loss) 10/11/2012     Priority: Medium    Insomnia 12/12/2011     Priority: Medium    Primary localized osteoarthrosis, lower leg 10/02/2007     Priority: Medium    Restless leg syndrome 02/28/2006     Priority: Medium    Female stress incontinence 02/28/2006     Priority: Medium        Past Medical History:    Past Medical History:   Diagnosis Date    Arthritis 11/19/2013    Cerebrovascular accident (CVA), unspecified mechanism (H) 03/02/2021    Gastro-oesophageal reflux disease     GERD (gastroesophageal reflux disease) 10/02/2012    H/O total hysterectomy     HL (hearing loss) 10/11/2012    Malignant melanoma (H)     PONV (postoperative nausea and vomiting)     Squamous cell carcinoma        Past Surgical History:    Past Surgical History:   Procedure Laterality Date    BIOPSY BREAST      BIOPSY BREAST Left 8/22/2022    Procedure: Seed Localized Left Breast Biopsy;  Surgeon: Jeremy Berg DO;  Location: [facility]    COLONOSCOPY  10/30/2012    Procedure: COLONOSCOPY;  Colonoscopy;  Surgeon: Denise Bah MD;  Location: [facility]    ESOPHAGOSCOPY, GASTROSCOPY, DUODENOSCOPY (EGD), COMBINED N/A 9/15/2016    Procedure: COMBINED ESOPHAGOSCOPY, GASTROSCOPY, DUODENOSCOPY (EGD);  Surgeon: Bennie Godinez MD;  Location: [facility]    GALLBLADDER SURGERY      HYSTERECTOMY      knee replacment  2007    OPEN REDUCTION INTERNAL FIXATION FEMUR DISTAL Right 3/22/2025    Procedure: OPEN REDUCTION INTERNAL FIXATION, FRACTURE, FEMUR, DISTAL, RIGHT;  Surgeon: Dayne Wong MD;  Location: [facility]    RELEASE TRIGGER FINGER Right 4/29/2016    Procedure: RELEASE TRIGGER FINGER;  Surgeon: Percy Sarmiento MD;  Location: [facility]    REPAIR BLADDER      ZZC RAD RESEC TONSIL/PILLARS         Family History:    Family History   Problem Relation Age of Onset    C.A.D. Mother     Cardiovascular Mother     Thyroid Disease Mother     Cancer Father         stomach ca    Cancer  Sister     Eye Disorder Sister     C.A.D. Sister     Cerebrovascular Disease Sister     Breast Cancer Sister     Arthritis Sister     Cardiovascular Sister     Depression Sister     Heart Disease Sister     Neurologic Disorder Sister     Osteoporosis Sister     Thyroid Disease Sister     Depression Sister     Thyroid Disease Sister     Depression Sister     Thyroid Disease Sister     Obesity Sister     Neurologic Disorder Sister        Social History:  Marital Status:   [5]  Social History     Tobacco Use    Smoking status: Never    Smokeless tobacco: Never   Substance Use Topics    Alcohol use: No     Alcohol/week: 0.0 standard drinks of alcohol    Drug use: No        Medications:    cephALEXin (KEFLEX) 500 MG capsule  acetaminophen (TYLENOL) 500 MG tablet  amLODIPine (NORVASC) 5 MG tablet  artificial tears OINT ophthalmic ointment  aspirin (ASA) 325 MG EC tablet  [START ON 4/23/2025] aspirin 81 MG EC tablet  atorvastatin (LIPITOR) 10 MG tablet  azelastine 137 MCG/SPRAY SOLN  B Complex-Folic Acid (B COMPLEX PLUS PO)  CALCIUM PO  Cholecalciferol (VITAMIN D PO)  DULoxetine (CYMBALTA) 60 MG capsule  Fluticasone Propionate (FLONASE ALLERGY RELIEF NA)  modafinil (PROVIGIL) 100 MG tablet  NEW MED  Omega-3 Fatty Acids (OMEGA 3 PO)  omeprazole (PRILOSEC) 20 MG DR capsule  ondansetron (ZOFRAN ODT) 4 MG ODT tab  oxyCODONE (ROXICODONE) 10 MG tablet  Polyethyl Glycol-Propyl Glycol (SYSTANE FREE OP)  Polyethylene Glycol 3350 (MIRALAX PO)  traZODone (DESYREL) 50 MG tablet          Review of Systems   Constitutional: Negative.  Negative for fever.   Gastrointestinal: Negative.    Genitourinary:  Positive for dysuria, frequency and urgency.   Musculoskeletal: Negative.    All other systems reviewed and are negative.      Physical Exam   BP: 138/87  Pulse: 75  Temp: 97.7  F (36.5  C)  Resp: 18  SpO2: 97 %      Physical Exam  Constitutional:       General: She is not in acute distress.     Appearance: Normal appearance. She  is not ill-appearing, toxic-appearing or diaphoretic.   HENT:      Head: Normocephalic and atraumatic.   Eyes:      Conjunctiva/sclera: Conjunctivae normal.   Cardiovascular:      Rate and Rhythm: Normal rate and regular rhythm.      Heart sounds: Normal heart sounds.   Pulmonary:      Effort: Pulmonary effort is normal.      Breath sounds: Normal breath sounds.   Abdominal:      General: There is no distension.      Palpations: Abdomen is soft.      Tenderness: There is no abdominal tenderness. There is no right CVA tenderness, left CVA tenderness, guarding or rebound.   Skin:     General: Skin is warm and dry.   Neurological:      Mental Status: She is alert.         ED Course        Procedures      Results for orders placed or performed during the hospital encounter of 03/28/25 (from the past 24 hours)   UA Macroscopic with reflex to Microscopic and Culture    Specimen: Urine, Clean Catch   Result Value Ref Range    Color Urine Yellow Colorless, Straw, Light Yellow, Yellow    Appearance Urine Slightly Cloudy (A) Clear    Glucose Urine Negative Negative mg/dL    Bilirubin Urine Negative Negative    Ketones Urine Negative Negative mg/dL    Specific Gravity Urine 1.014 1.003 - 1.035    Blood Urine Negative Negative    pH Urine 8.0 (H) 5.0 - 7.0    Protein Albumin Urine Negative Negative mg/dL    Urobilinogen Urine 2.0 (A) Normal mg/dL    Nitrite Urine Negative Negative    Leukocyte Esterase Urine Moderate (A) Negative    Bacteria Urine Few (A) None Seen /HPF    Mucus Urine Present (A) None Seen /LPF    Amorphous Crystals Urine Few (A) None Seen /HPF    RBC Urine <1 <=2 /HPF    WBC Urine 100 (H) <=5 /HPF    Squamous Epithelials Urine <1 <=1 /HPF    Narrative    Urine Culture ordered based on laboratory criteria       Medications - No data to display    Assessments & Plan (with Medical Decision Making)     Pt is a 79 year old female who presents to Urgent Care with complaints of possible urinary tract infection.   Patient reports dysuria and increased urinary urgency and frequency starting today.  She states she has had episodes of urinary incontinence as well.  Patient was recently catheterized for urinary retention after having ORIF surgery on a distal femur fracture 1 week ago (periprosthetic fracture around internal prosthetic of right knee).  States it has been many years since she has last had a UTI.  Denies fevers, chills, nausea, vomiting, back or flank pain, or hematuria.    Pt is afebrile on arrival.  Exam as above.  Urinalysis was positive for moderate leuk esterase, 100 WBCs.  Urine was sent for culture.  Discussed results with patient.  Encouraged additional symptomatic treatments at home.  Return precautions were reviewed.  Hand-outs were provided.    Patient was sent with Keflex and was instructed to follow-up with PCP for continued care and management.  She is to return to the ED for persistent and/or worsening symptoms.  Patient expressed understanding of the diagnosis and plan and was discharged home in good condition.    I have reviewed the nursing notes.    I have reviewed the findings, diagnosis, plan and need for follow up with the patient.    New Prescriptions    CEPHALEXIN (KEFLEX) 500 MG CAPSULE    Take 1 capsule (500 mg) by mouth 2 times daily for 7 days.       Final diagnoses:   Urinary tract infection       3/28/2025   [facility] EMERGENCY DEPT      Disclaimer:  This note consists of symbols derived from keyboarding, dictation and/or voice recognition software.  As a result, there may be errors in the script that have gone undetected.  Please consider this when interpreting information found in this chart.     Mile Francisco PA-C  03/28/25 2041

## 2025-03-30 LAB — BACTERIA UR CULT: ABNORMAL

## 2025-03-31 ENCOUNTER — TELEPHONE (OUTPATIENT)
Dept: NURSING | Facility: CLINIC | Age: 80
End: 2025-03-31
Payer: COMMERCIAL

## 2025-03-31 ENCOUNTER — TELEPHONE (OUTPATIENT)
Dept: FAMILY MEDICINE | Facility: CLINIC | Age: 80
End: 2025-03-31
Payer: COMMERCIAL

## 2025-03-31 NOTE — TELEPHONE ENCOUNTER
Liz from [facility] calling for verbal order for Keflex 500 mg BID X 7 days for UTI  Informed this is on her current medication list and okay to continue    Sheyla Santiago RN on 3/31/2025 at 11:42 AM

## 2025-03-31 NOTE — TELEPHONE ENCOUNTER
[facility]    Reason for call: Lab Result Notification     Lab Result (including Rx patient on, if applicable).  If culture, copy of lab report at bottom.  Lab Result: Urine Culture    ED Rx: cephALEXin (KEFLEX) 500 MG capsule - Take 1 capsule (500 mg) by mouth 2 times daily for 7 days   50,000-100,000 CFU/mL Escherichia coli  - Susceptible    Creatinine Level (mg/dl)   Creatinine   Date Value Ref Range Status   03/23/2025 0.62 0.51 - 0.95 mg/dL Final   06/29/2020 0.68 0.52 - 1.04 mg/dL Final    Creatinine clearance (ml/min), if applicable    Serum creatinine: 0.62 mg/dL 03/23/25 0825  Estimated creatinine clearance: 66.8 mL/min     ED Symptoms: Patient presented to [facility] on 3/28/2025 for evaluation of possible UTI - dysuria, urinary urgency and frequency. Recent catheterization for surgery.    RN Recommendations/Instructions per [facility] ED lab result protocol:   [facility] ED lab result protocol utilized: Urine Culture  Continue antibiotic/medication as prescribed: cephalexin, pending patient assessment      Unable to reach patient/caregiver.     Left voicemail message requesting a call back to [phone] between 9 a.m. and 5:30 p.m. for patient's ED/UC lab results.      Letter pended to be sent via [employer].         Paloma Funk RN

## 2025-03-31 NOTE — LETTER
March 31, 2025        Kristen Thompson  [address] [place] [id number]          Dear Kristen Thompson:    You were seen in the [facility] at [facility] on 3/28/2025.  We are unable to reach you by phone, so we are sending you this letter.     It is important that you call [facility] lab result nurse at [phone], as we have information to relay to you AND/OR we MAY have to make some changes in your treatment.    Best time to call back is between 9AM and 5:30PM, 7 days a week.      Sincerely,     [facility] Lab Result RN  [phone]

## 2025-04-03 ENCOUNTER — TELEPHONE (OUTPATIENT)
Dept: FAMILY MEDICINE | Facility: CLINIC | Age: 80
End: 2025-04-03
Payer: COMMERCIAL

## 2025-04-03 NOTE — TELEPHONE ENCOUNTER
Patient returning our call. States that she is not feeling any better and continues to have the same urinary symptoms (dysuria, urinary urgency and urinary frequency.) Patient advised to be re-evaluated per our protocol. Patient will see if she can get an appointment at the clinic, otherwise she will return to urgent care.     ZULEIMA MUIR RN

## 2025-04-03 NOTE — TELEPHONE ENCOUNTER
Kristen calling to report she has had increase urination and urgency for the last 24 hours. She was up 8 times during the night and has gone 4 times so far today, she has urgency but no burning. She reports she has been on Cephalexin for 6 days for a UTI, she was getting better then last night things started up again.     Please advise if she should switch antibiotics or do another UA or just wait a couple more days?

## 2025-04-08 ENCOUNTER — OFFICE VISIT (OUTPATIENT)
Dept: FAMILY MEDICINE | Facility: CLINIC | Age: 80
End: 2025-04-08
Attending: STUDENT IN AN ORGANIZED HEALTH CARE EDUCATION/TRAINING PROGRAM
Payer: COMMERCIAL

## 2025-04-08 VITALS
SYSTOLIC BLOOD PRESSURE: 119 MMHG | TEMPERATURE: 96.2 F | WEIGHT: 131.5 LBS | OXYGEN SATURATION: 97 % | BODY MASS INDEX: 24.2 KG/M2 | RESPIRATION RATE: 16 BRPM | HEART RATE: 71 BPM | DIASTOLIC BLOOD PRESSURE: 77 MMHG | HEIGHT: 62 IN

## 2025-04-08 DIAGNOSIS — R30.0 DYSURIA: ICD-10-CM

## 2025-04-08 DIAGNOSIS — D03.61 MELANOMA IN SITU OF RIGHT SHOULDER (H): ICD-10-CM

## 2025-04-08 DIAGNOSIS — I69.359 HEMIPLEGIA AND HEMIPARESIS FOLLOWING CEREBRAL INFARCTION AFFECTING UNSPECIFIED SIDE (H): ICD-10-CM

## 2025-04-08 DIAGNOSIS — M97.11XA PERIPROSTHETIC FRACTURE AROUND INTERNAL PROSTHETIC RIGHT KNEE JOINT, INITIAL ENCOUNTER: Primary | ICD-10-CM

## 2025-04-08 LAB
ALBUMIN UR-MCNC: ABNORMAL MG/DL
ANION GAP SERPL CALCULATED.3IONS-SCNC: 9 MMOL/L (ref 7–15)
APPEARANCE UR: CLEAR
BACTERIA #/AREA URNS HPF: ABNORMAL /HPF
BILIRUB UR QL STRIP: ABNORMAL
BUN SERPL-MCNC: 19.3 MG/DL (ref 8–23)
CALCIUM SERPL-MCNC: 9.9 MG/DL (ref 8.8–10.4)
CHLORIDE SERPL-SCNC: 101 MMOL/L (ref 98–107)
COLOR UR AUTO: YELLOW
CREAT SERPL-MCNC: 0.93 MG/DL (ref 0.51–0.95)
EGFRCR SERPLBLD CKD-EPI 2021: 63 ML/MIN/1.73M2
ERYTHROCYTE [DISTWIDTH] IN BLOOD BY AUTOMATED COUNT: 12.6 % (ref 10–15)
GLUCOSE SERPL-MCNC: 93 MG/DL (ref 70–99)
GLUCOSE UR STRIP-MCNC: NEGATIVE MG/DL
HCO3 SERPL-SCNC: 31 MMOL/L (ref 22–29)
HCT VFR BLD AUTO: 38.8 % (ref 35–47)
HGB BLD-MCNC: 12.7 G/DL (ref 11.7–15.7)
HGB UR QL STRIP: NEGATIVE
KETONES UR STRIP-MCNC: NEGATIVE MG/DL
LEUKOCYTE ESTERASE UR QL STRIP: NEGATIVE
MCH RBC QN AUTO: 33 PG (ref 26.5–33)
MCHC RBC AUTO-ENTMCNC: 32.7 G/DL (ref 31.5–36.5)
MCV RBC AUTO: 101 FL (ref 78–100)
MUCOUS THREADS #/AREA URNS LPF: PRESENT /LPF
NITRATE UR QL: NEGATIVE
PH UR STRIP: 5.5 [PH] (ref 5–7)
PLATELET # BLD AUTO: 445 10E3/UL (ref 150–450)
POTASSIUM SERPL-SCNC: 4 MMOL/L (ref 3.4–5.3)
RBC # BLD AUTO: 3.85 10E6/UL (ref 3.8–5.2)
RBC #/AREA URNS AUTO: ABNORMAL /HPF
SODIUM SERPL-SCNC: 141 MMOL/L (ref 135–145)
SP GR UR STRIP: >=1.03 (ref 1–1.03)
SQUAMOUS #/AREA URNS AUTO: ABNORMAL /LPF
UROBILINOGEN UR STRIP-ACNC: 0.2 E.U./DL
WBC # BLD AUTO: 7.8 10E3/UL (ref 4–11)
WBC #/AREA URNS AUTO: ABNORMAL /HPF

## 2025-04-08 PROCEDURE — 36415 COLL VENOUS BLD VENIPUNCTURE: CPT | Performed by: FAMILY MEDICINE

## 2025-04-08 PROCEDURE — 80048 BASIC METABOLIC PNL TOTAL CA: CPT | Performed by: FAMILY MEDICINE

## 2025-04-08 PROCEDURE — 1111F DSCHRG MED/CURRENT MED MERGE: CPT | Performed by: FAMILY MEDICINE

## 2025-04-08 PROCEDURE — 3074F SYST BP LT 130 MM HG: CPT | Performed by: FAMILY MEDICINE

## 2025-04-08 PROCEDURE — 81001 URINALYSIS AUTO W/SCOPE: CPT | Performed by: FAMILY MEDICINE

## 2025-04-08 PROCEDURE — 1126F AMNT PAIN NOTED NONE PRSNT: CPT | Performed by: FAMILY MEDICINE

## 2025-04-08 PROCEDURE — 99213 OFFICE O/P EST LOW 20 MIN: CPT | Performed by: FAMILY MEDICINE

## 2025-04-08 PROCEDURE — 85027 COMPLETE CBC AUTOMATED: CPT | Performed by: FAMILY MEDICINE

## 2025-04-08 PROCEDURE — 3078F DIAST BP <80 MM HG: CPT | Performed by: FAMILY MEDICINE

## 2025-04-08 ASSESSMENT — PAIN SCALES - GENERAL: PAINLEVEL_OUTOF10: NO PAIN (0)

## 2025-04-08 NOTE — PROGRESS NOTES
Assessment & Plan     (M97.11XA) Periprosthetic fracture around internal prosthetic right knee joint, initial encounter  (primary encounter diagnosis)  Comment: healing well. Ambulating without cane or walker. No limp. Pain is well controlled. Recheck some labs that were abnormal in the hospital. All questions answered. The patient indicates understanding of these issues and agrees with the plan.   Plan: Basic metabolic panel  (Ca, Cl, CO2, Creat,         Gluc, K, Na, BUN), CBC with platelets            (R30.0) Dysuria  Comment: recheck urine today   Plan: UA Macroscopic with reflex to Microscopic and         Culture - Lab Collect            (I69.359) Hemiplegia and hemiparesis following cerebral infarction affecting unspecified side (H)  Comment: really moving well now. No visible residual stroke symptoms   Plan:     (D03.61) Melanoma in situ of right shoulder (H)  Comment: sees derm  Plan:         MED REC REQUIRED  Post Medication Reconciliation Status: discharge medications reconciled, continue medications without change      Subjective   Kristen is a 79 year old, presenting for the following health issues:  ER F/U and Hospital F/U        4/8/2025     3:48 PM   Additional Questions   Roomed by Aye Rai CMA   Accompanied by Self     HPI          3/25/2025   Post Discharge Outreach   How are you doing now that you are home? spoke with Kristen briefly.  She stated she is doing well.  She is trying not to take the oxycodone during the day.  Is taking Tylenol during the day and oxycodone to sleep at night.  She has not called to schedule her [facility] appointment yet but will call this afternoon.  Unable to complete the full Post Discharge Assessment due to the brief nature of the call.  Completed to the best of my ability.  Unable to offer CCC due to the brief nature of the call as well.   How are your symptoms? (Red Flag symptoms escalate to triage hotline per guidelines) Improved   Does the patient have their discharge  instructions?  Yes   Does the patient have questions regarding their discharge instructions?  No   Were you started on any new medications or were there changes to any of your previous medications?  Yes   Does the patient have all of their medications? Yes   Do you have questions regarding any of your medications?  No   Do you have all of your needed medical supplies or equipment (DME)?  (i.e. oxygen tank, CPAP, cane, etc.) Yes       Hospital Follow-up Visit:    Hospital/Nursing Home/IP Rehab Facility: [facility]  Most Recent Admission Date: 3/28/2025   Most Recent Admission Diagnosis:      Most Recent Discharge Date: 3/28/2025   Most Recent Discharge Diagnosis: Urinary tract infection - N39.0   Was the patient in the ICU or did the patient experience delirium during hospitalization?  No  Do you have any other stressors you would like to discuss with your provider? No    Problems taking medications regularly:  None  Medication changes since discharge: oxyCODONE (ROXICODONE) 10 MG tablet   Problems adhering to non-medication therapy:  None    Summary of hospitalization:  [facility] discharge summary reviewed  Diagnostic Tests/Treatments reviewed.  Follow up needed: none  Other Healthcare Providers Involved in Patient s Care:         Specialist appointment - follow-up with surgeon  Update since discharge: improved.       Plan of care communicated with patient         3/21 - fell while volunteering and fractured left femur, also had some chest wall contusions.   Had surgery on 3/22/25 - has already had follow-up with the surgeon    UTI on 3/28/25 - was put on keflex  - e-coli positive   On 4/3/25 - recurrence of burning     Still having some hesitancy and burning still   No bloody/cloudy urine  No back pain or fever    Currently on 325mg aspirin because of risk of dvt  x 30d  Will return to the 81mg soon   Is on PPI daily     Pain control - tylenol and advil  Took oxy at  "night - ten tablets - all gone now, doesn't want/need any more       Review of Systems  Constitutional, HEENT, cardiovascular, pulmonary, gi and gu systems are negative, except as otherwise noted.      Objective    /77 (BP Location: Right arm, Patient Position: Sitting, Cuff Size: Adult Regular)   Pulse 71   Temp (!) 96.2  F (35.7  C) (Tympanic)   Resp 16   Ht 1.581 m (5' 2.24\")   Wt 59.6 kg (131 lb 8 oz)   SpO2 97%   Breastfeeding No   BMI 23.86 kg/m    Body mass index is 23.86 kg/m .  Physical Exam   GENERAL: alert and no distress  EYES: Eyes grossly normal to inspection, PERRL and conjunctivae and sclerae normal  RESP: lungs clear to auscultation - no rales, rhonchi or wheezes  CV: regular rate and rhythm, normal S1 S2, no S3 or S4, no murmur, click or rub, no peripheral edema  MS: sutures are healing well. Walking without a limp and is steady in her gait   NEURO: Normal strength and tone, mentation intact and speech normal  PSYCH: mentation appears normal, affect normal/bright          Signed Electronically by: Janett Reid MD    "

## 2025-04-12 ENCOUNTER — MEDICAL CORRESPONDENCE (OUTPATIENT)
Dept: HEALTH INFORMATION MANAGEMENT | Facility: CLINIC | Age: 80
End: 2025-04-12
Payer: COMMERCIAL

## 2025-04-24 ENCOUNTER — OFFICE VISIT (OUTPATIENT)
Dept: NEUROLOGY | Facility: CLINIC | Age: 80
End: 2025-04-24
Payer: COMMERCIAL

## 2025-04-24 VITALS
SYSTOLIC BLOOD PRESSURE: 124 MMHG | DIASTOLIC BLOOD PRESSURE: 84 MMHG | WEIGHT: 133.4 LBS | HEART RATE: 65 BPM | BODY MASS INDEX: 24.21 KG/M2

## 2025-04-24 DIAGNOSIS — G56.03 BILATERAL CARPAL TUNNEL SYNDROME: Primary | ICD-10-CM

## 2025-04-24 DIAGNOSIS — Z86.73 HISTORY OF STROKE: ICD-10-CM

## 2025-04-24 DIAGNOSIS — Z79.899 ENCOUNTER FOR LONG-TERM (CURRENT) USE OF MEDICATIONS: ICD-10-CM

## 2025-04-24 DIAGNOSIS — R53.82 CHRONIC FATIGUE: ICD-10-CM

## 2025-04-24 NOTE — PATIENT INSTRUCTIONS
PLAN:  Continue the Cymbalta and modafinil to help with pain and energy.  It looks like these have been refilled by other providers but if you need any more refills before I see you again please let me know.  Consider seeing Dr. Maldonado again for wrist injections if the left hand pain continues.  Wrist splint in the meantime as we discussed.  Keep up the good work with staying active!  Return to neurology clinic in 6 months.  Please let us know if any additional concerns arise in the meantime.    Thank you so much for the jam!

## 2025-04-24 NOTE — PROGRESS NOTES
ESTABLISHED PATIENT NEUROLOGY NOTE    DATE OF VISIT: 4/24/2025  MRN: [id number]  PATIENT NAME: Kristen Thompson  YOB: 1945    Chief Complaint   Patient presents with    Stroke     Patient had a fall on March 21 and broke her R femur. During her recovery, patient had to use a walker to get around and while using the walking, it really flared up her carpal tunnels.      SUBJECTIVE:                                                      HISTORY OF PRESENT ILLNESS:  Kristen is here for follow up regarding stroke with residual symptoms and carpal tunnel syndrome.  She has additional history of hearing loss and has followed with ENT for symptoms related to this.  She is on Cymbalta which has been helpful for emotional lability.  She more recently had localized triamcinolone/lidocaine injections in the Left wrist with good response.  When we met about 6 months ago, she told me she still had a little bit of cramping in the left hand at times but overall she was happy with the relief she got from her injections.  She was also still doing acupuncture and chiropractic treatments and also dealing with some arthritis and bursitis in the right hip.    Kristen tells me that she fell last month and broke her femur.     She made good progress with physical therapy. She does the exercises on her own, walking/staying active.  Extra protein and calcium  She thinks that when she gets into better shape. She is healing, but is frustrating.   She has had some stomach issues with the antibiotics.     Head CT at the time of the fall showed nothing acute.  Kristen says she is still having some trouble with her neck as a result however.      CURRENT MEDICATIONS:   Current Outpatient Medications   Medication Sig Dispense Refill    acetaminophen (TYLENOL) 500 MG tablet Take 1,000 mg by mouth at bedtime.      amLODIPine (NORVASC) 5 MG tablet Take 1 tablet (5 mg) by mouth daily. 90 tablet 3    artificial tears OINT ophthalmic ointment  Place 1 inch into both eyes at bedtime.      aspirin 81 MG EC tablet Take 1 tablet (81 mg) by mouth daily.      atorvastatin (LIPITOR) 10 MG tablet Take 1 tablet (10 mg) by mouth daily. 90 tablet 3    azelastine 137 MCG/SPRAY SOLN [place] 2 sprays into both nostrils 2 times daily as needed. 90 mL 2    B Complex-Folic Acid (B COMPLEX PLUS PO) Take 1 capsule by mouth daily.      CALCIUM PO Take 2 tablets by mouth daily.      Cholecalciferol (VITAMIN D PO) Take 1,000 Units by mouth daily       DULoxetine (CYMBALTA) 60 MG capsule Take 1 capsule (60 mg) by mouth daily. 90 capsule 3    Fluticasone Propionate (FLONASE ALLERGY RELIEF NA) Spray 2 sprays into both nostrils daily as needed.      modafinil (PROVIGIL) 100 MG tablet Take 100 mg by mouth daily as needed.      NEW MED Take 1 Gum by mouth at bedtime.      Omega-3 Fatty Acids (OMEGA 3 PO) Take 2 capsules by mouth daily       omeprazole (PRILOSEC) 20 MG DR capsule Take 1 capsule (20 mg) by mouth 2 times daily. 180 capsule 3    ondansetron (ZOFRAN ODT) 4 MG ODT tab Take 1 tablet (4 mg) by mouth every 8 hours as needed for nausea. 10 tablet 0    Polyethyl Glycol-Propyl Glycol (SYSTANE FREE OP) Place 2 drops into both eyes every 2 hours as needed (dry eyes).      Polyethylene Glycol 3350 (MIRALAX PO) Take 17 g by mouth every evening.      traZODone (DESYREL) 50 MG tablet Take 50 mg by mouth at bedtime.      aspirin (ASA) 325 MG EC tablet Take 1 tablet (325 mg) by mouth daily. (Patient not taking: Reported on 4/24/2025) 30 tablet 0    oxyCODONE (ROXICODONE) 10 MG tablet Take 0.5-1 tablets (5-10 mg) by mouth every 4 hours as needed for moderate to severe pain (5mg for moderate, 10mg for severe). (Patient not taking: Reported on 4/24/2025) 10 tablet 0     Current Facility-Administered Medications   Medication Dose Route Frequency Provider Last Rate Last Admin    lidocaine 2%-EPINEPHrine 1:100,000 injection 20 mL  20 mL Intradermal Once Cortney Hung MD           RECENT  DIAGNOSTIC STUDIES:   Labs: No results found for any visits on 04/24/25.    Imaging:   Head CT (3.21.25):  IMPRESSION:  HEAD CT:  1.  No CT evidence for acute intracranial process.  2.  Brain atrophy and presumed chronic microvascular ischemic changes as above.     CERVICAL SPINE CT:  1.  No acute traumatic abnormality within the cervical spine.    Imaging reviewed independently by me.  Agree with radiology read.    REVIEW OF SYSTEMS:                                                      10-point review of systems is negative except as mentioned above in HPI.    EXAM:                                                      Physical Exam:   Vitals: /84   Pulse 65   Wt 60.5 kg (133 lb 6.4 oz)   BMI 24.21 kg/m    BMI= Body mass index is 24.21 kg/m .  GENERAL: NAD.  HEENT: NC/AT.  PULM: Non-labored breathing.   Focused Neurologic:  MENTAL STATUS: Alert, attentive. Speech impediment. Normal comprehension. Normal concentration. Adequate fund of knowledge.   CRANIAL NERVES: Discs technically difficult to visualize. Visual fields intact to confrontation. Pupils equally, round and reactive to light. Facial sensation and movement normal. EOM full. Hard of hearing. Trapezius strength intact. Tongue midline.  MOTOR: Strength is 5/5 in proximal and distal muscle groups of upper and lower extremities. Tone and bulk normal.   DTRs: Intact and symmetric in biceps, BR, patellae.  Cannot elicit ankle jerks.  Babinski down-going bilaterally.   SENSATION: Hyperesthesia with pinprick in the fingertips on the left.  COORDINATION: Normal finger nose finger on the right, dysmetria on the left.  Knee heel shin normal.   STATION AND GAIT: Slight sway with Romberg. Casual gait - slight limp.  Right hand-dominant.    ASSESSMENT and PLAN:                                                      Assessment:    ICD-10-CM    1. Bilateral carpal tunnel syndrome  G56.03       2. Chronic fatigue  R53.82       3. History of stroke  Z86.73       4.  Encounter for long-term (current) use of medications  Z79.899           Ms. Thompson is a pleasant 79-year-old woman with history of stroke and left-sided deficits as well as carpal tunnel syndrome and arthritis which affect use of her hands, specifically the left.  She is having more trouble since a fall.  I tried to reassure Kristen that I think she will continue to improve with time.  She is active which is helpful.  I did mention that she could get back into sports medicine for additional injections in the wrist which were quite helpful for her in the past.  She will think about this and continue to work on being more active as the weather changes.  We will follow-up again in 6 months.  Kristen expressed understanding and agreement with the plan.    Plan:  Continue the Cymbalta and modafinil to help with pain and energy.  It looks like these have been refilled by other providers but if you need any more refills before I see you again please let me know.  Consider seeing Dr. Maldonado again for wrist injections if the left hand pain continues.  Wrist splint in the meantime as we discussed.  Keep up the good work with staying active!  Return to neurology clinic in 6 months.  Please let us know if any additional concerns arise in the meantime    Total Time: 25 minutes were spent with the patient and in chart review/documentation (as described above in Assessment and Plan)/coordinating the care on date of service.    Yasmin Quintanilla MD  Neurology    Dragon software used in the dictation of this note.

## 2025-04-24 NOTE — LETTER
4/24/2025      Kristen Thompson  [address] [place] [id number]      Dear Colleague,    Thank you for referring your patient, Kristen Thompson, to the [facility] [place]. Please see a copy of my visit note below.    ESTABLISHED PATIENT NEUROLOGY NOTE    DATE OF VISIT: 4/24/2025  MRN: [id number]  PATIENT NAME: Kristen Thompson  YOB: 1945    Chief Complaint   Patient presents with     Stroke     Patient had a fall on March 21 and broke her R femur. During her recovery, patient had to use a walker to get around and while using the walking, it really flared up her carpal tunnels.      SUBJECTIVE:                                                      HISTORY OF PRESENT ILLNESS:  Kristen is here for follow up regarding stroke with residual symptoms and carpal tunnel syndrome.  She has additional history of hearing loss and has followed with ENT for symptoms related to this.  She is on Cymbalta which has been helpful for emotional lability.  She more recently had localized triamcinolone/lidocaine injections in the Left wrist with good response.  When we met about 6 months ago, she told me she still had a little bit of cramping in the left hand at times but overall she was happy with the relief she got from her injections.  She was also still doing acupuncture and chiropractic treatments and also dealing with some arthritis and bursitis in the right hip.    Kristen tells me that she fell last month and broke her femur.     She made good progress with physical therapy. She does the exercises on her own, walking/staying active.  Extra protein and calcium  She thinks that when she gets into better shape. She is healing, but is frustrating.   She has had some stomach issues with the antibiotics.     Head CT at the time of the fall showed nothing acute.  Kristen says she is still having some trouble with her neck as a result however.      CURRENT MEDICATIONS:   Current Outpatient  Medications   Medication Sig Dispense Refill     acetaminophen (TYLENOL) 500 MG tablet Take 1,000 mg by mouth at bedtime.       amLODIPine (NORVASC) 5 MG tablet Take 1 tablet (5 mg) by mouth daily. 90 tablet 3     artificial tears OINT ophthalmic ointment Place 1 inch into both eyes at bedtime.       aspirin 81 MG EC tablet Take 1 tablet (81 mg) by mouth daily.       atorvastatin (LIPITOR) 10 MG tablet Take 1 tablet (10 mg) by mouth daily. 90 tablet 3     azelastine 137 MCG/SPRAY SOLN [place] 2 sprays into both nostrils 2 times daily as needed. 90 mL 2     B Complex-Folic Acid (B COMPLEX PLUS PO) Take 1 capsule by mouth daily.       CALCIUM PO Take 2 tablets by mouth daily.       Cholecalciferol (VITAMIN D PO) Take 1,000 Units by mouth daily        DULoxetine (CYMBALTA) 60 MG capsule Take 1 capsule (60 mg) by mouth daily. 90 capsule 3     Fluticasone Propionate (FLONASE ALLERGY RELIEF NA) Spray 2 sprays into both nostrils daily as needed.       modafinil (PROVIGIL) 100 MG tablet Take 100 mg by mouth daily as needed.       NEW MED Take 1 Gum by mouth at bedtime.       Omega-3 Fatty Acids (OMEGA 3 PO) Take 2 capsules by mouth daily        omeprazole (PRILOSEC) 20 MG DR capsule Take 1 capsule (20 mg) by mouth 2 times daily. 180 capsule 3     ondansetron (ZOFRAN ODT) 4 MG ODT tab Take 1 tablet (4 mg) by mouth every 8 hours as needed for nausea. 10 tablet 0     Polyethyl Glycol-Propyl Glycol (SYSTANE FREE OP) Place 2 drops into both eyes every 2 hours as needed (dry eyes).       Polyethylene Glycol 3350 (MIRALAX PO) Take 17 g by mouth every evening.       traZODone (DESYREL) 50 MG tablet Take 50 mg by mouth at bedtime.       aspirin (ASA) 325 MG EC tablet Take 1 tablet (325 mg) by mouth daily. (Patient not taking: Reported on 4/24/2025) 30 tablet 0     oxyCODONE (ROXICODONE) 10 MG tablet Take 0.5-1 tablets (5-10 mg) by mouth every 4 hours as needed for moderate to severe pain (5mg for moderate, 10mg for severe). (Patient  not taking: Reported on 4/24/2025) 10 tablet 0     Current Facility-Administered Medications   Medication Dose Route Frequency Provider Last Rate Last Admin     lidocaine 2%-EPINEPHrine 1:100,000 injection 20 mL  20 mL Intradermal Once Cortney Hung MD           RECENT DIAGNOSTIC STUDIES:   Labs: No results found for any visits on 04/24/25.    Imaging:   Head CT (3.21.25):  IMPRESSION:  HEAD CT:  1.  No CT evidence for acute intracranial process.  2.  Brain atrophy and presumed chronic microvascular ischemic changes as above.     CERVICAL SPINE CT:  1.  No acute traumatic abnormality within the cervical spine.    Imaging reviewed independently by me.  Agree with radiology read.    REVIEW OF SYSTEMS:                                                      10-point review of systems is negative except as mentioned above in HPI.    EXAM:                                                      Physical Exam:   Vitals: /84   Pulse 65   Wt 60.5 kg (133 lb 6.4 oz)   BMI 24.21 kg/m    BMI= Body mass index is 24.21 kg/m .  GENERAL: NAD.  HEENT: NC/AT.  PULM: Non-labored breathing.   Focused Neurologic:  MENTAL STATUS: Alert, attentive. Speech impediment. Normal comprehension. Normal concentration. Adequate fund of knowledge.   CRANIAL NERVES: Discs technically difficult to visualize. Visual fields intact to confrontation. Pupils equally, round and reactive to light. Facial sensation and movement normal. EOM full. Hard of hearing. Trapezius strength intact. Tongue midline.  MOTOR: Strength is 5/5 in proximal and distal muscle groups of upper and lower extremities. Tone and bulk normal.   DTRs: Intact and symmetric in biceps, BR, patellae.  Cannot elicit ankle jerks.  Babinski down-going bilaterally.   SENSATION: Hyperesthesia with pinprick in the fingertips on the left.  COORDINATION: Normal finger nose finger on the right, dysmetria on the left.  Knee heel shin normal.   STATION AND GAIT: Slight sway with Romberg.  Casual gait - slight limp.  Right hand-dominant.    ASSESSMENT and PLAN:                                                      Assessment:    ICD-10-CM    1. Bilateral carpal tunnel syndrome  G56.03       2. Chronic fatigue  R53.82       3. History of stroke  Z86.73       4. Encounter for long-term (current) use of medications  Z79.899           Ms. Thompson is a pleasant 79-year-old woman with history of stroke and left-sided deficits as well as carpal tunnel syndrome and arthritis which affect use of her hands, specifically the left.  She is having more trouble since a fall.  I tried to reassure Kristen that I think she will continue to improve with time.  She is active which is helpful.  I did mention that she could get back into sports medicine for additional injections in the wrist which were quite helpful for her in the past.  She will think about this and continue to work on being more active as the weather changes.  We will follow-up again in 6 months.  Kristen expressed understanding and agreement with the plan.    Plan:  Continue the Cymbalta and modafinil to help with pain and energy.  It looks like these have been refilled by other providers but if you need any more refills before I see you again please let me know.  Consider seeing Dr. Maldonado again for wrist injections if the left hand pain continues.  Wrist splint in the meantime as we discussed.  Keep up the good work with staying active!  Return to neurology clinic in 6 months.  Please let us know if any additional concerns arise in the meantime    Total Time: 25 minutes were spent with the patient and in chart review/documentation (as described above in Assessment and Plan)/coordinating the care on date of service.    Yasmin Quintanilla MD  Neurology    Dragon software used in the dictation of this note.                    Again, thank you for allowing me to participate in the care of your patient.        Sincerely,        Yasmin West  MD [place]    Electronically signed

## 2025-04-24 NOTE — NURSING NOTE
"Kristen Thompson is a 79 year old female who presents for:  Chief Complaint   Patient presents with    Stroke     Patient had a fall on March 21 and broke her R femur. During her recovery, patient had to use a walker to get around and while using the walking, it really flared up her carpal tunnels.         Initial Vitals:  /84   Pulse 65   Wt 60.5 kg (133 lb 6.4 oz)   BMI 24.21 kg/m   Estimated body mass index is 24.21 kg/m  as calculated from the following:    Height as of 4/8/25: 1.581 m (5' 2.24\").    Weight as of this encounter: 60.5 kg (133 lb 6.4 oz).. Body surface area is 1.63 meters squared. BP completed using cuff size: wrist cuff    Garland Robles  "

## 2025-05-16 ENCOUNTER — OFFICE VISIT (OUTPATIENT)
Dept: ORTHOPEDICS | Facility: CLINIC | Age: 80
End: 2025-05-16
Payer: COMMERCIAL

## 2025-05-16 VITALS — HEIGHT: 62 IN | WEIGHT: 133 LBS | BODY MASS INDEX: 24.48 KG/M2

## 2025-05-16 DIAGNOSIS — G56.03 BILATERAL CARPAL TUNNEL SYNDROME: Primary | ICD-10-CM

## 2025-05-16 DIAGNOSIS — M25.531 BILATERAL WRIST PAIN: ICD-10-CM

## 2025-05-16 DIAGNOSIS — M25.532 BILATERAL WRIST PAIN: ICD-10-CM

## 2025-05-16 PROCEDURE — 76942 ECHO GUIDE FOR BIOPSY: CPT | Mod: RT | Performed by: FAMILY MEDICINE

## 2025-05-16 PROCEDURE — 20526 THER INJECTION CARP TUNNEL: CPT | Mod: 50 | Performed by: FAMILY MEDICINE

## 2025-05-16 RX ADMIN — BETAMETHASONE SODIUM PHOSPHATE AND BETAMETHASONE ACETATE 6 MG: 3; 3 INJECTION, SUSPENSION INTRA-ARTICULAR; INTRALESIONAL; INTRAMUSCULAR; SOFT TISSUE at 14:54

## 2025-05-16 RX ADMIN — LIDOCAINE HYDROCHLORIDE 1 ML: 10 INJECTION, SOLUTION INFILTRATION; PERINEURAL at 14:54

## 2025-05-16 NOTE — PROGRESS NOTES
ASSESSMENT & PLAN    Kristen was seen today for pain and pain.    Diagnoses and all orders for this visit:    Bilateral carpal tunnel syndrome  -     Hand / Upper Extremity Injection/Arthrocentesis: bilateral carpal tunnel    Bilateral wrist pain  -     Hand / Upper Extremity Injection/Arthrocentesis: bilateral carpal tunnel      This issue is acute on chronic and Worsening.     # Bilateral Carpal Tunnel Syndrome: Kristen Thompson  was seen today for bilateral carpal tunnel syndrome. Symptoms had been going on for 6+ mon, improved after previous steroid injections on 10/8/24. On examination there are positive findings of positive carpal tunnel testing in wrists bilaterally. Likely cause of patient's condition due to flare of carpal tunnel syndrome. Other possible conditions contributing to symptoms include wrist arthritis.  Counseled patient on nature of condition and treatment options.  Given this plan as below, follow-up 1 mon as needed.     Image Findings: none today  Treatment: Activities as tolerated, wrist brace at night  Medications/Injections: Limited tylenol/ibuprofen for pain for 1-2 weeks, Topical Voltaren gel, repeat bilateral carpal tunnel steroid injections  Follow-up: In one month if symptoms do not improve, sooner if worsening  Can consider repeat evaluation    Cuate De La Fuente MD  [facility] [place]    -----  Chief Complaint   Patient presents with    Right Wrist - Pain    Left Wrist - Pain       SUBJECTIVE  Kristen Thompson is a/an 79 year old female who is seen as a self referral for evaluation of bilateral wrist pain.     The patient is seen by themselves.  The patient is Right handed    Onset: 3 month(s) ago. Reports insidious onset without acute precipitating event. Saw Neurology 4/24/25.   Location of Pain: bilateral wrist   Worsened by: increased use   Better with: rest  Treatments tried: rest/activity avoidance, ice, heat, Tylenol, ibuprofen, and wrist  "braces, bilateral carpal tunnel steroid injections 10/8/24 by Dr. Maldonado   Associated symptoms: no distal numbness or tingling; denies swelling or warmth    Orthopedic/Surgical history: YES - chronic wrist pain/numbness and tingling   Social History/Occupation: retired     No family history pertinent to patient's problem today.      REVIEW OF SYSTEMS:  Review of Systems  Constitutional, HEENT, cardiovascular, pulmonary, gi and gu systems are negative, except as otherwise noted.    OBJECTIVE:  Ht 1.581 m (5' 2.24\")   Wt 60.3 kg (133 lb)   BMI 24.14 kg/m     General: healthy, alert and in no distress  HEENT: no scleral icterus or conjunctival erythema  Skin: no suspicious lesions or rash. No jaundice.  CV: distal perfusion intact    Resp: normal respiratory effort without conversational dyspnea   Psych: normal mood and affect  Gait: normal steady gait with appropriate coordination and balance    Neuro: Normal light sensory exam of bilateral upper extremities    Ortho Exam   BILATERAL WRIST  Inspection:    No swelling or obvious deformity or asymmetry  Palpation:  Nontender.     Metacarpals: normal    Thumb: normal    Fingers: normal  Range of Motion:    Full (active and passive) flexion, extension, pronation/supination, and ulnar/radial deviation.  Strength:    No deficits in flexion, extension, ulnar/radial deviation, or  strength.. Normal pinch strength.  Special Tests:    Positive: Phalen's, Tinel's (carpal tunnel)    Negative: thenar eminence wasting, hypothenar eminence wasting.     RADIOLOGY:  No new imaging       Disclaimer: This note consists of symbols derived from keyboarding, dictation and/or voice recognition software. As a result, there may be errors in the script that have gone undetected. Please consider this when interpreting information found in this chart.    Hand / Upper Extremity Injection/Arthrocentesis: bilateral carpal tunnel    Date/Time: 5/16/2025 2:54 PM    Performed by: Cuate De La Fuente " MD HILARIO  Authorized by: Cuate De La Fuente MD    Indications:  Pain and therapeutic  Needle Size:  25 G  Guidance: ultrasound    Approach:  Volar  Condition: carpal tunnel    Laterality:  Bilateral    Site:  Bilateral carpal tunnel  Medications (Right):  6 mg betamethasone acet & sod phos 6 (3-3) MG/ML; 1 mL lidocaine 1 %  Medications (Left):  6 mg betamethasone acet & sod phos 6 (3-3) MG/ML; 1 mL lidocaine 1 %  Outcome:  Tolerated well, no immediate complications  Procedure discussed: discussed risks, benefits, and alternatives    Consent Given by:  Patient  Timeout: timeout called immediately prior to procedure    Prep: patient was prepped and draped in usual sterile fashion     Ultrasound images of procedure were permanently stored.     Patient tolerated bilateral carpal tunnel steroid injections.  Ultrasound guided images were permanently stored.   Aftercare instructions given to patient.  Plan to follow-up as discussed above.     Cuate De La Fuente MD [facility]

## 2025-05-16 NOTE — LETTER
5/16/2025      Kristen Thompson  [address] [place] [id number]      Dear Colleague,    Thank you for referring your patient, Kristen Thompson, to the [facility]. Please see a copy of my visit note below.    ASSESSMENT & PLAN    Kristen was seen today for pain and pain.    Diagnoses and all orders for this visit:    Bilateral carpal tunnel syndrome  -     Hand / Upper Extremity Injection/Arthrocentesis: bilateral carpal tunnel    Bilateral wrist pain  -     Hand / Upper Extremity Injection/Arthrocentesis: bilateral carpal tunnel      This issue is acute on chronic and Worsening.     # Bilateral Carpal Tunnel Syndrome: Kristen Thompson  was seen today for bilateral carpal tunnel syndrome. Symptoms had been going on for 6+ mon, improved after previous steroid injections on 10/8/24. On examination there are positive findings of positive carpal tunnel testing in wrists bilaterally. Likely cause of patient's condition due to flare of carpal tunnel syndrome. Other possible conditions contributing to symptoms include wrist arthritis.  Counseled patient on nature of condition and treatment options.  Given this plan as below, follow-up 1 mon as needed.     Image Findings: none today  Treatment: Activities as tolerated, wrist brace at night  Medications/Injections: Limited tylenol/ibuprofen for pain for 1-2 weeks, Topical Voltaren gel, repeat bilateral carpal tunnel steroid injections  Follow-up: In one month if symptoms do not improve, sooner if worsening  Can consider repeat evaluation    Cuate De La Fuente MD  [facility]    -----  Chief Complaint   Patient presents with     Right Wrist - Pain     Left Wrist - Pain       SUBJECTIVE  Kristen Thompsno is a/an 79 year old female who is seen as a self referral for evaluation of bilateral wrist pain.     The patient is seen by themselves.  The patient is Right handed    Onset: 3 month(s)  "ago. Reports insidious onset without acute precipitating event. Saw Neurology 4/24/25.   Location of Pain: bilateral wrist   Worsened by: increased use   Better with: rest  Treatments tried: rest/activity avoidance, ice, heat, Tylenol, ibuprofen, and wrist braces, bilateral carpal tunnel steroid injections 10/8/24 by Dr. Maldonado   Associated symptoms: no distal numbness or tingling; denies swelling or warmth    Orthopedic/Surgical history: YES - chronic wrist pain/numbness and tingling   Social History/Occupation: retired     No family history pertinent to patient's problem today.      REVIEW OF SYSTEMS:  Review of Systems  Constitutional, HEENT, cardiovascular, pulmonary, gi and gu systems are negative, except as otherwise noted.    OBJECTIVE:  Ht 1.581 m (5' 2.24\")   Wt 60.3 kg (133 lb)   BMI 24.14 kg/m     General: healthy, alert and in no distress  HEENT: no scleral icterus or conjunctival erythema  Skin: no suspicious lesions or rash. No jaundice.  CV: distal perfusion intact    Resp: normal respiratory effort without conversational dyspnea   Psych: normal mood and affect  Gait: normal steady gait with appropriate coordination and balance    Neuro: Normal light sensory exam of bilateral upper extremities    Ortho Exam   BILATERAL WRIST  Inspection:    No swelling or obvious deformity or asymmetry  Palpation:  Nontender.     Metacarpals: normal    Thumb: normal    Fingers: normal  Range of Motion:    Full (active and passive) flexion, extension, pronation/supination, and ulnar/radial deviation.  Strength:    No deficits in flexion, extension, ulnar/radial deviation, or  strength.. Normal pinch strength.  Special Tests:    Positive: Phalen's, Tinel's (carpal tunnel)    Negative: thenar eminence wasting, hypothenar eminence wasting.     RADIOLOGY:  No new imaging       Disclaimer: This note consists of symbols derived from keyboarding, dictation and/or voice recognition software. As a result, there may be " errors in the script that have gone undetected. Please consider this when interpreting information found in this chart.    Hand / Upper Extremity Injection/Arthrocentesis: bilateral carpal tunnel    Date/Time: 5/16/2025 2:54 PM    Performed by: Cuate De La Fuente MD  Authorized by: Cuate De La Fuente MD    Indications:  Pain and therapeutic  Needle Size:  25 G  Guidance: ultrasound    Approach:  Volar  Condition: carpal tunnel    Laterality:  Bilateral    Site:  Bilateral carpal tunnel  Medications (Right):  6 mg betamethasone acet & sod phos 6 (3-3) MG/ML; 1 mL lidocaine 1 %  Medications (Left):  6 mg betamethasone acet & sod phos 6 (3-3) MG/ML; 1 mL lidocaine 1 %  Outcome:  Tolerated well, no immediate complications  Procedure discussed: discussed risks, benefits, and alternatives    Consent Given by:  Patient  Timeout: timeout called immediately prior to procedure    Prep: patient was prepped and draped in usual sterile fashion     Ultrasound images of procedure were permanently stored.     Patient tolerated bilateral carpal tunnel steroid injections.  Ultrasound guided images were permanently stored.   Aftercare instructions given to patient.  Plan to follow-up as discussed above.     Cuate De La Fuente MD [facility]            Again, thank you for allowing me to participate in the care of your patient.        Sincerely,        Cuate De La Fuente MD    Electronically signed

## 2025-05-19 NOTE — PATIENT INSTRUCTIONS
# Bilateral Carpal Tunnel Syndrome: Kristen Thompson  was seen today for bilateral carpal tunnel syndrome. Symptoms had been going on for 6+ mon, improved after previous steroid injections on 10/8/24. On examination there are positive findings of positive carpal tunnel testing in wrists bilaterally. Likely cause of patient's condition due to flare of carpal tunnel syndrome. Other possible conditions contributing to symptoms include wrist arthritis.  Counseled patient on nature of condition and treatment options.  Given this plan as below, follow-up 1 mon as needed.     Image Findings: none today  Treatment: Activities as tolerated, wrist brace at night  Medications/Injections: Limited tylenol/ibuprofen for pain for 1-2 weeks, Topical Voltaren gel, repeat bilateral carpal tunnel steroid injections  Follow-up: In one month if symptoms do not improve, sooner if worsening  Can consider repeat evaluation    Please call [phone]   Ask for my team if you have any questions or concerns    If you have not yet received the influenza vaccine but would like to get one, please call  [phone] or you can schedule via [employer]Your[employer]light    It was great seeing you again today!    Cuate De La Fuente MD, [facility]     FS Injection Discharge Instructions    Procedure: bilateral carpal tunnel steroid injections    You may shower, however avoid swimming, tub baths or hot tubs for 24 hours following your procedure  You may have a mild to moderate increase in pain for several days following the injection.  It may take up to 14 days for the steroid medication to start working although you may feel the effect as early as a few days after the procedure.  You may use ice packs for 10-15 minutes, 3 to 4 times a day at the injection site for comfort  You may use anti-inflammatory medications (such as Ibuprofen or Aleve or Advil) or Tylenol for pain control if necessary  If you were fasting, you may resume your normal diet and medications after  the procedure  If you have diabetes, check your blood sugar more frequently than usual as your blood sugar may be higher than normal for 10-14 days following a steroid injection. Contact your doctor who manages your diabetes if your blood sugar is higher than usual    If you experience any of the following, [place] [facility] [place] [phone] or [phone]  -Fever over 100 degree F  -Swelling, bleeding, redness, drainage, warmth at the injection site  - New or worsening pain

## 2025-05-20 RX ORDER — BETAMETHASONE SODIUM PHOSPHATE AND BETAMETHASONE ACETATE 3; 3 MG/ML; MG/ML
6 INJECTION, SUSPENSION INTRA-ARTICULAR; INTRALESIONAL; INTRAMUSCULAR; SOFT TISSUE
Status: COMPLETED | OUTPATIENT
Start: 2025-05-16 | End: 2025-05-16

## 2025-05-20 RX ORDER — LIDOCAINE HYDROCHLORIDE 10 MG/ML
1 INJECTION, SOLUTION INFILTRATION; PERINEURAL
Status: COMPLETED | OUTPATIENT
Start: 2025-05-16 | End: 2025-05-16

## 2025-06-24 ENCOUNTER — TELEPHONE (OUTPATIENT)
Dept: FAMILY MEDICINE | Facility: CLINIC | Age: 80
End: 2025-06-24
Payer: COMMERCIAL

## 2025-06-24 NOTE — TELEPHONE ENCOUNTER
Patient called states she is having heart palpitations off and on x 1-2 months, has a broken leg so is having increased stress.     Denies any new SOB or difficulty breathing. States she has an arrhythmia naturally and has noticed an increase in frequency and duration of episodes.     States she feels the palpitations on left side of her chest, it does not radiate, denies any pressure/pain, denies any diaphoresis, denies any pallor, denies any N/V, denies any new visual changes, denies any lightheadedness/dizziness. Per patient the episodes are lasting approximately 15 secs to 2 minutes, no pattern as they occur at rest and with activity.     States she does not check her BP at home, continues on amlodipine 5 mg po daily for HTN and asa 81 mg po daily (per ortho).     Denies any numbness/tingling    Noted ECG done 3/2025 see results below:        Interpretation ECG Sinus rhythm with sinus arrhythmia  Nonspecific T wave abnormality  Abnormal ECG  When compared with ECG of 25-Jan-2017 08:29,  Nonspecific T wave abnormality now evident in Anterior leads  Confirmed by SEE ED PROVIDER NOTE FOR, ECG INTERPRETATION ([id number]),  Lizette Gallegos ([id number]) on 3/23/2025 1:34:52 PM       Appointment scheduled 6/25/25 with Stefani Rios 1:40 PM.    Update sent to provider to review.     Julie Behrendt RN

## 2025-06-25 ENCOUNTER — OFFICE VISIT (OUTPATIENT)
Dept: FAMILY MEDICINE | Facility: CLINIC | Age: 80
End: 2025-06-25
Payer: COMMERCIAL

## 2025-06-25 ENCOUNTER — RESULTS FOLLOW-UP (OUTPATIENT)
Dept: FAMILY MEDICINE | Facility: CLINIC | Age: 80
End: 2025-06-25

## 2025-06-25 ENCOUNTER — ORDERS ONLY (AUTO-RELEASED) (OUTPATIENT)
Dept: FAMILY MEDICINE | Facility: CLINIC | Age: 80
End: 2025-06-25

## 2025-06-25 VITALS
WEIGHT: 131 LBS | TEMPERATURE: 98.2 F | RESPIRATION RATE: 16 BRPM | DIASTOLIC BLOOD PRESSURE: 86 MMHG | OXYGEN SATURATION: 97 % | HEART RATE: 73 BPM | BODY MASS INDEX: 24.11 KG/M2 | HEIGHT: 62 IN | SYSTOLIC BLOOD PRESSURE: 142 MMHG

## 2025-06-25 DIAGNOSIS — R00.2 PALPITATIONS: Primary | ICD-10-CM

## 2025-06-25 DIAGNOSIS — E78.5 HYPERLIPIDEMIA LDL GOAL <130: ICD-10-CM

## 2025-06-25 DIAGNOSIS — R00.2 PALPITATIONS: ICD-10-CM

## 2025-06-25 LAB
ANION GAP SERPL CALCULATED.3IONS-SCNC: 11 MMOL/L (ref 7–15)
BUN SERPL-MCNC: 16.5 MG/DL (ref 8–23)
CALCIUM SERPL-MCNC: 9.6 MG/DL (ref 8.8–10.4)
CHLORIDE SERPL-SCNC: 102 MMOL/L (ref 98–107)
CHOLEST SERPL-MCNC: 175 MG/DL
CREAT SERPL-MCNC: 0.6 MG/DL (ref 0.51–0.95)
EGFRCR SERPLBLD CKD-EPI 2021: >90 ML/MIN/1.73M2
ERYTHROCYTE [DISTWIDTH] IN BLOOD BY AUTOMATED COUNT: 12.3 % (ref 10–15)
FASTING STATUS PATIENT QL REPORTED: YES
FASTING STATUS PATIENT QL REPORTED: YES
GLUCOSE SERPL-MCNC: 98 MG/DL (ref 70–99)
HCO3 SERPL-SCNC: 26 MMOL/L (ref 22–29)
HCT VFR BLD AUTO: 40.9 % (ref 35–47)
HDLC SERPL-MCNC: 70 MG/DL
HGB BLD-MCNC: 13.7 G/DL (ref 11.7–15.7)
IRON BINDING CAPACITY (ROCHE): 309 UG/DL (ref 240–430)
IRON SATN MFR SERPL: 31 % (ref 15–46)
IRON SERPL-MCNC: 97 UG/DL (ref 37–145)
LDLC SERPL CALC-MCNC: 91 MG/DL
MCH RBC QN AUTO: 32.3 PG (ref 26.5–33)
MCHC RBC AUTO-ENTMCNC: 33.5 G/DL (ref 31.5–36.5)
MCV RBC AUTO: 97 FL (ref 78–100)
NONHDLC SERPL-MCNC: 105 MG/DL
PLATELET # BLD AUTO: 330 10E3/UL (ref 150–450)
POTASSIUM SERPL-SCNC: 3.9 MMOL/L (ref 3.4–5.3)
RBC # BLD AUTO: 4.24 10E6/UL (ref 3.8–5.2)
SODIUM SERPL-SCNC: 139 MMOL/L (ref 135–145)
TRIGL SERPL-MCNC: 68 MG/DL
TSH SERPL DL<=0.005 MIU/L-ACNC: 1.65 UIU/ML (ref 0.3–4.2)
WBC # BLD AUTO: 10.1 10E3/UL (ref 4–11)

## 2025-06-25 PROCEDURE — 99213 OFFICE O/P EST LOW 20 MIN: CPT

## 2025-06-25 PROCEDURE — G2211 COMPLEX E/M VISIT ADD ON: HCPCS

## 2025-06-25 PROCEDURE — 80061 LIPID PANEL: CPT

## 2025-06-25 PROCEDURE — 83540 ASSAY OF IRON: CPT | Mod: GZ

## 2025-06-25 PROCEDURE — 3077F SYST BP >= 140 MM HG: CPT

## 2025-06-25 PROCEDURE — 83550 IRON BINDING TEST: CPT | Mod: GZ

## 2025-06-25 PROCEDURE — 3079F DIAST BP 80-89 MM HG: CPT

## 2025-06-25 PROCEDURE — 84443 ASSAY THYROID STIM HORMONE: CPT

## 2025-06-25 PROCEDURE — 85027 COMPLETE CBC AUTOMATED: CPT

## 2025-06-25 PROCEDURE — 80048 BASIC METABOLIC PNL TOTAL CA: CPT

## 2025-06-25 PROCEDURE — 36415 COLL VENOUS BLD VENIPUNCTURE: CPT

## 2025-06-25 PROCEDURE — 93000 ELECTROCARDIOGRAM COMPLETE: CPT

## 2025-06-25 NOTE — PROGRESS NOTES
"  Assessment & Plan     Palpitations  Pt has been having increased palpitations- heaviness/pressure in chest and irregular heart beat for last 1-2 months. Feels sob for a moment but resolves when she takes a deep breath. Has chronic dizziness that is at baseline. No radiating pain. EKG showed sinus rhythm with rate variation. Plan to complete labs and ziopatch. Pt bp is mildly elevated, advised to monitor at home and reach out if it remains >140/90. Pt states it was 125/90 this morning. Red flag symptoms discussed. Followup based on results of workup.   - EKG 12-lead complete w/read - Clinics  - ZIO PATCH MAIL OUT  - TSH with free T4 reflex  - Basic metabolic panel  (Ca, Cl, CO2, Creat, Gluc, K, Na, BUN)  - CBC with platelets  - Iron and iron binding capacity  - TSH with free T4 reflex  - Basic metabolic panel  (Ca, Cl, CO2, Creat, Gluc, K, Na, BUN)  - CBC with platelets  - Iron and iron binding capacity    Hyperlipidemia LDL goal <130  Pt requests labs  - Lipid panel reflex to direct LDL Non-fasting  - Lipid panel reflex to direct LDL Non-fasting      The longitudinal plan of care for the diagnosis(es)/condition(s) as documented were addressed during this visit. Due to the added complexity in care, I will continue to support Kristen in the subsequent management and with ongoing continuity of care.        Subjective   Kristen is a 79 year old, presenting for the following health issues:  Palpitations      6/25/2025     1:42 PM   Additional Questions   Roomed by Tennille QUIGLEY   Accompanied by self     Palpitations        Here to be check for palpitations.    Feels pressure in chest, and feels heart is beating funny. Describes sensation as feeling \"pressure\" like she \"has to burp\" but burping isn't helping. Brief sob, goes away with deep breath. No pain in chest. Has been happening more frequently, today she has been feeling it all day. No new swelling in legs. Has chronic dizziness, but this feels unchanged from baseline. " "    Broke leg in March.       BP at home 125/91            Objective    BP (!) 147/86   Pulse 73   Temp 98.2  F (36.8  C) (Tympanic)   Resp 16   Ht 1.581 m (5' 2.24\")   Wt 59.4 kg (131 lb)   SpO2 97%   BMI 23.78 kg/m    Body mass index is 23.78 kg/m .  Physical Exam   GENERAL: alert and no distress  RESP: lungs clear to auscultation - no rales, rhonchi or wheezes  CV: +irregular rate and rhythm, normal S1 S2, no S3 or S4, no murmur, click or rub, no peripheral edema  ABDOMEN: soft, nontender, no hepatosplenomegaly, no masses and bowel sounds normal  MS: +1 pitting edema     EKG shows rate variation with no ST elevation        Signed Electronically by: AHMET Whitten CNP    "

## 2025-07-09 LAB — CV ZIO PRELIM RESULTS: NORMAL

## 2025-07-15 ENCOUNTER — PATIENT OUTREACH (OUTPATIENT)
Dept: CARE COORDINATION | Facility: CLINIC | Age: 80
End: 2025-07-15
Payer: COMMERCIAL

## 2025-07-28 NOTE — PROGRESS NOTES
CARDIOLOGY CONSULT    REASON FOR CONSULT: Palpitations    PRIMARY CARE PHYSICIAN:  Janett Reid    HISTORY OF PRESENT ILLNESS:  79-year-old female seen for palpitations.  She has dyslipidemia, hypertension.     Angiogram 2017 showed normal coronary arteries.  She had A. fib during the angiogram.  3-day ZIO monitor showed sinus rhythm, 7% PACs with runs up to 6 beats, no A. fib.     February 2021 she was evaluated for right-sided weakness.  Head CT showed no acute finding.  She was given TPA.  She was diagnosed with right thalamus stroke by MRI. Echo February 2021 showed EF 60%, normal RV, no valve disease, negative bubble study.  Discharged on aspirin and 21 days of clopidogrel. 30-day event monitor showed sinus rhythm, no A. fib, mild to moderate PVC burden.    Zio monitor July 2025 showed sinus rhythm, average ectopy, few short SVT runs up to 13 beats, several triggers correlating with ectopy, some during sinus.    She has had intermittent palpitations for years, they have been a little worse this summer.  She will feel some kicking or skipping beats, occasionally will last up to 1 minute, but typically is less.  She has no lightheadedness, dizziness, chest pain, or syncope.    She has hip pain and a recent femoral fracture.  She is scheduled for right total hip arthroplasty on August 19.    PAST MEDICAL HISTORY:  Past Medical History:   Diagnosis Date    Arthritis 11/19/2013    Cerebrovascular accident (CVA), unspecified mechanism (H) 03/02/2021    Gastro-oesophageal reflux disease     GERD (gastroesophageal reflux disease) 10/02/2012    H/O total hysterectomy     HL (hearing loss) 10/11/2012    Malignant melanoma (H)     PONV (postoperative nausea and vomiting)     Squamous cell carcinoma        MEDICATIONS:  Current Outpatient Medications   Medication Sig Dispense Refill    acetaminophen (TYLENOL) 500 MG tablet Take 1,000 mg by mouth at bedtime.      amLODIPine (NORVASC) 5 MG tablet Take 1 tablet (5 mg) by  mouth daily. 90 tablet 3    artificial tears OINT ophthalmic ointment Place 1 inch into both eyes at bedtime.      aspirin (ASA) 325 MG EC tablet Take 1 tablet (325 mg) by mouth daily. 30 tablet 0    aspirin 81 MG EC tablet Take 1 tablet (81 mg) by mouth daily.      atorvastatin (LIPITOR) 10 MG tablet Take 1 tablet (10 mg) by mouth daily. 90 tablet 3    azelastine 137 MCG/SPRAY SOLN [place] 2 sprays into both nostrils 2 times daily as needed. 90 mL 2    B Complex-Folic Acid (B COMPLEX PLUS PO) Take 1 capsule by mouth daily.      CALCIUM PO Take 2 tablets by mouth daily.      Cholecalciferol (VITAMIN D PO) Take 1,000 Units by mouth daily       DULoxetine (CYMBALTA) 60 MG capsule Take 1 capsule (60 mg) by mouth daily. 90 capsule 3    Fluticasone Propionate (FLONASE ALLERGY RELIEF NA) Spray 2 sprays into both nostrils daily as needed.      modafinil (PROVIGIL) 100 MG tablet Take 100 mg by mouth daily as needed.      NEW MED Take 1 Gum by mouth at bedtime.      Omega-3 Fatty Acids (OMEGA 3 PO) Take 2 capsules by mouth daily       omeprazole (PRILOSEC) 20 MG DR capsule Take 1 capsule (20 mg) by mouth 2 times daily. 180 capsule 3    ondansetron (ZOFRAN ODT) 4 MG ODT tab Take 1 tablet (4 mg) by mouth every 8 hours as needed for nausea. 10 tablet 0    Polyethyl Glycol-Propyl Glycol (SYSTANE FREE OP) Place 2 drops into both eyes every 2 hours as needed (dry eyes).      Polyethylene Glycol 3350 (MIRALAX PO) Take 17 g by mouth every evening.      traZODone (DESYREL) 50 MG tablet Take 50 mg by mouth at bedtime.       No current facility-administered medications for this visit.       ALLERGIES:  Allergies   Allergen Reactions    Codeine Sulfate      Nausea and vomiting     Quinapril Difficulty breathing    Darvon-N [Propoxyphene Napsylate] Nausea and Vomiting       SOCIAL HISTORY:  I have reviewed this patient's social history and updated it with pertinent information if needed. Kristen Thompson  reports that she has never  "smoked. She has never used smokeless tobacco. She reports that she does not drink alcohol and does not use drugs.    FAMILY HISTORY:  I have reviewed this patient's family history and updated it with pertinent information if needed.   Family History   Problem Relation Age of Onset    C.A.D. Mother     Cardiovascular Mother     Thyroid Disease Mother     Cancer Father         stomach ca    Cancer Sister     Eye Disorder Sister     C.A.D. Sister     Cerebrovascular Disease Sister     Breast Cancer Sister     Arthritis Sister     Cardiovascular Sister     Depression Sister     Heart Disease Sister     Neurologic Disorder Sister     Osteoporosis Sister     Thyroid Disease Sister     Depression Sister     Thyroid Disease Sister     Depression Sister     Thyroid Disease Sister     Obesity Sister     Neurologic Disorder Sister        REVIEW OF SYSTEMS:  Constitutional:  No weight loss, fever, chills  HEENT:  Eyes:  No visual loss, blurred vision, double vision or yellow sclerae. No hearing loss, sneezing, congestion, runny nose or sore throat.  Skin:  No rash or itching.  Cardiovascular: per HPI  Respiratory: per HPI  GI:  No anorexia, nausea, vomiting or diarrhea. No abdominal pain or blood.  :  No dysurea, hematuria  Neurologic:  No headache, paralysis, ataxia, numbness or tingling in the extremities. No change in bowel or bladder control.  Musculoskeletal:  No muscle pain  Hematologic:  No bleeding or bruising.  Lymphatics:  No enlarged nodes. No history of splenectomy.  Endocrine:  No reports of sweating, cold or heat intolerance. No polyuria or polydipsia.  Allergies:  No history of asthma, hives, eczema or rhinitis.    PHYSICAL EXAM:  /81 (BP Location: Right arm, Patient Position: Sitting, Cuff Size: Adult Regular)   Pulse 68   Resp 16   Ht 1.581 m (5' 2.25\")   Wt 60.5 kg (133 lb 4.8 oz)   SpO2 97%   BMI 24.19 kg/m      Constitutional: awake, alert, no distress  Eyes: PERRL, sclera nonicteric  ENT: " trachea midline  Respiratory: lungs clear  Cardiovascular: Regular rate and rhythm, no murmurs  GI: nondistended, nontender, bowel sounds present  Lymph/Hematologic: no lymphadenopathy  Skin: dry, no rash  Musculoskeletal: good muscle tone, strength 5/5 in upper and lower extremities  Neurologic: no focal deficits  Neuropsychiatric: appropriate affact    DATA:  Labs:   Recent Labs   Lab Test 06/25/25  1428 11/13/24  0914   CHOL 175 192   HDL 70 83   LDL 91 96   TRIG 68 63   June 2025: Potassium 3.9, creatinine 0.6    EKG, June 25: Sinus rhythm, rate 65    ASSESSMENT:  79-year-old female seen for palpitations.  She is probably feeling some ectopy.  She had 2% PACs on recent monitor, some of her triggers correlated with ectopy.  She had a higher PAC burden a few years ago.  There was no A-fib or other concerning arrhythmia, no significant bradycardia.  She has no evidence of angina or heart failure type symptoms.  No treatment was recommended, if symptoms worsen she could try metoprolol.  However she is quite adamant she would prefer to avoid medications unless necessary.    Patient is low cardiac risk for upcoming hip arthroplasty.    RECOMMENDATIONS:  1.  Palpitations, suspected secondary to PACs  - No treatment    2.  Preop cardiac evaluation  - Patient is low cardiac risk for hip arthroplasty    Follow-up as needed with cardiology.    Jacinto Dubose MD  Cardiology - [facility]  Pager:  [phone]  Text Page  July 29, 2025

## 2025-07-29 ENCOUNTER — OFFICE VISIT (OUTPATIENT)
Dept: CARDIOLOGY | Facility: CLINIC | Age: 80
End: 2025-07-29
Payer: COMMERCIAL

## 2025-07-29 VITALS
SYSTOLIC BLOOD PRESSURE: 119 MMHG | DIASTOLIC BLOOD PRESSURE: 81 MMHG | RESPIRATION RATE: 16 BRPM | HEIGHT: 62 IN | HEART RATE: 68 BPM | WEIGHT: 133.3 LBS | OXYGEN SATURATION: 97 % | BODY MASS INDEX: 24.53 KG/M2

## 2025-07-29 DIAGNOSIS — R00.2 PALPITATIONS: ICD-10-CM

## 2025-07-29 NOTE — PATIENT INSTRUCTIONS
Your recent heart monitor showed that your heart was in a normal rhythm.  You had a few skipped heartbeats which is a normal finding.  Sometimes these correlated with your palpitations, sometimes they did not.  These skipped beats may partially explain some of your palpitations.  If your symptoms are bad enough, there are some medications that can be used to decrease palpitation symptoms.  However this is nothing dangerous and nothing really needs to be done

## 2025-07-29 NOTE — LETTER
7/29/2025    Janett Reid MD  [id number] [facility] [id number]    RE: Kristen Thompson       Dear Colleague,     I had the pleasure of seeing Kristen Thompson in the [facility].  CARDIOLOGY CONSULT    REASON FOR CONSULT: Palpitations    PRIMARY CARE PHYSICIAN:  Janett Reid    HISTORY OF PRESENT ILLNESS:  79-year-old female seen for palpitations.  She has dyslipidemia, hypertension.     Angiogram 2017 showed normal coronary arteries.  She had A. fib during the angiogram.  3-day ZIO monitor showed sinus rhythm, 7% PACs with runs up to 6 beats, no A. fib.     February 2021 she was evaluated for right-sided weakness.  Head CT showed no acute finding.  She was given TPA.  She was diagnosed with right thalamus stroke by MRI. Echo February 2021 showed EF 60%, normal RV, no valve disease, negative bubble study.  Discharged on aspirin and 21 days of clopidogrel. 30-day event monitor showed sinus rhythm, no A. fib, mild to moderate PVC burden.    Zio monitor July 2025 showed sinus rhythm, average ectopy, few short SVT runs up to 13 beats, several triggers correlating with ectopy, some during sinus.    She has had intermittent palpitations for years, they have been a little worse this summer.  She will feel some kicking or skipping beats, occasionally will last up to 1 minute, but typically is less.  She has no lightheadedness, dizziness, chest pain, or syncope.    She has hip pain and a recent femoral fracture.  She is scheduled for right total hip arthroplasty on August 19.    PAST MEDICAL HISTORY:  Past Medical History:   Diagnosis Date     Arthritis 11/19/2013     Cerebrovascular accident (CVA), unspecified mechanism (H) 03/02/2021     Gastro-oesophageal reflux disease      GERD (gastroesophageal reflux disease) 10/02/2012     H/O total hysterectomy      HL (hearing loss) 10/11/2012     Malignant melanoma (H)      PONV (postoperative nausea and vomiting)      Squamous cell carcinoma         MEDICATIONS:  Current Outpatient Medications   Medication Sig Dispense Refill     acetaminophen (TYLENOL) 500 MG tablet Take 1,000 mg by mouth at bedtime.       amLODIPine (NORVASC) 5 MG tablet Take 1 tablet (5 mg) by mouth daily. 90 tablet 3     artificial tears OINT ophthalmic ointment Place 1 inch into both eyes at bedtime.       aspirin (ASA) 325 MG EC tablet Take 1 tablet (325 mg) by mouth daily. 30 tablet 0     aspirin 81 MG EC tablet Take 1 tablet (81 mg) by mouth daily.       atorvastatin (LIPITOR) 10 MG tablet Take 1 tablet (10 mg) by mouth daily. 90 tablet 3     azelastine 137 MCG/SPRAY SOLN [place] 2 sprays into both nostrils 2 times daily as needed. 90 mL 2     B Complex-Folic Acid (B COMPLEX PLUS PO) Take 1 capsule by mouth daily.       CALCIUM PO Take 2 tablets by mouth daily.       Cholecalciferol (VITAMIN D PO) Take 1,000 Units by mouth daily        DULoxetine (CYMBALTA) 60 MG capsule Take 1 capsule (60 mg) by mouth daily. 90 capsule 3     Fluticasone Propionate (FLONASE ALLERGY RELIEF NA) Spray 2 sprays into both nostrils daily as needed.       modafinil (PROVIGIL) 100 MG tablet Take 100 mg by mouth daily as needed.       NEW MED Take 1 Gum by mouth at bedtime.       Omega-3 Fatty Acids (OMEGA 3 PO) Take 2 capsules by mouth daily        omeprazole (PRILOSEC) 20 MG DR capsule Take 1 capsule (20 mg) by mouth 2 times daily. 180 capsule 3     ondansetron (ZOFRAN ODT) 4 MG ODT tab Take 1 tablet (4 mg) by mouth every 8 hours as needed for nausea. 10 tablet 0     Polyethyl Glycol-Propyl Glycol (SYSTANE FREE OP) Place 2 drops into both eyes every 2 hours as needed (dry eyes).       Polyethylene Glycol 3350 (MIRALAX PO) Take 17 g by mouth every evening.       traZODone (DESYREL) 50 MG tablet Take 50 mg by mouth at bedtime.       No current facility-administered medications for this visit.       ALLERGIES:  Allergies   Allergen Reactions     Codeine Sulfate      Nausea and vomiting      Quinapril  Difficulty breathing     Darvon-N [Propoxyphene Napsylate] Nausea and Vomiting       SOCIAL HISTORY:  I have reviewed this patient's social history and updated it with pertinent information if needed. Kristen Thompson  reports that she has never smoked. She has never used smokeless tobacco. She reports that she does not drink alcohol and does not use drugs.    FAMILY HISTORY:  I have reviewed this patient's family history and updated it with pertinent information if needed.   Family History   Problem Relation Age of Onset     C.A.D. Mother      Cardiovascular Mother      Thyroid Disease Mother      Cancer Father         stomach ca     Cancer Sister      Eye Disorder Sister      C.A.D. Sister      Cerebrovascular Disease Sister      Breast Cancer Sister      Arthritis Sister      Cardiovascular Sister      Depression Sister      Heart Disease Sister      Neurologic Disorder Sister      Osteoporosis Sister      Thyroid Disease Sister      Depression Sister      Thyroid Disease Sister      Depression Sister      Thyroid Disease Sister      Obesity Sister      Neurologic Disorder Sister        REVIEW OF SYSTEMS:  Constitutional:  No weight loss, fever, chills  HEENT:  Eyes:  No visual loss, blurred vision, double vision or yellow sclerae. No hearing loss, sneezing, congestion, runny nose or sore throat.  Skin:  No rash or itching.  Cardiovascular: per HPI  Respiratory: per HPI  GI:  No anorexia, nausea, vomiting or diarrhea. No abdominal pain or blood.  :  No dysurea, hematuria  Neurologic:  No headache, paralysis, ataxia, numbness or tingling in the extremities. No change in bowel or bladder control.  Musculoskeletal:  No muscle pain  Hematologic:  No bleeding or bruising.  Lymphatics:  No enlarged nodes. No history of splenectomy.  Endocrine:  No reports of sweating, cold or heat intolerance. No polyuria or polydipsia.  Allergies:  No history of asthma, hives, eczema or rhinitis.    PHYSICAL EXAM:  /81 (BP  "Location: Right arm, Patient Position: Sitting, Cuff Size: Adult Regular)   Pulse 68   Resp 16   Ht 1.581 m (5' 2.25\")   Wt 60.5 kg (133 lb 4.8 oz)   SpO2 97%   BMI 24.19 kg/m      Constitutional: awake, alert, no distress  Eyes: PERRL, sclera nonicteric  ENT: trachea midline  Respiratory: lungs clear  Cardiovascular: Regular rate and rhythm, no murmurs  GI: nondistended, nontender, bowel sounds present  Lymph/Hematologic: no lymphadenopathy  Skin: dry, no rash  Musculoskeletal: good muscle tone, strength 5/5 in upper and lower extremities  Neurologic: no focal deficits  Neuropsychiatric: appropriate affact    DATA:  Labs:   Recent Labs   Lab Test 06/25/25  1428 11/13/24  0914   CHOL 175 192   HDL 70 83   LDL 91 96   TRIG 68 63   June 2025: Potassium 3.9, creatinine 0.6    EKG, June 25: Sinus rhythm, rate 65    ASSESSMENT:  79-year-old female seen for palpitations.  She is probably feeling some ectopy.  She had 2% PACs on recent monitor, some of her triggers correlated with ectopy.  She had a higher PAC burden a few years ago.  There was no A-fib or other concerning arrhythmia, no significant bradycardia.  She has no evidence of angina or heart failure type symptoms.  No treatment was recommended, if symptoms worsen she could try metoprolol.  However she is quite adamant she would prefer to avoid medications unless necessary.    Patient is low cardiac risk for upcoming hip arthroplasty.    RECOMMENDATIONS:  1.  Palpitations, suspected secondary to PACs  - No treatment    2.  Preop cardiac evaluation  - Patient is low cardiac risk for hip arthroplasty    Follow-up as needed with cardiology.    Jacinto Dubose MD  Cardiology - [facility]  Pager:  [phone]  Text Page  July 29, 2025        Thank you for allowing me to participate in the care of your patient.      Sincerely,     Jacinto Dubose MD     [facility]  cc:   AHMET Whitten " CNP  [id number] [id number]

## 2025-08-13 ENCOUNTER — OFFICE VISIT (OUTPATIENT)
Dept: FAMILY MEDICINE | Facility: CLINIC | Age: 80
End: 2025-08-13
Payer: COMMERCIAL

## 2025-08-13 VITALS
WEIGHT: 130 LBS | OXYGEN SATURATION: 95 % | SYSTOLIC BLOOD PRESSURE: 122 MMHG | RESPIRATION RATE: 12 BRPM | HEART RATE: 69 BPM | DIASTOLIC BLOOD PRESSURE: 80 MMHG | HEIGHT: 62 IN | TEMPERATURE: 98.5 F | BODY MASS INDEX: 23.92 KG/M2

## 2025-08-13 DIAGNOSIS — I69.351 HEMIPLEGIA AND HEMIPARESIS FOLLOWING CEREBRAL INFARCTION AFFECTING RIGHT DOMINANT SIDE (H): ICD-10-CM

## 2025-08-13 DIAGNOSIS — Z01.818 PREOP GENERAL PHYSICAL EXAM: Primary | ICD-10-CM

## 2025-08-13 DIAGNOSIS — M16.11 ARTHRITIS OF RIGHT HIP: ICD-10-CM

## 2025-08-13 DIAGNOSIS — H91.93 BILATERAL HEARING LOSS, UNSPECIFIED HEARING LOSS TYPE: ICD-10-CM

## 2025-08-13 DIAGNOSIS — Z86.73 HISTORY OF STROKE: ICD-10-CM

## 2025-08-13 DIAGNOSIS — Z86.2 HISTORY OF ANEMIA: ICD-10-CM

## 2025-08-13 PROBLEM — I69.359 HEMIPLEGIA AND HEMIPARESIS FOLLOWING CEREBRAL INFARCTION AFFECTING UNSPECIFIED SIDE (H): Status: RESOLVED | Noted: 2022-04-12 | Resolved: 2025-08-13

## 2025-08-13 LAB
HGB BLD-MCNC: 14.3 G/DL (ref 11.7–15.7)
INR PPP: 1.03 (ref 0.85–1.15)
MCV RBC AUTO: 97.8 FL (ref 78–100)
PROTHROMBIN TIME: 13.4 SECONDS (ref 11.8–14.8)

## 2025-08-13 PROCEDURE — 3074F SYST BP LT 130 MM HG: CPT | Performed by: FAMILY MEDICINE

## 2025-08-13 PROCEDURE — 85018 HEMOGLOBIN: CPT | Performed by: FAMILY MEDICINE

## 2025-08-13 PROCEDURE — 3079F DIAST BP 80-89 MM HG: CPT | Performed by: FAMILY MEDICINE

## 2025-08-13 PROCEDURE — 99214 OFFICE O/P EST MOD 30 MIN: CPT | Performed by: FAMILY MEDICINE

## 2025-08-13 PROCEDURE — 36415 COLL VENOUS BLD VENIPUNCTURE: CPT | Performed by: FAMILY MEDICINE

## 2025-08-13 PROCEDURE — 85610 PROTHROMBIN TIME: CPT | Performed by: FAMILY MEDICINE

## 2025-08-18 ENCOUNTER — ANESTHESIA EVENT (OUTPATIENT)
Dept: SURGERY | Facility: CLINIC | Age: 80
End: 2025-08-18
Payer: COMMERCIAL

## 2025-08-19 ENCOUNTER — HOSPITAL ENCOUNTER (OUTPATIENT)
Facility: CLINIC | Age: 80
Discharge: HOME | End: 2025-08-20
Attending: ORTHOPAEDIC SURGERY | Admitting: ORTHOPAEDIC SURGERY
Payer: COMMERCIAL

## 2025-08-19 ENCOUNTER — APPOINTMENT (OUTPATIENT)
Dept: GENERAL RADIOLOGY | Facility: CLINIC | Age: 80
End: 2025-08-19
Attending: ORTHOPAEDIC SURGERY
Payer: COMMERCIAL

## 2025-08-19 ENCOUNTER — ANESTHESIA (OUTPATIENT)
Dept: SURGERY | Facility: CLINIC | Age: 80
End: 2025-08-19
Payer: COMMERCIAL

## 2025-08-19 PROBLEM — Z96.641 STATUS POST RIGHT HIP REPLACEMENT: Status: ACTIVE | Noted: 2025-08-19

## 2025-08-19 PROCEDURE — 258N000003 HC RX IP 258 OP 636: Performed by: NURSE ANESTHETIST, CERTIFIED REGISTERED

## 2025-08-19 PROCEDURE — 250N000011 HC RX IP 250 OP 636: Performed by: NURSE ANESTHETIST, CERTIFIED REGISTERED

## 2025-08-19 PROCEDURE — 999N000065 XR PELVIS PORT 1/2 VIEWS

## 2025-08-19 PROCEDURE — 250N000009 HC RX 250: Performed by: NURSE ANESTHETIST, CERTIFIED REGISTERED

## 2025-08-19 PROCEDURE — 250N000011 HC RX IP 250 OP 636

## 2025-08-19 RX ORDER — BUPIVACAINE HYDROCHLORIDE 7.5 MG/ML
INJECTION, SOLUTION INTRASPINAL
Status: COMPLETED | OUTPATIENT
Start: 2025-08-19 | End: 2025-08-19

## 2025-08-19 RX ORDER — ONDANSETRON 2 MG/ML
INJECTION INTRAMUSCULAR; INTRAVENOUS PRN
Status: DISCONTINUED | OUTPATIENT
Start: 2025-08-19 | End: 2025-08-19

## 2025-08-19 RX ORDER — EPHEDRINE SULFATE 50 MG/ML
INJECTION, SOLUTION INTRAMUSCULAR; INTRAVENOUS; SUBCUTANEOUS PRN
Status: DISCONTINUED | OUTPATIENT
Start: 2025-08-19 | End: 2025-08-19

## 2025-08-19 RX ORDER — PROPOFOL 10 MG/ML
INJECTION, EMULSION INTRAVENOUS CONTINUOUS PRN
Status: DISCONTINUED | OUTPATIENT
Start: 2025-08-19 | End: 2025-08-19

## 2025-08-19 RX ORDER — FENTANYL CITRATE-0.9 % NACL/PF 10 MCG/ML
PLASTIC BAG, INJECTION (ML) INTRAVENOUS CONTINUOUS PRN
Status: DISCONTINUED | OUTPATIENT
Start: 2025-08-19 | End: 2025-08-19

## 2025-08-19 RX ORDER — DEXAMETHASONE SODIUM PHOSPHATE 4 MG/ML
INJECTION, SOLUTION INTRA-ARTICULAR; INTRALESIONAL; INTRAMUSCULAR; INTRAVENOUS; SOFT TISSUE PRN
Status: DISCONTINUED | OUTPATIENT
Start: 2025-08-19 | End: 2025-08-19

## 2025-08-19 RX ORDER — MAGNESIUM SULFATE HEPTAHYDRATE 40 MG/ML
INJECTION, SOLUTION INTRAVENOUS PRN
Status: DISCONTINUED | OUTPATIENT
Start: 2025-08-19 | End: 2025-08-19

## 2025-08-19 RX ORDER — FENTANYL CITRATE 0.05 MG/ML
INJECTION, SOLUTION INTRAMUSCULAR; INTRAVENOUS PRN
Status: DISCONTINUED | OUTPATIENT
Start: 2025-08-19 | End: 2025-08-19

## 2025-08-19 RX ADMIN — ONDANSETRON 4 MG: 2 INJECTION INTRAMUSCULAR; INTRAVENOUS at 10:57

## 2025-08-19 RX ADMIN — MAGNESIUM SULFATE HEPTAHYDRATE 2 G: 40 INJECTION, SOLUTION INTRAVENOUS at 11:28

## 2025-08-19 RX ADMIN — PROPOFOL 100 MCG/KG/MIN: 10 INJECTION, EMULSION INTRAVENOUS at 10:52

## 2025-08-19 RX ADMIN — FENTANYL CITRATE 50 MCG: 0.05 INJECTION, SOLUTION INTRAMUSCULAR; INTRAVENOUS at 10:44

## 2025-08-19 RX ADMIN — BUPIVACAINE HYDROCHLORIDE IN DEXTROSE 1.6 ML: 7.5 INJECTION, SOLUTION SUBARACHNOID at 10:45

## 2025-08-19 RX ADMIN — Medication 10 MG: at 11:52

## 2025-08-19 RX ADMIN — Medication 2 G: at 10:39

## 2025-08-19 RX ADMIN — Medication 40 MCG/MIN: at 11:07

## 2025-08-19 RX ADMIN — PHENYLEPHRINE HYDROCHLORIDE 100 MCG: 10 INJECTION INTRAVENOUS at 11:07

## 2025-08-19 RX ADMIN — Medication 5 MG: at 11:47

## 2025-08-19 RX ADMIN — PHENYLEPHRINE HYDROCHLORIDE 100 MCG: 10 INJECTION INTRAVENOUS at 11:43

## 2025-08-19 RX ADMIN — DEXAMETHASONE SODIUM PHOSPHATE 4 MG: 4 INJECTION, SOLUTION INTRA-ARTICULAR; INTRALESIONAL; INTRAMUSCULAR; INTRAVENOUS; SOFT TISSUE at 10:57

## 2025-08-19 RX ADMIN — Medication 10 MG: at 11:49

## 2025-08-19 RX ADMIN — PHENYLEPHRINE HYDROCHLORIDE 100 MCG: 10 INJECTION INTRAVENOUS at 11:41

## 2025-08-19 RX ADMIN — FENTANYL CITRATE 50 MCG: 0.05 INJECTION, SOLUTION INTRAMUSCULAR; INTRAVENOUS at 11:13

## 2025-08-19 RX ADMIN — PHENYLEPHRINE HYDROCHLORIDE 100 MCG: 10 INJECTION INTRAVENOUS at 11:55

## 2025-08-19 ASSESSMENT — ACTIVITIES OF DAILY LIVING (ADL)
ADLS_ACUITY_SCORE: 31
ADLS_ACUITY_SCORE: 39
ADLS_ACUITY_SCORE: 40
ADLS_ACUITY_SCORE: 39
ADLS_ACUITY_SCORE: 49
ADLS_ACUITY_SCORE: 47
ADLS_ACUITY_SCORE: 49
ADLS_ACUITY_SCORE: 47
ADLS_ACUITY_SCORE: 49
ADLS_ACUITY_SCORE: 39
ADLS_ACUITY_SCORE: 49

## 2025-08-20 ASSESSMENT — ACTIVITIES OF DAILY LIVING (ADL)
ADLS_ACUITY_SCORE: 47
ADLS_ACUITY_SCORE: 48
ADLS_ACUITY_SCORE: 47
ADLS_ACUITY_SCORE: 48
ADLS_ACUITY_SCORE: 48
ADLS_ACUITY_SCORE: 47
ADLS_ACUITY_SCORE: 48

## (undated) DEVICE — SU VICRYL 0 CT-1 36" J946H

## (undated) DEVICE — GOWN XLG DISP 9545

## (undated) DEVICE — DRSG GAUZE 4X4" TRAY

## (undated) DEVICE — PREP CHLORAPREP 26ML TINTED ORANGE  260815

## (undated) DEVICE — BASIN SET MINOR DISP

## (undated) DEVICE — SOL NACL 0.9% IRRIG 1000ML BOTTLE 07138-09

## (undated) DEVICE — SU MONOCRYL 3-0 PS-2 18" UND Y497G

## (undated) DEVICE — GUIDE WIRE 2.5X200MM 310.243

## (undated) DEVICE — Device

## (undated) DEVICE — DECANTER VIAL 2006S

## (undated) DEVICE — PACK LAPAROTOMY

## (undated) DEVICE — GLOVE BIOGEL PI MICRO INDICATOR UNDERGLOVE SZ 8.0 48980

## (undated) DEVICE — GLOVE BIOGEL PI MICRO SZ 6.5 48565

## (undated) DEVICE — SOL WATER IRRIG 1000ML BOTTLE 07139-09

## (undated) DEVICE — DRILL BIT SYN 4.3X180MM  310.431

## (undated) DEVICE — ADH SKIN CLOSURE PREMIERPRO EXOFIN 1.0ML 3470

## (undated) DEVICE — SU VICRYL 2-0 CT-2 27" UND J269H

## (undated) DEVICE — STOCKING SLEEVE COMPRESSION CALF MED

## (undated) DEVICE — GLOVE BIOGEL PI ULTRATOUCH G SZ 7.5 42175

## (undated) DEVICE — MARKER MARGIN MARKER STD 6 COLOR SGL USE MMS6

## (undated) DEVICE — SU MONOCRYL 4-0 PS-2 18" UND Y496G

## (undated) DEVICE — DRAPE C-ARM 60X42" 1013

## (undated) DEVICE — BLADE KNIFE SURG 15 371115

## (undated) DEVICE — DRAPE C-ARMOR 5 SIDED 5523

## (undated) DEVICE — GOWN IMPERVIOUS SPECIALTY XLG/XLONG 32474

## (undated) DEVICE — GLOVE BIOGEL PI MICRO INDICATOR UNDERGLOVE SZ 7.0 48970

## (undated) DEVICE — DRSG ABDOMINAL 07 1/2X8" 7197D

## (undated) DEVICE — GLOVE PROTEXIS W/NEU-THERA 7.0  2D73TE70

## (undated) DEVICE — DRILL BIT SYN QUICK COUPLING 4.3X300MM 324.213

## (undated) DEVICE — GLOVE PROTEXIS W/NEU-THERA 7.5  2D73TE75

## (undated) DEVICE — GOWN LG DISP 9515

## (undated) DEVICE — DRSG STERI STRIP 1X5" R1548

## (undated) DEVICE — DRAPE SHEET REV FOLD 3/4 9349

## (undated) DEVICE — SUCTION TIP YANKAUER STR K87

## (undated) DEVICE — SUCTION MANIFOLD NEPTUNE 2 SYS 1 PORT 702-025-000

## (undated) DEVICE — SU VICRYL 2-0 CT-1 36" UND J945H

## (undated) DEVICE — SYR BULB IRRIG DOVER 60 ML LATEX FREE 67000

## (undated) DEVICE — SYR 10ML FINGER CONTROL W/O NDL 309695

## (undated) DEVICE — SU VICRYL 3-0 SH 27" UND J416H

## (undated) DEVICE — TUBING SUCTION 12"X1/4" N612

## (undated) DEVICE — DRAPE BREAST/CHEST 29420

## (undated) DEVICE — DRILL TIP GUIDE WIRE 2.5X300MM

## (undated) DEVICE — GLOVE PROTEXIS BLUE W/NEU-THERA 7.0  2D73EB70

## (undated) DEVICE — GLOVE PROTEXIS BLUE W/NEU-THERA 8.0  2D73EB80

## (undated) RX ORDER — PHENYLEPHRINE HYDROCHLORIDE 10 MG/ML
INJECTION INTRAVENOUS
Status: DISPENSED
Start: 2022-08-22

## (undated) RX ORDER — GLYCOPYRROLATE 0.2 MG/ML
INJECTION, SOLUTION INTRAMUSCULAR; INTRAVENOUS
Status: DISPENSED
Start: 2022-08-22

## (undated) RX ORDER — BUPIVACAINE HYDROCHLORIDE AND EPINEPHRINE 5; 5 MG/ML; UG/ML
INJECTION, SOLUTION EPIDURAL; INTRACAUDAL; PERINEURAL
Status: DISPENSED
Start: 2022-08-22

## (undated) RX ORDER — FENTANYL CITRATE-0.9 % NACL/PF 10 MCG/ML
PLASTIC BAG, INJECTION (ML) INTRAVENOUS
Status: DISPENSED
Start: 2022-08-22

## (undated) RX ORDER — ROPIVACAINE HYDROCHLORIDE 5 MG/ML
INJECTION, SOLUTION EPIDURAL; INFILTRATION; PERINEURAL
Status: DISPENSED
Start: 2025-03-22

## (undated) RX ORDER — LIDOCAINE HYDROCHLORIDE 10 MG/ML
INJECTION, SOLUTION EPIDURAL; INFILTRATION; INTRACAUDAL; PERINEURAL
Status: DISPENSED
Start: 2021-08-10

## (undated) RX ORDER — FENTANYL CITRATE 50 UG/ML
INJECTION, SOLUTION INTRAMUSCULAR; INTRAVENOUS
Status: DISPENSED
Start: 2022-08-22

## (undated) RX ORDER — PROPOFOL 10 MG/ML
INJECTION, EMULSION INTRAVENOUS
Status: DISPENSED
Start: 2025-03-22

## (undated) RX ORDER — DEXAMETHASONE SODIUM PHOSPHATE 4 MG/ML
INJECTION, SOLUTION INTRA-ARTICULAR; INTRALESIONAL; INTRAMUSCULAR; INTRAVENOUS; SOFT TISSUE
Status: DISPENSED
Start: 2022-08-22

## (undated) RX ORDER — BUPIVACAINE HYDROCHLORIDE 5 MG/ML
INJECTION, SOLUTION EPIDURAL; INTRACAUDAL; PERINEURAL
Status: DISPENSED
Start: 2025-03-22

## (undated) RX ORDER — FENTANYL CITRATE-0.9 % NACL/PF 10 MCG/ML
PLASTIC BAG, INJECTION (ML) INTRAVENOUS
Status: DISPENSED
Start: 2025-03-22

## (undated) RX ORDER — POTASSIUM CHLORIDE 1500 MG/1
TABLET, EXTENDED RELEASE ORAL
Status: DISPENSED
Start: 2017-01-25

## (undated) RX ORDER — LIDOCAINE HYDROCHLORIDE AND EPINEPHRINE 10; 10 MG/ML; UG/ML
INJECTION, SOLUTION INFILTRATION; PERINEURAL
Status: DISPENSED
Start: 2025-03-22

## (undated) RX ORDER — ONDANSETRON 2 MG/ML
INJECTION INTRAMUSCULAR; INTRAVENOUS
Status: DISPENSED
Start: 2025-03-22

## (undated) RX ORDER — DEXAMETHASONE SODIUM PHOSPHATE 4 MG/ML
INJECTION, SOLUTION INTRA-ARTICULAR; INTRALESIONAL; INTRAMUSCULAR; INTRAVENOUS; SOFT TISSUE
Status: DISPENSED
Start: 2025-03-22

## (undated) RX ORDER — NITROGLYCERIN 5 MG/ML
VIAL (ML) INTRAVENOUS
Status: DISPENSED
Start: 2017-01-25

## (undated) RX ORDER — CEFAZOLIN SODIUM/WATER 2 G/20 ML
SYRINGE (ML) INTRAVENOUS
Status: DISPENSED
Start: 2022-08-22

## (undated) RX ORDER — LIDOCAINE HYDROCHLORIDE 10 MG/ML
INJECTION, SOLUTION EPIDURAL; INFILTRATION; INTRACAUDAL; PERINEURAL
Status: DISPENSED
Start: 2022-07-12

## (undated) RX ORDER — ACETAMINOPHEN 325 MG/1
TABLET ORAL
Status: DISPENSED
Start: 2022-08-22

## (undated) RX ORDER — DEXAMETHASONE SODIUM PHOSPHATE 10 MG/ML
INJECTION, SOLUTION INTRAMUSCULAR; INTRAVENOUS
Status: DISPENSED
Start: 2021-08-10

## (undated) RX ORDER — BUPIVACAINE HYDROCHLORIDE 2.5 MG/ML
INJECTION, SOLUTION EPIDURAL; INFILTRATION; INTRACAUDAL
Status: DISPENSED
Start: 2021-08-10

## (undated) RX ORDER — NITROGLYCERIN 0.4 MG/1
TABLET SUBLINGUAL
Status: DISPENSED
Start: 2017-01-25

## (undated) RX ORDER — LIDOCAINE HYDROCHLORIDE 10 MG/ML
INJECTION, SOLUTION EPIDURAL; INFILTRATION; INTRACAUDAL; PERINEURAL
Status: DISPENSED
Start: 2022-08-22

## (undated) RX ORDER — PROPOFOL 10 MG/ML
INJECTION, EMULSION INTRAVENOUS
Status: DISPENSED
Start: 2022-08-22

## (undated) RX ORDER — FENTANYL CITRATE 50 UG/ML
INJECTION, SOLUTION INTRAMUSCULAR; INTRAVENOUS
Status: DISPENSED
Start: 2017-01-25

## (undated) RX ORDER — VERAPAMIL HYDROCHLORIDE 2.5 MG/ML
INJECTION, SOLUTION INTRAVENOUS
Status: DISPENSED
Start: 2017-01-25

## (undated) RX ORDER — CEFAZOLIN SODIUM/WATER 2 G/20 ML
SYRINGE (ML) INTRAVENOUS
Status: DISPENSED
Start: 2025-03-22

## (undated) RX ORDER — ONDANSETRON 2 MG/ML
INJECTION INTRAMUSCULAR; INTRAVENOUS
Status: DISPENSED
Start: 2022-08-22

## (undated) RX ORDER — MAGNESIUM SULFATE HEPTAHYDRATE 40 MG/ML
INJECTION, SOLUTION INTRAVENOUS
Status: DISPENSED
Start: 2022-08-22

## (undated) RX ORDER — HEPARIN SODIUM 1000 [USP'U]/ML
INJECTION, SOLUTION INTRAVENOUS; SUBCUTANEOUS
Status: DISPENSED
Start: 2017-01-25

## (undated) RX ORDER — LIDOCAINE HYDROCHLORIDE 10 MG/ML
INJECTION, SOLUTION INFILTRATION; PERINEURAL
Status: DISPENSED
Start: 2022-08-22